# Patient Record
Sex: MALE | Race: WHITE | NOT HISPANIC OR LATINO | Employment: OTHER | URBAN - METROPOLITAN AREA
[De-identification: names, ages, dates, MRNs, and addresses within clinical notes are randomized per-mention and may not be internally consistent; named-entity substitution may affect disease eponyms.]

---

## 2019-12-16 LAB
ANION GAP SERPL CALCULATED.3IONS-SCNC: 12 MMOL/L (ref 10–20)
BICARBONATE: 32 MMOL/L (ref 21–32)
CALCIUM SERPL-MCNC: 9.6 MG/DL (ref 8.6–10.3)
CHLORIDE BLD-SCNC: 93 MMOL/L (ref 98–107)
CREAT SERPL-MCNC: 0.93 MG/DL (ref 0.5–1.3)
ERYTHROCYTE [DISTWIDTH] IN BLOOD BY AUTOMATED COUNT: 14 % (ref 11.5–14)
GFR AFRICAN AMERICAN: >60 ML/MIN/1.73M2
GFR NON-AFRICAN AMERICAN: >60 ML/MIN/1.73M2
GLUCOSE: 109 MG/DL (ref 74–99)
HCT VFR BLD CALC: 48.9 % (ref 41–52)
HEMOGLOBIN: 16.3 G/DL (ref 13.5–17)
MCHC RBC AUTO-ENTMCNC: 33.3 G/DL (ref 32–36)
MCV RBC AUTO: 109 FL (ref 80–100)
NUCLEATED RBCS: 0 /100 WBC (ref 0–0)
PLATELET # BLD: 304 X10E9/L (ref 150–450)
POTASSIUM SERPL-SCNC: 3.6 MMOL/L (ref 3.5–5.3)
RBC # BLD: 4.5 X10E12/L (ref 4.5–5.9)
SODIUM BLD-SCNC: 133 MMOL/L (ref 136–145)
UREA NITROGEN: 11 MG/DL (ref 6–23)
WBC: 9.2 X10E9/L (ref 4.4–11.3)

## 2019-12-17 LAB
ABO: NORMAL
ANTIBODY SCREEN: NORMAL
RH TYPE: NORMAL

## 2019-12-28 LAB
BASOPHILS # BLD: 0.01 X10E9/L (ref 0–0.1)
BASOPHILS RELATIVE PERCENT: 0.1 % (ref 0–2)
EOSINOPHIL # BLD: 0.06 X10E9/L (ref 0–0.7)
EOSINOPHILS RELATIVE PERCENT: 0.5 % (ref 0–6)
ERYTHROCYTE [DISTWIDTH] IN BLOOD BY AUTOMATED COUNT: 12.6 % (ref 11.5–14)
HCT VFR BLD CALC: 41.5 % (ref 41–52)
HEMOGLOBIN: 13.4 G/DL (ref 13.5–17)
IMMATURE GRANULOCYTES %: 0.7 % (ref 0–0.9)
LYMPHOCYTES # BLD: 12.7 % (ref 13–44)
LYMPHOCYTES RELATIVE PERCENT: 1.56 X10E9/L (ref 1.2–4.8)
MCHC RBC AUTO-ENTMCNC: 32.3 G/DL (ref 32–36)
MCV RBC AUTO: 109 FL (ref 80–100)
MONOCYTES # BLD: 1.18 X10E9/L (ref 0.1–1)
MONOCYTES RELATIVE PERCENT: 9.6 % (ref 2–10)
NEUTROPHILS RELATIVE PERCENT: 76.4 % (ref 40–80)
NEUTROPHILS: 9.41 X10E9/L (ref 1.2–7.7)
NUCLEATED RBCS: 0 /100 WBC (ref 0–0)
PLATELET # BLD: 228 X10E9/L (ref 150–450)
RBC # BLD: 3.79 X10E12/L (ref 4.5–5.9)
WBC: 12.3 X10E9/L (ref 4.4–11.3)

## 2019-12-29 LAB
BASOPHILS # BLD: 0.01 X10E9/L (ref 0–0.1)
BASOPHILS RELATIVE PERCENT: 0.1 % (ref 0–2)
EOSINOPHIL # BLD: 0.23 X10E9/L (ref 0–0.7)
EOSINOPHILS RELATIVE PERCENT: 1.9 % (ref 0–6)
ERYTHROCYTE [DISTWIDTH] IN BLOOD BY AUTOMATED COUNT: 12.8 % (ref 11.5–14)
HCT VFR BLD CALC: 36.1 % (ref 41–52)
HEMOGLOBIN: 11.9 G/DL (ref 13.5–17)
IMMATURE GRANULOCYTES %: 0.6 % (ref 0–0.9)
LYMPHOCYTES # BLD: 14.5 % (ref 13–44)
LYMPHOCYTES RELATIVE PERCENT: 1.73 X10E9/L (ref 1.2–4.8)
MCHC RBC AUTO-ENTMCNC: 33 G/DL (ref 32–36)
MCV RBC AUTO: 106 FL (ref 80–100)
MONOCYTES # BLD: 1.55 X10E9/L (ref 0.1–1)
MONOCYTES RELATIVE PERCENT: 13 % (ref 2–10)
NEUTROPHILS RELATIVE PERCENT: 69.9 % (ref 40–80)
NEUTROPHILS: 8.31 X10E9/L (ref 1.2–7.7)
NUCLEATED RBCS: 0 /100 WBC (ref 0–0)
PLATELET # BLD: 212 X10E9/L (ref 150–450)
RBC # BLD: 3.4 X10E12/L (ref 4.5–5.9)
WBC: 11.9 X10E9/L (ref 4.4–11.3)

## 2019-12-30 LAB
BASOPHILS # BLD: 0.04 X10E9/L (ref 0–0.1)
BASOPHILS RELATIVE PERCENT: 0.4 % (ref 0–2)
EOSINOPHIL # BLD: 0.17 X10E9/L (ref 0–0.7)
EOSINOPHILS RELATIVE PERCENT: 1.6 % (ref 0–6)
ERYTHROCYTE [DISTWIDTH] IN BLOOD BY AUTOMATED COUNT: 12.7 % (ref 11.5–14)
HCT VFR BLD CALC: 34.7 % (ref 41–52)
HEMOGLOBIN: 11.5 G/DL (ref 13.5–17)
IMMATURE GRANULOCYTES %: 1 % (ref 0–0.9)
LYMPHOCYTES # BLD: 14.6 % (ref 13–44)
LYMPHOCYTES RELATIVE PERCENT: 1.58 X10E9/L (ref 1.2–4.8)
MCHC RBC AUTO-ENTMCNC: 33.1 G/DL (ref 32–36)
MCV RBC AUTO: 107 FL (ref 80–100)
MONOCYTES # BLD: 1.32 X10E9/L (ref 0.1–1)
MONOCYTES RELATIVE PERCENT: 12.2 % (ref 2–10)
NEUTROPHILS RELATIVE PERCENT: 70.2 % (ref 40–80)
NEUTROPHILS: 7.58 X10E9/L (ref 1.2–7.7)
NUCLEATED RBCS: 0 /100 WBC (ref 0–0)
PLATELET # BLD: 270 X10E9/L (ref 150–450)
RBC # BLD: 3.24 X10E12/L (ref 4.5–5.9)
WBC: 10.8 X10E9/L (ref 4.4–11.3)

## 2020-01-07 LAB — SURGICAL PATHOLOGY REPORT: NORMAL

## 2023-01-29 PROBLEM — M53.3 SACROILIAC PAIN: Status: ACTIVE | Noted: 2023-01-29

## 2023-01-29 PROBLEM — M25.522 LEFT ELBOW PAIN: Status: ACTIVE | Noted: 2023-01-29

## 2023-01-29 PROBLEM — N40.0 BENIGN ENLARGEMENT OF PROSTATE: Status: ACTIVE | Noted: 2023-01-29

## 2023-01-29 PROBLEM — R10.9 ABDOMINAL PAIN: Status: ACTIVE | Noted: 2023-01-29

## 2023-01-29 PROBLEM — M54.16 LUMBAR RADICULOPATHY: Status: ACTIVE | Noted: 2023-01-29

## 2023-01-29 PROBLEM — E78.5 HYPERLIPIDEMIA: Status: ACTIVE | Noted: 2023-01-29

## 2023-01-29 PROBLEM — R29.6 FALLING: Status: ACTIVE | Noted: 2023-01-29

## 2023-01-29 PROBLEM — Z86.79 HISTORY OF PAROXYSMAL SUPRAVENTRICULAR TACHYCARDIA: Status: ACTIVE | Noted: 2023-01-29

## 2023-01-29 PROBLEM — R74.01 ALT (SGPT) LEVEL RAISED: Status: ACTIVE | Noted: 2023-01-29

## 2023-01-29 PROBLEM — G89.29 CHRONIC HIP PAIN AFTER TOTAL REPLACEMENT OF RIGHT HIP JOINT: Status: ACTIVE | Noted: 2023-01-29

## 2023-01-29 PROBLEM — Z96.642 HISTORY OF LEFT HIP REPLACEMENT: Status: ACTIVE | Noted: 2023-01-29

## 2023-01-29 PROBLEM — R39.89 PNEUMATURIA: Status: ACTIVE | Noted: 2023-01-29

## 2023-01-29 PROBLEM — E87.6 HYPOKALEMIA: Status: ACTIVE | Noted: 2023-01-29

## 2023-01-29 PROBLEM — R26.9 ABNORMALITY OF GAIT AND MOBILITY: Status: ACTIVE | Noted: 2023-01-29

## 2023-01-29 PROBLEM — S22.39XA RIB FRACTURE: Status: ACTIVE | Noted: 2023-01-29

## 2023-01-29 PROBLEM — M79.672 LEFT FOOT PAIN: Status: ACTIVE | Noted: 2023-01-29

## 2023-01-29 PROBLEM — N52.9 MALE ERECTILE DISORDER OF ORGANIC ORIGIN: Status: ACTIVE | Noted: 2023-01-29

## 2023-01-29 PROBLEM — M25.572 PAIN IN JOINTS OF BOTH FEET: Status: ACTIVE | Noted: 2023-01-29

## 2023-01-29 PROBLEM — Z96.641 CHRONIC HIP PAIN AFTER TOTAL REPLACEMENT OF RIGHT HIP JOINT: Status: ACTIVE | Noted: 2023-01-29

## 2023-01-29 PROBLEM — D53.9 MACROCYTIC ANEMIA: Status: ACTIVE | Noted: 2023-01-29

## 2023-01-29 PROBLEM — F32.A DEPRESSION, CONTROLLED: Status: ACTIVE | Noted: 2023-01-29

## 2023-01-29 PROBLEM — N12 PYELONEPHRITIS: Status: ACTIVE | Noted: 2023-01-29

## 2023-01-29 PROBLEM — F43.21 SITUATIONAL DEPRESSION: Status: ACTIVE | Noted: 2023-01-29

## 2023-01-29 PROBLEM — S29.9XXA RIB INJURY: Status: ACTIVE | Noted: 2023-01-29

## 2023-01-29 PROBLEM — G56.22 CUBITAL TUNNEL SYNDROME ON LEFT: Status: ACTIVE | Noted: 2023-01-29

## 2023-01-29 PROBLEM — D12.6 ADENOMATOUS POLYP OF COLON: Status: ACTIVE | Noted: 2023-01-29

## 2023-01-29 PROBLEM — R39.15 URINARY URGENCY: Status: ACTIVE | Noted: 2023-01-29

## 2023-01-29 PROBLEM — I48.0 PAROXYSMAL ATRIAL FIBRILLATION (MULTI): Status: ACTIVE | Noted: 2023-01-29

## 2023-01-29 PROBLEM — G89.29 CHRONIC PAIN OF RIGHT KNEE: Status: ACTIVE | Noted: 2023-01-29

## 2023-01-29 PROBLEM — R91.8 NONSPECIFIC ABNORMAL FINDING OF LUNG FIELD: Status: ACTIVE | Noted: 2023-01-29

## 2023-01-29 PROBLEM — E53.8 VITAMIN B12 DEFICIENCY: Status: ACTIVE | Noted: 2023-01-29

## 2023-01-29 PROBLEM — K21.9 GASTROESOPHAGEAL REFLUX DISEASE: Status: ACTIVE | Noted: 2023-01-29

## 2023-01-29 PROBLEM — G62.9 PERIPHERAL NEUROPATHY: Status: ACTIVE | Noted: 2023-01-29

## 2023-01-29 PROBLEM — M25.561 CHRONIC PAIN OF RIGHT KNEE: Status: ACTIVE | Noted: 2023-01-29

## 2023-01-29 PROBLEM — G57.93 NEUROPATHIC PAIN OF BOTH FEET: Status: ACTIVE | Noted: 2023-01-29

## 2023-01-29 PROBLEM — M25.571 PAIN IN JOINTS OF BOTH FEET: Status: ACTIVE | Noted: 2023-01-29

## 2023-01-29 PROBLEM — M25.519 SHOULDER PAIN: Status: ACTIVE | Noted: 2023-01-29

## 2023-01-29 PROBLEM — Q66.50 CONGENITAL PES PLANUS: Status: ACTIVE | Noted: 2023-01-29

## 2023-01-29 PROBLEM — M79.641 PAIN IN BOTH HANDS: Status: ACTIVE | Noted: 2023-01-29

## 2023-01-29 PROBLEM — G56.00 CARPAL TUNNEL SYNDROME: Status: ACTIVE | Noted: 2023-01-29

## 2023-01-29 PROBLEM — S00.03XA SCALP HEMATOMA: Status: ACTIVE | Noted: 2023-01-29

## 2023-01-29 PROBLEM — N39.0 ACUTE UTI: Status: ACTIVE | Noted: 2023-01-29

## 2023-01-29 PROBLEM — N40.1 BPH WITH OBSTRUCTION/LOWER URINARY TRACT SYMPTOMS: Status: ACTIVE | Noted: 2023-01-29

## 2023-01-29 PROBLEM — Z97.3 WEARS GLASSES: Status: ACTIVE | Noted: 2023-01-29

## 2023-01-29 PROBLEM — N13.8 BPH WITH OBSTRUCTION/LOWER URINARY TRACT SYMPTOMS: Status: ACTIVE | Noted: 2023-01-29

## 2023-01-29 PROBLEM — K29.20 ALCOHOLIC GASTRITIS: Status: ACTIVE | Noted: 2023-01-29

## 2023-01-29 PROBLEM — I10 BENIGN ESSENTIAL HYPERTENSION: Status: ACTIVE | Noted: 2023-01-29

## 2023-01-29 PROBLEM — C44.90 SKIN CANCER: Status: ACTIVE | Noted: 2023-01-29

## 2023-01-29 PROBLEM — M79.642 PAIN IN BOTH HANDS: Status: ACTIVE | Noted: 2023-01-29

## 2023-01-29 PROBLEM — E55.9 VITAMIN D DEFICIENCY: Status: ACTIVE | Noted: 2023-01-29

## 2023-01-29 PROBLEM — L03.90 CELLULITIS: Status: ACTIVE | Noted: 2023-01-29

## 2023-01-29 PROBLEM — D12.6 TUBULAR ADENOMA OF COLON: Status: ACTIVE | Noted: 2023-01-29

## 2023-01-29 PROBLEM — H26.9 EARLY CATARACTS, BILATERAL: Status: ACTIVE | Noted: 2023-01-29

## 2023-01-29 PROBLEM — S98.131A AMPUTATION OF TOE OF RIGHT FOOT (CMS-HCC): Status: ACTIVE | Noted: 2023-01-29

## 2023-01-29 PROBLEM — L30.9 ECZEMA: Status: ACTIVE | Noted: 2023-01-29

## 2023-01-29 PROBLEM — F41.9 ANXIETY: Status: ACTIVE | Noted: 2023-01-29

## 2023-01-29 PROBLEM — M19.90 OSTEOARTHRITIS: Status: ACTIVE | Noted: 2023-01-29

## 2023-01-29 PROBLEM — K63.5 COLON POLYPS: Status: ACTIVE | Noted: 2023-01-29

## 2023-01-29 PROBLEM — J32.9 SINUSITIS: Status: ACTIVE | Noted: 2023-01-29

## 2023-01-29 PROBLEM — D64.9 ANEMIA: Status: ACTIVE | Noted: 2023-01-29

## 2023-01-29 PROBLEM — R33.9 BLADDER RETENTION: Status: ACTIVE | Noted: 2023-01-29

## 2023-01-29 PROBLEM — M85.80 OSTEOPENIA: Status: ACTIVE | Noted: 2023-01-29

## 2023-01-29 PROBLEM — J45.909 REACTIVE AIRWAY DISEASE (HHS-HCC): Status: ACTIVE | Noted: 2023-01-29

## 2023-01-29 PROBLEM — M65.342 TRIGGER RING FINGER OF LEFT HAND: Status: ACTIVE | Noted: 2023-01-29

## 2023-01-29 PROBLEM — E03.9 HYPOTHYROIDISM: Status: ACTIVE | Noted: 2023-01-29

## 2023-01-29 PROBLEM — M54.9 BACK PAIN: Status: ACTIVE | Noted: 2023-01-29

## 2023-01-29 PROBLEM — M25.551 CHRONIC HIP PAIN AFTER TOTAL REPLACEMENT OF RIGHT HIP JOINT: Status: ACTIVE | Noted: 2023-01-29

## 2023-01-29 PROBLEM — R00.2 HEART PALPITATIONS: Status: ACTIVE | Noted: 2023-01-29

## 2023-01-29 PROBLEM — Z99.89 USE OF CANE AS AMBULATORY AID: Status: ACTIVE | Noted: 2023-01-29

## 2023-01-29 PROBLEM — M79.632 PAIN OF LEFT FOREARM: Status: ACTIVE | Noted: 2023-01-29

## 2023-01-29 PROBLEM — J30.9 ALLERGIC RHINITIS: Status: ACTIVE | Noted: 2023-01-29

## 2023-01-29 PROBLEM — E53.8 FOLIC ACID DEFICIENCY: Status: ACTIVE | Noted: 2023-01-29

## 2023-01-29 PROBLEM — R09.02 HYPOXEMIA: Status: ACTIVE | Noted: 2023-01-29

## 2023-01-29 PROBLEM — E61.1 IRON DEFICIENCY: Status: ACTIVE | Noted: 2023-01-29

## 2023-01-29 PROBLEM — R42 POSTURAL DIZZINESS: Status: ACTIVE | Noted: 2023-01-29

## 2023-01-29 PROBLEM — F32.4 MAJOR DEPRESSIVE DISORDER IN PARTIAL REMISSION (CMS-HCC): Status: ACTIVE | Noted: 2023-01-29

## 2023-01-29 PROBLEM — F10.20 CONTINUOUS CHRONIC ALCOHOLISM (MULTI): Status: ACTIVE | Noted: 2023-01-29

## 2023-01-29 PROBLEM — Z78.9 LIVING WILL IN PLACE: Status: RESOLVED | Noted: 2023-01-29 | Resolved: 2023-01-29

## 2023-01-29 PROBLEM — Z91.199 NONCOMPLIANCE WITH THERAPEUTIC PLAN: Status: ACTIVE | Noted: 2023-01-29

## 2023-01-29 PROBLEM — I95.1 ORTHOSTATIC HYPOTENSION: Status: ACTIVE | Noted: 2023-01-29

## 2023-01-29 RX ORDER — LEVOTHYROXINE SODIUM 50 UG/1
50 TABLET ORAL DAILY
COMMUNITY
Start: 2015-06-26 | End: 2023-03-13 | Stop reason: SDUPTHER

## 2023-01-29 RX ORDER — MINERAL OIL
180 ENEMA (ML) RECTAL DAILY PRN
COMMUNITY
Start: 2022-06-08

## 2023-01-29 RX ORDER — TAMSULOSIN HYDROCHLORIDE 0.4 MG/1
0.4 CAPSULE ORAL NIGHTLY
COMMUNITY
Start: 2021-11-04 | End: 2024-05-10 | Stop reason: SDUPTHER

## 2023-01-29 RX ORDER — TADALAFIL 5 MG/1
5 TABLET ORAL DAILY
COMMUNITY
Start: 2022-03-14 | End: 2024-02-16

## 2023-01-29 RX ORDER — ERGOCALCIFEROL 1.25 MG/1
1 CAPSULE ORAL
COMMUNITY
Start: 2016-06-02 | End: 2024-01-15 | Stop reason: SDUPTHER

## 2023-01-29 RX ORDER — FOLIC ACID 1 MG/1
1 TABLET ORAL DAILY
COMMUNITY
Start: 2016-12-15 | End: 2023-06-22

## 2023-01-29 RX ORDER — GABAPENTIN 800 MG/1
800 TABLET ORAL 3 TIMES DAILY
COMMUNITY
Start: 2022-09-30 | End: 2024-01-10 | Stop reason: SDUPTHER

## 2023-01-29 RX ORDER — POTASSIUM CHLORIDE 1500 MG/1
20 TABLET, EXTENDED RELEASE ORAL 2 TIMES DAILY
COMMUNITY
Start: 2015-06-09 | End: 2023-06-22

## 2023-01-29 RX ORDER — LANOLIN ALCOHOL/MO/W.PET/CERES
100 CREAM (GRAM) TOPICAL DAILY
COMMUNITY
Start: 2014-12-24 | End: 2023-03-13 | Stop reason: SDUPTHER

## 2023-01-29 RX ORDER — PANTOPRAZOLE SODIUM 40 MG/1
40 TABLET, DELAYED RELEASE ORAL 3 TIMES DAILY
COMMUNITY
Start: 2022-07-04 | End: 2024-01-21 | Stop reason: SDUPTHER

## 2023-01-29 RX ORDER — FLUTICASONE PROPIONATE 50 MCG
1-2 SPRAY, SUSPENSION (ML) NASAL DAILY
COMMUNITY
Start: 2021-04-12

## 2023-01-29 RX ORDER — BUSPIRONE HYDROCHLORIDE 5 MG/1
5 TABLET ORAL 3 TIMES DAILY PRN
COMMUNITY
Start: 2013-09-20

## 2023-01-29 RX ORDER — MIRTAZAPINE 7.5 MG/1
7.5 TABLET, FILM COATED ORAL NIGHTLY
COMMUNITY
End: 2024-01-21 | Stop reason: WASHOUT

## 2023-01-29 RX ORDER — FLUTICASONE FUROATE AND VILANTEROL 100; 25 UG/1; UG/1
1 POWDER RESPIRATORY (INHALATION) DAILY
COMMUNITY
Start: 2021-07-06

## 2023-01-29 RX ORDER — DULOXETIN HYDROCHLORIDE 60 MG/1
60 CAPSULE, DELAYED RELEASE ORAL 2 TIMES DAILY
COMMUNITY
Start: 2019-05-06 | End: 2024-01-10 | Stop reason: SDUPTHER

## 2023-01-29 RX ORDER — MONTELUKAST SODIUM 10 MG/1
TABLET ORAL NIGHTLY
COMMUNITY
Start: 2018-06-27 | End: 2023-05-02

## 2023-01-29 RX ORDER — ALENDRONATE SODIUM 70 MG/1
70 TABLET ORAL
COMMUNITY
Start: 2016-06-02 | End: 2024-03-04 | Stop reason: SDUPTHER

## 2023-01-29 RX ORDER — ESCITALOPRAM OXALATE 10 MG/1
10 TABLET ORAL DAILY
COMMUNITY
End: 2024-05-07 | Stop reason: WASHOUT

## 2023-01-29 RX ORDER — FERROUS SULFATE 325(65) MG
65 TABLET ORAL 2 TIMES DAILY PRN
COMMUNITY
Start: 2021-03-02 | End: 2024-01-21 | Stop reason: ALTCHOICE

## 2023-01-29 RX ORDER — ESCITALOPRAM OXALATE 20 MG/1
20 TABLET ORAL DAILY PRN
COMMUNITY
Start: 2022-09-12 | End: 2023-05-24 | Stop reason: SDUPTHER

## 2023-01-29 RX ORDER — SILDENAFIL 50 MG/1
50 TABLET, FILM COATED ORAL AS NEEDED
COMMUNITY
Start: 2019-04-23 | End: 2024-02-16

## 2023-01-29 RX ORDER — AMLODIPINE BESYLATE 2.5 MG/1
2.5 TABLET ORAL DAILY
COMMUNITY
Start: 2021-08-23 | End: 2023-11-06 | Stop reason: SDUPTHER

## 2023-01-29 RX ORDER — ONDANSETRON 4 MG/1
4 TABLET, FILM COATED ORAL DAILY
COMMUNITY

## 2023-01-29 RX ORDER — PROPRANOLOL HYDROCHLORIDE 10 MG/1
10 TABLET ORAL 3 TIMES DAILY
COMMUNITY
Start: 2015-12-28 | End: 2023-06-22

## 2023-02-24 ENCOUNTER — DOCUMENTATION (OUTPATIENT)
Dept: PRIMARY CARE | Facility: CLINIC | Age: 68
End: 2023-02-24
Payer: MEDICARE

## 2023-03-13 ENCOUNTER — OFFICE VISIT (OUTPATIENT)
Dept: PRIMARY CARE | Facility: CLINIC | Age: 68
End: 2023-03-13
Payer: MEDICARE

## 2023-03-13 ENCOUNTER — PATIENT OUTREACH (OUTPATIENT)
Dept: PRIMARY CARE | Facility: CLINIC | Age: 68
End: 2023-03-13

## 2023-03-13 VITALS
SYSTOLIC BLOOD PRESSURE: 131 MMHG | OXYGEN SATURATION: 92 % | WEIGHT: 228.4 LBS | HEIGHT: 69 IN | BODY MASS INDEX: 33.83 KG/M2 | TEMPERATURE: 98.3 F | RESPIRATION RATE: 16 BRPM | HEART RATE: 74 BPM | DIASTOLIC BLOOD PRESSURE: 76 MMHG

## 2023-03-13 DIAGNOSIS — G57.93 NEUROPATHIC PAIN OF BOTH FEET: Chronic | ICD-10-CM

## 2023-03-13 DIAGNOSIS — M19.91 PRIMARY OSTEOARTHRITIS, UNSPECIFIED SITE: Chronic | ICD-10-CM

## 2023-03-13 DIAGNOSIS — F10.20: Chronic | ICD-10-CM

## 2023-03-13 DIAGNOSIS — R26.9 ABNORMALITY OF GAIT AND MOBILITY: Chronic | ICD-10-CM

## 2023-03-13 DIAGNOSIS — I95.1 ORTHOSTATIC HYPOTENSION: Chronic | ICD-10-CM

## 2023-03-13 DIAGNOSIS — D12.6 TUBULAR ADENOMA OF COLON: Chronic | ICD-10-CM

## 2023-03-13 DIAGNOSIS — E55.9 VITAMIN D DEFICIENCY: Chronic | ICD-10-CM

## 2023-03-13 DIAGNOSIS — I10 HYPERTENSION, UNSPECIFIED TYPE: ICD-10-CM

## 2023-03-13 DIAGNOSIS — N40.1 BPH WITH OBSTRUCTION/LOWER URINARY TRACT SYMPTOMS: Chronic | ICD-10-CM

## 2023-03-13 DIAGNOSIS — Z00.00 ROUTINE GENERAL MEDICAL EXAMINATION AT HEALTH CARE FACILITY: ICD-10-CM

## 2023-03-13 DIAGNOSIS — S98.131A AMPUTATION OF TOE OF RIGHT FOOT (CMS-HCC): Chronic | ICD-10-CM

## 2023-03-13 DIAGNOSIS — E03.9 HYPOTHYROIDISM, UNSPECIFIED TYPE: ICD-10-CM

## 2023-03-13 DIAGNOSIS — M54.16 LUMBAR RADICULOPATHY: Chronic | ICD-10-CM

## 2023-03-13 DIAGNOSIS — N13.8 BPH WITH OBSTRUCTION/LOWER URINARY TRACT SYMPTOMS: Chronic | ICD-10-CM

## 2023-03-13 DIAGNOSIS — E53.8 FOLIC ACID DEFICIENCY: Chronic | ICD-10-CM

## 2023-03-13 DIAGNOSIS — M25.551 CHRONIC HIP PAIN AFTER TOTAL REPLACEMENT OF RIGHT HIP JOINT: Chronic | ICD-10-CM

## 2023-03-13 DIAGNOSIS — E66.09 CLASS 1 OBESITY DUE TO EXCESS CALORIES WITH SERIOUS COMORBIDITY AND BODY MASS INDEX (BMI) OF 33.0 TO 33.9 IN ADULT: ICD-10-CM

## 2023-03-13 DIAGNOSIS — Z96.641 CHRONIC HIP PAIN AFTER TOTAL REPLACEMENT OF RIGHT HIP JOINT: Chronic | ICD-10-CM

## 2023-03-13 DIAGNOSIS — G56.03 BILATERAL CARPAL TUNNEL SYNDROME: Chronic | ICD-10-CM

## 2023-03-13 DIAGNOSIS — E03.9 HYPOTHYROIDISM, UNSPECIFIED TYPE: Chronic | ICD-10-CM

## 2023-03-13 DIAGNOSIS — D64.9 ANEMIA, UNSPECIFIED TYPE: Chronic | ICD-10-CM

## 2023-03-13 DIAGNOSIS — C44.90 SKIN CANCER: Chronic | ICD-10-CM

## 2023-03-13 DIAGNOSIS — I10 BENIGN ESSENTIAL HYPERTENSION: Primary | Chronic | ICD-10-CM

## 2023-03-13 DIAGNOSIS — E87.6 HYPOKALEMIA: Chronic | ICD-10-CM

## 2023-03-13 DIAGNOSIS — I48.20 ATRIAL FIBRILLATION, CHRONIC (MULTI): ICD-10-CM

## 2023-03-13 DIAGNOSIS — E53.8 VITAMIN B12 DEFICIENCY: Chronic | ICD-10-CM

## 2023-03-13 DIAGNOSIS — I48.0 PAROXYSMAL ATRIAL FIBRILLATION (MULTI): Chronic | ICD-10-CM

## 2023-03-13 DIAGNOSIS — E53.8 VITAMIN B 12 DEFICIENCY: Primary | ICD-10-CM

## 2023-03-13 DIAGNOSIS — G62.1 ALCOHOL-INDUCED POLYNEUROPATHY (MULTI): Chronic | ICD-10-CM

## 2023-03-13 DIAGNOSIS — G89.29 CHRONIC HIP PAIN AFTER TOTAL REPLACEMENT OF RIGHT HIP JOINT: Chronic | ICD-10-CM

## 2023-03-13 DIAGNOSIS — F33.41 RECURRENT MAJOR DEPRESSIVE DISORDER, IN PARTIAL REMISSION (CMS-HCC): Chronic | ICD-10-CM

## 2023-03-13 DIAGNOSIS — Z86.79 HISTORY OF PAROXYSMAL SUPRAVENTRICULAR TACHYCARDIA: Chronic | ICD-10-CM

## 2023-03-13 DIAGNOSIS — K29.20 ALCOHOLIC GASTRITIS WITHOUT BLEEDING, UNSPECIFIED CHRONICITY: Chronic | ICD-10-CM

## 2023-03-13 LAB
ALANINE AMINOTRANSFERASE (SGPT) (U/L) IN SER/PLAS: 24 U/L (ref 10–52)
ALBUMIN (G/DL) IN SER/PLAS: 4.3 G/DL (ref 3.4–5)
ALKALINE PHOSPHATASE (U/L) IN SER/PLAS: 119 U/L (ref 33–136)
ASPARTATE AMINOTRANSFERASE (SGOT) (U/L) IN SER/PLAS: 38 U/L (ref 9–39)
BILIRUBIN DIRECT (MG/DL) IN SER/PLAS: 0.5 MG/DL (ref 0–0.3)
BILIRUBIN TOTAL (MG/DL) IN SER/PLAS: 1.7 MG/DL (ref 0–1.2)
ERYTHROCYTE DISTRIBUTION WIDTH (RATIO) BY AUTOMATED COUNT: 14.5 % (ref 11.5–14.5)
ERYTHROCYTE MEAN CORPUSCULAR HEMOGLOBIN CONCENTRATION (G/DL) BY AUTOMATED: 31.2 G/DL (ref 32–36)
ERYTHROCYTE MEAN CORPUSCULAR VOLUME (FL) BY AUTOMATED COUNT: 107 FL (ref 80–100)
ERYTHROCYTES (10*6/UL) IN BLOOD BY AUTOMATED COUNT: 5.09 X10E12/L (ref 4.5–5.9)
FERRITIN (UG/LL) IN SER/PLAS: 671 UG/L (ref 20–300)
HEMATOCRIT (%) IN BLOOD BY AUTOMATED COUNT: 54.5 % (ref 41–52)
HEMOGLOBIN (G/DL) IN BLOOD: 17 G/DL (ref 13.5–17.5)
IRON (UG/DL) IN SER/PLAS: 93 UG/DL (ref 35–150)
IRON BINDING CAPACITY (UG/DL) IN SER/PLAS: 308 UG/DL (ref 240–445)
IRON SATURATION (%) IN SER/PLAS: 30 % (ref 25–45)
LEUKOCYTES (10*3/UL) IN BLOOD BY AUTOMATED COUNT: 7.9 X10E9/L (ref 4.4–11.3)
PLATELETS (10*3/UL) IN BLOOD AUTOMATED COUNT: 275 X10E9/L (ref 150–450)
PROTEIN TOTAL: 7.9 G/DL (ref 6.4–8.2)

## 2023-03-13 PROCEDURE — 1170F FXNL STATUS ASSESSED: CPT | Performed by: INTERNAL MEDICINE

## 2023-03-13 PROCEDURE — 99397 PER PM REEVAL EST PAT 65+ YR: CPT | Performed by: INTERNAL MEDICINE

## 2023-03-13 PROCEDURE — 3008F BODY MASS INDEX DOCD: CPT | Performed by: INTERNAL MEDICINE

## 2023-03-13 PROCEDURE — 1036F TOBACCO NON-USER: CPT | Performed by: INTERNAL MEDICINE

## 2023-03-13 PROCEDURE — 3078F DIAST BP <80 MM HG: CPT | Performed by: INTERNAL MEDICINE

## 2023-03-13 PROCEDURE — 3075F SYST BP GE 130 - 139MM HG: CPT | Performed by: INTERNAL MEDICINE

## 2023-03-13 PROCEDURE — 1160F RVW MEDS BY RX/DR IN RCRD: CPT | Performed by: INTERNAL MEDICINE

## 2023-03-13 PROCEDURE — G0444 DEPRESSION SCREEN ANNUAL: HCPCS | Performed by: INTERNAL MEDICINE

## 2023-03-13 PROCEDURE — 1123F ACP DISCUSS/DSCN MKR DOCD: CPT | Performed by: INTERNAL MEDICINE

## 2023-03-13 PROCEDURE — 1159F MED LIST DOCD IN RCRD: CPT | Performed by: INTERNAL MEDICINE

## 2023-03-13 PROCEDURE — G0439 PPPS, SUBSEQ VISIT: HCPCS | Performed by: INTERNAL MEDICINE

## 2023-03-13 RX ORDER — LEVOTHYROXINE SODIUM 50 UG/1
50 TABLET ORAL
Qty: 30 TABLET | Refills: 2 | Status: SHIPPED | OUTPATIENT
Start: 2023-03-13 | End: 2023-12-13 | Stop reason: SDUPTHER

## 2023-03-13 RX ORDER — LANOLIN ALCOHOL/MO/W.PET/CERES
100 CREAM (GRAM) TOPICAL DAILY
Status: CANCELLED | OUTPATIENT
Start: 2023-03-13

## 2023-03-13 RX ORDER — LANOLIN ALCOHOL/MO/W.PET/CERES
1000 CREAM (GRAM) TOPICAL DAILY
Qty: 90 TABLET | Refills: 3 | Status: SHIPPED | OUTPATIENT
Start: 2023-03-13 | End: 2024-01-15 | Stop reason: SDUPTHER

## 2023-03-13 ASSESSMENT — ACTIVITIES OF DAILY LIVING (ADL)
HEARING - RIGHT EAR: FUNCTIONAL
HEARING - LEFT EAR: FUNCTIONAL
FEEDING YOURSELF: INDEPENDENT
USING TELEPHONE: INDEPENDENT
ASSISTIVE_DEVICE: WALKER;CANE
WALKS IN HOME: NEEDS ASSISTANCE
NEEDS ASSISTANCE WITH FOOD: INDEPENDENT
EATING: INDEPENDENT
TAKING MEDICATION: INDEPENDENT
GROOMING: INDEPENDENT
PATIENT'S MEMORY ADEQUATE TO SAFELY COMPLETE DAILY ACTIVITIES?: YES
ADEQUATE_TO_COMPLETE_ADL: YES
JUDGMENT_ADEQUATE_SAFELY_COMPLETE_DAILY_ACTIVITIES: YES
PREPARING MEALS: INDEPENDENT
PILL BOX USED: NO
GROCERY SHOPPING: INDEPENDENT
BATHING: INDEPENDENT
JUDGMENT_ADEQUATE_SAFELY_COMPLETE_DAILY_ACTIVITIES: YES
JUDGMENT_ADEQUATE_SAFELY_COMPLETE_DAILY_ACTIVITIES: YES
ADEQUATE_TO_COMPLETE_ADL: YES
BATHING: INDEPENDENT
DRESSING YOURSELF: INDEPENDENT
HEARING - LEFT EAR: FUNCTIONAL
USING TRANSPORTATION: TOTAL CARE
WALKS IN HOME: INDEPENDENT
MANAGING FINANCES: INDEPENDENT
PATIENT'S MEMORY ADEQUATE TO SAFELY COMPLETE DAILY ACTIVITIES?: YES
GROOMING: INDEPENDENT
TOILETING: INDEPENDENT
DOING HOUSEWORK: NEEDS ASSISTANCE
DRESSING YOURSELF: INDEPENDENT
STIL DRIVING: NO
TOILETING: INDEPENDENT
HEARING - RIGHT EAR: FUNCTIONAL
ASSISTIVE_DEVICE: WALKER
ADEQUATE_TO_COMPLETE_ADL: YES
FEEDING YOURSELF: INDEPENDENT

## 2023-03-13 ASSESSMENT — ENCOUNTER SYMPTOMS
DEPRESSION: 0
LOSS OF SENSATION IN FEET: 1
OCCASIONAL FEELINGS OF UNSTEADINESS: 1

## 2023-03-13 ASSESSMENT — PATIENT HEALTH QUESTIONNAIRE - PHQ9
1. LITTLE INTEREST OR PLEASURE IN DOING THINGS: NOT AT ALL
2. FEELING DOWN, DEPRESSED OR HOPELESS: NOT AT ALL
SUM OF ALL RESPONSES TO PHQ9 QUESTIONS 1 AND 2: 0

## 2023-03-13 NOTE — PROGRESS NOTES
"Subjective   Reason for Visit: Angus Redman is an 67 y.o. male here for a Medicare Wellness visit.     Past Medical, Surgical, and Family History reviewed and updated in chart.    Reviewed all medications by prescribing practitioner or clinical pharmacist (such as prescriptions, OTCs, herbal therapies and supplements) and documented in the medical record.    Here for f/up and wellness visit  Using cane and walker at home  Now w/ life alert pendant - used it this past Saturday, when he fell on the front porch trying to bring in a package  He orderes a lot of packages                            Patient Care Team:  Artie Haskins MD as PCP - General  Artie Haskins MD as PCP - [employer] Advantage PCP  Evelyn Valentin RN as Care Manager (Case Management)     Review of Systems    Objective   Vitals:  /76 (BP Location: Left arm, Patient Position: Sitting)   Pulse 74   Temp 36.8 °C (98.3 °F)   Resp 16   Ht 1.753 m (5' 9\")   Wt 104 kg (228 lb 6.4 oz)   SpO2 92%   BMI 33.73 kg/m²       Physical Exam  Vitals reviewed.   Constitutional:       Appearance: Normal appearance.   HENT:      Head: Normocephalic and atraumatic.   Eyes:      General: No scleral icterus.        Right eye: No discharge.         Left eye: No discharge.      Extraocular Movements: Extraocular movements intact.      Conjunctiva/sclera: Conjunctivae normal.      Pupils: Pupils are equal, round, and reactive to light.   Cardiovascular:      Rate and Rhythm: Normal rate and regular rhythm.      Pulses: Normal pulses.      Heart sounds: Normal heart sounds. No murmur heard.  Pulmonary:      Effort: Pulmonary effort is normal.      Breath sounds: Normal breath sounds. No wheezing or rhonchi.   Musculoskeletal:         General: No deformity or signs of injury. Normal range of motion.      Cervical back: Normal range of motion and neck supple. No rigidity or tenderness.   Lymphadenopathy:      Cervical: No cervical adenopathy.   Skin:     General: " Skin is warm and dry.      Findings: No rash.   Neurological:      General: No focal deficit present.      Mental Status: He is alert and oriented to person, place, and time. Mental status is at baseline.      Cranial Nerves: No cranial nerve deficit.      Sensory: No sensory deficit.      Gait: Gait normal.   Psychiatric:         Mood and Affect: Mood normal.         Behavior: Behavior normal.         Thought Content: Thought content normal.         Judgment: Judgment normal.         Assessment/Plan   Problem List Items Addressed This Visit          Nervous    Peripheral neuropathy    Lumbar radiculopathy    Carpal tunnel syndrome       Circulatory    Paroxysmal atrial fibrillation (CMS/HCC)    Orthostatic hypotension    Benign essential hypertension - Primary    Relevant Orders    Follow Up In [facility] - PCP       Digestive    Alcoholic gastritis    Tubular adenoma of colon       Genitourinary    BPH with obstruction/lower urinary tract symptoms       Musculoskeletal    Osteoarthritis    Neuropathic pain of both feet    Chronic hip pain after total replacement of right hip joint    Amputation of toe of right foot (CMS/HCC)       Endocrine/Metabolic    Vitamin D deficiency    Folic acid deficiency    Vitamin B12 deficiency    Hypothyroidism       Hematologic    Anemia       Other    Abnormality of gait and mobility    Skin cancer    Major depressive disorder in partial remission (CMS/HCC)    Hypokalemia    History of paroxysmal supraventricular tachycardia    Continuous chronic alcoholism (CMS/HCC)     Other Visit Diagnoses       Routine general medical examination at health care facility        Class 1 obesity due to excess calories with serious comorbidity and body mass index (BMI) of 33.0 to 33.9 in adult                     We spoke at length regarding his complex care plan    Regarding tenuous home situation-he lives alone but now no longer drives.  He is able to hire drivers to get him where he  needs to go.  He is able to order groceries and have them delivered.  At this point he declines any further changes in his home situation    Alcoholism with a history of alcoholic gastritis-unfortunately he is still drinking alcohol-apparently the groceries will deliver wine which she has converted to from gin, unfortunately.    History hypertension-he will continue medications    Alcoholic gastritis-he will continue his PPI and limit alcohol use    Depression-stable with his present plan he will continue    Recurrent falls-he will use a cane or walker as he is able to.  Unfortunately he recently fell when he went out to the [facility] to  delivered items from the store.  He had to call the squad    He will follow-up in 3 to 4 months, sooner as needed

## 2023-03-14 PROBLEM — R10.9 ABDOMINAL PAIN: Status: RESOLVED | Noted: 2023-01-29 | Resolved: 2023-03-14

## 2023-03-14 PROBLEM — R00.2 HEART PALPITATIONS: Status: RESOLVED | Noted: 2023-01-29 | Resolved: 2023-03-14

## 2023-03-14 PROBLEM — S29.9XXA RIB INJURY: Status: RESOLVED | Noted: 2023-01-29 | Resolved: 2023-03-14

## 2023-03-14 PROBLEM — R91.8 NONSPECIFIC ABNORMAL FINDING OF LUNG FIELD: Status: RESOLVED | Noted: 2023-01-29 | Resolved: 2023-03-14

## 2023-03-14 PROBLEM — M79.641 PAIN IN BOTH HANDS: Status: RESOLVED | Noted: 2023-01-29 | Resolved: 2023-03-14

## 2023-03-14 PROBLEM — N39.0 ACUTE UTI: Status: RESOLVED | Noted: 2023-01-29 | Resolved: 2023-03-14

## 2023-03-14 PROBLEM — S22.39XA RIB FRACTURE: Status: RESOLVED | Noted: 2023-01-29 | Resolved: 2023-03-14

## 2023-03-14 PROBLEM — N40.0 BENIGN ENLARGEMENT OF PROSTATE: Status: RESOLVED | Noted: 2023-01-29 | Resolved: 2023-03-14

## 2023-03-14 PROBLEM — M79.642 PAIN IN BOTH HANDS: Status: RESOLVED | Noted: 2023-01-29 | Resolved: 2023-03-14

## 2023-03-14 PROBLEM — N12 PYELONEPHRITIS: Status: RESOLVED | Noted: 2023-01-29 | Resolved: 2023-03-14

## 2023-03-14 PROBLEM — R33.9 BLADDER RETENTION: Status: RESOLVED | Noted: 2023-01-29 | Resolved: 2023-03-14

## 2023-03-14 PROBLEM — M79.632 PAIN OF LEFT FOREARM: Status: RESOLVED | Noted: 2023-01-29 | Resolved: 2023-03-14

## 2023-03-14 PROBLEM — R39.89 PNEUMATURIA: Status: RESOLVED | Noted: 2023-01-29 | Resolved: 2023-03-14

## 2023-03-14 ASSESSMENT — ACTIVITIES OF DAILY LIVING (ADL)
DRESSING: INDEPENDENT
GROCERY_SHOPPING: NEEDS ASSISTANCE
BATHING: INDEPENDENT
DOING_HOUSEWORK: NEEDS ASSISTANCE
MANAGING_FINANCES: INDEPENDENT
TAKING_MEDICATION: INDEPENDENT

## 2023-03-14 ASSESSMENT — PATIENT HEALTH QUESTIONNAIRE - PHQ9
SUM OF ALL RESPONSES TO PHQ9 QUESTIONS 1 AND 2: 1
2. FEELING DOWN, DEPRESSED OR HOPELESS: SEVERAL DAYS
10. IF YOU CHECKED OFF ANY PROBLEMS, HOW DIFFICULT HAVE THESE PROBLEMS MADE IT FOR YOU TO DO YOUR WORK, TAKE CARE OF THINGS AT HOME, OR GET ALONG WITH OTHER PEOPLE: NOT DIFFICULT AT ALL
1. LITTLE INTEREST OR PLEASURE IN DOING THINGS: NOT AT ALL

## 2023-03-16 LAB — BONE SPECIFIC ALKALINE PHOSPHATASE: 13.2 UG/L (ref 6.5–20.1)

## 2023-03-17 LAB
ALKALINE PHOSPHATASE (REF): 148 U/L (ref 40–120)
ALKALINE PHOSPHATASE OTHER: 0 U/L
ALKALINE PHOSPHATASE.BONE (U/L) IN SERUM OR PLASMA: 27 U/L (ref 0–55)
ALKALINE PHOSPHATASE.LIVER (U/L) IN SERUM OR PLASMA: 121 U/L (ref 0–94)

## 2023-04-03 ENCOUNTER — DOCUMENTATION (OUTPATIENT)
Dept: PRIMARY CARE | Facility: CLINIC | Age: 68
End: 2023-04-03
Payer: MEDICARE

## 2023-04-04 ENCOUNTER — PATIENT OUTREACH (OUTPATIENT)
Dept: PRIMARY CARE | Facility: CLINIC | Age: 68
End: 2023-04-04
Payer: MEDICARE

## 2023-04-04 DIAGNOSIS — I10 BENIGN ESSENTIAL HYPERTENSION: ICD-10-CM

## 2023-04-04 DIAGNOSIS — I48.20 ATRIAL FIBRILLATION, CHRONIC (MULTI): ICD-10-CM

## 2023-04-04 PROCEDURE — 99490 CHRNC CARE MGMT STAFF 1ST 20: CPT | Performed by: INTERNAL MEDICINE

## 2023-04-04 NOTE — PROGRESS NOTES
I called and spoke to the patient post discharge from [facility].  Per the patient he fell when it was daylight and woke up in the dark.  Patient does not know how long he was down for.  Patient pressed his emergency button when he work up.  Patient was to be admitted for a longer time but he had to go home to his cat who missed 2 doses of insulin.  Patient stated that he has fallen 2x since being home.  The neighbor helped him up.  I discussed the patient being safe in the house and being alone.  I suggested possible a assisted living or senior living and the patient refused. He does not to sell his house.  Patients sister suggests he move also.  Patient is SOB with ambulation. He is using his roll ator and resting.  Patient refused home o2 at the hospital.  Patient will  his medrol dose pack 4/5/2023.  I stressed the importance of his medrol dose pack and using his IS.  Patient was agreeable.  Patient is seeing pain management and podiatry 4/5/2023.   PCP appointment is scheduled for 4/10/2023.

## 2023-04-10 ENCOUNTER — OFFICE VISIT (OUTPATIENT)
Dept: PRIMARY CARE | Facility: CLINIC | Age: 68
End: 2023-04-10
Payer: MEDICARE

## 2023-04-10 VITALS
TEMPERATURE: 98.5 F | HEART RATE: 75 BPM | DIASTOLIC BLOOD PRESSURE: 71 MMHG | OXYGEN SATURATION: 94 % | WEIGHT: 231.2 LBS | RESPIRATION RATE: 16 BRPM | SYSTOLIC BLOOD PRESSURE: 137 MMHG | BODY MASS INDEX: 34.14 KG/M2

## 2023-04-10 DIAGNOSIS — M19.09 OSTEOARTHRITIS OF OTHER SITE, UNSPECIFIED OSTEOARTHRITIS TYPE: ICD-10-CM

## 2023-04-10 DIAGNOSIS — R54 FRAIL ELDERLY: ICD-10-CM

## 2023-04-10 DIAGNOSIS — I10 BENIGN ESSENTIAL HYPERTENSION: ICD-10-CM

## 2023-04-10 DIAGNOSIS — R09.02 HYPOXEMIA: Primary | ICD-10-CM

## 2023-04-10 DIAGNOSIS — G62.1 ALCOHOL-INDUCED POLYNEUROPATHY (MULTI): ICD-10-CM

## 2023-04-10 DIAGNOSIS — M53.3 SACROILIAC PAIN: ICD-10-CM

## 2023-04-10 DIAGNOSIS — K29.20 ALCOHOLIC GASTRITIS WITHOUT BLEEDING, UNSPECIFIED CHRONICITY: ICD-10-CM

## 2023-04-10 DIAGNOSIS — Z74.2 NEED FOR HOME HEALTH CARE: ICD-10-CM

## 2023-04-10 DIAGNOSIS — E03.9 HYPOTHYROIDISM, UNSPECIFIED TYPE: ICD-10-CM

## 2023-04-10 PROBLEM — R29.6 FREQUENT FALLS: Status: ACTIVE | Noted: 2017-12-14

## 2023-04-10 PROBLEM — R17 ELEVATED BILIRUBIN: Status: ACTIVE | Noted: 2023-04-10

## 2023-04-10 PROBLEM — F10.129 ALCOHOL ABUSE WITH INTOXICATION (CMS-HCC): Status: ACTIVE | Noted: 2017-12-14

## 2023-04-10 PROBLEM — Z91.148 HISTORY OF MEDICATION NONCOMPLIANCE: Status: ACTIVE | Noted: 2023-04-10

## 2023-04-10 PROCEDURE — 99495 TRANSJ CARE MGMT MOD F2F 14D: CPT | Performed by: INTERNAL MEDICINE

## 2023-04-10 PROCEDURE — 3008F BODY MASS INDEX DOCD: CPT | Performed by: INTERNAL MEDICINE

## 2023-04-10 PROCEDURE — 1160F RVW MEDS BY RX/DR IN RCRD: CPT | Performed by: INTERNAL MEDICINE

## 2023-04-10 PROCEDURE — 3075F SYST BP GE 130 - 139MM HG: CPT | Performed by: INTERNAL MEDICINE

## 2023-04-10 PROCEDURE — 1159F MED LIST DOCD IN RCRD: CPT | Performed by: INTERNAL MEDICINE

## 2023-04-10 PROCEDURE — 1036F TOBACCO NON-USER: CPT | Performed by: INTERNAL MEDICINE

## 2023-04-10 PROCEDURE — 3078F DIAST BP <80 MM HG: CPT | Performed by: INTERNAL MEDICINE

## 2023-04-10 RX ORDER — ASPIRIN 81 MG/1
81 TABLET ORAL DAILY
COMMUNITY
End: 2024-02-06 | Stop reason: ALTCHOICE

## 2023-04-10 RX ORDER — ACETAMINOPHEN 325 MG/1
1000 TABLET ORAL EVERY 6 HOURS PRN
COMMUNITY
End: 2024-05-07 | Stop reason: WASHOUT

## 2023-04-10 RX ORDER — ALBUTEROL SULFATE 90 UG/1
2 AEROSOL, METERED RESPIRATORY (INHALATION) EVERY 6 HOURS PRN
COMMUNITY

## 2023-04-10 ASSESSMENT — PATIENT HEALTH QUESTIONNAIRE - PHQ9
2. FEELING DOWN, DEPRESSED OR HOPELESS: NOT AT ALL
SUM OF ALL RESPONSES TO PHQ9 QUESTIONS 1 AND 2: 0
1. LITTLE INTEREST OR PLEASURE IN DOING THINGS: NOT AT ALL

## 2023-04-10 ASSESSMENT — ENCOUNTER SYMPTOMS
OCCASIONAL FEELINGS OF UNSTEADINESS: 1
LOSS OF SENSATION IN FEET: 1
DEPRESSION: 0

## 2023-04-10 NOTE — PROGRESS NOTES
Subjective   Patient ID: Angus Redman is a 67 y.o. male who presents for Hospital Follow-up.    For follow-up after recent hospitalization  He is considering selling his house and moving to a condo  He has had issues with falling where he tends to fall backwards when he looks up  Home physical therapy has not yet been set up         Review of Systems    Objective   /71 (BP Location: Left arm, Patient Position: Sitting)   Pulse 75   Temp 36.9 °C (98.5 °F)   Resp 16   Wt 105 kg (231 lb 3.2 oz)   SpO2 94%   BMI 34.14 kg/m²     Physical Exam  Vitals reviewed.   Constitutional:       Appearance: Normal appearance. He is obese.      Comments: Disheveled white male, appearing older than his stated age   HENT:      Head: Normocephalic and atraumatic.   Eyes:      General: No scleral icterus.        Right eye: No discharge.         Left eye: No discharge.      Extraocular Movements: Extraocular movements intact.      Conjunctiva/sclera: Conjunctivae normal.      Pupils: Pupils are equal, round, and reactive to light.   Cardiovascular:      Rate and Rhythm: Normal rate and regular rhythm.      Pulses: Normal pulses.      Heart sounds: Normal heart sounds. No murmur heard.  Pulmonary:      Effort: Pulmonary effort is normal.      Breath sounds: Normal breath sounds. No wheezing or rhonchi.   Musculoskeletal:         General: No deformity or signs of injury. Normal range of motion.      Cervical back: Normal range of motion and neck supple. No rigidity or tenderness.      Comments: Significant ecchymoses lateral and inferior to his left scapula extending towards his left lateral lumbar area without crepitance on palpation of the ribs   Lymphadenopathy:      Cervical: No cervical adenopathy.   Skin:     General: Skin is warm and dry.      Findings: No rash.   Neurological:      General: No focal deficit present.      Mental Status: He is alert and oriented to person, place, and time. Mental status is at baseline.     "  Cranial Nerves: No cranial nerve deficit.      Sensory: No sensory deficit.      Gait: Gait normal.   Psychiatric:         Mood and Affect: Mood normal.         Behavior: Behavior normal.         Thought Content: Thought content normal.         Judgment: Judgment normal.         Assessment/Plan   Problem List Items Addressed This Visit          Nervous    Peripheral neuropathy       Musculoskeletal    Sacroiliac pain    Relevant Orders    Referral to Home Care    Osteoarthritis       Other    Hypoxemia - Primary    Relevant Orders    Pulse ox single determination (39447)            Patient: Angus Redman  : 1955  PCP: Artie Haskins MD  MRN: [id number]  Program: No linked episodes     Angus Redman is a 67 y.o. male presenting today for follow-up after being discharged from the hospital 8 days ago. The main problem requiring admission was fall and injury to chest wall.  The discharge summary and/or Transitional Care Management documentation was reviewed. Medication reconciliation was performed as indicated via the \"Juan as Reviewed\" timestamp.     Angus Redman was contacted by Transitional Care Management services two days after his discharge. This encounter and supporting documentation was reviewed.    The complexity of medical decision making for this patient's transitional care is moderate    We spoke at length regarding his complex care plan    Patient was recently discharged from an inpatient facility. Discharge medication list reviewed and reconciled with her electronic medical record medication list. Discharge orders reviewed, and we'll continue to follow active medical problems as outlined in this note.    Recent fall with left lateral chest wall contusion with ecchymosis-fortunately no rib fractures he will use a heating pad    Gait unsteadiness-Home physical therapy ordered    Hypoxemia-he was reportedly hypoxic overnight-we will ordered overnight oximeter    Regarding tenuous home situation-he " lives alone but now no longer drives.  He is able to hire drivers to get him where he needs to go.  He is able to order groceries and have them delivered.  At this point he declines any further changes in his home situation    Alcoholism with a history of alcoholic gastritis-unfortunately he is still drinking alcohol-apparently the groceries will deliver wine which she has converted to from gin, unfortunately.    History hypertension-he will continue medications    Alcoholic gastritis-he will continue his PPI and limit alcohol use    Depression-stable with his present plan he will continue    Recurrent falls-he will use a cane or walker as he is able to.  Unfortunately he recently fell when he went out to the [employer] to  delivered items from the store.  He had to call the squad    He will follow-up in 3 to 4 months, sooner as needed

## 2023-04-20 ENCOUNTER — TELEPHONE (OUTPATIENT)
Dept: PRIMARY CARE | Facility: CLINIC | Age: 68
End: 2023-04-20
Payer: MEDICARE

## 2023-04-20 NOTE — TELEPHONE ENCOUNTER
Blanca from [facility] OT Dept calling / states she assessed patient today for his OT.     States she is concerned because pt's pulse ox keeps fluctuating- today it was 71% with activity and 86-90% without activity. States his Pulse ox was low yesterday and was advised to go to ED or UC but patient refused and is still refusing today to go.     States patient has a friend staying with him and she will try to encourage him to seek treatment at ED or UC.     Also, requesting a verbal order for home care nursing and for a .    Please advise

## 2023-04-21 ENCOUNTER — TELEPHONE (OUTPATIENT)
Dept: PRIMARY CARE | Facility: CLINIC | Age: 68
End: 2023-04-21
Payer: MEDICARE

## 2023-04-21 NOTE — TELEPHONE ENCOUNTER
Pt called wanting to to talk to MA in reference to getting out of [facility], will be released this afternoon.

## 2023-04-24 ENCOUNTER — PATIENT OUTREACH (OUTPATIENT)
Dept: PRIMARY CARE | Facility: CLINIC | Age: 68
End: 2023-04-24
Payer: MEDICARE

## 2023-04-25 ENCOUNTER — DOCUMENTATION (OUTPATIENT)
Dept: PRIMARY CARE | Facility: CLINIC | Age: 68
End: 2023-04-25
Payer: MEDICARE

## 2023-04-25 ENCOUNTER — PATIENT OUTREACH (OUTPATIENT)
Dept: PRIMARY CARE | Facility: CLINIC | Age: 68
End: 2023-04-25
Payer: MEDICARE

## 2023-04-25 NOTE — PROGRESS NOTES
I called and spoke with the patient who stated that he is feeling better since his discharge from the hospital.  Patient has a home nurse,PT & OT.  Patient will purchase a pulse ox.  Patient is taking his Prednisone as directed.  No refills needed.  Patient will call with any questions or concerns.

## 2023-04-28 ENCOUNTER — PATIENT OUTREACH (OUTPATIENT)
Dept: PRIMARY CARE | Facility: CLINIC | Age: 68
End: 2023-04-28
Payer: MEDICARE

## 2023-04-28 DIAGNOSIS — J45.909 REACTIVE AIRWAY DISEASE WITHOUT COMPLICATION, UNSPECIFIED ASTHMA SEVERITY, UNSPECIFIED WHETHER PERSISTENT (HHS-HCC): Primary | ICD-10-CM

## 2023-05-02 DIAGNOSIS — J45.20 MILD INTERMITTENT REACTIVE AIRWAY DISEASE WITHOUT COMPLICATION (HHS-HCC): Primary | ICD-10-CM

## 2023-05-02 RX ORDER — MONTELUKAST SODIUM 10 MG/1
TABLET ORAL
Qty: 90 TABLET | Refills: 3 | Status: SHIPPED | OUTPATIENT
Start: 2023-05-02

## 2023-05-03 ENCOUNTER — TELEPHONE (OUTPATIENT)
Dept: PRIMARY CARE | Facility: CLINIC | Age: 68
End: 2023-05-03
Payer: MEDICARE

## 2023-05-03 NOTE — TELEPHONE ENCOUNTER
Pt went to [facility] he was advised Dr. Haskins was ordering a pulmonary function test and there is no order. Please call advise

## 2023-05-08 DIAGNOSIS — R09.02 HYPOXEMIA: ICD-10-CM

## 2023-05-08 DIAGNOSIS — J45.20 MILD INTERMITTENT REACTIVE AIRWAY DISEASE WITHOUT COMPLICATION (HHS-HCC): Primary | ICD-10-CM

## 2023-05-18 ENCOUNTER — PATIENT OUTREACH (OUTPATIENT)
Dept: PRIMARY CARE | Facility: CLINIC | Age: 68
End: 2023-05-18
Payer: MEDICARE

## 2023-05-24 DIAGNOSIS — F33.41 RECURRENT MAJOR DEPRESSIVE DISORDER, IN PARTIAL REMISSION (CMS-HCC): Primary | ICD-10-CM

## 2023-05-24 NOTE — TELEPHONE ENCOUNTER
----- Message from Evelyn Valentin RN sent at 5/24/2023  3:42 PM EDT -----  Regarding: Angus Murray is asking for a refill on his Lexapro.  He also wants to know if Dr Haskins saw the results from his PFT's.    Evelyn

## 2023-05-25 ENCOUNTER — PATIENT OUTREACH (OUTPATIENT)
Dept: PRIMARY CARE | Facility: CLINIC | Age: 68
End: 2023-05-25
Payer: MEDICARE

## 2023-05-25 DIAGNOSIS — R09.02 HYPOXEMIA: ICD-10-CM

## 2023-05-25 DIAGNOSIS — I10 HYPERTENSION, UNSPECIFIED TYPE: ICD-10-CM

## 2023-05-25 PROCEDURE — 99490 CHRNC CARE MGMT STAFF 1ST 20: CPT | Performed by: INTERNAL MEDICINE

## 2023-05-25 RX ORDER — ESCITALOPRAM OXALATE 20 MG/1
20 TABLET ORAL DAILY PRN
Qty: 90 TABLET | Refills: 2 | Status: SHIPPED | OUTPATIENT
Start: 2023-05-25 | End: 2024-05-10 | Stop reason: SDUPTHER

## 2023-05-25 NOTE — PROGRESS NOTES
Patient called and asked for a refill on his Lexapro. Patient also asking if Dr Haskins saw his PFT's.  I will get a copy of the results and send to Dr Haskins.

## 2023-05-30 DIAGNOSIS — R09.02 HYPOXEMIA: Primary | ICD-10-CM

## 2023-06-21 DIAGNOSIS — R26.9 ABNORMALITY OF GAIT AND MOBILITY: ICD-10-CM

## 2023-06-21 DIAGNOSIS — F41.9 ANXIETY: ICD-10-CM

## 2023-06-21 DIAGNOSIS — E53.8 FOLIC ACID DEFICIENCY: Primary | ICD-10-CM

## 2023-06-21 DIAGNOSIS — E87.6 HYPOKALEMIA: ICD-10-CM

## 2023-06-22 RX ORDER — FOLIC ACID 1 MG/1
TABLET ORAL
Qty: 90 TABLET | Refills: 3 | Status: SHIPPED | OUTPATIENT
Start: 2023-06-22

## 2023-06-22 RX ORDER — POTASSIUM CHLORIDE 1500 MG/1
TABLET, EXTENDED RELEASE ORAL
Qty: 180 TABLET | Refills: 3 | Status: SHIPPED | OUTPATIENT
Start: 2023-06-22

## 2023-06-22 RX ORDER — MIRTAZAPINE 15 MG/1
TABLET, FILM COATED ORAL
Qty: 90 TABLET | Refills: 1 | Status: SHIPPED | OUTPATIENT
Start: 2023-06-22 | End: 2024-05-10 | Stop reason: SDUPTHER

## 2023-06-22 RX ORDER — PROPRANOLOL HYDROCHLORIDE 10 MG/1
TABLET ORAL
Qty: 270 TABLET | Refills: 3 | Status: SHIPPED | OUTPATIENT
Start: 2023-06-22 | End: 2024-01-21 | Stop reason: SDUPTHER

## 2023-06-28 DIAGNOSIS — G47.34 NOCTURNAL HYPOXEMIA: ICD-10-CM

## 2023-06-28 DIAGNOSIS — J98.4 RESTRICTIVE LUNG DISEASE: Primary | ICD-10-CM

## 2023-07-13 ENCOUNTER — OFFICE VISIT (OUTPATIENT)
Dept: PRIMARY CARE | Facility: CLINIC | Age: 68
End: 2023-07-13
Payer: MEDICARE

## 2023-07-13 VITALS
RESPIRATION RATE: 16 BRPM | HEART RATE: 77 BPM | OXYGEN SATURATION: 94 % | BODY MASS INDEX: 30.9 KG/M2 | DIASTOLIC BLOOD PRESSURE: 79 MMHG | WEIGHT: 227.8 LBS | SYSTOLIC BLOOD PRESSURE: 141 MMHG | TEMPERATURE: 97.5 F

## 2023-07-13 DIAGNOSIS — J98.4 RESTRICTIVE LUNG DISEASE: ICD-10-CM

## 2023-07-13 DIAGNOSIS — R26.9 ABNORMALITY OF GAIT AND MOBILITY: ICD-10-CM

## 2023-07-13 DIAGNOSIS — I10 BENIGN ESSENTIAL HYPERTENSION: Chronic | ICD-10-CM

## 2023-07-13 DIAGNOSIS — F43.21 SITUATIONAL DEPRESSION: ICD-10-CM

## 2023-07-13 DIAGNOSIS — F10.20: ICD-10-CM

## 2023-07-13 DIAGNOSIS — I73.9 PERIPHERAL VASCULAR DISEASE, UNSPECIFIED (CMS-HCC): Primary | ICD-10-CM

## 2023-07-13 DIAGNOSIS — R09.02 HYPOXEMIA: ICD-10-CM

## 2023-07-13 DIAGNOSIS — G47.34 NOCTURNAL HYPOXEMIA: ICD-10-CM

## 2023-07-13 PROCEDURE — 99214 OFFICE O/P EST MOD 30 MIN: CPT | Performed by: INTERNAL MEDICINE

## 2023-07-13 PROCEDURE — 1159F MED LIST DOCD IN RCRD: CPT | Performed by: INTERNAL MEDICINE

## 2023-07-13 PROCEDURE — 3078F DIAST BP <80 MM HG: CPT | Performed by: INTERNAL MEDICINE

## 2023-07-13 PROCEDURE — 1125F AMNT PAIN NOTED PAIN PRSNT: CPT | Performed by: INTERNAL MEDICINE

## 2023-07-13 PROCEDURE — 3077F SYST BP >= 140 MM HG: CPT | Performed by: INTERNAL MEDICINE

## 2023-07-13 PROCEDURE — 1036F TOBACCO NON-USER: CPT | Performed by: INTERNAL MEDICINE

## 2023-07-13 PROCEDURE — 3008F BODY MASS INDEX DOCD: CPT | Performed by: INTERNAL MEDICINE

## 2023-07-13 PROCEDURE — 1160F RVW MEDS BY RX/DR IN RCRD: CPT | Performed by: INTERNAL MEDICINE

## 2023-07-13 RX ORDER — IBUPROFEN 800 MG/1
800 TABLET ORAL EVERY 8 HOURS PRN
COMMUNITY

## 2023-07-13 ASSESSMENT — PATIENT HEALTH QUESTIONNAIRE - PHQ9
2. FEELING DOWN, DEPRESSED OR HOPELESS: NOT AT ALL
1. LITTLE INTEREST OR PLEASURE IN DOING THINGS: NOT AT ALL
SUM OF ALL RESPONSES TO PHQ9 QUESTIONS 1 AND 2: 0

## 2023-07-13 ASSESSMENT — ENCOUNTER SYMPTOMS
OCCASIONAL FEELINGS OF UNSTEADINESS: 1
DEPRESSION: 0
LOSS OF SENSATION IN FEET: 0

## 2023-07-13 NOTE — PROGRESS NOTES
Subjective   Patient ID: Angus Redman is a 67 y.o. male who presents for Follow-up.    Here for follow-up  For the most part doing well.  He has not had any recent falls.  Painful neuropathy remains a problem-he is using his gabapentin twice daily  He is not on tramadol now  He no longer drinks gin he states now drinking wine which can be delivered he states 2 bottles of white wine daily         Review of Systems    Objective   /79 (BP Location: Left arm, Patient Position: Sitting, BP Cuff Size: Adult)   Pulse 77   Temp 36.4 °C (97.5 °F)   Resp 16   Wt 103 kg (227 lb 12.8 oz)   SpO2 94%   BMI 30.90 kg/m²     Physical Exam  Vitals reviewed.   Constitutional:       Appearance: Normal appearance. He is obese.      Comments: Pleasant white male-appears older than his stated age  With portable oxygen   HENT:      Head: Normocephalic and atraumatic.   Eyes:      General: No scleral icterus.        Right eye: No discharge.         Left eye: No discharge.      Extraocular Movements: Extraocular movements intact.      Conjunctiva/sclera: Conjunctivae normal.      Pupils: Pupils are equal, round, and reactive to light.   Cardiovascular:      Rate and Rhythm: Normal rate and regular rhythm.      Pulses: Normal pulses.      Heart sounds: Normal heart sounds. No murmur heard.  Pulmonary:      Effort: Pulmonary effort is normal.      Breath sounds: Normal breath sounds. No wheezing or rhonchi.   Musculoskeletal:         General: No deformity or signs of injury. Normal range of motion.      Cervical back: Normal range of motion and neck supple. No rigidity or tenderness.   Lymphadenopathy:      Cervical: No cervical adenopathy.   Skin:     General: Skin is warm and dry.      Findings: No rash.   Neurological:      General: No focal deficit present.      Mental Status: He is alert and oriented to person, place, and time. Mental status is at baseline.      Cranial Nerves: No cranial nerve deficit.      Sensory: No  sensory deficit.      Gait: Gait normal.   Psychiatric:         Mood and Affect: Mood normal.         Behavior: Behavior normal.         Thought Content: Thought content normal.         Judgment: Judgment normal.         Assessment/Plan   Problem List Items Addressed This Visit       Benign essential hypertension          Living situation-he lives alone in a house.  He no longer drives.  His   in .  His only sibling sister lives in [place]    Recent fall with left lateral chest wall contusion with ecchymosis-fortunately no rib fractures he will use a heating pad    Gait unsteadiness-Home physical therapy ordered    Hypoxemia-he was reportedly hypoxic overnight-we will ordered overnight oximeter            He is now on portable oxygen presumably from his pulmonologist.  He will continue.  He states that he feels better and has more stamina    Restrictive lung disease-he will follow-up with his pulmonologist    Regarding tenuous home situation-he lives alone but now no longer drives.  He is able to hire drivers to get him where he needs to go.  He is able to order groceries and have them delivered.  At this point he declines any further changes in his home situation    Alcoholism with a history of alcoholic gastritis-unfortunately he is still drinking alcohol-apparently the groceries will deliver wine which she has converted to from gin, unfortunately.                -presently drinking 2 bottles of white wine daily.  He refuses to cut back    History hypertension-he will continue medications    Alcoholic gastritis-he will continue his PPI and limit alcohol use    Asthma-improved with Breo.  He does not always use it    Allergic rhinitis-he is allergic to cats and he has several cats.  He will continue his Allegra and Flonase    Painful peripheral neuropathy-both feet are numb, but he feels the pain at times right lateral ankle area and hands.  He is on the gabapentin twice daily now that  helps.  He will continue to work with the pain clinic    Recurrent falls-he will use a cane or walker as he is able to.  Unfortunately he recently fell when he went out to the [facility] to  delivered items from the store.  He had to call the squad             Presently using a walker-encouraged him to continue to optimize his safety    Polypharmacy-he is on extensive medicine list-he does his best to maintain all of this    Depression/anxiety-he will continue the BuSpar Cymbalta and Lexapro    Flu shot-encouraged each September-    He will follow-up in 3 to 4 months, sooner as needed

## 2023-07-27 ENCOUNTER — PATIENT OUTREACH (OUTPATIENT)
Dept: PRIMARY CARE | Facility: CLINIC | Age: 68
End: 2023-07-27
Payer: MEDICARE

## 2023-07-27 DIAGNOSIS — I10 BENIGN ESSENTIAL HYPERTENSION: ICD-10-CM

## 2023-07-27 DIAGNOSIS — I73.9 PERIPHERAL VASCULAR DISEASE, UNSPECIFIED (CMS-HCC): ICD-10-CM

## 2023-07-31 PROBLEM — R06.89 ASTHMATIC BREATHING: Status: ACTIVE | Noted: 2023-07-31

## 2023-07-31 PROBLEM — J45.20 MILD INTERMITTENT ASTHMA (HHS-HCC): Status: ACTIVE | Noted: 2023-07-31

## 2023-08-24 ENCOUNTER — PATIENT OUTREACH (OUTPATIENT)
Dept: PRIMARY CARE | Facility: CLINIC | Age: 68
End: 2023-08-24
Payer: MEDICARE

## 2023-08-24 DIAGNOSIS — J98.4 RESTRICTIVE LUNG DISEASE: ICD-10-CM

## 2023-08-24 DIAGNOSIS — R09.02 HYPOXEMIA: ICD-10-CM

## 2023-08-24 NOTE — PROGRESS NOTES
I received a call from the patient stating that he is currently on 3 L per nc.  Patient saw Pulmonary on 8/9/2023 and had a cxr.  His spo2 in the office was 85%.  Pulmonary would like it to be at least 90%.  The patient will have a echo, 6 minute walking test, and sleep apnea study.  The echo is scheduled.  Patient will call to schedule the other tests.  Patient denies any recent falls.  Food delivered from [employer].  Patient will have his RSV, flu and new covid vaccine as soon as available.  No refills needed at this time.  Patient will call with any questions or concerns.

## 2023-08-24 NOTE — PROGRESS NOTES
Noted-agree with pulmonary plan-we will defer further pulmonary evaluation and management for his hypoxemia to pulmonary  clinic

## 2023-09-26 ENCOUNTER — PATIENT OUTREACH (OUTPATIENT)
Dept: PRIMARY CARE | Facility: CLINIC | Age: 68
End: 2023-09-26
Payer: MEDICARE

## 2023-09-26 DIAGNOSIS — J98.4 RESTRICTIVE LUNG DISEASE: ICD-10-CM

## 2023-09-26 DIAGNOSIS — I48.20 ATRIAL FIBRILLATION, CHRONIC (MULTI): ICD-10-CM

## 2023-10-20 ENCOUNTER — PATIENT OUTREACH (OUTPATIENT)
Dept: PRIMARY CARE | Facility: CLINIC | Age: 68
End: 2023-10-20
Payer: MEDICARE

## 2023-10-20 DIAGNOSIS — I48.20 ATRIAL FIBRILLATION, CHRONIC (MULTI): ICD-10-CM

## 2023-10-20 DIAGNOSIS — I73.9 PERIPHERAL VASCULAR DISEASE, UNSPECIFIED (CMS-HCC): ICD-10-CM

## 2023-10-23 ENCOUNTER — APPOINTMENT (OUTPATIENT)
Dept: PRIMARY CARE | Facility: CLINIC | Age: 68
End: 2023-10-23
Payer: MEDICARE

## 2023-10-30 ENCOUNTER — PATIENT OUTREACH (OUTPATIENT)
Dept: PRIMARY CARE | Facility: CLINIC | Age: 68
End: 2023-10-30
Payer: MEDICARE

## 2023-10-30 DIAGNOSIS — I73.9 PERIPHERAL VASCULAR DISEASE, UNSPECIFIED (CMS-HCC): ICD-10-CM

## 2023-10-30 DIAGNOSIS — I48.20 ATRIAL FIBRILLATION, CHRONIC (MULTI): ICD-10-CM

## 2023-10-30 NOTE — PROGRESS NOTES
Angus was transferred to a non short stay at [facility] 10/18/2023.  I will take him off the CCM list.

## 2023-11-03 ENCOUNTER — APPOINTMENT (OUTPATIENT)
Dept: PRIMARY CARE | Facility: CLINIC | Age: 68
End: 2023-11-03
Payer: MEDICARE

## 2023-11-05 DIAGNOSIS — I10 ESSENTIAL HYPERTENSION: Primary | ICD-10-CM

## 2023-11-06 RX ORDER — AMLODIPINE BESYLATE 2.5 MG/1
2.5 TABLET ORAL DAILY
Qty: 90 TABLET | Refills: 0 | Status: SHIPPED | OUTPATIENT
Start: 2023-11-06

## 2023-11-20 ENCOUNTER — DOCUMENTATION (OUTPATIENT)
Dept: PRIMARY CARE | Facility: CLINIC | Age: 68
End: 2023-11-20
Payer: MEDICARE

## 2023-11-20 DIAGNOSIS — I73.9 PERIPHERAL VASCULAR DISEASE, UNSPECIFIED (CMS-HCC): ICD-10-CM

## 2023-11-20 DIAGNOSIS — J98.4 RESTRICTIVE LUNG DISEASE: ICD-10-CM

## 2023-11-20 NOTE — PROGRESS NOTES
I spoke with [facility] [phone] and they started care today at the patience's residence [address] [place] [address].  Message left for the patient to return my call.     Patient returned my call and stated that he was very tired from coming home.  Patient stated that [facility] visited today and did not review the medications with him.  I asked the patient if he wanted to review his medication at this time.  Patient was tired from coming home.  Patient stated that he will call me tomorrow to discuss his medications and discharge instructions.  Patient is on 3L per NC.  Per the patient he has wounds on his bilateral feet that was addressed with [facility].  Appointment with Dr Haskins made for 11/27/2023.

## 2023-11-21 ENCOUNTER — DOCUMENTATION (OUTPATIENT)
Dept: PRIMARY CARE | Facility: CLINIC | Age: 68
End: 2023-11-21
Payer: MEDICARE

## 2023-11-21 DIAGNOSIS — I73.9 PERIPHERAL VASCULAR DISEASE, UNSPECIFIED (CMS-HCC): ICD-10-CM

## 2023-11-21 DIAGNOSIS — I48.20 ATRIAL FIBRILLATION, CHRONIC (MULTI): ICD-10-CM

## 2023-11-21 PROCEDURE — 99439 CHRNC CARE MGMT STAF EA ADDL: CPT | Performed by: INTERNAL MEDICINE

## 2023-11-21 PROCEDURE — 99490 CHRNC CARE MGMT STAFF 1ST 20: CPT | Performed by: INTERNAL MEDICINE

## 2023-11-21 RX ORDER — LEVOTHYROXINE SODIUM 50 UG/1
50 TABLET ORAL
COMMUNITY
End: 2023-12-13 | Stop reason: SDUPTHER

## 2023-11-21 RX ORDER — LANOLIN ALCOHOL/MO/W.PET/CERES
100 CREAM (GRAM) TOPICAL DAILY
COMMUNITY
End: 2024-05-07 | Stop reason: WASHOUT

## 2023-11-21 RX ORDER — NITROFURANTOIN 25; 75 MG/1; MG/1
100 CAPSULE ORAL 2 TIMES DAILY
COMMUNITY
Start: 2023-11-18 | End: 2023-11-24

## 2023-11-21 NOTE — PROGRESS NOTES
I called and spoke with the patient who stated that he was not feeling well.  Per the patient he was up last night throwing up.  The night before he slept from 4P M to 8 AM.  He has not been able to prepare any meals for himself due to weakness.  Patient stated that he left the [facility] too soon.  I called and spoke with Valerie at the [facility] and updated her on the patients condition.  Per Valerie, they helped the patient apply for Medicaid and they would be able to take him back without going to the ED.  Valerie will have the  [facility] get involved and assist the patient with returning if he chooses.      I spoke with Ottoniel from [facility].  Patient is scheduled to have the dressings on his feet changed 3x a week.  The patient has a stage 2-3 wound 1.5 x 1 cm on his left ankle.  There is also a wound on his right great toe.  The start date is 11/22/2023. Ottoniel has ordered all the supplies.

## 2023-11-22 ENCOUNTER — TELEPHONE (OUTPATIENT)
Dept: PRIMARY CARE | Facility: CLINIC | Age: 68
End: 2023-11-22
Payer: MEDICARE

## 2023-11-22 NOTE — TELEPHONE ENCOUNTER
Patient has opted to go back to the [facility]   Complex Repair And Flap Additional Text (Will Appearing After The Standard Complex Repair Text): The complex repair was not sufficient to completely close the primary defect. The remaining additional defect was repaired with the flap mentioned below.

## 2023-11-27 ENCOUNTER — APPOINTMENT (OUTPATIENT)
Dept: PRIMARY CARE | Facility: CLINIC | Age: 68
End: 2023-11-27
Payer: MEDICARE

## 2023-12-07 ENCOUNTER — TELEPHONE (OUTPATIENT)
Dept: PRIMARY CARE | Facility: CLINIC | Age: 68
End: 2023-12-07
Payer: MEDICARE

## 2023-12-07 NOTE — TELEPHONE ENCOUNTER
Pt of Dr Haskins's. He was just discharged from the [facility] for rehabilitation and needs to set up a follow up appt with him. Please advise if he may be added on the schedule. Thank you!

## 2023-12-12 ENCOUNTER — PATIENT OUTREACH (OUTPATIENT)
Dept: PRIMARY CARE | Facility: CLINIC | Age: 68
End: 2023-12-12
Payer: MEDICARE

## 2023-12-13 ENCOUNTER — PATIENT OUTREACH (OUTPATIENT)
Dept: PRIMARY CARE | Facility: CLINIC | Age: 68
End: 2023-12-13
Payer: MEDICARE

## 2023-12-13 DIAGNOSIS — I48.20 ATRIAL FIBRILLATION, CHRONIC (MULTI): ICD-10-CM

## 2023-12-13 DIAGNOSIS — E03.9 HYPOTHYROIDISM, UNSPECIFIED TYPE: ICD-10-CM

## 2023-12-13 DIAGNOSIS — J98.4 RESTRICTIVE LUNG DISEASE: ICD-10-CM

## 2023-12-13 PROCEDURE — 99490 CHRNC CARE MGMT STAFF 1ST 20: CPT | Performed by: INTERNAL MEDICINE

## 2023-12-13 RX ORDER — LEVOTHYROXINE SODIUM 50 UG/1
50 TABLET ORAL
Qty: 30 TABLET | Refills: 1 | Status: SHIPPED | OUTPATIENT
Start: 2023-12-13 | End: 2024-02-12 | Stop reason: SDUPTHER

## 2023-12-13 NOTE — PROGRESS NOTES
I called and spoke with the patient who was discharged from [facility] 12/7/2023.  Patient has [facility] coming to the home for wound care/pt/ot.  Per  the patient his wound on his left ankle almost healed.  Wound care will come out next week to evaluate.  Patient will have PT 4 times a week and OT 1 time a week.  Patient is applying for Medicaid to pay off the nursing home bill.  Patient plans on staying in his home.  Patient has a life alert which he used the last time he fell and went to the nursing home.  Refill for synthroid sent to Dr Haskins.  Per the patient he has food and heat in his house and in managing.  Patient has a follow up with Dr Haskins 12/19/2023.  Patient has transportation set up.  Instructed to call with any questions or concerns.

## 2023-12-19 ENCOUNTER — LAB (OUTPATIENT)
Dept: LAB | Facility: LAB | Age: 68
End: 2023-12-19
Payer: MEDICARE

## 2023-12-19 ENCOUNTER — OFFICE VISIT (OUTPATIENT)
Dept: PRIMARY CARE | Facility: CLINIC | Age: 68
End: 2023-12-19
Payer: MEDICARE

## 2023-12-19 DIAGNOSIS — E53.8 VITAMIN B12 DEFICIENCY: Chronic | ICD-10-CM

## 2023-12-19 DIAGNOSIS — D64.9 ANEMIA, UNSPECIFIED TYPE: ICD-10-CM

## 2023-12-19 DIAGNOSIS — E55.9 VITAMIN D DEFICIENCY: ICD-10-CM

## 2023-12-19 DIAGNOSIS — R73.09 ELEVATED GLUCOSE: ICD-10-CM

## 2023-12-19 DIAGNOSIS — E53.8 VITAMIN B12 DEFICIENCY: ICD-10-CM

## 2023-12-19 DIAGNOSIS — J30.1 SEASONAL ALLERGIC RHINITIS DUE TO POLLEN: Chronic | ICD-10-CM

## 2023-12-19 DIAGNOSIS — E03.9 ACQUIRED HYPOTHYROIDISM: Chronic | ICD-10-CM

## 2023-12-19 DIAGNOSIS — K21.9 GASTROESOPHAGEAL REFLUX DISEASE WITHOUT ESOPHAGITIS: Chronic | ICD-10-CM

## 2023-12-19 DIAGNOSIS — R29.6 FREQUENT FALLS: Chronic | ICD-10-CM

## 2023-12-19 DIAGNOSIS — R17 ELEVATED BILIRUBIN: Chronic | ICD-10-CM

## 2023-12-19 DIAGNOSIS — E03.9 HYPOTHYROIDISM, UNSPECIFIED TYPE: Chronic | ICD-10-CM

## 2023-12-19 DIAGNOSIS — E55.9 VITAMIN D DEFICIENCY: Chronic | ICD-10-CM

## 2023-12-19 DIAGNOSIS — E03.9 ACQUIRED HYPOTHYROIDISM: ICD-10-CM

## 2023-12-19 DIAGNOSIS — D64.9 ANEMIA, UNSPECIFIED TYPE: Chronic | ICD-10-CM

## 2023-12-19 DIAGNOSIS — I10 BENIGN ESSENTIAL HYPERTENSION: Primary | Chronic | ICD-10-CM

## 2023-12-19 DIAGNOSIS — M19.91 PRIMARY OSTEOARTHRITIS, UNSPECIFIED SITE: Chronic | ICD-10-CM

## 2023-12-19 DIAGNOSIS — R74.01 ALT (SGPT) LEVEL RAISED: ICD-10-CM

## 2023-12-19 DIAGNOSIS — R74.01 ALT (SGPT) LEVEL RAISED: Chronic | ICD-10-CM

## 2023-12-19 DIAGNOSIS — Z66 DNR (DO NOT RESUSCITATE): Chronic | ICD-10-CM

## 2023-12-19 DIAGNOSIS — R73.09 ELEVATED GLUCOSE: Chronic | ICD-10-CM

## 2023-12-19 PROBLEM — I21.4 NSTEMI (NON-ST ELEVATED MYOCARDIAL INFARCTION) (MULTI): Status: ACTIVE | Noted: 2023-10-13
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
## 2023-12-19 PROBLEM — J32.9 SINUSITIS: Status: RESOLVED | Noted: 2023-01-29 | Resolved: 2023-12-19

## 2023-12-19 PROBLEM — T79.6XXA TRAUMATIC RHABDOMYOLYSIS (CMS-HCC): Status: ACTIVE | Noted: 2023-10-13

## 2023-12-19 LAB
25(OH)D3 SERPL-MCNC: 81 NG/ML (ref 30–100)
ALBUMIN SERPL BCP-MCNC: 3.4 G/DL (ref 3.4–5)
ALP SERPL-CCNC: 122 U/L (ref 33–136)
ALT SERPL W P-5'-P-CCNC: 7 U/L (ref 10–52)
ANION GAP SERPL CALC-SCNC: 16 MMOL/L (ref 10–20)
AST SERPL W P-5'-P-CCNC: 14 U/L (ref 9–39)
BILIRUB SERPL-MCNC: 0.7 MG/DL (ref 0–1.2)
BUN SERPL-MCNC: 14 MG/DL (ref 6–23)
CALCIUM SERPL-MCNC: 8.5 MG/DL (ref 8.6–10.3)
CHLORIDE SERPL-SCNC: 104 MMOL/L (ref 98–107)
CO2 SERPL-SCNC: 27 MMOL/L (ref 21–32)
CREAT SERPL-MCNC: 1.03 MG/DL (ref 0.5–1.3)
ERYTHROCYTE [DISTWIDTH] IN BLOOD BY AUTOMATED COUNT: 15.3 % (ref 11.5–14.5)
EST. AVERAGE GLUCOSE BLD GHB EST-MCNC: 94 MG/DL
FOLATE SERPL-MCNC: 11.4 NG/ML
GFR SERPL CREATININE-BSD FRML MDRD: 78 ML/MIN/1.73M*2
GLUCOSE SERPL-MCNC: 82 MG/DL (ref 74–99)
HBA1C MFR BLD: 4.9 %
HCT VFR BLD AUTO: 36.2 % (ref 41–52)
HGB BLD-MCNC: 11.3 G/DL (ref 13.5–17.5)
IRON SATN MFR SERPL: 36 % (ref 25–45)
IRON SERPL-MCNC: 69 UG/DL (ref 35–150)
MCH RBC QN AUTO: 30.3 PG (ref 26–34)
MCHC RBC AUTO-ENTMCNC: 31.2 G/DL (ref 32–36)
MCV RBC AUTO: 97 FL (ref 80–100)
NRBC BLD-RTO: 0 /100 WBCS (ref 0–0)
PLATELET # BLD AUTO: 395 X10*3/UL (ref 150–450)
POTASSIUM SERPL-SCNC: 4.7 MMOL/L (ref 3.5–5.3)
PROT SERPL-MCNC: 7.2 G/DL (ref 6.4–8.2)
RBC # BLD AUTO: 3.73 X10*6/UL (ref 4.5–5.9)
SODIUM SERPL-SCNC: 142 MMOL/L (ref 136–145)
TIBC SERPL-MCNC: 191 UG/DL (ref 240–445)
TSH SERPL-ACNC: 2.4 MIU/L (ref 0.44–3.98)
UIBC SERPL-MCNC: 122 UG/DL (ref 110–370)
VIT B12 SERPL-MCNC: 936 PG/ML (ref 211–911)
WBC # BLD AUTO: 10.7 X10*3/UL (ref 4.4–11.3)

## 2023-12-19 PROCEDURE — 82746 ASSAY OF FOLIC ACID SERUM: CPT

## 2023-12-19 PROCEDURE — 1125F AMNT PAIN NOTED PAIN PRSNT: CPT | Performed by: INTERNAL MEDICINE

## 2023-12-19 PROCEDURE — 84443 ASSAY THYROID STIM HORMONE: CPT

## 2023-12-19 PROCEDURE — 80053 COMPREHEN METABOLIC PANEL: CPT

## 2023-12-19 PROCEDURE — 99215 OFFICE O/P EST HI 40 MIN: CPT | Performed by: INTERNAL MEDICINE

## 2023-12-19 PROCEDURE — 36415 COLL VENOUS BLD VENIPUNCTURE: CPT

## 2023-12-19 PROCEDURE — 1036F TOBACCO NON-USER: CPT | Performed by: INTERNAL MEDICINE

## 2023-12-19 PROCEDURE — 85027 COMPLETE CBC AUTOMATED: CPT

## 2023-12-19 PROCEDURE — 83036 HEMOGLOBIN GLYCOSYLATED A1C: CPT

## 2023-12-19 PROCEDURE — 83550 IRON BINDING TEST: CPT

## 2023-12-19 PROCEDURE — 83540 ASSAY OF IRON: CPT

## 2023-12-19 PROCEDURE — 3074F SYST BP LT 130 MM HG: CPT | Performed by: INTERNAL MEDICINE

## 2023-12-19 PROCEDURE — 99497 ADVNCD CARE PLAN 30 MIN: CPT | Performed by: INTERNAL MEDICINE

## 2023-12-19 PROCEDURE — 3078F DIAST BP <80 MM HG: CPT | Performed by: INTERNAL MEDICINE

## 2023-12-19 PROCEDURE — 1160F RVW MEDS BY RX/DR IN RCRD: CPT | Performed by: INTERNAL MEDICINE

## 2023-12-19 PROCEDURE — 82306 VITAMIN D 25 HYDROXY: CPT

## 2023-12-19 PROCEDURE — 3008F BODY MASS INDEX DOCD: CPT | Performed by: INTERNAL MEDICINE

## 2023-12-19 PROCEDURE — 1159F MED LIST DOCD IN RCRD: CPT | Performed by: INTERNAL MEDICINE

## 2023-12-19 PROCEDURE — 82607 VITAMIN B-12: CPT

## 2023-12-19 RX ORDER — PHENAZOPYRIDINE HYDROCHLORIDE 100 MG/1
TABLET, FILM COATED ORAL
COMMUNITY
Start: 2023-11-13 | End: 2024-05-07 | Stop reason: WASHOUT

## 2023-12-19 RX ORDER — OXYCODONE HYDROCHLORIDE 5 MG/1
CAPSULE ORAL
COMMUNITY
End: 2024-02-06 | Stop reason: ALTCHOICE

## 2023-12-19 RX ORDER — IPRATROPIUM BROMIDE AND ALBUTEROL SULFATE 2.5; .5 MG/3ML; MG/3ML
SOLUTION RESPIRATORY (INHALATION)
COMMUNITY
Start: 2023-10-18 | End: 2024-05-07 | Stop reason: WASHOUT

## 2023-12-19 RX ORDER — LIDOCAINE 560 MG/1
PATCH PERCUTANEOUS; TOPICAL; TRANSDERMAL
COMMUNITY
Start: 2023-10-19 | End: 2024-02-06 | Stop reason: ALTCHOICE

## 2023-12-19 RX ORDER — CARBOXYMETHYLCELLULOSE SODIUM 10 MG/ML
GEL OPHTHALMIC
COMMUNITY
Start: 2023-10-18

## 2023-12-19 RX ORDER — HONEY 100 %
PASTE (ML) TOPICAL
COMMUNITY
Start: 2023-11-22 | End: 2024-02-06 | Stop reason: ALTCHOICE

## 2023-12-19 RX ORDER — CAPSAICIN 0.03 G/100G
CREAM TOPICAL 3 TIMES DAILY
COMMUNITY
Start: 2023-10-18

## 2023-12-19 ASSESSMENT — PATIENT HEALTH QUESTIONNAIRE - PHQ9
SUM OF ALL RESPONSES TO PHQ9 QUESTIONS 1 AND 2: 0
1. LITTLE INTEREST OR PLEASURE IN DOING THINGS: NOT AT ALL
2. FEELING DOWN, DEPRESSED OR HOPELESS: NOT AT ALL

## 2023-12-19 ASSESSMENT — ENCOUNTER SYMPTOMS
LOSS OF SENSATION IN FEET: 0
OCCASIONAL FEELINGS OF UNSTEADINESS: 1
DEPRESSION: 0

## 2023-12-19 NOTE — PROGRESS NOTES
Subjective   Patient ID: Angus Redman is a 68 y.o. male who presents for Hospital Follow-up.    Here for follow-up after discharge from rehab in the nursing home December 7  He has fallen twice since being home.  He is now wearing a life alert and so both times he is able to push that to get help  He is not driving.  He gets groceries through [employer] delivered  He states he has worked with the  and has signed advanced directives for DNR comfort care    Remarkably he has not been drinking alcohol.  He states he promised his sister that he would do that and so he has had no alcohol in over 8 weeks.  He states he has not really missed it.         Review of Systems    Objective   /70   Pulse 77   Temp 36 °C (96.8 °F)   Resp 16   Ht 1.829 m (6')   Wt 92.5 kg (204 lb)   SpO2 98%   BMI 27.67 kg/m²     Physical Exam  Vitals reviewed.   Constitutional:       Appearance: Normal appearance.   HENT:      Head: Normocephalic and atraumatic.   Eyes:      General: No scleral icterus.        Right eye: No discharge.         Left eye: No discharge.      Extraocular Movements: Extraocular movements intact.      Conjunctiva/sclera: Conjunctivae normal.      Pupils: Pupils are equal, round, and reactive to light.   Cardiovascular:      Rate and Rhythm: Normal rate and regular rhythm.      Pulses: Normal pulses.      Heart sounds: Normal heart sounds. No murmur heard.  Pulmonary:      Effort: Pulmonary effort is normal.      Breath sounds: Normal breath sounds. No wheezing or rhonchi.   Musculoskeletal:         General: No deformity or signs of injury. Normal range of motion.      Cervical back: Normal range of motion and neck supple. No rigidity or tenderness.      Right lower leg: Edema present.      Left lower leg: Edema present.   Lymphadenopathy:      Cervical: No cervical adenopathy.   Skin:     General: Skin is warm and dry.      Findings: No rash.   Neurological:      General: No focal deficit  present.      Mental Status: He is alert and oriented to person, place, and time. Mental status is at baseline.      Cranial Nerves: No cranial nerve deficit.      Sensory: No sensory deficit.      Gait: Gait abnormal.      Comments: Cautious gait   Psychiatric:         Mood and Affect: Mood normal.         Behavior: Behavior normal.         Thought Content: Thought content normal.         Judgment: Judgment normal.         Assessment/Plan   Problem List Items Addressed This Visit             ICD-10-CM    Vitamin D deficiency E55.9    Relevant Orders    Vitamin D 25-Hydroxy,Total (for eval of Vitamin D levels) (Completed)    Vitamin B12 deficiency E53.8    Relevant Orders    Vitamin D 25-Hydroxy,Total (for eval of Vitamin D levels) (Completed)    Osteoarthritis M19.90    Hypothyroidism E03.9    Relevant Orders    TSH with reflex to Free T4 if abnormal (Completed)    Gastroesophageal reflux disease K21.9    Benign essential hypertension - Primary I10    Anemia D64.9    Relevant Orders    CBC (Completed)    Folate (Completed)    Vitamin B12 (Completed)    Iron and TIBC (Completed)    ALT (SGPT) level raised R74.01    Relevant Orders    Comprehensive Metabolic Panel (Completed)    Allergic rhinitis J30.9    Elevated bilirubin R17    Frequent falls R29.6    DNR (do not resuscitate) Z66    Elevated glucose (Chronic) R73.09    Relevant Orders    Hemoglobin A1C (Completed)       We spoke for over 40 minutes, over half the time counseling    Home health documentation-forms completed and faxed back as requested    CODE STATUS-he has requested DNR comfort care and states he has worked with the  to get forms completed accordingly.  He has these at home.  He is will send those in and will update his electronic medical record to reflect this accordingly     Living situation-he lives alone in a house.  He no longer drives.  His   in .  His only sibling sister - Antonina, lives in [place]  [place].      Gait unsteadiness-Home physical therapy ordered last time           He needs help getting out of shower          12/23-he now has a life alert    Home health-he is working with home health with physical therapy and home nursing, twice a week with physical therapy, once a week at this point with OT.    Home help         12/23  - Sharon from [employer], Tues for 4 hrs, and Valentina 3 hrs Friday - housekeeping    Recurrent falls-he will use a cane or walker as he is able to.  Unfortunately he recently fell when he went out to the [employer] to  delivered items from the store.  He had to call the squad             Presently using a walker-encouraged him to continue to optimize his safety              Now has a wheelchair to use in his kitchen             12/23-he continues to use the walker as much as he can around the house and kitchen.  He now has a life alert    Feet infection - from past fall. Home wound RN done w/ regular visits. Plans to follow up to check feet prn      Hypoxemia-he was reportedly hypoxic overnight-we will ordered overnight oximeter            He is now on portable oxygen presumably from his pulmonologist.  He will continue.  He states that he feels better and has more stamina              He forgot his portable oxygen today    Restrictive lung disease-he will follow-up with his pulmonologist    Regarding tenuous home situation-he lives alone but now no longer drives.  He is able to hire drivers to get him where he needs to go.  He is able to order groceries and have them delivered.  At this point he declines any further changes in his home situation    Nutritional concerns - He gets groceries by [employer] w/ home deliveries 1-2 x wk    Alcoholism with a history of alcoholic gastritis-unfortunately he is still drinking alcohol-apparently the groceries will deliver wine which she has converted to from gin, unfortunately.                7/23-presently drinking 2 bottles of white wine  daily.  He refuses to cut back              12/23-he states he quit using alcohol in 10/23 when he promised his sister he would not drink.  He has not really missed that he states strongly encouraged long-term abstinence    History hypertension-he will continue medications           Blood pressure much improved on recheck    Alcoholic gastritis-he will continue his PPI and limit alcohol use            12/23-he states he quit using alcohol in 10/23 when he promised his sister he would not drink.  He has not really missed that he states strongly encouraged long-term abstinence    Asthma-improved with Breo.  He does not always use it    Allergic rhinitis-he is allergic to cats and he has several cats.  He will continue his Allegra and Flonase    Painful peripheral neuropathy-both feet are numb, but he feels the pain at times right lateral ankle area and hands.  He is on the gabapentin twice daily now that helps.  He will continue to work with the pain clinic    Polypharmacy-he is on extensive medicine list-he does his best to maintain all of this    Depression/anxiety-he will continue the BuSpar Cymbalta and Lexapro    Flu shot-encouraged each September-updated 9/23    Covid booster - updated 9/23    RSV vacc - updated 9/23    He will follow-up in 3 to 4 months, sooner as needed

## 2023-12-21 VITALS
TEMPERATURE: 96.8 F | HEART RATE: 77 BPM | SYSTOLIC BLOOD PRESSURE: 116 MMHG | BODY MASS INDEX: 27.63 KG/M2 | RESPIRATION RATE: 16 BRPM | HEIGHT: 72 IN | WEIGHT: 204 LBS | OXYGEN SATURATION: 98 % | DIASTOLIC BLOOD PRESSURE: 70 MMHG

## 2023-12-21 PROBLEM — R73.09 ELEVATED GLUCOSE: Chronic | Status: ACTIVE | Noted: 2023-12-21

## 2023-12-21 PROBLEM — Z66 DNR (DO NOT RESUSCITATE): Status: ACTIVE | Noted: 2023-12-21

## 2023-12-21 NOTE — RESULT ENCOUNTER NOTE
Denzel -thanks for coming in for your appointment, and for doing the blood work.  I am thrilled that the liver labs have normalized now just over 8 weeks of the alcohol.  This is great news.  Please continue to remain off the alcohol.  The vitamin and electrolyte levels look fine as to the kidney and thyroid labs.      The complete blood count shows very slight anemia but there is no evidence of any iron deficiency B12 or folate deficiency.  Will continue to follow this down the road.  Interestingly your complete blood count showed that your MCV also normalized off the alcohol which is noteworthy.  At this point no additional blood work is needed.    Wishing you the very best.  Please notify me if I can do anything to help you.    Sincerely,  Artie Haskins MD

## 2024-01-03 ENCOUNTER — OFFICE VISIT (OUTPATIENT)
Dept: WOUND CARE | Facility: CLINIC | Age: 69
End: 2024-01-03
Payer: MEDICARE

## 2024-01-03 PROCEDURE — 11045 DBRDMT SUBQ TISS EACH ADDL: CPT

## 2024-01-03 PROCEDURE — 11721 DEBRIDE NAIL 6 OR MORE: CPT | Mod: 59

## 2024-01-03 PROCEDURE — 99212 OFFICE O/P EST SF 10 MIN: CPT | Mod: 25

## 2024-01-03 PROCEDURE — 11042 DBRDMT SUBQ TIS 1ST 20SQCM/<: CPT

## 2024-01-09 ENCOUNTER — PATIENT OUTREACH (OUTPATIENT)
Dept: PRIMARY CARE | Facility: CLINIC | Age: 69
End: 2024-01-09
Payer: MEDICARE

## 2024-01-09 DIAGNOSIS — R29.6 FREQUENT FALLS: ICD-10-CM

## 2024-01-09 DIAGNOSIS — I10 BENIGN ESSENTIAL HYPERTENSION: ICD-10-CM

## 2024-01-10 ENCOUNTER — LAB REQUISITION (OUTPATIENT)
Dept: LAB | Facility: HOSPITAL | Age: 69
End: 2024-01-10
Payer: MEDICARE

## 2024-01-10 ENCOUNTER — OFFICE VISIT (OUTPATIENT)
Dept: WOUND CARE | Facility: CLINIC | Age: 69
End: 2024-01-10
Payer: MEDICARE

## 2024-01-10 ENCOUNTER — OFFICE VISIT (OUTPATIENT)
Dept: PAIN MEDICINE | Facility: CLINIC | Age: 69
End: 2024-01-10
Payer: MEDICARE

## 2024-01-10 VITALS
RESPIRATION RATE: 18 BRPM | DIASTOLIC BLOOD PRESSURE: 84 MMHG | TEMPERATURE: 96.2 F | SYSTOLIC BLOOD PRESSURE: 149 MMHG | HEART RATE: 82 BPM

## 2024-01-10 DIAGNOSIS — L97.522 NON-PRESSURE CHRONIC ULCER OF OTHER PART OF LEFT FOOT WITH FAT LAYER EXPOSED (MULTI): ICD-10-CM

## 2024-01-10 DIAGNOSIS — M54.50 CHRONIC LOW BACK PAIN, UNSPECIFIED BACK PAIN LATERALITY, UNSPECIFIED WHETHER SCIATICA PRESENT: ICD-10-CM

## 2024-01-10 DIAGNOSIS — G57.93 NEUROPATHIC PAIN OF BOTH FEET: Primary | ICD-10-CM

## 2024-01-10 DIAGNOSIS — G89.29 CHRONIC LOW BACK PAIN, UNSPECIFIED BACK PAIN LATERALITY, UNSPECIFIED WHETHER SCIATICA PRESENT: ICD-10-CM

## 2024-01-10 PROCEDURE — 3077F SYST BP >= 140 MM HG: CPT

## 2024-01-10 PROCEDURE — 3008F BODY MASS INDEX DOCD: CPT

## 2024-01-10 PROCEDURE — 87070 CULTURE OTHR SPECIMN AEROBIC: CPT

## 2024-01-10 PROCEDURE — 1036F TOBACCO NON-USER: CPT

## 2024-01-10 PROCEDURE — 1159F MED LIST DOCD IN RCRD: CPT

## 2024-01-10 PROCEDURE — 99213 OFFICE O/P EST LOW 20 MIN: CPT | Mod: ZK,25

## 2024-01-10 PROCEDURE — 11042 DBRDMT SUBQ TIS 1ST 20SQCM/<: CPT

## 2024-01-10 PROCEDURE — 87075 CULTR BACTERIA EXCEPT BLOOD: CPT

## 2024-01-10 PROCEDURE — 1125F AMNT PAIN NOTED PAIN PRSNT: CPT

## 2024-01-10 PROCEDURE — 87205 SMEAR GRAM STAIN: CPT

## 2024-01-10 PROCEDURE — 87186 SC STD MICRODIL/AGAR DIL: CPT

## 2024-01-10 PROCEDURE — 3079F DIAST BP 80-89 MM HG: CPT

## 2024-01-10 PROCEDURE — 99213 OFFICE O/P EST LOW 20 MIN: CPT

## 2024-01-10 RX ORDER — DULOXETIN HYDROCHLORIDE 60 MG/1
60 CAPSULE, DELAYED RELEASE ORAL 2 TIMES DAILY
Qty: 60 CAPSULE | Refills: 2 | Status: SHIPPED | OUTPATIENT
Start: 2024-01-10 | End: 2024-04-10 | Stop reason: SDUPTHER

## 2024-01-10 RX ORDER — GABAPENTIN 800 MG/1
800 TABLET ORAL 2 TIMES DAILY
Qty: 60 TABLET | Refills: 2 | Status: SHIPPED | OUTPATIENT
Start: 2024-01-10 | End: 2024-04-10 | Stop reason: SDUPTHER

## 2024-01-10 ASSESSMENT — PAIN - FUNCTIONAL ASSESSMENT: PAIN_FUNCTIONAL_ASSESSMENT: 0-10

## 2024-01-10 ASSESSMENT — COLUMBIA-SUICIDE SEVERITY RATING SCALE - C-SSRS
6. HAVE YOU EVER DONE ANYTHING, STARTED TO DO ANYTHING, OR PREPARED TO DO ANYTHING TO END YOUR LIFE?: NO
1. IN THE PAST MONTH, HAVE YOU WISHED YOU WERE DEAD OR WISHED YOU COULD GO TO SLEEP AND NOT WAKE UP?: NO
2. HAVE YOU ACTUALLY HAD ANY THOUGHTS OF KILLING YOURSELF?: NO

## 2024-01-10 ASSESSMENT — PAIN SCALES - GENERAL
PAINLEVEL_OUTOF10: 6
PAINLEVEL: 7

## 2024-01-10 NOTE — PROGRESS NOTES
Subjective   Patient ID: Angus Redman is a 68 y.o. male who presents for Pain.  HPI    68 year old male presents today for medication refills. He is known to this clinic for neuropathic pain and is maintained on duloxetine 60mg BID and gabapentin 800mg twice daily. He denies any side effects from the medication. He is taking ibuprofen for additional pain relief. Today his pain is rated 7/10 in the low back and bilateral hands and feet. He was in a nursing home for 2 months for rehab, he was discharged home on 12/07. This was following a fall at home, after he tripped over his oxygen tubing. He has a home health aid once a week and a cleaning lady that comes on Fridays. He wears a life alert. He uses a rollator in the house. Denies any bowel or bladder incontinence.       Review of Systems    All 13 systems were reviewed and are within normal levels except as noted below or per HPI. Positive and pertinent negative responses are noted below or in the HPI   Denied any fever or chills. No weight loss and no night sweats. No cough or sputum production. No diarrhea   The constipation has been responding to fibers and over the counter medications.     No bladder and bowel incontinence and no other changes in bladder and bowel. No skin changes. Reports tiredness and fatigability only if the pain is not controlled.   Denied opioids diversion and abuse and denies alcoholism. Denies overuse of the pain medications.     Objective   Physical Exam    General   Alert and oriented x4, pleasant and cooperative.      HEENT  Pupils are equal and normal in size. Ears, nose, mouth, and throat appear to be WNL.  Head atraumatic, symmetric.      No signs of sedation or signs of withdrawal apparent.     Psychiatric   No signs of depression apparent. Appropriate mood and affect.     Neuro   No focal neurological deficit apparent. Ambulation at baseline using rollator.      Respiratory  No respiratory distress, respirations equal and  unlabored. On 3L O2 NC.     Abdomen  Soft and nontender, no distention noted.     Skin  Warm, dry and intact. No skin markings supportive of recent IV drug usage .            Assessment/Plan       67 yo male with history and physical exam supportive of chronic bilateral lower extremity pain associated with peripheral neuropathy. Low back pain associated with lumbar spondylosis and lumbar radiculopathy.   I have personally reviewed the OARRS report for this patient. I have considered the risks of abuse, dependence, addiction and diversion. I believe that it is clinically appropriate for this patient to be prescribed this medication based on documented diagnosis.  I believe the benefits of the continuation of the opiate outweighs the risk. Patient has described positive response to the present medication therapy. Patient denies any side effects associated with the usage of the medication. Patient did not elicit any signs supportive of misuse or abuse. The review of the [place] Automated Reporting prescription is not suggestive of any worrisome pattern. Patient believes the usage of this medication has improved the quality of life and allow him to participate in day-to-day activity. Therefore  I recommend the continuation of this medication and I will be refilling the medication prescription.  Continue to take gabapentin and duloxetine as prescribed  Follow up in 3 months or as needed.        Rosie Camejo, SHIRA-CNP 01/10/24 1:18 PM

## 2024-01-15 ENCOUNTER — PATIENT OUTREACH (OUTPATIENT)
Dept: PRIMARY CARE | Facility: CLINIC | Age: 69
End: 2024-01-15
Payer: MEDICARE

## 2024-01-15 DIAGNOSIS — E53.8 VITAMIN B 12 DEFICIENCY: ICD-10-CM

## 2024-01-15 DIAGNOSIS — I10 BENIGN ESSENTIAL HYPERTENSION: ICD-10-CM

## 2024-01-15 DIAGNOSIS — E55.9 VITAMIN D DEFICIENCY: Primary | ICD-10-CM

## 2024-01-15 DIAGNOSIS — I73.9 PERIPHERAL VASCULAR DISEASE, UNSPECIFIED (CMS-HCC): ICD-10-CM

## 2024-01-15 LAB
BACTERIA SPEC CULT: ABNORMAL
BACTERIA SPEC CULT: ABNORMAL
GRAM STN SPEC: ABNORMAL
GRAM STN SPEC: ABNORMAL

## 2024-01-15 PROCEDURE — 99490 CHRNC CARE MGMT STAFF 1ST 20: CPT | Performed by: INTERNAL MEDICINE

## 2024-01-15 RX ORDER — ERGOCALCIFEROL 1.25 MG/1
1.25 CAPSULE ORAL
Qty: 12 CAPSULE | Refills: 3 | Status: SHIPPED | OUTPATIENT
Start: 2024-01-15

## 2024-01-15 RX ORDER — LANOLIN ALCOHOL/MO/W.PET/CERES
1000 CREAM (GRAM) TOPICAL DAILY
Qty: 90 TABLET | Refills: 3 | Status: SHIPPED | OUTPATIENT
Start: 2024-01-15

## 2024-01-15 NOTE — PROGRESS NOTES
I received a call from the patient who stated that he was doing well.  Per the patient he has an infection in his leg that is being treated by wound care with 2 different antibiotics.  Patient has wound care coming to the house on Monday and Friday.  Patient goes to the wound center on Wednesdays.  Patient also has an aide that comes for 4 hours a week.  Patient has OT 1 time a week and PT 2 times a week.  Patient is doing his exercises on the days they do not come to the house.  Patient feels he is getting stronger every day.

## 2024-01-17 ENCOUNTER — OFFICE VISIT (OUTPATIENT)
Dept: WOUND CARE | Facility: CLINIC | Age: 69
End: 2024-01-17
Payer: MEDICARE

## 2024-01-17 PROCEDURE — 11042 DBRDMT SUBQ TIS 1ST 20SQCM/<: CPT

## 2024-01-21 DIAGNOSIS — R26.9 ABNORMALITY OF GAIT AND MOBILITY: ICD-10-CM

## 2024-01-21 RX ORDER — PROPRANOLOL HYDROCHLORIDE 10 MG/1
10 TABLET ORAL 2 TIMES DAILY
Qty: 270 TABLET | Refills: 3 | COMMUNITY
Start: 2024-01-21

## 2024-01-21 RX ORDER — FERROUS SULFATE 325(65) MG
65 TABLET ORAL
COMMUNITY
Start: 2024-01-21 | End: 2024-05-28 | Stop reason: SDUPTHER

## 2024-01-21 RX ORDER — PANTOPRAZOLE SODIUM 40 MG/1
40 TABLET, DELAYED RELEASE ORAL 2 TIMES DAILY
COMMUNITY
Start: 2024-01-21

## 2024-01-24 ENCOUNTER — OFFICE VISIT (OUTPATIENT)
Dept: WOUND CARE | Facility: CLINIC | Age: 69
End: 2024-01-24
Payer: MEDICARE

## 2024-01-24 PROCEDURE — 11042 DBRDMT SUBQ TIS 1ST 20SQCM/<: CPT

## 2024-01-31 ENCOUNTER — OFFICE VISIT (OUTPATIENT)
Dept: WOUND CARE | Facility: CLINIC | Age: 69
End: 2024-01-31
Payer: MEDICARE

## 2024-01-31 PROCEDURE — 11042 DBRDMT SUBQ TIS 1ST 20SQCM/<: CPT

## 2024-02-06 ENCOUNTER — OFFICE VISIT (OUTPATIENT)
Dept: CARDIOLOGY | Facility: CLINIC | Age: 69
End: 2024-02-06
Payer: MEDICARE

## 2024-02-06 VITALS
HEART RATE: 80 BPM | DIASTOLIC BLOOD PRESSURE: 75 MMHG | HEIGHT: 72 IN | WEIGHT: 204 LBS | BODY MASS INDEX: 27.63 KG/M2 | SYSTOLIC BLOOD PRESSURE: 116 MMHG

## 2024-02-06 DIAGNOSIS — I48.0 PAROXYSMAL ATRIAL FIBRILLATION (MULTI): ICD-10-CM

## 2024-02-06 DIAGNOSIS — Z86.79 HISTORY OF PAROXYSMAL SUPRAVENTRICULAR TACHYCARDIA: ICD-10-CM

## 2024-02-06 DIAGNOSIS — I25.10 ATHEROSCLEROSIS OF NATIVE CORONARY ARTERY OF NATIVE HEART WITHOUT ANGINA PECTORIS: ICD-10-CM

## 2024-02-06 DIAGNOSIS — I10 ESSENTIAL HYPERTENSION: ICD-10-CM

## 2024-02-06 DIAGNOSIS — I21.4 NSTEMI (NON-ST ELEVATED MYOCARDIAL INFARCTION) (MULTI): Primary | ICD-10-CM

## 2024-02-06 PROCEDURE — 1159F MED LIST DOCD IN RCRD: CPT | Performed by: INTERNAL MEDICINE

## 2024-02-06 PROCEDURE — 1125F AMNT PAIN NOTED PAIN PRSNT: CPT | Performed by: INTERNAL MEDICINE

## 2024-02-06 PROCEDURE — 1160F RVW MEDS BY RX/DR IN RCRD: CPT | Performed by: INTERNAL MEDICINE

## 2024-02-06 PROCEDURE — 3078F DIAST BP <80 MM HG: CPT | Performed by: INTERNAL MEDICINE

## 2024-02-06 PROCEDURE — 1157F ADVNC CARE PLAN IN RCRD: CPT | Performed by: INTERNAL MEDICINE

## 2024-02-06 PROCEDURE — 3008F BODY MASS INDEX DOCD: CPT | Performed by: INTERNAL MEDICINE

## 2024-02-06 PROCEDURE — 99205 OFFICE O/P NEW HI 60 MIN: CPT | Performed by: INTERNAL MEDICINE

## 2024-02-06 PROCEDURE — 3074F SYST BP LT 130 MM HG: CPT | Performed by: INTERNAL MEDICINE

## 2024-02-06 PROCEDURE — 93000 ELECTROCARDIOGRAM COMPLETE: CPT | Performed by: INTERNAL MEDICINE

## 2024-02-06 PROCEDURE — 1036F TOBACCO NON-USER: CPT | Performed by: INTERNAL MEDICINE

## 2024-02-06 PROCEDURE — 1123F ACP DISCUSS/DSCN MKR DOCD: CPT | Performed by: INTERNAL MEDICINE

## 2024-02-06 RX ORDER — METOPROLOL TARTRATE 100 MG/1
100 TABLET ORAL DAILY
Qty: 1 TABLET | Refills: 0 | Status: SHIPPED | OUTPATIENT
Start: 2024-02-06 | End: 2024-05-07 | Stop reason: WASHOUT

## 2024-02-06 NOTE — PATIENT INSTRUCTIONS
It was a pleasure seeing you today. Please contact myself or my team with any questions.     To reach Dr. Harry' office please call [phone] (Dasha).   Fax: [phone]   To schedule an appointment call [phone]     If you have any questions or need cardiac medication refills, please call the Heart Failure office at [phone], option [phone]. You may also contact the  Heart Failure Nursing team via email at [email or web address] (Please include your name and date of birth).         1) Continue your current medications  2) We will do a cardiac CT angiogram to look at your heart arteries; you will take metoprolol tartrate 100 mg (2 hours before the scan); to schedule, call [phone].  3) Labs (RFP/BNP)  4) We will do a 14 day monitor to look for atrial fibrillation  5) Follow up in 3 months at /[facility]      Cass Morgan

## 2024-02-06 NOTE — PROGRESS NOTES
[facility] Advanced Heart Failure Clinic  Primary Care Physician: Artie Haskins MD  Referring Provider/Cardiologist: n/a     Date of Visit: 02/06/2024  3:00 PM EST  Location of visit: [address]     HPI:   Mr. Redman is a 68M with a PMHx sig for pSVT, HTN, and oxygen dependent chronic hypoxic respiratory failure who presents to the  Advanced Heart Failure clinic to establish care.     Recently hospitalized at [facility] after being down for several days. Diagnosed with an NSTEMI at that time. He was also told he had AF but is not on anticoagulation.     Currently denies chest pain, palpitations, shortness of breath, orthopnea, PND. No edema noted in BLE. Patient denies headaches or dizziness.     Patient presents today using a walker and on O2 3L via NC (states is supposed to wear all the time but he doesn't). Patient states LARSEN, frequent falls.     Hospitalizations: Admitted October 2023 to [facility] for NSTEMI      PMHx:  pSVT, HTN, and oxygen dependent chronic hypoxic respiratory failure    SocHx:  Lives alone in [place]  Former smoker (quit 1999). Denies ETOH, illicits    FamHx:  Father had heart disease       Current Outpatient Medications   Medication Sig Dispense Refill    acetaminophen (Tylenol) 325 mg tablet Take 1,000 mg by mouth every 6 hours if needed for mild pain (1 - 3).      albuterol (ProAir HFA) 90 mcg/actuation inhaler Inhale 2 puffs every 6 hours if needed for wheezing.      amLODIPine (Norvasc) 2.5 mg tablet TAKE ONE TABLET BY MOUTH DAILY 90 tablet 0    busPIRone (Buspar) 5 mg tablet Take 1 tablet (5 mg) by mouth 3 times a day as needed (anxiety).      capsaicin (Zostrix) 0.025 % cream Apply topically 3 times a day.      carboxymethylcellulose (Refresh Celluvisc) 1 % ophthalmic solution dropperette Administer into affected eye(s).      cyanocobalamin (Vitamin B-12) 1,000 mcg tablet Take 1 tablet (1,000 mcg) by mouth once daily. As directed 90 tablet 3    DULoxetine  (Cymbalta) 60 mg DR capsule Take 1 capsule (60 mg) by mouth 2 times a day. 60 capsule 2    ergocalciferol (Vitamin D-2) 1.25 MG (63287 UT) capsule Take 1 capsule (1,250 mcg) by mouth 1 (one) time per week. 12 capsule 3    escitalopram (Lexapro) 10 mg tablet Take 1 tablet (10 mg) by mouth once daily.      escitalopram (Lexapro) 20 mg tablet Take 1 tablet (20 mg) by mouth once daily as needed (for mood and derpression concerns). 90 tablet 2    ferrous sulfate, 325 mg ferrous sulfate, tablet Take 1 tablet by mouth once daily with breakfast.      fexofenadine (Allegra) 180 mg tablet Take 1 tablet (180 mg) by mouth once daily as needed (as needed for allergies and congestion).      fluticasone (Flonase) 50 mcg/actuation nasal spray Administer 1-2 sprays into affected nostril(s) once daily.      fluticasone furoate-vilanteroL (Breo Elipta) 100-25 mcg/dose inhaler Inhale 1 puff once daily.      folic acid (Folvite) 1 mg tablet TAKE ONE TABLET BY MOUTH EVERY DAY FOR FOLATE DEFICIENCY 90 tablet 3    gabapentin (Neurontin) 800 mg tablet Take 1 tablet (800 mg) by mouth 2 times a day. 60 tablet 2    ibuprofen 800 mg tablet Take 1 tablet (800 mg) by mouth every 8 hours if needed for mild pain (1 - 3).      ipratropium-albuteroL (Duo-Neb) 0.5-2.5 mg/3 mL nebulizer solution Inhale.      levothyroxine (Synthroid, Levoxyl) 50 mcg tablet Take 1 tablet (50 mcg) by mouth once daily in the morning. Take before meals. 30 tablet 1    mirtazapine (Remeron) 15 mg tablet TAKE ONE TABLET BY MOUTH AT BEDTIME 90 tablet 1    montelukast (Singulair) 10 mg tablet TAKE ONE TABLET BY MOUTH AT NIGHT AS NEEDED TO HELP CONTROL ASTHMA AND ALLERGIES 90 tablet 3    ondansetron (Zofran) 4 mg tablet Take 1 tablet (4 mg) by mouth once daily.      pantoprazole (ProtoNix) 40 mg EC tablet Take 1 tablet (40 mg) by mouth 2 times a day.      phenazopyridine (Pyridium) 100 mg tablet       potassium chloride CR (K-Tab) 20 mEq ER tablet TAKE ONE TABLET BY MOUTH TWO  TIMES A  tablet 3    propranolol (Inderal) 10 mg tablet Take 1 tablet (10 mg) by mouth 2 times a day. 270 tablet 3    tadalafil (Cialis) 5 mg tablet Take 1 tablet (5 mg) by mouth once daily.      tamsulosin (Flomax) 0.4 mg 24 hr capsule Take 1 capsule (0.4 mg) by mouth once daily at bedtime.      alendronate (Fosamax) 70 mg tablet Take 1 tablet (70 mg) by mouth every 7 days. Take in the morning with a full glass of water at least 30 min before first food, or drink or medications of the day.      sildenafil (Viagra) 50 mg tablet Take 1 tablet (50 mg) by mouth if needed (sexual activity). 1 hour prior to sexual activity      thiamine 100 mg tablet Take 1 tablet (100 mg) by mouth once daily.       No current facility-administered medications for this visit.       Allergies   Allergen Reactions    Codeine Other    House Dust Mite Unknown    Latex Unknown     Latex gloves Misc     Latex tube/ connecter kit    Lisinopril Hives    Mold Unknown    Tree And Shrub Pollen Unknown         Visit Vitals  /75 (BP Location: Right arm, Patient Position: Sitting)   Pulse 80   Ht 1.829 m (6')   Wt 92.5 kg (204 lb)   BMI 27.67 kg/m²   Smoking Status Former   BSA 2.17 m²        Physical Exam:  On exam Mr. Redman appears his stated age, is alert and oriented x3, and in no acute distress. His sclera are anicteric and his oropharynx has moist mucous membranes. His neck is supple and without thyromegaly. The JVP is ~5 cm of water above the right atrium. His cardiac exam has regular rhythm, normal S1, S2. No S3/4. There are no murmurs. His lungs are clear to auscultation bilaterally and there is no dullness to percussion. His abdomen is soft, nontender with normoactive bowel sounds. There is no HJR. The extremities are warm and without edema. The skin is dry. There is no rash present. The distal pulses are 2-3+ in all four extremities. His mood and affect are appropriate for todays encounter.       Cardiac  Labs/Diagnostics:    Lab Results   Component Value Date    CREATININE 1.03 12/19/2023    BUN 14 12/19/2023     12/19/2023    K 4.7 12/19/2023     12/19/2023    CO2 27 12/19/2023        Recent Labs     04/06/21  1320   BNP 43     ECG (2/6/24):  Sinus rhythm (HR 98)    Echo (9/12/23):  1. Left ventricular systolic function is normal with a 60% estimated ejection fraction.  2. Sinus rhythm (HR 70s).      Impression/Plan:  Mr. Redman is a 68M with a PMHx sig for pSVT, HTN, and oxygen dependent chronic hypoxic respiratory failure who presents to the  Advanced Heart Failure clinic to establish care.     1) NSTEMI  Diagnosed with NSTEMI at [facility] ([facility]). Was recommended for outpt follow up.   -refer for cardiac CTA to risk stratify; will prescribe metoprolol tartrate 100 mg to be taken the morning of  -check lipids  -pending results of the above, will adjust meds    2) ? AF  States he has a history, but not on anticoagulation or any diagnostic tests showing this.   -refer for 2 week holter; pending results, will need to discuss anticoagulation    3) pSVT  Currently on propranolol  -holter as above    4) HTN  BP controlled.  -c/w amlodipine 2.5 mg daily      F/U: 3 months at /[facility]      ____________________________________________________________  Angel Harry,   Section of Advanced Heart Failure and Cardiac Transplantation  Division of Cardiovascular Medicine  [facility]  [facility]

## 2024-02-07 ENCOUNTER — APPOINTMENT (OUTPATIENT)
Dept: PULMONOLOGY | Facility: HOSPITAL | Age: 69
End: 2024-02-07
Payer: MEDICARE

## 2024-02-07 ENCOUNTER — OFFICE VISIT (OUTPATIENT)
Dept: WOUND CARE | Facility: CLINIC | Age: 69
End: 2024-02-07
Payer: MEDICARE

## 2024-02-07 PROCEDURE — 11042 DBRDMT SUBQ TIS 1ST 20SQCM/<: CPT

## 2024-02-08 ENCOUNTER — PATIENT OUTREACH (OUTPATIENT)
Dept: PRIMARY CARE | Facility: CLINIC | Age: 69
End: 2024-02-08
Payer: MEDICARE

## 2024-02-08 DIAGNOSIS — R29.6 FREQUENT FALLS: ICD-10-CM

## 2024-02-08 DIAGNOSIS — I73.9 PERIPHERAL VASCULAR DISEASE, UNSPECIFIED (CMS-HCC): ICD-10-CM

## 2024-02-12 DIAGNOSIS — E03.9 HYPOTHYROIDISM, UNSPECIFIED TYPE: ICD-10-CM

## 2024-02-12 RX ORDER — LEVOTHYROXINE SODIUM 50 UG/1
50 TABLET ORAL
Qty: 90 TABLET | Refills: 1 | Status: SHIPPED | OUTPATIENT
Start: 2024-02-12

## 2024-02-13 ENCOUNTER — LAB (OUTPATIENT)
Dept: LAB | Facility: LAB | Age: 69
End: 2024-02-13
Payer: MEDICARE

## 2024-02-13 DIAGNOSIS — I48.0 PAROXYSMAL ATRIAL FIBRILLATION (MULTI): ICD-10-CM

## 2024-02-13 DIAGNOSIS — I21.4 NSTEMI (NON-ST ELEVATED MYOCARDIAL INFARCTION) (MULTI): ICD-10-CM

## 2024-02-13 LAB
ALBUMIN SERPL BCP-MCNC: 4.2 G/DL (ref 3.4–5)
ANION GAP SERPL CALC-SCNC: 14 MMOL/L (ref 10–20)
BNP SERPL-MCNC: 119 PG/ML (ref 0–99)
BUN SERPL-MCNC: 16 MG/DL (ref 6–23)
CALCIUM SERPL-MCNC: 9.3 MG/DL (ref 8.6–10.3)
CHLORIDE SERPL-SCNC: 101 MMOL/L (ref 98–107)
CHOLEST SERPL-MCNC: 203 MG/DL (ref 0–199)
CHOLESTEROL/HDL RATIO: 5
CO2 SERPL-SCNC: 30 MMOL/L (ref 21–32)
CREAT SERPL-MCNC: 1.27 MG/DL (ref 0.5–1.3)
EGFRCR SERPLBLD CKD-EPI 2021: 61 ML/MIN/1.73M*2
GLUCOSE SERPL-MCNC: 86 MG/DL (ref 74–99)
HDLC SERPL-MCNC: 40.6 MG/DL
LDLC SERPL CALC-MCNC: 136 MG/DL
NON HDL CHOLESTEROL: 162 MG/DL (ref 0–149)
PHOSPHATE SERPL-MCNC: 4.2 MG/DL (ref 2.5–4.9)
POTASSIUM SERPL-SCNC: 5.5 MMOL/L (ref 3.5–5.3)
SODIUM SERPL-SCNC: 139 MMOL/L (ref 136–145)
TRIGL SERPL-MCNC: 132 MG/DL (ref 0–149)
VLDL: 26 MG/DL (ref 0–40)

## 2024-02-13 PROCEDURE — 80061 LIPID PANEL: CPT

## 2024-02-13 PROCEDURE — 36415 COLL VENOUS BLD VENIPUNCTURE: CPT

## 2024-02-13 PROCEDURE — 80069 RENAL FUNCTION PANEL: CPT

## 2024-02-13 PROCEDURE — 83880 ASSAY OF NATRIURETIC PEPTIDE: CPT

## 2024-02-14 ENCOUNTER — OFFICE VISIT (OUTPATIENT)
Dept: WOUND CARE | Facility: CLINIC | Age: 69
End: 2024-02-14
Payer: MEDICARE

## 2024-02-14 ENCOUNTER — LAB REQUISITION (OUTPATIENT)
Dept: LAB | Facility: HOSPITAL | Age: 69
End: 2024-02-14
Payer: MEDICARE

## 2024-02-14 DIAGNOSIS — L97.512 NON-PRESSURE CHRONIC ULCER OF OTHER PART OF RIGHT FOOT WITH FAT LAYER EXPOSED (MULTI): ICD-10-CM

## 2024-02-14 PROCEDURE — 97597 DBRDMT OPN WND 1ST 20 CM/<: CPT | Mod: 59

## 2024-02-14 PROCEDURE — 87070 CULTURE OTHR SPECIMN AEROBIC: CPT | Mod: OUT,STJLAB | Performed by: STUDENT IN AN ORGANIZED HEALTH CARE EDUCATION/TRAINING PROGRAM

## 2024-02-14 PROCEDURE — 11042 DBRDMT SUBQ TIS 1ST 20SQCM/<: CPT

## 2024-02-17 LAB
BACTERIA SPEC CULT: ABNORMAL
GRAM STN SPEC: ABNORMAL
GRAM STN SPEC: ABNORMAL

## 2024-02-19 ENCOUNTER — TELEPHONE (OUTPATIENT)
Dept: PRIMARY CARE | Facility: CLINIC | Age: 69
End: 2024-02-19
Payer: MEDICARE

## 2024-02-19 NOTE — TELEPHONE ENCOUNTER
Ramu, called from [facility] stating there are 2 order that need signed.    Please call Ramu [phone] ext [phone]

## 2024-02-21 ENCOUNTER — OFFICE VISIT (OUTPATIENT)
Dept: WOUND CARE | Facility: CLINIC | Age: 69
End: 2024-02-21
Payer: MEDICARE

## 2024-02-21 ENCOUNTER — LAB REQUISITION (OUTPATIENT)
Dept: LAB | Facility: HOSPITAL | Age: 69
End: 2024-02-21
Payer: MEDICARE

## 2024-02-21 ENCOUNTER — HOSPITAL ENCOUNTER (OUTPATIENT)
Dept: CARDIOLOGY | Facility: CLINIC | Age: 69
Discharge: HOME | End: 2024-02-21
Payer: MEDICARE

## 2024-02-21 ENCOUNTER — APPOINTMENT (OUTPATIENT)
Dept: CARDIOLOGY | Facility: CLINIC | Age: 69
End: 2024-02-21
Payer: MEDICARE

## 2024-02-21 DIAGNOSIS — I48.0 PAROXYSMAL ATRIAL FIBRILLATION (MULTI): ICD-10-CM

## 2024-02-21 DIAGNOSIS — L97.522 NON-PRESSURE CHRONIC ULCER OF OTHER PART OF LEFT FOOT WITH FAT LAYER EXPOSED (MULTI): ICD-10-CM

## 2024-02-21 PROCEDURE — 11042 DBRDMT SUBQ TIS 1ST 20SQCM/<: CPT

## 2024-02-21 PROCEDURE — 93246 EXT ECG>7D<15D RECORDING: CPT

## 2024-02-21 PROCEDURE — 87070 CULTURE OTHR SPECIMN AEROBIC: CPT

## 2024-02-21 PROCEDURE — 87181 SC STD AGAR DILUTION PER AGT: CPT

## 2024-02-21 PROCEDURE — 87075 CULTR BACTERIA EXCEPT BLOOD: CPT

## 2024-02-21 PROCEDURE — 87205 SMEAR GRAM STAIN: CPT

## 2024-02-21 PROCEDURE — 87186 SC STD MICRODIL/AGAR DIL: CPT

## 2024-02-21 PROCEDURE — 93248 EXT ECG>7D<15D REV&INTERPJ: CPT | Performed by: INTERNAL MEDICINE

## 2024-02-22 ENCOUNTER — HOSPITAL ENCOUNTER (OUTPATIENT)
Dept: RADIOLOGY | Facility: HOSPITAL | Age: 69
Discharge: HOME | End: 2024-02-22
Payer: MEDICARE

## 2024-02-22 VITALS
OXYGEN SATURATION: 95 % | HEART RATE: 72 BPM | DIASTOLIC BLOOD PRESSURE: 58 MMHG | RESPIRATION RATE: 14 BRPM | SYSTOLIC BLOOD PRESSURE: 95 MMHG

## 2024-02-22 DIAGNOSIS — I25.10 ATHEROSCLEROSIS OF NATIVE CORONARY ARTERY OF NATIVE HEART WITHOUT ANGINA PECTORIS: ICD-10-CM

## 2024-02-22 DIAGNOSIS — R93.1 ABNORMAL FINDINGS ON DIAGNOSTIC IMAGING OF HEART AND CORONARY CIRCULATION: ICD-10-CM

## 2024-02-22 DIAGNOSIS — I21.4 NSTEMI (NON-ST ELEVATED MYOCARDIAL INFARCTION) (MULTI): ICD-10-CM

## 2024-02-22 PROCEDURE — 2500000005 HC RX 250 GENERAL PHARMACY W/O HCPCS: Performed by: STUDENT IN AN ORGANIZED HEALTH CARE EDUCATION/TRAINING PROGRAM

## 2024-02-22 PROCEDURE — 75580 N-INVAS EST C FFR SW ALY CTA: CPT | Performed by: STUDENT IN AN ORGANIZED HEALTH CARE EDUCATION/TRAINING PROGRAM

## 2024-02-22 PROCEDURE — 2550000001 HC RX 255 CONTRASTS: Performed by: INTERNAL MEDICINE

## 2024-02-22 PROCEDURE — 75580 N-INVAS EST C FFR SW ALY CTA: CPT

## 2024-02-22 PROCEDURE — 75574 CT ANGIO HRT W/3D IMAGE: CPT | Performed by: STUDENT IN AN ORGANIZED HEALTH CARE EDUCATION/TRAINING PROGRAM

## 2024-02-22 PROCEDURE — 75574 CT ANGIO HRT W/3D IMAGE: CPT

## 2024-02-22 RX ORDER — METOPROLOL TARTRATE 1 MG/ML
5 INJECTION, SOLUTION INTRAVENOUS EVERY 5 MIN PRN
Status: DISCONTINUED | OUTPATIENT
Start: 2024-02-22 | End: 2024-02-23 | Stop reason: HOSPADM

## 2024-02-22 RX ORDER — NITROGLYCERIN 0.4 MG/1
0.8 TABLET SUBLINGUAL EVERY 5 MIN PRN
Status: DISCONTINUED | OUTPATIENT
Start: 2024-02-22 | End: 2024-02-23 | Stop reason: HOSPADM

## 2024-02-22 RX ADMIN — Medication: at 14:17

## 2024-02-22 RX ADMIN — METOPROLOL TARTRATE 5 MG: 5 INJECTION INTRAVENOUS at 14:06

## 2024-02-22 RX ADMIN — METOPROLOL TARTRATE 5 MG: 5 INJECTION INTRAVENOUS at 14:25

## 2024-02-22 RX ADMIN — METOPROLOL TARTRATE 5 MG: 5 INJECTION INTRAVENOUS at 14:48

## 2024-02-22 RX ADMIN — METOPROLOL TARTRATE 5 MG: 5 INJECTION INTRAVENOUS at 15:08

## 2024-02-22 RX ADMIN — IOHEXOL 90 ML: 350 INJECTION, SOLUTION INTRAVENOUS at 15:25

## 2024-02-22 RX ADMIN — METOPROLOL TARTRATE 5 MG: 5 INJECTION INTRAVENOUS at 14:18

## 2024-02-22 RX ADMIN — METOPROLOL TARTRATE 5 MG: 5 INJECTION INTRAVENOUS at 14:39

## 2024-02-22 RX ADMIN — METOPROLOL TARTRATE 5 MG: 5 INJECTION INTRAVENOUS at 14:32

## 2024-02-22 ASSESSMENT — PAIN - FUNCTIONAL ASSESSMENT
PAIN_FUNCTIONAL_ASSESSMENT: 0-10

## 2024-02-22 ASSESSMENT — PAIN SCALES - GENERAL
PAINLEVEL_OUTOF10: 0 - NO PAIN

## 2024-02-28 ENCOUNTER — APPOINTMENT (OUTPATIENT)
Dept: PULMONOLOGY | Facility: HOSPITAL | Age: 69
End: 2024-02-28
Payer: MEDICARE

## 2024-02-28 ENCOUNTER — OFFICE VISIT (OUTPATIENT)
Dept: WOUND CARE | Facility: CLINIC | Age: 69
End: 2024-02-28
Payer: MEDICARE

## 2024-02-28 PROCEDURE — 11042 DBRDMT SUBQ TIS 1ST 20SQCM/<: CPT

## 2024-03-04 DIAGNOSIS — M85.80 OSTEOPENIA, UNSPECIFIED LOCATION: Primary | ICD-10-CM

## 2024-03-04 RX ORDER — ALENDRONATE SODIUM 70 MG/1
70 TABLET ORAL
Qty: 12 TABLET | Refills: 3 | Status: SHIPPED | OUTPATIENT
Start: 2024-03-04 | End: 2025-03-04

## 2024-03-06 ENCOUNTER — HOSPITAL ENCOUNTER (EMERGENCY)
Facility: HOSPITAL | Age: 69
Discharge: LEFT BEFORE DISCHARGE | End: 2024-03-06
Attending: EMERGENCY MEDICINE
Payer: MEDICARE

## 2024-03-06 ENCOUNTER — OFFICE VISIT (OUTPATIENT)
Dept: WOUND CARE | Facility: CLINIC | Age: 69
End: 2024-03-06
Payer: MEDICARE

## 2024-03-06 VITALS
HEART RATE: 67 BPM | BODY MASS INDEX: 31.15 KG/M2 | HEIGHT: 72 IN | OXYGEN SATURATION: 93 % | DIASTOLIC BLOOD PRESSURE: 74 MMHG | WEIGHT: 230 LBS | TEMPERATURE: 97.5 F | SYSTOLIC BLOOD PRESSURE: 159 MMHG | RESPIRATION RATE: 18 BRPM

## 2024-03-06 DIAGNOSIS — M10.00 ACUTE IDIOPATHIC GOUT, UNSPECIFIED SITE: Primary | ICD-10-CM

## 2024-03-06 DIAGNOSIS — L03.119 CELLULITIS OF KNEE: ICD-10-CM

## 2024-03-06 LAB
ALBUMIN SERPL BCP-MCNC: 4.3 G/DL (ref 3.4–5)
ALP SERPL-CCNC: 83 U/L (ref 33–136)
ALT SERPL W P-5'-P-CCNC: 16 U/L (ref 10–52)
ANION GAP SERPL CALC-SCNC: 10 MMOL/L (ref 10–20)
AST SERPL W P-5'-P-CCNC: 25 U/L (ref 9–39)
BASOPHILS # BLD AUTO: 0.04 X10*3/UL (ref 0–0.1)
BASOPHILS NFR BLD AUTO: 0.4 %
BILIRUB SERPL-MCNC: 0.6 MG/DL (ref 0–1.2)
BUN SERPL-MCNC: 26 MG/DL (ref 6–23)
CALCIUM SERPL-MCNC: 10.4 MG/DL (ref 8.6–10.3)
CHLORIDE SERPL-SCNC: 98 MMOL/L (ref 98–107)
CO2 SERPL-SCNC: 34 MMOL/L (ref 21–32)
CREAT SERPL-MCNC: 2.22 MG/DL (ref 0.5–1.3)
CRP SERPL-MCNC: 1.35 MG/DL
EGFRCR SERPLBLD CKD-EPI 2021: 31 ML/MIN/1.73M*2
EOSINOPHIL # BLD AUTO: 0.31 X10*3/UL (ref 0–0.7)
EOSINOPHIL NFR BLD AUTO: 2.9 %
ERYTHROCYTE [DISTWIDTH] IN BLOOD BY AUTOMATED COUNT: 14.2 % (ref 11.5–14.5)
GLUCOSE SERPL-MCNC: 95 MG/DL (ref 74–99)
HCT VFR BLD AUTO: 44.1 % (ref 41–52)
HGB BLD-MCNC: 13.5 G/DL (ref 13.5–17.5)
IMM GRANULOCYTES # BLD AUTO: 0.09 X10*3/UL (ref 0–0.7)
IMM GRANULOCYTES NFR BLD AUTO: 0.9 % (ref 0–0.9)
LACTATE SERPL-SCNC: 1.4 MMOL/L (ref 0.4–2)
LYMPHOCYTES # BLD AUTO: 2.06 X10*3/UL (ref 1.2–4.8)
LYMPHOCYTES NFR BLD AUTO: 19.6 %
MCH RBC QN AUTO: 30.4 PG (ref 26–34)
MCHC RBC AUTO-ENTMCNC: 30.6 G/DL (ref 32–36)
MCV RBC AUTO: 99 FL (ref 80–100)
MONOCYTES # BLD AUTO: 0.89 X10*3/UL (ref 0.1–1)
MONOCYTES NFR BLD AUTO: 8.5 %
NEUTROPHILS # BLD AUTO: 7.13 X10*3/UL (ref 1.2–7.7)
NEUTROPHILS NFR BLD AUTO: 67.7 %
NRBC BLD-RTO: 0 /100 WBCS (ref 0–0)
PLATELET # BLD AUTO: 226 X10*3/UL (ref 150–450)
POTASSIUM SERPL-SCNC: 5 MMOL/L (ref 3.5–5.3)
PROT SERPL-MCNC: 7.7 G/DL (ref 6.4–8.2)
RBC # BLD AUTO: 4.44 X10*6/UL (ref 4.5–5.9)
SODIUM SERPL-SCNC: 137 MMOL/L (ref 136–145)
WBC # BLD AUTO: 10.5 X10*3/UL (ref 4.4–11.3)

## 2024-03-06 PROCEDURE — 36415 COLL VENOUS BLD VENIPUNCTURE: CPT | Performed by: EMERGENCY MEDICINE

## 2024-03-06 PROCEDURE — 83605 ASSAY OF LACTIC ACID: CPT | Performed by: EMERGENCY MEDICINE

## 2024-03-06 PROCEDURE — 2500000004 HC RX 250 GENERAL PHARMACY W/ HCPCS (ALT 636 FOR OP/ED): Performed by: EMERGENCY MEDICINE

## 2024-03-06 PROCEDURE — 86140 C-REACTIVE PROTEIN: CPT | Performed by: EMERGENCY MEDICINE

## 2024-03-06 PROCEDURE — 99283 EMERGENCY DEPT VISIT LOW MDM: CPT | Mod: 25

## 2024-03-06 PROCEDURE — 84075 ASSAY ALKALINE PHOSPHATASE: CPT | Performed by: EMERGENCY MEDICINE

## 2024-03-06 PROCEDURE — 87040 BLOOD CULTURE FOR BACTERIA: CPT | Mod: STJLAB | Performed by: EMERGENCY MEDICINE

## 2024-03-06 PROCEDURE — 11042 DBRDMT SUBQ TIS 1ST 20SQCM/<: CPT

## 2024-03-06 PROCEDURE — 96360 HYDRATION IV INFUSION INIT: CPT

## 2024-03-06 PROCEDURE — 85025 COMPLETE CBC W/AUTO DIFF WBC: CPT | Performed by: EMERGENCY MEDICINE

## 2024-03-06 PROCEDURE — 99285 EMERGENCY DEPT VISIT HI MDM: CPT | Performed by: EMERGENCY MEDICINE

## 2024-03-06 RX ORDER — COLCHICINE 0.6 MG/1
0.6 TABLET ORAL DAILY
Qty: 3 TABLET | Refills: 0 | Status: SHIPPED | OUTPATIENT
Start: 2024-03-06 | End: 2024-05-07 | Stop reason: WASHOUT

## 2024-03-06 RX ORDER — DOXYCYCLINE HYCLATE 100 MG
100 TABLET ORAL EVERY 12 HOURS
Qty: 20 TABLET | Refills: 0 | Status: SHIPPED | OUTPATIENT
Start: 2024-03-06 | End: 2024-05-07 | Stop reason: WASHOUT

## 2024-03-06 RX ORDER — AMOXICILLIN AND CLAVULANATE POTASSIUM 875; 125 MG/1; MG/1
1 TABLET, FILM COATED ORAL EVERY 12 HOURS
Qty: 20 TABLET | Refills: 0 | Status: SHIPPED | OUTPATIENT
Start: 2024-03-06 | End: 2024-05-07 | Stop reason: WASHOUT

## 2024-03-06 RX ADMIN — SODIUM CHLORIDE 1000 ML: 9 INJECTION, SOLUTION INTRAVENOUS at 16:52

## 2024-03-06 ASSESSMENT — COLUMBIA-SUICIDE SEVERITY RATING SCALE - C-SSRS
2. HAVE YOU ACTUALLY HAD ANY THOUGHTS OF KILLING YOURSELF?: NO
1. IN THE PAST MONTH, HAVE YOU WISHED YOU WERE DEAD OR WISHED YOU COULD GO TO SLEEP AND NOT WAKE UP?: NO
6. HAVE YOU EVER DONE ANYTHING, STARTED TO DO ANYTHING, OR PREPARED TO DO ANYTHING TO END YOUR LIFE?: NO

## 2024-03-06 ASSESSMENT — PAIN - FUNCTIONAL ASSESSMENT: PAIN_FUNCTIONAL_ASSESSMENT: 0-10

## 2024-03-06 ASSESSMENT — PAIN SCALES - GENERAL: PAINLEVEL_OUTOF10: 0 - NO PAIN

## 2024-03-06 ASSESSMENT — LIFESTYLE VARIABLES
HAVE YOU EVER FELT YOU SHOULD CUT DOWN ON YOUR DRINKING: NO
EVER HAD A DRINK FIRST THING IN THE MORNING TO STEADY YOUR NERVES TO GET RID OF A HANGOVER: NO
HAVE PEOPLE ANNOYED YOU BY CRITICIZING YOUR DRINKING: NO
EVER FELT BAD OR GUILTY ABOUT YOUR DRINKING: NO

## 2024-03-06 NOTE — ED PROVIDER NOTES
HPI   Chief Complaint   Patient presents with    Knee Pain     R/o septic knee       HPI                    Margarito Coma Scale Score: 15                     Patient History   Past Medical History:   Diagnosis Date    Alcohol dependence, in remission (CMS/HCC) 06/08/2022    Alcohol dependence in early, early partial, sustained full, or sustained partial remission    Alcohol dependence, in remission (CMS/HCC) 06/08/2022    Alcohol dependence in early, early partial, sustained full, or sustained partial remission    Alcohol dependence, uncomplicated (CMS/HCC) 09/15/2022    Alcohol dependence, daily use    Dependence on other enabling machines and devices 09/24/2019    Walker as ambulation aid    Encounter for screening for other disorder 03/01/2021    Special screening for other conditions    Fracture of one rib, unspecified side, initial encounter for closed fracture 01/03/2014    Closed rib fracture    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    Aseptic necrosis of femoral head    Laceration without foreign body of scalp, initial encounter 09/10/2015    Scalp laceration    Nausea 04/15/2014    Nausea    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) 07/27/2017    Skin ulcer of right ankle, limited to breakdown of skin    Osteomyelitis, unspecified (CMS/HCC) 02/26/2022    Osteomyelitis of toe    Other chest pain 09/21/2013    Atypical chest pain    Other conditions influencing health status     Skin Cancer    Other conditions influencing health status 10/08/2017    Closed displaced fracture of olecranon process of left ulna with intra-articular extension, initial encounter    Other specified health status 07/23/2014    Foreign travel    Patient's noncompliance with other medical treatment and regimen due to unspecified reason 10/28/2016    Noncompliance with therapeutic plan    Patient's other noncompliance with medication regimen 06/04/2016    History of  medication noncompliance    Personal history of (healed) stress fracture 12/30/2013    History of stress fracture    Personal history of diseases of the skin and subcutaneous tissue 02/26/2022    History of chronic skin ulcer    Personal history of other diseases of the nervous system and sense organs 03/25/2016    History of peripheral neuropathy    Personal history of other diseases of the nervous system and sense organs 02/02/2016    History of peripheral neuropathy    Personal history of other endocrine, nutritional and metabolic disease 07/13/2015    History of hypothyroidism    Personal history of other specified conditions 07/28/2019    History of syncope    Personal history of other specified conditions 05/07/2018    History of syncope    Personal history of other specified conditions 06/25/2015    History of nausea    Unspecified fracture of left foot, initial encounter for closed fracture 06/04/2016    Foot fracture, left    Unspecified injury of head, initial encounter 04/20/2016    Closed head injury, initial encounter    Unspecified injury of unspecified elbow, initial encounter 04/07/2017    Elbow injury    Unsteadiness on feet 04/15/2014    Unsteady gait     Past Surgical History:   Procedure Laterality Date    COLONOSCOPY  10/09/2013    Complete Colonoscopy    OTHER SURGICAL HISTORY  07/28/2019    Insertion of cardiac monitor    OTHER SURGICAL HISTORY  04/15/2014    Reported Hx Of Hip Replacement - Left Side    OTHER SURGICAL HISTORY  01/28/2020    Hip replacement    OTHER SURGICAL HISTORY  11/24/2015    Interrogation Of Implantable Loop Recorder By Physician In Person    OTHER SURGICAL HISTORY  04/19/2017    Arthroscopy Elbow Left    OTHER SURGICAL HISTORY  10/09/2013    Shoulder Surgery Left    SKIN CANCER EXCISION  10/09/2013    Mohs Micrographic Surgery Face     Family History   Problem Relation Name Age of Onset    Other (cardiac pacemaker) Mother      Coronary artery disease Mother      Other  (history of heart artery stent) Mother      Cancer Mother      Other (kurtis cell cancer) Mother      Arthritis Mother      Mental illness Brother          living in handicapped assisted living facility permanently [Other]    Other (angina pectoris) Paternal Grandmother       Social History     Tobacco Use    Smoking status: Former     Types: Cigarettes     Quit date:      Years since quittin.1    Smokeless tobacco: Never   Vaping Use    Vaping Use: Never used   Substance Use Topics    Alcohol use: Yes     Alcohol/week: 3.0 standard drinks of alcohol     Types: 3 Glasses of wine per week    Drug use: Never       Physical Exam   ED Triage Vitals [24 1316]   Temperature Heart Rate Respirations BP   36.4 °C (97.5 °F) 68 18 150/68      Pulse Ox Temp Source Heart Rate Source Patient Position   97 % Temporal Monitor Sitting      BP Location FiO2 (%)     Right arm --       Physical Exam    ED Course & MDM   Diagnoses as of 24 1729   Acute idiopathic gout, unspecified site   Cellulitis of knee       Medical Decision Making  =================Attending note===============    The patient was seen by the resident/fellow.  I have personally performed a substantive portion of the encounter.  I have seen and examined the patient; agree with the workup, evaluation, MDM,   management and diagnosis.  The care plan has been discussed with the resident; I have reviewed the resident's note and agree with the documented findings.      This is a 68 y.o. male who presents to ER with right knee warmth and redness.  Patient was seen by Dr Bull today because of chronic foot wounds.  He states that they did undress his wounds and rewrapped them today.  He is currently on antibiotics because of these wounds.  He was sent here today from the wound care office because they noticed that he had warmth and redness of the anterior portion of his right knee.  Patient had no pain and did not notice this until the doctor  pointed out.  He has been ambulating without issue.  There is no systemic symptoms.  No fevers or chills.  There is been no nausea or vomiting or diarrhea.  Denies any injuries.  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  He no longer drinks alcohol.  No diabetes.  He does have a history of MRSA infection.  He does have a history of gout and is elbow.  He has no history of renal failure.    Heart regular.  Lungs clear.  Abdomen soft and nontender.  I did not undress the foot wound since they were just addressed at the wound care center just prior to him coming here and I did not want to disrupt the dressings.  There is some anterior warmth and redness of the right knee.  There is mild swelling.  There is no fusiform swelling of the joint.  He has good range of motion of the joint.  There is no pain with flexion and extension at the joint.  This is not consistent with septic joint.  Redness and warmth are limited to the anterior knee.  This could be bursitis versus potentially gout.  This could also just be cellulitis.  Patient had been on ciprofloxacin and clindamycin.    CRP is minimally elevated.  Chemistry does show acute renal insufficiency.  Today the BUN is 26 and creatinine is 2.22.  No leukocytosis.  No anemia.  BUN was 16 and creatinine was 1.27 less than a month ago on February 13.    Due to the patient's elevated acute renal failure we did want to admit the patient.  Patient does not want to be admitted.  He is awake and alert and oriented.  He has full decisional capacity.  Patient is signed out AGAINST MEDICAL ADVICE.    Patient is discharged home with colchicine, doxycycline, Augmentin.  He is covered for cellulitis and potential bursitis along with gout.  He is to follow-up with his doctors.  He is to return to the nearest ER for any new or worsening symptoms.                  ==========================================          Procedure  Procedures

## 2024-03-06 NOTE — ED PROVIDER NOTES
EMERGENCY DEPARTMENT ENCOUNTER      Pt Name: Angus Redman  MRN: [id number]  Birthdate 1955  Date of evaluation: 3/6/2024  Provider: Broderick Nuno MD    CHIEF COMPLAINT       Chief Complaint   Patient presents with    Knee Pain     R/o septic knee         HISTORY OF PRESENT ILLNESS    The patient comes to the emergency department after being referred here by his podiatrist Dr. Boone Jang.  The patient was seeing Dr. Jang this morning for bilateral nondiabetic foot wounds.  The patient sees Dr. Jang every Wednesday for bilateral nondiabetic foot wounds.  Document counts greater than noticed that the patient has a swollen erythematous right knee and send the patient to the emergency department due to concern for gout.  Dr. Jang says that the patient was originally being treated for bilateral foot wounds with Bactrim, but has since been switched to ciprofloxacin and clindamycin.  The patient states that Dr. Jang thinks that the patient should be admitted to the hospital, but does not know why Dr. Jang thinks this way.  The patient is adamant that he be discharged and not admitted to the hospital.  The patient denies any fevers, sore throat, nasal drainage, chest pain, shortness of breath, painful respirations, abdominal pain, nausea, vomiting, neck pain, back pain, or headache.  The patient denies the use of tobacco or alcohol.  The patient said that he has been diagnosed with elbow In the past before, but he is not taking any current gout medications.  The patient states that he is having mild medial right knee pain where he has swelling and erythema.      History provided by:  Patient   used: No        Nursing Notes were reviewed.    PAST MEDICAL HISTORY     Past Medical History:   Diagnosis Date    Alcohol dependence, in remission (CMS/[facility]) 06/08/2022    Alcohol dependence in early, early partial, sustained full, or sustained partial remission    Alcohol  dependence, in remission (CMS/HCC) 06/08/2022    Alcohol dependence in early, early partial, sustained full, or sustained partial remission    Alcohol dependence, uncomplicated (CMS/HCC) 09/15/2022    Alcohol dependence, daily use    Dependence on other enabling machines and devices 09/24/2019    Walker as ambulation aid    Encounter for screening for other disorder 03/01/2021    Special screening for other conditions    Fracture of one rib, unspecified side, initial encounter for closed fracture 01/03/2014    Closed rib fracture    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    Aseptic necrosis of femoral head    Laceration without foreign body of scalp, initial encounter 09/10/2015    Scalp laceration    Nausea 04/15/2014    Nausea    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) 07/27/2017    Skin ulcer of right ankle, limited to breakdown of skin    Osteomyelitis, unspecified (CMS/HCC) 02/26/2022    Osteomyelitis of toe    Other chest pain 09/21/2013    Atypical chest pain    Other conditions influencing health status     Skin Cancer    Other conditions influencing health status 10/08/2017    Closed displaced fracture of olecranon process of left ulna with intra-articular extension, initial encounter    Other specified health status 07/23/2014    Foreign travel    Patient's noncompliance with other medical treatment and regimen due to unspecified reason 10/28/2016    Noncompliance with therapeutic plan    Patient's other noncompliance with medication regimen 06/04/2016    History of medication noncompliance    Personal history of (healed) stress fracture 12/30/2013    History of stress fracture    Personal history of diseases of the skin and subcutaneous tissue 02/26/2022    History of chronic skin ulcer    Personal history of other diseases of the nervous system and sense organs 03/25/2016    History of peripheral neuropathy    Personal history of  other diseases of the nervous system and sense organs 02/02/2016    History of peripheral neuropathy    Personal history of other endocrine, nutritional and metabolic disease 07/13/2015    History of hypothyroidism    Personal history of other specified conditions 07/28/2019    History of syncope    Personal history of other specified conditions 05/07/2018    History of syncope    Personal history of other specified conditions 06/25/2015    History of nausea    Unspecified fracture of left foot, initial encounter for closed fracture 06/04/2016    Foot fracture, left    Unspecified injury of head, initial encounter 04/20/2016    Closed head injury, initial encounter    Unspecified injury of unspecified elbow, initial encounter 04/07/2017    Elbow injury    Unsteadiness on feet 04/15/2014    Unsteady gait         SURGICAL HISTORY       Past Surgical History:   Procedure Laterality Date    COLONOSCOPY  10/09/2013    Complete Colonoscopy    OTHER SURGICAL HISTORY  07/28/2019    Insertion of cardiac monitor    OTHER SURGICAL HISTORY  04/15/2014    Reported Hx Of Hip Replacement - Left Side    OTHER SURGICAL HISTORY  01/28/2020    Hip replacement    OTHER SURGICAL HISTORY  11/24/2015    Interrogation Of Implantable Loop Recorder By Physician In Person    OTHER SURGICAL HISTORY  04/19/2017    Arthroscopy Elbow Left    OTHER SURGICAL HISTORY  10/09/2013    Shoulder Surgery Left    SKIN CANCER EXCISION  10/09/2013    Mohs Micrographic Surgery Face         CURRENT MEDICATIONS       Previous Medications    ACETAMINOPHEN (TYLENOL) 325 MG TABLET    Take 1,000 mg by mouth every 6 hours if needed for mild pain (1 - 3).    ALBUTEROL (PROAIR HFA) 90 MCG/ACTUATION INHALER    Inhale 2 puffs every 6 hours if needed for wheezing.    ALENDRONATE (FOSAMAX) 70 MG TABLET    Take 1 tablet (70 mg) by mouth every 7 days. Take in the morning with a full glass of water at least 30 min before first food, or drink or medications of the day.     AMLODIPINE (NORVASC) 2.5 MG TABLET    TAKE ONE TABLET BY MOUTH DAILY    BUSPIRONE (BUSPAR) 5 MG TABLET    Take 1 tablet (5 mg) by mouth 3 times a day as needed (anxiety).    CAPSAICIN (ZOSTRIX) 0.025 % CREAM    Apply topically 3 times a day.    CARBOXYMETHYLCELLULOSE (REFRESH CELLUVISC) 1 % OPHTHALMIC SOLUTION DROPPERETTE    Administer into affected eye(s).    CYANOCOBALAMIN (VITAMIN B-12) 1,000 MCG TABLET    Take 1 tablet (1,000 mcg) by mouth once daily. As directed    DULOXETINE (CYMBALTA) 60 MG DR CAPSULE    Take 1 capsule (60 mg) by mouth 2 times a day.    ERGOCALCIFEROL (VITAMIN D-2) 1.25 MG (92907 UT) CAPSULE    Take 1 capsule (1,250 mcg) by mouth 1 (one) time per week.    ESCITALOPRAM (LEXAPRO) 10 MG TABLET    Take 1 tablet (10 mg) by mouth once daily.    ESCITALOPRAM (LEXAPRO) 20 MG TABLET    Take 1 tablet (20 mg) by mouth once daily as needed (for mood and derpression concerns).    FERROUS SULFATE, 325 MG FERROUS SULFATE, TABLET    Take 1 tablet by mouth once daily with breakfast.    FEXOFENADINE (ALLEGRA) 180 MG TABLET    Take 1 tablet (180 mg) by mouth once daily as needed (as needed for allergies and congestion).    FLUTICASONE (FLONASE) 50 MCG/ACTUATION NASAL SPRAY    Administer 1-2 sprays into affected nostril(s) once daily.    FLUTICASONE FUROATE-VILANTEROL (BREO ELIPTA) 100-25 MCG/DOSE INHALER    Inhale 1 puff once daily.    FOLIC ACID (FOLVITE) 1 MG TABLET    TAKE ONE TABLET BY MOUTH EVERY DAY FOR FOLATE DEFICIENCY    GABAPENTIN (NEURONTIN) 800 MG TABLET    Take 1 tablet (800 mg) by mouth 2 times a day.    IBUPROFEN 800 MG TABLET    Take 1 tablet (800 mg) by mouth every 8 hours if needed for mild pain (1 - 3).    IPRATROPIUM-ALBUTEROL (DUO-NEB) 0.5-2.5 MG/3 ML NEBULIZER SOLUTION    Inhale.    LEVOTHYROXINE (SYNTHROID, LEVOXYL) 50 MCG TABLET    Take 1 tablet (50 mcg) by mouth once daily in the morning. Take before meals.    METOPROLOL TARTRATE (LOPRESSOR) 100 MG TABLET    Take 1 tablet (100 mg) by  mouth once daily. Please take 1 tablet 2 hrs prior to CT    MIRTAZAPINE (REMERON) 15 MG TABLET    TAKE ONE TABLET BY MOUTH AT BEDTIME    MONTELUKAST (SINGULAIR) 10 MG TABLET    TAKE ONE TABLET BY MOUTH AT NIGHT AS NEEDED TO HELP CONTROL ASTHMA AND ALLERGIES    ONDANSETRON (ZOFRAN) 4 MG TABLET    Take 1 tablet (4 mg) by mouth once daily.    PANTOPRAZOLE (PROTONIX) 40 MG EC TABLET    Take 1 tablet (40 mg) by mouth 2 times a day.    PHENAZOPYRIDINE (PYRIDIUM) 100 MG TABLET        POTASSIUM CHLORIDE CR (K-TAB) 20 MEQ ER TABLET    TAKE ONE TABLET BY MOUTH TWO TIMES A DAY    PROPRANOLOL (INDERAL) 10 MG TABLET    Take 1 tablet (10 mg) by mouth 2 times a day.    TAMSULOSIN (FLOMAX) 0.4 MG 24 HR CAPSULE    Take 1 capsule (0.4 mg) by mouth once daily at bedtime.    THIAMINE 100 MG TABLET    Take 1 tablet (100 mg) by mouth once daily.       ALLERGIES     Codeine, House dust mite, Latex, Lisinopril, Mold, and Tree and shrub pollen    FAMILY HISTORY       Family History   Problem Relation Name Age of Onset    Other (cardiac pacemaker) Mother      Coronary artery disease Mother      Other (history of heart artery stent) Mother      Cancer Mother      Other (kurtis cell cancer) Mother      Arthritis Mother      Mental illness Brother          living in handicapped assisted living facility permanently [Other]    Other (angina pectoris) Paternal Grandmother            SOCIAL HISTORY       Social History     Socioeconomic History    Marital status:      Spouse name: None    Number of children: None    Years of education: None    Highest education level: None   Occupational History    None   Tobacco Use    Smoking status: Former     Types: Cigarettes     Quit date:      Years since quittin.1    Smokeless tobacco: Never   Vaping Use    Vaping Use: Never used   Substance and Sexual Activity    Alcohol use: Yes     Alcohol/week: 3.0 standard drinks of alcohol     Types: 3 Glasses of wine per week    Drug use: Never     Sexual activity: None   Other Topics Concern    None   Social History Narrative    None     Social Determinants of Health     Financial Resource Strain: Not on file   Food Insecurity: Not on file   Transportation Needs: Not on file   Physical Activity: Not on file   Stress: Not on file   Social Connections: Not on file   Intimate Partner Violence: Not on file   Housing Stability: Not on file       SCREENINGS                        PHYSICAL EXAM    (up to 7 for level 4, 8 or more for level 5)     ED Triage Vitals [03/06/24 1316]   Temperature Heart Rate Respirations BP   36.4 °C (97.5 °F) 68 18 150/68      Pulse Ox Temp Source Heart Rate Source Patient Position   97 % Temporal Monitor Sitting      BP Location FiO2 (%)     Right arm --       Physical Exam  Constitutional:       General: He is not in acute distress.     Appearance: Normal appearance. He is normal weight. He is not ill-appearing, toxic-appearing or diaphoretic.   HENT:      Head: Normocephalic and atraumatic.      Nose: Nose normal. No congestion or rhinorrhea.   Eyes:      General: No scleral icterus.        Right eye: No discharge.         Left eye: No discharge.      Extraocular Movements: Extraocular movements intact.      Conjunctiva/sclera: Conjunctivae normal.   Cardiovascular:      Rate and Rhythm: Normal rate and regular rhythm.      Pulses: Normal pulses.      Heart sounds: Normal heart sounds. No murmur heard.     No friction rub. No gallop.   Pulmonary:      Effort: Pulmonary effort is normal. No respiratory distress.      Breath sounds: Normal breath sounds. No stridor. No wheezing, rhonchi or rales.   Chest:      Chest wall: No tenderness.   Abdominal:      Tenderness: There is no abdominal tenderness. There is no guarding or rebound.   Musculoskeletal:      Cervical back: Normal range of motion and neck supple. No rigidity or tenderness.   Lymphadenopathy:      Cervical: No cervical adenopathy.   Skin:     General: Skin is warm.       Coloration: Skin is not jaundiced or pale.      Findings: Erythema (The patient has erythema of his right knee.) present. No bruising, lesion or rash.   Neurological:      General: No focal deficit present.      Mental Status: He is alert.   Psychiatric:         Mood and Affect: Mood normal.         Behavior: Behavior normal.         Thought Content: Thought content normal.         Judgment: Judgment normal.          DIAGNOSTIC RESULTS     LABS:  Labs Reviewed   CBC WITH AUTO DIFFERENTIAL - Abnormal       Result Value    WBC 10.5      nRBC 0.0      RBC 4.44 (*)     Hemoglobin 13.5      Hematocrit 44.1      MCV 99      MCH 30.4      MCHC 30.6 (*)     RDW 14.2      Platelets 226      Neutrophils % 67.7      Immature Granulocytes %, Automated 0.9      Lymphocytes % 19.6      Monocytes % 8.5      Eosinophils % 2.9      Basophils % 0.4      Neutrophils Absolute 7.13      Immature Granulocytes Absolute, Automated 0.09      Lymphocytes Absolute 2.06      Monocytes Absolute 0.89      Eosinophils Absolute 0.31      Basophils Absolute 0.04     COMPREHENSIVE METABOLIC PANEL - Abnormal    Glucose 95      Sodium 137      Potassium 5.0      Chloride 98      Bicarbonate 34 (*)     Anion Gap 10      Urea Nitrogen 26 (*)     Creatinine 2.22 (*)     eGFR 31 (*)     Calcium 10.4 (*)     Albumin 4.3      Alkaline Phosphatase 83      Total Protein 7.7      AST 25      Bilirubin, Total 0.6      ALT 16     C-REACTIVE PROTEIN - Abnormal    C-Reactive Protein 1.35 (*)    LACTATE - Normal    Lactate 1.4      Narrative:     Venipuncture immediately after or during the administration of Metamizole may lead to falsely low results. Testing should be performed immediately  prior to Metamizole dosing.   BLOOD CULTURE   BLOOD CULTURE       All other labs were within normal range or not returned as of this dictation.    Imaging  No orders to display        Procedures  Procedures     EMERGENCY DEPARTMENT COURSE/MDM:     Diagnoses as of 03/06/24 3446    Acute idiopathic gout, unspecified site   Cellulitis of knee        Medical Decision Making  =================Attending note===============     The patient was seen by the resident/fellow.  I have personally performed a substantive portion of the encounter.  I have seen and examined the patient; agree with the workup, evaluation, MDM,   management and diagnosis.  The care plan has been discussed with the resident; I have reviewed the resident's note and agree with the documented findings.       This is a 68 y.o. male who presents to ER with right knee warmth and redness.  Patient was seen by Dr Bull today because of chronic foot wounds.  He states that they did undress his wounds and rewrapped them today.  He is currently on antibiotics because of these wounds.  He was sent here today from the wound care office because they noticed that he had warmth and redness of the anterior portion of his right knee.  Patient had no pain and did not notice this until the doctor pointed out.  He has been ambulating without issue.  There is no systemic symptoms.  No fevers or chills.  There is been no nausea or vomiting or diarrhea.  Denies any injuries.  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  He no longer drinks alcohol.  No diabetes.  He does have a history of MRSA infection.  He does have a history of gout and is elbow.  He has no history of renal failure.     Heart regular.  Lungs clear.  Abdomen soft and nontender.  I did not undress the foot wound since they were just addressed at the wound care center just prior to him coming here and I did not want to disrupt the dressings.  There is some anterior warmth and redness of the right knee.  There is mild swelling.  There is no fusiform swelling of the joint.  He has good range of motion of the joint.  There is no pain with flexion and extension at the joint.  This is not consistent with septic joint.  Redness and warmth are limited to the anterior knee.   This could be bursitis versus potentially gout.  This could also just be cellulitis.  Patient had been on ciprofloxacin and clindamycin.     CRP is minimally elevated.  Chemistry does show acute renal insufficiency.  Today the BUN is 26 and creatinine is 2.22.  No leukocytosis.  No anemia.  BUN was 16 and creatinine was 1.27 less than a month ago on February 13.     Due to the patient's elevated acute renal failure we did want to admit the patient.  Patient does not want to be admitted.  He is awake and alert and oriented.  He has full decisional capacity.  Patient is signed out AGAINST MEDICAL ADVICE.     Patient is discharged home with colchicine, doxycycline, Augmentin.  He is covered for cellulitis and potential bursitis along with gout.  He is to follow-up with his doctors.  He is to return to the nearest ER for any new or worsening symptoms.     ==========================================      The patient received a C-reactive protein to look for signs of inflammation as gout can cause inflammation. The patient's C-reactive protein pain came back elevated at 1.35 indicating that there is an inflammatory process. The patient received a lactate study to look for signs of anaerobic metabolism. The patient's lactate was not elevated indicating adequate tissue perfusion due to no elevated signs of anaerobic metabolism. The patient received a CBC to look for signs of elevated white blood cell counts that might indicate infection. The patient's CBC did not return any significant abnormalities.  A comprehensive metabolic panel study was performed to look for electrolyte abnormalities. The patient was found to have elevated BUN, elevated creatinine and decreased EGFR indicating kidney injury.  Since his kidney function was within normal range 3 weeks ago, and his BUN and creatinine nearly doubled in the past 3 weeks, and his EGFR halved in the past 3 weeks, the patient has acute kidney injury.  Although acute kidney  injury is a reason for admission, the patient has been adamant that he does not want to be admitted to the hospital for any reason.  Thus, the patient was given 1 L of normal saline to improve his kidney function and subsequently discharged from the emergency department AMA with a prescription of colchicine for gout, Augmentin for the possibility of cellulitis, and doxycycline for MRSA coverage in case the patient's cellulitis is due to MRSA.    Patient and or family in agreement and understanding of treatment plan.  All questions answered.      I reviewed the case with the attending ED physician. The attending ED physician agrees with the plan. Patient and/or patient´s representative was counseled regarding labs, imaging, likely diagnosis, and plan. All questions were answered.    ED Medications administered this visit:    Medications   sodium chloride 0.9 % bolus 1,000 mL (1,000 mL intravenous New Bag 3/6/24 1938)       New Prescriptions from this visit:    New Prescriptions    AMOXICILLIN-POT CLAVULANATE (AUGMENTIN) 875-125 MG TABLET    Take 1 tablet by mouth every 12 hours.    COLCHICINE 0.6 MG TABLET    Take 1 tablet (0.6 mg) by mouth once daily. Take 2 tablets now and one tablet an hour later    DOXYCYCLINE (VIBRA-TABS) 100 MG TABLET    Take 1 tablet (100 mg) by mouth every 12 hours. Take with a full glass of water and do not lie down for at least 30 minutes after.       Follow-up:  Artie Haskins MD  [address]  [facility], [address]  [place] [id number]  [phone]    In 1 day      Boone Bull DPM  [address]  [place] [address]  [phone]    In 1 day          Final Impression:   1. Acute idiopathic gout, unspecified site    2. Cellulitis of knee          (Please note that portions of this note were completed with a voice recognition program.  Efforts were made to edit the dictations but occasionally words are mis-transcribed.)     Broderick Nuno,  MD  Resident  03/06/24 1830       Broderick Nuno MD  Resident  03/06/24 1950       Foreign Verdugo DO  03/08/24 1431

## 2024-03-06 NOTE — DISCHARGE INSTRUCTIONS
Seek immediate medical attention if you develop: increasing knee redness, increasing knee swelling, fever, chills, nausea, vomiting, weakness, or any new or worsening symptoms.    You need to follow up your acute kidney injury with your doctor.

## 2024-03-07 ENCOUNTER — PATIENT OUTREACH (OUTPATIENT)
Dept: PRIMARY CARE | Facility: CLINIC | Age: 69
End: 2024-03-07
Payer: MEDICARE

## 2024-03-07 DIAGNOSIS — I73.9 PERIPHERAL VASCULAR DISEASE, UNSPECIFIED (CMS-HCC): ICD-10-CM

## 2024-03-07 DIAGNOSIS — I48.20 ATRIAL FIBRILLATION, CHRONIC (MULTI): ICD-10-CM

## 2024-03-07 PROCEDURE — 99490 CHRNC CARE MGMT STAFF 1ST 20: CPT | Performed by: INTERNAL MEDICINE

## 2024-03-07 NOTE — PROGRESS NOTES
I called and spoke with the patient who was sent to the ED from the wound clinic for a red, swollen knee.  Patient was given a RX for amoxicillin, colchicine and doxycline which he will  today.  Patient will, have to take an uber to pick the meds up.  Per the patient, today his knee in not red or swollen.  Patient refused admission for acute renal failure and will follow up on 3/12/2024 with Dr Haskins.  I sent Dr Haskins a message asking if her wants repeat labs.

## 2024-03-10 LAB
BACTERIA BLD CULT: NORMAL
BACTERIA BLD CULT: NORMAL

## 2024-03-12 ENCOUNTER — OFFICE VISIT (OUTPATIENT)
Dept: PRIMARY CARE | Facility: CLINIC | Age: 69
End: 2024-03-12
Payer: MEDICARE

## 2024-03-12 ENCOUNTER — LAB (OUTPATIENT)
Dept: LAB | Facility: LAB | Age: 69
End: 2024-03-12
Payer: MEDICARE

## 2024-03-12 VITALS
RESPIRATION RATE: 16 BRPM | TEMPERATURE: 97.7 F | WEIGHT: 232.4 LBS | HEIGHT: 72 IN | DIASTOLIC BLOOD PRESSURE: 84 MMHG | OXYGEN SATURATION: 96 % | SYSTOLIC BLOOD PRESSURE: 122 MMHG | HEART RATE: 79 BPM | BODY MASS INDEX: 31.48 KG/M2

## 2024-03-12 DIAGNOSIS — F10.20: ICD-10-CM

## 2024-03-12 DIAGNOSIS — Z00.00 ROUTINE GENERAL MEDICAL EXAMINATION AT HEALTH CARE FACILITY: Primary | ICD-10-CM

## 2024-03-12 DIAGNOSIS — I73.9 PERIPHERAL VASCULAR DISEASE, UNSPECIFIED (CMS-HCC): ICD-10-CM

## 2024-03-12 DIAGNOSIS — G62.1 ALCOHOL-INDUCED POLYNEUROPATHY (MULTI): ICD-10-CM

## 2024-03-12 DIAGNOSIS — F33.41 RECURRENT MAJOR DEPRESSIVE DISORDER, IN PARTIAL REMISSION (CMS-HCC): ICD-10-CM

## 2024-03-12 DIAGNOSIS — R79.89 ELEVATED SERUM CREATININE: ICD-10-CM

## 2024-03-12 DIAGNOSIS — Z66 DNR (DO NOT RESUSCITATE): ICD-10-CM

## 2024-03-12 DIAGNOSIS — Z71.89 ADVANCED DIRECTIVES, COUNSELING/DISCUSSION: ICD-10-CM

## 2024-03-12 DIAGNOSIS — E66.9 CLASS 1 OBESITY WITH SERIOUS COMORBIDITY AND BODY MASS INDEX (BMI) OF 31.0 TO 31.9 IN ADULT, UNSPECIFIED OBESITY TYPE: ICD-10-CM

## 2024-03-12 DIAGNOSIS — N18.31 STAGE 3A CHRONIC KIDNEY DISEASE (MULTI): ICD-10-CM

## 2024-03-12 DIAGNOSIS — E11.42 TYPE 2 DIABETES MELLITUS WITH DIABETIC POLYNEUROPATHY, WITHOUT LONG-TERM CURRENT USE OF INSULIN (MULTI): ICD-10-CM

## 2024-03-12 LAB
ANION GAP SERPL CALC-SCNC: 13 MMOL/L (ref 10–20)
BUN SERPL-MCNC: 12 MG/DL (ref 6–23)
CALCIUM SERPL-MCNC: 9.8 MG/DL (ref 8.6–10.6)
CHLORIDE SERPL-SCNC: 105 MMOL/L (ref 98–107)
CO2 SERPL-SCNC: 29 MMOL/L (ref 21–32)
CREAT SERPL-MCNC: 1.25 MG/DL (ref 0.5–1.3)
EGFRCR SERPLBLD CKD-EPI 2021: 62 ML/MIN/1.73M*2
GLUCOSE SERPL-MCNC: 75 MG/DL (ref 74–99)
POTASSIUM SERPL-SCNC: 4.3 MMOL/L (ref 3.5–5.3)
SODIUM SERPL-SCNC: 143 MMOL/L (ref 136–145)

## 2024-03-12 PROCEDURE — 99397 PER PM REEVAL EST PAT 65+ YR: CPT | Performed by: INTERNAL MEDICINE

## 2024-03-12 PROCEDURE — 3074F SYST BP LT 130 MM HG: CPT | Performed by: INTERNAL MEDICINE

## 2024-03-12 PROCEDURE — 99214 OFFICE O/P EST MOD 30 MIN: CPT | Performed by: INTERNAL MEDICINE

## 2024-03-12 PROCEDURE — 1123F ACP DISCUSS/DSCN MKR DOCD: CPT | Performed by: INTERNAL MEDICINE

## 2024-03-12 PROCEDURE — 1036F TOBACCO NON-USER: CPT | Performed by: INTERNAL MEDICINE

## 2024-03-12 PROCEDURE — 1170F FXNL STATUS ASSESSED: CPT | Performed by: INTERNAL MEDICINE

## 2024-03-12 PROCEDURE — 1157F ADVNC CARE PLAN IN RCRD: CPT | Performed by: INTERNAL MEDICINE

## 2024-03-12 PROCEDURE — 80048 BASIC METABOLIC PNL TOTAL CA: CPT

## 2024-03-12 PROCEDURE — 36415 COLL VENOUS BLD VENIPUNCTURE: CPT

## 2024-03-12 PROCEDURE — 3079F DIAST BP 80-89 MM HG: CPT | Performed by: INTERNAL MEDICINE

## 2024-03-12 PROCEDURE — G0444 DEPRESSION SCREEN ANNUAL: HCPCS | Performed by: INTERNAL MEDICINE

## 2024-03-12 PROCEDURE — 1159F MED LIST DOCD IN RCRD: CPT | Performed by: INTERNAL MEDICINE

## 2024-03-12 PROCEDURE — 1160F RVW MEDS BY RX/DR IN RCRD: CPT | Performed by: INTERNAL MEDICINE

## 2024-03-12 PROCEDURE — G0439 PPPS, SUBSEQ VISIT: HCPCS | Performed by: INTERNAL MEDICINE

## 2024-03-12 PROCEDURE — 1125F AMNT PAIN NOTED PAIN PRSNT: CPT | Performed by: INTERNAL MEDICINE

## 2024-03-12 PROCEDURE — 3008F BODY MASS INDEX DOCD: CPT | Performed by: INTERNAL MEDICINE

## 2024-03-12 PROCEDURE — 3050F LDL-C >= 130 MG/DL: CPT | Performed by: INTERNAL MEDICINE

## 2024-03-12 ASSESSMENT — PATIENT HEALTH QUESTIONNAIRE - PHQ9
1. LITTLE INTEREST OR PLEASURE IN DOING THINGS: NOT AT ALL
2. FEELING DOWN, DEPRESSED OR HOPELESS: NOT AT ALL
SUM OF ALL RESPONSES TO PHQ9 QUESTIONS 1 AND 2: 0
1. LITTLE INTEREST OR PLEASURE IN DOING THINGS: NOT AT ALL
2. FEELING DOWN, DEPRESSED OR HOPELESS: NOT AT ALL
SUM OF ALL RESPONSES TO PHQ9 QUESTIONS 1 AND 2: 0

## 2024-03-12 ASSESSMENT — ACTIVITIES OF DAILY LIVING (ADL)
DOING_HOUSEWORK: INDEPENDENT
JUDGMENT_ADEQUATE_SAFELY_COMPLETE_DAILY_ACTIVITIES: YES
ADEQUATE_TO_COMPLETE_ADL: YES
TAKING_MEDICATION: INDEPENDENT
BATHING: INDEPENDENT
ASSISTIVE_DEVICE: WALKER
GROCERY_SHOPPING: INDEPENDENT
DRESSING: INDEPENDENT
PATIENT'S MEMORY ADEQUATE TO SAFELY COMPLETE DAILY ACTIVITIES?: YES
TOILETING: INDEPENDENT
WALKS IN HOME: INDEPENDENT
GROOMING: INDEPENDENT
FEEDING YOURSELF: INDEPENDENT
MANAGING_FINANCES: INDEPENDENT

## 2024-03-12 ASSESSMENT — ENCOUNTER SYMPTOMS
OCCASIONAL FEELINGS OF UNSTEADINESS: 1
DEPRESSION: 0
LOSS OF SENSATION IN FEET: 0

## 2024-03-12 NOTE — PROGRESS NOTES
Subjective   Reason for Visit: Angus Redman is an 68 y.o. male here for a Medicare Wellness visit.     Past Medical, Surgical, and Family History reviewed and updated in chart.         Here for follow up and wellness visit.  Overall doing well for the most part.    He has had hypoxemia and remains on portable oxygen under the direction of his pulmonologist    He remains on antibiotics under the care of his podiatrist and the wound clinic.  He is scheduled to see his podiatrist Dr. Jang tomorrow.    He is also working with Dr. Harry in the cardiology clinic.  He completed his 2-week Holter    He will see his pulmonologist soon as scheduled    He did see orthopedics recently for left elbow    He was contacted by a agent about a home scooter.  However he has multiple stairs and admits that he really cannot use a scooter in the house          Patient Care Team:  Artie Haskins MD as PCP - General  Artie Haskins MD as PCP - [employer]age PCP  Evelyn Valentin RN as Care Manager (Case Management)     Review of Systems    Objective   Vitals:  /84 (BP Location: Left arm, Patient Position: Sitting, BP Cuff Size: Adult)   Pulse 79   Temp 36.5 °C (97.7 °F)   Resp 16   Ht 1.829 m (6')   Wt 105 kg (232 lb 6.4 oz)   SpO2 96%   BMI 31.52 kg/m²       Physical Exam  Constitutional:       Comments: Well-developed elderly male, in a wheelchair slightly disheveled appearing older than his stated age, with portable oxygen per nasal cannula  Eye exam revealed pupils equally round and reactive, with extraocular muscles intact. Normal sclera and eyelids.  No scleral icterus  Neck exam revealed no masses, adenopathy or thyromegaly. No neck pain on range of motion was noted.  Pulmonary exam revealed clear breath sounds bilaterally in all lung fields. No wheezes bronchitis or rales noted.  Cardiac exam revealed I/VI systolic ejection murmur, without gallops or rubs.  No back flank or abdominal  discomfort  Abdominal exam without tenderness on palpation.  Extremities with compression hose/sleeves both calves.  No real pitting edema.  Pedal pulses diminished no palpable  Foot exam deferred to his podiatrist who we will see tomorrow  Mental status-his affect was rather flat but he answered questions appropriately without abnormal thoughts or suicidal ideations.  His recall was appropriate and at baseline without evidence of cognitive impairment         Assessment/Plan   Problem List Items Addressed This Visit       Major depressive disorder in partial remission (CMS/[facility])    Continuous chronic alcoholism (CMS/[facility])    Peripheral vascular disease, unspecified (CMS/[facility])    DNR (do not resuscitate)    Relevant Orders    DNR (Completed)    Alcohol-induced polyneuropathy (CMS/[facility])    Type 2 diabetes mellitus with diabetic polyneuropathy, without long-term current use of insulin (CMS/[facility])    Stage 3a chronic kidney disease (CMS/[facility])     Other Visit Diagnoses       Routine general medical examination at health care facility    -  Primary    Class 1 obesity with serious comorbidity and body mass index (BMI) of 31.0 to 31.9 in adult, unspecified obesity type        Elevated serum creatinine        Relevant Orders    Basic Metabolic Panel (Completed)    Advanced directives, counseling/discussion        Relevant Orders    DNR (Completed)             We spoke for over 30 minutes, over half the time counseling    Home health documentation-forms completed and faxed back as requested    CODE STATUS-he has requested DNR comfort care and states he has worked with the  to get forms completed accordingly.  He has these at home.  He is will send those in and will update his electronic medical record to reflect this accordingly    Healthcare power of -reviewed at his 12/March/2024 visit.  He has designated his sister, Antonina Redman as his legal agent of authority.  His formal written and notarized medical POA  has been filed     Living situation-he lives alone in a house.  He no longer drives.  His   in .  His only sibling sister - Antonina, lives in [facility].      Gait unsteadiness-Home physical therapy ordered last time           He needs help getting out of shower          -he now has a life alert    Home health-he was working with home health with physical therapy and home nursing, twice a week with physical therapy, once a week at this point with OT.    Home help           - Sharon from [employer], Tues for 4 hrs, and Valentina 3 hrs Friday - housekeeping    Home adaptive equipment-though he uses a wheelchair out of the house-he has stairs in his house and cannot use the wheelchair or scooter inside of his house.  He uses a walker instead    Recurrent falls-he will use a cane or walker as he is able to.  Unfortunately he recently fell when he went out to the [employer] to  delivered items from the store.  He had to call the squad             Presently using a walker-encouraged him to continue to optimize his safety              Now has a wheelchair to use in his kitchen             -he continues to use the walker as much as he can around the house and kitchen.  He now has a life alert    MRSA feet infection - from past fall. Home wound RN done w/ regular visits. Plans to follow up to check feet prn             He was referred to the emergency room at [facility] 3/6/2024 for worsening foot infections-he was changed to oral antibiotics as below and discharged             He has ongoing foot infections mainly on the left under his first MTP from difficult calluses-currently on Augmentin and doxycycline.  He is working with the wound clinic as well as his podiatrist who he will see tomorrow, Dr. Jang 3/13/2024    Elevated creatinine-2.2 in ER 3/6/2024.  He will recheck that this afternoon      Hypoxemia-with oxygen dependent chronic hypoxic respiratory failure-he was reportedly  hypoxic overnight-we will ordered overnight oximeter            He is now on portable oxygen presumably from his pulmonologist.  He will continue.  He states that he feels better and has more stamina             He remains on portable oxygen and will meet with his pulmonologist as scheduled soon    Cardiac evaluation-he was referred to the advanced heart failure clinic with Dr. Harry February 2024-he has completed a 2-week Holter monitor and will see Dr. Harry soon.  He was reported to have a NSTEMI.  LVEF normal on last echo 9/23.      Restrictive lung disease-he will follow-up with his pulmonologist    Regarding tenuous home situation-he lives alone but now no longer drives.  He is able to hire drivers to get him where he needs to go.  He is able to order groceries and have them delivered.  At this point he declines any further changes in his home situation    Nutritional concerns - He gets groceries by [employer] w/ home deliveries 1-2 x wk    Alcoholism with a history of alcoholic gastritis-unfortunately he is still drinking alcohol-apparently the groceries will deliver wine which she has converted to from gin, unfortunately.                7/23-presently drinking 2 bottles of white wine daily.  He refuses to cut back              12/23-he states he quit using alcohol in 10/23 when he promised his sister he would not drink.  He has not really missed that he states strongly encouraged long-term abstinence                3/24-he confirms that he remains off alcohol.  Encouragement/support provided    History hypertension-he will continue medications           Blood pressure much improved on recheck    Alcoholic gastritis-he will continue his PPI and limit alcohol use            12/23-he states he quit using alcohol in 10/23 when he promised his sister he would not drink.  He has not really missed that he states strongly encouraged long-term abstinence    Asthma-improved with Breo.  He does not always use  it    Allergic rhinitis-he is allergic to cats and he has several cats.  He will continue his Allegra and Flonase    Painful peripheral neuropathy-both feet are numb, but he feels the pain at times right lateral ankle area and hands.  He is on the gabapentin twice daily now that helps.  He will continue to work with the pain clinic           He continues to meet with the pain clinic-with that appointment 1/24 to continue his duloxetine and gabapentin.    Vision care-he had an eye exam with Dr. Lutz in Feb '24    Polypharmacy-he is on extensive medicine list-he does his best to maintain all of this    Depression/anxiety-he will continue the BuSpar Cymbalta and Lexapro    Flu shot-encouraged each September-updated 9/23    Covid booster - updated 9/23    RSV vacc - updated 9/23    He will follow-up in 3 months, sooner as needed

## 2024-03-12 NOTE — RESULT ENCOUNTER NOTE
Denzel    Thanks for doing the blood work.  I am very glad that the kidney lab has returned back to normal.  Thank you for doing this extra test.    Sincerely,  Artie Haskins MD

## 2024-03-13 ENCOUNTER — OFFICE VISIT (OUTPATIENT)
Dept: PULMONOLOGY | Facility: HOSPITAL | Age: 69
End: 2024-03-13
Payer: MEDICARE

## 2024-03-13 ENCOUNTER — OFFICE VISIT (OUTPATIENT)
Dept: WOUND CARE | Facility: CLINIC | Age: 69
End: 2024-03-13
Payer: MEDICARE

## 2024-03-13 VITALS
SYSTOLIC BLOOD PRESSURE: 128 MMHG | BODY MASS INDEX: 32.28 KG/M2 | TEMPERATURE: 98 F | OXYGEN SATURATION: 96 % | HEART RATE: 77 BPM | WEIGHT: 238 LBS | DIASTOLIC BLOOD PRESSURE: 80 MMHG

## 2024-03-13 DIAGNOSIS — R68.89 OTHER GENERAL SYMPTOMS AND SIGNS: ICD-10-CM

## 2024-03-13 DIAGNOSIS — Z99.81 CHRONIC HYPOXIC RESPIRATORY FAILURE, ON HOME OXYGEN THERAPY (MULTI): Primary | ICD-10-CM

## 2024-03-13 DIAGNOSIS — J96.11 CHRONIC HYPOXIC RESPIRATORY FAILURE, ON HOME OXYGEN THERAPY (MULTI): Primary | ICD-10-CM

## 2024-03-13 PROCEDURE — 1123F ACP DISCUSS/DSCN MKR DOCD: CPT | Performed by: STUDENT IN AN ORGANIZED HEALTH CARE EDUCATION/TRAINING PROGRAM

## 2024-03-13 PROCEDURE — 3008F BODY MASS INDEX DOCD: CPT | Performed by: STUDENT IN AN ORGANIZED HEALTH CARE EDUCATION/TRAINING PROGRAM

## 2024-03-13 PROCEDURE — 11042 DBRDMT SUBQ TIS 1ST 20SQCM/<: CPT

## 2024-03-13 PROCEDURE — 1125F AMNT PAIN NOTED PAIN PRSNT: CPT | Performed by: STUDENT IN AN ORGANIZED HEALTH CARE EDUCATION/TRAINING PROGRAM

## 2024-03-13 PROCEDURE — 1159F MED LIST DOCD IN RCRD: CPT | Performed by: STUDENT IN AN ORGANIZED HEALTH CARE EDUCATION/TRAINING PROGRAM

## 2024-03-13 PROCEDURE — 99213 OFFICE O/P EST LOW 20 MIN: CPT | Performed by: STUDENT IN AN ORGANIZED HEALTH CARE EDUCATION/TRAINING PROGRAM

## 2024-03-13 PROCEDURE — 1157F ADVNC CARE PLAN IN RCRD: CPT | Performed by: STUDENT IN AN ORGANIZED HEALTH CARE EDUCATION/TRAINING PROGRAM

## 2024-03-13 PROCEDURE — 1036F TOBACCO NON-USER: CPT | Performed by: STUDENT IN AN ORGANIZED HEALTH CARE EDUCATION/TRAINING PROGRAM

## 2024-03-13 PROCEDURE — 3074F SYST BP LT 130 MM HG: CPT | Performed by: STUDENT IN AN ORGANIZED HEALTH CARE EDUCATION/TRAINING PROGRAM

## 2024-03-13 PROCEDURE — 3079F DIAST BP 80-89 MM HG: CPT | Performed by: STUDENT IN AN ORGANIZED HEALTH CARE EDUCATION/TRAINING PROGRAM

## 2024-03-13 PROCEDURE — 1160F RVW MEDS BY RX/DR IN RCRD: CPT | Performed by: STUDENT IN AN ORGANIZED HEALTH CARE EDUCATION/TRAINING PROGRAM

## 2024-03-13 PROCEDURE — 3050F LDL-C >= 130 MG/DL: CPT | Performed by: STUDENT IN AN ORGANIZED HEALTH CARE EDUCATION/TRAINING PROGRAM

## 2024-03-13 SDOH — ECONOMIC STABILITY: FOOD INSECURITY: WITHIN THE PAST 12 MONTHS, THE FOOD YOU BOUGHT JUST DIDN'T LAST AND YOU DIDN'T HAVE MONEY TO GET MORE.: NEVER TRUE

## 2024-03-13 SDOH — ECONOMIC STABILITY: FOOD INSECURITY: WITHIN THE PAST 12 MONTHS, YOU WORRIED THAT YOUR FOOD WOULD RUN OUT BEFORE YOU GOT MONEY TO BUY MORE.: NEVER TRUE

## 2024-03-13 ASSESSMENT — PATIENT HEALTH QUESTIONNAIRE - PHQ9
1. LITTLE INTEREST OR PLEASURE IN DOING THINGS: NOT AT ALL
SUM OF ALL RESPONSES TO PHQ9 QUESTIONS 1 AND 2: 0
2. FEELING DOWN, DEPRESSED OR HOPELESS: NOT AT ALL

## 2024-03-13 ASSESSMENT — PAIN SCALES - GENERAL: PAINLEVEL: 6

## 2024-03-13 ASSESSMENT — LIFESTYLE VARIABLES
SKIP TO QUESTIONS 9-10: 1
AUDIT-C TOTAL SCORE: 0
HOW MANY STANDARD DRINKS CONTAINING ALCOHOL DO YOU HAVE ON A TYPICAL DAY: PATIENT DOES NOT DRINK
HOW OFTEN DO YOU HAVE A DRINK CONTAINING ALCOHOL: NEVER
HOW OFTEN DO YOU HAVE SIX OR MORE DRINKS ON ONE OCCASION: NEVER

## 2024-03-13 NOTE — PATIENT INSTRUCTIONS
Thank you for coming into the clinic today. It was a pleasure to see you!     Today we discussed the following:   - We will get PFTs, 6 minute walk test, oxygen titration study  - We will also get CT chest and ECHO to evaluate the hypoxia    Please follow up with me after the above tests.    If you have any further questions feel free to call my office at [phone] (choose #2 to reach a pulmonary nurse) and please allow 1-2 business days for a response.     If you have any scheduling needs feel free to call [phone] (for breathing or walking test), [phone] (for EKG's, echocardiograms or cardiopulmonary stress test), and [phone] (for radiology tests such as CT scan, MRIs and nuclear medicine tests).    Helen Puckett  Pulmonary and Critical Care Fellow     [address] [place] [address]

## 2024-03-16 NOTE — PROGRESS NOTES
Subjective  HPI  68-year-old male with chronic hypoxic respiratory failure on 3 L nasal cannula, former smoker, restrictive lung disease being evaluated for hypoxia presents to the pulmonary clinic for follow-up visit.    Patient was last seen on 8/9/2023, however none of the investigations that were ordered were completed.  Reemphasized the importance of getting these tests and then following up in the pulmonary clinic for further recommendations.     Patient states that on April/May 2023 he passed out at home for couple of hours. When he woke up he activated his health alarm, which prompted EMS to break into his house. As per patient his oxygen saturation was in the 70s. He was admitted to [facility], discharged after 3 days. He was not discharged on any home oxygen. Plan was to do physical therapy and occupational therapy at home. The reason for the syncopal episode was deemed to be secondary to orthostatic hypotension. 3 days later, when PT came home he could not participate since his oxygen saturation was low. He was advised to go to the ED, however patient refused. The following day when OT came home, low SpO2 was observed again, and was advised to go to the ED for hypoxia. He went to [facility], stayed there for 1 day, and was discharged home with 2 L nasal cannula. Patient does not know why he is hypoxic.     Today in the clinic, reports shortness of breath with exertion. Asked to rest for few minutes in between before continuing his day-to-day activities. States that he has to hold on to the side rail intermittently and rest for few minutes while climbing stairs in his house. Denies cough, chest pain, palpitations, fevers, weight loss, night sweats or reflux symptoms.    PMH/PSH: Paroxysmal atrial fibrillation, recurrent syncope secondary to orthostatic hypotension, hypothyroidism, skin cancer, hypertension, hyperlipidemia, BPH, erectile dysfunction, right hip replacement  SH: Former smoker,  30 pack years, quit in 1999. Drinks alcohol regularly. 750 mL bottle of gin every other day. Worked as a , retired now.  FH: No family history of lung disease or lung cancer.      Review of systems  Constitutional: Denies fever, weight loss or night sweats.  Eyes: Denies blurring of vision or double vision.  ENT: Denies nasal discharge, ear pain or throat pain.  Respiratory: As noted in HPI.  Cardiovascular: Denies chest pain or palpitations.  Gastrointestinal: Denies abdominal pain, nausea, vomiting, diarrhea or constipation.  Neurological: Denies headache, confusion or lightheadedness.    All other systems have been reviewed and are negative.    Objective  Vitals:    03/13/24 1432   BP: 128/80   Pulse: 77   Temp: 36.7 °C (98 °F)   SpO2: 96%       Physical exam  General: Awake and alert. In no acute distress.   Eyes: PERRL. Clear sclera.  ENT: Neck supple. Mucus membranes moist.  Neck: Trachea midline. No JVD.   Respiratory: Equal air entry bilaterally. No added sounds.  Cardiovascular: Regular rate and rhythm. S1S2 heard.  Gastrointestinal: Soft, non-distended. Bowel sounds present.  Neurological: Awake and alert. No focal motor deficits.  Skin: Warm and dry. No rash.  Extremities: No pedal edema.    Data  PFTs 5/12/2023  FEV1/FVC 87, FEV1 2.02 (65%), FVC 2.33 (56%), TLC 5.41 (79%), RV by TLC 31 (ULN 36), DLCO 14.37 (55%)    Assessment and plan  68-year-old male with chronic hypoxic respiratory failure on 3 L nasal cannula, former smoker, restrictive lung disease being evaluated for hypoxia presents to the pulmonary clinic for follow-up visit.     #Chronic hypoxic respiratory failure, on 2 to 3 L nasal cannula  #Restrictive lung disease, likely secondary to pleural thickening  #Former smoker, 30 pack years, quit in 1999    - Unclear etiology of hypoxia. Noted to have restrictive pattern on PFT with reduced DLCO,  likely secondary to pleural disease (pleural thickening and calcification noted). No asbestos  history known to patient.   - Ordered CT chest and ECHO with bubble  - Ordered oxygen titration study, PFTs and 6-minute walk test  - Does not qualify for lung cancer screening since patient quit smoking more than 15 years ago     Assessment and plan was discussed with Dr. Vela.     This note was created using speech recognition transcription software. Despite proofreading, several typographical errors might be present that might affect the meaning of the content.     Helen Puckett  Pulmonary and Critical Care  PGY-6

## 2024-03-20 ENCOUNTER — OFFICE VISIT (OUTPATIENT)
Dept: WOUND CARE | Facility: CLINIC | Age: 69
End: 2024-03-20
Payer: MEDICARE

## 2024-03-20 PROCEDURE — 11042 DBRDMT SUBQ TIS 1ST 20SQCM/<: CPT

## 2024-03-27 ENCOUNTER — OFFICE VISIT (OUTPATIENT)
Dept: WOUND CARE | Facility: CLINIC | Age: 69
End: 2024-03-27
Payer: MEDICARE

## 2024-03-27 ENCOUNTER — HOSPITAL ENCOUNTER (OUTPATIENT)
Dept: RADIOLOGY | Facility: CLINIC | Age: 69
Discharge: HOME | End: 2024-03-27
Payer: MEDICARE

## 2024-03-27 DIAGNOSIS — Z99.81 CHRONIC HYPOXIC RESPIRATORY FAILURE, ON HOME OXYGEN THERAPY (MULTI): ICD-10-CM

## 2024-03-27 DIAGNOSIS — J96.11 CHRONIC HYPOXIC RESPIRATORY FAILURE, ON HOME OXYGEN THERAPY (MULTI): ICD-10-CM

## 2024-03-27 PROCEDURE — 71250 CT THORAX DX C-: CPT | Performed by: RADIOLOGY

## 2024-03-27 PROCEDURE — 71250 CT THORAX DX C-: CPT

## 2024-03-27 PROCEDURE — 11042 DBRDMT SUBQ TIS 1ST 20SQCM/<: CPT

## 2024-04-03 ENCOUNTER — OFFICE VISIT (OUTPATIENT)
Dept: WOUND CARE | Facility: CLINIC | Age: 69
End: 2024-04-03
Payer: MEDICARE

## 2024-04-03 PROCEDURE — 11042 DBRDMT SUBQ TIS 1ST 20SQCM/<: CPT

## 2024-04-05 ENCOUNTER — PATIENT OUTREACH (OUTPATIENT)
Dept: PRIMARY CARE | Facility: CLINIC | Age: 69
End: 2024-04-05
Payer: MEDICARE

## 2024-04-05 DIAGNOSIS — E11.42 TYPE 2 DIABETES MELLITUS WITH DIABETIC POLYNEUROPATHY, WITHOUT LONG-TERM CURRENT USE OF INSULIN (MULTI): ICD-10-CM

## 2024-04-05 DIAGNOSIS — N18.31 STAGE 3A CHRONIC KIDNEY DISEASE (MULTI): ICD-10-CM

## 2024-04-05 PROCEDURE — 99490 CHRNC CARE MGMT STAFF 1ST 20: CPT | Performed by: INTERNAL MEDICINE

## 2024-04-05 NOTE — PROGRESS NOTES
I called and spoke with the patient who stated that he was doing well.  Patient is going to the wound clinic every Wednesday and home care is changing the dressing on Mondays and Fridays.  Patient is scheduled for an echo on 4/11/2024 and will follow up with Dr Muller.  Patient had the flu last weekend and feels like he is over it now.  Patient has his food delivered from [employer].  Instructed to call with any questions or concerns.

## 2024-04-08 ENCOUNTER — APPOINTMENT (OUTPATIENT)
Dept: PAIN MEDICINE | Facility: CLINIC | Age: 69
End: 2024-04-08
Payer: MEDICARE

## 2024-04-10 ENCOUNTER — OFFICE VISIT (OUTPATIENT)
Dept: WOUND CARE | Facility: CLINIC | Age: 69
End: 2024-04-10
Payer: MEDICARE

## 2024-04-10 ENCOUNTER — OFFICE VISIT (OUTPATIENT)
Dept: PAIN MEDICINE | Facility: CLINIC | Age: 69
End: 2024-04-10
Payer: MEDICARE

## 2024-04-10 VITALS
OXYGEN SATURATION: 99 % | DIASTOLIC BLOOD PRESSURE: 103 MMHG | HEART RATE: 69 BPM | RESPIRATION RATE: 20 BRPM | SYSTOLIC BLOOD PRESSURE: 163 MMHG | TEMPERATURE: 97.7 F

## 2024-04-10 DIAGNOSIS — G62.1 ALCOHOL-INDUCED POLYNEUROPATHY (MULTI): ICD-10-CM

## 2024-04-10 DIAGNOSIS — G89.29 CHRONIC LOW BACK PAIN, UNSPECIFIED BACK PAIN LATERALITY, UNSPECIFIED WHETHER SCIATICA PRESENT: ICD-10-CM

## 2024-04-10 DIAGNOSIS — M54.50 CHRONIC LOW BACK PAIN, UNSPECIFIED BACK PAIN LATERALITY, UNSPECIFIED WHETHER SCIATICA PRESENT: ICD-10-CM

## 2024-04-10 DIAGNOSIS — M15.9 OSTEOARTHRITIS OF MULTIPLE JOINTS, UNSPECIFIED OSTEOARTHRITIS TYPE: ICD-10-CM

## 2024-04-10 DIAGNOSIS — G57.93 NEUROPATHIC PAIN OF BOTH FEET: Primary | ICD-10-CM

## 2024-04-10 PROCEDURE — 99213 OFFICE O/P EST LOW 20 MIN: CPT

## 2024-04-10 PROCEDURE — 1160F RVW MEDS BY RX/DR IN RCRD: CPT

## 2024-04-10 PROCEDURE — 3008F BODY MASS INDEX DOCD: CPT

## 2024-04-10 PROCEDURE — 1123F ACP DISCUSS/DSCN MKR DOCD: CPT

## 2024-04-10 PROCEDURE — 1159F MED LIST DOCD IN RCRD: CPT

## 2024-04-10 PROCEDURE — 11042 DBRDMT SUBQ TIS 1ST 20SQCM/<: CPT

## 2024-04-10 PROCEDURE — 3080F DIAST BP >= 90 MM HG: CPT

## 2024-04-10 PROCEDURE — 3050F LDL-C >= 130 MG/DL: CPT

## 2024-04-10 PROCEDURE — 99213 OFFICE O/P EST LOW 20 MIN: CPT | Mod: ZK,25

## 2024-04-10 PROCEDURE — 3077F SYST BP >= 140 MM HG: CPT

## 2024-04-10 PROCEDURE — 1036F TOBACCO NON-USER: CPT

## 2024-04-10 PROCEDURE — 1125F AMNT PAIN NOTED PAIN PRSNT: CPT

## 2024-04-10 PROCEDURE — 1157F ADVNC CARE PLAN IN RCRD: CPT

## 2024-04-10 RX ORDER — GABAPENTIN 800 MG/1
800 TABLET ORAL 2 TIMES DAILY
Qty: 60 TABLET | Refills: 2 | Status: SHIPPED | OUTPATIENT
Start: 2024-04-10 | End: 2024-07-09

## 2024-04-10 RX ORDER — DULOXETIN HYDROCHLORIDE 60 MG/1
60 CAPSULE, DELAYED RELEASE ORAL 2 TIMES DAILY
Qty: 60 CAPSULE | Refills: 2 | Status: SHIPPED | OUTPATIENT
Start: 2024-04-10 | End: 2024-07-09

## 2024-04-10 RX ORDER — DICLOFENAC SODIUM 10 MG/G
2 GEL TOPICAL 3 TIMES DAILY PRN
Qty: 180 G | Refills: 0 | Status: SHIPPED | OUTPATIENT
Start: 2024-04-10 | End: 2024-05-07 | Stop reason: WASHOUT

## 2024-04-10 ASSESSMENT — PAIN - FUNCTIONAL ASSESSMENT: PAIN_FUNCTIONAL_ASSESSMENT: 0-10

## 2024-04-10 ASSESSMENT — PAIN SCALES - GENERAL: PAINLEVEL_OUTOF10: 8

## 2024-04-10 NOTE — PATIENT INSTRUCTIONS
Advised to discuss colchicine prescription with primary care.   May use diclofenac gel on hands for additional pain relief.

## 2024-04-10 NOTE — PROGRESS NOTES
Subjective   Patient ID: Angus Redman is a 68 y.o. male who presents for Med Refill.  HPI    67 yo male presents today for medication refills. He is known to this clinic for chronic neuropathic pain. He is maintained on duloxetine 60mg twice daily and gabapentin 800mg twice daily. He confirms that the medications are still effective and he denies any side effects. He is reporting shooting pain in the bilateral hands. This has gotten progressively worse over the past week. He feels as if his hands are swollen. Denies any injury to the hands. He reports a history of gout. States that he recently stopped taking his colchicine. Today he rates his pain as 8/10 in the hands.      Review of Systems  All 13 systems were reviewed and are within normal levels except as noted below or per HPI. Positive and pertinent negative responses are noted below or in the HPI   Denied any fever or chills. No weight loss and no night sweats. No cough or sputum production. No diarrhea   The constipation has been responding to fibers and over the counter medications.     No bladder and bowel incontinence and no other changes in bladder and bowel. No skin changes. Reports tiredness and fatigability only if the pain is not controlled.   Denied opioids diversion and abuse and denies alcoholism. Denies overuse of the pain medications.      Objective   Physical Exam  General   Alert and oriented x4, pleasant and cooperative.      HEENT  Pupils are equal and normal in size. Ears, nose, mouth, and throat appear to be WNL.  Head atraumatic, symmetric.      No signs of sedation or signs of withdrawal apparent.     Psychiatric   No signs of depression apparent. Appropriate mood and affect.     Neuro   No focal neurological deficit apparent. Ambulation at baseline wheeled walker.      Respiratory  No respiratory distress, respirations equal and unlabored.     Abdomen  Soft and nontender, no distention noted.     Skin  Warm, dry and intact. No skin  markings supportive of recent IV drug usage .     Hands  Bilateral hands with no apparent abnormality. ROM intact in distal phalanges. No erythema observed.          Assessment/Plan        67 yo male with history and physical exam supportive of chronic extremity pain associated with peripheral neuropathy. Joint pain associated with osteoarthritis.   I have personally reviewed the [place]ARRS report for this patient. I have considered the risks of abuse, dependence, addiction and diversion. I believe that it is clinically appropriate for this patient to be prescribed this medication based on documented diagnosis.  I believe the benefits of the continuation of the opiate outweighs the risk. Patient has described positive response to the present medication therapy. Patient denies any side effects associated with the usage of the medication. Patient did not elicit any signs supportive of misuse or abuse. The review of the [place] Automated Reporting prescription is not suggestive of any worrisome pattern. Patient believes the usage of this medication has improved the quality of life and allow him to participate in day-to-day activity. Therefore  I recommend the continuation of this medication and I will be refilling the medication prescription.  Continue to take duloxetine and gabapentin as prescribed.   Patient advised to follow up with primary care regarding his colchicine prescription. Diclofenac gel prescribed for joint pain.   Follow up in 3 months or as needed.   Explained plan to this patient, and patient verbalized understanding and agreement with the plan.     Please do not hesitate to contact the pain clinic after your visit with any questions or concerns at  M-F 8-4 pm     Patient was reminded not to share medications, not to take prescription medications that were not prescribed to the patient, and not to increase or change dose without consulting the pain clinic. I advised the patient to always take the  least amount of medication needed to keep symptoms under control.       Rosie Camejo, SHIRA-CNP 04/10/24 12:57 PM

## 2024-04-11 ENCOUNTER — HOSPITAL ENCOUNTER (OUTPATIENT)
Dept: CARDIOLOGY | Facility: CLINIC | Age: 69
Discharge: HOME | End: 2024-04-11
Payer: MEDICARE

## 2024-04-11 DIAGNOSIS — R68.89 OTHER GENERAL SYMPTOMS AND SIGNS: ICD-10-CM

## 2024-04-11 DIAGNOSIS — J96.01 ACUTE RESPIRATORY FAILURE WITH HYPOXIA (MULTI): ICD-10-CM

## 2024-04-11 DIAGNOSIS — J96.11 CHRONIC HYPOXIC RESPIRATORY FAILURE, ON HOME OXYGEN THERAPY (MULTI): ICD-10-CM

## 2024-04-11 DIAGNOSIS — Z99.81 CHRONIC HYPOXIC RESPIRATORY FAILURE, ON HOME OXYGEN THERAPY (MULTI): ICD-10-CM

## 2024-04-11 PROCEDURE — 93321 DOPPLER ECHO F-UP/LMTD STD: CPT | Performed by: INTERNAL MEDICINE

## 2024-04-11 PROCEDURE — 93325 DOPPLER ECHO COLOR FLOW MAPG: CPT | Performed by: INTERNAL MEDICINE

## 2024-04-11 PROCEDURE — 93308 TTE F-UP OR LMTD: CPT | Performed by: INTERNAL MEDICINE

## 2024-04-11 PROCEDURE — 93321 DOPPLER ECHO F-UP/LMTD STD: CPT

## 2024-04-17 ENCOUNTER — OFFICE VISIT (OUTPATIENT)
Dept: WOUND CARE | Facility: CLINIC | Age: 69
End: 2024-04-17
Payer: MEDICARE

## 2024-04-17 PROCEDURE — 97597 DBRDMT OPN WND 1ST 20 CM/<: CPT

## 2024-04-22 ENCOUNTER — HOSPITAL ENCOUNTER (OUTPATIENT)
Dept: RESPIRATORY THERAPY | Facility: HOSPITAL | Age: 69
Discharge: HOME | End: 2024-04-22
Payer: MEDICARE

## 2024-04-22 DIAGNOSIS — J96.11 CHRONIC HYPOXIC RESPIRATORY FAILURE, ON HOME OXYGEN THERAPY (MULTI): ICD-10-CM

## 2024-04-22 DIAGNOSIS — Z99.81 CHRONIC HYPOXIC RESPIRATORY FAILURE, ON HOME OXYGEN THERAPY (MULTI): ICD-10-CM

## 2024-04-22 PROCEDURE — 94726 PLETHYSMOGRAPHY LUNG VOLUMES: CPT | Performed by: INTERNAL MEDICINE

## 2024-04-22 PROCEDURE — 94726 PLETHYSMOGRAPHY LUNG VOLUMES: CPT

## 2024-04-22 PROCEDURE — 94060 EVALUATION OF WHEEZING: CPT | Performed by: INTERNAL MEDICINE

## 2024-04-22 PROCEDURE — 94618 PULMONARY STRESS TESTING: CPT | Performed by: INTERNAL MEDICINE

## 2024-04-22 PROCEDURE — 94729 DIFFUSING CAPACITY: CPT | Performed by: INTERNAL MEDICINE

## 2024-04-24 ENCOUNTER — LAB REQUISITION (OUTPATIENT)
Dept: LAB | Facility: HOSPITAL | Age: 69
End: 2024-04-24
Payer: MEDICARE

## 2024-04-24 ENCOUNTER — OFFICE VISIT (OUTPATIENT)
Dept: WOUND CARE | Facility: CLINIC | Age: 69
End: 2024-04-24
Payer: MEDICARE

## 2024-04-24 DIAGNOSIS — L97.512 NON-PRESSURE CHRONIC ULCER OF OTHER PART OF RIGHT FOOT WITH FAT LAYER EXPOSED (MULTI): ICD-10-CM

## 2024-04-24 LAB
MGC ASCENT PFT - FEV1 - POST: 1.99
MGC ASCENT PFT - FEV1 - PRE: 1.94
MGC ASCENT PFT - FEV1 - PREDICTED: 3.26
MGC ASCENT PFT - FVC - POST: 2.71
MGC ASCENT PFT - FVC - PRE: 2.79
MGC ASCENT PFT - FVC - PREDICTED: 4.36

## 2024-04-24 PROCEDURE — 87070 CULTURE OTHR SPECIMN AEROBIC: CPT

## 2024-04-24 PROCEDURE — 87205 SMEAR GRAM STAIN: CPT

## 2024-04-24 PROCEDURE — 87186 SC STD MICRODIL/AGAR DIL: CPT

## 2024-04-24 PROCEDURE — 87075 CULTR BACTERIA EXCEPT BLOOD: CPT

## 2024-04-24 PROCEDURE — 11042 DBRDMT SUBQ TIS 1ST 20SQCM/<: CPT

## 2024-05-01 ENCOUNTER — OFFICE VISIT (OUTPATIENT)
Dept: WOUND CARE | Facility: CLINIC | Age: 69
End: 2024-05-01
Payer: MEDICARE

## 2024-05-01 PROCEDURE — 11042 DBRDMT SUBQ TIS 1ST 20SQCM/<: CPT

## 2024-05-06 ENCOUNTER — OFFICE VISIT (OUTPATIENT)
Dept: CARDIOLOGY | Facility: CLINIC | Age: 69
End: 2024-05-06
Payer: MEDICARE

## 2024-05-06 VITALS
HEIGHT: 72 IN | DIASTOLIC BLOOD PRESSURE: 86 MMHG | OXYGEN SATURATION: 92 % | BODY MASS INDEX: 33.05 KG/M2 | SYSTOLIC BLOOD PRESSURE: 144 MMHG | HEART RATE: 69 BPM | WEIGHT: 244 LBS

## 2024-05-06 DIAGNOSIS — I10 ESSENTIAL HYPERTENSION: ICD-10-CM

## 2024-05-06 DIAGNOSIS — I25.10 ASCVD (ARTERIOSCLEROTIC CARDIOVASCULAR DISEASE): Primary | ICD-10-CM

## 2024-05-06 DIAGNOSIS — Z86.79 HISTORY OF PAROXYSMAL SUPRAVENTRICULAR TACHYCARDIA: ICD-10-CM

## 2024-05-06 PROCEDURE — 3050F LDL-C >= 130 MG/DL: CPT | Performed by: INTERNAL MEDICINE

## 2024-05-06 PROCEDURE — 1157F ADVNC CARE PLAN IN RCRD: CPT | Performed by: INTERNAL MEDICINE

## 2024-05-06 PROCEDURE — 3079F DIAST BP 80-89 MM HG: CPT | Performed by: INTERNAL MEDICINE

## 2024-05-06 PROCEDURE — 1159F MED LIST DOCD IN RCRD: CPT | Performed by: INTERNAL MEDICINE

## 2024-05-06 PROCEDURE — 1160F RVW MEDS BY RX/DR IN RCRD: CPT | Performed by: INTERNAL MEDICINE

## 2024-05-06 PROCEDURE — 3008F BODY MASS INDEX DOCD: CPT | Performed by: INTERNAL MEDICINE

## 2024-05-06 PROCEDURE — 1036F TOBACCO NON-USER: CPT | Performed by: INTERNAL MEDICINE

## 2024-05-06 PROCEDURE — 3077F SYST BP >= 140 MM HG: CPT | Performed by: INTERNAL MEDICINE

## 2024-05-06 PROCEDURE — 1123F ACP DISCUSS/DSCN MKR DOCD: CPT | Performed by: INTERNAL MEDICINE

## 2024-05-06 PROCEDURE — 99214 OFFICE O/P EST MOD 30 MIN: CPT | Performed by: INTERNAL MEDICINE

## 2024-05-06 RX ORDER — ATORVASTATIN CALCIUM 40 MG/1
40 TABLET, FILM COATED ORAL DAILY
Qty: 30 TABLET | Refills: 11 | Status: SHIPPED | OUTPATIENT
Start: 2024-05-06 | End: 2025-05-06

## 2024-05-06 RX ORDER — NAPROXEN SODIUM 220 MG/1
81 TABLET, FILM COATED ORAL DAILY
Qty: 30 TABLET | Refills: 11 | Status: SHIPPED | OUTPATIENT
Start: 2024-05-06 | End: 2025-05-06

## 2024-05-06 NOTE — PROGRESS NOTES
[facility]  Primary Care Physician: Artie Haskins MD  Referring Provider/Cardiologist: n/a     Date of Visit: 05/06/2024  3:00 PM EDT  Location of visit: [address]     HPI:   Mr. Redman is a 68M with a PMHx sig for pSVT, HTN, and oxygen dependent chronic hypoxic respiratory failure (3L via n/c) who returns to the  Advanced Heart Failure clinic for ongoing evaluation and management.     Interval hx:   He underwent the additional cardiac diagnostics to help with further management, including CCTA, holter, and echocardiogram.     Currently denies chest pain, palpitations, or LE edema. He has exertional dyspnea., but denies orthopnea, PND. He wears 3L O2 continuously. He denies headaches, dizziness or recent falls.     Hospitalizations: Denies       PMHx:  pSVT, nonobstructive CAD, HTN, and oxygen dependent chronic hypoxic respiratory failure    SocHx:  Lives alone in [place]  Former smoker (quit 1999). Denies ETOH, illicits    FamHx:  Father had heart disease       Current Outpatient Medications   Medication Sig Dispense Refill    acetaminophen (Tylenol) 325 mg tablet Take 1,000 mg by mouth every 6 hours if needed for mild pain (1 - 3).      albuterol (ProAir HFA) 90 mcg/actuation inhaler Inhale 2 puffs every 6 hours if needed for wheezing.      alendronate (Fosamax) 70 mg tablet Take 1 tablet (70 mg) by mouth every 7 days. Take in the morning with a full glass of water at least 30 min before first food, or drink or medications of the day. 12 tablet 3    amLODIPine (Norvasc) 2.5 mg tablet TAKE ONE TABLET BY MOUTH DAILY 90 tablet 0    amoxicillin-pot clavulanate (Augmentin) 875-125 mg tablet Take 1 tablet by mouth every 12 hours. 20 tablet 0    busPIRone (Buspar) 5 mg tablet Take 1 tablet (5 mg) by mouth 3 times a day as needed (anxiety).      capsaicin (Zostrix) 0.025 % cream Apply topically 3 times a day.      carboxymethylcellulose (Refresh Celluvisc) 1 %  ophthalmic solution dropperette Administer into affected eye(s).      colchicine 0.6 mg tablet Take 1 tablet (0.6 mg) by mouth once daily. Take 2 tablets now and one tablet an hour later 3 tablet 0    cyanocobalamin (Vitamin B-12) 1,000 mcg tablet Take 1 tablet (1,000 mcg) by mouth once daily. As directed 90 tablet 3    diclofenac sodium (Voltaren) 1 % gel Apply 2.25 inches (2 g) topically 3 times a day as needed (Joint pain). 180 g 0    doxycycline (Vibra-Tabs) 100 mg tablet Take 1 tablet (100 mg) by mouth every 12 hours. Take with a full glass of water and do not lie down for at least 30 minutes after. 20 tablet 0    DULoxetine (Cymbalta) 60 mg DR capsule Take 1 capsule (60 mg) by mouth 2 times a day. 60 capsule 2    ergocalciferol (Vitamin D-2) 1.25 MG (67492 UT) capsule Take 1 capsule (1,250 mcg) by mouth 1 (one) time per week. 12 capsule 3    escitalopram (Lexapro) 10 mg tablet Take 1 tablet (10 mg) by mouth once daily.      escitalopram (Lexapro) 20 mg tablet Take 1 tablet (20 mg) by mouth once daily as needed (for mood and derpression concerns). 90 tablet 2    ferrous sulfate, 325 mg ferrous sulfate, tablet Take 1 tablet by mouth once daily with breakfast.      fexofenadine (Allegra) 180 mg tablet Take 1 tablet (180 mg) by mouth once daily as needed (as needed for allergies and congestion).      fluticasone (Flonase) 50 mcg/actuation nasal spray Administer 1-2 sprays into affected nostril(s) once daily.      fluticasone furoate-vilanteroL (Breo Elipta) 100-25 mcg/dose inhaler Inhale 1 puff once daily.      folic acid (Folvite) 1 mg tablet TAKE ONE TABLET BY MOUTH EVERY DAY FOR FOLATE DEFICIENCY 90 tablet 3    gabapentin (Neurontin) 800 mg tablet Take 1 tablet (800 mg) by mouth 2 times a day. 60 tablet 2    ibuprofen 800 mg tablet Take 1 tablet (800 mg) by mouth every 8 hours if needed for mild pain (1 - 3).      ipratropium-albuteroL (Duo-Neb) 0.5-2.5 mg/3 mL nebulizer solution Inhale.      levothyroxine  (Synthroid, Levoxyl) 50 mcg tablet Take 1 tablet (50 mcg) by mouth once daily in the morning. Take before meals. 90 tablet 1    metoprolol tartrate (Lopressor) 100 mg tablet Take 1 tablet (100 mg) by mouth once daily. Please take 1 tablet 2 hrs prior to CT 1 tablet 0    mirtazapine (Remeron) 15 mg tablet TAKE ONE TABLET BY MOUTH AT BEDTIME 90 tablet 1    montelukast (Singulair) 10 mg tablet TAKE ONE TABLET BY MOUTH AT NIGHT AS NEEDED TO HELP CONTROL ASTHMA AND ALLERGIES 90 tablet 3    ondansetron (Zofran) 4 mg tablet Take 1 tablet (4 mg) by mouth once daily.      pantoprazole (ProtoNix) 40 mg EC tablet Take 1 tablet (40 mg) by mouth 2 times a day.      phenazopyridine (Pyridium) 100 mg tablet       potassium chloride CR (K-Tab) 20 mEq ER tablet TAKE ONE TABLET BY MOUTH TWO TIMES A  tablet 3    propranolol (Inderal) 10 mg tablet Take 1 tablet (10 mg) by mouth 2 times a day. 270 tablet 3    tamsulosin (Flomax) 0.4 mg 24 hr capsule Take 1 capsule (0.4 mg) by mouth once daily at bedtime.      thiamine 100 mg tablet Take 1 tablet (100 mg) by mouth once daily.       No current facility-administered medications for this visit.       Allergies   Allergen Reactions    Codeine Other    House Dust Mite Unknown    Hydrocodone-Acetaminophen Nausea/vomiting    Latex Unknown     Latex gloves Misc     Latex tube/ connecter kit    Lisinopril Hives    Mold Unknown    Tree And Shrub Pollen Unknown         Visit Vitals  /86 (BP Location: Right arm, Patient Position: Sitting)   Pulse 69   Ht 1.829 m (6')   Wt 111 kg (244 lb)   SpO2 92%   BMI 33.09 kg/m²   Smoking Status Former   BSA 2.37 m²        Physical Exam:  On exam Mr. Redman appears his stated age, is alert and oriented x3, and in no acute distress. His sclera are anicteric and his oropharynx has moist mucous membranes. His neck is supple and without thyromegaly. The JVP is ~7 cm of water above the right atrium. His cardiac exam has regular rhythm, normal S1, S2. No  S3/4. There are no murmurs. His lungs are clear to auscultation bilaterally and there is no dullness to percussion. His abdomen is soft, nontender with normoactive bowel sounds. There is no HJR. The extremities are warm and without sig edema. The skin is dry. There is no rash present. The distal pulses are 2+ in all four extremities. His mood and affect are appropriate for todays encounter.       Cardiac Labs/Diagnostics:    Lab Results   Component Value Date    CREATININE 1.25 03/12/2024    BUN 12 03/12/2024     03/12/2024    K 4.3 03/12/2024     03/12/2024    CO2 29 03/12/2024        Recent Labs     02/13/24  1507 04/06/21  1320   * 43     Echo (4/11/24):  1. Left ventricular systolic function is normal with a 60-65% estimated ejection fraction.  2. Poorly visualized anatomical structures due to suboptimal image quality.    Holter (2/21-3/6/24):  Sinus rhythm, no AF, occ SVT (longest lasting 20 seconds)    CCTA (2/27/24):  1. Right dominant system  2. 50% stenosis appreciated at the level the prox/mid LAD-CT FFR of 0.82 in the mid LAD; 25-50% stenosis appreciated in the proximal/mid RCA - CT FFR results are not available..  3. Nonobstructive CAD revolving the remaining vessels    ECG (2/6/24):  Sinus rhythm (HR 98)    Echo (9/12/23):  1. Left ventricular systolic function is normal with a 60% estimated ejection fraction.  2. Sinus rhythm (HR 70s).      Impression/Plan:  Mr. Redman is a 68M with a PMHx sig for pSVT, CAD, HTN, and oxygen dependent chronic hypoxic respiratory failure (3L via n/c) who returns to the  Advanced Heart Failure clinic for ongoing evaluation and management.     1) CAD  Recent NSTEMI at [facility] (hsTrop peak 128).   Lipids (2/13/24): tChol 203, HDL 40, , Trigs 132  CCTA (2/2024) with nonobstructive CAD (CT FFR negative LAD). Echo with preserved biventricular function and no hemodynamically significant valvular disease.   -start ASA 81 mg daily, atorvastatin 40  mg daily  -will repeat lipids after starting statin    2) ? AF  States he has a history, but not on anticoagulation or any diagnostic tests showing this. 2 week holter with no evidence of AF.     3) pSVT  Currently on propranolol. Transient episodes noted on recent holter.   -c/w propranolol    4) HTN  BP controlled.  -c/w amlodipine 2.5 mg daily      F/U: 6 months at /[facility]

## 2024-05-06 NOTE — PATIENT INSTRUCTIONS
It was a pleasure seeing you today. Please contact myself or my team with any questions.     To reach Dr. Harry' office please call [phone] ([place]asha).   Fax: [phone]   To schedule an appointment call [phone]     If you have any questions or need cardiac medication refills, please call the Heart Failure office at [phone], option [phone]. You may also contact the  Heart Failure Nursing team via email at [email or web address] (Please include your name and date of birth).         1) Start aspirin 81 mg once a day  2) Start atorvastatin 40 mg once a day  3) Follow up up in 6 months at /[facility]     Pharmacy  [facility]

## 2024-05-08 ENCOUNTER — OFFICE VISIT (OUTPATIENT)
Dept: WOUND CARE | Facility: CLINIC | Age: 69
End: 2024-05-08
Payer: MEDICARE

## 2024-05-08 PROCEDURE — 11721 DEBRIDE NAIL 6 OR MORE: CPT

## 2024-05-08 PROCEDURE — 11042 DBRDMT SUBQ TIS 1ST 20SQCM/<: CPT

## 2024-05-10 ENCOUNTER — PATIENT OUTREACH (OUTPATIENT)
Dept: PRIMARY CARE | Facility: CLINIC | Age: 69
End: 2024-05-10
Payer: MEDICARE

## 2024-05-10 DIAGNOSIS — R39.15 URINARY URGENCY: Primary | ICD-10-CM

## 2024-05-10 DIAGNOSIS — E11.42 TYPE 2 DIABETES MELLITUS WITH DIABETIC POLYNEUROPATHY, WITHOUT LONG-TERM CURRENT USE OF INSULIN (MULTI): ICD-10-CM

## 2024-05-10 DIAGNOSIS — F41.9 ANXIETY: ICD-10-CM

## 2024-05-10 DIAGNOSIS — F33.41 RECURRENT MAJOR DEPRESSIVE DISORDER, IN PARTIAL REMISSION (CMS-HCC): ICD-10-CM

## 2024-05-10 DIAGNOSIS — N18.31 STAGE 3A CHRONIC KIDNEY DISEASE (MULTI): ICD-10-CM

## 2024-05-10 PROCEDURE — 99490 CHRNC CARE MGMT STAFF 1ST 20: CPT | Performed by: INTERNAL MEDICINE

## 2024-05-10 NOTE — PROGRESS NOTES
I called and spoke with the patient who stated that he MRSA in his wound.  Patient is taking Doxycycline as directed.  Patient goes to the wound center on Wednesdays. On Monday and Tuesdays he has a wound care nurse come to the home.   Patient saw cardiology on 5/1/2024 and was started on ASA and lipitor.  Pulmonary appointment on 6/24/2024.  Patient is using his O2 at 3L per nc.  Patient is to wear his O2 24/7 but does not wear it at night or when in the basement.  I discussed the importance of using the o2 as directed.  Refills sent to Dr Haskins.  Patient to call with any questions or concerns.

## 2024-05-11 RX ORDER — MIRTAZAPINE 15 MG/1
15 TABLET, FILM COATED ORAL NIGHTLY
Qty: 90 TABLET | Refills: 2 | Status: SHIPPED | OUTPATIENT
Start: 2024-05-11

## 2024-05-11 RX ORDER — TAMSULOSIN HYDROCHLORIDE 0.4 MG/1
0.4 CAPSULE ORAL NIGHTLY
Qty: 90 CAPSULE | Refills: 3 | Status: SHIPPED | OUTPATIENT
Start: 2024-05-11

## 2024-05-11 RX ORDER — ESCITALOPRAM OXALATE 20 MG/1
20 TABLET ORAL DAILY PRN
Qty: 90 TABLET | Refills: 2 | Status: SHIPPED | OUTPATIENT
Start: 2024-05-11

## 2024-05-15 ENCOUNTER — APPOINTMENT (OUTPATIENT)
Dept: WOUND CARE | Facility: CLINIC | Age: 69
End: 2024-05-15
Payer: MEDICARE

## 2024-05-22 ENCOUNTER — OFFICE VISIT (OUTPATIENT)
Dept: WOUND CARE | Facility: CLINIC | Age: 69
End: 2024-05-22
Payer: MEDICARE

## 2024-05-22 PROCEDURE — 11042 DBRDMT SUBQ TIS 1ST 20SQCM/<: CPT

## 2024-05-28 ENCOUNTER — PATIENT OUTREACH (OUTPATIENT)
Dept: PRIMARY CARE | Facility: CLINIC | Age: 69
End: 2024-05-28
Payer: MEDICARE

## 2024-05-28 DIAGNOSIS — D64.9 ANEMIA, UNSPECIFIED TYPE: Primary | ICD-10-CM

## 2024-05-28 RX ORDER — FERROUS SULFATE 325(65) MG
65 TABLET ORAL
Qty: 90 TABLET | Refills: 1 | Status: SHIPPED | OUTPATIENT
Start: 2024-05-28 | End: 2025-05-28

## 2024-05-29 ENCOUNTER — OFFICE VISIT (OUTPATIENT)
Dept: WOUND CARE | Facility: CLINIC | Age: 69
End: 2024-05-29
Payer: MEDICARE

## 2024-05-29 PROCEDURE — 11042 DBRDMT SUBQ TIS 1ST 20SQCM/<: CPT

## 2024-06-05 ENCOUNTER — OFFICE VISIT (OUTPATIENT)
Dept: WOUND CARE | Facility: CLINIC | Age: 69
End: 2024-06-05
Payer: MEDICARE

## 2024-06-05 PROCEDURE — 11042 DBRDMT SUBQ TIS 1ST 20SQCM/<: CPT

## 2024-06-10 ENCOUNTER — PATIENT OUTREACH (OUTPATIENT)
Dept: PRIMARY CARE | Facility: CLINIC | Age: 69
End: 2024-06-10
Payer: MEDICARE

## 2024-06-10 DIAGNOSIS — N18.31 STAGE 3A CHRONIC KIDNEY DISEASE (MULTI): ICD-10-CM

## 2024-06-10 DIAGNOSIS — E11.42 TYPE 2 DIABETES MELLITUS WITH DIABETIC POLYNEUROPATHY, WITHOUT LONG-TERM CURRENT USE OF INSULIN (MULTI): ICD-10-CM

## 2024-06-10 NOTE — PROGRESS NOTES
I called and spoke with the patient who continues to have would care change his dressing 2x a week and goes to would care 1x a week.  Per the patient the wound is not healing.  Patient continues to grow MRSA in his wound.  Patient does not want to admitted to the hospital for IV antibiotics at this time.  Without o2 the patients spo2 drops into the 80's.  Patient is wearing his o2 continually and his spo2 is in the low 90's.  Patient has an pulmonary appointment later this month.  Medications reviewed.  Patient has instacart for his groceries.  Instructed to call with any questions or concerns.

## 2024-06-12 ENCOUNTER — LAB REQUISITION (OUTPATIENT)
Dept: LAB | Facility: HOSPITAL | Age: 69
End: 2024-06-12
Payer: MEDICARE

## 2024-06-12 ENCOUNTER — OFFICE VISIT (OUTPATIENT)
Dept: WOUND CARE | Facility: CLINIC | Age: 69
End: 2024-06-12
Payer: MEDICARE

## 2024-06-12 DIAGNOSIS — L97.512 NON-PRESSURE CHRONIC ULCER OF OTHER PART OF RIGHT FOOT WITH FAT LAYER EXPOSED (MULTI): ICD-10-CM

## 2024-06-12 PROCEDURE — 11042 DBRDMT SUBQ TIS 1ST 20SQCM/<: CPT

## 2024-06-12 PROCEDURE — 87075 CULTR BACTERIA EXCEPT BLOOD: CPT

## 2024-06-12 PROCEDURE — 87070 CULTURE OTHR SPECIMN AEROBIC: CPT

## 2024-06-12 PROCEDURE — 87186 SC STD MICRODIL/AGAR DIL: CPT

## 2024-06-12 PROCEDURE — 87205 SMEAR GRAM STAIN: CPT

## 2024-06-13 ENCOUNTER — APPOINTMENT (OUTPATIENT)
Dept: PRIMARY CARE | Facility: CLINIC | Age: 69
End: 2024-06-13
Payer: MEDICARE

## 2024-06-24 ENCOUNTER — OFFICE VISIT (OUTPATIENT)
Dept: PULMONOLOGY | Facility: HOSPITAL | Age: 69
End: 2024-06-24
Payer: MEDICARE

## 2024-06-24 VITALS
WEIGHT: 251 LBS | DIASTOLIC BLOOD PRESSURE: 79 MMHG | OXYGEN SATURATION: 95 % | SYSTOLIC BLOOD PRESSURE: 128 MMHG | TEMPERATURE: 97.2 F | BODY MASS INDEX: 34.04 KG/M2 | HEART RATE: 79 BPM

## 2024-06-24 DIAGNOSIS — Z01.811 PREOPERATIVE RESPIRATORY EXAMINATION: ICD-10-CM

## 2024-06-24 DIAGNOSIS — R06.02 SOB (SHORTNESS OF BREATH): Primary | ICD-10-CM

## 2024-06-24 PROCEDURE — 1126F AMNT PAIN NOTED NONE PRSNT: CPT | Performed by: STUDENT IN AN ORGANIZED HEALTH CARE EDUCATION/TRAINING PROGRAM

## 2024-06-24 PROCEDURE — 3074F SYST BP LT 130 MM HG: CPT | Performed by: STUDENT IN AN ORGANIZED HEALTH CARE EDUCATION/TRAINING PROGRAM

## 2024-06-24 PROCEDURE — 1123F ACP DISCUSS/DSCN MKR DOCD: CPT | Performed by: STUDENT IN AN ORGANIZED HEALTH CARE EDUCATION/TRAINING PROGRAM

## 2024-06-24 PROCEDURE — 99214 OFFICE O/P EST MOD 30 MIN: CPT | Mod: GC | Performed by: STUDENT IN AN ORGANIZED HEALTH CARE EDUCATION/TRAINING PROGRAM

## 2024-06-24 PROCEDURE — 3078F DIAST BP <80 MM HG: CPT | Performed by: STUDENT IN AN ORGANIZED HEALTH CARE EDUCATION/TRAINING PROGRAM

## 2024-06-24 PROCEDURE — 1157F ADVNC CARE PLAN IN RCRD: CPT | Performed by: STUDENT IN AN ORGANIZED HEALTH CARE EDUCATION/TRAINING PROGRAM

## 2024-06-24 PROCEDURE — 1159F MED LIST DOCD IN RCRD: CPT | Performed by: STUDENT IN AN ORGANIZED HEALTH CARE EDUCATION/TRAINING PROGRAM

## 2024-06-24 PROCEDURE — 99214 OFFICE O/P EST MOD 30 MIN: CPT | Performed by: STUDENT IN AN ORGANIZED HEALTH CARE EDUCATION/TRAINING PROGRAM

## 2024-06-24 RX ORDER — CIPROFLOXACIN 500 MG/1
1 TABLET ORAL
COMMUNITY
Start: 2024-06-12

## 2024-06-24 RX ORDER — ALBUTEROL SULFATE 90 UG/1
2 AEROSOL, METERED RESPIRATORY (INHALATION) EVERY 6 HOURS PRN
Qty: 18 G | Refills: 2 | Status: SHIPPED | OUTPATIENT
Start: 2024-06-24

## 2024-06-24 ASSESSMENT — PAIN SCALES - GENERAL: PAINLEVEL: 0-NO PAIN

## 2024-06-24 NOTE — PATIENT INSTRUCTIONS
Thank you for coming today.    Please continue to use 3 L of oxygen specially when you walk around.  I refilled your albuterol to be used as needed for shortness of breath  We will request home agency to do pulmonary rehabilitation at home.   You have 13% risk of pulmonary complications (Respiratory failure, respiratory infection, pleural effusion, atelectasis, pneumothorax, bronchospasm, aspiration pneumonitis) with procedures, and 3% risk of needing mechanical ventilation and endotracheal intubation after procedures.  Please discuss with your gastroenterologist those numbers.  No need for further testing from pulmonary standpoint.  I will see you in 6 months.

## 2024-06-24 NOTE — PROGRESS NOTES
Subjective   Patient ID: Angus Redman is a 68 y.o. male who presents for Pt. here today for FUV.  HPI  This is a 68-year-old male patient with past medical history of chronic hypoxic respiratory failure on 2 to 3 L via nasal cannula, restrictive lung disease on PFTs, and ex smoking (90 pack years, quit in 1999).  Patient presented today to the clinic for follow-up.    Patient was seen in the clinic and 3/2024, CT scan of the chest, echocardiogram, PFTs, 6-minute walk test and oxygen titration study were ordered.    Today, patient mentioned that he still has exertional dyspnea but he feels well overall, he denies cough or chest pain.    Patient mentioned that he had an multiple ribs fracture bilaterally in the past, after that he had multiple falls that also caused multiple rib fracture.  All of that happened before 2016.    Patient mentioned that he did not have any asbestos exposure himself, but his father got exposed to asbestos from his job so he is not sure if he got an direct exposure from his father's clothes.      Review of Systems  As above.      Objective   Physical Exam  Constitutional:       General: He is awake.      Appearance: Normal appearance.   HENT:      Head: Normocephalic and atraumatic.      Nose: Nose normal.      Mouth/Throat:      Mouth: Mucous membranes are moist.   Eyes:      Pupils: Pupils are equal, round, and reactive to light.   Pulmonary:      Effort: Pulmonary effort is normal. No respiratory distress.      Breath sounds: No wheezing.      Comments: Decreased air entry to the bases of the lungs.  Neurological:      Mental Status: He is alert and oriented to person, place, and time. Mental status is at baseline.   Psychiatric:         Attention and Perception: Attention and perception normal.         Mood and Affect: Mood normal.         Behavior: Behavior normal.           CT scan of the chest 3/2024  IMPRESSION:  1.  Pleural thickening and calcification of the right posterior  lung  with associated right lower lobe round atelectasis, unchanged in  appearance as compared with exams dating back to 06/26/2016.  2. Unchanged tiny loculated right pleural effusion as compared with  exams dating back to 06/26/2016.  3. No new acute cardiopulmonary process.  4. Small hiatal hernia.  5. Severe coronary artery calcifications      TTE 4/2024:  PHYSICIAN INTERPRETATION:  Left Ventricle: The left ventricular systolic function is normal, with an estimated ejection fraction of 60-65%. There are no regional wall motion abnormalities. The left ventricular cavity size is normal. Left ventricular diastolic filling was not assessed.  Left Atrium: The left atrium is upper limits of normal in size. A bubble study using agitated saline was performed. Bubble study is negative.  Right Ventricle: The right ventricle is normal in size. There is normal right ventricular global systolic function.  Right Atrium: The right atrium is normal in size.  Aortic Valve: The aortic valve was not well visualized. There is no evidence of aortic valve regurgitation.  Mitral Valve: The mitral valve is normal in structure. There is no evidence of mitral valve regurgitation.  Tricuspid Valve: The tricuspid valve was not well visualized. There is trace tricuspid regurgitation. The right ventricular systolic pressure is unable to be estimated.  Pulmonic Valve: The pulmonic valve was not assessed. Pulmonic valve regurgitation was not assessed.  Pericardium: There is a trivial pericardial effusion.  Aorta: The aortic root is normal.  Systemic Veins: The inferior vena cava appears to be of normal size. There is IVC inspiratory collapse greater than 50%.     CONCLUSIONS:   1. Left ventricular systolic function is normal with a 60-65% estimated ejection fraction.   2. Poorly visualized anatomical structures due to suboptimal image quality.      PFTs 4/2024:  Interpretation: No obstruction, negative bronchodilator response, moderate  restriction, air trapping, decreased DLCO.  FEV1: 1.94 L (59%)  FVC: 2.79 L (63%)  FEV1/FVC: 70%  T.04 L (67%)  DLCO: 14.45 (52%)      6MWT:  Patient was able to walk for a total of 79 m, he desaturated down to 89% on 3 L of oxygen, patient was dyspneic at the end of the test.      Assessment/Plan   Diagnoses and all orders for this visit:  SOB (shortness of breath)  The patient dyspnea can be explained by the moderate restriction seen on PFTs which could be explained by the pleural thickening on the right side.  Unfortunately medical therapy for this condition will probably not be very helpful.  We discussed discussing this condition with thoracic surgery, we talked about the possibility that the surgery might cause a lot of complications without significant improvement of the symptoms.  The shared decision with the patient was not to do the referral.  We discussed a trial of albuterol.  Will also refer the patient for pulmonary rehab, patient prefers to do the pulmonary rehab at home, will try to arrange for that through his home care agency.  -     albuterol (ProAir HFA) 90 mcg/actuation inhaler; Inhale 2 puffs every 6 hours if needed for wheezing.  -     Referral to Pulmonary Rehabilitation; Future  -     Follow Up In Pulmonology; Future        Preoperative respiratory examination  Patient is planned for colonoscopy in the near future.  He has 3% risk of developing perioperative respiratory failure or needing mechanical ventilation based on Sales score, he also has 13% risk of pulmonary complication (Respiratory failure, respiratory infection, pleural effusion, atelectasis, pneumothorax, bronchospasm, aspiration pneumonitis) based on ARISCAT score.  The patient is currently medically optimized and no need for further testing or intervention from pulmonary standpoint.  The decision to proceed with the procedure is between the patient and the procedure team.    RTC in 6 months.    Kenton Montano MD 24  3:26 PM

## 2024-06-26 ENCOUNTER — APPOINTMENT (OUTPATIENT)
Dept: WOUND CARE | Facility: CLINIC | Age: 69
End: 2024-06-26
Payer: MEDICARE

## 2024-07-03 ENCOUNTER — APPOINTMENT (OUTPATIENT)
Dept: PAIN MEDICINE | Facility: CLINIC | Age: 69
End: 2024-07-03
Payer: MEDICARE

## 2024-07-03 ENCOUNTER — OFFICE VISIT (OUTPATIENT)
Dept: WOUND CARE | Facility: CLINIC | Age: 69
End: 2024-07-03
Payer: MEDICARE

## 2024-07-03 DIAGNOSIS — L97.522 FOOT ULCERATION, LEFT, WITH FAT LAYER EXPOSED (MULTI): Primary | ICD-10-CM

## 2024-07-03 DIAGNOSIS — L97.512 FOOT ULCERATION, RIGHT, WITH FAT LAYER EXPOSED (MULTI): ICD-10-CM

## 2024-07-03 PROCEDURE — 11042 DBRDMT SUBQ TIS 1ST 20SQCM/<: CPT

## 2024-07-05 ENCOUNTER — HOSPITAL ENCOUNTER (OUTPATIENT)
Dept: RADIOLOGY | Facility: HOSPITAL | Age: 69
Discharge: HOME | End: 2024-07-05
Payer: MEDICARE

## 2024-07-05 ENCOUNTER — OFFICE VISIT (OUTPATIENT)
Dept: PAIN MEDICINE | Facility: CLINIC | Age: 69
End: 2024-07-05
Payer: MEDICARE

## 2024-07-05 VITALS
DIASTOLIC BLOOD PRESSURE: 72 MMHG | RESPIRATION RATE: 20 BRPM | HEART RATE: 79 BPM | OXYGEN SATURATION: 92 % | SYSTOLIC BLOOD PRESSURE: 143 MMHG | TEMPERATURE: 96.9 F

## 2024-07-05 DIAGNOSIS — M25.531 RIGHT WRIST PAIN: ICD-10-CM

## 2024-07-05 DIAGNOSIS — M54.50 CHRONIC LOW BACK PAIN, UNSPECIFIED BACK PAIN LATERALITY, UNSPECIFIED WHETHER SCIATICA PRESENT: ICD-10-CM

## 2024-07-05 DIAGNOSIS — L97.512 FOOT ULCERATION, RIGHT, WITH FAT LAYER EXPOSED (MULTI): ICD-10-CM

## 2024-07-05 DIAGNOSIS — L97.522 FOOT ULCERATION, LEFT, WITH FAT LAYER EXPOSED (MULTI): ICD-10-CM

## 2024-07-05 DIAGNOSIS — M25.531 RIGHT WRIST PAIN: Primary | ICD-10-CM

## 2024-07-05 DIAGNOSIS — G57.93 NEUROPATHIC PAIN OF BOTH FEET: ICD-10-CM

## 2024-07-05 DIAGNOSIS — G89.29 CHRONIC LOW BACK PAIN, UNSPECIFIED BACK PAIN LATERALITY, UNSPECIFIED WHETHER SCIATICA PRESENT: ICD-10-CM

## 2024-07-05 PROCEDURE — 73630 X-RAY EXAM OF FOOT: CPT | Mod: 50

## 2024-07-05 PROCEDURE — 1125F AMNT PAIN NOTED PAIN PRSNT: CPT

## 2024-07-05 PROCEDURE — 3078F DIAST BP <80 MM HG: CPT

## 2024-07-05 PROCEDURE — 3008F BODY MASS INDEX DOCD: CPT

## 2024-07-05 PROCEDURE — 99213 OFFICE O/P EST LOW 20 MIN: CPT

## 2024-07-05 PROCEDURE — 1159F MED LIST DOCD IN RCRD: CPT

## 2024-07-05 PROCEDURE — 3077F SYST BP >= 140 MM HG: CPT

## 2024-07-05 PROCEDURE — 1036F TOBACCO NON-USER: CPT

## 2024-07-05 PROCEDURE — 3050F LDL-C >= 130 MG/DL: CPT

## 2024-07-05 PROCEDURE — 1157F ADVNC CARE PLAN IN RCRD: CPT

## 2024-07-05 PROCEDURE — 73110 X-RAY EXAM OF WRIST: CPT | Mod: RT

## 2024-07-05 PROCEDURE — 1123F ACP DISCUSS/DSCN MKR DOCD: CPT

## 2024-07-05 RX ORDER — DULOXETIN HYDROCHLORIDE 60 MG/1
60 CAPSULE, DELAYED RELEASE ORAL 2 TIMES DAILY
Qty: 60 CAPSULE | Refills: 2 | Status: SHIPPED | OUTPATIENT
Start: 2024-07-05 | End: 2024-10-03

## 2024-07-05 RX ORDER — GABAPENTIN 800 MG/1
800 TABLET ORAL 3 TIMES DAILY
Qty: 90 TABLET | Refills: 2 | Status: SHIPPED | OUTPATIENT
Start: 2024-07-05 | End: 2024-10-03

## 2024-07-05 ASSESSMENT — PAIN SCALES - GENERAL
PAINLEVEL_OUTOF10: 9
PAINLEVEL: 9

## 2024-07-05 ASSESSMENT — PAIN - FUNCTIONAL ASSESSMENT: PAIN_FUNCTIONAL_ASSESSMENT: 0-10

## 2024-07-05 NOTE — PROGRESS NOTES
"Subjective   Patient ID: Angus Redman is a 68 y.o. male who presents for Med Refill.  HPI    He feels that the gabapentin is no longer helpful. Denies any side effects. He is also taking duloxetine twice daily and ibuprofen.     He is reporting right wrist pain that started on Monday after lifting the litter pan, felt something \"snap\" and experienced swelling. States that he has not been using the right hand since. States that swelling has gone done.     He has MRSA in the right foot, is going to wound care. He has a life alert button     Review of Systems    Objective   Physical Exam  General   Alert and oriented x4, pleasant and cooperative.      HEENT  Pupils are equal and normal in size. Ears, nose, mouth, and throat appear to be WNL.  Head atraumatic, symmetric.      No signs of sedation or signs of withdrawal apparent.     Psychiatric   No signs of depression apparent. Appropriate mood and affect.     Neuro   No focal neurological deficit apparent. Ambulation at baseline using wheeled walker.      Respiratory  No respiratory distress, respirations equal and unlabored.     Abdomen  Soft and nontender, no distention noted.     Skin  Warm, dry and intact. No skin markings supportive of recent IV drug usage .      Right wrist.   Normal visual inspection of the right wrist, slight swelling noted. Pain with palpation along medial aspect of the right wrist. 3/5  on the right, 5/5 biceps and triceps. Normal sensation. Cap refill <2 seconds. 2+ right radial pulse.       Assessment/Plan     67 yo male with history and physical exam supportive of chronic low back pain associated with lumbar spondylosis, lumbar radiculopathy. Bilateral lower extremity pain associated with peripheral neuropathy. Right wrist pain.      I have personally reviewed the OARRS report for this patient. I have considered the risks of abuse, dependence, addiction and diversion. I believe that it is clinically appropriate for this patient to be " prescribed this medication based on documented diagnosis.  I believe the benefits of the continuation of the opiate outweighs the risk. Patient has described positive response to the present medication therapy. Patient denies any side effects associated with the usage of the medication. Patient did not elicit any signs supportive of misuse or abuse. The review of the [place] Automated Reporting prescription is not suggestive of any worrisome pattern. Patient believes the usage of this medication has improved the quality of life and allow him to participate in day-to-day activity. Therefore  I recommend the continuation of this medication and I will be refilling the medication prescription.  We will increase gabapentin to 800mg three times daily.   Continue to take duloxetine as prescribed.   Obtain right wrist x-ray to rule out fracture.   Prefers to be called with results.   Follow up in 3 months or as needed.   Explained plan to this patient, and patient verbalized understanding and agreement with the plan.     Please do not hesitate to contact the pain clinic after your visit with any questions or concerns at  M-F 8-4 pm     Patient was reminded not to share medications, not to take prescription medications that were not prescribed to the patient, and not to increase or change dose without consulting the pain clinic. I advised the patient to always take the least amount of medication needed to keep symptoms under control.       Rosie Camejo, SHIRA-CNP 07/05/24 10:21 AM

## 2024-07-05 NOTE — PROGRESS NOTES
"Patient here for medication refills. States his gabapentin is not helping with the pain. Also states his right wrist has been bothering him. Recently \"heard a snap\" on Monday and has had pain since.  "

## 2024-07-08 ENCOUNTER — PATIENT OUTREACH (OUTPATIENT)
Dept: PRIMARY CARE | Facility: CLINIC | Age: 69
End: 2024-07-08
Payer: MEDICARE

## 2024-07-08 DIAGNOSIS — E11.42 TYPE 2 DIABETES MELLITUS WITH DIABETIC POLYNEUROPATHY, WITHOUT LONG-TERM CURRENT USE OF INSULIN (MULTI): ICD-10-CM

## 2024-07-08 DIAGNOSIS — N18.31 STAGE 3A CHRONIC KIDNEY DISEASE (MULTI): ICD-10-CM

## 2024-07-08 DIAGNOSIS — E53.8 FOLIC ACID DEFICIENCY: ICD-10-CM

## 2024-07-08 NOTE — PROGRESS NOTES
I called and spoke with the patient who stated that he just saw pain management and is still having terrific pain in his right wrist.  Patient has the wrist x-rayed and did not show a fx.  Patient is taking gabapentin and ibuprofen for the pain without much relief.  I suggested he let pain management know that he is still having intense pain and suggested possibly using a wrist splint.  I also suggested over the counter lido patches and possible a referral to [facility] for acupuncture.  Patient will ask pain management regarding the above.

## 2024-07-09 ENCOUNTER — TELEPHONE (OUTPATIENT)
Dept: PRIMARY CARE | Facility: CLINIC | Age: 69
End: 2024-07-09
Payer: MEDICARE

## 2024-07-09 DIAGNOSIS — M25.531 RIGHT WRIST PAIN: Primary | ICD-10-CM

## 2024-07-09 RX ORDER — FOLIC ACID 1 MG/1
TABLET ORAL
Qty: 90 TABLET | Refills: 3 | Status: SHIPPED | OUTPATIENT
Start: 2024-07-09

## 2024-07-10 ENCOUNTER — OFFICE VISIT (OUTPATIENT)
Dept: WOUND CARE | Facility: CLINIC | Age: 69
End: 2024-07-10
Payer: MEDICARE

## 2024-07-10 ENCOUNTER — APPOINTMENT (OUTPATIENT)
Dept: WOUND CARE | Facility: CLINIC | Age: 69
End: 2024-07-10
Payer: MEDICARE

## 2024-07-10 PROCEDURE — 11042 DBRDMT SUBQ TIS 1ST 20SQCM/<: CPT

## 2024-07-11 ENCOUNTER — TELEPHONE (OUTPATIENT)
Dept: PAIN MEDICINE | Facility: CLINIC | Age: 69
End: 2024-07-11
Payer: MEDICARE

## 2024-07-11 NOTE — TELEPHONE ENCOUNTER
I CALLED AND LEFT MESSAGE ABOUT THE RECENT REULTS  OUR NUMBER WAS PROVIDED FOR ANY QUESTIONS    ----- Message from Rosie Camejo sent at 7/10/2024  1:08 PM EDT -----  Regarding: Results  Can you let Angus know that the X-Ray of his wrist does not show any fracture. 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 If the pain/swelling continues we can refer him to Ortho-Hand.  ----- Message -----  From: Interface, Radiology Results In  Sent: 7/6/2024   8:59 AM EDT  To: Rosie Camejo, APRN-CNP

## 2024-07-17 ENCOUNTER — OFFICE VISIT (OUTPATIENT)
Dept: WOUND CARE | Facility: CLINIC | Age: 69
End: 2024-07-17
Payer: MEDICARE

## 2024-07-17 ENCOUNTER — APPOINTMENT (OUTPATIENT)
Dept: WOUND CARE | Facility: CLINIC | Age: 69
End: 2024-07-17
Payer: MEDICARE

## 2024-07-17 PROCEDURE — 11042 DBRDMT SUBQ TIS 1ST 20SQCM/<: CPT

## 2024-07-18 ENCOUNTER — TELEPHONE (OUTPATIENT)
Dept: PAIN MEDICINE | Facility: CLINIC | Age: 69
End: 2024-07-18
Payer: MEDICARE

## 2024-07-18 NOTE — TELEPHONE ENCOUNTER
"He cals in and states the gabapentin is not giving him releif  states he \"needs something else for the pain\" did call him back and reached voicemail  "

## 2024-07-24 ENCOUNTER — OFFICE VISIT (OUTPATIENT)
Dept: WOUND CARE | Facility: CLINIC | Age: 69
End: 2024-07-24
Payer: MEDICARE

## 2024-07-24 ENCOUNTER — APPOINTMENT (OUTPATIENT)
Dept: PRIMARY CARE | Facility: CLINIC | Age: 69
End: 2024-07-24
Payer: MEDICARE

## 2024-07-24 ENCOUNTER — APPOINTMENT (OUTPATIENT)
Dept: WOUND CARE | Facility: CLINIC | Age: 69
End: 2024-07-24
Payer: MEDICARE

## 2024-07-24 VITALS
TEMPERATURE: 97.7 F | SYSTOLIC BLOOD PRESSURE: 128 MMHG | WEIGHT: 247 LBS | HEART RATE: 74 BPM | HEIGHT: 72 IN | OXYGEN SATURATION: 93 % | BODY MASS INDEX: 33.46 KG/M2 | DIASTOLIC BLOOD PRESSURE: 82 MMHG

## 2024-07-24 DIAGNOSIS — D64.9 ANEMIA, UNSPECIFIED TYPE: ICD-10-CM

## 2024-07-24 DIAGNOSIS — E03.9 HYPOTHYROIDISM, UNSPECIFIED TYPE: ICD-10-CM

## 2024-07-24 DIAGNOSIS — I25.10 ASCVD (ARTERIOSCLEROTIC CARDIOVASCULAR DISEASE): ICD-10-CM

## 2024-07-24 DIAGNOSIS — F41.9 ANXIETY: ICD-10-CM

## 2024-07-24 DIAGNOSIS — F33.41 RECURRENT MAJOR DEPRESSIVE DISORDER, IN PARTIAL REMISSION (CMS-HCC): ICD-10-CM

## 2024-07-24 DIAGNOSIS — E87.6 HYPOKALEMIA: ICD-10-CM

## 2024-07-24 PROCEDURE — G2211 COMPLEX E/M VISIT ADD ON: HCPCS | Performed by: INTERNAL MEDICINE

## 2024-07-24 PROCEDURE — 11042 DBRDMT SUBQ TIS 1ST 20SQCM/<: CPT

## 2024-07-24 PROCEDURE — 3074F SYST BP LT 130 MM HG: CPT | Performed by: INTERNAL MEDICINE

## 2024-07-24 PROCEDURE — 1157F ADVNC CARE PLAN IN RCRD: CPT | Performed by: INTERNAL MEDICINE

## 2024-07-24 PROCEDURE — 3050F LDL-C >= 130 MG/DL: CPT | Performed by: INTERNAL MEDICINE

## 2024-07-24 PROCEDURE — 1123F ACP DISCUSS/DSCN MKR DOCD: CPT | Performed by: INTERNAL MEDICINE

## 2024-07-24 PROCEDURE — 3079F DIAST BP 80-89 MM HG: CPT | Performed by: INTERNAL MEDICINE

## 2024-07-24 PROCEDURE — 1159F MED LIST DOCD IN RCRD: CPT | Performed by: INTERNAL MEDICINE

## 2024-07-24 PROCEDURE — 99214 OFFICE O/P EST MOD 30 MIN: CPT | Performed by: INTERNAL MEDICINE

## 2024-07-24 PROCEDURE — 1036F TOBACCO NON-USER: CPT | Performed by: INTERNAL MEDICINE

## 2024-07-24 PROCEDURE — 3008F BODY MASS INDEX DOCD: CPT | Performed by: INTERNAL MEDICINE

## 2024-07-24 PROCEDURE — 1160F RVW MEDS BY RX/DR IN RCRD: CPT | Performed by: INTERNAL MEDICINE

## 2024-07-24 RX ORDER — NAPROXEN SODIUM 220 MG/1
81 TABLET, FILM COATED ORAL DAILY
Qty: 90 TABLET | Refills: 3 | Status: SHIPPED | OUTPATIENT
Start: 2024-07-24 | End: 2025-07-24

## 2024-07-24 RX ORDER — ESCITALOPRAM OXALATE 20 MG/1
20 TABLET ORAL DAILY PRN
Qty: 90 TABLET | Refills: 2 | Status: SHIPPED | OUTPATIENT
Start: 2024-07-24

## 2024-07-24 RX ORDER — MIRTAZAPINE 15 MG/1
15 TABLET, FILM COATED ORAL NIGHTLY
Qty: 90 TABLET | Refills: 2 | Status: SHIPPED | OUTPATIENT
Start: 2024-07-24

## 2024-07-24 RX ORDER — ATORVASTATIN CALCIUM 40 MG/1
40 TABLET, FILM COATED ORAL DAILY
Qty: 90 TABLET | Refills: 3 | Status: SHIPPED | OUTPATIENT
Start: 2024-07-24 | End: 2025-07-24

## 2024-07-24 RX ORDER — FERROUS SULFATE 325(65) MG
65 TABLET ORAL
Qty: 90 TABLET | Refills: 1 | Status: SHIPPED | OUTPATIENT
Start: 2024-07-24 | End: 2025-07-24

## 2024-07-24 RX ORDER — LEVOTHYROXINE SODIUM 50 UG/1
50 TABLET ORAL
Qty: 90 TABLET | Refills: 1 | Status: SHIPPED | OUTPATIENT
Start: 2024-07-24

## 2024-07-24 RX ORDER — POTASSIUM CHLORIDE 20 MEQ/1
20 TABLET, EXTENDED RELEASE ORAL 2 TIMES DAILY
Qty: 180 TABLET | Refills: 3 | Status: SHIPPED | OUTPATIENT
Start: 2024-07-24

## 2024-07-24 ASSESSMENT — ENCOUNTER SYMPTOMS
LOSS OF SENSATION IN FEET: 0
DEPRESSION: 0
OCCASIONAL FEELINGS OF UNSTEADINESS: 1

## 2024-07-24 NOTE — PROGRESS NOTES
Subjective   Patient ID: Angus Redman is a 68 y.o. male who presents for Follow-up.    Here for follow-up  No recent illnesses or injuries  He has a lot of pain from his right wrist as well as his left arm-he has been work with the pain clinic-using gabapentin twice daily  He works with the wound clinic each Wednesday with his MRSA feet infections  Wound nurse comes out to his house Monday and Fridays    Unfortunately he is drinking wine again-1 bottle a day he states  Denies any abdominal pain no recent falls         Review of Systems    Objective   /82   Pulse 74   Temp 36.5 °C (97.7 °F)   Ht 1.829 m (6')   Wt 112 kg (247 lb)   SpO2 93%   BMI 33.50 kg/m²     Physical Exam  Constitutional:       Comments: Disheveled overweight white male, appears older than his stated age  Eye exam revealed pupils equally round and reactive, with extraocular muscles intact. Normal sclera and eyelids.  No scleral icterus  Neck exam revealed no masses, adenopathy or thyromegaly. No neck pain on range of motion was noted.  Pulmonary exam revealed clear breath sounds bilaterally in all lung fields. No wheezes bronchitis or rales noted.  Cardiac exam revealed regular rate and rhythm without murmurs gallops or rubs.  No back flank or abdominal discomfort  Extremities-with compression hose from the wound clinic/Unna boots bilaterally  Foot exam deferred to podiatry  Mental status rather pleasant, no forgetfulness no anxiousness affect a bit flat, his baseline at times mild         Assessment/Plan   Problem List Items Addressed This Visit             ICD-10-CM    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) F32.4    Relevant Medications    escitalopram (Lexapro) 20 mg tablet    Hypothyroidism E03.9    Relevant Medications    levothyroxine (Synthroid, Levoxyl) 50 mcg tablet    Hypokalemia E87.6    Relevant Medications    potassium chloride CR 20 mEq ER tablet    Anxiety F41.9    Relevant Medications    mirtazapine  (Remeron) 15 mg tablet    Anemia D64.9    Relevant Medications    ferrous sulfate, 325 mg ferrous sulfate, tablet     Other Visit Diagnoses         Codes    ASCVD (arteriosclerotic cardiovascular disease)     I25.10    Relevant Medications    atorvastatin (Lipitor) 40 mg tablet    aspirin 81 mg chewable tablet             Portions of this encounter note have been copied from my previous note dated 3/12/24  , which have been updated where appropriate and all reflect my current medical decision making from today.        We spoke for over 30 minutes, over half the time counseling    Home health documentation-forms completed and faxed back as requested    CODE STATUS-he has requested DNR comfort care and states he has worked with the  to get forms completed accordingly.  He has these at home.  He is will send those in and will update his electronic medical record to reflect this accordingly    Healthcare power of -reviewed at his  visit.  He has designated his sister, Antonina Redman as his legal agent of authority.  His formal written and notarized medical POA has been filed     Living situation-he lives alone in a house.  He no longer drives.  His   in .  His only sibling sister - Antonina, lives in [facility].    Nutritional concerns-unfortunately he does not do a lot of cooking.  He gets food from [employer] as well as his wife and he states      Gait unsteadiness-Home physical therapy ordered last time           He needs help getting out of shower          -he now has a life alert             He uses his rollator consistently    Home health-he was working with home health with physical therapy and home nursing, twice a week with physical therapy, once a week at this point with OT.           - Ottoniel, wound care RN comes to his house Mon and Fri    Home help           - Sharon from [employer], Tues for 4 hrs, and Valentina 3 hrs Friday -  housekeeping    Home adaptive equipment-though he uses a wheelchair out of the house-he has stairs in his house and cannot use the wheelchair or scooter inside of his house.  He uses a walker instead    Recurrent falls-he will use a cane or walker as he is able to.  Unfortunately he recently fell when he went out to the [employer] to  delivered items from the store.  He had to call the squad             Presently using a walker-encouraged him to continue to optimize his safety              Now has a wheelchair to use in his kitchen             12/23-he continues to use the walker as much as he can around the house and kitchen.  He now has a life alert              7/24-fortunately no recent falls    MRSA feet infection - from past fall. Home wound RN done w/ regular visits. Plans to follow up to check feet prn             He was referred to the emergency room at [facility] 3/6/2024 for worsening foot infections-he was changed to oral antibiotics as below and discharged             He has ongoing foot infections mainly on the left under his first MTP from difficult calluses-currently on Augmentin and doxycycline.  He is working with the wound clinic as well as his podiatrist who he will see tomorrow, Dr. Jnag 3/13/2024            7/24- wound clinic visits every Wed continues    Elevated creatinine-2.2 in ER 3/6/2024.  He will recheck that this afternoon                 Creatinine normalized on recheck fortunately      Hypoxemia-with oxygen dependent chronic hypoxic respiratory failure-he was reportedly hypoxic overnight-we will ordered overnight oximeter            He is now on portable oxygen presumably from his pulmonologist.  He will continue.  He states that he feels better and has more stamina             He remains on portable oxygen and will meet with his pulmonologist as scheduled.              7/24 - recent visit w/ pulmonologist - suggested pulm rehab. He hopes to do this at home.    Cardiac  evaluation-he was referred to the advanced heart failure clinic with Dr. Harry February 2024-he has completed a 2-week Holter monitor and will see Dr. Harry soon.  He was reported to have a NSTEMI.  LVEF normal on last echo 9/23.      Restrictive lung disease-he will follow-up with his pulmonologist    Regarding tenuous home situation-he lives alone but now no longer drives.  He is able to hire drivers to get him where he needs to go.  He is able to order groceries and have them delivered.  At this point he declines any further changes in his home situation            7/24- his friend Vicky moved away. His sister comes to town when she can. Now retired. He's managing so far on his own.    Nutritional concerns - He gets groceries by [employer] w/ home deliveries 1-2 x wk    Alcoholism with a history of alcoholic gastritis-unfortunately he is still drinking alcohol-apparently the groceries will deliver wine which she has converted to from gin, unfortunately.                7/23-presently drinking 2 bottles of white wine daily.  He refuses to cut back              12/23-he states he quit using alcohol in 10/23 when he promised his sister he would not drink.  He has not really missed that he states strongly encouraged long-term abstinence                3/24-he confirms that he remains off alcohol.  Encouragement/support provided               7/24-unfortunately he is drinking a bottle of wine again daily.  Encouraged him to stop using alcohol    History hypertension-he will continue medications           Blood pressure much improved on recheck    Alcoholic gastritis-he will continue his PPI and limit alcohol use            12/23-he states he quit using alcohol in 10/23 when he promised his sister he would not drink.  He has not really missed that he states strongly encouraged long-term abstinence    Asthma-improved with Breo.  He does not always use it    Allergic rhinitis-he is allergic to cats and he has  several cats.  He will continue his Allegra and Flonase      Painful peripheral neuropathy-both feet are numb, but he feels the pain at times right lateral ankle area and hands.  He is on the gabapentin twice daily now that helps.  He will continue to work with the pain clinic           He continues to meet with the pain clinic-with that appointment 1/24 to continue his duloxetine and gabapentin.              7/24-right wrist arthritis and left arm pain main problems presently.  He is on twice daily gabapentin.  He plans to establish with the pain clinic near [facility]-he had an eye exam with Dr. Lutz in Feb '24    Polypharmacy-he is on extensive medicine list-he does his best to maintain all of this    Depression/anxiety-he will continue the BuSpar Cymbalta and Lexapro    Flu shot-encouraged each September-updated 9/23    Covid booster -encouraged each September updated 9/23    RSV vacc - updated 9/23    He will follow-up in 3-4 months, sooner as needed    Charting was completed using voice recognition technology and may include unintended errors.

## 2024-07-31 ENCOUNTER — APPOINTMENT (OUTPATIENT)
Dept: WOUND CARE | Facility: CLINIC | Age: 69
End: 2024-07-31
Payer: MEDICARE

## 2024-08-07 ENCOUNTER — APPOINTMENT (OUTPATIENT)
Dept: WOUND CARE | Facility: CLINIC | Age: 69
End: 2024-08-07
Payer: MEDICARE

## 2024-08-12 ENCOUNTER — APPOINTMENT (OUTPATIENT)
Dept: PAIN MEDICINE | Facility: CLINIC | Age: 69
End: 2024-08-12
Payer: MEDICARE

## 2024-08-14 ENCOUNTER — OFFICE VISIT (OUTPATIENT)
Dept: PAIN MEDICINE | Facility: CLINIC | Age: 69
End: 2024-08-14
Payer: MEDICARE

## 2024-08-14 ENCOUNTER — LAB REQUISITION (OUTPATIENT)
Dept: LAB | Facility: HOSPITAL | Age: 69
End: 2024-08-14
Payer: MEDICARE

## 2024-08-14 ENCOUNTER — OFFICE VISIT (OUTPATIENT)
Dept: WOUND CARE | Facility: CLINIC | Age: 69
End: 2024-08-14
Payer: MEDICARE

## 2024-08-14 DIAGNOSIS — G57.93 NEUROPATHIC PAIN OF BOTH FEET: ICD-10-CM

## 2024-08-14 DIAGNOSIS — M54.50 CHRONIC LOW BACK PAIN, UNSPECIFIED BACK PAIN LATERALITY, UNSPECIFIED WHETHER SCIATICA PRESENT: ICD-10-CM

## 2024-08-14 DIAGNOSIS — G89.29 CHRONIC LOW BACK PAIN, UNSPECIFIED BACK PAIN LATERALITY, UNSPECIFIED WHETHER SCIATICA PRESENT: ICD-10-CM

## 2024-08-14 DIAGNOSIS — L97.512 NON-PRESSURE CHRONIC ULCER OF OTHER PART OF RIGHT FOOT WITH FAT LAYER EXPOSED (MULTI): ICD-10-CM

## 2024-08-14 PROCEDURE — 11721 DEBRIDE NAIL 6 OR MORE: CPT

## 2024-08-14 PROCEDURE — 3050F LDL-C >= 130 MG/DL: CPT | Performed by: PAIN MEDICINE

## 2024-08-14 PROCEDURE — 87075 CULTR BACTERIA EXCEPT BLOOD: CPT

## 2024-08-14 PROCEDURE — 87186 SC STD MICRODIL/AGAR DIL: CPT

## 2024-08-14 PROCEDURE — 99214 OFFICE O/P EST MOD 30 MIN: CPT | Performed by: PAIN MEDICINE

## 2024-08-14 PROCEDURE — 1159F MED LIST DOCD IN RCRD: CPT | Performed by: PAIN MEDICINE

## 2024-08-14 PROCEDURE — 87070 CULTURE OTHR SPECIMN AEROBIC: CPT

## 2024-08-14 PROCEDURE — 1157F ADVNC CARE PLAN IN RCRD: CPT | Performed by: PAIN MEDICINE

## 2024-08-14 PROCEDURE — 87205 SMEAR GRAM STAIN: CPT

## 2024-08-14 PROCEDURE — 1125F AMNT PAIN NOTED PAIN PRSNT: CPT | Performed by: PAIN MEDICINE

## 2024-08-14 PROCEDURE — 87077 CULTURE AEROBIC IDENTIFY: CPT

## 2024-08-14 PROCEDURE — 1123F ACP DISCUSS/DSCN MKR DOCD: CPT | Performed by: PAIN MEDICINE

## 2024-08-14 PROCEDURE — G2211 COMPLEX E/M VISIT ADD ON: HCPCS | Performed by: PAIN MEDICINE

## 2024-08-14 PROCEDURE — 11042 DBRDMT SUBQ TIS 1ST 20SQCM/<: CPT

## 2024-08-14 RX ORDER — GABAPENTIN 800 MG/1
800 TABLET ORAL 4 TIMES DAILY
Qty: 120 TABLET | Refills: 0 | Status: SHIPPED | OUTPATIENT
Start: 2024-08-14 | End: 2024-11-12

## 2024-08-14 ASSESSMENT — PAIN SCALES - GENERAL: PAINLEVEL: 5

## 2024-08-14 NOTE — H&P
History Of Present Illness  Angus Redman is a 68 y.o. male presenting with neuropathy in the hands and the feet he continues to complain of pain at night that is 10 out of 10 during the day it however around 5 out of 10 described feels like a shooting sensation goes through his fingers he is currently on the gabapentin 800 mg 3 times per day but he does not believe the gabapentin is sufficient he is also on the Cymbalta 60 mg twice per day with both medication on board he continues to describe his pain as not well under control     Past Medical History  Past Medical History:   Diagnosis Date    Alcohol dependence, in remission (Multi) 06/08/2022    Alcohol dependence in early, early partial, sustained full, or sustained partial remission    Alcohol dependence, in remission (Multi) 06/08/2022    Alcohol dependence in early, early partial, sustained full, or sustained partial remission    Alcohol dependence, uncomplicated (Multi) 09/15/2022    Alcohol dependence, daily use    Dependence on other enabling machines and devices 09/24/2019    Walker as ambulation aid    Encounter for screening for other disorder 03/01/2021    Special screening for other conditions    Fracture of one rib, unspecified side, initial encounter for closed fracture 01/03/2014    Closed rib fracture    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    Laceration without foreign body of scalp, initial encounter 09/10/2015    Scalp laceration    Nausea 04/15/2014    Nausea    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) 07/27/2017    Skin ulcer of right ankle, limited to breakdown of skin    Osteomyelitis, unspecified (Multi) 02/26/2022    Osteomyelitis of toe    Other chest pain 09/21/2013    Atypical chest pain    Other conditions influencing health status     Skin Cancer    Other conditions influencing health status 10/08/2017    Closed displaced  fracture of olecranon process of left ulna with intra-articular extension, initial encounter    Other specified health status 07/23/2014    Foreign travel    Patient's noncompliance with other medical treatment and regimen due to unspecified reason 10/28/2016    Noncompliance with therapeutic plan    Patient's other noncompliance with medication regimen 06/04/2016    History of medication noncompliance    Personal history of (healed) stress fracture 12/30/2013    History of stress fracture    Personal history of diseases of the skin and subcutaneous tissue 02/26/2022    History of chronic skin ulcer    Personal history of other diseases of the nervous system and sense organs 03/25/2016    History of peripheral neuropathy    Personal history of other diseases of the nervous system and sense organs 02/02/2016    History of peripheral neuropathy    Personal history of other endocrine, nutritional and metabolic disease 07/13/2015    History of hypothyroidism    Personal history of other specified conditions 07/28/2019    History of syncope    Personal history of other specified conditions 05/07/2018    History of syncope    Personal history of other specified conditions 06/25/2015    History of nausea    Unspecified fracture of left foot, initial encounter for closed fracture 06/04/2016    Foot fracture, left    Unspecified injury of head, initial encounter 04/20/2016    Closed head injury, initial encounter    Unspecified injury of unspecified elbow, initial encounter 04/07/2017    Elbow injury    Unsteadiness on feet 04/15/2014    Unsteady gait     Surgical History  Past Surgical History:   Procedure Laterality Date    COLONOSCOPY  10/09/2013    Complete Colonoscopy    OTHER SURGICAL HISTORY  07/28/2019    Insertion of cardiac monitor    OTHER SURGICAL HISTORY  04/15/2014    Reported Hx Of Hip Replacement - Left Side    OTHER SURGICAL HISTORY  01/28/2020    Hip replacement    OTHER SURGICAL HISTORY  11/24/2015     Interrogation Of Implantable Loop Recorder By Physician In Person    OTHER SURGICAL HISTORY  04/19/2017    Arthroscopy Elbow Left    OTHER SURGICAL HISTORY  10/09/2013    Shoulder Surgery Left    SKIN CANCER EXCISION  10/09/2013    Mohs Micrographic Surgery Face     Social History  He reports that he quit smoking about 25 years ago. His smoking use included cigarettes. He has never used smokeless tobacco. He reports that he does not currently use alcohol. He reports that he does not use drugs.    Family History  Family History   Problem Relation Name Age of Onset    Other (cardiac pacemaker) Mother      Coronary artery disease Mother      Other (history of heart artery stent) Mother      Cancer Mother      Other (kurtis cell cancer) Mother      Arthritis Mother      Mental illness Brother          living in handicapped assisted living facility permanently [Other]    Other (angina pectoris) Paternal Grandmother          Allergies  Allergies   Allergen Reactions    Codeine Other    House Dust Mite Unknown    Hydrocodone-Acetaminophen Nausea/vomiting    Latex Unknown     Latex gloves Misc     Latex tube/ connecter kit    Lisinopril Hives    Mold Unknown    Tree And Shrub Pollen Unknown     Review of Systems   All 13 systems were reviewed and are within normal levels except as noted below or per HPI. Positive and pertinent negative responses are noted below or in the HPI   Denied any fever or chills. No weight loss and no night sweats. No cough or sputum production. No diarrhea   No constipation  No bladder and bowel incontinence and no other changes in bladder and bowel. No skin changes.   Denied opioids diversion and abuse and denies alcoholism. Denies overuse of  pain medications.   Physical Exam       Past medical history no interval changes has been noted    On physical examination    General   Alert, oriented x3 pleasant and cooperative. Does not look in any major distress.    HEENT  Pupils normal in size. Ears,  nose, mouth, and throat appear to be in normal condition.  Head atraumatic      No signs of sedation or signs of withdrawal apparent.    Psychiatric   No signs of depression apparent.    Neuro   No focal neurological deficit apparent. Ambulation at baseline.      Respiratory  No respiratory distress on nc      Abdomen  no distention     Skin  No skin markings supportive of recent IV drug usage .    Cardiovascular  Regular rate and rhythm    Last Recorded Vitals  There were no vitals taken for this visit.    Assessment/Plan   68 years old with history and physical examination supportive of peripheral neuropathy in the hands and the feet currently maintained on gabapentin    Plan  Advised the patient that I will be increasing the dose of the gabapentin to 4 times per day and continuing him on the Cymbalta to 60 mg twice per day for a trial of 1 month if the patient continues to have significant amount of pain despite the titration of the medication then I will stop the gabapentin and switch the patient to Lyrica starting with 100 mg 3 times per day titrated up to a maximum of 600 mg/day patient was advised to follow-up with the pain clinic within 1 month to continue the titration of the medication patient verbalized understanding and agreement to the plan      The above clinical summary has been dictated with voice recognition software. It has not been proofread for grammatical errors, typographical mistakes, or other semantic inconsistencies.    Thank you for visiting our office today. It was our pleasure to take part in your healthcare.     Please do not hesitate to contact the pain clinic after your visit with any questions or concerns at  M-F 8-4 pm       Raheem Graham M.D.  Medical Director , Division of Pain Medicine [facility]   of Anesthesiology and Pain Medicine  [facility] [employer] [facility]      [address] [id number] [facility]  [address]  [place] [address]     Office: [phone]  Fax: [phone]      Raheem Graham MD

## 2024-08-16 DIAGNOSIS — G57.93 NEUROPATHIC PAIN OF BOTH FEET: ICD-10-CM

## 2024-08-16 DIAGNOSIS — G89.29 CHRONIC LOW BACK PAIN, UNSPECIFIED BACK PAIN LATERALITY, UNSPECIFIED WHETHER SCIATICA PRESENT: ICD-10-CM

## 2024-08-16 DIAGNOSIS — K29.20 CHRONIC ALCOHOLIC GASTRITIS, PRESENCE OF BLEEDING UNSPECIFIED: Primary | ICD-10-CM

## 2024-08-16 DIAGNOSIS — M54.50 CHRONIC LOW BACK PAIN, UNSPECIFIED BACK PAIN LATERALITY, UNSPECIFIED WHETHER SCIATICA PRESENT: ICD-10-CM

## 2024-08-16 RX ORDER — PANTOPRAZOLE SODIUM 40 MG/1
40 TABLET, DELAYED RELEASE ORAL 2 TIMES DAILY
Qty: 180 TABLET | Refills: 3 | Status: SHIPPED | OUTPATIENT
Start: 2024-08-16

## 2024-08-16 RX ORDER — DULOXETIN HYDROCHLORIDE 60 MG/1
60 CAPSULE, DELAYED RELEASE ORAL 2 TIMES DAILY
Qty: 60 CAPSULE | Refills: 0 | Status: SHIPPED | OUTPATIENT
Start: 2024-08-16 | End: 2024-08-22

## 2024-08-17 LAB
BACTERIA SPEC CULT: ABNORMAL
BACTERIA SPEC CULT: ABNORMAL
GRAM STN SPEC: ABNORMAL

## 2024-08-21 ENCOUNTER — OFFICE VISIT (OUTPATIENT)
Dept: WOUND CARE | Facility: CLINIC | Age: 69
End: 2024-08-21
Payer: MEDICARE

## 2024-08-21 PROCEDURE — 11042 DBRDMT SUBQ TIS 1ST 20SQCM/<: CPT

## 2024-08-27 ENCOUNTER — APPOINTMENT (OUTPATIENT)
Dept: PAIN MEDICINE | Facility: CLINIC | Age: 69
End: 2024-08-27
Payer: MEDICARE

## 2024-08-27 DIAGNOSIS — J45.20 MILD INTERMITTENT REACTIVE AIRWAY DISEASE WITHOUT COMPLICATION (HHS-HCC): ICD-10-CM

## 2024-08-27 DIAGNOSIS — R26.9 ABNORMALITY OF GAIT AND MOBILITY: ICD-10-CM

## 2024-08-27 DIAGNOSIS — I10 ESSENTIAL HYPERTENSION: ICD-10-CM

## 2024-08-27 DIAGNOSIS — E03.9 HYPOTHYROIDISM, UNSPECIFIED TYPE: ICD-10-CM

## 2024-08-27 DIAGNOSIS — F41.9 ANXIETY: Primary | ICD-10-CM

## 2024-08-28 ENCOUNTER — OFFICE VISIT (OUTPATIENT)
Dept: WOUND CARE | Facility: CLINIC | Age: 69
End: 2024-08-28
Payer: MEDICARE

## 2024-08-28 PROCEDURE — 11042 DBRDMT SUBQ TIS 1ST 20SQCM/<: CPT

## 2024-08-28 RX ORDER — SULFAMETHOXAZOLE AND TRIMETHOPRIM 800; 160 MG/1; MG/1
1 TABLET ORAL EVERY 12 HOURS
Qty: 20 TABLET | OUTPATIENT
Start: 2024-08-28 | End: 2024-09-07

## 2024-08-30 ENCOUNTER — PATIENT OUTREACH (OUTPATIENT)
Dept: PRIMARY CARE | Facility: CLINIC | Age: 69
End: 2024-08-30
Payer: MEDICARE

## 2024-08-30 DIAGNOSIS — F33.41 RECURRENT MAJOR DEPRESSIVE DISORDER, IN PARTIAL REMISSION (CMS-HCC): ICD-10-CM

## 2024-08-30 DIAGNOSIS — E11.42 TYPE 2 DIABETES MELLITUS WITH DIABETIC POLYNEUROPATHY, WITHOUT LONG-TERM CURRENT USE OF INSULIN (MULTI): ICD-10-CM

## 2024-08-30 PROCEDURE — 99490 CHRNC CARE MGMT STAFF 1ST 20: CPT | Performed by: INTERNAL MEDICINE

## 2024-08-30 RX ORDER — LEVOTHYROXINE SODIUM 50 UG/1
50 TABLET ORAL
Qty: 90 TABLET | Refills: 1 | Status: SHIPPED | OUTPATIENT
Start: 2024-08-30

## 2024-08-30 RX ORDER — MONTELUKAST SODIUM 10 MG/1
10 TABLET ORAL NIGHTLY
Qty: 60 TABLET | Refills: 0 | Status: SHIPPED | OUTPATIENT
Start: 2024-08-30 | End: 2024-10-29

## 2024-08-30 RX ORDER — BUSPIRONE HYDROCHLORIDE 5 MG/1
5 TABLET ORAL 3 TIMES DAILY PRN
Qty: 180 TABLET | Refills: 0 | Status: SHIPPED | OUTPATIENT
Start: 2024-08-30 | End: 2024-10-29

## 2024-08-30 RX ORDER — AMLODIPINE BESYLATE 2.5 MG/1
2.5 TABLET ORAL DAILY
Qty: 90 TABLET | Refills: 0 | Status: SHIPPED | OUTPATIENT
Start: 2024-08-30

## 2024-08-30 RX ORDER — PROPRANOLOL HYDROCHLORIDE 10 MG/1
10 TABLET ORAL 2 TIMES DAILY
Qty: 270 TABLET | Refills: 3 | Status: SHIPPED | OUTPATIENT
Start: 2024-08-30

## 2024-08-30 NOTE — PROGRESS NOTES
I called and spoke with the patient who stated that he was doing well.  Per the patient the wound care nurse just left the house.  The wound is healing slowly.  Patient remains on antibiotic for MRSA.  Patient goes to wound care every Wednesday and have home care on Monday and Friday.  Medications reviewed and renewal sent to Dr Haskins.  Patient fell 2 days ago changing the water in his air cooler. Patient used his home alert to call for help.  No injury per the patient.  Instructed to call with any questions or concerns.

## 2024-09-04 ENCOUNTER — OFFICE VISIT (OUTPATIENT)
Dept: WOUND CARE | Facility: CLINIC | Age: 69
End: 2024-09-04
Payer: MEDICARE

## 2024-09-04 PROCEDURE — 11042 DBRDMT SUBQ TIS 1ST 20SQCM/<: CPT

## 2024-09-11 ENCOUNTER — OFFICE VISIT (OUTPATIENT)
Dept: WOUND CARE | Facility: CLINIC | Age: 69
End: 2024-09-11
Payer: MEDICARE

## 2024-09-11 ENCOUNTER — APPOINTMENT (OUTPATIENT)
Dept: PAIN MEDICINE | Facility: CLINIC | Age: 69
End: 2024-09-11
Payer: MEDICARE

## 2024-09-11 DIAGNOSIS — G62.9 PERIPHERAL NEUROPATHY: Primary | ICD-10-CM

## 2024-09-11 PROCEDURE — 1159F MED LIST DOCD IN RCRD: CPT | Performed by: PAIN MEDICINE

## 2024-09-11 PROCEDURE — 11042 DBRDMT SUBQ TIS 1ST 20SQCM/<: CPT

## 2024-09-11 PROCEDURE — 99213 OFFICE O/P EST LOW 20 MIN: CPT | Mod: 25 | Performed by: PAIN MEDICINE

## 2024-09-11 PROCEDURE — 1125F AMNT PAIN NOTED PAIN PRSNT: CPT | Performed by: PAIN MEDICINE

## 2024-09-11 PROCEDURE — 3050F LDL-C >= 130 MG/DL: CPT | Performed by: PAIN MEDICINE

## 2024-09-11 PROCEDURE — 1157F ADVNC CARE PLAN IN RCRD: CPT | Performed by: PAIN MEDICINE

## 2024-09-11 PROCEDURE — 99213 OFFICE O/P EST LOW 20 MIN: CPT | Performed by: PAIN MEDICINE

## 2024-09-11 PROCEDURE — G2211 COMPLEX E/M VISIT ADD ON: HCPCS | Performed by: PAIN MEDICINE

## 2024-09-11 PROCEDURE — 1123F ACP DISCUSS/DSCN MKR DOCD: CPT | Performed by: PAIN MEDICINE

## 2024-09-11 RX ORDER — PREGABALIN 150 MG/1
150 CAPSULE ORAL EVERY 6 HOURS
Qty: 120 CAPSULE | Refills: 2 | Status: SHIPPED | OUTPATIENT
Start: 2024-09-11

## 2024-09-11 ASSESSMENT — PAIN SCALES - GENERAL: PAINLEVEL: 8

## 2024-09-11 NOTE — H&P
History Of Present Illness  Angus Redman is a 69 y.o. male presenting with pain mainly in the right hand and in both feet he titrated up the dose of the Cymbalta to 60 mg twice per day and he is currently on the gabapentin 800 mg 4 pills a day but despite usage of all the medication he continues to have a significant amount of pain rating the pain at 8 out of 10 which will increase to a 10 out of 10 in the evening described mainly as a sharp     Past Medical History  Past Medical History:   Diagnosis Date    Alcohol dependence, in remission (Multi) 06/08/2022    Alcohol dependence in early, early partial, sustained full, or sustained partial remission    Alcohol dependence, in remission (Multi) 06/08/2022    Alcohol dependence in early, early partial, sustained full, or sustained partial remission    Alcohol dependence, uncomplicated (Multi) 09/15/2022    Alcohol dependence, daily use    Dependence on other enabling machines and devices 09/24/2019    Walker as ambulation aid    Encounter for screening for other disorder 03/01/2021    Special screening for other conditions    Fracture of one rib, unspecified side, initial encounter for closed fracture 01/03/2014    Closed rib fracture    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    Laceration without foreign body of scalp, initial encounter 09/10/2015    Scalp laceration    Nausea 04/15/2014    Nausea    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) 07/27/2017    Skin ulcer of right ankle, limited to breakdown of skin    Osteomyelitis, unspecified (Multi) 02/26/2022    Osteomyelitis of toe    Other chest pain 09/21/2013    Atypical chest pain    Other conditions influencing health status     Skin Cancer    Other conditions influencing health status 10/08/2017    Closed displaced fracture of olecranon process of left ulna with intra-articular extension, initial encounter     Other specified health status 07/23/2014    Foreign travel    Patient's noncompliance with other medical treatment and regimen due to unspecified reason 10/28/2016    Noncompliance with therapeutic plan    Patient's other noncompliance with medication regimen 06/04/2016    History of medication noncompliance    Personal history of (healed) stress fracture 12/30/2013    History of stress fracture    Personal history of diseases of the skin and subcutaneous tissue 02/26/2022    History of chronic skin ulcer    Personal history of other diseases of the nervous system and sense organs 03/25/2016    History of peripheral neuropathy    Personal history of other diseases of the nervous system and sense organs 02/02/2016    History of peripheral neuropathy    Personal history of other endocrine, nutritional and metabolic disease 07/13/2015    History of hypothyroidism    Personal history of other specified conditions 07/28/2019    History of syncope    Personal history of other specified conditions 05/07/2018    History of syncope    Personal history of other specified conditions 06/25/2015    History of nausea    Unspecified fracture of left foot, initial encounter for closed fracture 06/04/2016    Foot fracture, left    Unspecified injury of head, initial encounter 04/20/2016    Closed head injury, initial encounter    Unspecified injury of unspecified elbow, initial encounter 04/07/2017    Elbow injury    Unsteadiness on feet 04/15/2014    Unsteady gait     Surgical History  Past Surgical History:   Procedure Laterality Date    COLONOSCOPY  10/09/2013    Complete Colonoscopy    OTHER SURGICAL HISTORY  07/28/2019    Insertion of cardiac monitor    OTHER SURGICAL HISTORY  04/15/2014    Reported Hx Of Hip Replacement - Left Side    OTHER SURGICAL HISTORY  01/28/2020    Hip replacement    OTHER SURGICAL HISTORY  11/24/2015    Interrogation Of Implantable Loop Recorder By Physician In Person    OTHER SURGICAL HISTORY   04/19/2017    Arthroscopy Elbow Left    OTHER SURGICAL HISTORY  10/09/2013    Shoulder Surgery Left    SKIN CANCER EXCISION  10/09/2013    Mohs Micrographic Surgery Face     Social History  He reports that he quit smoking about 25 years ago. His smoking use included cigarettes. He has never used smokeless tobacco. He reports that he does not currently use alcohol. He reports that he does not use drugs.    Family History  Family History   Problem Relation Name Age of Onset    Other (cardiac pacemaker) Mother      Coronary artery disease Mother      Other (history of heart artery stent) Mother      Cancer Mother      Other (kurtis cell cancer) Mother      Arthritis Mother      Mental illness Brother          living in handicapped assisted living facility permanently [Other]    Other (angina pectoris) Paternal Grandmother          Allergies  Allergies   Allergen Reactions    Codeine Other    House Dust Mite Unknown    Hydrocodone-Acetaminophen Nausea/vomiting    Latex Unknown     Latex gloves Misc     Latex tube/ connecter kit    Lisinopril Hives    Mold Unknown    Tree And Shrub Pollen Unknown     Review of Systems   All 13 systems were reviewed and are within normal levels except as noted below or per HPI. Positive and pertinent negative responses are noted below or in the HPI   Denied any fever or chills. No weight loss and no night sweats. No cough or sputum production. No diarrhea   No constipation  No bladder and bowel incontinence and no other changes in bladder and bowel. No skin changes.   Denied opioids diversion and abuse and denies alcoholism. Denies overuse of  pain medications.   Physical Exam       Past medical history no interval changes has been noted    On physical examination    General   Alert, oriented x3 pleasant and cooperative. Does not look in any major distress.    HEENT  Pupils normal in size. Ears, nose, mouth, and throat appear to be in normal condition.  Head atraumatic      No signs of  sedation or signs of withdrawal apparent.    Psychiatric   No signs of depression apparent.    Neuro  Neurologically unchanged ambulate with the assistance of      Respiratory  No respiratory distress     Abdomen  no distention     Skin  No skin markings supportive of recent IV drug usage .    Cardiovascular  Regular rate and rhythm    Last Recorded Vitals  There were no vitals taken for this visit.    Assessment/Plan   69 years old with history and physical examination supportive of peripheral neuropathy failed gabapentin full dose    Plan  Advised the patient that I will be stopping the gabapentin and switching him to a Lyrica trial at 150 mg 4 times per day patient will be continued on the full dose of the Cymbalta 60 mg twice per day  Explained to the patient that if he does not have any significant improvement with the Lyrica then I would recommend for him a trial of spinal cord stimulator transcutaneously patient verbalized understanding and agreement with the plan benefits and risk were discussed and he elected proceed      The above clinical summary has been dictated with voice recognition software. It has not been proofread for grammatical errors, typographical mistakes, or other semantic inconsistencies.    Thank you for visiting our office today. It was our pleasure to take part in your healthcare.     Please do not hesitate to contact the pain clinic after your visit with any questions or concerns at  M-F 8-4 pm       Raheem Graham M.D.  Medical Director , Division of Pain Medicine [facility]   of Anesthesiology and Pain Medicine  [facility]     [address] [place] [id number]     Office: [phone]  Fax: [phone]      Raheem Graham MD

## 2024-09-11 NOTE — PROGRESS NOTES
Right hand and both feet pain   Complex Repair Preamble Text (Leave Blank If You Do Not Want): Extensive wide undermining was performed.

## 2024-09-12 DIAGNOSIS — I25.10 ASCVD (ARTERIOSCLEROTIC CARDIOVASCULAR DISEASE): ICD-10-CM

## 2024-09-12 DIAGNOSIS — M85.80 OSTEOPENIA, UNSPECIFIED LOCATION: ICD-10-CM

## 2024-09-12 DIAGNOSIS — I10 ESSENTIAL HYPERTENSION: ICD-10-CM

## 2024-09-12 DIAGNOSIS — F41.9 ANXIETY: ICD-10-CM

## 2024-09-12 DIAGNOSIS — D64.9 ANEMIA, UNSPECIFIED TYPE: ICD-10-CM

## 2024-09-12 DIAGNOSIS — J45.20 MILD INTERMITTENT REACTIVE AIRWAY DISEASE WITHOUT COMPLICATION (HHS-HCC): ICD-10-CM

## 2024-09-12 RX ORDER — BUSPIRONE HYDROCHLORIDE 5 MG/1
5 TABLET ORAL 3 TIMES DAILY PRN
Qty: 180 TABLET | Refills: 3 | Status: SHIPPED | OUTPATIENT
Start: 2024-09-12

## 2024-09-12 RX ORDER — NAPROXEN SODIUM 220 MG/1
81 TABLET, FILM COATED ORAL DAILY
Qty: 90 TABLET | Refills: 3 | Status: SHIPPED | OUTPATIENT
Start: 2024-09-12

## 2024-09-12 RX ORDER — AMLODIPINE BESYLATE 2.5 MG/1
2.5 TABLET ORAL DAILY
Qty: 90 TABLET | Refills: 3 | Status: SHIPPED | OUTPATIENT
Start: 2024-09-12

## 2024-09-12 RX ORDER — ALENDRONATE SODIUM 70 MG/1
TABLET ORAL
Qty: 12 TABLET | Refills: 3 | Status: SHIPPED | OUTPATIENT
Start: 2024-09-12

## 2024-09-12 RX ORDER — FERROUS SULFATE TAB 325 MG (65 MG ELEMENTAL FE) 325 (65 FE) MG
1 TAB ORAL
Qty: 90 TABLET | Refills: 3 | Status: SHIPPED | OUTPATIENT
Start: 2024-09-12

## 2024-09-12 RX ORDER — MONTELUKAST SODIUM 10 MG/1
10 TABLET ORAL NIGHTLY
Qty: 90 TABLET | Refills: 3 | Status: SHIPPED | OUTPATIENT
Start: 2024-09-12

## 2024-09-18 ENCOUNTER — LAB REQUISITION (OUTPATIENT)
Dept: LAB | Facility: HOSPITAL | Age: 69
End: 2024-09-18
Payer: MEDICARE

## 2024-09-18 ENCOUNTER — OFFICE VISIT (OUTPATIENT)
Dept: WOUND CARE | Facility: CLINIC | Age: 69
End: 2024-09-18
Payer: MEDICARE

## 2024-09-18 DIAGNOSIS — L97.512 NON-PRESSURE CHRONIC ULCER OF OTHER PART OF RIGHT FOOT WITH FAT LAYER EXPOSED: ICD-10-CM

## 2024-09-18 PROCEDURE — 87186 SC STD MICRODIL/AGAR DIL: CPT

## 2024-09-18 PROCEDURE — 87205 SMEAR GRAM STAIN: CPT

## 2024-09-18 PROCEDURE — 87075 CULTR BACTERIA EXCEPT BLOOD: CPT

## 2024-09-18 PROCEDURE — 87077 CULTURE AEROBIC IDENTIFY: CPT

## 2024-09-18 PROCEDURE — 87070 CULTURE OTHR SPECIMN AEROBIC: CPT

## 2024-09-18 PROCEDURE — 11042 DBRDMT SUBQ TIS 1ST 20SQCM/<: CPT

## 2024-09-23 ENCOUNTER — HOSPITAL ENCOUNTER (INPATIENT)
Facility: HOSPITAL | Age: 69
LOS: 2 days | Discharge: HOME | DRG: 186 | End: 2024-09-25
Attending: STUDENT IN AN ORGANIZED HEALTH CARE EDUCATION/TRAINING PROGRAM | Admitting: STUDENT IN AN ORGANIZED HEALTH CARE EDUCATION/TRAINING PROGRAM
Payer: MEDICARE

## 2024-09-23 ENCOUNTER — APPOINTMENT (OUTPATIENT)
Dept: CARDIOLOGY | Facility: HOSPITAL | Age: 69
DRG: 186 | End: 2024-09-23
Payer: MEDICARE

## 2024-09-23 ENCOUNTER — APPOINTMENT (OUTPATIENT)
Dept: RADIOLOGY | Facility: HOSPITAL | Age: 69
DRG: 186 | End: 2024-09-23
Payer: MEDICARE

## 2024-09-23 DIAGNOSIS — I50.31 ACUTE DIASTOLIC HEART FAILURE: ICD-10-CM

## 2024-09-23 DIAGNOSIS — F33.41 RECURRENT MAJOR DEPRESSIVE DISORDER, IN PARTIAL REMISSION (CMS-HCC): ICD-10-CM

## 2024-09-23 DIAGNOSIS — M79.605 PAIN IN LEFT LEG: ICD-10-CM

## 2024-09-23 DIAGNOSIS — L97.512 ULCER OF TOE OF RIGHT FOOT, WITH FAT LAYER EXPOSED (MULTI): ICD-10-CM

## 2024-09-23 DIAGNOSIS — J98.4 RESTRICTIVE LUNG DISEASE: ICD-10-CM

## 2024-09-23 DIAGNOSIS — J96.01 ACUTE HYPOXIC RESPIRATORY FAILURE (MULTI): Primary | ICD-10-CM

## 2024-09-23 DIAGNOSIS — M79.604 PAIN IN RIGHT LEG: ICD-10-CM

## 2024-09-23 LAB
ALBUMIN SERPL BCP-MCNC: 3.9 G/DL (ref 3.4–5)
ALP SERPL-CCNC: 103 U/L (ref 33–136)
ALT SERPL W P-5'-P-CCNC: 6 U/L (ref 10–52)
ANION GAP SERPL CALC-SCNC: 13 MMOL/L (ref 10–20)
APPEARANCE UR: CLEAR
AST SERPL W P-5'-P-CCNC: 10 U/L (ref 9–39)
BASOPHILS # BLD AUTO: 0.03 X10*3/UL (ref 0–0.1)
BASOPHILS NFR BLD AUTO: 0.3 %
BILIRUB SERPL-MCNC: 0.8 MG/DL (ref 0–1.2)
BILIRUB UR STRIP.AUTO-MCNC: NEGATIVE MG/DL
BUN SERPL-MCNC: 17 MG/DL (ref 6–23)
CALCIUM SERPL-MCNC: 9.1 MG/DL (ref 8.6–10.3)
CARDIAC TROPONIN I PNL SERPL HS: 3 NG/L (ref 0–20)
CARDIAC TROPONIN I PNL SERPL HS: 4 NG/L (ref 0–20)
CHLORIDE SERPL-SCNC: 101 MMOL/L (ref 98–107)
CO2 SERPL-SCNC: 28 MMOL/L (ref 21–32)
COLOR UR: ABNORMAL
CREAT SERPL-MCNC: 1.61 MG/DL (ref 0.5–1.3)
D DIMER PPP FEU-MCNC: 1484 NG/ML FEU
EGFRCR SERPLBLD CKD-EPI 2021: 45 ML/MIN/1.73M*2
EOSINOPHIL # BLD AUTO: 0.3 X10*3/UL (ref 0–0.4)
EOSINOPHIL NFR BLD AUTO: 2.8 %
ERYTHROCYTE [DISTWIDTH] IN BLOOD BY AUTOMATED COUNT: 17.2 % (ref 11.5–14.5)
FLUAV RNA RESP QL NAA+PROBE: NOT DETECTED
FLUBV RNA RESP QL NAA+PROBE: NOT DETECTED
GLUCOSE SERPL-MCNC: 99 MG/DL (ref 74–99)
GLUCOSE UR STRIP.AUTO-MCNC: NORMAL MG/DL
HCT VFR BLD AUTO: 33.3 % (ref 41–52)
HGB BLD-MCNC: 9.7 G/DL (ref 13.5–17.5)
IMM GRANULOCYTES # BLD AUTO: 0.19 X10*3/UL (ref 0–0.5)
IMM GRANULOCYTES NFR BLD AUTO: 1.8 % (ref 0–0.9)
KETONES UR STRIP.AUTO-MCNC: NEGATIVE MG/DL
LACTATE SERPL-SCNC: 0.9 MMOL/L (ref 0.4–2)
LDH SERPL L TO P-CCNC: 125 U/L (ref 84–246)
LEUKOCYTE ESTERASE UR QL STRIP.AUTO: ABNORMAL
LYMPHOCYTES # BLD AUTO: 1.75 X10*3/UL (ref 0.8–3)
LYMPHOCYTES NFR BLD AUTO: 16.2 %
MCH RBC QN AUTO: 29.1 PG (ref 26–34)
MCHC RBC AUTO-ENTMCNC: 29.1 G/DL (ref 32–36)
MCV RBC AUTO: 100 FL (ref 80–100)
MONOCYTES # BLD AUTO: 0.7 X10*3/UL (ref 0.05–0.8)
MONOCYTES NFR BLD AUTO: 6.5 %
NEUTROPHILS # BLD AUTO: 7.84 X10*3/UL (ref 1.6–5.5)
NEUTROPHILS NFR BLD AUTO: 72.4 %
NITRITE UR QL STRIP.AUTO: NEGATIVE
NRBC BLD-RTO: 0.3 /100 WBCS (ref 0–0)
PH UR STRIP.AUTO: 5.5 [PH]
PLATELET # BLD AUTO: 365 X10*3/UL (ref 150–450)
POTASSIUM SERPL-SCNC: 4.7 MMOL/L (ref 3.5–5.3)
PROT SERPL-MCNC: 7.3 G/DL (ref 6.4–8.2)
PROT SERPL-MCNC: 7.7 G/DL (ref 6.4–8.2)
PROT UR STRIP.AUTO-MCNC: NEGATIVE MG/DL
RBC # BLD AUTO: 3.33 X10*6/UL (ref 4.5–5.9)
RBC # UR STRIP.AUTO: NEGATIVE /UL
RBC #/AREA URNS AUTO: ABNORMAL /HPF
SARS-COV-2 RNA RESP QL NAA+PROBE: NOT DETECTED
SODIUM SERPL-SCNC: 137 MMOL/L (ref 136–145)
SP GR UR STRIP.AUTO: 1.01
TRIGL SERPL-MCNC: 104 MG/DL (ref 0–149)
UROBILINOGEN UR STRIP.AUTO-MCNC: NORMAL MG/DL
WBC # BLD AUTO: 10.8 X10*3/UL (ref 4.4–11.3)
WBC #/AREA URNS AUTO: ABNORMAL /HPF

## 2024-09-23 PROCEDURE — 71275 CT ANGIOGRAPHY CHEST: CPT

## 2024-09-23 PROCEDURE — 87086 URINE CULTURE/COLONY COUNT: CPT | Mod: STJLAB

## 2024-09-23 PROCEDURE — 83615 LACTATE (LD) (LDH) ENZYME: CPT

## 2024-09-23 PROCEDURE — 71045 X-RAY EXAM CHEST 1 VIEW: CPT

## 2024-09-23 PROCEDURE — 93010 ELECTROCARDIOGRAM REPORT: CPT | Performed by: STUDENT IN AN ORGANIZED HEALTH CARE EDUCATION/TRAINING PROGRAM

## 2024-09-23 PROCEDURE — 99285 EMERGENCY DEPT VISIT HI MDM: CPT

## 2024-09-23 PROCEDURE — 36415 COLL VENOUS BLD VENIPUNCTURE: CPT

## 2024-09-23 PROCEDURE — 85379 FIBRIN DEGRADATION QUANT: CPT

## 2024-09-23 PROCEDURE — 2550000001 HC RX 255 CONTRASTS: Performed by: STUDENT IN AN ORGANIZED HEALTH CARE EDUCATION/TRAINING PROGRAM

## 2024-09-23 PROCEDURE — 84484 ASSAY OF TROPONIN QUANT: CPT

## 2024-09-23 PROCEDURE — 99285 EMERGENCY DEPT VISIT HI MDM: CPT | Performed by: STUDENT IN AN ORGANIZED HEALTH CARE EDUCATION/TRAINING PROGRAM

## 2024-09-23 PROCEDURE — 87040 BLOOD CULTURE FOR BACTERIA: CPT | Mod: STJLAB

## 2024-09-23 PROCEDURE — 80053 COMPREHEN METABOLIC PANEL: CPT

## 2024-09-23 PROCEDURE — 2060000001 HC INTERMEDIATE ICU ROOM DAILY

## 2024-09-23 PROCEDURE — 93010 ELECTROCARDIOGRAM REPORT: CPT | Performed by: INTERNAL MEDICINE

## 2024-09-23 PROCEDURE — 2500000001 HC RX 250 WO HCPCS SELF ADMINISTERED DRUGS (ALT 637 FOR MEDICARE OP)

## 2024-09-23 PROCEDURE — 2500000004 HC RX 250 GENERAL PHARMACY W/ HCPCS (ALT 636 FOR OP/ED)

## 2024-09-23 PROCEDURE — 84155 ASSAY OF PROTEIN SERUM: CPT

## 2024-09-23 PROCEDURE — 2500000005 HC RX 250 GENERAL PHARMACY W/O HCPCS

## 2024-09-23 PROCEDURE — 87899 AGENT NOS ASSAY W/OPTIC: CPT | Mod: STJLAB

## 2024-09-23 PROCEDURE — 85025 COMPLETE CBC W/AUTO DIFF WBC: CPT

## 2024-09-23 PROCEDURE — 81003 URINALYSIS AUTO W/O SCOPE: CPT

## 2024-09-23 PROCEDURE — 71045 X-RAY EXAM CHEST 1 VIEW: CPT | Performed by: RADIOLOGY

## 2024-09-23 PROCEDURE — 84478 ASSAY OF TRIGLYCERIDES: CPT

## 2024-09-23 PROCEDURE — 87635 SARS-COV-2 COVID-19 AMP PRB: CPT

## 2024-09-23 PROCEDURE — 93005 ELECTROCARDIOGRAM TRACING: CPT

## 2024-09-23 PROCEDURE — 71275 CT ANGIOGRAPHY CHEST: CPT | Performed by: STUDENT IN AN ORGANIZED HEALTH CARE EDUCATION/TRAINING PROGRAM

## 2024-09-23 PROCEDURE — 2500000002 HC RX 250 W HCPCS SELF ADMINISTERED DRUGS (ALT 637 FOR MEDICARE OP, ALT 636 FOR OP/ED)

## 2024-09-23 PROCEDURE — 83605 ASSAY OF LACTIC ACID: CPT

## 2024-09-23 RX ORDER — HEPARIN SODIUM 5000 [USP'U]/ML
5000 INJECTION, SOLUTION INTRAVENOUS; SUBCUTANEOUS EVERY 8 HOURS
Status: DISCONTINUED | OUTPATIENT
Start: 2024-09-23 | End: 2024-09-25 | Stop reason: HOSPADM

## 2024-09-23 RX ORDER — CETIRIZINE HYDROCHLORIDE 10 MG/1
10 TABLET ORAL DAILY
Status: DISCONTINUED | OUTPATIENT
Start: 2024-09-24 | End: 2024-09-25 | Stop reason: HOSPADM

## 2024-09-23 RX ORDER — GUAIFENESIN 600 MG/1
600 TABLET, EXTENDED RELEASE ORAL EVERY 12 HOURS PRN
Status: DISCONTINUED | OUTPATIENT
Start: 2024-09-23 | End: 2024-09-25 | Stop reason: HOSPADM

## 2024-09-23 RX ORDER — ONDANSETRON 4 MG/1
4 TABLET, FILM COATED ORAL EVERY 8 HOURS PRN
Status: DISCONTINUED | OUTPATIENT
Start: 2024-09-23 | End: 2024-09-25 | Stop reason: HOSPADM

## 2024-09-23 RX ORDER — NAPROXEN SODIUM 220 MG/1
81 TABLET, FILM COATED ORAL DAILY
Status: DISCONTINUED | OUTPATIENT
Start: 2024-09-24 | End: 2024-09-25 | Stop reason: HOSPADM

## 2024-09-23 RX ORDER — ACETAMINOPHEN 325 MG/1
650 TABLET ORAL EVERY 6 HOURS PRN
Status: DISCONTINUED | OUTPATIENT
Start: 2024-09-23 | End: 2024-09-25 | Stop reason: HOSPADM

## 2024-09-23 RX ORDER — LANOLIN ALCOHOL/MO/W.PET/CERES
100 CREAM (GRAM) TOPICAL DAILY
Status: DISCONTINUED | OUTPATIENT
Start: 2024-09-26 | End: 2024-09-24

## 2024-09-23 RX ORDER — FOLIC ACID 1 MG/1
1 TABLET ORAL DAILY
Status: DISCONTINUED | OUTPATIENT
Start: 2024-09-24 | End: 2024-09-25

## 2024-09-23 RX ORDER — PROPRANOLOL HYDROCHLORIDE 10 MG/1
10 TABLET ORAL 2 TIMES DAILY
Status: DISCONTINUED | OUTPATIENT
Start: 2024-09-23 | End: 2024-09-25 | Stop reason: HOSPADM

## 2024-09-23 RX ORDER — ATORVASTATIN CALCIUM 40 MG/1
40 TABLET, FILM COATED ORAL DAILY
Status: DISCONTINUED | OUTPATIENT
Start: 2024-09-24 | End: 2024-09-25 | Stop reason: HOSPADM

## 2024-09-23 RX ORDER — MIRTAZAPINE 15 MG/1
15 TABLET, FILM COATED ORAL NIGHTLY
Status: DISCONTINUED | OUTPATIENT
Start: 2024-09-23 | End: 2024-09-25 | Stop reason: HOSPADM

## 2024-09-23 RX ORDER — LORAZEPAM 2 MG/ML
1 INJECTION INTRAMUSCULAR EVERY 2 HOUR PRN
Status: DISCONTINUED | OUTPATIENT
Start: 2024-09-23 | End: 2024-09-23

## 2024-09-23 RX ORDER — MONTELUKAST SODIUM 10 MG/1
10 TABLET ORAL NIGHTLY
Status: DISCONTINUED | OUTPATIENT
Start: 2024-09-23 | End: 2024-09-25 | Stop reason: HOSPADM

## 2024-09-23 RX ORDER — LORAZEPAM 2 MG/ML
2 INJECTION INTRAMUSCULAR EVERY 2 HOUR PRN
Status: DISCONTINUED | OUTPATIENT
Start: 2024-09-23 | End: 2024-09-23

## 2024-09-23 RX ORDER — LEVOTHYROXINE SODIUM 50 UG/1
50 TABLET ORAL DAILY
Status: DISCONTINUED | OUTPATIENT
Start: 2024-09-24 | End: 2024-09-25 | Stop reason: HOSPADM

## 2024-09-23 RX ORDER — OXYCODONE HYDROCHLORIDE 5 MG/1
5 TABLET ORAL EVERY 6 HOURS PRN
Status: DISCONTINUED | OUTPATIENT
Start: 2024-09-23 | End: 2024-09-25 | Stop reason: HOSPADM

## 2024-09-23 RX ORDER — DIAZEPAM 5 MG/ML
10 INJECTION, SOLUTION INTRAMUSCULAR; INTRAVENOUS EVERY 2 HOUR PRN
Status: DISCONTINUED | OUTPATIENT
Start: 2024-09-23 | End: 2024-09-24

## 2024-09-23 RX ORDER — PREGABALIN 150 MG/1
150 CAPSULE ORAL 2 TIMES DAILY
Status: DISCONTINUED | OUTPATIENT
Start: 2024-09-23 | End: 2024-09-24

## 2024-09-23 RX ORDER — THIAMINE HYDROCHLORIDE 100 MG/ML
100 INJECTION, SOLUTION INTRAMUSCULAR; INTRAVENOUS DAILY
Status: DISCONTINUED | OUTPATIENT
Start: 2024-09-24 | End: 2024-09-25

## 2024-09-23 RX ORDER — MULTIVIT-MIN/IRON FUM/FOLIC AC 7.5 MG-4
1 TABLET ORAL DAILY
Status: DISCONTINUED | OUTPATIENT
Start: 2024-09-24 | End: 2024-09-25

## 2024-09-23 RX ORDER — SULFAMETHOXAZOLE AND TRIMETHOPRIM 800; 160 MG/1; MG/1
1 TABLET ORAL 2 TIMES DAILY
COMMUNITY
Start: 2024-09-20 | End: 2024-09-29

## 2024-09-23 RX ORDER — ONDANSETRON HYDROCHLORIDE 2 MG/ML
4 INJECTION, SOLUTION INTRAVENOUS EVERY 8 HOURS PRN
Status: DISCONTINUED | OUTPATIENT
Start: 2024-09-23 | End: 2024-09-25 | Stop reason: HOSPADM

## 2024-09-23 RX ORDER — CEFTRIAXONE 1 G/50ML
1 INJECTION, SOLUTION INTRAVENOUS EVERY 24 HOURS
Status: DISCONTINUED | OUTPATIENT
Start: 2024-09-23 | End: 2024-09-24

## 2024-09-23 RX ORDER — ALENDRONATE SODIUM 5 MG/1
10 TABLET ORAL
Status: DISCONTINUED | OUTPATIENT
Start: 2024-09-24 | End: 2024-09-24

## 2024-09-23 RX ORDER — DULOXETIN HYDROCHLORIDE 60 MG/1
60 CAPSULE, DELAYED RELEASE ORAL 2 TIMES DAILY
Status: DISCONTINUED | OUTPATIENT
Start: 2024-09-23 | End: 2024-09-25 | Stop reason: HOSPADM

## 2024-09-23 RX ORDER — AMLODIPINE BESYLATE 2.5 MG/1
2.5 TABLET ORAL DAILY
Status: DISCONTINUED | OUTPATIENT
Start: 2024-09-24 | End: 2024-09-25 | Stop reason: HOSPADM

## 2024-09-23 RX ORDER — PANTOPRAZOLE SODIUM 40 MG/1
40 TABLET, DELAYED RELEASE ORAL
Status: DISCONTINUED | OUTPATIENT
Start: 2024-09-24 | End: 2024-09-25 | Stop reason: HOSPADM

## 2024-09-23 RX ORDER — BUSPIRONE HYDROCHLORIDE 5 MG/1
5 TABLET ORAL 3 TIMES DAILY PRN
Status: DISCONTINUED | OUTPATIENT
Start: 2024-09-23 | End: 2024-09-25 | Stop reason: HOSPADM

## 2024-09-23 RX ORDER — ESCITALOPRAM OXALATE 20 MG/1
20 TABLET ORAL DAILY PRN
Status: DISCONTINUED | OUTPATIENT
Start: 2024-09-23 | End: 2024-09-24

## 2024-09-23 RX ORDER — TAMSULOSIN HYDROCHLORIDE 0.4 MG/1
0.4 CAPSULE ORAL NIGHTLY
Status: DISCONTINUED | OUTPATIENT
Start: 2024-09-23 | End: 2024-09-25 | Stop reason: HOSPADM

## 2024-09-23 RX ORDER — GUAIFENESIN/DEXTROMETHORPHAN 100-10MG/5
5 SYRUP ORAL EVERY 4 HOURS PRN
Status: DISCONTINUED | OUTPATIENT
Start: 2024-09-23 | End: 2024-09-25 | Stop reason: HOSPADM

## 2024-09-23 RX ORDER — LORAZEPAM 2 MG/ML
0.5 INJECTION INTRAMUSCULAR EVERY 2 HOUR PRN
Status: DISCONTINUED | OUTPATIENT
Start: 2024-09-23 | End: 2024-09-23

## 2024-09-23 RX ORDER — IPRATROPIUM BROMIDE AND ALBUTEROL SULFATE 2.5; .5 MG/3ML; MG/3ML
3 SOLUTION RESPIRATORY (INHALATION)
Status: DISCONTINUED | OUTPATIENT
Start: 2024-09-24 | End: 2024-09-24

## 2024-09-23 SDOH — SOCIAL STABILITY: SOCIAL INSECURITY: HAVE YOU HAD THOUGHTS OF HARMING ANYONE ELSE?: NO

## 2024-09-23 SDOH — SOCIAL STABILITY: SOCIAL INSECURITY: ABUSE: ADULT

## 2024-09-23 SDOH — SOCIAL STABILITY: SOCIAL INSECURITY: WERE YOU ABLE TO COMPLETE ALL THE BEHAVIORAL HEALTH SCREENINGS?: YES

## 2024-09-23 SDOH — SOCIAL STABILITY: SOCIAL INSECURITY: DO YOU FEEL ANYONE HAS EXPLOITED OR TAKEN ADVANTAGE OF YOU FINANCIALLY OR OF YOUR PERSONAL PROPERTY?: NO

## 2024-09-23 SDOH — SOCIAL STABILITY: SOCIAL INSECURITY: DOES ANYONE TRY TO KEEP YOU FROM HAVING/CONTACTING OTHER FRIENDS OR DOING THINGS OUTSIDE YOUR HOME?: NO

## 2024-09-23 SDOH — SOCIAL STABILITY: SOCIAL INSECURITY: DO YOU FEEL UNSAFE GOING BACK TO THE PLACE WHERE YOU ARE LIVING?: NO

## 2024-09-23 SDOH — SOCIAL STABILITY: SOCIAL INSECURITY: HAS ANYONE EVER THREATENED TO HURT YOUR FAMILY OR YOUR PETS?: NO

## 2024-09-23 SDOH — SOCIAL STABILITY: SOCIAL INSECURITY: ARE YOU OR HAVE YOU BEEN THREATENED OR ABUSED PHYSICALLY, EMOTIONALLY, OR SEXUALLY BY ANYONE?: NO

## 2024-09-23 SDOH — SOCIAL STABILITY: SOCIAL INSECURITY: HAVE YOU HAD ANY THOUGHTS OF HARMING ANYONE ELSE?: NO

## 2024-09-23 SDOH — SOCIAL STABILITY: SOCIAL INSECURITY: ARE THERE ANY APPARENT SIGNS OF INJURIES/BEHAVIORS THAT COULD BE RELATED TO ABUSE/NEGLECT?: NO

## 2024-09-23 ASSESSMENT — LIFESTYLE VARIABLES
TOTAL SCORE: 0
ANXIETY: MILDLY ANXIOUS
ORIENTATION AND CLOUDING OF SENSORIUM: ORIENTED AND CAN DO SERIAL ADDITIONS
HOW OFTEN DO YOU HAVE A DRINK CONTAINING ALCOHOL: 4 OR MORE TIMES A WEEK
HEADACHE, FULLNESS IN HEAD: NOT PRESENT
HOW OFTEN DO YOU HAVE 6 OR MORE DRINKS ON ONE OCCASION: NEVER
VISUAL DISTURBANCES: NOT PRESENT
SKIP TO QUESTIONS 9-10: 0
AUDIT-C TOTAL SCORE: 5
AUDIT-C TOTAL SCORE: 5
HAVE PEOPLE ANNOYED YOU BY CRITICIZING YOUR DRINKING: NO
HAVE YOU EVER FELT YOU SHOULD CUT DOWN ON YOUR DRINKING: NO
TREMOR: NO TREMOR
PULSE: 64
NAUSEA AND VOMITING: NO NAUSEA AND NO VOMITING
TOTAL SCORE: 1
EVER FELT BAD OR GUILTY ABOUT YOUR DRINKING: NO
AGITATION: NORMAL ACTIVITY
EVER HAD A DRINK FIRST THING IN THE MORNING TO STEADY YOUR NERVES TO GET RID OF A HANGOVER: NO
HOW MANY STANDARD DRINKS CONTAINING ALCOHOL DO YOU HAVE ON A TYPICAL DAY: 3 OR 4
PAROXYSMAL SWEATS: NO SWEAT VISIBLE
AUDITORY DISTURBANCES: NOT PRESENT

## 2024-09-23 ASSESSMENT — PAIN - FUNCTIONAL ASSESSMENT
PAIN_FUNCTIONAL_ASSESSMENT: 0-10

## 2024-09-23 ASSESSMENT — PAIN DESCRIPTION - LOCATION
LOCATION: FOOT
LOCATION: FOOT

## 2024-09-23 ASSESSMENT — COGNITIVE AND FUNCTIONAL STATUS - GENERAL
CLIMB 3 TO 5 STEPS WITH RAILING: A LOT
HELP NEEDED FOR BATHING: A LITTLE
TURNING FROM BACK TO SIDE WHILE IN FLAT BAD: A LITTLE
PERSONAL GROOMING: A LITTLE
MOBILITY SCORE: 15
DRESSING REGULAR UPPER BODY CLOTHING: A LITTLE
DAILY ACTIVITIY SCORE: 19
STANDING UP FROM CHAIR USING ARMS: A LOT
MOVING FROM LYING ON BACK TO SITTING ON SIDE OF FLAT BED WITH BEDRAILS: A LITTLE
MOVING TO AND FROM BED TO CHAIR: A LITTLE
DRESSING REGULAR LOWER BODY CLOTHING: A LITTLE
TOILETING: A LITTLE
PATIENT BASELINE BEDBOUND: NO
WALKING IN HOSPITAL ROOM: A LOT

## 2024-09-23 ASSESSMENT — ACTIVITIES OF DAILY LIVING (ADL)
WALKS IN HOME: INDEPENDENT
ASSISTIVE_DEVICE: OXYGEN;WALKER
PATIENT'S MEMORY ADEQUATE TO SAFELY COMPLETE DAILY ACTIVITIES?: YES
JUDGMENT_ADEQUATE_SAFELY_COMPLETE_DAILY_ACTIVITIES: YES
FEEDING YOURSELF: INDEPENDENT
HEARING - RIGHT EAR: FUNCTIONAL
HEARING - LEFT EAR: FUNCTIONAL
GROOMING: INDEPENDENT
TOILETING: INDEPENDENT
LACK_OF_TRANSPORTATION: NO
DRESSING YOURSELF: INDEPENDENT
ADEQUATE_TO_COMPLETE_ADL: YES
BATHING: INDEPENDENT

## 2024-09-23 ASSESSMENT — PAIN DESCRIPTION - FREQUENCY: FREQUENCY: CONSTANT/CONTINUOUS

## 2024-09-23 ASSESSMENT — PAIN DESCRIPTION - ONSET: ONSET: OTHER (COMMENT)

## 2024-09-23 ASSESSMENT — PAIN SCALES - GENERAL
PAINLEVEL_OUTOF10: 8
PAINLEVEL_OUTOF10: 8
PAINLEVEL_OUTOF10: 7
PAINLEVEL_OUTOF10: 8
PAINLEVEL_OUTOF10: 3

## 2024-09-23 ASSESSMENT — PATIENT HEALTH QUESTIONNAIRE - PHQ9
SUM OF ALL RESPONSES TO PHQ9 QUESTIONS 1 & 2: 0
2. FEELING DOWN, DEPRESSED OR HOPELESS: NOT AT ALL
1. LITTLE INTEREST OR PLEASURE IN DOING THINGS: NOT AT ALL

## 2024-09-23 ASSESSMENT — PAIN DESCRIPTION - ORIENTATION
ORIENTATION: OTHER (COMMENT)
ORIENTATION: RIGHT

## 2024-09-23 ASSESSMENT — PAIN DESCRIPTION - DESCRIPTORS: DESCRIPTORS: DISCOMFORT;NAGGING

## 2024-09-23 NOTE — Clinical Note
265 mL serous fluid drained from left pleural space. Specimen taken to lab as ordered. Pt tolerated procedure well. Site dressed with gauze, tegaderm, no signs of bleeding or hematoma. Pt to return to room 2105 for ongoing hospitalization.

## 2024-09-23 NOTE — ED PROVIDER NOTES
"HPI   Chief Complaint   Patient presents with    Shortness of Breath     Pt states he has been sating at home in the \"40-50s\" sp02 x1 week; pt arrived to ER wearing 3L 02 NC sating low 60s-70s in triage, brought back directly to room. Pt endorsing headaches w/the SOB but denies any other s/sx. Currently following w/ wound clinic for leg wounds, states wound doc wants him to get iv antibiotics       Patient is a 69-year-old male with history of HLD, CKD 3, DM P A-fib on blood thinners presenting [facility] ED for shortness of breath, where he was measuring low oxygen saturations to the 40s to 50s for the past week.  Patient reports that it is worsened with exertion.  Patient reports that he normally wears 3 to 4 L of oxygen at home.  Patient reports that he does follow with pulmonologist.  Patient denies any recent fever, chills, chest pain, nausea, vomiting, diarrhea, abdominal pain, dizziness, headache, or weakness.              Patient History   Past Medical History:   Diagnosis Date    Alcohol dependence, in remission (Multi) 06/08/2022    Alcohol dependence in early, early partial, sustained full, or sustained partial remission    Alcohol dependence, in remission (Multi) 06/08/2022    Alcohol dependence in early, early partial, sustained full, or sustained partial remission    Alcohol dependence, uncomplicated (Multi) 09/15/2022    Alcohol dependence, daily use    Dependence on other enabling machines and devices 09/24/2019    Walker as ambulation aid    Encounter for screening for other disorder 03/01/2021    Special screening for other conditions    Fracture of one rib, unspecified side, initial encounter for closed fracture 01/03/2014    Closed rib fracture    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    Laceration without foreign body of scalp, initial encounter 09/10/2015    Scalp laceration    Nausea 04/15/2014    Nausea    Non-pressure chronic " ulcer of right ankle limited to breakdown of skin (Multi) 07/27/2017    Skin ulcer of right ankle, limited to breakdown of skin    Osteomyelitis, unspecified (Multi) 02/26/2022    Osteomyelitis of toe    Other chest pain 09/21/2013    Atypical chest pain    Other conditions influencing health status     Skin Cancer    Other conditions influencing health status 10/08/2017    Closed displaced fracture of olecranon process of left ulna with intra-articular extension, initial encounter    Other specified health status 07/23/2014    Foreign travel    Patient's noncompliance with other medical treatment and regimen due to unspecified reason 10/28/2016    Noncompliance with therapeutic plan    Patient's other noncompliance with medication regimen 06/04/2016    History of medication noncompliance    Personal history of (healed) stress fracture 12/30/2013    History of stress fracture    Personal history of diseases of the skin and subcutaneous tissue 02/26/2022    History of chronic skin ulcer    Personal history of other diseases of the nervous system and sense organs 03/25/2016    History of peripheral neuropathy    Personal history of other diseases of the nervous system and sense organs 02/02/2016    History of peripheral neuropathy    Personal history of other endocrine, nutritional and metabolic disease 07/13/2015    History of hypothyroidism    Personal history of other specified conditions 07/28/2019    History of syncope    Personal history of other specified conditions 05/07/2018    History of syncope    Personal history of other specified conditions 06/25/2015    History of nausea    Unspecified fracture of left foot, initial encounter for closed fracture 06/04/2016    Foot fracture, left    Unspecified injury of head, initial encounter 04/20/2016    Closed head injury, initial encounter    Unspecified injury of unspecified elbow, initial encounter 04/07/2017    Elbow injury    Unsteadiness on feet 04/15/2014     Unsteady gait     Past Surgical History:   Procedure Laterality Date    COLONOSCOPY  10/09/2013    Complete Colonoscopy    OTHER SURGICAL HISTORY  2019    Insertion of cardiac monitor    OTHER SURGICAL HISTORY  04/15/2014    Reported Hx Of Hip Replacement - Left Side    OTHER SURGICAL HISTORY  2020    Hip replacement    OTHER SURGICAL HISTORY  2015    Interrogation Of Implantable Loop Recorder By Physician In Person    OTHER SURGICAL HISTORY  2017    Arthroscopy Elbow Left    OTHER SURGICAL HISTORY  10/09/2013    Shoulder Surgery Left    SKIN CANCER EXCISION  10/09/2013    Mohs Micrographic Surgery Face     Family History   Problem Relation Name Age of Onset    Other (cardiac pacemaker) Mother      Coronary artery disease Mother      Other (history of heart artery stent) Mother      Cancer Mother      Other (kurtis cell cancer) Mother      Arthritis Mother      Mental illness Brother          living in handicapped assisted living facility permanently [Other]    Other (angina pectoris) Paternal Grandmother       Social History     Tobacco Use    Smoking status: Former     Current packs/day: 0.00     Types: Cigarettes     Quit date:      Years since quittin.7    Smokeless tobacco: Never   Vaping Use    Vaping status: Never Used   Substance Use Topics    Alcohol use: Not Currently    Drug use: Never       Physical Exam   ED Triage Vitals   Temperature Heart Rate Respirations BP   24 1529 24 1529 24 1529 24 1529   36.7 °C (98.1 °F) 69 (!) 24 130/64      Pulse Ox Temp Source Heart Rate Source Patient Position   24 1519 24 1529 -- --   (!) 77 % Tympanic        BP Location FiO2 (%)     -- --             Physical Exam  Constitutional:       Appearance: Normal appearance. He is normal weight.   HENT:      Head: Normocephalic and atraumatic.      Nose: Nose normal.      Mouth/Throat:      Mouth: Mucous membranes are moist.      Pharynx: Oropharynx is clear.  "  Eyes:      Extraocular Movements: Extraocular movements intact.      Conjunctiva/sclera: Conjunctivae normal.      Pupils: Pupils are equal, round, and reactive to light.   Cardiovascular:      Rate and Rhythm: Normal rate and regular rhythm.      Pulses: Normal pulses.      Heart sounds: Normal heart sounds.   Pulmonary:      Effort: Pulmonary effort is normal.      Breath sounds: Normal breath sounds. No wheezing, rhonchi or rales.   Abdominal:      General: Abdomen is flat. Bowel sounds are normal.      Palpations: Abdomen is soft.   Musculoskeletal:         General: Normal range of motion.      Cervical back: Normal range of motion and neck supple.   Skin:     General: Skin is warm and dry.      Capillary Refill: Capillary refill takes less than 2 seconds.   Neurological:      General: No focal deficit present.      Mental Status: He is alert and oriented to person, place, and time. Mental status is at baseline.   Psychiatric:         Mood and Affect: Mood normal.         Behavior: Behavior normal.           ED Course & MDM   Diagnoses as of 09/23/24 6586   Acute hypoxic respiratory failure (Multi)                 No data recorded                                 Medical Decision Making  Patient is a 69 y.o. male who presents to [facility] ED for Shortness of Breath (Pt states he has been sating at home in the \"40-50s\" sp02 x1 week; pt arrived to ER wearing 3L 02 NC sating low 60s-70s in triage, brought back directly to room. Pt endorsing headaches w/the SOB but denies any other s/sx. Currently following w/ wound clinic for leg wounds, states wound doc wants him to get iv antibiotics). On initial ED evaluation, patient found to be in no acute distress. Per HPI, concern to evaluate and treat for possible PE versus pneumonia.  Obtaining cardiac labs and diagnostics including chest x-ray and D-dimer.  Patient EKG showed no acute ischemic change.  Patient had negative delta troponin series.  Patient denies any chest " pain.  Patient CMP showing no significant electrolyte abnormality or MARIA L.  Patient CBC showed no concerning leukocytosis.  Patient had elevated D-dimer of 1484.  Obtaining CT PE study.  CT PE study showed no pulmonary embolism, however did show moderate to large new left-sided pleural effusion with small amount of loculated fluid also present in the right lung.  This presents likely cause for patient's new oxygenation need.  Patient to be admitted to general medicine service for further evaluation management of pleural effusion.  Patient vitally stable at this time.  Diagnostic findings and treatment plan discussed with patient.  Patient amenable to plan.              Procedure  Procedures     Dang Paul MD  Resident  09/23/24 [id number]

## 2024-09-24 ENCOUNTER — APPOINTMENT (OUTPATIENT)
Dept: RADIOLOGY | Facility: HOSPITAL | Age: 69
DRG: 186 | End: 2024-09-24
Payer: MEDICARE

## 2024-09-24 ENCOUNTER — APPOINTMENT (OUTPATIENT)
Dept: CARDIOLOGY | Facility: HOSPITAL | Age: 69
DRG: 186 | End: 2024-09-24
Payer: MEDICARE

## 2024-09-24 LAB
ALBUMIN SERPL BCP-MCNC: 3.4 G/DL (ref 3.4–5)
ANION GAP SERPL CALC-SCNC: 11 MMOL/L (ref 10–20)
ATRIAL RATE: 59 BPM
ATRIAL RATE: 66 BPM
BASOPHILS # BLD AUTO: 0.02 X10*3/UL (ref 0–0.1)
BASOPHILS NFR BLD AUTO: 0.2 %
BASOPHILS NFR FLD MANUAL: 0 %
BLASTS NFR FLD MANUAL: 0 %
BNP SERPL-MCNC: 395 PG/ML (ref 0–99)
BUN SERPL-MCNC: 15 MG/DL (ref 6–23)
CALCIUM SERPL-MCNC: 8.4 MG/DL (ref 8.6–10.3)
CHLORIDE SERPL-SCNC: 103 MMOL/L (ref 98–107)
CLARITY FLD: ABNORMAL
CO2 SERPL-SCNC: 28 MMOL/L (ref 21–32)
COLOR FLD: ABNORMAL
CREAT SERPL-MCNC: 1.39 MG/DL (ref 0.5–1.3)
EGFRCR SERPLBLD CKD-EPI 2021: 54 ML/MIN/1.73M*2
EOSINOPHIL # BLD AUTO: 0.3 X10*3/UL (ref 0–0.4)
EOSINOPHIL NFR BLD AUTO: 3 %
EOSINOPHIL NFR FLD MANUAL: 0 %
ERYTHROCYTE [DISTWIDTH] IN BLOOD BY AUTOMATED COUNT: 17.2 % (ref 11.5–14.5)
GLUCOSE BLD MANUAL STRIP-MCNC: 116 MG/DL (ref 74–99)
GLUCOSE FLD-MCNC: 95 MG/DL
GLUCOSE SERPL-MCNC: 103 MG/DL (ref 74–99)
HCT VFR BLD AUTO: 30.5 % (ref 41–52)
HGB BLD-MCNC: 8.9 G/DL (ref 13.5–17.5)
HOLD SPECIMEN: NORMAL
IMM GRANULOCYTES # BLD AUTO: 0.09 X10*3/UL (ref 0–0.5)
IMM GRANULOCYTES NFR BLD AUTO: 0.9 % (ref 0–0.9)
IMMATURE GRANULOCYTES IN FLUID: 0 %
LDH FLD L TO P-CCNC: 83 U/L
LEGIONELLA AG UR QL: NEGATIVE
LYMPHOCYTES # BLD AUTO: 1.18 X10*3/UL (ref 0.8–3)
LYMPHOCYTES NFR BLD AUTO: 11.8 %
LYMPHOCYTES NFR FLD MANUAL: 25 %
MAGNESIUM SERPL-MCNC: 2.06 MG/DL (ref 1.6–2.4)
MCH RBC QN AUTO: 29.3 PG (ref 26–34)
MCHC RBC AUTO-ENTMCNC: 29.2 G/DL (ref 32–36)
MCV RBC AUTO: 100 FL (ref 80–100)
MONOCYTES # BLD AUTO: 0.78 X10*3/UL (ref 0.05–0.8)
MONOCYTES NFR BLD AUTO: 7.8 %
MONOS+MACROS NFR FLD MANUAL: 12 %
MRSA DNA SPEC QL NAA+PROBE: DETECTED
NEUTROPHILS # BLD AUTO: 7.63 X10*3/UL (ref 1.6–5.5)
NEUTROPHILS NFR BLD AUTO: 76.3 %
NEUTROPHILS NFR FLD MANUAL: 63 %
NRBC BLD-RTO: 0.2 /100 WBCS (ref 0–0)
OTHER CELLS NFR FLD MANUAL: 0 %
P AXIS: 180 DEGREES
P AXIS: 33 DEGREES
P OFFSET: 165 MS
P OFFSET: 181 MS
P ONSET: 114 MS
P ONSET: 115 MS
PH FLD: 7.26 [PH]
PHOSPHATE SERPL-MCNC: 5.1 MG/DL (ref 2.5–4.9)
PLASMA CELLS NFR FLD MANUAL: 0 %
PLATELET # BLD AUTO: 296 X10*3/UL (ref 150–450)
POTASSIUM SERPL-SCNC: 4.1 MMOL/L (ref 3.5–5.3)
PR INTERVAL: 212 MS
PR INTERVAL: 216 MS
PROCALCITONIN SERPL-MCNC: 0.05 NG/ML
PROT FLD-MCNC: 3 G/DL
Q ONSET: 221 MS
Q ONSET: 222 MS
QRS COUNT: 11 BEATS
QRS COUNT: 9 BEATS
QRS DURATION: 76 MS
QRS DURATION: 80 MS
QT INTERVAL: 410 MS
QT INTERVAL: 450 MS
QTC CALCULATION(BAZETT): 429 MS
QTC CALCULATION(BAZETT): 445 MS
QTC FREDERICIA: 423 MS
QTC FREDERICIA: 447 MS
R AXIS: 2 DEGREES
R AXIS: 25 DEGREES
RBC # BLD AUTO: 3.04 X10*6/UL (ref 4.5–5.9)
RBC # FLD AUTO: 4000 /UL
S PNEUM AG UR QL: NEGATIVE
SODIUM SERPL-SCNC: 138 MMOL/L (ref 136–145)
T AXIS: 37 DEGREES
T AXIS: 64 DEGREES
T OFFSET: 426 MS
T OFFSET: 447 MS
TOTAL CELLS COUNTED FLD: 100
VENTRICULAR RATE: 59 BPM
VENTRICULAR RATE: 66 BPM
WBC # BLD AUTO: 10 X10*3/UL (ref 4.4–11.3)
WBC # FLD AUTO: 1127 /UL

## 2024-09-24 PROCEDURE — 83735 ASSAY OF MAGNESIUM: CPT

## 2024-09-24 PROCEDURE — 93010 ELECTROCARDIOGRAM REPORT: CPT | Performed by: INTERNAL MEDICINE

## 2024-09-24 PROCEDURE — 32555 ASPIRATE PLEURA W/ IMAGING: CPT | Mod: RIGHT SIDE | Performed by: RADIOLOGY

## 2024-09-24 PROCEDURE — 87116 MYCOBACTERIA CULTURE: CPT | Mod: STJLAB

## 2024-09-24 PROCEDURE — 84145 PROCALCITONIN (PCT): CPT | Mod: STJLAB

## 2024-09-24 PROCEDURE — 84157 ASSAY OF PROTEIN OTHER: CPT | Mod: STJLAB

## 2024-09-24 PROCEDURE — 2500000002 HC RX 250 W HCPCS SELF ADMINISTERED DRUGS (ALT 637 FOR MEDICARE OP, ALT 636 FOR OP/ED)

## 2024-09-24 PROCEDURE — 2720000007 HC OR 272 NO HCPCS

## 2024-09-24 PROCEDURE — 2500000005 HC RX 250 GENERAL PHARMACY W/O HCPCS

## 2024-09-24 PROCEDURE — 85025 COMPLETE CBC W/AUTO DIFF WBC: CPT

## 2024-09-24 PROCEDURE — 32555 ASPIRATE PLEURA W/ IMAGING: CPT | Mod: LT | Performed by: NURSE PRACTITIONER

## 2024-09-24 PROCEDURE — 36415 COLL VENOUS BLD VENIPUNCTURE: CPT

## 2024-09-24 PROCEDURE — 99223 1ST HOSP IP/OBS HIGH 75: CPT | Performed by: STUDENT IN AN ORGANIZED HEALTH CARE EDUCATION/TRAINING PROGRAM

## 2024-09-24 PROCEDURE — 82947 ASSAY GLUCOSE BLOOD QUANT: CPT

## 2024-09-24 PROCEDURE — 2500000004 HC RX 250 GENERAL PHARMACY W/ HCPCS (ALT 636 FOR OP/ED)

## 2024-09-24 PROCEDURE — 2500000001 HC RX 250 WO HCPCS SELF ADMINISTERED DRUGS (ALT 637 FOR MEDICARE OP)

## 2024-09-24 PROCEDURE — 87641 MR-STAPH DNA AMP PROBE: CPT

## 2024-09-24 PROCEDURE — 83615 LACTATE (LD) (LDH) ENZYME: CPT | Mod: STJLAB

## 2024-09-24 PROCEDURE — 2500000002 HC RX 250 W HCPCS SELF ADMINISTERED DRUGS (ALT 637 FOR MEDICARE OP, ALT 636 FOR OP/ED): Performed by: STUDENT IN AN ORGANIZED HEALTH CARE EDUCATION/TRAINING PROGRAM

## 2024-09-24 PROCEDURE — 83986 ASSAY PH BODY FLUID NOS: CPT | Mod: STJLAB

## 2024-09-24 PROCEDURE — 87102 FUNGUS ISOLATION CULTURE: CPT | Mod: STJLAB

## 2024-09-24 PROCEDURE — 83880 ASSAY OF NATRIURETIC PEPTIDE: CPT

## 2024-09-24 PROCEDURE — 80069 RENAL FUNCTION PANEL: CPT

## 2024-09-24 PROCEDURE — 87070 CULTURE OTHR SPECIMN AEROBIC: CPT | Mod: STJLAB

## 2024-09-24 PROCEDURE — 88112 CYTOPATH CELL ENHANCE TECH: CPT | Mod: TC

## 2024-09-24 PROCEDURE — 99223 1ST HOSP IP/OBS HIGH 75: CPT | Performed by: INTERNAL MEDICINE

## 2024-09-24 PROCEDURE — 82945 GLUCOSE OTHER FLUID: CPT | Mod: STJLAB

## 2024-09-24 PROCEDURE — 94640 AIRWAY INHALATION TREATMENT: CPT

## 2024-09-24 PROCEDURE — 93005 ELECTROCARDIOGRAM TRACING: CPT

## 2024-09-24 PROCEDURE — 2060000001 HC INTERMEDIATE ICU ROOM DAILY

## 2024-09-24 PROCEDURE — 89051 BODY FLUID CELL COUNT: CPT

## 2024-09-24 PROCEDURE — 87449 NOS EACH ORGANISM AG IA: CPT | Mod: STJLAB

## 2024-09-24 PROCEDURE — 2500000001 HC RX 250 WO HCPCS SELF ADMINISTERED DRUGS (ALT 637 FOR MEDICARE OP): Performed by: INTERNAL MEDICINE

## 2024-09-24 PROCEDURE — 0W9B3ZZ DRAINAGE OF LEFT PLEURAL CAVITY, PERCUTANEOUS APPROACH: ICD-10-PCS | Performed by: NURSE PRACTITIONER

## 2024-09-24 PROCEDURE — 2500000005 HC RX 250 GENERAL PHARMACY W/O HCPCS: Performed by: RADIOLOGY

## 2024-09-24 RX ORDER — LORAZEPAM 0.5 MG/1
0.5 TABLET ORAL EVERY 2 HOUR PRN
Status: DISCONTINUED | OUTPATIENT
Start: 2024-09-24 | End: 2024-09-25

## 2024-09-24 RX ORDER — IPRATROPIUM BROMIDE AND ALBUTEROL SULFATE 2.5; .5 MG/3ML; MG/3ML
3 SOLUTION RESPIRATORY (INHALATION)
Status: DISCONTINUED | OUTPATIENT
Start: 2024-09-24 | End: 2024-09-25 | Stop reason: HOSPADM

## 2024-09-24 RX ORDER — SULFAMETHOXAZOLE AND TRIMETHOPRIM 800; 160 MG/1; MG/1
1 TABLET ORAL EVERY 12 HOURS SCHEDULED
Status: DISCONTINUED | OUTPATIENT
Start: 2024-09-24 | End: 2024-09-25 | Stop reason: HOSPADM

## 2024-09-24 RX ORDER — FUROSEMIDE 10 MG/ML
20 INJECTION INTRAMUSCULAR; INTRAVENOUS ONCE
Status: COMPLETED | OUTPATIENT
Start: 2024-09-24 | End: 2024-09-24

## 2024-09-24 RX ORDER — LORAZEPAM 1 MG/1
1 TABLET ORAL EVERY 2 HOUR PRN
Status: DISCONTINUED | OUTPATIENT
Start: 2024-09-24 | End: 2024-09-25

## 2024-09-24 RX ORDER — VANCOMYCIN HYDROCHLORIDE 1 G/200ML
1000 INJECTION, SOLUTION INTRAVENOUS EVERY 12 HOURS
Status: DISCONTINUED | OUTPATIENT
Start: 2024-09-24 | End: 2024-09-24

## 2024-09-24 RX ORDER — IPRATROPIUM BROMIDE AND ALBUTEROL SULFATE 2.5; .5 MG/3ML; MG/3ML
3 SOLUTION RESPIRATORY (INHALATION) EVERY 2 HOUR PRN
Status: DISCONTINUED | OUTPATIENT
Start: 2024-09-24 | End: 2024-09-25 | Stop reason: HOSPADM

## 2024-09-24 RX ORDER — LANOLIN ALCOHOL/MO/W.PET/CERES
100 CREAM (GRAM) TOPICAL DAILY
Status: DISCONTINUED | OUTPATIENT
Start: 2024-09-27 | End: 2024-09-25

## 2024-09-24 RX ORDER — LIDOCAINE HYDROCHLORIDE 10 MG/ML
INJECTION, SOLUTION EPIDURAL; INFILTRATION; INTRACAUDAL; PERINEURAL
Status: COMPLETED | OUTPATIENT
Start: 2024-09-24 | End: 2024-09-24

## 2024-09-24 RX ORDER — PREGABALIN 150 MG/1
150 CAPSULE ORAL 4 TIMES DAILY
Status: DISCONTINUED | OUTPATIENT
Start: 2024-09-24 | End: 2024-09-25 | Stop reason: HOSPADM

## 2024-09-24 RX ORDER — ESCITALOPRAM OXALATE 20 MG/1
20 TABLET ORAL DAILY
Status: DISCONTINUED | OUTPATIENT
Start: 2024-09-24 | End: 2024-09-25 | Stop reason: HOSPADM

## 2024-09-24 RX ORDER — VANCOMYCIN HYDROCHLORIDE 1 G/20ML
INJECTION, POWDER, LYOPHILIZED, FOR SOLUTION INTRAVENOUS DAILY PRN
Status: DISCONTINUED | OUTPATIENT
Start: 2024-09-24 | End: 2024-09-24 | Stop reason: ALTCHOICE

## 2024-09-24 RX ORDER — LORAZEPAM 1 MG/1
2 TABLET ORAL EVERY 2 HOUR PRN
Status: DISCONTINUED | OUTPATIENT
Start: 2024-09-24 | End: 2024-09-25

## 2024-09-24 RX ORDER — OXYCODONE HYDROCHLORIDE 5 MG/1
5 TABLET ORAL ONCE
Status: DISCONTINUED | OUTPATIENT
Start: 2024-09-24 | End: 2024-09-25 | Stop reason: HOSPADM

## 2024-09-24 SDOH — ECONOMIC STABILITY: FOOD INSECURITY: WITHIN THE PAST 12 MONTHS, THE FOOD YOU BOUGHT JUST DIDN'T LAST AND YOU DIDN'T HAVE MONEY TO GET MORE.: NEVER TRUE

## 2024-09-24 SDOH — ECONOMIC STABILITY: FOOD INSECURITY: WITHIN THE PAST 12 MONTHS, YOU WORRIED THAT YOUR FOOD WOULD RUN OUT BEFORE YOU GOT MONEY TO BUY MORE.: NEVER TRUE

## 2024-09-24 SDOH — ECONOMIC STABILITY: HOUSING INSECURITY: IN THE PAST 12 MONTHS, HOW MANY TIMES HAVE YOU MOVED WHERE YOU WERE LIVING?: 0

## 2024-09-24 SDOH — ECONOMIC STABILITY: INCOME INSECURITY: IN THE LAST 12 MONTHS, WAS THERE A TIME WHEN YOU WERE NOT ABLE TO PAY THE MORTGAGE OR RENT ON TIME?: NO

## 2024-09-24 SDOH — ECONOMIC STABILITY: INCOME INSECURITY: HOW HARD IS IT FOR YOU TO PAY FOR THE VERY BASICS LIKE FOOD, HOUSING, MEDICAL CARE, AND HEATING?: HARD

## 2024-09-24 SDOH — ECONOMIC STABILITY: HOUSING INSECURITY: AT ANY TIME IN THE PAST 12 MONTHS, WERE YOU HOMELESS OR LIVING IN A SHELTER (INCLUDING NOW)?: NO

## 2024-09-24 SDOH — ECONOMIC STABILITY: TRANSPORTATION INSECURITY
IN THE PAST 12 MONTHS, HAS LACK OF TRANSPORTATION KEPT YOU FROM MEETINGS, WORK, OR FROM GETTING THINGS NEEDED FOR DAILY LIVING?: NO

## 2024-09-24 SDOH — ECONOMIC STABILITY: TRANSPORTATION INSECURITY
IN THE PAST 12 MONTHS, HAS THE LACK OF TRANSPORTATION KEPT YOU FROM MEDICAL APPOINTMENTS OR FROM GETTING MEDICATIONS?: NO

## 2024-09-24 ASSESSMENT — COGNITIVE AND FUNCTIONAL STATUS - GENERAL
WALKING IN HOSPITAL ROOM: A LOT
STANDING UP FROM CHAIR USING ARMS: A LOT
MOBILITY SCORE: 15
TOILETING: A LITTLE
HELP NEEDED FOR BATHING: A LITTLE
DRESSING REGULAR UPPER BODY CLOTHING: A LITTLE
MOBILITY SCORE: 15
MOVING FROM LYING ON BACK TO SITTING ON SIDE OF FLAT BED WITH BEDRAILS: A LITTLE
DRESSING REGULAR UPPER BODY CLOTHING: A LITTLE
CLIMB 3 TO 5 STEPS WITH RAILING: A LOT
DRESSING REGULAR LOWER BODY CLOTHING: A LITTLE
DAILY ACTIVITIY SCORE: 19
TOILETING: A LITTLE
STANDING UP FROM CHAIR USING ARMS: A LOT
DRESSING REGULAR LOWER BODY CLOTHING: A LITTLE
MOBILITY SCORE: 15
STANDING UP FROM CHAIR USING ARMS: A LOT
MOBILITY SCORE: 16
TURNING FROM BACK TO SIDE WHILE IN FLAT BAD: A LITTLE
WALKING IN HOSPITAL ROOM: A LOT
MOVING TO AND FROM BED TO CHAIR: A LITTLE
MOVING FROM LYING ON BACK TO SITTING ON SIDE OF FLAT BED WITH BEDRAILS: A LITTLE
TURNING FROM BACK TO SIDE WHILE IN FLAT BAD: A LITTLE
MOVING FROM LYING ON BACK TO SITTING ON SIDE OF FLAT BED WITH BEDRAILS: A LITTLE
PERSONAL GROOMING: A LITTLE
CLIMB 3 TO 5 STEPS WITH RAILING: A LOT
DRESSING REGULAR UPPER BODY CLOTHING: A LITTLE
TOILETING: A LITTLE
TOILETING: A LITTLE
HELP NEEDED FOR BATHING: A LITTLE
DAILY ACTIVITIY SCORE: 20
DAILY ACTIVITIY SCORE: 19
HELP NEEDED FOR BATHING: A LITTLE
WALKING IN HOSPITAL ROOM: A LOT
STANDING UP FROM CHAIR USING ARMS: A LOT
MOVING TO AND FROM BED TO CHAIR: A LITTLE
MOVING TO AND FROM BED TO CHAIR: A LITTLE
DAILY ACTIVITIY SCORE: 19
TURNING FROM BACK TO SIDE WHILE IN FLAT BAD: A LITTLE
DRESSING REGULAR LOWER BODY CLOTHING: A LITTLE
CLIMB 3 TO 5 STEPS WITH RAILING: A LOT
PERSONAL GROOMING: A LITTLE
PERSONAL GROOMING: A LITTLE
TURNING FROM BACK TO SIDE WHILE IN FLAT BAD: A LITTLE
WALKING IN HOSPITAL ROOM: A LOT
DRESSING REGULAR LOWER BODY CLOTHING: A LITTLE
MOVING TO AND FROM BED TO CHAIR: A LITTLE
CLIMB 3 TO 5 STEPS WITH RAILING: A LOT
PERSONAL GROOMING: A LITTLE
HELP NEEDED FOR BATHING: A LITTLE

## 2024-09-24 ASSESSMENT — LIFESTYLE VARIABLES
ORIENTATION AND CLOUDING OF SENSORIUM: ORIENTED AND CAN DO SERIAL ADDITIONS
NAUSEA AND VOMITING: NO NAUSEA AND NO VOMITING
PAROXYSMAL SWEATS: NO SWEAT VISIBLE
TOTAL SCORE: 0
ANXIETY: NO ANXIETY, AT EASE
NAUSEA AND VOMITING: NO NAUSEA AND NO VOMITING
HEADACHE, FULLNESS IN HEAD: NOT PRESENT
AGITATION: NORMAL ACTIVITY
VISUAL DISTURBANCES: NOT PRESENT
NAUSEA AND VOMITING: NO NAUSEA AND NO VOMITING
TREMOR: NO TREMOR
AUDITORY DISTURBANCES: NOT PRESENT
TOTAL SCORE: 0
TOTAL SCORE: 1
AUDITORY DISTURBANCES: NOT PRESENT
TOTAL SCORE: 0
TREMOR: NO TREMOR
VISUAL DISTURBANCES: NOT PRESENT
TOTAL SCORE: 0
VISUAL DISTURBANCES: NOT PRESENT
HEADACHE, FULLNESS IN HEAD: NOT PRESENT
ANXIETY: NO ANXIETY, AT EASE
HEADACHE, FULLNESS IN HEAD: NOT PRESENT
AGITATION: NORMAL ACTIVITY
AUDITORY DISTURBANCES: NOT PRESENT
AUDITORY DISTURBANCES: NOT PRESENT
NAUSEA AND VOMITING: NO NAUSEA AND NO VOMITING
AGITATION: NORMAL ACTIVITY
VISUAL DISTURBANCES: NOT PRESENT
AGITATION: NORMAL ACTIVITY
NAUSEA AND VOMITING: NO NAUSEA AND NO VOMITING
ORIENTATION AND CLOUDING OF SENSORIUM: ORIENTED AND CAN DO SERIAL ADDITIONS
NAUSEA AND VOMITING: NO NAUSEA AND NO VOMITING
ANXIETY: NO ANXIETY, AT EASE
TOTAL SCORE: 0
ORIENTATION AND CLOUDING OF SENSORIUM: ORIENTED AND CAN DO SERIAL ADDITIONS
VISUAL DISTURBANCES: NOT PRESENT
HEADACHE, FULLNESS IN HEAD: NOT PRESENT
ORIENTATION AND CLOUDING OF SENSORIUM: ORIENTED AND CAN DO SERIAL ADDITIONS
ORIENTATION AND CLOUDING OF SENSORIUM: ORIENTED AND CAN DO SERIAL ADDITIONS
AUDITORY DISTURBANCES: NOT PRESENT
ORIENTATION AND CLOUDING OF SENSORIUM: ORIENTED AND CAN DO SERIAL ADDITIONS
PAROXYSMAL SWEATS: NO SWEAT VISIBLE
PAROXYSMAL SWEATS: NO SWEAT VISIBLE
HEADACHE, FULLNESS IN HEAD: NOT PRESENT
VISUAL DISTURBANCES: NOT PRESENT
PAROXYSMAL SWEATS: NO SWEAT VISIBLE
NAUSEA AND VOMITING: NO NAUSEA AND NO VOMITING
PULSE: 64
HEADACHE, FULLNESS IN HEAD: NOT PRESENT
TREMOR: NO TREMOR
AUDITORY DISTURBANCES: NOT PRESENT
PAROXYSMAL SWEATS: NO SWEAT VISIBLE
TREMOR: NO TREMOR
ANXIETY: NO ANXIETY, AT EASE
HEADACHE, FULLNESS IN HEAD: NOT PRESENT
PAROXYSMAL SWEATS: NO SWEAT VISIBLE
PAROXYSMAL SWEATS: BARELY PERCEPTIBLE SWEATING, PALMS MOIST
VISUAL DISTURBANCES: NOT PRESENT
AUDITORY DISTURBANCES: NOT PRESENT
HEADACHE, FULLNESS IN HEAD: NOT PRESENT
HEADACHE, FULLNESS IN HEAD: NOT PRESENT
AGITATION: NORMAL ACTIVITY
HEADACHE, FULLNESS IN HEAD: NOT PRESENT
ANXIETY: NO ANXIETY, AT EASE
PAROXYSMAL SWEATS: NO SWEAT VISIBLE
AGITATION: NORMAL ACTIVITY
ORIENTATION AND CLOUDING OF SENSORIUM: ORIENTED AND CAN DO SERIAL ADDITIONS
ORIENTATION AND CLOUDING OF SENSORIUM: ORIENTED AND CAN DO SERIAL ADDITIONS
TOTAL SCORE: 0
ANXIETY: NO ANXIETY, AT EASE
AUDITORY DISTURBANCES: NOT PRESENT
VISUAL DISTURBANCES: NOT PRESENT
VISUAL DISTURBANCES: NOT PRESENT
ORIENTATION AND CLOUDING OF SENSORIUM: ORIENTED AND CAN DO SERIAL ADDITIONS
ANXIETY: NO ANXIETY, AT EASE
ANXIETY: NO ANXIETY, AT EASE
AUDITORY DISTURBANCES: NOT PRESENT
PAROXYSMAL SWEATS: NO SWEAT VISIBLE
NAUSEA AND VOMITING: NO NAUSEA AND NO VOMITING
PAROXYSMAL SWEATS: NO SWEAT VISIBLE
ANXIETY: NO ANXIETY, AT EASE
PAROXYSMAL SWEATS: NO SWEAT VISIBLE
PAROXYSMAL SWEATS: NO SWEAT VISIBLE
ORIENTATION AND CLOUDING OF SENSORIUM: ORIENTED AND CAN DO SERIAL ADDITIONS
TREMOR: NO TREMOR
HEADACHE, FULLNESS IN HEAD: NOT PRESENT
TREMOR: NOT VISIBLE, BUT CAN BE FELT FINGERTIP TO FINGERTIP
AUDITORY DISTURBANCES: NOT PRESENT
VISUAL DISTURBANCES: NOT PRESENT
AGITATION: NORMAL ACTIVITY
TREMOR: NO TREMOR
TREMOR: NO TREMOR
VISUAL DISTURBANCES: NOT PRESENT
TOTAL SCORE: 1
ANXIETY: NO ANXIETY, AT EASE
TOTAL SCORE: 0
BLOOD PRESSURE: 127/73
AGITATION: NORMAL ACTIVITY
NAUSEA AND VOMITING: NO NAUSEA AND NO VOMITING
BLOOD PRESSURE: 114/76
TOTAL SCORE: 0
TREMOR: NO TREMOR
NAUSEA AND VOMITING: NO NAUSEA AND NO VOMITING
AGITATION: NORMAL ACTIVITY
TREMOR: NO TREMOR
ANXIETY: NO ANXIETY, AT EASE
ANXIETY: NO ANXIETY, AT EASE
NAUSEA AND VOMITING: NO NAUSEA AND NO VOMITING
AGITATION: NORMAL ACTIVITY
NAUSEA AND VOMITING: NO NAUSEA AND NO VOMITING
HEADACHE, FULLNESS IN HEAD: NOT PRESENT
TOTAL SCORE: 0
TREMOR: NO TREMOR
AGITATION: NORMAL ACTIVITY
AGITATION: NORMAL ACTIVITY
TREMOR: NO TREMOR
VISUAL DISTURBANCES: NOT PRESENT
TOTAL SCORE: 0

## 2024-09-24 ASSESSMENT — PAIN - FUNCTIONAL ASSESSMENT
PAIN_FUNCTIONAL_ASSESSMENT: 0-10

## 2024-09-24 ASSESSMENT — PAIN SCALES - GENERAL
PAINLEVEL_OUTOF10: 2
PAINLEVEL_OUTOF10: 8
PAINLEVEL_OUTOF10: 0 - NO PAIN
PAINLEVEL_OUTOF10: 8
PAINLEVEL_OUTOF10: 7
PAINLEVEL_OUTOF10: 0 - NO PAIN
PAINLEVEL_OUTOF10: 8
PAINLEVEL_OUTOF10: 0 - NO PAIN
PAINLEVEL_OUTOF10: 5 - MODERATE PAIN

## 2024-09-24 ASSESSMENT — ENCOUNTER SYMPTOMS
CARDIOVASCULAR NEGATIVE: 1
ENDOCRINE NEGATIVE: 1
ALLERGIC/IMMUNOLOGIC NEGATIVE: 1
PSYCHIATRIC NEGATIVE: 1
HEMATOLOGIC/LYMPHATIC NEGATIVE: 1
NEUROLOGICAL NEGATIVE: 1
GASTROINTESTINAL NEGATIVE: 1
COUGH: 1
SHORTNESS OF BREATH: 1
CONSTITUTIONAL NEGATIVE: 1
MUSCULOSKELETAL NEGATIVE: 1
EYES NEGATIVE: 1

## 2024-09-24 ASSESSMENT — PAIN DESCRIPTION - DESCRIPTORS
DESCRIPTORS: DISCOMFORT
DESCRIPTORS: DISCOMFORT

## 2024-09-24 ASSESSMENT — PAIN DESCRIPTION - LOCATION: LOCATION: FOOT

## 2024-09-24 NOTE — NURSING NOTE
THE PT WAS ADMITTED FROM THE ER BY CART INTO  2105. THE PT IS AWAKE AND ALERT X3. WAS ORIENTED TO THE ,  EQUIPMENT AND PLAN OF CARE. THE PT CAME UP ON 4 L NC. AMBULATED FROM THE CART TO BED. O2 SATS DROPPED TO 88%. O2 was increased to 6 l nc and the pts sats improved to 92-94%, the pt visibly appeared sob and was using his accessory muscles to breathe. Effort lessened with rest. Breath sounds are diminished t/o and with expiratory wheezing.  THE PT STATES A COPD AND ASTHMA HX. STATED HE HAD BEEN SOB X1WK+. STATES HE HAS SOME FINANCIAL CONCERNS .  HE WAS IRRITATED THAT HE CAN'T GO HOME. STATED HE HAS CATS X3 AT HOME WHO NEED HIM TO CARE FOR THEM. BUT THEN STATED HIS SISTER TOOK CARE OF THEM WHEN HE WAS IN THE [facility] HOME LAST OCT/NOV/DEC. 2023.  STATED SHE IS CARING FOR THEM NOW.  ALSO STATED HE'S GOING HOME AND WILL REFUSE ANY REHAB IF ANYONE SUGGESTS IT. THAT HE NEEDS TO BE HOME. THE PT ALSO STATED  HE WENT TO HIS PODIATRIST WHO SUGGESTED  HE NEEDED TO GET IV ANTIBIOTICS FOR HIS FEET.  THEN HE  SAID HE IS UNABLE TO DRIVE AND CAN'T REALLY WALK AT HOME MUCH DUE TO THE PAIN IN HIS BLE. HIS RT LEG APPEARS SWOLLEN, RED AND WARM TO TOUCH. PULSES ARE +1. HAS BOTH FEET BANDAGED UP WITH KERLEX DSGS. 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 HAD HIS 3RD TOE AMPUTATED. HIS FEET AND TOES ARE CRUSTY AND FLAKY. THE PT STATES SEVERE NEUROPATHY IN ALL 4 EXTREMITIES.   THE LT FOOT IS +1 EDEMA. THE PTS DDIMER WAS ELLEVATED AT 1400 ON ADMISSION.  STATES HIS PAIN IS AN 8/10. REQUESTING IBUPROPHEN  AND LYRICA FOR PAIN. THE PT DENIES ANY CHEST PAIN. EKG-NSR WITH [facility] PACS AND PVCS. VSS. TEMP IS AFEBRILE. THE PTS ABD IS OBESE, SOFT AND DISTENDED ROUND. BS+. THE PT IS VOICING HUNGER, ALSO STATED HE DRINKS A BOTTLE OF WINE A DAY.  HAS DONE GIN AND VODKA IN THE PAST. THE PT WAS IRRITATED BECAUSE HE IS A HIGH FALL RISK  AND WAS BANDED, BUT THEN STATED HE FREQ. FALLS WEEKLY. LAST FALL WAS LAST WEEK. HE STATES HE HAS A HARD TIME GETTING UP OFF THE  FLOOR AND FREKIKI HAS CALLED POLICE/FIRE TO GET HIM UP .IT WAS ALSO NOTED THAT HE HAS A LG AREA OF PUFFY SWOLLEN AREA ON HIS POST NECK AND LT SCAPULA. NON TENDER TO TOUCH. .  THE PT STATED  THAT HE CALLED HIS SISTER AND SHE  IS AWARE THAT  HE WAS BEING ADMITTED. HE VOIDED A MOD AMT OF CLEAR YELLOW URINE VIA THE URINAL.   CONTINENT. CIWA +0 AT THIS TIME.  2245- THE PTS FOOT DSGS WERE CHANGED, EXAMINED AND DOCUMENTED .  FOAM DSGS WITH ACE WRAPS WERE APPLIED. THE OLD DSGS WERE STUCK TO THE WOUNDS. NS IRRIGATION GENTLY REMOVED THE DSGS. THE PT TOLERATED THE DSG CHANGES W/O DIFFICULTY. NO PAIN WAS VOICED.   2315 DR MCPHERSON WAS AT THE BEDSIDE AND SPOKE WITH THE PT REGARDING THE  ANTICIPATED POC. WOUND CARE WAS BEING CONSULTED, SOCIAL WORK AND IR - TO DRAIN AND CULTURE HIS LT BASILAR PLEURAL EFFUSION FLUID. THAT HE WAS GOING TO BE STARTED ON IV ANTIBIOTICS AND HAVE RESP THERAPY /PHYSICAL THEARPY CONSULTS AS WELL.   2345 OXYCODONE WAS GIVEN FOR PAIN X1 ALONG WITH HIS PM MEDS.  THE PT ATE SNACKY FOODS PROVIDED BY THE NSG SUPERVISOR.  URINE SAMPLE WAS SENT FOR LEGONELLA AND A MRSA NARES SAMPLE WAS SENT TO LAB.  0100- ANTIBIOTIS ROCEPHIN AND AZITHROMYCIN WERE STARTED ONCE PROVIDED BY PHARMACY.  0400- VANCOMYCIN ALSO ADMINISTERED. THE PT HAS BEEN SLEEPING. THE PTS IS NOTED TO USE HIS ACCESORY MUSCLES TO BREATHE WHEN SLEEPING. STILL STATES HIS LE PAINS AN 8/10.  I SPOKE WITH DR CORCORAN SEVERAL TIMES FOR  ADDITIONAL PAIN MED REQUEST.   0530- THE PT IS ASLEEP; THE PT CONTINUES TO USE HIS ACCESSORY ABD MUSCLES WHEN ASLEEP OR WITH EXERTION. FEELS SOB. . O2 SATS 91-93%. THE PT AROUSES EASILY.  HIS CIWA REMAINS A 1. HE APPEARS ASYMPTOMATIC AT THIS TIME. VSS. TEMP AFEBRILE. THE PT STATES HIS LEG AND FEET PAIN IS RESOLVED IN RESPONSE TO THE EXTRA OXYCODONE DOSE GIVEN. DR WOODS WAS UPDATED ON THE PTS PRESENT STATUS. NO DRAINAGE  IS NOTED ON EITHER FEET DSGS. THE PT CONTINUES TO STATE HE IS GOING HOME TODAY. AM LABS OBTAINED.  0800 IR CALLED  ; STATED THEY WOULD DO HIS THORCOCENTHESIS AT 3 PM TODAY. THEN TEXTED BACK AND ARE GOING TO TAKE THE PT NOW.

## 2024-09-24 NOTE — PROGRESS NOTES
Vancomycin Dosing by Pharmacy- Cessation of Therapy    Consult to pharmacy for vancomycin dosing has been discontinued by the prescriber, pharmacy will sign off at this time.    Please call pharmacy if there are further questions or re-enter a consult if vancomycin is resumed.     Gabriela Hoover, AriannaD

## 2024-09-24 NOTE — PROGRESS NOTES
09/24/24 1333   Discharge Planning   Living Arrangements Alone   Support Systems Family members   Assistance Needed Uses rollator to ambulate   Type of Residence Private residence   Number of Stairs to Enter Residence 3   Number of Stairs Within Residence 17  (patient has tri-level home  railings on stairs)   Do you have animals or pets at home? Yes   Type of Animals or Pets 3 cats   Who is requesting discharge planning? Provider   Home or Post Acute Services In home services   Type of Home Care Services Home nursing visits   Expected Discharge Disposition Home   Does the patient need discharge transport arranged? Yes   RoundTrip coordination needed? Yes   Has discharge transport been arranged? No   Patient Choice   Patient / Family choosing to utilize agency / facility established prior to hospitalization Yes     Met with patient at bedside. Introduced self and role in hospital. Verified address and PCP is Dr. Artie Haskins. Uses [facility] for medications mainly because they will deliver to home. Also uses [facility] for specific medications that [employer] does not have ans patient manages his own medications. Uses a rollator to ambulate and states that he has 3 of them to uses in different place of the home. Does not drive but has transportation available through the [employer] insurance which is called [employer]. Orders the groceries through [employer]. Patient is active with a HC agency, [facility] for wound care and also goes to  wound center for treatment of bilateral foot wounds. Depending on therapy recommendations, patient would be agreeable only to PT/OT with HC. Patient refused to go to  a rehab facility. Does wear O2 at home @ 4 liters and [employer] is the supply company. CT team to follow patient.

## 2024-09-24 NOTE — CONSULTS
Consults   Reason For Consult  Bilateral foot wounds    History Of Present Illness  Angus Redman is a 69 y.o. male who presented to [facility] ED on 9/23/2024, and was admitted for acute hypoxic respiratory failure.  Patient was noted to have bilateral pleural effusions.  Patient is well-known to the service, seen weekly at [facility] for bilateral foot wounds.  Patient has had recalcitrant cellulitis of bilateral lower extremities, and has been urged many occasions to report to [facility] for IV antibiotics.  Patient has refused each time, and has been treated outpatient with p.o. antibiotics.  Cellulitis typically responds somewhat to antibiotics, however recurs frequently.  Patient has been noncompliant with medications, and often states antibiotics for later once the cellulitis starts to improve.  Patient has a [facility] nurse that comes to the hospital twice weekly for dressing changes.  Patient states that he is feeling much better, and his chest SOB has resolved, however, appears winded upon conversation.  Patient denies any constitutional symptoms, and has no other complaints at this time.     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) (07/27/2017), Osteomyelitis, unspecified (Multi) (02/26/2022), Other chest pain (09/21/2013), Other conditions influencing health status, Other conditions influencing  health status (10/08/2017), Other specified health status (07/23/2014), Patient's noncompliance with other medical treatment and regimen due to unspecified reason (10/28/2016), Patient's other noncompliance with medication regimen (06/04/2016), Personal history of (healed) stress fracture (12/30/2013), Personal history of diseases of the skin and subcutaneous tissue (02/26/2022), Personal history of other diseases of the nervous system and sense organs (03/25/2016), Personal history of other diseases of the nervous system and sense organs (02/02/2016), Personal history of other endocrine, nutritional and metabolic disease (07/13/2015), Personal history of other specified conditions (07/28/2019), Personal history of other specified conditions (05/07/2018), Personal history of other specified conditions (06/25/2015), Unspecified fracture of left foot, initial encounter for closed fracture (06/04/2016), Unspecified injury of head, initial encounter (04/20/2016), Unspecified injury of unspecified elbow, initial encounter (04/07/2017), and Unsteadiness on feet (04/15/2014).    Surgical History  He has a past surgical history that includes Colonoscopy (10/09/2013); Other surgical history (07/28/2019); Other surgical history (04/15/2014); Other surgical history (01/28/2020); Other surgical history (11/24/2015); Other surgical history (04/19/2017); Other surgical history (10/09/2013); and Skin cancer excision (10/09/2013).     Social History  He reports that he quit smoking about 25 years ago. His smoking use included cigarettes. He has never used smokeless tobacco. He reports that he does not currently use alcohol. He reports that he does not use drugs.    Family History  Family History   Problem Relation Name Age of Onset   • Other (cardiac pacemaker) Mother     • Coronary artery disease Mother     • Other (history of heart artery stent) Mother     • Cancer Mother     • Other (kurtis cell cancer) Mother     • Arthritis  Mother     • Mental illness Brother          living in handicapped assisted living facility permanently [Other]   • Other (angina pectoris) Paternal Grandmother          Allergies  Latex, Codeine, House dust mite, Hydrocodone-acetaminophen, Lactose, Lisinopril, Mold, and Tree and shrub pollen    Review of Systems  Review of Systems      Physical Exam  Vascular: DP and PT pulses palpable 1/4 bilaterally.  CFT under 5 seconds when tested at distal digits.  Skin temp warm to warm from proximal to distal.  +2 pitting edema noted to BLE.    Neuro: Protective sensation absent, light tough sensation intact.  No Tinel's or Valleix's signs elicited.      Derm: Full-thickness ulceration noted to plantar aspect of first metatarsal head, left foot.  Wound extends to subcutaneous tissue, no probe to bone or exposed fascia noted.  No undermining, tracking, or tunneling moderate periwound hyperkeratotic tissue noted.  Serosanguineous drainage, mild maceration.   Full-thickness ulceration noted to plantar aspect of right foot to subcutaneous tissue.  No tracking, tunneling, or undermining.  No exposed bone or fascia.  Minimal drainage noted.   Full-thickness ulceration noted distal aspect of third digit, right foot.  Wound extends to subcutaneous tissue, no no exposed bone or fascia.  No subcutaneous nodules.  Interdigital spaces are CDI.  Marked periwound erythema noted to RLE, improved from admission.    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  No other gross deformities noted.  No POP noted to any other area of the foot/ankle.     Medications  Scheduled medications  amLODIPine, 2.5 mg, oral, Daily  aspirin, 81 mg, oral, Daily  atorvastatin, 40 mg, oral, Daily  cetirizine, 10 mg, oral, Daily  DULoxetine, 60 mg, oral, BID  escitalopram, 20 mg, oral, Daily  folic acid, 1 mg, oral, Daily  heparin (porcine), 5,000 Units, subcutaneous, q8h  ipratropium-albuteroL, 3 mL, nebulization, TID  levothyroxine, 50 mcg, oral,  Daily  mirtazapine, 15 mg, oral, Nightly  montelukast, 10 mg, oral, Nightly  multivitamin with minerals, 1 tablet, oral, Daily  oxyCODONE, 5 mg, oral, Once  oxygen, , inhalation, Continuous - Inhalation  pantoprazole, 40 mg, oral, BID AC  pregabalin, 150 mg, oral, 4x daily  propranolol, 10 mg, oral, BID  sulfamethoxazole-trimethoprim, 1 tablet, oral, q12h SHANTEL  tamsulosin, 0.4 mg, oral, Nightly  [START ON 9/27/2024] thiamine, 100 mg, oral, Daily  thiamine, 100 mg, intravenous, Daily      Continuous medications     PRN medications  PRN medications: acetaminophen, benzocaine-menthol, busPIRone, dextromethorphan-guaifenesin, guaiFENesin, ipratropium-albuteroL, LORazepam **OR** LORazepam **OR** LORazepam, ondansetron **OR** ondansetron, oxyCODONE     Last Recorded Vitals  Blood pressure 119/60, pulse 68, temperature 36.7 °C (98.1 °F), temperature source Temporal, resp. rate 18, height 1.829 m (6'), weight 114 kg (251 lb 8.7 oz), SpO2 92%.    Relevant Results  Results for orders placed or performed during the hospital encounter of 09/23/24 (from the past 24 hour(s))   CBC and Auto Differential   Result Value Ref Range    WBC 10.8 4.4 - 11.3 x10*3/uL    nRBC 0.3 (H) 0.0 - 0.0 /100 WBCs    RBC 3.33 (L) 4.50 - 5.90 x10*6/uL    Hemoglobin 9.7 (L) 13.5 - 17.5 g/dL    Hematocrit 33.3 (L) 41.0 - 52.0 %     80 - 100 fL    MCH 29.1 26.0 - 34.0 pg    MCHC 29.1 (L) 32.0 - 36.0 g/dL    RDW 17.2 (H) 11.5 - 14.5 %    Platelets 365 150 - 450 x10*3/uL    Neutrophils % 72.4 40.0 - 80.0 %    Immature Granulocytes %, Automated 1.8 (H) 0.0 - 0.9 %    Lymphocytes % 16.2 13.0 - 44.0 %    Monocytes % 6.5 2.0 - 10.0 %    Eosinophils % 2.8 0.0 - 6.0 %    Basophils % 0.3 0.0 - 2.0 %    Neutrophils Absolute 7.84 (H) 1.60 - 5.50 x10*3/uL    Immature Granulocytes Absolute, Automated 0.19 0.00 - 0.50 x10*3/uL    Lymphocytes Absolute 1.75 0.80 - 3.00 x10*3/uL    Monocytes Absolute 0.70 0.05 - 0.80 x10*3/uL    Eosinophils Absolute 0.30 0.00 -  0.40 x10*3/uL    Basophils Absolute 0.03 0.00 - 0.10 x10*3/uL   Comprehensive Metabolic Panel   Result Value Ref Range    Glucose 99 74 - 99 mg/dL    Sodium 137 136 - 145 mmol/L    Potassium 4.7 3.5 - 5.3 mmol/L    Chloride 101 98 - 107 mmol/L    Bicarbonate 28 21 - 32 mmol/L    Anion Gap 13 10 - 20 mmol/L    Urea Nitrogen 17 6 - 23 mg/dL    Creatinine 1.61 (H) 0.50 - 1.30 mg/dL    eGFR 45 (L) >60 mL/min/1.73m*2    Calcium 9.1 8.6 - 10.3 mg/dL    Albumin 3.9 3.4 - 5.0 g/dL    Alkaline Phosphatase 103 33 - 136 U/L    Total Protein 7.7 6.4 - 8.2 g/dL    AST 10 9 - 39 U/L    Bilirubin, Total 0.8 0.0 - 1.2 mg/dL    ALT 6 (L) 10 - 52 U/L   Lactate   Result Value Ref Range    Lactate 0.9 0.4 - 2.0 mmol/L   Troponin I, High Sensitivity, Initial   Result Value Ref Range    Troponin I, High Sensitivity 4 0 - 20 ng/L   ECG 12 Lead   Result Value Ref Range    Ventricular Rate 66 BPM    Atrial Rate 66 BPM    IN Interval 212 ms    QRS Duration 80 ms    QT Interval 410 ms    QTC Calculation(Bazett) 429 ms    P Axis 33 degrees    R Axis 25 degrees    T Axis 64 degrees    QRS Count 11 beats    Q Onset 221 ms    P Onset 115 ms    P Offset 181 ms    T Offset 426 ms    QTC Fredericia 423 ms   Blood Culture    Specimen: Peripheral Venipuncture; Blood culture   Result Value Ref Range    Blood Culture Loaded on Instrument - Culture in progress    Blood Culture    Specimen: Peripheral Venipuncture; Blood culture   Result Value Ref Range    Blood Culture Loaded on Instrument - Culture in progress    Troponin, High Sensitivity, 1 Hour   Result Value Ref Range    Troponin I, High Sensitivity 3 0 - 20 ng/L   D-dimer, VTE Exclusion   Result Value Ref Range    D-Dimer, Quantitative VTE Exclusion 1,484 (H) <=500 ng/mL FEU   Protein, Total   Result Value Ref Range    Total Protein 7.3 6.4 - 8.2 g/dL   Lactate Dehydrogenase   Result Value Ref Range     84 - 246 U/L   Triglycerides   Result Value Ref Range    Triglycerides 104 0 - 149 mg/dL    Urinalysis with Reflex Culture and Microscopic   Result Value Ref Range    Color, Urine Light-Yellow Light-Yellow, Yellow, Dark-Yellow    Appearance, Urine Clear Clear    Specific Gravity, Urine 1.010 1.005 - 1.035    pH, Urine 5.5 5.0, 5.5, 6.0, 6.5, 7.0, 7.5, 8.0    Protein, Urine NEGATIVE NEGATIVE, 10 (TRACE), 20 (TRACE) mg/dL    Glucose, Urine Normal Normal mg/dL    Blood, Urine NEGATIVE NEGATIVE    Ketones, Urine NEGATIVE NEGATIVE mg/dL    Bilirubin, Urine NEGATIVE NEGATIVE    Urobilinogen, Urine Normal Normal mg/dL    Nitrite, Urine NEGATIVE NEGATIVE    Leukocyte Esterase, Urine 250 Mike/µL (A) NEGATIVE   Extra Urine Gray Tube   Result Value Ref Range    Extra Tube Hold for add-ons.    Microscopic Only, Urine   Result Value Ref Range    WBC, Urine 21-50 (A) 1-5, NONE /HPF    RBC, Urine 1-2 NONE, 1-2, 3-5 /HPF   Sars-CoV-2 PCR   Result Value Ref Range    Coronavirus 2019, PCR Not Detected Not Detected   Influenza A, and B PCR   Result Value Ref Range    Flu A Result Not Detected Not Detected    Flu B Result Not Detected Not Detected   MRSA Surveillance for Vancomycin De-escalation, PCR    Specimen: Anterior Nares; Swab   Result Value Ref Range    MRSA PCR Detected (A) Not Detected   Renal Function Panel   Result Value Ref Range    Glucose 103 (H) 74 - 99 mg/dL    Sodium 138 136 - 145 mmol/L    Potassium 4.1 3.5 - 5.3 mmol/L    Chloride 103 98 - 107 mmol/L    Bicarbonate 28 21 - 32 mmol/L    Anion Gap 11 10 - 20 mmol/L    Urea Nitrogen 15 6 - 23 mg/dL    Creatinine 1.39 (H) 0.50 - 1.30 mg/dL    eGFR 54 (L) >60 mL/min/1.73m*2    Calcium 8.4 (L) 8.6 - 10.3 mg/dL    Phosphorus 5.1 (H) 2.5 - 4.9 mg/dL    Albumin 3.4 3.4 - 5.0 g/dL   CBC and Auto Differential   Result Value Ref Range    WBC 10.0 4.4 - 11.3 x10*3/uL    nRBC 0.2 (H) 0.0 - 0.0 /100 WBCs    RBC 3.04 (L) 4.50 - 5.90 x10*6/uL    Hemoglobin 8.9 (L) 13.5 - 17.5 g/dL    Hematocrit 30.5 (L) 41.0 - 52.0 %     80 - 100 fL    MCH 29.3 26.0 - 34.0 pg     MCHC 29.2 (L) 32.0 - 36.0 g/dL    RDW 17.2 (H) 11.5 - 14.5 %    Platelets 296 150 - 450 x10*3/uL    Neutrophils % 76.3 40.0 - 80.0 %    Immature Granulocytes %, Automated 0.9 0.0 - 0.9 %    Lymphocytes % 11.8 13.0 - 44.0 %    Monocytes % 7.8 2.0 - 10.0 %    Eosinophils % 3.0 0.0 - 6.0 %    Basophils % 0.2 0.0 - 2.0 %    Neutrophils Absolute 7.63 (H) 1.60 - 5.50 x10*3/uL    Immature Granulocytes Absolute, Automated 0.09 0.00 - 0.50 x10*3/uL    Lymphocytes Absolute 1.18 0.80 - 3.00 x10*3/uL    Monocytes Absolute 0.78 0.05 - 0.80 x10*3/uL    Eosinophils Absolute 0.30 0.00 - 0.40 x10*3/uL    Basophils Absolute 0.02 0.00 - 0.10 x10*3/uL   Magnesium   Result Value Ref Range    Magnesium 2.06 1.60 - 2.40 mg/dL   PST Top   Result Value Ref Range    Extra Tube Hold for add-ons.    B-type natriuretic peptide   Result Value Ref Range     (H) 0 - 99 pg/mL   Electrocardiogram, 12-lead PRN ACS symptoms   Result Value Ref Range    Ventricular Rate 59 BPM    Atrial Rate 59 BPM    IA Interval 216 ms    QRS Duration 76 ms    QT Interval 450 ms    QTC Calculation(Bazett) 445 ms    P Axis 180 degrees    R Axis 2 degrees    T Axis 37 degrees    QRS Count 9 beats    Q Onset 222 ms    P Onset 114 ms    P Offset 165 ms    T Offset 447 ms    QTC Fredericia 447 ms   pH, Body Fluid   Result Value Ref Range    pH, Fluid 7.26 See Below   Body Fluid Cell Count   Result Value Ref Range    Color, Fluid Winsome (A) Colorless, Straw, Yellow    Clarity, Fluid Hazy (A) Clear    WBC, Fluid 1,127 See Comment /uL    RBC, Fluid 4,000 0  /uL /uL   Body Fluid Differential   Result Value Ref Range    Neutrophils %, Manual, Fluid 63 <25 % %    Lymphocytes %, Manual, Fluid 25 <75 % %    Mono/Macrophages %, Manual, Fluid 12 <70 % %    Eosinophils %, Manual, Fluid 0 0 % %    Basophils %, Manual, Fluid 0 0 % %    Immature Granulocytes %, Manual, Fluid 0 0 % %    Blasts %, Manual, Fluid 0 0 % %    Unclassified Cells %, Manual, Fluid 0 0 % %    Plasma Cells %,  Manual, Fluid 0 0 % %    Total Cells Counted, Fluid 100       Electrocardiogram, 12-lead PRN ACS symptoms    Result Date: 9/24/2024  Unusual P axis, possible ectopic atrial bradycardia Low voltage QRS Abnormal ECG When compared with ECG of 23-SEP-2024 15:45, (unconfirmed) Ectopic atrial rhythm has replaced Sinus rhythm    ECG 12 Lead    Result Date: 9/24/2024  Sinus rhythm with 1st degree AV block Low voltage QRS Borderline ECG When compared with ECG of 12-JUL-2021 19:51, QRS axis Shifted right    CT angio chest for pulmonary embolism    Result Date: 9/23/2024  Interpreted By:  Shanna Flores, STUDY: CT ANGIO CHEST FOR PULMONARY EMBOLISM;  9/23/2024 8:17 pm   INDICATION: Signs/Symptoms:D-dimer 1400.     COMPARISON: CT of the chest dated 03/27/2024.   ACCESSION NUMBER(S): [id number]   ORDERING CLINICIAN: SHAYLA ARNOLD   TECHNIQUE: Helical data acquisition of the chest was obtained after intravenous administration of 75 mL Omnipaque 350, as per PE protocol. Images were reformatted in coronal and sagittal planes. Axial and coronal maximum intensity projection (MIP) images were created and reviewed.   FINDINGS: POTENTIAL LIMITATIONS OF THE STUDY: Exam is somewhat degraded by beam hardening artifact from the contrast bolus in the left subclavian vein in the SVC.   HEART AND VESSELS: There are no discrete filling defects within main pulmonary artery and its branches to suggest acute pulmonary embolism. Main pulmonary arteries mildly enlarged, measuring up to 3.6 cm in diameter.   The thoracic aorta normal in course and caliber.Mild vascular calcifications are present in the thoracic aorta.Although, the study is not tailored for evaluation of aorta, there is no definite evidence of acute aortic pathology. Moderate coronary artery calcifications are seen. Please note,the study is not optimized for evaluation of coronary arteries.   The cardiac chambers are not enlarged.There are no findings to suggest right  heart strain. There is no pericardial effusion seen.   MEDIASTINUM AND KAITLYN, LOWER NECK AND AXILLA: The visualized thyroid gland is within normal limits. Mildly enlarged mediastinal lymph nodes, measuring up to 1.2 cm in size are nonspecific and may be reactive in etiology. Esophagus appears within normal limits as seen.   LUNGS AND AIRWAYS: The trachea and central airways are patent. No endobronchial lesion is seen.   In the interim since prior exam in March of 2024, there is a new moderate to large left-sided pleural effusion. Small amount of loculated fluid is also present in the right lower lobe, new/increased in the interim since prior exam.   Pleural calcifications in the posterior aspect of the right lung with round atelectasis in the right lower lobe are similar in appearance to prior exam.   There is somewhat increased volume loss/atelectasis in the lower lobes bilaterally compared to prior study with prominence of the interstitial markings in the upper lobes and subtle mosaic attenuation, suggestive of combination of fluid overload and small vessel/small airway disease.   UPPER ABDOMEN: The visualized subdiaphragmatic structures demonstrate no remarkable findings.   CHEST WALL AND OSSEOUS STRUCTURES: Cutaneous tissues of the chest wall do not demonstrate any acute abnormality. There is mild bilateral gynecomastia. Numerous remote bilateral rib fractures are similar to prior study. No acute osseous abnormality is present.There are no suspicious osseous lesions.Multilevel degenerative changes of the thoracic spine are similar to prior exam without evidence of compression fracture or high-grade stenosis.       1. No definite evidence of pulmonary embolism. 2. Moderate to large new left-sided pleural effusion with small amount of loculated fluid also present in the right lung with mild interstitial thickening/prominence in the lungs bilaterally. Correlate with fluid volume status. 3. Pleural calcifications  along the posterior aspect of the right lung and round atelectasis in the right lower lobe are similar in appearance to prior study in March of 2024, although there is somewhat increased volume loss in the lung bases bilaterally compared to prior exam. 4. Subtle mosaic attenuation in the lungs bilaterally likely representing component of small vessel/small airway disease. 5. Mildly enlarged mediastinal lymph nodes, measuring up to 1.2 cm in short axis dimension, are nonspecific and may be reactive.   MACRO: None   Signed by: Shanna Flores 9/23/2024 8:36 PM Dictation workstation:   [id number]    XR chest 1 view    Result Date: 9/23/2024  Interpreted By:  Schoenberger, Joseph, STUDY: XR CHEST 1 VIEW;  9/23/2024 4:29 pm   INDICATION: Signs/Symptoms:SOB.     COMPARISON: 08/09/2023   ACCESSION NUMBER(S): [id number]   ORDERING CLINICIAN: SHAYLA ARNOLD   FINDINGS: Pleural and parenchymal scarring in the right hemithorax is likely similar to the prior exam accounting for projectional differences.       CARDIOMEDIASTINAL SILHOUETTE: Cardiac silhouette remains enlarged.   LUNGS: There is some new blunting in the costophrenic angle on the right which may represent some minimal amount of new pleural fluid versus pleural thickening more apparent on the AP projection than the prior PA projection.   ABDOMEN: No remarkable upper abdominal findings.   BONES: No acute osseous changes.       1.  Pleural and parenchymal scarring in the right hemithorax similar to prior. Difficult to exclude of developing very small right pleural effusion. Otherwise findings unchanged.       MACRO: None   Signed by: Joseph Schoenberger 9/23/2024 4:37 PM Dictation workstation:   [id number]       Assessment/Plan     Assessment:  - Non pressure ulcers, left and right foot  - Peripheral neuropathy  - Acute on Chronic respiratory failure  - Bilateral pleural effusion      Plan:  - Pt examined and evaluated.  All findings discussed to  patient to their satisfaction and understanding.  - Reviewed labs and chart.  HUMBLE/PVRs ordered, pending.  - Radiographs obtained in July showed no evidence of OM.  No probe to bone noted at this time.  - SOI have improved significantly from last week.  - Systemic conditions managed by primary team, antibiotics managed by ID.  - Performed dressing change consisting of Betadine paint and DSD.  Dressings to be changed every 1-2 days by podiatry.  - Will continue to follow inpatient.  Please contact podiatry with any acute questions/concerns.    - Thank you for the consult.     Boone Bull DPM  Podiatric Surgery  Doc Halo/Hiram BAUTISTA spent >35 minutes in the professional and overall care of this patient.      Boone Bull DPM

## 2024-09-24 NOTE — CARE PLAN
The patient's goals for the shift include      The clinical goals for the shift include THE PT WILL BE HDS, MAINTAIN A PULSE OX > 92; HAVE NO CP OR SOB; LEG/FOOT PAIN WILL BE ACHIEVED TO TOLERABLE LEVELS 5/10; THE PTS RESPS WILL BE EVEN AND UNLABORED. HE WILL HAVE GOOD URINE OUTPUT APROX  30ML/HR; HE WILL TOLERATE HIS X3 ANTIBIOTICS WITHOUT ANY STOMACH UPSET. THE PT MASSIEL;L NOT FALL AND WILL REMAIN IN A SAFE ENVIRONMENT T/O THIS SHIFT  TILL 0700.

## 2024-09-24 NOTE — CONSULTS
Pulmonary Disease Consult    PATIENT NAME: Angus Redman    MRN: [id number]  SERVICE DATE:  9/24/2024   SERVICE TIME:  4:11 PM        PRIMARY CARE PHYSICIAN: Artie Haskins MD  REASON FOR CONSULT: Pleural effusion  REQUESTING PHYSICIAN: Naveen        ASSESSMENT :     Acute on chronic hypoxic respiratory failure  Moderate to large pleural effusion  Bilateral pleural thickening  Restrictive lung disease  Possible underlying COPD  Pleural calcification  No history to suggest asbestos exposure      Status post thoracentesis today  Pleural fluid sent for analysis  Patient feels less hypoxic and less short of breath  Further workup depends on the fluid results transudate versus exudate and need for further intervention    PLAN:    IMPRESSION:  1. No definite evidence of pulmonary embolism.  2. Moderate to large new left-sided pleural effusion with small  amount of loculated fluid also present in the right lung with mild  interstitial thickening/prominence in the lungs bilaterally.  Correlate with fluid volume status.  3. Pleural calcifications along the posterior aspect of the right  lung and round atelectasis in the right lower lobe are similar in  appearance to prior study in March of 2024, although there is  somewhat increased volume loss in the lung bases bilaterally compared  to prior exam.  4. Subtle mosaic attenuation in the lungs bilaterally likely  representing component of small vessel/small airway disease.  5. Mildly enlarged mediastinal lymph nodes, measuring up to 1.2 cm in       CONCLUSIONS:   1. Left ventricular systolic function is normal with a 60-65% estimated ejection fraction.   2. Poorly visualized anatomical structures due to suboptimal image qualit  HPI    Mr. Angus Redman is a 69 y.o. male ex heavy smoker with past medical history of asthma, HTN, hypothyroidism, depression, anxiety, chronic alcohol abuse, BPH, ALMA DELIA, PAF not on AC, GERD, COPD, restrictive lung disease, bilateral pleural  thickening  -Card: recurrent syncope secondary to orthostatic hypotension/autonomic dysfunction/autonomic neuropathy, pSVT on propranolol, Type 2 MI 2023/CAD w/ CCTA 2/2024 - 50% stenosis of LAD/25%-50% at RCA,  primary HTN  -Pulm: Restrictive lung disease on PFTs follows with pulmonology possibly due to right pleural thickening seen on CT imaging (not a CT surgery candidate) with 30-pack-year smoking history on 2 to 3 L of O2 at baseline   on April 2023 CT spine was reviewed with again noted chronic pleural thickening and bibasilar atelectasis and right lower lobe opacity with suspicion for scarring versus atelectasis as well   -Neuro: alcohol use disorder daily bottle of wine, severe alcoholic polyneuropathy with autonomic dysfunction  -ID: Chronic nonhealing MRSA positive b/l foot infections on Bactrim (Dr. Jang), osteomyelitis s/p left great toe and right 2nd toe amputation  -GI: alcohol use disorder c/b alcoholic gastritis  -MSK: aseptic necrosis of the femoral head, gout  -Endo: hypothyroidism  -: BPH  Presents with 1 week history of shortness of breath on exertion with reduced SpO2 despite increase in O2 supplementation from baseline 3 L to 4 L in the absence of any lightheadedness, dizziness, or chest pain      PAST MEDICAL HISTORY:   Past Medical History:   Diagnosis Date    Alcohol dependence, in remission (Multi) 06/08/2022    Alcohol dependence in early, early partial, sustained full, or sustained partial remission    Alcohol dependence, in remission (Multi) 06/08/2022    Alcohol dependence in early, early partial, sustained full, or sustained partial remission    Alcohol dependence, uncomplicated (Multi) 09/15/2022    Alcohol dependence, daily use    Dependence on other enabling machines and devices 09/24/2019    Walker as ambulation aid    Encounter for screening for other disorder 03/01/2021    Special screening for other conditions    Fracture of one rib, unspecified side, initial encounter  for closed fracture 01/03/2014    Closed rib fracture    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    Laceration without foreign body of scalp, initial encounter 09/10/2015    Scalp laceration    Nausea 04/15/2014    Nausea    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) 07/27/2017    Skin ulcer of right ankle, limited to breakdown of skin    Osteomyelitis, unspecified (Multi) 02/26/2022    Osteomyelitis of toe    Other chest pain 09/21/2013    Atypical chest pain    Other conditions influencing health status     Skin Cancer    Other conditions influencing health status 10/08/2017    Closed displaced fracture of olecranon process of left ulna with intra-articular extension, initial encounter    Other specified health status 07/23/2014    Foreign travel    Patient's noncompliance with other medical treatment and regimen due to unspecified reason 10/28/2016    Noncompliance with therapeutic plan    Patient's other noncompliance with medication regimen 06/04/2016    History of medication noncompliance    Personal history of (healed) stress fracture 12/30/2013    History of stress fracture    Personal history of diseases of the skin and subcutaneous tissue 02/26/2022    History of chronic skin ulcer    Personal history of other diseases of the nervous system and sense organs 03/25/2016    History of peripheral neuropathy    Personal history of other diseases of the nervous system and sense organs 02/02/2016    History of peripheral neuropathy    Personal history of other endocrine, nutritional and metabolic disease 07/13/2015    History of hypothyroidism    Personal history of other specified conditions 07/28/2019    History of syncope    Personal history of other specified conditions 05/07/2018    History of syncope    Personal history of other specified conditions 06/25/2015    History of nausea    Unspecified fracture of  left foot, initial encounter for closed fracture 2016    Foot fracture, left    Unspecified injury of head, initial encounter 2016    Closed head injury, initial encounter    Unspecified injury of unspecified elbow, initial encounter 2017    Elbow injury    Unsteadiness on feet 04/15/2014    Unsteady gait     PAST SURGICAL HISTORY:   Past Surgical History:   Procedure Laterality Date    COLONOSCOPY  10/09/2013    Complete Colonoscopy    OTHER SURGICAL HISTORY  2019    Insertion of cardiac monitor    OTHER SURGICAL HISTORY  04/15/2014    Reported Hx Of Hip Replacement - Left Side    OTHER SURGICAL HISTORY  2020    Hip replacement    OTHER SURGICAL HISTORY  2015    Interrogation Of Implantable Loop Recorder By Physician In Person    OTHER SURGICAL HISTORY  2017    Arthroscopy Elbow Left    OTHER SURGICAL HISTORY  10/09/2013    Shoulder Surgery Left    SKIN CANCER EXCISION  10/09/2013    Mohs Micrographic Surgery Face     FAMILY HISTORY:   Family History   Problem Relation Name Age of Onset    Other (cardiac pacemaker) Mother      Coronary artery disease Mother      Other (history of heart artery stent) Mother      Cancer Mother      Other (kurtis cell cancer) Mother      Arthritis Mother      Mental illness Brother          living in handicapped assisted living facility permanently [Other]    Other (angina pectoris) Paternal Grandmother       SOCIAL HISTORY:   Social History     Tobacco Use    Smoking status: Former     Current packs/day: 0.00     Types: Cigarettes     Quit date:      Years since quittin.7    Smokeless tobacco: Never   Vaping Use    Vaping status: Never Used   Substance Use Topics    Alcohol use: Not Currently    Drug use: Never     CURRENT ALLERGIES:   Allergies as of 2024 - Reviewed 2024   Allergen Reaction Noted    Latex Unknown 2023    Codeine GI Upset 2023    House dust mite Runny nose 2023     Hydrocodone-acetaminophen Nausea/vomiting 03/12/2024    Lisinopril Hives 01/29/2023    Mold Runny nose 01/29/2023    Tree and shrub pollen Runny nose 01/29/2023       MEDICATIONS:  Scheduled medications  amLODIPine, 2.5 mg, oral, Daily  aspirin, 81 mg, oral, Daily  atorvastatin, 40 mg, oral, Daily  cetirizine, 10 mg, oral, Daily  DULoxetine, 60 mg, oral, BID  empagliflozin, 10 mg, oral, Daily  escitalopram, 20 mg, oral, Daily  folic acid, 1 mg, oral, Daily  heparin (porcine), 5,000 Units, subcutaneous, q8h  ipratropium-albuteroL, 3 mL, nebulization, TID  levothyroxine, 50 mcg, oral, Daily  mirtazapine, 15 mg, oral, Nightly  montelukast, 10 mg, oral, Nightly  multivitamin with minerals, 1 tablet, oral, Daily  oxyCODONE, 5 mg, oral, Once  oxygen, , inhalation, Continuous - Inhalation  pantoprazole, 40 mg, oral, BID AC  pregabalin, 150 mg, oral, 4x daily  propranolol, 10 mg, oral, BID  sulfamethoxazole-trimethoprim, 1 tablet, oral, q12h SHANTEL  tamsulosin, 0.4 mg, oral, Nightly  [START ON 9/27/2024] thiamine, 100 mg, oral, Daily  thiamine, 100 mg, intravenous, Daily      Continuous medications     PRN medications  PRN medications: acetaminophen, benzocaine-menthol, busPIRone, dextromethorphan-guaifenesin, guaiFENesin, ipratropium-albuteroL, LORazepam **OR** LORazepam **OR** LORazepam, ondansetron **OR** ondansetron, oxyCODONE      ROS  Review of Systems      Physical Exam  Vitals reviewed.   Constitutional:       Appearance: Normal appearance. he is obese.   HENT:      Head: Normocephalic and atraumatic.   Eyes:      Extraocular Movements: Extraocular movements intact.      Pupils: Pupils are equal, round, and reactive to light.   Cardiovascular:      Rate and Rhythm: Normal rate and regular rhythm.      Heart sounds: No murmur heard.  Pulmonary:      Effort: no Respiratory distress present.      Breath sounds: No stridor. No Wheezing and rhonchi present.   Chest:      Chest wall: No tenderness.   Abdominal:      General:  Bowel sounds are normal. There is no distension.      Palpations: There is no mass.      Tenderness: There is no abdominal tenderness. There is no rebound.   Musculoskeletal:         General: no Swelling present. No tenderness or deformity.      Cervical back: No rigidity.   Lymphadenopathy:      Cervical: No cervical adenopathy.   Skin:     General: Skin is warm.      Coloration: Skin is pale. Skin is not jaundiced.      Findings: No bruising.   Neurological:      General: No focal deficit present.      Mental Status: She is alert and oriented to person, place, and time. Mental status is at baseline.      Cranial Nerves: No cranial nerve deficit.      Motor: Weakness present.   Psychiatric:         Mood and Affect: Mood normal.         Behavior: Behavior normal.     Patient Vitals for the past 24 hrs:   BP Temp Temp src Pulse Resp SpO2 Height Weight   09/24/24 1607 114/76 36.7 °C (98.1 °F) Temporal 72 26 94 % -- --   09/24/24 1210 119/60 36.7 °C (98.1 °F) Temporal 68 18 92 % -- --   09/24/24 0957 137/72 -- -- 69 24 93 % -- --   09/24/24 0951 -- -- -- -- -- 92 % -- --   09/24/24 0940 -- -- -- -- -- 91 % -- --   09/24/24 0940 122/58 -- -- 63 24 (!) 86 % -- --   09/24/24 0739 150/70 36.6 °C (97.9 °F) Temporal 62 21 93 % -- --   09/24/24 0607 140/74 -- -- 54 15 92 % -- --   09/24/24 0424 -- -- -- -- -- 91 % -- --   09/24/24 0400 127/73 36.5 °C (97.7 °F) Temporal 54 17 94 % -- --   09/24/24 0200 117/59 -- -- 62 14 94 % -- --   09/24/24 0100 -- -- -- 64 -- 92 % -- --   09/24/24 0045 -- -- -- -- -- 93 % -- --   09/24/24 0010 107/56 36 °C (96.8 °F) Temporal 70 20 93 % -- --   09/24/24 0000 107/56 -- -- 70 20 94 % -- --   09/23/24 2348 -- -- -- -- -- 96 % -- --   09/23/24 2300 -- -- -- 64 -- 94 % -- --   09/23/24 2150 163/72 36.1 °C (97 °F) Temporal 62 (!) 31 92 % 1.829 m (6') 114 kg (251 lb 8.7 oz)   09/23/24 2130 -- -- -- 66 (!) 24 94 % -- --   09/23/24 2100 136/68 -- -- 64 16 (!) 93 % -- --   09/23/24 2030 -- -- -- 68 --  (!) 93 % -- --   09/23/24 2000 131/82 -- -- 66 (!) 34 94 % -- --   09/23/24 1930 -- -- -- 66 -- (!) 92 % -- --   09/23/24 1900 137/78 -- -- 68 16 (!) 92 % -- --   09/23/24 1800 128/67 -- -- 68 20 (!) 93 % -- --   09/23/24 1710 -- -- -- -- -- -- 1.829 m (6') 112 kg (247 lb)   09/23/24 1700 132/69 -- -- 70 20 94 % -- --   09/23/24 1649 126/67 -- -- 72 20 (!) 93 % -- --     Body mass index is 34.12 kg/m².      Gen: NAD  Neck: symmetric, no mass  Cardiovascular: RRR  Respiratory: No distress   GI: Abd soft, nontender, non-distended  Extremities: no leg edema  Skin: Warm and dry.  Neuro: alert and oriented times 3  : no carrion     Labs:  Lab Results   Component Value Date    WBC 10.0 09/24/2024    HGB 8.9 (L) 09/24/2024    HCT 30.5 (L) 09/24/2024     09/24/2024     09/24/2024     Lab Results   Component Value Date    GLUCOSE 103 (H) 09/24/2024    CALCIUM 8.4 (L) 09/24/2024     09/24/2024    K 4.1 09/24/2024    CO2 28 09/24/2024     09/24/2024    BUN 15 09/24/2024    CREATININE 1.39 (H) 09/24/2024   ESR: --  Lab Results   Component Value Date    SEDRATE 71 (H) 12/17/2021     Lab Results   Component Value Date    CRP 1.35 (H) 03/06/2024     Lab Results   Component Value Date    ALT 6 (L) 09/23/2024    AST 10 09/23/2024    ALKPHOS 103 09/23/2024    BILITOT 0.8 09/23/2024   @ABG@      DATA:   Diagnostic tests reviewed for today's visit:    Labs this admission reviewed  Imagings this admission reviewed  Cultures: Reviewed    IR guided thoracentesis   Final Result   Ultrasound-guided left thoracentesis.        Signed by: Wallace Lockett 9/24/2024 3:44 PM   Dictation workstation:   [id number]      CT angio chest for pulmonary embolism   Final Result   1. No definite evidence of pulmonary embolism.   2. Moderate to large new left-sided pleural effusion with small   amount of loculated fluid also present in the right lung with mild   interstitial thickening/prominence in the lungs bilaterally.    Correlate with fluid volume status.   3. Pleural calcifications along the posterior aspect of the right   lung and round atelectasis in the right lower lobe are similar in   appearance to prior study in March of 2024, although there is   somewhat increased volume loss in the lung bases bilaterally compared   to prior exam.   4. Subtle mosaic attenuation in the lungs bilaterally likely   representing component of small vessel/small airway disease.   5. Mildly enlarged mediastinal lymph nodes, measuring up to 1.2 cm in   short axis dimension, are nonspecific and may be reactive.        MACRO:   None        Signed by: Shanna Flores 9/23/2024 8:36 PM   Dictation workstation:   [id number]      XR chest 1 view   Final Result   1.  Pleural and parenchymal scarring in the right hemithorax similar   to prior. Difficult to exclude of developing very small right pleural   effusion. Otherwise findings unchanged.                  MACRO:   None        Signed by: Joseph Schoenberger 9/23/2024 4:37 PM   Dictation workstation:   [id number]      Vascular US PVR without exercise    (Results Pending)            Will continue to follow     Thank you so much for this consultation     Elieser Patterson MD

## 2024-09-24 NOTE — PROGRESS NOTES
Physical Therapy                 Therapy Communication Note    Patient Name: Angus Redman  MRN: [id number]  Department: [facility]  Room: 2105/2105-A  Today's Date: 9/24/2024     Discipline: Physical Therapy    Missed Visit Reason: Missed Visit Reason: Patient in a medical procedure    Missed Time: Attempt    Comment:  PT orders received, chart reviewed.  Pt currently off floor for thoracentesis.  Will re-attempt PT eval as able.

## 2024-09-24 NOTE — PROGRESS NOTES
Spiritual Care Visit    Clinical Encounter Type  Visited With: Patient  Routine Visit: Introduction  Continue Visiting: No                                            Taxonomy  Intended Effects: Promote sense of peace, Preserve dignity and respect, Meaning-making  Methods: Offer spiritual/Voodoo support  Interventions: Share words of hope and inspiration, Provide a Voodoo item(s), [place]     gave the patient a Rosary at his request and prayed for him.  Patient shared he has no family members.   provided companionship and a supportive presence.

## 2024-09-24 NOTE — CONSULTS
Wound Care Consult     Visit Date: 9/24/2024      Patient Name: Angus Redman         MRN: [id number]           YOB: 1955     Reason for Consult: Bilateral foot wounds         Wound History: Non pressure Injury     Pertinent Labs:   Albumin   Date Value Ref Range Status   09/24/2024 3.4 3.4 - 5.0 g/dL Final   02/23/2018 4.2 3.4 - 5.0 g/dL Final     Albumin (Data Conversion)   Date Value Ref Range Status   02/23/2018 5.8 mg/dL Final       Wound Assessment:  Wound 09/23/24 Foot Right (Active)   Wound Image   09/23/24 2238   Wound Length (cm) 5 cm 09/23/24 2238   Wound Width (cm) 3 cm 09/23/24 2238   Wound Surface Area (cm^2) 15 cm^2 09/23/24 2238   State of Healing Early/partial granulation;Eschar;Healing ridge 09/23/24 2238   Margins Poorly defined 09/23/24 2238   Drainage Description None 09/23/24 2238   Drainage Amount None 09/24/24 0400   Dressing Foam 09/24/24 0000   Dressing Changed Changed 09/23/24 2238   Dressing Status Clean;Dry 09/24/24 1200       Wound 09/23/24 Toe D1, great Left (Active)   Wound Image   09/23/24 2236   Wound Length (cm) 4 cm 09/23/24 2236   Wound Width (cm) 2.5 cm 09/23/24 2236   Wound Surface Area (cm^2) 10 cm^2 09/23/24 2236   Wound Depth (cm) 0.1 cm 09/23/24 2236   Wound Volume (cm^3) 1 cm^3 09/23/24 2236   State of Healing Early/partial granulation 09/23/24 2236   Drainage Description None 09/23/24 2236   Drainage Amount None 09/24/24 0400   Dressing Foam 09/24/24 0000   Dressing Changed Changed 09/23/24 2236   Dressing Status Clean;Dry 09/24/24 1200       Wound 09/23/24 Toe D2, second Right;Dorsal foot (Active)   Wound Image   09/23/24 2245   Wound Length (cm) 1 cm 09/23/24 2245   Wound Width (cm) 1 cm 09/23/24 2245   Wound Surface Area (cm^2) 1 cm^2 09/23/24 2245   State of Healing Early/partial granulation 09/23/24 2245   Drainage Description None 09/23/24 2245   Drainage Amount None 09/24/24 0400   Dressing Foam 09/24/24 0000   Dressing Changed Changed 09/23/24 2245    Dressing Status Clean;Dry 09/24/24 1200       Wound Team Summary Assessment: Received consult to see patient for bilateral foot wounds. Chart and pictures reviewed. Patient is going to wound care center with Dr. Bull managing wounds. Podiatry consulted.      Wound Team Plan: Recommendation for wound care - cleanse with NS, pat dry and paint with Betadine and apply dressing daily. Awaiting podiatry consult.      Salma Flores RN  9/24/2024  3:08 PM

## 2024-09-24 NOTE — POST-PROCEDURE NOTE
Interventional Radiology Brief Postprocedure Note    Procedure: Left thoracentesis    Provider: Wallace Lockett MD    Assistant: None    Diagnosis: Left small pleural effusion    Description of procedure: A time out was performed and Left Hemithorax was examined with US and appropriate entry point was confirmed and marked.  The patient was prepped and draped in a sterile manner, 1% lidocaine was used to anesthesize the skin and subcutaneous tissue. A 5F Centesis needle was then introduced through the skin into the pleural space, the centesis catheter was then threaded without difficulty. 265 ml of  serous  fluid was removed without difficulty. The catheter was then removed. Specimens labeled and sent to lab per primary team. No immediate complications were noted during and immediately following the procedure.          Medications  As of 09/24/24 0959      oxygen (O2) therapy (L/min) Total volume:  Not documented*   *Total volume has not been documented. View each administration to see the amount administered.     Date/Time Rate/Dose/Volume Action       09/23/24  1649 6 L/min Rate Verify Medical Gas      2348 6 L/min Start     09/24/24  0010 6 L/min Rate Verify Medical Gas               iohexol (OMNIPaque) 350 mg iodine/mL solution 75 mL (mL) Total volume:  500 mL Dosing weight:  112      Date/Time Rate/Dose/Volume Action       09/23/24 2006 500 mL Given               heparin (porcine) injection 5,000 Units (Units) Total dose:  10,000 Units Dosing weight:  112      Date/Time Rate/Dose/Volume Action       09/23/24  2347 5,000 Units Given     09/24/24  0637 5,000 Units Given      0709 *Not included in total Held by provider               guaiFENesin (Mucinex) 12 hr tablet 600 mg (mg) Total dose:  600 mg* Dosing weight:  112   *Administration not included in total     Date/Time Rate/Dose/Volume Action       09/23/24  2345 *600 mg Missed     09/24/24  0009 600 mg Given               alendronate (Fosamax) tablet 10 mg  (mg) Total dose:  Cannot be calculated* Dosing weight:  112   *Administration dose not documented     Date/Time Rate/Dose/Volume Action       09/23/24  2222 *Not included in total Held by provider     09/24/24  0700 *Not included in total Automatically Held      0911 *Not included in total Unheld by provider               aspirin chewable tablet 81 mg (mg) Total dose:  Cannot be calculated* Dosing weight:  112   *Administration dose not documented     Date/Time Rate/Dose/Volume Action       09/24/24  0714 *Not included in total Held by provider      0900 *Not included in total Automatically Held               busPIRone (Buspar) tablet 5 mg (mg) Total dose:  5 mg      Date/Time Rate/Dose/Volume Action       09/23/24 2347 5 mg Given               DULoxetine (Cymbalta) DR capsule 60 mg (mg) Total dose:  60 mg      Date/Time Rate/Dose/Volume Action       09/23/24 2345 60 mg Given               escitalopram (Lexapro) tablet 20 mg (mg) Total dose:  20 mg      Date/Time Rate/Dose/Volume Action       09/23/24 2346 20 mg Given               levothyroxine (Synthroid, Levoxyl) tablet 50 mcg (mcg) Total dose:  50 mcg      Date/Time Rate/Dose/Volume Action       09/24/24 0637 50 mcg Given               mirtazapine (Remeron) tablet 15 mg (mg) Total dose:  15 mg      Date/Time Rate/Dose/Volume Action       09/23/24 2347 15 mg Given               montelukast (Singulair) tablet 10 mg (mg) Total dose:  10 mg      Date/Time Rate/Dose/Volume Action       09/23/24 2346 10 mg Given               pantoprazole (ProtoNix) EC tablet 40 mg (mg) Total dose:  40 mg      Date/Time Rate/Dose/Volume Action       09/24/24 0637 40 mg Given               propranolol (Inderal) tablet 10 mg (mg) Total dose:  10 mg      Date/Time Rate/Dose/Volume Action       09/24/24 0054 10 mg Given               tamsulosin (Flomax) 24 hr capsule 0.4 mg (mg) Total dose:  0.4 mg      Date/Time Rate/Dose/Volume Action       09/23/24 2347 0.4 mg Given                pregabalin (Lyrica) capsule 150 mg (mg) Total dose:  150 mg      Date/Time Rate/Dose/Volume Action       09/24/24  0005 150 mg Given               cefTRIAXone (Rocephin) 1 g in dextrose (iso) IV 50 mL (mL/hr) Total dose:  1 g* Dosing weight:  114   *From user-documented volume     Date/Time Rate/Dose/Volume Action       09/24/24  0054 1 g - 100 mL/hr (over 30 min) New Bag      0135 50 mL Stopped               azithromycin 500 mg in dextrose 5% 250 mL IV (mL/hr) Total dose:  500 mg* Dosing weight:  114   *From user-documented volume     Date/Time Rate/Dose/Volume Action       09/24/24  0135 500 mg - 250 mL/hr (over 60 min) New Bag      0346 250 mL Stopped               ipratropium-albuteroL (Duo-Neb) 0.5-2.5 mg/3 mL nebulizer solution 3 mL (mL) Total volume:  6 mL Dosing weight:  114      Date/Time Rate/Dose/Volume Action       09/24/24  0010 3 mL Given      0754 3 mL Given               oxyCODONE (Roxicodone) immediate release tablet 5 mg (mg) Total dose:  10 mg Dosing weight:  114      Date/Time Rate/Dose/Volume Action       09/23/24  2346 5 mg Given     09/24/24  0424 5 mg Given      0641 *5 mg Missed               diazePAM (Valium) injection 10 mg (mg) Total dose:  0 mg* Dosing weight:  114   *Administration not included in total     Date/Time Rate/Dose/Volume Action       09/24/24  0112 *10 mg (over 3 min) Missed               vancomycin (Vancocin) 1,000 mg in dextrose 5%  mL (mL/hr) Total dose:  1,000 mg* Dosing weight:  114   *From user-documented volume     Date/Time Rate/Dose/Volume Action       09/24/24  0424 1,000 mg - 200 mL/hr (over 60 min) - 200 mL New Bag      0642  (over 60 min) Stopped               lidocaine PF (Xylocaine) 10 mg/mL (1 %) injection (mL) Total volume:  3 mL      Date/Time Rate/Dose/Volume Action       09/24/24  0952 3 mL Given                     Complications: None    Estimated Blood Loss: none        See detailed result report with images in PACS.    The patient tolerated  the procedure well without incident or complication and is in stable condition.

## 2024-09-24 NOTE — PROGRESS NOTES
Nutrition Initial Assessment:   Nutrition Assessment    Reason for Assessment: Dietitian discretion (auto: wound)    Patient is a 69 y.o. male presenting from home alone for acute hypoxic respiratory failure. Cardiology on consult. Pt w/left small pleural effusion. Pt seeing outpatient wound care. Pt w/frequent falls at home and uses a Rolator. Pt was SOB prior to admission and now on antibiotics. Per chart notes, pt endorses 1 bottle of wine/day. Pt w/thoracentesis today w/265mL removed.    Past Medical History: HLD, CKD 3, DM, hypertension, pSVT, A-fib not on blood thinners (only reported by the patient, however 2-week Holter monitor showed no evidence of AF, reported by Dr. Harry 5/6/2024), former smoker (quit 25 years ago), NSTEMI.    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) (07/27/2017), Osteomyelitis, unspecified (Multi) (02/26/2022), Other chest pain (09/21/2013), Other conditions influencing health status, Other conditions influencing health status (10/08/2017), Other specified health status (07/23/2014), Patient's noncompliance with other medical treatment and regimen due to unspecified reason (10/28/2016), Patient's other noncompliance with medication regimen (06/04/2016), Personal history of (healed) stress fracture (12/30/2013), Personal history of diseases of the skin and subcutaneous tissue  (02/26/2022), Personal history of other diseases of the nervous system and sense organs (03/25/2016), Personal history of other diseases of the nervous system and sense organs (02/02/2016), Personal history of other endocrine, nutritional and metabolic disease (07/13/2015), Personal history of other specified conditions (07/28/2019), Personal history of other specified conditions (05/07/2018), Personal history of other specified conditions (06/25/2015), Unspecified fracture of left foot, initial encounter for closed fracture (06/04/2016), Unspecified injury of head, initial encounter (04/20/2016), Unspecified injury of unspecified elbow, initial encounter (04/07/2017), and Unsteadiness on feet (04/15/2014).  Surgical History   has a past surgical history that includes Colonoscopy (10/09/2013); Other surgical history (07/28/2019); Other surgical history (04/15/2014); Other surgical history (01/28/2020); Other surgical history (11/24/2015); Other surgical history (04/19/2017); Other surgical history (10/09/2013); and Skin cancer excision (10/09/2013).    Nutrition History:  Energy Intake: Poor < 50 % (x4 days PTA)  Food and Nutrient History: Pt typically eats 1 large meal per evening and no snacks. Pt drinks Gatorade Zero during the day. Pt has been eating 1 meal/day for years. Pt avoids fish, gravy, poultry and steak. Pt does eat eggs, hamburger and pork. Pt endorses being Lactose intolerant and will sometimes take Lactaid pills. Pt has had GoLo supplements for a year (for wt loss), but not yet taken them. Pt has Ensure at home, but not yet started. Pt was too weak/SOB to cook the past 4 days PTA that he only ate a sleeve of saltine crackers per day x 4 days. Pt endorses this happens 1-2x/month.  Pt endorses being a good cook.  Vitamin/Herbal Supplement Use: Vitamin D, B1 and B12  Food Allergies/Intolerances:   lactose intolerant  GI Symptoms: None  Oral Problems: None     Anthropometrics:  Height: 182.9 cm (6')    Weight: 114 kg (251 lb 8.7 oz)   BMI (Calculated): 34.11           Weight History:   Wt Readings from Last 22 Encounters:   09/23/24 114 kg (251 lb 8.7 oz)   07/24/24 112 kg (247 lb)   06/24/24 114 kg (251 lb)   05/06/24 111 kg (244 lb)   03/13/24 108 kg (238 lb)   03/12/24 105 kg (232 lb 6.4 oz)   03/06/24 104 kg (230 lb)   02/06/24 92.5 kg (204 lb)   12/19/23 92.5 kg (204 lb)   07/13/23 103 kg (227 lb 12.8 oz)   06/21/23 106 kg (234 lb)   04/10/23 105 kg (231 lb 3.2 oz)   04/05/23 104 kg (230 lb)   03/13/23 104 kg (228 lb 6.4 oz)   01/19/23 108 kg (238 lb 8 oz)   09/30/22 113 kg (250 lb)   09/14/22 113 kg (248 lb 4 oz)   08/23/22 112 kg (246 lb)   07/08/22 112 kg (246 lb)   06/28/22 118 kg (260 lb)   06/08/22 118 kg (260 lb)   05/02/22 113 kg (250 lb)     Weight Change %:  Weight History / % Weight Change: Pt endorses wt gain, desires wt loss. Per archives 10/18/23 244#.  Significant Weight Loss: No    Nutrition Focused Physical Exam Findings:    Subcutaneous Fat Loss:   Orbital Fat Pads: Well nourished (slightly bulging fat pads)  Buccal Fat Pads: Well nourished (full, rounded cheeks)  Triceps: Well nourished (ample fat tissue)  Muscle Wasting:  Temporalis: Well nourished (well-defined muscle)  Pectoralis (Clavicular Region): Well nourished (clavicle not visible)  Gastrocnemius: Well nourished (well developed bulbous muscle)  Edema:  Edema: +1 trace  Edema Location: BLE  Physical Findings:  Skin: Positive (pale, warm, dry, bruising and rt foot wound, left great toe wound, rt 2nd toe wound)    Nutrition Significant Labs:  CBC Trend:   Results from last 7 days   Lab Units 09/24/24  0535 09/23/24  1536   WBC AUTO x10*3/uL 10.0 10.8   RBC AUTO x10*6/uL 3.04* 3.33*   HEMOGLOBIN g/dL 8.9* 9.7*   HEMATOCRIT % 30.5* 33.3*   MCV fL 100 100   PLATELETS AUTO x10*3/uL 296 365    , BMP Trend:   Results from last 7 days   Lab Units 09/24/24  0535 09/23/24  1536   GLUCOSE mg/dL 103* 99   CALCIUM mg/dL 8.4* 9.1   SODIUM mmol/L  138 137   POTASSIUM mmol/L 4.1 4.7   CO2 mmol/L 28 28   CHLORIDE mmol/L 103 101   BUN mg/dL 15 17   CREATININE mg/dL 1.39* 1.61*    , A1C:  Lab Results   Component Value Date    HGBA1C 4.9 12/19/2023   , BG POCT trend:    , Liver Function Trend:   Results from last 7 days   Lab Units 09/23/24  1536   ALK PHOS U/L 103   AST U/L 10   ALT U/L 6*   BILIRUBIN TOTAL mg/dL 0.8    , Renal Lab Trend:   Results from last 7 days   Lab Units 09/24/24  0535 09/23/24  1536   POTASSIUM mmol/L 4.1 4.7   PHOSPHORUS mg/dL 5.1*  --    SODIUM mmol/L 138 137   MAGNESIUM mg/dL 2.06  --    EGFR mL/min/1.73m*2 54* 45*   BUN mg/dL 15 17   CREATININE mg/dL 1.39* 1.61*    , Vit D:   Lab Results   Component Value Date    VITD25 81 12/19/2023    , Vit B12:   Lab Results   Component Value Date    QIYTKEYW48 936 (H) 12/19/2023    , Iron Panel:   Lab Results   Component Value Date    IRON 69 12/19/2023    TIBC 191 (L) 12/19/2023    FERRITIN 671 (H) 03/13/2023    , Folate:   Lab Results   Component Value Date    FOLATE 11.4 12/19/2023        Nutrition Specific Medications:  Scheduled medications  amLODIPine, 2.5 mg, oral, Daily  aspirin, 81 mg, oral, Daily  atorvastatin, 40 mg, oral, Daily  cetirizine, 10 mg, oral, Daily  DULoxetine, 60 mg, oral, BID  escitalopram, 20 mg, oral, Daily  folic acid, 1 mg, oral, Daily  heparin (porcine), 5,000 Units, subcutaneous, q8h  ipratropium-albuteroL, 3 mL, nebulization, TID  levothyroxine, 50 mcg, oral, Daily  mirtazapine, 15 mg, oral, Nightly  montelukast, 10 mg, oral, Nightly  multivitamin with minerals, 1 tablet, oral, Daily  oxyCODONE, 5 mg, oral, Once  oxygen, , inhalation, Continuous - Inhalation  pantoprazole, 40 mg, oral, BID AC  pregabalin, 150 mg, oral, 4x daily  propranolol, 10 mg, oral, BID  sulfamethoxazole-trimethoprim, 1 tablet, oral, q12h SHANTEL  tamsulosin, 0.4 mg, oral, Nightly  [START ON 9/27/2024] thiamine, 100 mg, oral, Daily  thiamine, 100 mg, intravenous, Daily      Continuous medications      PRN medications  PRN medications: acetaminophen, benzocaine-menthol, busPIRone, dextromethorphan-guaifenesin, guaiFENesin, ipratropium-albuteroL, LORazepam **OR** LORazepam **OR** LORazepam, ondansetron **OR** ondansetron, oxyCODONE  0-10 (Numeric) Pain Score: 5 - Moderate pain     I/O:   Last BM Date: 09/24/24;      Dietary Orders (From admission, onward)       Start     Ordered    09/24/24 1252  Oral nutritional supplements  Until discontinued        Comments: vanilla   Question Answer Comment   Deliver with Breakfast    Select supplement: Ensure High Protein        09/24/24 1252    09/24/24 1252  Oral nutritional supplements  Until discontinued        Comments: 1 oz   Question Answer Comment   Deliver with Lunch    Select supplement: Pro-Stat Sugar Free        09/24/24 1252    09/23/24 2212  Adult diet Regular, Cardiac; 70 gm fat; 2 - 3 grams Sodium  Diet effective now        Question Answer Comment   Diet type Regular    Diet type Cardiac    Fat restriction: 70 gm fat    Sodium restriction: 2 - 3 grams Sodium        09/23/24 2211                   Estimated Needs:   Total Energy Estimated Needs (kCal):  (0050-5950 (15-18kcal/kg))     Total Protein Estimated Needs (g):  ( (1.2-1.5g/kg IBW))     Total Fluid Estimated Needs (mL):  (1mL/kcal)         Nutrition Diagnosis   Malnutrition Diagnosis  Patient has Malnutrition Diagnosis: No    Nutrition Diagnosis  Patient has Nutrition Diagnosis: Yes  Diagnosis Status (1): New  Nutrition Diagnosis 1: Inadequate oral intake  Related to (1): SOB/weakness  As Evidenced by (1): pt only consuming 1 sleeve of saltine crackers per day x 4 days.     Nutrition Interventions/Recommendations         Nutrition Prescription:  Individualized Nutrition Prescription Provided for : Diet: continue w/oral diet as ordered - Adult diet Regular, Cardiac; 70 gm fat; 2 - 3 grams Sodium        Nutrition Interventions:   Interventions: Meals and snacks, Medical food supplement  Meals and  Snacks: Fat-modified diet, Mineral-modified diet  Goal: will consume >75% of meals.  Medical Food Supplement: Commercial beverage  Goal: will provide vanilla Ensure HP at breakfast (160kcal, 16g pro per 8oz) and ProStat 1 oz daily at lunch (100kcal, 15g pro per 1oz)    Collaboration and Referral of Nutrition Care: Collaboration by nutrition professional with other providers  Goal: LINDSEY Carvajal    Nutrition Education:   Discussed increased nutrient needs (protein) for wound healing. Reviewed protein sources. Discussed ONS.  Encouraged pt to attempt small frequent meals rather than 1 large meal only per day. Discussed how only eating crackers daily is not adequate nutrition. Encouraged pt to discuss all over the counter vitamins/supplements w/PCP.      Nutrition Monitoring and Evaluation   Food/Nutrient Related History Monitoring  Monitoring and Evaluation Plan: Energy intake  Energy Intake: Estimated energy intake  Criteria: will meet 75% of estimated energy needs w/meals and ONS.       Biochemical Data, Medical Tests and Procedures  Monitoring and Evaluation Plan: Electrolyte/renal panel, Glucose/endocrine profile  Electrolyte and Renal Panel: Sodium, Potassium, Phosphorus, Creatinine, Magnesium, BUN  Criteria: WNR  Glucose/Endocrine Profile: Glucose, casual  Criteria: BG  mg/dL    Nutrition Focused Physical Findings  Monitoring and Evaluation Plan: Skin  Skin: Impaired wound healing  Criteria: promote healing     Time Spent (min): 60 minutes  Follow up 5-7 days  Last RD note 9/24/24

## 2024-09-24 NOTE — CONSULTS
Vancomycin Dosing by Pharmacy- INITIAL    Angus Redman is a 69 y.o. year old male who Pharmacy has been consulted for vancomycin dosing for pneumonia. Based on the patient's indication and renal status this patient will be dosed based on a goal AUC of 400-600.     Renal function is currently stable. CKD stage 3, but no information on baseline Scr after diagnosis    Visit Vitals  /56 (BP Location: Left arm, Patient Position: Lying)   Pulse 64   Temp 36 °C (96.8 °F) (Temporal)   Resp 20        Lab Results   Component Value Date    CREATININE 1.61 (H) 2024    CREATININE 1.25 2024    CREATININE 2.22 (H) 2024    CREATININE 1.27 2024        Patient weight is as follows:   Vitals:    24 2150   Weight: 114 kg (251 lb 8.7 oz)       Cultures:  No results found for the encounter in last 14 days.        No intake/output data recorded.  I/O during current shift:  No intake/output data recorded.    Temp (24hrs), Av.3 °C (97.3 °F), Min:36 °C (96.8 °F), Max:36.7 °C (98.1 °F)         Assessment/Plan     Patient will not be given a loading dose.  Will initiate vancomycin maintenance,  1000 mg every 12 hours.    This dosing regimen is predicted by [employer] to result in the following pharmacokinetic parameters:  Loading dose: N/A  Regimen: 1000 mg IV every 12 hours.  Start time: 03:30 on 2024  Exposure target: AUC24 (range)400-600 mg/L.hr   [id number]: 407 mg/L.hr  AUC24,ss: 535 mg/L.hr  Probability of AUC24 > 400: 93 %  Ctrough,ss: 17.4 mg/L  Probability of Ctrough,ss > 20: 27 %      Follow-up level will be ordered on  at 1000 unless clinically indicated sooner.  Will continue to monitor renal function daily while on vancomycin and order serum creatinine at least every 48 hours if not already ordered.  Follow for continued vancomycin needs, clinical response, and signs/symptoms of toxicity.       ADRIANA ROSS, PharmD

## 2024-09-24 NOTE — PROGRESS NOTES
Occupational Therapy                 Therapy Communication Note    Patient Name: Angus Redman  MRN: [id number]  Department: [facility]  Room: 2105/2105-A  Today's Date: 9/24/2024     Discipline: Occupational Therapy    Missed Visit Reason:  OT evaluation attempted at 09:37, patient away at thoracentesis.  Reattempt as appropriate.     Missed Time: Attempt    Comment:

## 2024-09-24 NOTE — CONSULTS
Consults  J90.0 Pleural effusion status post IR thoracentesis  J96.01 Acute on chronic hypoxic respiratory failure  I25.1 Atherosclerotic vascular disease sees Artie Haskins  I25.1 Nonobstructive LAD disease by CCTA with FFR at [place] in clinic    Workup at [place] by CC TA with FFR showed nonobstructive LAD lesion, normal echo after non-STEMI peak troponin 258    Holter monitor showed short PSVT on propranolol  Hypertension on amlodipine  No documented A-fib not on anticoagulants  Dyslipidemia being treated  Recurrent COPD exacerbations followed by pulmonary    Will review [facility] chart for greater understanding of the workup there    History Of Present Illness:    Angus Redman is a 69 y.o. male presenting with past medical history of dyslipidemia chronic kidney disease stage III diabetes hypertension PSVT paroxysmal A-fib not on blood thinners.  2-week Holter showed no atrial fibrillation per Dr. Harry he is a former smoker who stopped 25 years ago recent non-STEMI infarction.  He presents with acute hypoxic respiratory failure day 1 with known restrictive lung disease bilateral pleural effusions sees pulmonology Dr. Montano.  Patient declined surgical thoracic intervention previous admission had a BNP of 395 prompting concern for heart failure and a CT angio which showed moderate to large new left pleural effusion with small loculated right lung effusion but no pulmonary embolism.  He has a past history of recurrent falls left great toe osteomyelitis right second toe amputation July 2021 history of MRSA foot infections in the past at this time I see that he is on vancomycin.  He has underwent left thoracentesis found to have a left small pleural effusion with 265 mL serous fluid removed and sent for cytology    In the ED presented with home oxygen saturation 40-50 wearing 3 L oxygen 60 to 70% in triage was evaluated for PE versus pneumonia.  EKG showed no acute ischemic changes.  No white count  elevation was noted initially.  Was started initially on vancomycin in the ER for pneumonia which was later discontinued    Outpatient notes document hypothyroidism on Synthroid 50 hypokalemia on replacement anemia on iron therapy atherosclerotic vascular disease on aspirin atorvastatin depressive disorder on Lexapro.  According to his primary care he is DNR comfort care with a baseline creatinine value of 2.2 follows with Dr. Jang for podiatry and Dr. Harry cardiology and advanced heart failure clinic.  He has a past history of drinking 2 L of wine a day in the past past history of alcoholic gastritis and asthma peripheral neuropathy.  Echo in April 11, 2024 by Dr. Corey Oneal showed normal left ventricular function with no significant valvular heart disease.  Remote carotid duplex showed mild plaque in the right carotid bulb and left carotid bulb nonobstructive in 2016.  A PVR study in July 2021 per Dr. Jaime Marsh showed normal triphasic flow bilaterally with HUMBLE ratios 0.96 on the right 1.08 on the left posterior tibial right dorsalis pedis ratio is 1.13 and 1.02 right and left    He now follows with Angel Harry nonobstructive CAD PSVT former smoker 1999 quit drinking alcohol status post echo and Holter monitor with PSVT but no documented atrial fibrillation followed at  advanced heart failure clinic is updated cardiac medicines include amlodipine 2.5, metoprolol to tartrate 100 daily, Inderal 10 twice daily.  He had a recent non-STEMI at [facility] high-sensitivity troponins 128 total cholesterol 203 HDL 40  triglycerides 132 CT FFR negative for LAD lesion echo with preserved biventricular function he remains on propranolol for intermittent PSVT his hypertension is controlled with amlodipine 2.5 I will review his [facility] epic chart on [facility] later    cho (4/11/24):  1. Left ventricular systolic function is normal with a 60-65% estimated ejection  fraction.  2. Poorly visualized anatomical structures due to suboptimal image quality.     Holter (2/21-3/6/24):  Sinus rhythm, no AF, occ SVT (longest lasting 20 seconds)     CCTA (2/27/24):  1. Right dominant system  2. 50% stenosis appreciated at the level the prox/mid LAD-CT FFR of 0.82 in the mid LAD; 25-50% stenosis appreciated in the proximal/mid RCA - CT FFR results are not available..  3. Nonobstructive CAD revolving the remaining vessels     ECG (2/6/24):  Sinus rhythm (HR 98)     Echo (9/12/23):  1. Left ventricular systolic function is normal with a 60% estimated ejection fraction.  2. Sinus rhythm (HR 70s).    Echocardiogram:    PHYSICIAN INTERPRETATION:  Left Ventricle: The left ventricular systolic function is normal, with an estimated ejection fraction of 60-65%. There are no regional wall motion abnormalities. The left ventricular cavity size is normal. Left ventricular diastolic filling was not assessed.  Left Atrium: The left atrium is upper limits of normal in size. A bubble study using agitated saline was performed. Bubble study is negative.  Right Ventricle: The right ventricle is normal in size. There is normal right ventricular global systolic function.  Right Atrium: The right atrium is normal in size.  Aortic Valve: The aortic valve was not well visualized. There is no evidence of aortic valve regurgitation.  Mitral Valve: The mitral valve is normal in structure. There is no evidence of mitral valve regurgitation.  Tricuspid Valve: The tricuspid valve was not well visualized. There is trace tricuspid regurgitation. The right ventricular systolic pressure is unable to be estimated.  Pulmonic Valve: The pulmonic valve was not assessed. Pulmonic valve regurgitation was not assessed.  Pericardium: There is a trivial pericardial effusion.  Aorta: The aortic root is normal.  Systemic Veins: The inferior vena cava appears to be of normal size. There is IVC inspiratory collapse greater than 50%.         CONCLUSIONS:   1. Left ventricular systolic function is normal with a 60-65% estimated ejection fraction.   2. Poorly visualized anatomical structures due to suboptimal image quality.     QUANTITATIVE DATA SUMMARY:  TRICUSPID VALVE/RVSP:                    Normal Ranges:  IVC Diam: 2.10 cm        [id number] Corey Hsu MD  Electronically signed on 4/11/2024 at 1:54:45 PM        Last Recorded Vitals:  Vitals:    09/24/24 0940 09/24/24 0951 09/24/24 0957 09/24/24 1210   BP:   137/72 119/60   BP Location:    Right arm   Patient Position:    Lying   Pulse:   69 68   Resp:   24 18   Temp:    36.7 °C (98.1 °F)   TempSrc:    Temporal   SpO2: 91% 92% 93% 92%   Weight:       Height:           Last Labs:  CBC - 9/24/2024:  5:35 AM  10.0 8.9 296    30.5      CMP - 9/24/2024:  5:35 AM  8.4 7.3 10 --- 0.8   5.1 3.4 6 103      PTT - No results in last year.  _   _ _     Troponin I, High Sensitivity   Date/Time Value Ref Range Status   09/23/2024 04:39 PM 3 0 - 20 ng/L Final   09/23/2024 03:36 PM 4 0 - 20 ng/L Final     BNP   Date/Time Value Ref Range Status   09/24/2024 05:35  (H) 0 - 99 pg/mL Final   02/13/2024 03:07  (H) 0 - 99 pg/mL Final     Hemoglobin A1C   Date/Time Value Ref Range Status   12/19/2023 02:52 PM 4.9 see below % Final   02/23/2018 01:42 PM 4.7 % Final     Comment:          Diagnosis of Diabetes-Adults   Non-Diabetic: < or = 5.6%   Increased risk for developing diabetes: 5.7-6.4%   Diagnostic of diabetes: > or = 6.5%  .       Monitoring of Diabetes                Age (y)     Therapeutic Goal (%)   Adults:          >18           <7.0   Pediatrics:    13-18           <7.5                   7-12           <8.0                   0- 6            7.5-8.5   American Diabetes Association. Diabetes Care 33(S1), Jan 2010.       LDL Calculated   Date/Time Value Ref Range Status   02/13/2024 03:07  (H) <=99 mg/dL Final     Comment:                                 Near   Borderline      AGE      Desirable  " Optimal    High     High     Very High     0-19 Y     0 - 109     ---    110-129   >/= 130     ----    20-24 Y     0 - 119     ---    120-159   >/= 160     ----      >24 Y     0 -  99   100-129  130-159   160-189     >/=190       VLDL   Date/Time Value Ref Range Status   02/13/2024 03:07 PM 26 0 - 40 mg/dL Final      Last I/O:  I/O last 3 completed shifts:  In: 1280 (11.2 mL/kg) [P.O.:780; IV Piggyback:500]  Out: 1100 (9.6 mL/kg) [Urine:1100 (0.3 mL/kg/hr)]  Weight: 114.1 kg     Past Cardiology Tests (Last 3 Years):  EKG:  Electrocardiogram, 12-lead PRN ACS symptoms 09/24/2024 (Preliminary)      ECG 12 Lead 09/23/2024 (Preliminary)      ECG 12 lead (Clinic Performed) 02/06/2024    Echo:  Transthoracic Echo (TTE) Limited 04/11/2024    Ejection Fractions:  No results found for: \"EF\"  Cath:  No results found for this or any previous visit from the past 1095 days.    Stress Test:  No results found for this or any previous visit from the past 1095 days.    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) (07/27/2017), Osteomyelitis, unspecified (Multi) (02/26/2022), Other chest pain (09/21/2013), Other conditions influencing health status, Other conditions influencing health status (10/08/2017), Other " specified health status (07/23/2014), Patient's noncompliance with other medical treatment and regimen due to unspecified reason (10/28/2016), Patient's other noncompliance with medication regimen (06/04/2016), Personal history of (healed) stress fracture (12/30/2013), Personal history of diseases of the skin and subcutaneous tissue (02/26/2022), Personal history of other diseases of the nervous system and sense organs (03/25/2016), Personal history of other diseases of the nervous system and sense organs (02/02/2016), Personal history of other endocrine, nutritional and metabolic disease (07/13/2015), Personal history of other specified conditions (07/28/2019), Personal history of other specified conditions (05/07/2018), Personal history of other specified conditions (06/25/2015), Unspecified fracture of left foot, initial encounter for closed fracture (06/04/2016), Unspecified injury of head, initial encounter (04/20/2016), Unspecified injury of unspecified elbow, initial encounter (04/07/2017), and Unsteadiness on feet (04/15/2014).    Past Surgical History:  He has a past surgical history that includes Colonoscopy (10/09/2013); Other surgical history (07/28/2019); Other surgical history (04/15/2014); Other surgical history (01/28/2020); Other surgical history (11/24/2015); Other surgical history (04/19/2017); Other surgical history (10/09/2013); and Skin cancer excision (10/09/2013).      Social History:  He reports that he quit smoking about 25 years ago. His smoking use included cigarettes. He has never used smokeless tobacco. He reports that he does not currently use alcohol. He reports that he does not use drugs.    Family History:  Family History   Problem Relation Name Age of Onset    Other (cardiac pacemaker) Mother      Coronary artery disease Mother      Other (history of heart artery stent) Mother      Cancer Mother      Other (kurtis cell cancer) Mother      Arthritis Mother      Mental illness Brother           living in handicapped assisted living facility permanently [Other]    Other (angina pectoris) Paternal Grandmother          Allergies:  Latex, Codeine, House dust mite, Hydrocodone-acetaminophen, Lactose, Lisinopril, Mold, and Tree and shrub pollen    Inpatient Medications:  Scheduled medications   Medication Dose Route Frequency    amLODIPine  2.5 mg oral Daily    aspirin  81 mg oral Daily    atorvastatin  40 mg oral Daily    cetirizine  10 mg oral Daily    DULoxetine  60 mg oral BID    escitalopram  20 mg oral Daily    folic acid  1 mg oral Daily    heparin (porcine)  5,000 Units subcutaneous q8h    ipratropium-albuteroL  3 mL nebulization TID    levothyroxine  50 mcg oral Daily    mirtazapine  15 mg oral Nightly    montelukast  10 mg oral Nightly    multivitamin with minerals  1 tablet oral Daily    oxyCODONE  5 mg oral Once    oxygen   inhalation Continuous - Inhalation    pantoprazole  40 mg oral BID AC    pregabalin  150 mg oral 4x daily    propranolol  10 mg oral BID    sulfamethoxazole-trimethoprim  1 tablet oral q12h SHANTEL    tamsulosin  0.4 mg oral Nightly    [START ON 9/27/2024] thiamine  100 mg oral Daily    thiamine  100 mg intravenous Daily     PRN medications   Medication    acetaminophen    benzocaine-menthol    busPIRone    dextromethorphan-guaifenesin    guaiFENesin    ipratropium-albuteroL    LORazepam    Or    LORazepam    Or    LORazepam    ondansetron    Or    ondansetron    oxyCODONE     Continuous Medications   Medication Dose Last Rate     Outpatient Medications:  Current Outpatient Medications   Medication Instructions    albuterol (ProAir HFA) 90 mcg/actuation inhaler 2 puffs, inhalation, Every 6 hours PRN    alendronate (Fosamax) 70 mg tablet Take 1 tablet by mouth every 7 days. Take in the morning with a full glass of water at least 30 minutes before first food, drink, or medications of the day.    amLODIPine (NORVASC) 2.5 mg, oral, Daily, For blood pressure    aspirin 81 mg,  oral, Daily    atorvastatin (LIPITOR) 40 mg, oral, Daily    busPIRone (BUSPAR) 5 mg, oral, 3 times daily PRN    cyanocobalamin (VITAMIN B-12) 1,000 mcg, oral, Daily, As directed    DULoxetine (CYMBALTA) 60 mg, oral, 2 times daily    ergocalciferol (VITAMIN D-2) 1,250 mcg, oral, Once Weekly    escitalopram (LEXAPRO) 20 mg, oral, Daily PRN    FeroSuL 325 mg, oral, Daily with breakfast    fexofenadine (ALLEGRA) 180 mg, oral, Daily PRN    folic acid (Folvite) 1 mg tablet TAKE ONE TABLET BY MOUTH EVERY DAY for folate deficiency    levothyroxine (SYNTHROID, LEVOXYL) 50 mcg, oral, Daily before breakfast    mirtazapine (REMERON) 15 mg, oral, Nightly    montelukast (SINGULAIR) 10 mg, oral, Nightly    pantoprazole (PROTONIX) 40 mg, oral, 2 times daily    potassium chloride CR 20 mEq ER tablet 20 mEq, oral, 2 times daily, Do not crush or chew.    pregabalin (LYRICA) 150 mg, oral, Every 6 hours    propranolol (INDERAL) 10 mg, oral, 2 times daily    sulfamethoxazole-trimethoprim (Bactrim DS) 800-160 mg tablet 1 tablet, oral, 2 times daily    tamsulosin (FLOMAX) 0.4 mg, oral, Nightly     Results for orders placed or performed during the hospital encounter of 09/23/24 (from the past 96 hour(s))   CBC and Auto Differential   Result Value Ref Range    WBC 10.8 4.4 - 11.3 x10*3/uL    nRBC 0.3 (H) 0.0 - 0.0 /100 WBCs    RBC 3.33 (L) 4.50 - 5.90 x10*6/uL    Hemoglobin 9.7 (L) 13.5 - 17.5 g/dL    Hematocrit 33.3 (L) 41.0 - 52.0 %     80 - 100 fL    MCH 29.1 26.0 - 34.0 pg    MCHC 29.1 (L) 32.0 - 36.0 g/dL    RDW 17.2 (H) 11.5 - 14.5 %    Platelets 365 150 - 450 x10*3/uL    Neutrophils % 72.4 40.0 - 80.0 %    Immature Granulocytes %, Automated 1.8 (H) 0.0 - 0.9 %    Lymphocytes % 16.2 13.0 - 44.0 %    Monocytes % 6.5 2.0 - 10.0 %    Eosinophils % 2.8 0.0 - 6.0 %    Basophils % 0.3 0.0 - 2.0 %    Neutrophils Absolute 7.84 (H) 1.60 - 5.50 x10*3/uL    Immature Granulocytes Absolute, Automated 0.19 0.00 - 0.50 x10*3/uL    Lymphocytes  Absolute 1.75 0.80 - 3.00 x10*3/uL    Monocytes Absolute 0.70 0.05 - 0.80 x10*3/uL    Eosinophils Absolute 0.30 0.00 - 0.40 x10*3/uL    Basophils Absolute 0.03 0.00 - 0.10 x10*3/uL   Comprehensive Metabolic Panel   Result Value Ref Range    Glucose 99 74 - 99 mg/dL    Sodium 137 136 - 145 mmol/L    Potassium 4.7 3.5 - 5.3 mmol/L    Chloride 101 98 - 107 mmol/L    Bicarbonate 28 21 - 32 mmol/L    Anion Gap 13 10 - 20 mmol/L    Urea Nitrogen 17 6 - 23 mg/dL    Creatinine 1.61 (H) 0.50 - 1.30 mg/dL    eGFR 45 (L) >60 mL/min/1.73m*2    Calcium 9.1 8.6 - 10.3 mg/dL    Albumin 3.9 3.4 - 5.0 g/dL    Alkaline Phosphatase 103 33 - 136 U/L    Total Protein 7.7 6.4 - 8.2 g/dL    AST 10 9 - 39 U/L    Bilirubin, Total 0.8 0.0 - 1.2 mg/dL    ALT 6 (L) 10 - 52 U/L   Lactate   Result Value Ref Range    Lactate 0.9 0.4 - 2.0 mmol/L   Troponin I, High Sensitivity, Initial   Result Value Ref Range    Troponin I, High Sensitivity 4 0 - 20 ng/L   ECG 12 Lead   Result Value Ref Range    Ventricular Rate 66 BPM    Atrial Rate 66 BPM    VT Interval 212 ms    QRS Duration 80 ms    QT Interval 410 ms    QTC Calculation(Bazett) 429 ms    P Axis 33 degrees    R Axis 25 degrees    T Axis 64 degrees    QRS Count 11 beats    Q Onset 221 ms    P Onset 115 ms    P Offset 181 ms    T Offset 426 ms    QTC Fredericia 423 ms   Blood Culture    Specimen: Peripheral Venipuncture; Blood culture   Result Value Ref Range    Blood Culture Loaded on Instrument - Culture in progress    Blood Culture    Specimen: Peripheral Venipuncture; Blood culture   Result Value Ref Range    Blood Culture Loaded on Instrument - Culture in progress    Troponin, High Sensitivity, 1 Hour   Result Value Ref Range    Troponin I, High Sensitivity 3 0 - 20 ng/L   D-dimer, VTE Exclusion   Result Value Ref Range    D-Dimer, Quantitative VTE Exclusion 1,484 (H) <=500 ng/mL FEU   Protein, Total   Result Value Ref Range    Total Protein 7.3 6.4 - 8.2 g/dL   Lactate Dehydrogenase    Result Value Ref Range     84 - 246 U/L   Triglycerides   Result Value Ref Range    Triglycerides 104 0 - 149 mg/dL   Urinalysis with Reflex Culture and Microscopic   Result Value Ref Range    Color, Urine Light-Yellow Light-Yellow, Yellow, Dark-Yellow    Appearance, Urine Clear Clear    Specific Gravity, Urine 1.010 1.005 - 1.035    pH, Urine 5.5 5.0, 5.5, 6.0, 6.5, 7.0, 7.5, 8.0    Protein, Urine NEGATIVE NEGATIVE, 10 (TRACE), 20 (TRACE) mg/dL    Glucose, Urine Normal Normal mg/dL    Blood, Urine NEGATIVE NEGATIVE    Ketones, Urine NEGATIVE NEGATIVE mg/dL    Bilirubin, Urine NEGATIVE NEGATIVE    Urobilinogen, Urine Normal Normal mg/dL    Nitrite, Urine NEGATIVE NEGATIVE    Leukocyte Esterase, Urine 250 Mike/µL (A) NEGATIVE   Extra Urine Gray Tube   Result Value Ref Range    Extra Tube Hold for add-ons.    Microscopic Only, Urine   Result Value Ref Range    WBC, Urine 21-50 (A) 1-5, NONE /HPF    RBC, Urine 1-2 NONE, 1-2, 3-5 /HPF   Sars-CoV-2 PCR   Result Value Ref Range    Coronavirus 2019, PCR Not Detected Not Detected   Influenza A, and B PCR   Result Value Ref Range    Flu A Result Not Detected Not Detected    Flu B Result Not Detected Not Detected   MRSA Surveillance for Vancomycin De-escalation, PCR    Specimen: Anterior Nares; Swab   Result Value Ref Range    MRSA PCR Detected (A) Not Detected   Renal Function Panel   Result Value Ref Range    Glucose 103 (H) 74 - 99 mg/dL    Sodium 138 136 - 145 mmol/L    Potassium 4.1 3.5 - 5.3 mmol/L    Chloride 103 98 - 107 mmol/L    Bicarbonate 28 21 - 32 mmol/L    Anion Gap 11 10 - 20 mmol/L    Urea Nitrogen 15 6 - 23 mg/dL    Creatinine 1.39 (H) 0.50 - 1.30 mg/dL    eGFR 54 (L) >60 mL/min/1.73m*2    Calcium 8.4 (L) 8.6 - 10.3 mg/dL    Phosphorus 5.1 (H) 2.5 - 4.9 mg/dL    Albumin 3.4 3.4 - 5.0 g/dL   CBC and Auto Differential   Result Value Ref Range    WBC 10.0 4.4 - 11.3 x10*3/uL    nRBC 0.2 (H) 0.0 - 0.0 /100 WBCs    RBC 3.04 (L) 4.50 - 5.90 x10*6/uL     Hemoglobin 8.9 (L) 13.5 - 17.5 g/dL    Hematocrit 30.5 (L) 41.0 - 52.0 %     80 - 100 fL    MCH 29.3 26.0 - 34.0 pg    MCHC 29.2 (L) 32.0 - 36.0 g/dL    RDW 17.2 (H) 11.5 - 14.5 %    Platelets 296 150 - 450 x10*3/uL    Neutrophils % 76.3 40.0 - 80.0 %    Immature Granulocytes %, Automated 0.9 0.0 - 0.9 %    Lymphocytes % 11.8 13.0 - 44.0 %    Monocytes % 7.8 2.0 - 10.0 %    Eosinophils % 3.0 0.0 - 6.0 %    Basophils % 0.2 0.0 - 2.0 %    Neutrophils Absolute 7.63 (H) 1.60 - 5.50 x10*3/uL    Immature Granulocytes Absolute, Automated 0.09 0.00 - 0.50 x10*3/uL    Lymphocytes Absolute 1.18 0.80 - 3.00 x10*3/uL    Monocytes Absolute 0.78 0.05 - 0.80 x10*3/uL    Eosinophils Absolute 0.30 0.00 - 0.40 x10*3/uL    Basophils Absolute 0.02 0.00 - 0.10 x10*3/uL   Magnesium   Result Value Ref Range    Magnesium 2.06 1.60 - 2.40 mg/dL   PST Top   Result Value Ref Range    Extra Tube Hold for add-ons.    B-type natriuretic peptide   Result Value Ref Range     (H) 0 - 99 pg/mL   Electrocardiogram, 12-lead PRN ACS symptoms   Result Value Ref Range    Ventricular Rate 59 BPM    Atrial Rate 59 BPM    NE Interval 216 ms    QRS Duration 76 ms    QT Interval 450 ms    QTC Calculation(Bazett) 445 ms    P Axis 180 degrees    R Axis 2 degrees    T Axis 37 degrees    QRS Count 9 beats    Q Onset 222 ms    P Onset 114 ms    P Offset 165 ms    T Offset 447 ms    QTC Fredericia 447 ms   Body Fluid Cell Count   Result Value Ref Range    Color, Fluid Winsome (A) Colorless, Straw, Yellow    Clarity, Fluid Hazy (A) Clear    WBC, Fluid 1,127 See Comment /uL    RBC, Fluid 4,000 0  /uL /uL   Body Fluid Differential   Result Value Ref Range    Neutrophils %, Manual, Fluid 63 <25 % %    Lymphocytes %, Manual, Fluid 25 <75 % %    Mono/Macrophages %, Manual, Fluid 12 <70 % %    Eosinophils %, Manual, Fluid 0 0 % %    Basophils %, Manual, Fluid 0 0 % %    Immature Granulocytes %, Manual, Fluid 0 0 % %    Blasts %, Manual, Fluid 0 0 % %     Unclassified Cells %, Manual, Fluid 0 0 % %    Plasma Cells %, Manual, Fluid 0 0 % %    Total Cells Counted, Fluid 100          Physical Exam:  Subjective:   Examination:   General Examination:   General Appearance: Well developed, well nourished, in no acute distress.  Advanced COPD status post IR drainage left pleural effusion which was mild to 50  Head: normocephalic, atraumatic   Eyes: Anicteric sclera. Pupils are equally round and reactive to light.  Extraocular movements are intact.    Ears: normal   Oral: Cavity: mucosa moist.   Throat: clear.   Neck/Thyroid: neck supple, full range of motion, no cervical lymphadenopathy.   Skin: warm and dry, no suspicious lesions.    Heart: regular rate and rhythm, S1, S2 normal, no murmurs.   Lungs: clear to auscultation bilaterally.   Abdomen: soft, non-tender, non-distended, bowl sound present, normal.   Extremities: no clubbing, no cyanosis, no edema.  Toe amputations recurrent MRSA foot infections  Neuro: non-focal, motor strength normal upper lower extremities, sensory exam intact.       Assessment/Plan   J90.0 Pleural effusion status post IR thoracentesis  J96.01 Acute on chronic hypoxic respiratory failure  I25.1 Atherosclerotic vascular disease sees Artie Haskins  I25.1 Nonobstructive LAD disease by CCTA with FFR at [place] in clinic    Workup at [place] by CC TA with FFR showed nonobstructive LAD lesion, normal echo after non-STEMI peak troponin 258    Holter monitor showed short PSVT on propranolol  Hypertension on amlodipine  No documented A-fib not on anticoagulants  Dyslipidemia being treated  Recurrent COPD exacerbations followed by pulmonary    Will review [facility] epic chart for greater understanding of the workup there      Discussed this afternoon with Dr. Angel Harry who suggested adding Jardiance 10 mg daily if cytology negative and HFpEF the leading diagnosis      Code Status:  DNR    I spent 70 minutes in the professional and overall care  of this patient.        Angus Nixon MD

## 2024-09-24 NOTE — PROGRESS NOTES
Angus Redman is a 69 y.o. male on day 1 of admission presenting with Acute hypoxic respiratory failure (Multi).    Subjective   No overnight events. Patient is feeling well this morning. He has no acute complaints. He has no lightheadedness, dizziness, chest pain, nausea, vomiting, diarrhea or constipation. When asked about asbestos exposure, he said not directly but his father had occupational exposure while he was a child. Patient personally denies any occupational exposures - previously exmployed as  at [employer]. He says that he usually sleeps with several pillows under his head for acid reflux management. He denies any shortness of breath when lying flat.  He also states that he lives with 3 cats and his sister has a pet parrot.     Objective     Physical Exam  Constitutional:       Appearance: Normal appearance. He is normal weight.   HENT:      Head: Normocephalic and atraumatic.      Nose: Nose normal. No congestion or rhinorrhea.   Eyes:      Conjunctiva/sclera: Conjunctivae normal.      Pupils: Pupils are equal, round, and reactive to light.   Cardiovascular:      Rate and Rhythm: Normal rate and regular rhythm.      Pulses: Normal pulses.      Heart sounds: Normal heart sounds. No murmur heard.     No friction rub. No gallop.   Pulmonary:      Effort: Pulmonary effort is normal. No respiratory distress.      Comments: Inspiratory crackles along lung bases  Abdominal:      General: Abdomen is flat. Bowel sounds are normal.      Palpations: Abdomen is soft.   Musculoskeletal:         General: Normal range of motion.      Right lower leg: No edema.      Left lower leg: No edema.   Skin:     General: Skin is warm and dry.   Neurological:      General: No focal deficit present.      Mental Status: He is alert and oriented to person, place, and time. Mental status is at baseline.   Psychiatric:         Mood and Affect: Mood normal.         Behavior: Behavior normal.         Thought  Content: Thought content normal.         Last Recorded Vitals  Blood pressure 137/72, pulse 69, temperature 36.6 °C (97.9 °F), temperature source Temporal, resp. rate 24, height 1.829 m (6'), weight 114 kg (251 lb 8.7 oz), SpO2 93%.  Intake/Output last 3 Shifts:  I/O last 3 completed shifts:  In: 1280 (11.2 mL/kg) [P.O.:780; IV Piggyback:500]  Out: 1100 (9.6 mL/kg) [Urine:1100 (0.3 mL/kg/hr)]  Weight: 114.1 kg     Relevant Results  Scheduled medications  amLODIPine, 2.5 mg, oral, Daily  aspirin, 81 mg, oral, Daily  atorvastatin, 40 mg, oral, Daily  cetirizine, 10 mg, oral, Daily  DULoxetine, 60 mg, oral, BID  escitalopram, 20 mg, oral, Daily  folic acid, 1 mg, oral, Daily  heparin (porcine), 5,000 Units, subcutaneous, q8h  ipratropium-albuteroL, 3 mL, nebulization, TID  levothyroxine, 50 mcg, oral, Daily  mirtazapine, 15 mg, oral, Nightly  montelukast, 10 mg, oral, Nightly  multivitamin with minerals, 1 tablet, oral, Daily  oxyCODONE, 5 mg, oral, Once  oxygen, , inhalation, Continuous - Inhalation  pantoprazole, 40 mg, oral, BID AC  pregabalin, 150 mg, oral, 4x daily  propranolol, 10 mg, oral, BID  sulfamethoxazole-trimethoprim, 1 tablet, oral, q12h SHANTEL  tamsulosin, 0.4 mg, oral, Nightly  [START ON 9/27/2024] thiamine, 100 mg, oral, Daily  thiamine, 100 mg, intravenous, Daily      Continuous medications     PRN medications  PRN medications: acetaminophen, benzocaine-menthol, busPIRone, dextromethorphan-guaifenesin, guaiFENesin, ipratropium-albuteroL, LORazepam **OR** LORazepam **OR** LORazepam, ondansetron **OR** ondansetron, oxyCODONE     Results for orders placed or performed during the hospital encounter of 09/23/24 (from the past 24 hour(s))   CBC and Auto Differential   Result Value Ref Range    WBC 10.8 4.4 - 11.3 x10*3/uL    nRBC 0.3 (H) 0.0 - 0.0 /100 WBCs    RBC 3.33 (L) 4.50 - 5.90 x10*6/uL    Hemoglobin 9.7 (L) 13.5 - 17.5 g/dL    Hematocrit 33.3 (L) 41.0 - 52.0 %     80 - 100 fL    MCH 29.1 26.0 -  34.0 pg    MCHC 29.1 (L) 32.0 - 36.0 g/dL    RDW 17.2 (H) 11.5 - 14.5 %    Platelets 365 150 - 450 x10*3/uL    Neutrophils % 72.4 40.0 - 80.0 %    Immature Granulocytes %, Automated 1.8 (H) 0.0 - 0.9 %    Lymphocytes % 16.2 13.0 - 44.0 %    Monocytes % 6.5 2.0 - 10.0 %    Eosinophils % 2.8 0.0 - 6.0 %    Basophils % 0.3 0.0 - 2.0 %    Neutrophils Absolute 7.84 (H) 1.60 - 5.50 x10*3/uL    Immature Granulocytes Absolute, Automated 0.19 0.00 - 0.50 x10*3/uL    Lymphocytes Absolute 1.75 0.80 - 3.00 x10*3/uL    Monocytes Absolute 0.70 0.05 - 0.80 x10*3/uL    Eosinophils Absolute 0.30 0.00 - 0.40 x10*3/uL    Basophils Absolute 0.03 0.00 - 0.10 x10*3/uL   Comprehensive Metabolic Panel   Result Value Ref Range    Glucose 99 74 - 99 mg/dL    Sodium 137 136 - 145 mmol/L    Potassium 4.7 3.5 - 5.3 mmol/L    Chloride 101 98 - 107 mmol/L    Bicarbonate 28 21 - 32 mmol/L    Anion Gap 13 10 - 20 mmol/L    Urea Nitrogen 17 6 - 23 mg/dL    Creatinine 1.61 (H) 0.50 - 1.30 mg/dL    eGFR 45 (L) >60 mL/min/1.73m*2    Calcium 9.1 8.6 - 10.3 mg/dL    Albumin 3.9 3.4 - 5.0 g/dL    Alkaline Phosphatase 103 33 - 136 U/L    Total Protein 7.7 6.4 - 8.2 g/dL    AST 10 9 - 39 U/L    Bilirubin, Total 0.8 0.0 - 1.2 mg/dL    ALT 6 (L) 10 - 52 U/L   Lactate   Result Value Ref Range    Lactate 0.9 0.4 - 2.0 mmol/L   Troponin I, High Sensitivity, Initial   Result Value Ref Range    Troponin I, High Sensitivity 4 0 - 20 ng/L   ECG 12 Lead   Result Value Ref Range    Ventricular Rate 66 BPM    Atrial Rate 66 BPM    MD Interval 212 ms    QRS Duration 80 ms    QT Interval 410 ms    QTC Calculation(Bazett) 429 ms    P Axis 33 degrees    R Axis 25 degrees    T Axis 64 degrees    QRS Count 11 beats    Q Onset 221 ms    P Onset 115 ms    P Offset 181 ms    T Offset 426 ms    QTC Fredericia 423 ms   Blood Culture    Specimen: Peripheral Venipuncture; Blood culture   Result Value Ref Range    Blood Culture Loaded on Instrument - Culture in progress    Blood  Culture    Specimen: Peripheral Venipuncture; Blood culture   Result Value Ref Range    Blood Culture Loaded on Instrument - Culture in progress    Troponin, High Sensitivity, 1 Hour   Result Value Ref Range    Troponin I, High Sensitivity 3 0 - 20 ng/L   D-dimer, VTE Exclusion   Result Value Ref Range    D-Dimer, Quantitative VTE Exclusion 1,484 (H) <=500 ng/mL FEU   Protein, Total   Result Value Ref Range    Total Protein 7.3 6.4 - 8.2 g/dL   Lactate Dehydrogenase   Result Value Ref Range     84 - 246 U/L   Triglycerides   Result Value Ref Range    Triglycerides 104 0 - 149 mg/dL   Urinalysis with Reflex Culture and Microscopic   Result Value Ref Range    Color, Urine Light-Yellow Light-Yellow, Yellow, Dark-Yellow    Appearance, Urine Clear Clear    Specific Gravity, Urine 1.010 1.005 - 1.035    pH, Urine 5.5 5.0, 5.5, 6.0, 6.5, 7.0, 7.5, 8.0    Protein, Urine NEGATIVE NEGATIVE, 10 (TRACE), 20 (TRACE) mg/dL    Glucose, Urine Normal Normal mg/dL    Blood, Urine NEGATIVE NEGATIVE    Ketones, Urine NEGATIVE NEGATIVE mg/dL    Bilirubin, Urine NEGATIVE NEGATIVE    Urobilinogen, Urine Normal Normal mg/dL    Nitrite, Urine NEGATIVE NEGATIVE    Leukocyte Esterase, Urine 250 Mike/µL (A) NEGATIVE   Extra Urine Gray Tube   Result Value Ref Range    Extra Tube Hold for add-ons.    Microscopic Only, Urine   Result Value Ref Range    WBC, Urine 21-50 (A) 1-5, NONE /HPF    RBC, Urine 1-2 NONE, 1-2, 3-5 /HPF   Sars-CoV-2 PCR   Result Value Ref Range    Coronavirus 2019, PCR Not Detected Not Detected   Influenza A, and B PCR   Result Value Ref Range    Flu A Result Not Detected Not Detected    Flu B Result Not Detected Not Detected   MRSA Surveillance for Vancomycin De-escalation, PCR    Specimen: Anterior Nares; Swab   Result Value Ref Range    MRSA PCR Detected (A) Not Detected   Renal Function Panel   Result Value Ref Range    Glucose 103 (H) 74 - 99 mg/dL    Sodium 138 136 - 145 mmol/L    Potassium 4.1 3.5 - 5.3 mmol/L     Chloride 103 98 - 107 mmol/L    Bicarbonate 28 21 - 32 mmol/L    Anion Gap 11 10 - 20 mmol/L    Urea Nitrogen 15 6 - 23 mg/dL    Creatinine 1.39 (H) 0.50 - 1.30 mg/dL    eGFR 54 (L) >60 mL/min/1.73m*2    Calcium 8.4 (L) 8.6 - 10.3 mg/dL    Phosphorus 5.1 (H) 2.5 - 4.9 mg/dL    Albumin 3.4 3.4 - 5.0 g/dL   CBC and Auto Differential   Result Value Ref Range    WBC 10.0 4.4 - 11.3 x10*3/uL    nRBC 0.2 (H) 0.0 - 0.0 /100 WBCs    RBC 3.04 (L) 4.50 - 5.90 x10*6/uL    Hemoglobin 8.9 (L) 13.5 - 17.5 g/dL    Hematocrit 30.5 (L) 41.0 - 52.0 %     80 - 100 fL    MCH 29.3 26.0 - 34.0 pg    MCHC 29.2 (L) 32.0 - 36.0 g/dL    RDW 17.2 (H) 11.5 - 14.5 %    Platelets 296 150 - 450 x10*3/uL    Neutrophils % 76.3 40.0 - 80.0 %    Immature Granulocytes %, Automated 0.9 0.0 - 0.9 %    Lymphocytes % 11.8 13.0 - 44.0 %    Monocytes % 7.8 2.0 - 10.0 %    Eosinophils % 3.0 0.0 - 6.0 %    Basophils % 0.2 0.0 - 2.0 %    Neutrophils Absolute 7.63 (H) 1.60 - 5.50 x10*3/uL    Immature Granulocytes Absolute, Automated 0.09 0.00 - 0.50 x10*3/uL    Lymphocytes Absolute 1.18 0.80 - 3.00 x10*3/uL    Monocytes Absolute 0.78 0.05 - 0.80 x10*3/uL    Eosinophils Absolute 0.30 0.00 - 0.40 x10*3/uL    Basophils Absolute 0.02 0.00 - 0.10 x10*3/uL   Magnesium   Result Value Ref Range    Magnesium 2.06 1.60 - 2.40 mg/dL   PST Top   Result Value Ref Range    Extra Tube Hold for add-ons.    B-type natriuretic peptide   Result Value Ref Range     (H) 0 - 99 pg/mL      ECG 12 Lead    Result Date: 9/24/2024  Sinus rhythm with 1st degree AV block Low voltage QRS Borderline ECG When compared with ECG of 12-JUL-2021 19:51, QRS axis Shifted right    CT angio chest for pulmonary embolism    Result Date: 9/23/2024  Interpreted By:  Shanna Flores, STUDY: CT ANGIO CHEST FOR PULMONARY EMBOLISM;  9/23/2024 8:17 pm   INDICATION: Signs/Symptoms:D-dimer 1400.     COMPARISON: CT of the chest dated 03/27/2024.   ACCESSION NUMBER(S): [id number]    ORDERING CLINICIAN: SHAYLA ARNOLD   TECHNIQUE: Helical data acquisition of the chest was obtained after intravenous administration of 75 mL Omnipaque 350, as per PE protocol. Images were reformatted in coronal and sagittal planes. Axial and coronal maximum intensity projection (MIP) images were created and reviewed.   FINDINGS: POTENTIAL LIMITATIONS OF THE STUDY: Exam is somewhat degraded by beam hardening artifact from the contrast bolus in the left subclavian vein in the SVC.   HEART AND VESSELS: There are no discrete filling defects within main pulmonary artery and its branches to suggest acute pulmonary embolism. Main pulmonary arteries mildly enlarged, measuring up to 3.6 cm in diameter.   The thoracic aorta normal in course and caliber.Mild vascular calcifications are present in the thoracic aorta.Although, the study is not tailored for evaluation of aorta, there is no definite evidence of acute aortic pathology. Moderate coronary artery calcifications are seen. Please note,the study is not optimized for evaluation of coronary arteries.   The cardiac chambers are not enlarged.There are no findings to suggest right heart strain. There is no pericardial effusion seen.   MEDIASTINUM AND KAITLYN, LOWER NECK AND AXILLA: The visualized thyroid gland is within normal limits. Mildly enlarged mediastinal lymph nodes, measuring up to 1.2 cm in size are nonspecific and may be reactive in etiology. Esophagus appears within normal limits as seen.   LUNGS AND AIRWAYS: The trachea and central airways are patent. No endobronchial lesion is seen.   In the interim since prior exam in March of 2024, there is a new moderate to large left-sided pleural effusion. Small amount of loculated fluid is also present in the right lower lobe, new/increased in the interim since prior exam.   Pleural calcifications in the posterior aspect of the right lung with round atelectasis in the right lower lobe are similar in appearance to prior  exam.   There is somewhat increased volume loss/atelectasis in the lower lobes bilaterally compared to prior study with prominence of the interstitial markings in the upper lobes and subtle mosaic attenuation, suggestive of combination of fluid overload and small vessel/small airway disease.   UPPER ABDOMEN: The visualized subdiaphragmatic structures demonstrate no remarkable findings.   CHEST WALL AND OSSEOUS STRUCTURES: Cutaneous tissues of the chest wall do not demonstrate any acute abnormality. There is mild bilateral gynecomastia. Numerous remote bilateral rib fractures are similar to prior study. No acute osseous abnormality is present.There are no suspicious osseous lesions.Multilevel degenerative changes of the thoracic spine are similar to prior exam without evidence of compression fracture or high-grade stenosis.       1. No definite evidence of pulmonary embolism. 2. Moderate to large new left-sided pleural effusion with small amount of loculated fluid also present in the right lung with mild interstitial thickening/prominence in the lungs bilaterally. Correlate with fluid volume status. 3. Pleural calcifications along the posterior aspect of the right lung and round atelectasis in the right lower lobe are similar in appearance to prior study in March of 2024, although there is somewhat increased volume loss in the lung bases bilaterally compared to prior exam. 4. Subtle mosaic attenuation in the lungs bilaterally likely representing component of small vessel/small airway disease. 5. Mildly enlarged mediastinal lymph nodes, measuring up to 1.2 cm in short axis dimension, are nonspecific and may be reactive.   MACRO: None   Signed by: Shanna Flores 9/23/2024 8:36 PM Dictation workstation:   [id number]    XR chest 1 view    Result Date: 9/23/2024  Interpreted By:  Schoenberger, Joseph, STUDY: XR CHEST 1 VIEW;  9/23/2024 4:29 pm   INDICATION: Signs/Symptoms:SOB.     COMPARISON: 08/09/2023    ACCESSION NUMBER(S): [id number]   ORDERING CLINICIAN: SHAYLA ARNOLD   FINDINGS: Pleural and parenchymal scarring in the right hemithorax is likely similar to the prior exam accounting for projectional differences.       CARDIOMEDIASTINAL SILHOUETTE: Cardiac silhouette remains enlarged.   LUNGS: There is some new blunting in the costophrenic angle on the right which may represent some minimal amount of new pleural fluid versus pleural thickening more apparent on the AP projection than the prior PA projection.   ABDOMEN: No remarkable upper abdominal findings.   BONES: No acute osseous changes.       1.  Pleural and parenchymal scarring in the right hemithorax similar to prior. Difficult to exclude of developing very small right pleural effusion. Otherwise findings unchanged.       MACRO: None   Signed by: Joseph Schoenberger 9/23/2024 4:37 PM Dictation workstation:   [id number]      Assessment/Plan    Mr. Angus Redman is a 69 y.o. male with PMH  -Card: recurrent syncope secondary to orthostatic hypotension/autonomic dysfunction/autonomic neuropathy, pSVT on propranolol, Type 2 MI 2023/CAD w/ CCTA 2/2024 - 50% stenosis of LAD/25%-50% at RCA,  primary HTN  -Pulm: Restrictive lung disease on PFTs follows with pulmonology possibly due to right pleural thickening seen on CT imaging (not a CT surgery candidate) with 30-pack-year smoking history on 2 to 3 L of O2 at baseline  -Neuro: alcohol use disorder daily bottle of wine, severe alcoholic polyneuropathy with autonomic dysfunction  -ID: Chronic nonhealing MRSA positive b/l foot infections on Bactrim (Dr. Jang), osteomyelitis s/p left great toe and right 2nd toe amputation  -GI: alcohol use disorder c/b alcoholic gastritis  -MSK: aseptic necrosis of the femoral head, gout  -Endo: hypothyroidism  -: BPH  Presents with 1 week history of shortness of breath on exertion with reduced SpO2 despite increase in O2 supplementation from baseline 3 L to 4 L in the  absence of any lightheadedness, dizziness, or chest pain.     # Acute on Chronic hypoxic respiratory failure 2/2 progressive restrictive lung disease versus new onset heart failure  # Dyspnea on exertion/PND  # Loculated pleural effusion, right-sided  Coronary artery disease disease/history of type II MI 2023  Primary hypertension  -Patient reports progressive shortness of breath with exertion over the last week  -Per chart review, patient does see pulmonology (Dr Montano). Most recent visit June 2024, felt shortness of breath secondary to pleural thickening. Discussed GDMT vs thoracic surgery, patient declined surgical referral. Recommended albuterol trial and pulmonary rehab.   -Oxygen therapy (2L NC) was initiated April 2023  -Patient denies any occupational exposures. Father did work in steel mill with asbestos exposure.   -Patient with documented 90 pack year history, quit 1999.   -CT angio chest for PE:  -No evidence of pulmonary embolism  -Moderate to large new left-sided pleural effusion with a small amount of loculated fluid in the right lung.  -  -Initial concern for possible CAP upon admission (thus started on ceftriaxone, azithromycin) but on evaluation 9/24, suspicion less likely for infectious etiology. Patient has had no leukocytosis or fever since presentation.   -Consideration for possible acute exacerbation congestive heart failure, however no documented diagnosis of heart failure found. Elevated BNP. Most recent echocardiogram performed April 2024, LVEF 60-65%.  -Left sided thoracentesis performed 9/24 - 265 mL serous fluid removed, specimens labeled and sent to lab for further analysis    Plan  -Antibiotics discontinued 9/24  -Sputum culture, Streptococcus pneumoniae, legionella urine antigens are ordered. Procalcitonin level was ordered.  -DuoNeb, spirometry, bronchial hygiene for improvement of respiratory secretion elimination and enhance ventilation  -O2 nasal cannula with target SpO2  92 to 94%  -Follow up on pleural effusion analysis   -May consider lasix 20 mg for pleural effusion.   -Consult Pulmonology, appreciate any recs       # Chronic alcoholism c/b gastritis and polyneuropathy   Patient reported drinking 1 bottle per day  -CIWA protocol is implemented.     # History of chronic bilateral foot infections - previously identified as MRSA positive  # History of osteomyelitis left great toe (December 2021, treated with IV antibiotics)  # History of right second toe amputation July 2021  Patient following up with wound care as an outpatient every Wednesday. Does see Dr. Jang with podiatry.   -Wound care was consulted   -Consult Podiatry, appreciate any recs     # History of recurrent falls  Autonomic neuropathy c/b recurrent syncope and and orthostasis  -PT/OT consulted     CHRONIC PROBLEMS:  #HLD   #CKD 3  #hypertension  #pSVT  #A-fib not on blood thinners (questionable)  #History of NSTEMI  Continue home meds as appropriate  Pregabalin dose was adjusted from 150 q6h to 150 BID due to increased creatinine level.      Global Plan of Care  Fluid: PRN  Electrolytes: PRN  Nutrition:   Regular  DVT Prophylaxis:  Heparin/SCD  GI Prophylaxis: Protonix 40 mg BID  Code Status: DNANGELA Fonseca, MS3    -------------------------  Attending Addendum  -------------------------     Seen and examined with resident physicians.  Labs and imaging personally reviewed.      69-year-old male with complicated past medical history including restrictive lung disease for which she is on 3 to 4 L at baseline of oxygen by nasal cannula secondary to presumed pleural disease as calcifications and thickening have been seen on multiple CTs going back to at least 2016 follows with [facility] pulmonology, and not a surgical candidate given multiple medical comorbidities.  Alcohol use disorder complicated by peripheral polyneuropathy including autonomic neuropathy complicated by orthostasis and recurrent syncope.  Other  medical history as above in resident documentation.     Presenting with dyspnea on exertion and increasing hypoxemia over the last few weeks and now requiring 6 L of oxygen by nasal cannula as before he was only requiring 3-4.  CT scan in the emergency room showing a small right loculated pleural effusion next to the stable pleural plaque that was seen in 2016, no focal infiltrates, small left pleural effusion.  He was afebrile, hemodynamically stable, no leukocytosis, denying productive cough, denying fevers, chills, sweats at home.  He has a chronic nonhealing wound wounds on his feet for which he is currently on Bactrim.     Acute on chronic hypoxemic respiratory failure secondary to restrictive lung disease likely exacerbated by new onset heart failure  Small right loculated pleural effusion  Small left pleural effusion  BNP near 400 which is increased from prior, likely some diastolic dysfunction as this has not been assessed on prior echocardiograms, will have thoracentesis performed today and fluid studies performed to assess for exudative effusion with concern for malignancy given his asbestos exposure and prior smoking history.  Based on these results we will pursue further workup if exudative, if transudative likely representing heart failure.  No signs or symptoms of pneumonia, denies fevers, chills, sweats, hemodynamically stable since admission without fevers and no focal infiltrate seen on the CT scan of the chest.  Will discontinue antibiotics, await procalcitonin for further insight as well as pleural fluid studies.     Alcohol use disorder c/b polyneuropathy/orthostasis and chronic foot wounds  CIWA  Continue home Bactrim  Podiatry and wound care consulted     Case discussed with case management, residents and patient.     Complexity: High     I saw and evaluated the patient. I personally obtained the key and critical portions of the history and physical exam or was physically present for key and  critical portions performed by the resident/fellow. I reviewed the resident/fellow's documentation and discussed the patient with the resident/fellow. I agree with the resident/fellow's medical decision making as documented in the note.     Trae Hodge, DO

## 2024-09-25 ENCOUNTER — APPOINTMENT (OUTPATIENT)
Dept: WOUND CARE | Facility: CLINIC | Age: 69
End: 2024-09-25
Payer: MEDICARE

## 2024-09-25 ENCOUNTER — APPOINTMENT (OUTPATIENT)
Dept: CARDIOLOGY | Facility: HOSPITAL | Age: 69
DRG: 186 | End: 2024-09-25
Payer: MEDICARE

## 2024-09-25 VITALS
HEIGHT: 72 IN | BODY MASS INDEX: 33.56 KG/M2 | TEMPERATURE: 96.8 F | OXYGEN SATURATION: 91 % | WEIGHT: 247.8 LBS | DIASTOLIC BLOOD PRESSURE: 59 MMHG | HEART RATE: 86 BPM | RESPIRATION RATE: 20 BRPM | SYSTOLIC BLOOD PRESSURE: 103 MMHG

## 2024-09-25 PROBLEM — I50.9 HEART FAILURE: Status: ACTIVE | Noted: 2024-09-25

## 2024-09-25 LAB
ACID FAST STN SPEC: NORMAL
ALBUMIN SERPL BCP-MCNC: 3.6 G/DL (ref 3.4–5)
ANION GAP SERPL CALC-SCNC: 13 MMOL/L (ref 10–20)
BASOPHILS # BLD AUTO: 0.02 X10*3/UL (ref 0–0.1)
BASOPHILS NFR BLD AUTO: 0.3 %
BUN SERPL-MCNC: 15 MG/DL (ref 6–23)
CALCIUM SERPL-MCNC: 8.5 MG/DL (ref 8.6–10.3)
CCP IGG SERPL-ACNC: <1 U/ML
CENTROMERE B AB SER-ACNC: <0.2 AI
CHLORIDE SERPL-SCNC: 101 MMOL/L (ref 98–107)
CHROMATIN AB SERPL-ACNC: <0.2 AI
CO2 SERPL-SCNC: 27 MMOL/L (ref 21–32)
CREAT SERPL-MCNC: 1.5 MG/DL (ref 0.5–1.3)
DSDNA AB SER-ACNC: <1 IU/ML
EGFRCR SERPLBLD CKD-EPI 2021: 49 ML/MIN/1.73M*2
ENA JO1 AB SER QL IA: <0.2 AI
ENA RNP AB SER IA-ACNC: <0.2 AI
ENA SCL70 AB SER QL IA: <0.2 AI
ENA SM AB SER IA-ACNC: <0.2 AI
ENA SM+RNP AB SER QL IA: <0.2 AI
ENA SS-A AB SER IA-ACNC: <0.2 AI
ENA SS-B AB SER IA-ACNC: <0.2 AI
EOSINOPHIL # BLD AUTO: 0.29 X10*3/UL (ref 0–0.4)
EOSINOPHIL NFR BLD AUTO: 4.1 %
ERYTHROCYTE [DISTWIDTH] IN BLOOD BY AUTOMATED COUNT: 17.2 % (ref 11.5–14.5)
GLUCOSE SERPL-MCNC: 101 MG/DL (ref 74–99)
HCT VFR BLD AUTO: 33.1 % (ref 41–52)
HGB BLD-MCNC: 9.4 G/DL (ref 13.5–17.5)
IMM GRANULOCYTES # BLD AUTO: 0.05 X10*3/UL (ref 0–0.5)
IMM GRANULOCYTES NFR BLD AUTO: 0.7 % (ref 0–0.9)
LYMPHOCYTES # BLD AUTO: 1.17 X10*3/UL (ref 0.8–3)
LYMPHOCYTES NFR BLD AUTO: 16.4 %
MAGNESIUM SERPL-MCNC: 2.11 MG/DL (ref 1.6–2.4)
MCH RBC QN AUTO: 28.9 PG (ref 26–34)
MCHC RBC AUTO-ENTMCNC: 28.4 G/DL (ref 32–36)
MCV RBC AUTO: 102 FL (ref 80–100)
MONOCYTES # BLD AUTO: 0.57 X10*3/UL (ref 0.05–0.8)
MONOCYTES NFR BLD AUTO: 8 %
MYCOBACTERIUM SPEC CULT: NORMAL
NEUTROPHILS # BLD AUTO: 5.02 X10*3/UL (ref 1.6–5.5)
NEUTROPHILS NFR BLD AUTO: 70.5 %
NRBC BLD-RTO: 0 /100 WBCS (ref 0–0)
PHOSPHATE SERPL-MCNC: 5.1 MG/DL (ref 2.5–4.9)
PLATELET # BLD AUTO: 302 X10*3/UL (ref 150–450)
POTASSIUM SERPL-SCNC: 4.1 MMOL/L (ref 3.5–5.3)
RBC # BLD AUTO: 3.25 X10*6/UL (ref 4.5–5.9)
RHEUMATOID FACT SER NEPH-ACNC: <10 IU/ML (ref 0–15)
RIBOSOMAL P AB SER-ACNC: <0.2 AI
SODIUM SERPL-SCNC: 137 MMOL/L (ref 136–145)
WBC # BLD AUTO: 7.1 X10*3/UL (ref 4.4–11.3)

## 2024-09-25 PROCEDURE — 99239 HOSP IP/OBS DSCHRG MGMT >30: CPT | Performed by: STUDENT IN AN ORGANIZED HEALTH CARE EDUCATION/TRAINING PROGRAM

## 2024-09-25 PROCEDURE — 97165 OT EVAL LOW COMPLEX 30 MIN: CPT | Mod: GO

## 2024-09-25 PROCEDURE — 93922 UPR/L XTREMITY ART 2 LEVELS: CPT | Performed by: INTERNAL MEDICINE

## 2024-09-25 PROCEDURE — 80069 RENAL FUNCTION PANEL: CPT

## 2024-09-25 PROCEDURE — 2500000001 HC RX 250 WO HCPCS SELF ADMINISTERED DRUGS (ALT 637 FOR MEDICARE OP)

## 2024-09-25 PROCEDURE — 2500000005 HC RX 250 GENERAL PHARMACY W/O HCPCS

## 2024-09-25 PROCEDURE — 86431 RHEUMATOID FACTOR QUANT: CPT | Mod: STJLAB

## 2024-09-25 PROCEDURE — 86200 CCP ANTIBODY: CPT | Mod: STJLAB

## 2024-09-25 PROCEDURE — 97116 GAIT TRAINING THERAPY: CPT | Mod: GP

## 2024-09-25 PROCEDURE — 2500000004 HC RX 250 GENERAL PHARMACY W/ HCPCS (ALT 636 FOR OP/ED): Performed by: STUDENT IN AN ORGANIZED HEALTH CARE EDUCATION/TRAINING PROGRAM

## 2024-09-25 PROCEDURE — 2500000002 HC RX 250 W HCPCS SELF ADMINISTERED DRUGS (ALT 637 FOR MEDICARE OP, ALT 636 FOR OP/ED)

## 2024-09-25 PROCEDURE — 99233 SBSQ HOSP IP/OBS HIGH 50: CPT | Performed by: INTERNAL MEDICINE

## 2024-09-25 PROCEDURE — 94640 AIRWAY INHALATION TREATMENT: CPT

## 2024-09-25 PROCEDURE — 97161 PT EVAL LOW COMPLEX 20 MIN: CPT | Mod: GP

## 2024-09-25 PROCEDURE — 86038 ANTINUCLEAR ANTIBODIES: CPT | Mod: STJLAB

## 2024-09-25 PROCEDURE — 97530 THERAPEUTIC ACTIVITIES: CPT | Mod: GP

## 2024-09-25 PROCEDURE — 36415 COLL VENOUS BLD VENIPUNCTURE: CPT

## 2024-09-25 PROCEDURE — 86235 NUCLEAR ANTIGEN ANTIBODY: CPT | Mod: STJLAB

## 2024-09-25 PROCEDURE — 2500000002 HC RX 250 W HCPCS SELF ADMINISTERED DRUGS (ALT 637 FOR MEDICARE OP, ALT 636 FOR OP/ED): Performed by: STUDENT IN AN ORGANIZED HEALTH CARE EDUCATION/TRAINING PROGRAM

## 2024-09-25 PROCEDURE — 2500000004 HC RX 250 GENERAL PHARMACY W/ HCPCS (ALT 636 FOR OP/ED)

## 2024-09-25 PROCEDURE — 97112 NEUROMUSCULAR REEDUCATION: CPT | Mod: GO

## 2024-09-25 PROCEDURE — 83735 ASSAY OF MAGNESIUM: CPT

## 2024-09-25 PROCEDURE — 93922 UPR/L XTREMITY ART 2 LEVELS: CPT

## 2024-09-25 PROCEDURE — 85025 COMPLETE CBC W/AUTO DIFF WBC: CPT

## 2024-09-25 RX ORDER — FUROSEMIDE 20 MG/1
20 TABLET ORAL DAILY PRN
Qty: 30 TABLET | Refills: 0 | Status: SHIPPED | OUTPATIENT
Start: 2024-09-25 | End: 2024-10-25

## 2024-09-25 RX ORDER — FUROSEMIDE 10 MG/ML
10 INJECTION INTRAMUSCULAR; INTRAVENOUS ONCE
Status: COMPLETED | OUTPATIENT
Start: 2024-09-25 | End: 2024-09-25

## 2024-09-25 RX ORDER — ESCITALOPRAM OXALATE 20 MG/1
20 TABLET ORAL DAILY
Qty: 30 TABLET | Refills: 0 | Status: SHIPPED | OUTPATIENT
Start: 2024-09-25

## 2024-09-25 ASSESSMENT — COGNITIVE AND FUNCTIONAL STATUS - GENERAL
DRESSING REGULAR UPPER BODY CLOTHING: A LITTLE
MOBILITY SCORE: 14
DRESSING REGULAR UPPER BODY CLOTHING: A LOT
HELP NEEDED FOR BATHING: A LITTLE
DAILY ACTIVITIY SCORE: 10
PERSONAL GROOMING: A LOT
TURNING FROM BACK TO SIDE WHILE IN FLAT BAD: A LITTLE
CLIMB 3 TO 5 STEPS WITH RAILING: A LOT
MOVING TO AND FROM BED TO CHAIR: A LITTLE
MOVING FROM LYING ON BACK TO SITTING ON SIDE OF FLAT BED WITH BEDRAILS: A LITTLE
EATING MEALS: A LOT
DAILY ACTIVITIY SCORE: 20
HELP NEEDED FOR BATHING: A LOT
TOILETING: A LITTLE
WALKING IN HOSPITAL ROOM: A LOT
DRESSING REGULAR LOWER BODY CLOTHING: A LITTLE
STANDING UP FROM CHAIR USING ARMS: A LOT
DRESSING REGULAR LOWER BODY CLOTHING: TOTAL
WALKING IN HOSPITAL ROOM: A LITTLE
MOVING TO AND FROM BED TO CHAIR: A LOT
CLIMB 3 TO 5 STEPS WITH RAILING: A LOT
TURNING FROM BACK TO SIDE WHILE IN FLAT BAD: A LITTLE
MOBILITY SCORE: 18
STANDING UP FROM CHAIR USING ARMS: A LITTLE
TOILETING: TOTAL

## 2024-09-25 ASSESSMENT — LIFESTYLE VARIABLES
AGITATION: NORMAL ACTIVITY
ANXIETY: NO ANXIETY, AT EASE
TOTAL SCORE: 0
HEADACHE, FULLNESS IN HEAD: NOT PRESENT
TREMOR: NO TREMOR
HEADACHE, FULLNESS IN HEAD: NOT PRESENT
AUDITORY DISTURBANCES: NOT PRESENT
TOTAL SCORE: 0
HEADACHE, FULLNESS IN HEAD: NOT PRESENT
AGITATION: NORMAL ACTIVITY
ORIENTATION AND CLOUDING OF SENSORIUM: ORIENTED AND CAN DO SERIAL ADDITIONS
AGITATION: NORMAL ACTIVITY
NAUSEA AND VOMITING: NO NAUSEA AND NO VOMITING
VISUAL DISTURBANCES: NOT PRESENT
PAROXYSMAL SWEATS: NO SWEAT VISIBLE
AUDITORY DISTURBANCES: NOT PRESENT
VISUAL DISTURBANCES: NOT PRESENT
AUDITORY DISTURBANCES: NOT PRESENT
VISUAL DISTURBANCES: NOT PRESENT
PAROXYSMAL SWEATS: NO SWEAT VISIBLE
NAUSEA AND VOMITING: NO NAUSEA AND NO VOMITING
ORIENTATION AND CLOUDING OF SENSORIUM: ORIENTED AND CAN DO SERIAL ADDITIONS
TREMOR: NO TREMOR
PAROXYSMAL SWEATS: NO SWEAT VISIBLE
ORIENTATION AND CLOUDING OF SENSORIUM: ORIENTED AND CAN DO SERIAL ADDITIONS
ANXIETY: NO ANXIETY, AT EASE
NAUSEA AND VOMITING: NO NAUSEA AND NO VOMITING
ANXIETY: NO ANXIETY, AT EASE
TOTAL SCORE: 0
TREMOR: NO TREMOR

## 2024-09-25 ASSESSMENT — PAIN SCALES - GENERAL
PAINLEVEL_OUTOF10: 0 - NO PAIN
PAINLEVEL_OUTOF10: 9
PAINLEVEL_OUTOF10: 0 - NO PAIN
PAINLEVEL_OUTOF10: 9
PAINLEVEL_OUTOF10: 0 - NO PAIN
PAINLEVEL_OUTOF10: 9

## 2024-09-25 ASSESSMENT — PAIN - FUNCTIONAL ASSESSMENT
PAIN_FUNCTIONAL_ASSESSMENT: 0-10

## 2024-09-25 ASSESSMENT — ACTIVITIES OF DAILY LIVING (ADL): BATHING_ASSISTANCE: MAXIMAL

## 2024-09-25 NOTE — PROGRESS NOTES
INPATIENT PULMONARY  PROGRESS NOTES    PATIENT NAME: Angus Redman    MRN: [id number]  SERVICE DATE:  9/25/2024   SERVICE TIME:  1:44 PM        ASSESSMENT :       Acute on chronic hypoxic respiratory failure  Moderate to large pleural effusion  Bilateral pleural thickening  Possible underlying COPD with signs of acute exacerbation  Restrictive lung disease Pleural calcification  No history to suggest asbestos exposure      PLAN:  Status post thoracentesis YESTERDAY,   Pleural fluid sent for analysis, showed transudate  Patient feels less hypoxic and less short of breath, adjust o2 reqiuirement to keep o2 sats 89--92%  Further workup as  the fluid results is transudate   Eval for chf, liver kidney thyroid and nutrition status  Otherwise, will need further follow up for reaccumulation of fluid , pleural thickening with risk of mesothelioma or worsening respiratory status  Patient is also showing signs of wheezing suggestive of COPD exacerbation, benefiting from DuoNeb treatment and continued on oxygen now.    Okay to discharge patient tomorrow from respiratory status and to follow-up with his own pulmonary  Suggest to add nebulizer device and to add DuoNeb every 4-6 hours as needed for respiratory distress or wheezing          SUBJECTIVE  Afebrile  No events overnight     REVIEW OF SYSTEMS    GENERAL: no pain fever, fatigue , chills night sweats or weight loss  Head and neck: no pain or swelling  Chest : as per HPI  Card ; no h/o CAD, CHF , A FIB. KNOWN WITH HTN. NO chest pain, orthopnea or paroxysmal noct dyspnea  Abdomen no abd pain, swelling or change in bowel habits, regular, no constipation or diarrhea  Neuro , no cahne in awakeness, or confusion, no weakness numbness headache or change in vision  Extremities; no leg swelling, pain or change in color   Vascular, NO H/O pvd  Endocrine: h/o DM, HYPOTHYROID           Scheduled medications  amLODIPine, 2.5 mg, oral, Daily  aspirin, 81 mg, oral, Daily  atorvastatin, 40  mg, oral, Daily  cetirizine, 10 mg, oral, Daily  DULoxetine, 60 mg, oral, BID  escitalopram, 20 mg, oral, Daily  heparin (porcine), 5,000 Units, subcutaneous, q8h  ipratropium-albuteroL, 3 mL, nebulization, TID  levothyroxine, 50 mcg, oral, Daily  mirtazapine, 15 mg, oral, Nightly  montelukast, 10 mg, oral, Nightly  oxyCODONE, 5 mg, oral, Once  oxygen, , inhalation, Continuous - Inhalation  pantoprazole, 40 mg, oral, BID AC  pregabalin, 150 mg, oral, 4x daily  propranolol, 10 mg, oral, BID  sulfamethoxazole-trimethoprim, 1 tablet, oral, q12h SHANTEL  tamsulosin, 0.4 mg, oral, Nightly      Continuous medications     PRN medications  PRN medications: acetaminophen, benzocaine-menthol, busPIRone, dextromethorphan-guaifenesin, guaiFENesin, ipratropium-albuteroL, ondansetron **OR** ondansetron, oxyCODONE         OBJECTIVE    Physical Exam  Vitals reviewed.   Constitutional:       Appearance: Normal appearance.She is obese.  On oxygen at rest  HENT:      Head: Normocephalic and atraumatic.   Eyes:      Extraocular Movements: Extraocular movements intact.      Pupils: Pupils are equal, round, and reactive to light.   Cardiovascular:      Rate and Rhythm: Normal rate and regular rhythm.      Heart sounds: No murmur heard.  Pulmonary:      Effort: No respiratory distress present.      Breath sounds: No stridor.  Mild expiratory wheezing and rhonchi present.   Chest:      Chest wall: No tenderness.   Abdominal:      General: Bowel sounds are normal. There is no distension.      Palpations: There is no mass.      Tenderness: There is no abdominal tenderness. There is no rebound.   Musculoskeletal:         General: Mild swelling present. No tenderness or deformity.      Cervical back: No rigidity.   Lymphadenopathy:      Cervical: No cervical adenopathy.   Skin:     General: Skin is warm.      Coloration: Skin is pale. Skin is not jaundiced.      Findings: No bruising.   Neurological:      General: No focal deficit present.       Mental Status: She is alert and oriented to person, place, and time. Mental status is at baseline.      Cranial Nerves: No cranial nerve deficit.      Motor: Weakness present.   Psychiatric:         Mood and Affect: Mood normal.         Behavior: Behavior normal.     Patient Vitals for the past 24 hrs:   BP Temp Temp src Pulse Resp SpO2 Weight   09/25/24 1226 112/57 36.2 °C (97.2 °F) Temporal 72 18 91 % --   09/25/24 0931 -- -- -- -- -- 92 % --   09/25/24 0852 139/63 -- -- 70 26 -- --   09/25/24 0807 129/62 36.5 °C (97.7 °F) Temporal 90 20 94 % --   09/25/24 0425 107/62 36 °C (96.8 °F) -- 56 12 94 % 112 kg (247 lb 12.8 oz)   09/25/24 0005 99/55 36.3 °C (97.3 °F) Temporal 56 14 93 % --   09/24/24 2220 99/56 -- -- 62 16 -- --   09/24/24 1950 122/67 36.5 °C (97.7 °F) Temporal 68 19 94 % --   09/24/24 1900 114/76 -- -- -- -- -- --   09/24/24 1607 114/76 36.7 °C (98.1 °F) Temporal 72 26 94 % --   09/24/24 1400 127/73 -- -- -- -- -- --             Gen: NAD  Neck: symmetric, no mass  Cardiovascular: RRR  Respiratory: No distress   GI: Abd soft, nontender, non-distended  Extremities: no leg edema  Skin: Warm and dry.  Neuro: alert and oriented times 3    Labs:  Lab Results   Component Value Date    WBC 7.1 09/25/2024    HGB 9.4 (L) 09/25/2024    HCT 33.1 (L) 09/25/2024     (H) 09/25/2024     09/25/2024     Lab Results   Component Value Date    GLUCOSE 101 (H) 09/25/2024    CALCIUM 8.5 (L) 09/25/2024     09/25/2024    K 4.1 09/25/2024    CO2 27 09/25/2024     09/25/2024    BUN 15 09/25/2024    CREATININE 1.50 (H) 09/25/2024   ESR: --  Lab Results   Component Value Date    SEDRATE 71 (H) 12/17/2021     Lab Results   Component Value Date    CRP 1.35 (H) 03/06/2024     Lab Results   Component Value Date    ALT 6 (L) 09/23/2024    AST 10 09/23/2024    ALKPHOS 103 09/23/2024    BILITOT 0.8 09/23/2024           DATA:   Diagnostic tests reviewed for today's visit:    Labs this admission reviewed  Imagings  this admission reviewed  Cultures: Reviewed    IR guided thoracentesis   Final Result   Ultrasound-guided left thoracentesis.        Signed by: Wallace Lockett 9/24/2024 3:44 PM   Dictation workstation:   [id number]      CT angio chest for pulmonary embolism   Final Result   1. No definite evidence of pulmonary embolism.   2. Moderate to large new left-sided pleural effusion with small   amount of loculated fluid also present in the right lung with mild   interstitial thickening/prominence in the lungs bilaterally.   Correlate with fluid volume status.   3. Pleural calcifications along the posterior aspect of the right   lung and round atelectasis in the right lower lobe are similar in   appearance to prior study in March of 2024, although there is   somewhat increased volume loss in the lung bases bilaterally compared   to prior exam.   4. Subtle mosaic attenuation in the lungs bilaterally likely   representing component of small vessel/small airway disease.   5. Mildly enlarged mediastinal lymph nodes, measuring up to 1.2 cm in   short axis dimension, are nonspecific and may be reactive.        MACRO:   None        Signed by: Shanna Flores 9/23/2024 8:36 PM   Dictation workstation:   [id number]      XR chest 1 view   Final Result   1.  Pleural and parenchymal scarring in the right hemithorax similar   to prior. Difficult to exclude of developing very small right pleural   effusion. Otherwise findings unchanged.                  MACRO:   None        Signed by: Joseph Schoenberger 9/23/2024 4:37 PM   Dictation workstation:   [id number]      Vascular US Ankle Brachial Index (HUMBLE) Without Exercise    (Results Pending)        Elieser Patterson MD

## 2024-09-25 NOTE — PROGRESS NOTES
Angus Redman is a 69 y.o. male on day 2 of admission presenting with Acute hypoxic respiratory failure (Multi).      Subjective   No significant events overnight. He was on 5L NC overnight sating 92-94% w/o any fever. When I saw the patient at bedside he had developed a worsening cough and runny nose. He blamed the cough on the nebulizer treatments since he did not have the cough prior. As for the runny nose we discussed how the oxygen could be irritating his nose and that we would humidify it to decrease how harsh it is intranasal. He also denied any new muscle/bone pain, rashes, vomiting, diarrhea. Left thoracentesis was done by IR and 265 ml of serous fluid was removed on 9/24 and analysis showed exudative pleural effusion and cytology result is pending. Podiatry recommended dressing change every 1 to 2 days and HUMBLE.       Objective     Last Recorded Vitals  /63 (BP Location: Right arm, Patient Position: Lying)   Pulse 70   Temp 36.5 °C (97.7 °F) (Temporal)   Resp 26   Wt 112 kg (247 lb 12.8 oz)   SpO2 92%   Intake/Output last 3 Shifts:    Intake/Output Summary (Last 24 hours) at 9/25/2024 1105  Last data filed at 9/25/2024 1037  Gross per 24 hour   Intake 360 ml   Output 2200 ml   Net -1840 ml       Admission Weight  Weight: 112 kg (247 lb) (09/23/24 1710)    Daily Weight  09/25/24 : 112 kg (247 lb 12.8 oz)    Image Results      Physical Exam  Constitutional:       Appearance: He is obese.   HENT:      Mouth/Throat:      Mouth: Mucous membranes are moist.   Cardiovascular:      Rate and Rhythm: Normal rate and regular rhythm.      Pulses: Normal pulses.      Heart sounds: Normal heart sounds.   Pulmonary:      Breath sounds: Wheezing and rhonchi present.   Abdominal:      General: Bowel sounds are normal.   Musculoskeletal:      Cervical back: Normal range of motion.   Skin:     General: Skin is warm and dry.      Capillary Refill: Capillary refill takes 2 to 3 seconds.   Neurological:      General:  No focal deficit present.      Mental Status: He is alert and oriented to person, place, and time. Mental status is at baseline.   Psychiatric:         Mood and Affect: Mood normal.         Behavior: Behavior normal.         Thought Content: Thought content normal.         Judgment: Judgment normal.                Assessment/Plan   This patient currently has cardiac telemetry ordered; if you would like to modify or discontinue the telemetry order, click here to go to the orders activity to modify/discontinue the order.    Mr. Angus Redman is a 69 y.o. male presenting with worsening SOB on exertion. His PMHx is significant for:    -Card: recurrent syncope secondary to orthostatic hypotension/autonomic dysfunction/autonomic neuropathy, pSVT on propranolol, Type 2 MI 2023/CAD w/ CCTA 2/2024 - 50% stenosis of LAD/25%-50% at RCA,  primary HTN  -Pulm: Restrictive lung disease on PFTs possibly due to right pleural thickening, 30-pack-year smoking history on 2 to 3 L of NC O2 at baseline  -Neuro: alcohol use disorder daily bottle of wine, severe alcoholic polyneuropathy with autonomic dysfunction  -ID: Chronic nonhealing MRSA positive b/l foot infections on Bactrim (Dr. Jang), osteomyelitis s/p left great toe and right 2nd toe amputation  -GI: alcohol use disorder c/b alcoholic gastritis  -MSK: aseptic necrosis of the femoral head, gout  -Endo: hypothyroidism  -: BPH    Presents with 1 week history of shortness of breath on exertion with reduced SpO2 despite increase in O2 supplementation from baseline 3 L to 4 L in the absence of any lightheadedness, dizziness, or chest pain.    Assessment & Plan    # Acute on chronic hypoxemic respiratory failure secondary to restrictive lung disease likely exacerbated by new onset heart failure  # Small right loculated pleural effusion  # Small left pleural effusion  Patient reports progressive shortness of breath with exertion over the last week. Per chart review, patient does  see pulmonology (Dr Montano). Most recent visit June 2024, felt shortness of breath secondary to pleural thickening. Discussed GDMT vs thoracic surgery, patient declined surgical referral. He began oxygen therapy (2L NC)in April 2023. SOB most likely secondary to rheumatological/inflammatory plural effusion vs undiagnosed CHF vs malignancy.  -Patient with documented 90 pack year history, quit 1999.   -Initial concern for possible CAP upon admission (thus started on ceftriaxone, azithromycin) but on evaluation 9/24, suspicion less likely d/t no leukocytosis or fever since presentation.   -Consideration for possible undiagnosed acute exacerbation CHF (), however no. Previous Echo April 2024, LVEF 60-65% but imaging quality was not great. Given his already complicated cardiopulmonary history a slight EF disturbance could result in edema.  -Left sided thoracentesis performed 9/24 - 265 mL serous fluid removed  -Exudative will look for underlying autoimmune/rheumatology causes.  -Additionally will order cytology of fluid to look for irregular cells indicating underlying malignancy  -CT angio chest for PE: No evidence of pulmonary embolism. Moderate to large new left-sided pleural effusion with a small amount of loculated fluid in the right lung.  -  -Echo (4/24): LVEF 60-65% limited view  -Thoracentesis fluid analysis:  exudative with elevated (63%) neutrophils, concerning for slight inflammatory exudative effusion  - Procalcitonin -0.5 which is normal.  Plan:  -Antibiotics discontinued 9/24  -Sputum culture,   -Streptococcus pneumoniae, legionella urine antigens are negative  -DuoNeb, spirometry, bronchial hygiene for improvement of respiratory secretion elimination and enhance ventilation  -O2 nasal cannula with target SpO2 92 to 94%   -Thoracentesis cytology: Pending  -ANC: Pending: Pending   -IRAM: Pending: Pending  -Rheumatoid Factor: Pending  -Citrulline Ab: Pending        # Alcohol use disorder C/B  polyneuropathy/orthostasis  # Chronic foot wounds  # History of recurrent falls  - Patient reported drinking 1 bottle per day  - CIWA discontinued  - Podiatry consulted, appreciate recommendations.  - HUMBLE/PVRs          CHRONIC PROBLEMS:  #HLD   #CKD 3  #hypertension  #pSVT  #A-fib not on blood thinners (questionable)  #History of NSTEMI  #Coronary artery disease disease/history of type II MI 2023  #Primary hypertension  Continue home meds as appropriate  Pregabalin dose was adjusted from 150 q6h to 150 BID due to increased creatinine level.      Global Plan of Care  Fluid: PRN  Electrolytes: PRN  Nutrition: Regular  DVT Prophylaxis:  Heparin/SCD  GI Prophylaxis: Protonix 40 mg BID  Code Status: DNR               KARLOS VERONICA  MS3    I saw this patient with the above medical student and performed my own History and Physical Examination. My Subjective and Objective findings are reflected in the above documentation. The Assessment and Plan reflect my input. My Edits are included in the above documentation.     Anjum Hu MD  PGY-I IM resident

## 2024-09-25 NOTE — PROGRESS NOTES
Angus Redman is a 69 y.o. male on day 2 of admission presenting with No Principal Problem: There is no principal problem currently on the Problem List. Please update the Problem List and refresh..    Subjective   Pt examined and evaluated at bedside, found resting comfortably.  Pt states that he developed a cough overnight, but still wishes to go home today.   Pt states that their pain is well controlled, denies any constitutional symptoms, and has no other complaints at this time.        Objective     Vascular: DP and PT pulses palpable 1/4 bilaterally.  CFT under 5 seconds when tested at distal digits.  Skin temp warm to warm from proximal to distal.  +2 pitting edema noted to BLE.     Neuro: Protective sensation absent, light tough sensation intact.  No Tinel's or Valleix's signs elicited.       Derm: Full-thickness ulceration noted to plantar aspect of first metatarsal head, left foot.  Wound extends to subcutaneous tissue, no probe to bone or exposed fascia noted.  No undermining, tracking, or tunneling moderate periwound hyperkeratotic tissue noted.  Serosanguineous drainage, mild maceration.   Full-thickness ulceration noted to plantar aspect of right foot to subcutaneous tissue.  No tracking, tunneling, or undermining.  No exposed bone or fascia.  Minimal drainage noted.   Full-thickness ulceration noted distal aspect of third digit, right foot.  Wound extends to subcutaneous tissue, no no exposed bone or fascia.  No subcutaneous nodules.  Interdigital spaces are CDI.  Marked periwound erythema noted to RLE, improved from admission.     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  No other gross deformities noted.  No POP noted to any other area of the foot/ankle.       Last Recorded Vitals  Blood pressure 112/57, pulse 72, temperature 36.2 °C (97.2 °F), temperature source Temporal, resp. rate 18, height 1.829 m (6'), weight 112 kg (247 lb 12.8 oz), SpO2 91%.  Intake/Output last 3 Shifts:  I/O last 3  completed shifts:  In: 1640 (14.6 mL/kg) [P.O.:1140; IV Piggyback:500]  Out: 3600 (32 mL/kg) [Urine:3600 (0.9 mL/kg/hr)]  Weight: 112.4 kg     Relevant Results  Results for orders placed or performed during the hospital encounter of 09/23/24 (from the past 24 hour(s))   POCT GLUCOSE   Result Value Ref Range    POCT Glucose 116 (H) 74 - 99 mg/dL   Renal Function Panel   Result Value Ref Range    Glucose 101 (H) 74 - 99 mg/dL    Sodium 137 136 - 145 mmol/L    Potassium 4.1 3.5 - 5.3 mmol/L    Chloride 101 98 - 107 mmol/L    Bicarbonate 27 21 - 32 mmol/L    Anion Gap 13 10 - 20 mmol/L    Urea Nitrogen 15 6 - 23 mg/dL    Creatinine 1.50 (H) 0.50 - 1.30 mg/dL    eGFR 49 (L) >60 mL/min/1.73m*2    Calcium 8.5 (L) 8.6 - 10.3 mg/dL    Phosphorus 5.1 (H) 2.5 - 4.9 mg/dL    Albumin 3.6 3.4 - 5.0 g/dL   CBC and Auto Differential   Result Value Ref Range    WBC 7.1 4.4 - 11.3 x10*3/uL    nRBC 0.0 0.0 - 0.0 /100 WBCs    RBC 3.25 (L) 4.50 - 5.90 x10*6/uL    Hemoglobin 9.4 (L) 13.5 - 17.5 g/dL    Hematocrit 33.1 (L) 41.0 - 52.0 %     (H) 80 - 100 fL    MCH 28.9 26.0 - 34.0 pg    MCHC 28.4 (L) 32.0 - 36.0 g/dL    RDW 17.2 (H) 11.5 - 14.5 %    Platelets 302 150 - 450 x10*3/uL    Neutrophils % 70.5 40.0 - 80.0 %    Immature Granulocytes %, Automated 0.7 0.0 - 0.9 %    Lymphocytes % 16.4 13.0 - 44.0 %    Monocytes % 8.0 2.0 - 10.0 %    Eosinophils % 4.1 0.0 - 6.0 %    Basophils % 0.3 0.0 - 2.0 %    Neutrophils Absolute 5.02 1.60 - 5.50 x10*3/uL    Immature Granulocytes Absolute, Automated 0.05 0.00 - 0.50 x10*3/uL    Lymphocytes Absolute 1.17 0.80 - 3.00 x10*3/uL    Monocytes Absolute 0.57 0.05 - 0.80 x10*3/uL    Eosinophils Absolute 0.29 0.00 - 0.40 x10*3/uL    Basophils Absolute 0.02 0.00 - 0.10 x10*3/uL   Magnesium   Result Value Ref Range    Magnesium 2.11 1.60 - 2.40 mg/dL      Assessment/Plan   Assessment & Plan    Assessment:  - Non pressure ulcers, left and right foot  - Peripheral neuropathy  - Acute on Chronic  respiratory failure  - Bilateral pleural effusion        Plan:  - Pt examined and evaluated.  All findings discussed to patient to their satisfaction and understanding.  - Reviewed labs and chart.  HUMBLE/PVRs ordered, pending.  - Radiographs obtained in July showed no evidence of OM.  No probe to bone noted at this time.  - SOI have improved significantly from last week.  Wounds continue to improve in size and quality daily from admission.  - Systemic conditions managed by primary team, antibiotics managed by ID.  - Performed dressing change consisting of Betadine paint and DSD.  Dressings to be changed every 1-2 days by podiatry.  - Will continue to follow inpatient.  Pt to follow up in my office 1 week after discharge.  Please contact podiatry with any acute questions/concerns.       Boone Bull DPM  Podiatric Surgery  Doc [facility]      I spent >35 minutes in the professional and overall care of this patient.        Boone Bull DPM

## 2024-09-25 NOTE — NURSING NOTE
Pt remained safe throughout shift. BP soft. HR 50-80s NSR. Sp02 >92% on 5L NC. CIWA scores 0 throughout shift.

## 2024-09-25 NOTE — DISCHARGE SUMMARY
Discharge Diagnosis  Acute on chronic hypoxemic respiratory failure 2/2 restrictive lung disease likely exacerbated by new onset heart failure    Issues Requiring Follow-Up  Hypoxemia/Restrictive lung disease - f/u with pulmonology clinic/cardiology - Started on lasix 20 mg on d/c after hypoxemia improved with diuresis and thoracentesis while inpatient, will need  Exudative effusion -Rheum and cytology collected but pending on discharge, f/u with PCP/Pulm for results and further management    Discharge Meds     Medication List      START taking these medications     furosemide 20 mg tablet; Commonly known as: Lasix; Take 1 tablet (20 mg)   by mouth once daily as needed (take it for worsening shortness of breath   or increased body weight).     CONTINUE taking these medications     albuterol 90 mcg/actuation inhaler; Commonly known as: ProAir HFA;   Inhale 2 puffs every 6 hours if needed for wheezing.   alendronate 70 mg tablet; Commonly known as: Fosamax; Take 1 tablet by   mouth every 7 days. Take in the morning with a full glass of water at   least 30 minutes before first food, drink, or medications of the day.   amLODIPine 2.5 mg tablet; Commonly known as: Norvasc; Take 1 tablet (2.5   mg) by mouth once daily. For blood pressure   aspirin 81 mg chewable tablet; Chew and swallow 1 tablet by mouth daily.   atorvastatin 40 mg tablet; Commonly known as: Lipitor; Take 1 tablet (40   mg) by mouth once daily.   busPIRone 5 mg tablet; Commonly known as: Buspar; Take 1 tablet by mouth   three times daily as needed for anxiety.   cyanocobalamin 1,000 mcg tablet; Commonly known as: Vitamin B-12; Take 1   tablet (1,000 mcg) by mouth once daily. As directed   DULoxetine 60 mg DR capsule; Commonly known as: Cymbalta; Take 1 capsule   by mouth twice daily.   ergocalciferol 1.25 MG (57851 UT) capsule; Commonly known as: Vitamin   D-2; Take 1 capsule (1,250 mcg) by mouth 1 (one) time per week.   escitalopram 20 mg tablet;  Commonly known as: Lexapro; Take 1 tablet (20   mg) by mouth once daily.   FeroSuL tablet; Generic drug: ferrous sulfate (325 mg ferrous sulfate);   Take 1 tablet by mouth once daily with breakfast.   fexofenadine 180 mg tablet; Commonly known as: Allegra   folic acid 1 mg tablet; Commonly known as: Folvite; TAKE ONE TABLET BY   MOUTH EVERY DAY for folate deficiency   levothyroxine 50 mcg tablet; Commonly known as: Synthroid, Levoxyl; Take   1 tablet (50 mcg) by mouth once daily in the morning. Take before meals.   mirtazapine 15 mg tablet; Commonly known as: Remeron; Take 1 tablet (15   mg) by mouth once daily at bedtime.   montelukast 10 mg tablet; Commonly known as: Singulair; Take 1 tablet by   mouth once daily at bedtime.   pantoprazole 40 mg EC tablet; Commonly known as: ProtoNix; Take 1 tablet   (40 mg) by mouth 2 times a day.   potassium chloride CR 20 mEq ER tablet; Commonly known as: Klor-Con M20;   Take 1 tablet (20 mEq) by mouth 2 times a day. Do not crush or chew.   pregabalin 150 mg capsule; Commonly known as: Lyrica; Take 1 capsule   (150 mg) by mouth every 6 hours.   propranolol 10 mg tablet; Commonly known as: Inderal; Take 1 tablet (10   mg) by mouth 2 times a day.   sulfamethoxazole-trimethoprim 800-160 mg tablet; Commonly known as:   Bactrim DS   tamsulosin 0.4 mg 24 hr capsule; Commonly known as: Flomax; Take 1   capsule (0.4 mg) by mouth once daily at bedtime.       Test Results Pending At Discharge  Pending Labs       Order Current Status    HOMERO without Reflex IRAM In process    Citrulline Antibody, IgG In process    IRAM Panel In process    Non-gynecologic cytology In process    Rheumatoid Factor In process    AFB Culture/Smear Preliminary result    Blood Culture Preliminary result    Blood Culture Preliminary result    Fungal Culture/Smear Preliminary result    Sterile Fluid Culture/Smear Preliminary result    Urine Culture Preliminary result            Hospital Course  Mr. Redman is a  69-year-old male with complicated past medical history including restrictive lung disease for which she is on 3 to 4 L at baseline of oxygen by nasal cannula secondary to presumed pleural disease as calcifications and thickening have been seen on multiple CTs going back to at least 2016 follows with [facility] [place], and not a surgical candidate given multiple medical comorbidities.  Alcohol use disorder complicated by peripheral polyneuropathy including autonomic neuropathy complicated by orthostasis and recurrent syncope. pSVT, hypertension, CAD, presented to the ED with 1 week worsening of shortness of breath associated with cough.     Found to have a bilateral pleural effusion, status post drainage of the left pleural effusion with round 200 mL of fluid removed with improvement in his subjective shortness of breath and received diuresis with good urine output and was discharged on his 3 L of oxygen home on Lasix as needed for shortness of breath and lower extremity edema.  Pleural effusion was exudative, rheumatologic collected and pending at time of discharge as well as cytology of the fluid given his pleural thickening.  Discharged on nebulizer treatments as well by pulmonology.  He will follow-up in the outpatient setting with his cardiologist Dr. Harry as well as pulmonology team at [facility].      Pertinent Physical Exam At Time of Discharge  Physical Exam  onstitutional:       Appearance: He is obese.   HENT:      Mouth/Throat:      Mouth: Mucous membranes are moist.   Cardiovascular:      Rate and Rhythm: Normal rate and regular rhythm.      Pulses: Normal pulses.      Heart sounds: Normal heart sounds.   Pulmonary:      Breath sounds: Wheezing and rhonchi present.   Abdominal:      General: Bowel sounds are normal.   Musculoskeletal:      Cervical back: Normal range of motion.   Skin:     General: Skin is warm and dry.      Capillary Refill: Capillary refill takes 2 to 3 seconds.   Neurological:       General: No focal deficit present.      Mental Status: He is alert and oriented to person, place, and time. Mental status is at baseline.   Psychiatric:         Mood and Affect: Mood normal.         Behavior: Behavior normal.         Thought Content: Thought content normal.         Judgment: Judgment normal.   Outpatient Follow-Up  Future Appointments   Date Time Provider Department [place]   10/2/2024 10:45 AM Boone Bull DPM [id number] [place]   10/2/2024  1:30 PM Rosie Camejo, APRN-CNP [id number] [place]   10/7/2024  1:30 PM Mendoza Woo MD PAROPCPNM [place]   11/5/2024  3:40 PM Angel Harry DO [id number] [place]   11/20/2024  1:30 PM Artie Haskins MD [id number] [place]   12/9/2024  2:20 PM Quita Younger, APRN-CNP [id number] [facility]   3/26/2025  1:30 PM Artie Haskins MD [id number] [place]       -------------------------  Attending Addendum  -------------------------    Seen and examined with resident physicians. Labs and imaging personally reviewed. Pt medically/clinically stable for discharge home w/ [facility]. Case discussed with case management, residents and patient.    DC Time: > 30 minutes    I saw and evaluated the patient. I personally obtained the key and critical portions of the history and physical exam or was physically present for key and critical portions performed by the resident/fellow. I reviewed the resident/fellow's documentation and discussed the patient with the resident/fellow. I agree with the resident/fellow's medical decision making as documented in the note.    Trae Hodge DO     7

## 2024-09-25 NOTE — PROGRESS NOTES
Occupational Therapy    Occupational Therapy    Evaluation    Patient Name: Angus Redman  MRN: [id number]  Today's Date: 9/25/2024  Time Calculation  Start Time: 1117  Stop Time: 1151  Time Calculation (min): 34 min  2105/2105-A    Assessment  IP OT Assessment  OT Assessment: Pt cooperative with increased motivation. Pt requires increased assist with ADL's and mobility. Recommend SNF to increase safety and independence with ADL's  Prognosis: Fair  End of Session Communication: Bedside nurse  End of Session Patient Position: Bed, 3 rail up, Alarm on    Plan:  Treatment Interventions: ADL retraining, Functional transfer training, UE strengthening/ROM, Endurance training, Cognitive reorientation, Neuromuscular reeducation  OT Frequency: 3 times per week  OT Discharge Recommendations: Moderate intensity level of continued care  Equipment Recommended upon Discharge: Wheeled walker  OT Recommended Transfer Status: Maximum assist, Assist of 2  OT - OK to Discharge: Yes (to next level of care)    Subjective     Current Problem:  1. Acute hypoxic respiratory failure (Multi)  Home Nebulizer    Referral to Home Health      2. Ulcer of toe of right foot, with fat layer exposed (Multi)  Vascular US Ankle Brachial Index (HUMBLE) Without Exercise    Vascular US Ankle Brachial Index (HUMBLE) Without Exercise    CANCELED: Vascular US PVR without exercise    CANCELED: Vascular US PVR without exercise    CANCELED: Vascular US PVR without exercise    CANCELED: Vascular US PVR without exercise      3. Pain in right leg  Vascular US Ankle Brachial Index (HUMBLE) Without Exercise      4. Pain in left leg  Vascular US Ankle Brachial Index (HUMBLE) Without Exercise      5. Recurrent major depressive disorder, in partial remission (CMS-HCC)  escitalopram (Lexapro) 20 mg tablet      6. Acute diastolic heart failure (Multi)  furosemide (Lasix) 20 mg tablet      7. Restrictive lung disease  Referral to Home Health          General:  General  Reason for  Referral: ADL's; Safety Assessment  Referred By: Angel Hodge  Past Medical History Relevant to Rehab: CKD, DM, Afib, COPD, neuropathy, frequent falls, restrictive lung disease, ETOH abuse  Co-Treatment: PT  Co-Treatment Reason: safety  Prior to Session Communication: Bedside nurse  Patient Position Received: Bed, 3 rail up, Alarm on  General Comment: pt with poor safety awareness    General Visit Information:  Per EMR: pt presents with progressive dyspnea on exertion and shortness of breath increasing over the last couple weeks, chronically on 3 L at baseline for restrictive lung disease however noted to be hypoxemic on his home 3 L by visiting wound care nurse and he was increased to 4 L, however on home pulse ox he has continued to have increasing hypoxemia as well as dyspnea on exertion prompting his visit to the emergency room.  He denies soco chest pain or discomfort, endorses some PND and sleeps with his head on multiple pillows due to GERD but denies increasing the amount of pillows that he uses at home.  He had a type II MI in 2023 after having a syncopal episode and fall and follow-up CCTA as above in medical history.  He denies unexplained weight loss, fevers, chills, sweats, productive cough, nausea, vomiting, diarrhea.  Endorses a chronic nonhealing foot wound for which she sees Dr. Jang in the wound care clinic and is currently on Bactrim.     He has been on home oxygen since 2023, echocardiograms since initiation of oxygen have showed normal biventricular function, on review of echocardiograms it appears diastolic function has not able to be assessed.  Last echocardiogram in 4/2024 showing an EF of 65%, however left ventricular diastolic filling was again not assessed, aortic, pulmonic and tricuspid valves were not well-visualized, there was a trivial pericardial effusion and the mitral valve was normal in structure, RVSP was not able to be assessed.  He is seeing advanced heart failure in  "the outpatient setting due to concern for atrial fibrillation however a Holter monitor showed episodes of pSVT episodes of atrial fibrillation.     PFTs April 2024 showing a restrictive pattern of lung disease with no response to bronchodilators no evidence of obstructive lung disease.  Follows with pulmonology at [facility] [place], discussed cardiothoracic surgery as the underlying etiology of his restrictive lung disease was felt to likely be due to pleural thickening seen on CT.  He does have a history of asbestos exposure, he was exposed to asbestos as a child through his father that worked at a steel foundry.  He himself worked at [email or web address] as a , no firsthand exposures to asbestos.     On arrival, pt supine in bed.  Pt in no apparent distress and agreeable to therapy.    Precautions:  Medical Precautions: Fall precautions, Oxygen therapy device and L/min (4LNC)  Precautions Comment: bed/chair alarm    Vital Signs:  SpO2:  (89-90% on 4LNC at rest; 85% once 4LNC replaced after pt insisted on removing NC to ambulate to bathroom despite education on importance and need for supp O2; SpO2 improved to 96% with 1 min of PLB)    Pain:  Pain Assessment  Pain Assessment: 0-10  0-10 (Numeric) Pain Score: 9  Pain Type: Chronic pain  Pain Location: Foot (pt with complaints of pain in B feet \"all of the time\" (L>R))    Objective     Cognition:  Overall Cognitive Status: Within Functional Limits  Orientation Level: Oriented X4  Safety/Judgement: Pt with poor safety and impaired judgement, and not receptive to education. Pt makes choices that can lead to increased risk for infection or falls.   Pt was not aware of incontinence of urine while in bed. Pt's clothing, brief and bed linens were heavily soiled with urine. Pt with shorts on, that once removed,  wanted therapist to hang to dry. This therapist educated pt that he should avoid wearing soiled shorts once discharged. Pt insisted \"it will be fine\". Pt was " "offered hospital pants, but refused.   Pt with poor safety, and insisted on pulling up on walker (with one lock broken) to complete sit-stand from bed. Pt demanded therapists hold walker in place. When asked if pt stood this way at home, pt replied that he has carpet, and the walker doesn't slide.  Pt impulsive with all transfers and mobility.  Pt was on tele and with 4L O2 NC (wears 3L O2 NC at home). Upon standing, pt wanted to use restroom, and disconnected tele and doffed NC to ambulate to restroom. Educated pt on safety of wearing O2 since on 4L, and pt refused. Pt with increased SOB, and SpO2 85% after using restroom (RA).  Pt adamant to have things done a certain way, or items to be placed in a certain spot without appreciating the assist provided.  Pt does not appear aware of poor safety    Home Living/PLOF:  Pt lives alone (with 3 cats) in multi level home.  Pt states there are no MELISSA to enter the house, and then 3 up to kitchen (main level). Pt does not have a bathroom on the main level. From the main level, pt has 10 (3+7) stairs (with rails) to get down to basement. Pt has a walk in shower in the basement that has a grab bar and a \"pipe\" to hold onto. From the main level, pt has 14 stairs (2+6+1+5) (with rails) to get to the 2nd floor. Pt's bedroom and another full bath are on 2nd floor. Pt has a tub/shower with a shower chair, but does not use that shower. Pt uses all levels of the house daily.  Pt states independence with all ADL's. Pt has only been sponge bathing secondary to B LE wounds. Pt was completing dressing and toileting independently. Pt wear briefs secondary to incontinence.   Pt was completing all household tasks. Pt did not clarify when he does for transportation. Pt uses [employer] for groceries and states they bring the groceries inside for him.   Pt ambulates using rollator and states that stair negotiation has become difficulty and he has had to \"pull self up and lay in bed to rest " "before using the bathroom.\"  Pt reports multiple falls but unable to quantify, or state the reason for the falls.  Pt has wound nurse that visits 2x/wk.  He normally wears 3 LNC at baseline but states his wound nurse recently bumped him up to 4LNC.       ADL:  Eating Assistance: Moderate  Grooming Assistance: Moderate  Bathing Assistance: Maximal  UE Dressing Assistance: Moderate  LE Dressing Assistance: Maximal  Toileting Assistance with Device: Maximal    Activity Tolerance:  Endurance: Tolerates 10 - 20 min exercise with multiple rests    Functional Mobility:  Bed mobility  Supine to sit: Min A x2; able to move BLE towards EOB but required assist to complete transfer     Sit to supine: Min A x2; assist at trunk and BLE      Transfers  Sit to stand: Mod A x2 off EOB (x2 trials), pt pulls up on rollator despite therapist cues for safety; Min A x2 off bedside chair     Stand to sit: Min A x2; vCs for hand placement, decreased eccentric control      Toilet transfer: standing toileting with close SUP; pt insisted on removing O2 to ambulate to bathroom despite Vcs for importance     Ambulation/Stairs  Pt ambulated 20 ft with Min A x2 and rollator; pt severely unsafe while ambulating and quite impulsive     Pt ambulated 3 steps from chair to bed with B HHA/Mod A x2 2/2 to pt insisting on not using rollator; pt prematurely sits on EOB prior to assuring safe clearance     Pt unreceptive to directions or safety cues and prefers to do what he wants when he chooses regardless of safety     Sitting Balance:  Static Sitting Balance  Static Sitting-Comment/Number of Minutes: fair  Dynamic Sitting Balance  Dynamic Sitting-Comments: fair-    Standing Balance:  Static Standing Balance  Static Standing-Comment/Number of Minutes: poor  Dynamic Standing Balance  Dynamic Standing-Comments: poor    Sensation:  Sensation Comment: pt reports neuropathy in B feet    Strength:  Strength Comments: B UE's  WFL      Extremities: RUE   RUE : " Within Functional Limits and LUE   LUE: Within Functional Limits    Outcome Measures: [facility] Daily Activity  Putting on and taking off regular lower body clothing: Total  Bathing (including washing, rinsing, drying): A lot  Putting on and taking off regular upper body clothing: A lot  Toileting, which includes using toilet, bedpan or urinal: Total  Taking care of personal grooming such as brushing teeth: A lot  Eating Meals: A lot  Daily Activity - Total Score: 10                       EDUCATION:  Education  Individual(s) Educated: Patient  Education Provided:  (safety)  Education Documentation  Body Mechanics, taught by Maira Nino OT at 9/25/2024  4:08 PM.  Learner: Patient  Readiness: Acceptance  Method: Explanation  Response: Verbalizes Understanding    Precautions, taught by Maira Nino OT at 9/25/2024  4:08 PM.  Learner: Patient  Readiness: Acceptance  Method: Explanation  Response: Verbalizes Understanding    Education Comments  No comments found.        Goals:   Encounter Problems       Encounter Problems (Active)       OT Goals       OT Goal 1 (Progressing)       Start:  09/25/24    Expected End:  10/09/24       Pt will complete all bed mobility with Min A safely            OT Goal 2 (Progressing)       Start:  09/25/24    Expected End:  10/09/24       Pt with complete all transfers safely with Min A of 1-2         OT Goal 3 (Progressing)       Start:  09/25/24    Expected End:  10/09/24       Pt will complete ADL's and mobility with good sit balance and fair stand balance            OT Goal 4 (Progressing)       Start:  09/25/24    Expected End:  10/09/24       Pt will complete UB dressing ADL's with Min A using adaptive device(s) as needed           OT Goal 5 (Progressing)       Start:  09/25/24    Expected End:  10/09/24       Pt will complete grooming ADL's with SBA using DME as needed                 Treatment: Pt required increased time to complete all mobility. Pt required Min A of 2 to complete  "supine-sit and to scoot hips to EOB. Pt with fair sit balance on EOB. Pt completed all transfers with Mod A. Of 2 with decreased safety. Pt insisted on ambulating to the bathroom, and did so using rollator with Min A. Pt stood to urinate in toilet with CGA/close SBA. Pt ambulated out of restroom to stand at sink with fair balance and CGA to wash hands. Pt ambulated to bedside chair with Min A, and sat in chair without warning or reaching back. Pt did not want to stay up in chair and ambulated a few feet chair to bed without rollator (he \"didn't need it\"), but then demanded both therapists hand to steady self, and stated, \"I hold onto things at home\". Pt returned to supine in bed with Min A of 2, and remained in bed with bed alarm on and call light within reach.        "

## 2024-09-25 NOTE — PROGRESS NOTES
Physical Therapy    Physical Therapy Evaluation    Patient Name: Angus Redman  MRN: [id number]  Today's Date: 9/25/2024   Time Calculation  Start Time: 1117  Stop Time: 1150  Time Calculation (min): 33 min  2105/2105-A    Assessment/Plan   PT Assessment: Pt demonstrates decreased strength, decreased safety awareness, increased risk of falls, decreased endurance, decreased activity tolerance, and impaired balance/mobility.  Pt appears below baseline level of function and based on current level of function, pt would benefit from continued skilled therapy while in the hospital to ensure safety, decrease risk of falls, and regain strength/mobility back to baseline.  Once stable enough for discharge, pt would benefit from moderate intensity therapy.  Pt is an extremely high fall risk 2/2 to lack of safety awareness and impulsion.      PT Assessment Results: Decreased strength, Decreased endurance, Impaired balance, Decreased mobility, Impaired judgement, Decreased safety awareness, Impaired sensation, Pain  Rehab Prognosis: Fair (2/2 to stubbornness)  Evaluation/Treatment Tolerance: Patient limited by fatigue  Medical Staff Made Aware: Yes  End of Session Communication: Bedside nurse  End of Session Patient Position: Bed, 3 rail up, Alarm on  IP OR SWING BED PT PLAN  Inpatient or Swing Bed: Inpatient  PT Plan  Treatment/Interventions: Bed mobility, Transfer training, Gait training, Stair training, Balance training, Neuromuscular re-education, Strengthening, Endurance training, Range of motion, Therapeutic exercise, Therapeutic activity, Home exercise program  PT Plan: Ongoing PT  PT Frequency: 3 times per week  PT Discharge Recommendations: Moderate intensity level of continued care  Equipment Recommended upon Discharge: Wheeled walker  PT Recommended Transfer Status: Assist x2 (Min-Mod A)  PT - OK to Discharge: Yes - To next level of care when cleared by medical team    Subjective     Current Problem:  1. Acute hypoxic  respiratory failure (Multi)        2. Ulcer of toe of right foot, with fat layer exposed (Multi)  Vascular US Ankle Brachial Index (HUMBLE) Without Exercise    Vascular US Ankle Brachial Index (HUMBLE) Without Exercise    CANCELED: Vascular US PVR without exercise    CANCELED: Vascular US PVR without exercise    CANCELED: Vascular US PVR without exercise    CANCELED: Vascular US PVR without exercise          Past Medical History:  Patient Active Problem List   Diagnosis    Abnormality of gait and mobility    Adenomatous polyp of colon    Alcoholic gastritis    Wears glasses    Vitamin D deficiency    Folic acid deficiency    Vitamin B12 deficiency    Use of cane as ambulatory aid    Urinary urgency    Tubular adenoma of colon    Skin cancer    Scalp hematoma    Sacroiliac pain    Reactive airway disease (HHS-HCC)    Postural dizziness    Peripheral neuropathy    Paroxysmal atrial fibrillation (Multi)    Osteopenia    Osteoarthritis    Orthostatic hypotension    Noncompliance with therapeutic plan    Male erectile disorder of organic origin    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)    Lumbar radiculopathy    Shoulder pain    Left elbow pain    Iron deficiency    Hypoxemia    Hypothyroidism    Hypokalemia    Hyperlipidemia    History of paroxysmal supraventricular tachycardia    Gastroesophageal reflux disease    Falling    Eczema    Early cataracts, bilateral    Situational depression    Depression, controlled    Cubital tunnel syndrome on left    Continuous chronic alcoholism (Multi)    Congenital pes planus    Colon polyps    Chronic pain of right knee    Pain in joints of both feet    Neuropathic pain of both feet    Left foot pain    Chronic hip pain after total replacement of right hip joint    Cellulitis    Trigger ring finger of left hand    Carpal tunnel syndrome    Benign essential hypertension    BPH with obstruction/lower urinary tract symptoms    Anxiety    Macrocytic anemia    Anemia     Amputation of toe of right foot (CMS-[facility])    ALT (SGPT) level raised    Allergic rhinitis    Back pain    History of left hip replacement    Elevated bilirubin    Elevated prostate specific antigen (PSA)    Frequent falls    History of medication noncompliance    Alcohol abuse with intoxication (CMS-[facility])    Restrictive lung disease    Nocturnal hypoxemia    Peripheral vascular disease, unspecified (CMS-[facility])    Asthmatic breathing    Mild intermittent asthma ([facility])    Fall    NSTEMI (non-ST elevated myocardial infarction) (Multi)    Traumatic rhabdomyolysis (CMS-[facility])    DNR (do not resuscitate)    Elevated glucose    Alcohol-induced polyneuropathy (Multi)    Type 2 diabetes mellitus with diabetic polyneuropathy, without long-term current use of insulin (Multi)    Stage 3a chronic kidney disease (Multi)       General Visit Information:  Per EMR: pt presents with progressive dyspnea on exertion and shortness of breath increasing over the last couple weeks, chronically on 3 L at baseline for restrictive lung disease however noted to be hypoxemic on his home 3 L by visiting wound care nurse and he was increased to 4 L, however on home pulse ox he has continued to have increasing hypoxemia as well as dyspnea on exertion prompting his visit to the emergency room.  He denies soco chest pain or discomfort, endorses some PND and sleeps with his head on multiple pillows due to GERD but denies increasing the amount of pillows that he uses at home.  He had a type II MI in 2023 after having a syncopal episode and fall and follow-up CCTA as above in medical history.  He denies unexplained weight loss, fevers, chills, sweats, productive cough, nausea, vomiting, diarrhea.  Endorses a chronic nonhealing foot wound for which she sees Dr. Jang in the wound care clinic and is currently on Bactrim.     He has been on home oxygen since 2023, echocardiograms since initiation of oxygen have showed normal biventricular function, on  review of echocardiograms it appears diastolic function has not able to be assessed.  Last echocardiogram in 4/2024 showing an EF of 65%, however left ventricular diastolic filling was again not assessed, aortic, pulmonic and tricuspid valves were not well-visualized, there was a trivial pericardial effusion and the mitral valve was normal in structure, RVSP was not able to be assessed.  He is seeing advanced heart failure in the outpatient setting due to concern for atrial fibrillation however a Holter monitor showed episodes of pSVT episodes of atrial fibrillation.     PFTs April 2024 showing a restrictive pattern of lung disease with no response to bronchodilators no evidence of obstructive lung disease.  Follows with pulmonology at [facility], discussed cardiothoracic surgery as the underlying etiology of his restrictive lung disease was felt to likely be due to pleural thickening seen on CT.  He does have a history of asbestos exposure, he was exposed to asbestos as a child through his father that worked at a steel foundry.  He himself worked at [employer] as a , no firsthand exposures to asbestos.    On arrival, pt supine in bed.  Pt in no apparent distress and agreeable to therapy.    General  Reason for Referral: impaired mobility, gait training  Referred By: Angel Hodge  Past Medical History Relevant to Rehab: CKD, DM, Afib, COPD, neuropathy, frequent falls, restrictive lung disease, ETOH abuse  Missed Visit: Yes  Missed Visit Reason: Patient in a medical procedure  Co-Treatment: OT  Prior to Session Communication: Bedside nurse  Patient Position Received: Bed, 3 rail up, Alarm on  General Comment: pt with poor safety awareness    Home Living/PLOF:  Pt lives alone (with cats) in multi level home.  Pt states there are 3 MELISSA to kitchen (main level).  No bathroom on main level.  Pt has full bath on basement level and full bath on upper level.  7 steps down to basement level.  Has WIS  "with Gbs in basement but states he has been only sponge bathing 2/2 to wounds.  Pt's bedroom is on upper level with 2+6+5 steps up with landings in between and B rails.  Pt ambulates using rollator and states that stair negotiation has become difficulty and he has had to \"pull self up and lay in bed to rest before using the bathroom.\"  Pt reports multiple falls but unable to quantify.  Pt has wound nurse that visits 2x/wk.  He normally wears 3 LNC at baseline but states his wound nurse recently bumped him up to 4LNC.  Pt uses [employer] for groceries and states they bring the groceries inside for him.     Precautions:  Precautions  Medical Precautions: Fall precautions, Oxygen therapy device and L/min (4LNC)    Vital Signs:  Vital Signs  SpO2:  (89-90% on 4LNC at rest; 85% once 4LNC replaced after pt insisted on removing NC to ambulate to bathroom despite education on importance and need for supp O2; SpO2 improved to 96% with 1 min of PLB)  Objective     Pain:  Pain Assessment  Pain Assessment: 0-10  0-10 (Numeric) Pain Score: 9  Pain Type: Chronic pain  Pain Location: Foot  Pain Orientation: Right, Left  Pain Interventions: Repositioned    Cognition:  Cognition  Overall Cognitive Status: Within Functional Limits  Orientation Level: Oriented X4  Safety/Judgement:  (severely impaired)  Insight: Moderate  Impulsive: Severely    General Assessments:      Activity Tolerance  Endurance: Tolerates 10 - 20 min exercise with multiple rests  Sensation  Sensation Comment: pt reports neuropathy in B feet    Static Sitting Balance  Static Sitting-Comment/Number of Minutes: fair plus  Dynamic Sitting Balance  Dynamic Sitting-Comments: fair  Static Standing Balance  Static Standing-Comment/Number of Minutes: fair  Dynamic Standing Balance  Dynamic Standing-Comments: fair minus    Extremity/Trunk Assessments:  BLE strength: appears WFL    Functional Mobility:  Bed mobility  Supine to sit: Min A x2; able to move BLE towards EOB but " required assist to complete transfer     Sit to supine: Min A x2; assist at trunk and BLE     Transfers  Sit to stand: Mod A x2 off EOB (x2 trials), pt pulls up on rollator despite therapist cues for safety; Min A x2 off bedside chair    Stand to sit: Min A x2; vCs for hand placement, decreased eccentric control     Toilet transfer: standing toileting with close SUP; pt insisted on removing O2 to ambulate to bathroom despite Vcs for importance    Ambulation/Stairs  Pt ambulated 20 ft with Min A x2 and rollator; pt severely unsafe while ambulating and quite impulsive    Pt ambulated 3 steps from chair to bed with B HHA/Mod A x2 2/2 to pt insisting on not using rollator; pt prematurely sits on EOB prior to assuring safe clearance    Pt unreceptive to directions or safety cues and prefers to do what he wants when he chooses regardless of safety     Outcome Measures:  [facility] Basic Mobility  Turning from your back to your side while in a flat bed without using bedrails: A little  Moving from lying on your back to sitting on the side of a flat bed without using bedrails: A little  Moving to and from bed to chair (including a wheelchair): A lot  Standing up from a chair using your arms (e.g. wheelchair or bedside chair): A lot  To walk in hospital room: A lot  Climbing 3-5 steps with railing: A lot  Basic Mobility - Total Score: 14    Goals:  Encounter Problems       Encounter Problems (Active)       PT Problem       Pt will be able to perform all bed mobility tasks with Mod I.  (Progressing)       Start:  09/25/24    Expected End:  10/09/24            Pt will perform all transfers with Mod I and FWW with proper safety mechanics.   (Progressing)       Start:  09/25/24    Expected End:  10/09/24            Pt will ambulate 75 ft with Mod I using FWW for improved functional independence.  (Progressing)       Start:  09/25/24    Expected End:  10/09/24            Pt will be able to negotiate 12 steps with 1-2 HR with SBA.   (Progressing)       Start:  09/25/24    Expected End:  10/09/24            Pt will be able to ambulate at least 75 ft with < or equal to 1 rest break while maintaining SpO2 > 90%.  (Progressing)       Start:  09/25/24    Expected End:  10/09/24                 Education Documentation  Body Mechanics, taught by Yvette Phelps, PT at 9/25/2024  2:21 PM.  Learner: Patient  Readiness: Acceptance  Method: Explanation  Response: Needs Reinforcement, No Evidence of Learning, Refused Teaching    Home Exercise Program, taught by Yvette Phelps, PT at 9/25/2024  2:21 PM.  Learner: Patient  Readiness: Acceptance  Method: Explanation  Response: Needs Reinforcement, No Evidence of Learning, Refused Teaching    Mobility Training, taught by Yvette Phelps, PT at 9/25/2024  2:21 PM.  Learner: Patient  Readiness: Acceptance  Method: Explanation  Response: Needs Reinforcement, No Evidence of Learning, Refused Teaching    Education Comments  No comments found.

## 2024-09-25 NOTE — CARE PLAN
Problem: Pain - Adult  Goal: Verbalizes/displays adequate comfort level or baseline comfort level  Outcome: Met     Problem: Safety - Adult  Goal: Free from fall injury  Outcome: Met     Problem: Discharge Planning  Goal: Discharge to home or other facility with appropriate resources  Outcome: Met     Problem: Skin  Goal: Prevent/manage excess moisture  Outcome: Met  Flowsheets (Taken 9/25/2024 0303)  Prevent/manage excess moisture: Cleanse incontinence/protect with barrier cream       Pt remained hemodynamically stable throughout shift. Pt required 4LNC, oxygen saturation remained above 91%. Discharge instructions reviewed with patient. Pt discharged home this shift. Transported via wheelchair to car with family. Safety maintained.

## 2024-09-25 NOTE — PROGRESS NOTES
Pt HHC agency is not [facility] as previously charted but is [facility], confirmed with pt. [facility]. Pt states he was having  home visits for wound care on Mondays and Fridays, was going to the wound clinic on Wednesdays.     1000 [facility] confirmed they can resume pt care upon dc and can add pt/ot if needed.    1525 Pt has a dc order. [facility] 14. HC orders for wound care/pt/ot requested from Dr. Hodge      1600 Received message that pt will need a nebulizer upon dc. Advised resident to reach out to respiratory or to provide a script for the pt. Pt's home oxygen is thru [facility] so respiratory may be able to reach out to them and arrange. [facility] updated.

## 2024-09-25 NOTE — DISCHARGE INSTRUCTIONS
Please follow up with your primary care provider,podiatrist and cardiologist within 7 days for hospital follow up. Please call to make this appointment.       New medication added  - Lasix 20 mg orally daily as needed for worsening shortness of breath,increased body weight or leg swelling    Please take your medications as prescribed.     If you have any new or worsening symptoms seek medical attention.    Thank you for allowing us to participate in your care!    -[facility] Inpatient Medicine Teaching Service.

## 2024-09-25 NOTE — HOSPITAL COURSE
Mr. Redman is a 69-year-old with PMH pSVT, hypertension, CAD, restrictive lung disease complicated by chronic hypoxic respiratory failure (on 4 L nasal cannula at home), presented to the ED with 1 week worsening of shortness of breath associated with cough (not sure if dry or wet). He admits to have orthopnea but no PND associated with chronic leg pain (he is following up with Dr. Bull at [facility]).  He denied any recent travel or being around sick patients. He lives by himself in a house with 3 cats, uses rollator for ambulation and he admits to have recurrent falls (up to 3 times in a week).  physical examination on admission, he was mildly tachypneic, in moderate respiratory distress but he speaks full sentences.  On 5 L nasal cannula satting 94-95%.  Lung auscultation did show decreased air entry at lung bases bilaterally, with bronchial breathing up to mid thorax bilaterally and no noticeable crackles or wheezing.  His WBC normal at 10.8, CMP consistent with CKD with BUN/creatinine/GFR 17/1.61/45, normal lactate. D-dimer elevated at 1484 for which CT PE was done in the ED was negative for PE but did show moderate to large new left-sided pleural effusion with loculated pleural effusion in the right lung.  Patient is admitted with a diagnosis of acute on chronic hypoxemic respiratory failure secondary to restrictive lung disease likely exacerbated by new onset heart failure, small right loculated pleural effusion and small left pleural effusion.  On 9/24 left thoracentesis was done by IR about 265 mL of serous fluid was removed and sent for analysis and cytology.  Patient was seen by podiatry and recommended dressing to be changed every 1 to 2 days and non pressure ulcer, left and right foot.  With suspicion for heart failure patient received IV Lasix.  Patient is back to his baseline 3 l of nasal cannula oxygen.

## 2024-09-26 ENCOUNTER — PATIENT OUTREACH (OUTPATIENT)
Dept: PRIMARY CARE | Facility: CLINIC | Age: 69
End: 2024-09-26
Payer: MEDICARE

## 2024-09-26 DIAGNOSIS — M54.50 CHRONIC LOW BACK PAIN, UNSPECIFIED BACK PAIN LATERALITY, UNSPECIFIED WHETHER SCIATICA PRESENT: ICD-10-CM

## 2024-09-26 DIAGNOSIS — G89.29 CHRONIC LOW BACK PAIN, UNSPECIFIED BACK PAIN LATERALITY, UNSPECIFIED WHETHER SCIATICA PRESENT: ICD-10-CM

## 2024-09-26 DIAGNOSIS — G57.93 NEUROPATHIC PAIN OF BOTH FEET: ICD-10-CM

## 2024-09-26 LAB
ANA SER QL HEP2 SUBST: NEGATIVE
BACTERIA UR CULT: ABNORMAL

## 2024-09-26 NOTE — PROGRESS NOTES
Discharge facility: [facility]  Discharge diagnosis: Acute on chronic hypoxemic respiratory failure 2/2 restrictive lung disease likely exacerbated by new onset heart failure   Admission date: 9/23/2024  Discharge date: 9/25/2024    PCP Appointment Date: 10/8/2024  Specialist Appointment Date: Pulmonary 12/9/2024, Cardiology 10/14/2024, Pain management 10/7/2024  Hospital Encounter and Summary: Linked   See Discharge assessment below for further details      Medications  Medications reviewed with patient/caregiver?: Yes (9/26/2024  3:15 PM)  Is the patient having any side effects they believe may be caused by any medication additions or changes?: No (9/26/2024  3:15 PM)  Does the patient have all medications ordered at discharge?: No (waiting for amanon medication delivery) (9/26/2024  3:15 PM)  Care Management Interventions: Provided patient education (9/26/2024  3:15 PM)  Prescription Comments: discussed lasix (9/26/2024  3:15 PM)  Is the patient taking all medications as directed (includes completed medication regime)?: Yes (9/26/2024  3:15 PM)  Care Management Interventions: Provided patient education (9/26/2024  3:15 PM)    Appointments  Does the patient have a primary care provider?: Yes (9/26/2024  3:15 PM)  Care Management Interventions: Verified appointment date/time/provider (9/26/2024  3:15 PM)  Has the patient kept scheduled appointments due by today?: Yes (9/26/2024  3:15 PM)  Care Management Interventions: Educated on importance of keeping appointment (9/26/2024  3:15 PM)    Self Management  What is the home health agency?:  [facility] (9/26/2024  3:15 PM)  Has home health visited the patient within 72 hours of discharge?: Yes (9/26/2024  3:15 PM)  What Durable Medical Equipment (DME) was ordered?: nebulizer (9/26/2024  3:15 PM)  Has all Durable Medical Equipment (DME) been delivered?: No (9/26/2024  3:15 PM)  Follow Up Tasks: Durable Medical Equipment (DME) (9/26/2024  3:15 PM)    Patient Teaching  Does  the patient have access to their discharge instructions?: Yes (9/26/2024  3:15 PM)  Care Management Interventions: Reviewed instructions with patient (9/26/2024  3:15 PM)  What is the patient's perception of their health status since discharge?: Improving (9/26/2024  3:15 PM)  Is the patient/caregiver able to teach back the hierarchy of who to call/visit for symptoms/problems? PCP, Specialist, Home Health nurse, Urgent Care, ED, 911: Yes (9/26/2024  3:15 PM)  Patient/Caregiver Education Comments: I spoke with the patient who stated that he was feeling better.  Patient is using his O2at 4L per NC.  Discussed O2 safety.  Discussed his new medication Lasix and when to use the medication.  We discussed daily weights and worsening S&S of HF.  His hospital weight was 247.  Patient verbalized an apparent understanding.  Patient was ordered an nebulizer at discharge.  Follow up appointments reviewed.  All questions answered.  patient will call with any questions or concerns. (9/26/2024  3:15 PM)    Wrap Up  Is the patient/caregiver familiar with Advance Care Planning?: Yes (9/26/2024  3:15 PM)  Would the patient like more information on Advance Care Planning?: No (9/26/2024  3:15 PM)      TCM outreach completed on : 9/26/2024  Discharge date: 9/25/2024  Pt has follow up on : 10/8/2024    Moderately complex & follow-up within 14 days- bill [id number] for hospital follow up.   Highly complex and follow-up visit within 7 days-can bill [id number] for hospital follow up    *virtual follow up needs modifier added (95 or GT)   *AWV AND TCM CAN BE BILLED TOGETHER WITH 25 MODIFIER

## 2024-09-27 DIAGNOSIS — J45.20 MILD INTERMITTENT ASTHMA, UNSPECIFIED WHETHER COMPLICATED (HHS-HCC): ICD-10-CM

## 2024-09-27 DIAGNOSIS — I50.9 HEART FAILURE, UNSPECIFIED HF CHRONICITY, UNSPECIFIED HEART FAILURE TYPE: Primary | ICD-10-CM

## 2024-09-27 LAB
BACTERIA FLD CULT: NORMAL
FUNGUS SPEC CULT: NORMAL
FUNGUS SPEC FUNGUS STN: NORMAL
GRAM STN SPEC: NORMAL
GRAM STN SPEC: NORMAL
LABORATORY COMMENT REPORT: NORMAL
LABORATORY COMMENT REPORT: NORMAL
PATH REPORT.FINAL DX SPEC: NORMAL
PATH REPORT.GROSS SPEC: NORMAL
PATH REPORT.RELEVANT HX SPEC: NORMAL
PATH REPORT.TOTAL CANCER: NORMAL

## 2024-09-27 RX ORDER — DULOXETIN HYDROCHLORIDE 60 MG/1
60 CAPSULE, DELAYED RELEASE ORAL 2 TIMES DAILY
Qty: 60 CAPSULE | Refills: 6 | Status: SHIPPED | OUTPATIENT
Start: 2024-09-27

## 2024-09-28 LAB
BACTERIA BLD CULT: NORMAL
BACTERIA BLD CULT: NORMAL

## 2024-09-30 ENCOUNTER — TELEPHONE (OUTPATIENT)
Dept: PAIN MEDICINE | Facility: CLINIC | Age: 69
End: 2024-09-30

## 2024-09-30 ENCOUNTER — PATIENT OUTREACH (OUTPATIENT)
Dept: PRIMARY CARE | Facility: CLINIC | Age: 69
End: 2024-09-30
Payer: MEDICARE

## 2024-09-30 DIAGNOSIS — F33.41 RECURRENT MAJOR DEPRESSIVE DISORDER, IN PARTIAL REMISSION (CMS-HCC): ICD-10-CM

## 2024-09-30 DIAGNOSIS — J98.4 RESTRICTIVE LUNG DISEASE: ICD-10-CM

## 2024-09-30 LAB
FUNGUS SPEC CULT: NORMAL
FUNGUS SPEC FUNGUS STN: NORMAL

## 2024-09-30 PROCEDURE — 99490 CHRNC CARE MGMT STAFF 1ST 20: CPT | Performed by: INTERNAL MEDICINE

## 2024-09-30 NOTE — PROGRESS NOTES
I called and spoke to the patient post discharge from the hospital.  Patient stated that he was still tired and sleeping a lot.  Wound care was at the house on Friday 9/27/2024 and will continue to follow the patient 2x a week.  Pt will also start in the house.  Patient did receive his lasix order.  Patient will try and have someone bring up his scale for daily weights.  Instructed again on the proper use of lasix.  Patient verbalized an apparent understanding.  Instructed to call with any questions or concerns.

## 2024-10-02 ENCOUNTER — APPOINTMENT (OUTPATIENT)
Dept: PAIN MEDICINE | Facility: CLINIC | Age: 69
End: 2024-10-02
Payer: MEDICARE

## 2024-10-02 ENCOUNTER — APPOINTMENT (OUTPATIENT)
Dept: WOUND CARE | Facility: CLINIC | Age: 69
End: 2024-10-02
Payer: MEDICARE

## 2024-10-02 ENCOUNTER — TELEPHONE (OUTPATIENT)
Dept: PRIMARY CARE | Facility: CLINIC | Age: 69
End: 2024-10-02
Payer: MEDICARE

## 2024-10-02 LAB
ACID FAST STN SPEC: NORMAL
MYCOBACTERIUM SPEC CULT: NORMAL

## 2024-10-02 NOTE — TELEPHONE ENCOUNTER
Pt was discharged from [facility] and needs a nebulizer which they did not set up. Home care now needs an order for a  consult to get that ordered for patient. Please call with verbal

## 2024-10-04 LAB
ATRIAL RATE: 59 BPM
P AXIS: 180 DEGREES
P OFFSET: 165 MS
P ONSET: 114 MS
PR INTERVAL: 216 MS
Q ONSET: 222 MS
QRS COUNT: 9 BEATS
QRS DURATION: 76 MS
QT INTERVAL: 450 MS
QTC CALCULATION(BAZETT): 445 MS
QTC FREDERICIA: 447 MS
R AXIS: 2 DEGREES
T AXIS: 37 DEGREES
T OFFSET: 447 MS
VENTRICULAR RATE: 59 BPM

## 2024-10-05 LAB
ATRIAL RATE: 66 BPM
P AXIS: 33 DEGREES
P OFFSET: 181 MS
P ONSET: 115 MS
PR INTERVAL: 212 MS
Q ONSET: 221 MS
QRS COUNT: 11 BEATS
QRS DURATION: 80 MS
QT INTERVAL: 410 MS
QTC CALCULATION(BAZETT): 429 MS
QTC FREDERICIA: 423 MS
R AXIS: 25 DEGREES
T AXIS: 64 DEGREES
T OFFSET: 426 MS
VENTRICULAR RATE: 66 BPM

## 2024-10-07 ENCOUNTER — APPOINTMENT (OUTPATIENT)
Dept: PAIN MEDICINE | Facility: CLINIC | Age: 69
End: 2024-10-07
Payer: MEDICARE

## 2024-10-07 LAB
FUNGUS SPEC CULT: NORMAL
FUNGUS SPEC FUNGUS STN: NORMAL

## 2024-10-08 ENCOUNTER — APPOINTMENT (OUTPATIENT)
Dept: PRIMARY CARE | Facility: CLINIC | Age: 69
End: 2024-10-08
Payer: MEDICARE

## 2024-10-08 VITALS
HEIGHT: 72 IN | BODY MASS INDEX: 32.21 KG/M2 | HEART RATE: 111 BPM | OXYGEN SATURATION: 93 % | WEIGHT: 237.8 LBS | SYSTOLIC BLOOD PRESSURE: 118 MMHG | DIASTOLIC BLOOD PRESSURE: 72 MMHG

## 2024-10-08 DIAGNOSIS — L89.520: Primary | ICD-10-CM

## 2024-10-08 DIAGNOSIS — I50.9 ACUTE ON CHRONIC HEART FAILURE, UNSPECIFIED HEART FAILURE TYPE: ICD-10-CM

## 2024-10-08 DIAGNOSIS — L03.119 CELLULITIS OF LOWER EXTREMITY, UNSPECIFIED LATERALITY: ICD-10-CM

## 2024-10-08 DIAGNOSIS — R09.02 HYPOXEMIA: ICD-10-CM

## 2024-10-08 DIAGNOSIS — E55.9 VITAMIN D DEFICIENCY: ICD-10-CM

## 2024-10-08 DIAGNOSIS — J30.1 SEASONAL ALLERGIC RHINITIS DUE TO POLLEN: ICD-10-CM

## 2024-10-08 DIAGNOSIS — G62.1 ALCOHOL-INDUCED POLYNEUROPATHY (MULTI): ICD-10-CM

## 2024-10-08 DIAGNOSIS — E03.9 ACQUIRED HYPOTHYROIDISM: ICD-10-CM

## 2024-10-08 DIAGNOSIS — I10 BENIGN ESSENTIAL HYPERTENSION: ICD-10-CM

## 2024-10-08 DIAGNOSIS — J45.41 MODERATE PERSISTENT REACTIVE AIRWAY DISEASE WITH ACUTE EXACERBATION (HHS-HCC): ICD-10-CM

## 2024-10-08 DIAGNOSIS — E53.8 VITAMIN B12 DEFICIENCY: ICD-10-CM

## 2024-10-08 DIAGNOSIS — F10.20: ICD-10-CM

## 2024-10-08 DIAGNOSIS — E53.8 VITAMIN B 12 DEFICIENCY: ICD-10-CM

## 2024-10-08 DIAGNOSIS — Z86.79 HISTORY OF PAROXYSMAL SUPRAVENTRICULAR TACHYCARDIA: ICD-10-CM

## 2024-10-08 PROCEDURE — 3008F BODY MASS INDEX DOCD: CPT | Performed by: INTERNAL MEDICINE

## 2024-10-08 PROCEDURE — 3078F DIAST BP <80 MM HG: CPT | Performed by: INTERNAL MEDICINE

## 2024-10-08 PROCEDURE — 3074F SYST BP LT 130 MM HG: CPT | Performed by: INTERNAL MEDICINE

## 2024-10-08 PROCEDURE — 1159F MED LIST DOCD IN RCRD: CPT | Performed by: INTERNAL MEDICINE

## 2024-10-08 PROCEDURE — 1160F RVW MEDS BY RX/DR IN RCRD: CPT | Performed by: INTERNAL MEDICINE

## 2024-10-08 PROCEDURE — 1157F ADVNC CARE PLAN IN RCRD: CPT | Performed by: INTERNAL MEDICINE

## 2024-10-08 PROCEDURE — 1111F DSCHRG MED/CURRENT MED MERGE: CPT | Performed by: INTERNAL MEDICINE

## 2024-10-08 PROCEDURE — 99495 TRANSJ CARE MGMT MOD F2F 14D: CPT | Performed by: INTERNAL MEDICINE

## 2024-10-08 PROCEDURE — 1123F ACP DISCUSS/DSCN MKR DOCD: CPT | Performed by: INTERNAL MEDICINE

## 2024-10-08 RX ORDER — ALBUTEROL SULFATE 0.83 MG/ML
2.5 SOLUTION RESPIRATORY (INHALATION) EVERY 6 HOURS PRN
Qty: 120 ML | Refills: 11 | Status: SHIPPED | OUTPATIENT
Start: 2024-10-08 | End: 2025-10-08

## 2024-10-08 RX ORDER — ERGOCALCIFEROL 1.25 MG/1
1.25 CAPSULE ORAL
Qty: 12 CAPSULE | Refills: 3 | Status: SHIPPED | OUTPATIENT
Start: 2024-10-08

## 2024-10-08 RX ORDER — IPRATROPIUM BROMIDE 0.5 MG/2.5ML
0.5 SOLUTION RESPIRATORY (INHALATION) 4 TIMES DAILY
Qty: 120 ML | Refills: 11 | Status: SHIPPED | OUTPATIENT
Start: 2024-10-08 | End: 2025-10-08

## 2024-10-08 NOTE — PROGRESS NOTES
"Patient: Angus Redman  : 1955  PCP: Artie Haskins MD  MRN: [id number]  Program: Chronic Care Management (CMS)  Status: Enrolled  Effective Dates: 2023 - present  Responsible Staff: Evelyn Valentin RN  Social Determinants to be Addressed: No information to display    Transitional Care Management  Status: Enrolled  Effective Dates: 2024 - present  Responsible Staff: Evelyn Valentin RN  Social Determinants to be Addressed: Alcohol Use, Financial Resource Strain, Physical Activity, Social Connections, Stress, Tobacco Use         Angus Redman is a 69 y.o. male presenting today for follow-up after being discharged from the hospital 13 days ago. The main problem requiring admission was dyspnea and pleural effusion. The discharge summary and/or Transitional Care Management documentation was reviewed. Medication reconciliation was performed as indicated via the \"Juan as Reviewed\" timestamp.     Angus Redman was contacted by Transitional Care Management services two days after his discharge. This encounter and supporting documentation was reviewed.      Here for follow-up after admission to the hospital , discharge , with dyspnea on exertion.  Left pleural effusion evaluated with thoracentesis.  Diagnosed as acute on chronic hypoxemia respiratory failure with restrictive lung disease.  Furosemide started along with nebulizer.  Portable oxygen continues      Review of Systems    /72 (BP Location: Right arm, Patient Position: Sitting, BP Cuff Size: Adult)   Pulse (!) 111   Ht 1.829 m (6')   Wt 108 kg (237 lb 12.8 oz)   SpO2 93%   BMI 32.25 kg/m²     Physical Exam  Constitutional:       Comments: Disheveled overweight white male, appears older than his stated age  He sat with rather poor posture and with his obesity appeared rather slumped at the waist  Eye exam revealed pupils equally round and reactive, with extraocular muscles intact. Normal sclera and eyelids.  No scleral " icterus  Neck exam revealed no masses, adenopathy or thyromegaly. No neck pain on range of motion was noted.  Pulmonary exam revealed clear breath sounds bilaterally in all lung fields. No wheezes bronchitis or rales noted.  He did have bibasilar crackles more on the right than left  Cardiac exam revealed regular rate and rhythm without murmurs gallops or rubs.  No back flank or abdominal discomfort  Extremities-with compression hose from the wound clinic  Foot exam deferred to podiatry  Mental status rather pleasant, no forgetfulness no anxiousness affect a bit flat, his baseline at times mild no worrisome thoughts           The complexity of medical decision making for this patient's transitional care is moderate.    Assessment/Plan   Problem List Items Addressed This Visit             ICD-10-CM    Vitamin D deficiency E55.9    Vitamin B12 deficiency E53.8    Reactive airway disease ([facility]) J45.909    Hypoxemia R09.02    Relevant Medications    albuterol 2.5 mg /3 mL (0.083 %) nebulizer solution    ipratropium (Atrovent) 0.02 % nebulizer solution    Hypothyroidism E03.9    History of paroxysmal supraventricular tachycardia Z86.79    Continuous chronic alcoholism (Multi) F10.20    Cellulitis L03.90    Benign essential hypertension I10    Allergic rhinitis J30.9    Alcohol-induced polyneuropathy (Multi) G62.1    Heart failure I50.9    Pressure ulcer of left ankle, unstageable (Multi) - Primary L89.520     Other Visit Diagnoses         Codes    Vitamin B 12 deficiency     E53.8    Relevant Medications    ergocalciferol (Vitamin D-2) 1.25 MG (31471 UT) capsule          Portions of this encounter note have been copied from my previous note dated 7/24/24  , which have been updated where appropriate and all reflect my current medical decision making from today.        We spoke for over 30 minutes, over half the time counseling    Home health documentation-forms completed and faxed back as requested    CODE STATUS-he  has requested DNR comfort care and states he has worked with the  to get forms completed accordingly.  He has these at home.  He is will send those in and will update his electronic medical record to reflect this accordingly    Healthcare power of -reviewed at his  visit.  He has designated his sister, Antonina Redman as his legal agent of authority.  His formal written and notarized medical POA has been filed     Living situation-he lives alone in a house.  He no longer drives.  His   in .  His  sister - Antonina, lives in [facility], now retired.  The brother lives in a group home near [place] near their family home.    Patient was recently discharged from an inpatient facility. Discharge medication list reviewed and reconciled with her electronic medical record medication list. Discharge orders reviewed, and we'll continue to follow active medical problems as outlined in this note.    Patient was recently discharged from an inpatient facility. Discharge medication list reviewed and reconciled with her electronic medical record medication list. Discharge orders reviewed, and we'll continue to follow active medical problems as outlined in this note.    Hospital admission 2024-2024-acute on chronic hypoxic respiratory failure from restrictive lung disease likely exacerbated by new onset heart failure-    Left pleural effusion-thoracentesis revealed exudative fluid-furosemide started.  He will follow-up with pulmonary later this week to review    History of heart failure-cardiac evaluation-he was referred to the advanced heart failure clinic with Dr. Harry 2024-he has completed a 2-week Holter monitor and will see Dr. Harry soon.  He was reported to have a NSTEMI.  LVEF normal on last echo .              echo-normal EF, suboptimal echo              10/24-scheduled to meet with Dr. Angel Harry in the heart failure clinic  10/14/2024.  He will continue his cardiac regimen including furosemide      Restrictive lung disease-he will follow-up with his pulmonologist provider-10/21/2024-since discharge he has been on a nebulizer-albuterol and ipratropium provided    Polypharmacy-he will continue to optimize his compliance.  He has a home nurse and will meet with our pharmacy team 10/28/2024    Hypoxemia-with oxygen dependent chronic hypoxic respiratory failure-he was reportedly hypoxic overnight-we will ordered overnight oximeter            He is now on portable oxygen presumably from his pulmonologist.  He will continue.  He states that he feels better and has more stamina             He remains on portable oxygen and will meet with his pulmonologist as scheduled.        Nutritional concerns-unfortunately he does not do a lot of cooking.  He gets food from [employer] which is delivered      Gait unsteadiness-Home physical therapy ordered last time           He needs help getting out of shower          10/24-he now has a life alert             He uses his rollator consistently             Will continue physical therapy Tuesdays and Thursdays.    Home health-he was working with home health with physical therapy and home nursing, twice a week with physical therapy, once a week at this point with OT.          7/24 - Ottoniel, wound care RN comes to his house Mon and Fri for wound care, physical therapy Tuesday and Thursday and occupational hopefully will be out soon    Home help         12/23  - Sharon from [employer], Tues for 4 hrs, and Valentina 3 hrs Friday - housekeeping    Home adaptive equipment-though he uses a wheelchair out of the house-he has stairs in his house and cannot use the wheelchair or scooter inside of his house.  He uses a walker instead.  Unfortunately he does not have a chairlift for the stairs as he does not think this can fit with the layout of his stairs    Recurrent falls-he will use a cane or walker as he is able to.   Unfortunately he recently fell when he went out to the [place] to  delivered items from the store.  He had to call the squad.           Presently using a walker-encouraged him to continue to optimize his safety              Now has a wheelchair to use in his kitchen             12/23-he continues to use the walker as much as he can around the house and kitchen.  He now has a life alert              10/24-fortunately no recent falls.  He will continue efforts with therapy to regain strength stamina and ambulation conditioning especially with his stairs    MRSA feet infection - from past fall. Home wound RN done w/ regular visits. Plans to follow up to check feet prn             He was referred to the emergency room at [facility] 3/6/2024 for worsening foot infections-he was changed to oral antibiotics as below and discharged             He has ongoing foot infections mainly on the left under his first MTP from difficult calluses-currently on Augmentin and doxycycline.  He is working with the wound clinic as well as his podiatrist who he will see tomorrow, Dr. Jang 3/13/2024            10/24-bilateral feet infections-ongoing issue-wound clinic visits every Wed continues with Dr. Jang along with home visits with wound care nurseOttoniel Monday and Fridays    Elevated creatinine-2.2 in ER 3/6/2024.  He will recheck that this afternoon                 Creatinine normalized on recheck fortunately           Regarding tenuous home situation-he lives alone but now no longer drives.  He is able to hire drivers to get him where he needs to go.  He is able to order groceries and have them delivered.  At this point he declines any further changes in his home situation            10/24- his friend Vicky moved away. His sister comes to town when she can. Now retired. He's managing so far on his own.    Nutritional concerns - He gets groceries by [employer] w/ home deliveries 1-2 x wk    Alcoholism with a history of  alcoholic gastritis-unfortunately he is still drinking alcohol-apparently the groceries will deliver wine which she has converted to from gin, unfortunately.                7/23-presently drinking 2 bottles of white wine daily.  He refuses to cut back              12/23-he states he quit using alcohol in 10/23 when he promised his sister he would not drink.  He has not really missed that he states strongly encouraged long-term abstinence                3/24-he confirms that he remains off alcohol.  Encouragement/support provided               10/24-unfortunately he is drinking a bottle of wine again daily.  Encouraged him to stop using alcohol    History hypertension-he will continue medications           Blood pressure much improved on recheck    Alcoholic gastritis-he will continue his PPI and limit alcohol use            12/23-he states he quit using alcohol in 10/23 when he promised his sister he would not drink.  He has not really missed that he states strongly encouraged long-term abstinence             10/24-fortunately no present dyspepsia issues    Asthma-improved with Breo.  He does not always use it    Allergic rhinitis-he is allergic to cats and he has several cats.  He will continue his Allegra and Flonase      Painful peripheral neuropathy-both feet are numb, but he feels the pain at times right lateral ankle area and hands.  He is on the gabapentin twice daily now that helps.  He will continue to work with the pain clinic           He continues to meet with the pain clinic-with that appointment 1/24 to continue his duloxetine and gabapentin.              7/24-right wrist arthritis and left arm pain main problems presently.  He is on twice daily gabapentin.  He plans to establish with the pain clinic near [place]             10/24-he will meet with the pain clinic as scheduled 10/9/2024    Vitamin D deficiency-he will continue-refilled    Vision care-he had an eye exam with Dr. Lutz in Feb  '24      Depression/anxiety-he will continue the BuSpar Cymbalta and Lexapro              10/24-confirm that he is using this    Flu shot-encouraged each September-updated 9/23           10/24-he will do this soon    Covid booster -encouraged each September updated 9/23        10/24-he will get a booster soon    RSV vacc - updated 9/23    Shingrix-completed in 2021    He will follow-up in 3 months, sooner as needed    Charting was completed using voice recognition technology and may include unintended errors.

## 2024-10-09 ENCOUNTER — OFFICE VISIT (OUTPATIENT)
Dept: PAIN MEDICINE | Facility: CLINIC | Age: 69
End: 2024-10-09
Payer: MEDICARE

## 2024-10-09 ENCOUNTER — PATIENT OUTREACH (OUTPATIENT)
Dept: PRIMARY CARE | Facility: CLINIC | Age: 69
End: 2024-10-09
Payer: MEDICARE

## 2024-10-09 ENCOUNTER — OFFICE VISIT (OUTPATIENT)
Dept: WOUND CARE | Facility: CLINIC | Age: 69
End: 2024-10-09
Payer: MEDICARE

## 2024-10-09 VITALS
RESPIRATION RATE: 16 BRPM | OXYGEN SATURATION: 96 % | TEMPERATURE: 98.6 F | DIASTOLIC BLOOD PRESSURE: 78 MMHG | HEART RATE: 76 BPM | SYSTOLIC BLOOD PRESSURE: 150 MMHG

## 2024-10-09 DIAGNOSIS — G57.93 NEUROPATHIC PAIN OF BOTH FEET: ICD-10-CM

## 2024-10-09 LAB
ACID FAST STN SPEC: NORMAL
MYCOBACTERIUM SPEC CULT: NORMAL

## 2024-10-09 PROCEDURE — 99213 OFFICE O/P EST LOW 20 MIN: CPT | Mod: 25

## 2024-10-09 PROCEDURE — 11042 DBRDMT SUBQ TIS 1ST 20SQCM/<: CPT

## 2024-10-09 PROCEDURE — 11721 DEBRIDE NAIL 6 OR MORE: CPT

## 2024-10-09 ASSESSMENT — PAIN SCALES - GENERAL: PAINLEVEL: 8

## 2024-10-09 NOTE — PROGRESS NOTES
States he feels the lyrica is not helping with his pain he has pain in his feet    Recently hospitalized a couple of weeks ago

## 2024-10-14 ENCOUNTER — APPOINTMENT (OUTPATIENT)
Dept: CARDIOLOGY | Facility: CLINIC | Age: 69
End: 2024-10-14
Payer: MEDICARE

## 2024-10-14 VITALS
HEIGHT: 72 IN | WEIGHT: 237 LBS | HEART RATE: 85 BPM | BODY MASS INDEX: 32.1 KG/M2 | SYSTOLIC BLOOD PRESSURE: 155 MMHG | DIASTOLIC BLOOD PRESSURE: 73 MMHG | OXYGEN SATURATION: 91 %

## 2024-10-14 DIAGNOSIS — I10 ESSENTIAL HYPERTENSION: ICD-10-CM

## 2024-10-14 DIAGNOSIS — I25.10 ASCVD (ARTERIOSCLEROTIC CARDIOVASCULAR DISEASE): ICD-10-CM

## 2024-10-14 DIAGNOSIS — I50.30 STAGE C DIASTOLIC HEART FAILURE: Primary | ICD-10-CM

## 2024-10-14 LAB
FUNGUS SPEC CULT: NORMAL
FUNGUS SPEC FUNGUS STN: NORMAL

## 2024-10-14 PROCEDURE — G2211 COMPLEX E/M VISIT ADD ON: HCPCS | Performed by: INTERNAL MEDICINE

## 2024-10-14 PROCEDURE — 99214 OFFICE O/P EST MOD 30 MIN: CPT | Performed by: INTERNAL MEDICINE

## 2024-10-14 PROCEDURE — 1159F MED LIST DOCD IN RCRD: CPT | Performed by: INTERNAL MEDICINE

## 2024-10-14 PROCEDURE — 1160F RVW MEDS BY RX/DR IN RCRD: CPT | Performed by: INTERNAL MEDICINE

## 2024-10-14 PROCEDURE — 3078F DIAST BP <80 MM HG: CPT | Performed by: INTERNAL MEDICINE

## 2024-10-14 PROCEDURE — 1111F DSCHRG MED/CURRENT MED MERGE: CPT | Performed by: INTERNAL MEDICINE

## 2024-10-14 PROCEDURE — 3077F SYST BP >= 140 MM HG: CPT | Performed by: INTERNAL MEDICINE

## 2024-10-14 PROCEDURE — 1036F TOBACCO NON-USER: CPT | Performed by: INTERNAL MEDICINE

## 2024-10-14 PROCEDURE — 1157F ADVNC CARE PLAN IN RCRD: CPT | Performed by: INTERNAL MEDICINE

## 2024-10-14 PROCEDURE — 3008F BODY MASS INDEX DOCD: CPT | Performed by: INTERNAL MEDICINE

## 2024-10-14 PROCEDURE — 1123F ACP DISCUSS/DSCN MKR DOCD: CPT | Performed by: INTERNAL MEDICINE

## 2024-10-14 RX ORDER — SPIRONOLACTONE 25 MG/1
25 TABLET ORAL DAILY
Qty: 30 TABLET | Refills: 11 | Status: SHIPPED | OUTPATIENT
Start: 2024-10-14 | End: 2025-10-14

## 2024-10-14 RX ORDER — TORSEMIDE 20 MG/1
20 TABLET ORAL DAILY
Qty: 30 TABLET | Refills: 11 | Status: SHIPPED | OUTPATIENT
Start: 2024-10-14 | End: 2025-10-14

## 2024-10-14 NOTE — PROGRESS NOTES
[facility] Advanced Heart Failure Clinic  Primary Care Physician: Artie Haskins MD  Referring Provider/Cardiologist: n/a     Date of Visit: 10/14/2024  2:00 PM EDT  Location of visit: [address]     HPI:   Mr. Redman is a 69M with a PMHx sig for stage C diastolic HF/HFpEF, pSVT, HTN, and oxygen dependent chronic hypoxic respiratory failure (4L via n/c) who returns to the  Advanced Heart Failure clinic for ongoing evaluation and management.     Interval hx:   Recently hospitalized for dyspnea found to have ADHF, pleural effusions, and COPD.     Currently denies chest pain, palpitations. He has complaints of shortness of breath, dyspnea on exertion. He is wearing 4L O2. Patient denies orthopnea, PND. No edema noted in BLE. He denies headaches or recent falls.    Hospitalizations: 9/23-9/25/24 Respiratory failure       PMHx:  stage C diastolic HF/HFpEF, pSVT, nonobstructive CAD, HTN, and oxygen dependent chronic hypoxic respiratory failure    SocHx:  Lives alone in [place]  Former smoker (quit 1999). Denies ETOH, illicits    FamHx:  Father had heart disease       Current Outpatient Medications   Medication Sig Dispense Refill    albuterol (ProAir HFA) 90 mcg/actuation inhaler Inhale 2 puffs every 6 hours if needed for wheezing. 18 g 2    albuterol 2.5 mg /3 mL (0.083 %) nebulizer solution Take 3 mL (2.5 mg) by nebulization every 6 hours if needed for wheezing. 120 mL 11    alendronate (Fosamax) 70 mg tablet Take 1 tablet by mouth every 7 days. Take in the morning with a full glass of water at least 30 minutes before first food, drink, or medications of the day. 12 tablet 3    amLODIPine (Norvasc) 2.5 mg tablet Take 1 tablet (2.5 mg) by mouth once daily. For blood pressure 90 tablet 3    aspirin 81 mg chewable tablet Chew and swallow 1 tablet by mouth daily. 90 tablet 3    atorvastatin (Lipitor) 40 mg tablet Take 1 tablet (40 mg) by mouth once daily. 90 tablet 3    busPIRone (Buspar) 5 mg  tablet Take 1 tablet by mouth three times daily as needed for anxiety. 180 tablet 3    cyanocobalamin (Vitamin B-12) 1,000 mcg tablet Take 1 tablet (1,000 mcg) by mouth once daily. As directed 90 tablet 3    DULoxetine (Cymbalta) 60 mg DR capsule Take 1 capsule by mouth twice daily. 60 capsule 6    ergocalciferol (Vitamin D-2) 1.25 MG (30153 UT) capsule Take 1 capsule (1,250 mcg) by mouth 1 (one) time per week. 12 capsule 3    escitalopram (Lexapro) 20 mg tablet Take 1 tablet (20 mg) by mouth once daily. 30 tablet 0    ferrous sulfate, 325 mg ferrous sulfate, (FeroSuL) tablet Take 1 tablet by mouth once daily with breakfast. 90 tablet 3    fexofenadine (Allegra) 180 mg tablet Take 1 tablet (180 mg) by mouth once daily as needed (as needed for allergies and congestion).      folic acid (Folvite) 1 mg tablet TAKE ONE TABLET BY MOUTH EVERY DAY for folate deficiency 90 tablet 3    furosemide (Lasix) 20 mg tablet Take 1 tablet (20 mg) by mouth once daily as needed (take it for worsening shortness of breath or increased body weight). 30 tablet 0    ipratropium (Atrovent) 0.02 % nebulizer solution Take 2.5 mL (0.5 mg) by nebulization 4 times a day. 120 mL 11    levothyroxine (Synthroid, Levoxyl) 50 mcg tablet Take 1 tablet (50 mcg) by mouth once daily in the morning. Take before meals. 90 tablet 1    mirtazapine (Remeron) 15 mg tablet Take 1 tablet (15 mg) by mouth once daily at bedtime. 90 tablet 2    montelukast (Singulair) 10 mg tablet Take 1 tablet by mouth once daily at bedtime. 90 tablet 3    pantoprazole (ProtoNix) 40 mg EC tablet Take 1 tablet (40 mg) by mouth 2 times a day. 180 tablet 3    potassium chloride CR 20 mEq ER tablet Take 1 tablet (20 mEq) by mouth 2 times a day. Do not crush or chew. 180 tablet 3    pregabalin (Lyrica) 150 mg capsule Take 1 capsule (150 mg) by mouth every 6 hours. 120 capsule 2    propranolol (Inderal) 10 mg tablet Take 1 tablet (10 mg) by mouth 2 times a day. 270 tablet 3     tamsulosin (Flomax) 0.4 mg 24 hr capsule Take 1 capsule (0.4 mg) by mouth once daily at bedtime. 90 capsule 3     No current facility-administered medications for this visit.       Allergies   Allergen Reactions    Latex Unknown     Latex gloves Misc     Latex tube/ connecter kit    Codeine GI Upset    House Dust Mite Runny nose    Hydrocodone-Acetaminophen Nausea/vomiting    Lactose GI Upset    Lisinopril Hives    Mold Runny nose    Tree And Shrub Pollen Runny nose         Visit Vitals  /73 (BP Location: Left arm, Patient Position: Sitting)   Pulse 85   Ht 1.829 m (6')   Wt 108 kg (237 lb)   SpO2 91%   BMI 32.14 kg/m²   Smoking Status Former   BSA 2.34 m²         Physical Exam:  On exam Mr. Redman appears his stated age, is alert and oriented x3, and in no acute distress. His sclera are anicteric and his oropharynx has moist mucous membranes. His neck is supple and without thyromegaly. The JVP is ~7 cm of water above the right atrium. His cardiac exam has regular rhythm, normal S1, S2. No S3/4. There are no murmurs. His lungs are clear to auscultation bilaterally and there is no dullness to percussion. His abdomen is soft, nontender with normoactive bowel sounds. There is no HJR. The extremities are warm and without sig edema. The skin is dry. There is no rash present. The distal pulses are 2+ in all four extremities. His mood and affect are appropriate for todays encounter.       Cardiac Labs/Diagnostics:    Lab Results   Component Value Date    CREATININE 1.50 (H) 09/25/2024    BUN 15 09/25/2024     09/25/2024    K 4.1 09/25/2024     09/25/2024    CO2 27 09/25/2024        Recent Labs     09/24/24  0535 02/13/24  1507 04/06/21  1320   * 119* 43     Echo (4/11/24):  1. Left ventricular systolic function is normal with a 60-65% estimated ejection fraction.  2. Poorly visualized anatomical structures due to suboptimal image quality.    Holter (2/21-3/6/24):  Sinus rhythm, no AF, occ SVT  (longest lasting 20 seconds)    CCTA (2/27/24):  1. Right dominant system  2. 50% stenosis appreciated at the level the prox/mid LAD-CT FFR of 0.82 in the mid LAD; 25-50% stenosis appreciated in the proximal/mid RCA - CT FFR results are not available..  3. Nonobstructive CAD revolving the remaining vessels    ECG (2/6/24):  Sinus rhythm (HR 98)    Echo (9/12/23):  1. Left ventricular systolic function is normal with a 60% estimated ejection fraction.  2. Sinus rhythm (HR 70s).      Impression/Plan:  Mr. Redman is a 69M with a PMHx sig for stage C diastolic HF/HFpEF, pSVT, HTN, and oxygen dependent chronic hypoxic respiratory failure (4L via n/c) who returns to the  Advanced Heart Failure clinic for ongoing evaluation and management. At the current time he has functional class III symptoms and is hypervolemic on exam.     1) Stage C acute on chronic diastolic HF/HFpEF  Will optimize meds and reassess.  -discontinue furosemide and potassium  -start torsemide 20 mg daily  -start spironolactone 25 mg daily  -start jardiance 10 mg daily  -labs (RFP/BNP) in in 7-10 days    2) CAD  Lipids (2/13/24): tChol 203, HDL 40, , Trigs 132  CCTA (2/2024) with nonobstructive CAD (CT FFR negative LAD). Echo with preserved biventricular function and no hemodynamically significant valvular disease.   -c/w ASA 81 mg daily, atorvastatin 40 mg daily  -will repeat lipids after starting statin    3) ? AF  States he has a history, but not on anticoagulation or any diagnostic tests showing this. 2 week holter with no evidence of AF.     4) pSVT  Currently on propranolol. Transient episodes noted on recent holter.   -c/w propranolol    5) HTN  BP elevated  -c/w amlodipine 2.5 mg daily  -reassess after starting MRA      F/U: 6 months at /[facility]      ____________________________________________________________  Angel Harry DO  Section of Advanced Heart Failure and Cardiac Transplantation  Division of Cardiovascular  Medicine  [facility]  [facility]

## 2024-10-14 NOTE — PATIENT INSTRUCTIONS
It was a pleasure seeing you today. Please contact myself or my team with any questions.     To reach Dr. Harry' office please call [phone] ([place]).   Fax: [phone]   To schedule an appointment call [phone]     If you have any questions or need cardiac medication refills, please call the Heart Failure office at [phone], option 6. You may also contact the  Heart Failure Nursing team via email at [email or web address] (Please include your name and date of birth).        1) Stop furosemide (Lasix)  2) Start torsemide 20 mg once a day  3) Start jardiance 10 mg once a day  4) Stop potassium  5) Start spironolactone 25 mg once a day  6) Labs (RFP/BNP) in 7-10 days   7) Follow up in 6 months at /[facility]

## 2024-10-14 NOTE — PROGRESS NOTES
Patient: Angus Redman    [id number]  : 1955 -- AGE 69 y.o.    Provider: MOISES Mcarthur     Location [facility]   Service Date: 10/21/2024         Department of Medicine  Division of Pulmonary, Critical Care, and Sleep Medicine         [facility]  Follow Up Visit Note        HISTORY OF PRESENT ILLNESS     HISTORY OF PRESENT ILLNESS   Angus Redman is a 69 y.o. male who presents to a [facility] for a follow up visit with concerns of Shortness of Breath (Follow up after hospitalization ). I have independently interviewed and examined the patient in the office and reviewed available records.      Current History    Acute on chronic hypoxic respiratory failure  Moderate to large pleural effusion - transudative   Bilateral pleural thickening  Possible underlying COPD with signs of acute exacerbation  Restrictive lung disease Pleural calcification  No history to suggest asbestos exposure    He would get dyspneic with 1 flight of stairs. He uses rollator to ambulate. Walking around his home he would have dyspnea. His wound care RN recommend he be seen in the hospital. He initially was placed on 6L. He is on 4L O2.  Currently  at rest is not short of breath. He has to stop for breath after walking about 100 meters or a few minutes (mMRC 3). He is unaware when things worsened.  He also denies orthopnea, but has tiffani and leg cellulitis . He has gained X 10 pounds in the last X 12 months. C/o cough in the am, with cloudy to brown secretions. He has a nebulizer helped with clearing secretions.  He also  wheezing, and denies  green, blood streaks, but no sputum. No night cough. No hemoptysis. No fever or shivering chills. He has a runny nose, and a tingling sensation in the back of his throat. Has seasonal allergies - dust mites, mold,pollen, and latex . He denies chest pain or heartburn.     Previous pulmonary history:    Multiple episodes of PNA non currently   He has no history of recurrent infections, or lung disease as a child.  He had no previous lung hx, never on oxygen or inhaler therapy.     Inhalers/nebulized medications: albuterol     Hospitalization History:  Has  been hospitalized over the last year for breathing related problem.      Sleep history:  Denies snoring, apnea, feeling tired during the day or taking naps during the day.       REVIEW OF SYSTEMS     REVIEW OF SYSTEMS  Review of Systems   Respiratory:  Positive for cough and shortness of breath.    Skin:  Positive for wound.        Lower leg cellulitlits    All other systems reviewed and are negative.        ALLERGIES AND MEDICATIONS     ALLERGIES  Allergies   Allergen Reactions    Latex Unknown     Latex gloves Misc     Latex tube/ connecter kit    Codeine GI Upset    House Dust Mite Runny nose    Hydrocodone-Acetaminophen Nausea/vomiting    Lactose GI Upset    Lisinopril Hives    Mold Runny nose    Tree And Shrub Pollen Runny nose       MEDICATIONS  Current Outpatient Medications   Medication Sig Dispense Refill    albuterol (ProAir HFA) 90 mcg/actuation inhaler Inhale 2 puffs every 6 hours if needed for wheezing. 18 g 2    albuterol 2.5 mg /3 mL (0.083 %) nebulizer solution Take 3 mL (2.5 mg) by nebulization every 6 hours if needed for wheezing. 120 mL 11    alendronate (Fosamax) 70 mg tablet Take 1 tablet by mouth every 7 days. Take in the morning with a full glass of water at least 30 minutes before first food, drink, or medications of the day. 12 tablet 3    amLODIPine (Norvasc) 2.5 mg tablet Take 1 tablet (2.5 mg) by mouth once daily. For blood pressure 90 tablet 3    aspirin 81 mg chewable tablet Chew and swallow 1 tablet by mouth daily. 90 tablet 3    atorvastatin (Lipitor) 40 mg tablet Take 1 tablet (40 mg) by mouth once daily. 90 tablet 3    busPIRone (Buspar) 5 mg tablet Take 1 tablet by mouth three times daily as needed for anxiety. 180 tablet 3     cyanocobalamin (Vitamin B-12) 1,000 mcg tablet Take 1 tablet (1,000 mcg) by mouth once daily. As directed 90 tablet 3    DULoxetine (Cymbalta) 60 mg DR capsule Take 1 capsule by mouth twice daily. 60 capsule 6    empagliflozin (Jardiance) 10 mg Take 1 tablet (10 mg) by mouth once daily. 30 tablet 11    ergocalciferol (Vitamin D-2) 1.25 MG (36963 UT) capsule Take 1 capsule (1,250 mcg) by mouth 1 (one) time per week. 12 capsule 3    escitalopram (Lexapro) 20 mg tablet Take 1 tablet (20 mg) by mouth once daily. 30 tablet 0    ferrous sulfate, 325 mg ferrous sulfate, (FeroSuL) tablet Take 1 tablet by mouth once daily with breakfast. 90 tablet 3    fexofenadine (Allegra) 180 mg tablet Take 1 tablet (180 mg) by mouth once daily as needed (as needed for allergies and congestion).      folic acid (Folvite) 1 mg tablet TAKE ONE TABLET BY MOUTH EVERY DAY for folate deficiency 90 tablet 3    ipratropium (Atrovent) 0.02 % nebulizer solution Take 2.5 mL (0.5 mg) by nebulization 4 times a day. 120 mL 11    levothyroxine (Synthroid, Levoxyl) 50 mcg tablet Take 1 tablet (50 mcg) by mouth once daily in the morning. Take before meals. 90 tablet 1    mirtazapine (Remeron) 15 mg tablet Take 1 tablet (15 mg) by mouth once daily at bedtime. 90 tablet 2    montelukast (Singulair) 10 mg tablet Take 1 tablet by mouth once daily at bedtime. 90 tablet 3    pantoprazole (ProtoNix) 40 mg EC tablet Take 1 tablet (40 mg) by mouth 2 times a day. 180 tablet 3    pregabalin (Lyrica) 150 mg capsule Take 1 capsule (150 mg) by mouth every 6 hours. 120 capsule 2    propranolol (Inderal) 10 mg tablet Take 1 tablet (10 mg) by mouth 2 times a day. 270 tablet 3    spironolactone (Aldactone) 25 mg tablet Take 1 tablet (25 mg) by mouth once daily. 30 tablet 11    tamsulosin (Flomax) 0.4 mg 24 hr capsule Take 1 capsule (0.4 mg) by mouth once daily at bedtime. 90 capsule 3    torsemide (Demadex) 20 mg tablet Take 1 tablet (20 mg) by mouth once daily. 30 tablet  11     No current facility-administered medications for this visit.         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), Laceration without foreign body of scalp, initial encounter (09/10/2015), Nausea (04/15/2014), 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 (07/27/2017), Osteomyelitis, unspecified (02/26/2022), Other chest pain (09/21/2013), Other conditions influencing health status, Other conditions influencing health status (10/08/2017), Other specified health status (07/23/2014), Patient's noncompliance with other medical treatment and regimen due to unspecified reason (10/28/2016), Patient's other noncompliance with medication regimen (06/04/2016), Personal history of (healed) stress fracture (12/30/2013), Personal history of diseases of the skin and subcutaneous tissue (02/26/2022), Personal history of other diseases of the nervous system and sense organs (03/25/2016), Personal history of other diseases of the nervous system and sense organs (02/02/2016), Personal history of other endocrine, nutritional and metabolic disease (07/13/2015), Personal history of other specified conditions (07/28/2019), Personal history of other specified conditions (05/07/2018), Personal history of other specified conditions (06/25/2015), Unspecified fracture of left foot, initial encounter for closed fracture (06/04/2016), Unspecified injury of head, initial encounter (04/20/2016), Unspecified injury of unspecified elbow, initial encounter  (04/07/2017), and Unsteadiness on feet (04/15/2014).       PAST SURGICAL HISTORY  Past Surgical History:   Procedure Laterality Date    COLONOSCOPY  10/09/2013    Complete Colonoscopy    OTHER SURGICAL HISTORY  07/28/2019    Insertion of cardiac monitor    OTHER SURGICAL HISTORY  04/15/2014    Reported Hx Of Hip Replacement - Left Side    OTHER SURGICAL HISTORY  01/28/2020    Hip replacement    OTHER SURGICAL HISTORY  11/24/2015    Interrogation Of Implantable Loop Recorder By Physician In Person    OTHER SURGICAL HISTORY  04/19/2017    Arthroscopy Elbow Left    OTHER SURGICAL HISTORY  10/09/2013    Shoulder Surgery Left    SKIN CANCER EXCISION  10/09/2013    Mohs Micrographic Surgery Face       IMMUNIZATION HISTORY  Immunization History   Administered Date(s) Administered    Flu vaccine (IIV4), preservative free *Check age/dose* 09/09/2015, 09/01/2016, 10/06/2017    Flu vaccine, quadrivalent, high-dose, preservative free, age 65y+ (FLUZONE) 09/13/2022    Flu vaccine, trivalent, preservative free, HIGH-DOSE, age 65y+ (Fluzone) 09/19/2019    Flu vaccine, trivalent, preservative free, age 6 months and greater (Fluarix/Fluzone/Flulaval) 09/21/2015    Hep A, Unspecified 01/28/2015    Hepatitis A vaccine, age 19 years and greater (HAVRIX) 07/07/2014, 01/28/2015    Influenza Nasal, Unspecified 09/30/2012    Influenza, Seasonal, Quadrivalent, Adjuvanted 10/21/2020, 08/30/2021, 09/29/2023    Influenza, seasonal, injectable 09/28/2012, 09/21/2015, 08/30/2021, 10/18/2023    Influenza, trivalent, adjuvanted 09/18/2018    Moderna COVID-19 vaccine, 12 years and older (50mcg/0.5mL)(Spikevax) 09/29/2023    Moderna COVID-19 vaccine, bivalent, blue cap/gray label *Check age/dose* 09/13/2022    Moderna SARS-CoV-2 Vaccination 02/08/2021, 02/13/2021, 03/13/2021, 11/08/2021, 04/01/2022, 09/13/2022    PPD Test 02/08/2014    Pneumococcal conjugate vaccine, 13-valent (PREVNAR 13) 06/02/2016    Pneumococcal polysaccharide vaccine,  23-valent, age 2 years and older (PNEUMOVAX 23) 12/15/2013, 01/28/2015, 09/24/2020, 05/28/2021    RESPIRATORY SYNCYTIAL VIRUS (RSV), ELIGIBLE PREGNANT PTS, 0.5 ML (ABRYSVO) 09/29/2023    SARS-CoV-2, Unspecified 10/12/2023    Td vaccine, age 7 years and older (TDVAX) 01/01/2005    Tdap vaccine, age 7 year and older (BOOSTRIX, ADACEL) 07/21/2014    Zoster vaccine, recombinant, adult (SHINGRIX) 10/21/2020, 05/11/2021    Zoster, live 10/09/2013       SOCIAL HISTORY  He  reports that he quit smoking about 25 years ago. His smoking use included cigarettes. He has never used smokeless tobacco. He reports that he does not currently use alcohol. He reports that he does not use drugs. 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  DOES/DOES NOT: does have exposure to birds or exotic animals. Had pet birds for 10 years about in [id number] has 3 cats     FAMILY HISTORY  Family History   Problem Relation Name Age of Onset    Other (cardiac pacemaker) Mother      Coronary artery disease Mother      Other (history of heart artery stent) Mother      Cancer Mother      Other (kurtis cell cancer) Mother      Arthritis Mother      Mental illness Brother          living in handicapped assisted living facility permanently [Other]    Other (angina pectoris) Paternal Grandmother       DOES/DOES NOT: does have a family history of pulmonary disease. Mother was heavy smoker   DOES/DOES NOT: does have a family history of cancer.  DOES/DOES NOT: does not have a family history of autoimmune disorders.    PHYSICAL EXAM     VITAL SIGNS: /73   Pulse 84   Temp 36.8 °C (98.2 °F) (Temporal)   Ht 1.829 m (6')   Wt 113 kg (249 lb)   SpO2 (!) 89%   BMI 33.77 kg/m²      PREVIOUS WEIGHTS:  Wt Readings from Last 3 Encounters:   10/21/24  113 kg (249 lb)   10/14/24 108 kg (237 lb)   10/08/24 108 kg (237 lb 12.8 oz)       Physical Exam  Constitutional:       Appearance: Normal appearance. He is ill-appearing.   HENT:      Head: Normocephalic and atraumatic.      Right Ear: External ear normal.      Left Ear: External ear normal.      Nose: Nose normal.      Mouth/Throat:      Mouth: Mucous membranes are moist.      Pharynx: Oropharynx is clear.   Eyes:      Extraocular Movements: Extraocular movements intact.      Conjunctiva/sclera: Conjunctivae normal.      Pupils: Pupils are equal, round, and reactive to light.   Cardiovascular:      Rate and Rhythm: Normal rate and regular rhythm.      Pulses: Normal pulses.      Heart sounds: Normal heart sounds.   Pulmonary:      Effort: Pulmonary effort is normal.      Breath sounds: Normal breath sounds.   Abdominal:      General: Bowel sounds are normal.      Palpations: Abdomen is soft.   Musculoskeletal:         General: Normal range of motion.      Cervical back: Normal range of motion and neck supple.      Right lower leg: Edema present.      Left lower leg: Edema present.   Skin:     General: Skin is warm and dry.   Neurological:      General: No focal deficit present.      Mental Status: He is alert and oriented to person, place, and time. Mental status is at baseline.   Psychiatric:         Mood and Affect: Mood normal.         Behavior: Behavior normal.         Thought Content: Thought content normal.         Judgment: Judgment normal.           RESULTS/DATA     Pulmonary Function Test Results        Complete Pulmonary Function Test Pre/Post Bronchodialator (Spirometry Pre/Post/DLCO/Lung Volumes) 4/22/2024  Status: Final result     Study Result    Narrative & Impression   Spirometry shows no obstruction. There is no significant bronchodilator response. Lung volumes indicate a moderate restrictive defect. Spirometry (normal FEV1/FVC) and lung volumes (increased RV/TLC) suggest a complex restrictive  ventilatory impairment. The DLCO corrected for hemoglobin is moderately reduced. Low DLCO with low/normal KCO is consistent with a loss of alveoli capillary structure with loss of lung volume. 6MWT: distance was 79 meters. Test was done on 3 LPM of supplemental O2. End of exercise SPO2 95%, HR 90.     Scans on Order [id number]      Pulmonary Function Test - Scan on 4/22/2024  2:22 PM                                          Chest Radiograph     XR chest 1 view 09/23/2024      Impression  1.  Pleural and parenchymal scarring in the right hemithorax similar  to prior. Difficult to exclude of developing very small right pleural  effusion. Otherwise findings unchanged.    Chest CT Scan     CT angio chest for pulmonary embolism 09/23/2024      Impression  1. No definite evidence of pulmonary embolism.  2. Moderate to large new left-sided pleural effusion with small  amount of loculated fluid also present in the right lung with mild  interstitial thickening/prominence in the lungs bilaterally.  Correlate with fluid volume status.  3. Pleural calcifications along the posterior aspect of the right  lung and round atelectasis in the right lower lobe are similar in  appearance to prior study in March of 2024, although there is  somewhat increased volume loss in the lung bases bilaterally compared  to prior exam.  4. Subtle mosaic attenuation in the lungs bilaterally likely  representing component of small vessel/small airway disease.  5. Mildly enlarged mediastinal lymph nodes, measuring up to 1.2 cm in  short axis dimension, are nonspecific and may be reactive.    Echocardiogram     TRANSTHORACIC ECHOCARDIOGRAM REPORT 4/11/2024        Patient Name:      PRUDENCIO Alcantar Physician:    [id number] Corey Hsu MD  Study Date:        4/11/2024            Ordering Provider:    [id number] SANDRA       PHYSICIAN INTERPRETATION:  Left Ventricle: The left ventricular  systolic function is normal, with an estimated ejection fraction of 60-65%. There are no regional wall motion abnormalities. The left ventricular cavity size is normal. Left ventricular diastolic filling was not assessed.  Left Atrium: The left atrium is upper limits of normal in size. A bubble study using agitated saline was performed. Bubble study is negative.  Right Ventricle: The right ventricle is normal in size. There is normal right ventricular global systolic function.  Right Atrium: The right atrium is normal in size.  Aortic Valve: The aortic valve was not well visualized. There is no evidence of aortic valve regurgitation.  Mitral Valve: The mitral valve is normal in structure. There is no evidence of mitral valve regurgitation.  Tricuspid Valve: The tricuspid valve was not well visualized. There is trace tricuspid regurgitation. The right ventricular systolic pressure is unable to be estimated.  Pulmonic Valve: The pulmonic valve was not assessed. Pulmonic valve regurgitation was not assessed.  Pericardium: There is a trivial pericardial effusion.  Aorta: The aortic root is normal.  Systemic Veins: The inferior vena cava appears to be of normal size. There is IVC inspiratory collapse greater than 50%.        CONCLUSIONS:   1. Left ventricular systolic function is normal with a 60-65% estimated ejection fraction.   2. Poorly visualized anatomical structures due to suboptimal image quality.     QUANTITATIVE DATA SUMMARY:  TRICUSPID VALVE/RVSP:                    Normal Ranges:  IVC Diam: 2.10 cm          Labwork   Complete Blood Count  Lab Results   Component Value Date    WBC 7.1 09/25/2024    HGB 9.4 (L) 09/25/2024    HCT 33.1 (L) 09/25/2024     (H) 09/25/2024     09/25/2024       Peripheral Eosinophil Count/Percentage:   Eosinophils Absolute (x10*3/uL)   Date Value   09/25/2024 0.29     Eosinophils % (%)   Date Value   09/25/2024 4.1       Serum Immunoglobulin E:    No results found for:  "\"IGE\"       ASSESSMENT/PLAN     Mr. Redman is a 69 y.o. 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 (07/27/2017), Osteomyelitis, unspecified (02/26/2022), Other chest pain (09/21/2013), Other conditions influencing health status, Other conditions influencing health status (10/08/2017), Other specified health status (07/23/2014), Patient's noncompliance with other medical treatment and regimen due to unspecified reason (10/28/2016), Patient's other noncompliance with medication regimen (06/04/2016), Personal history of (healed) stress fracture (12/30/2013), Personal history of diseases of the skin and subcutaneous tissue (02/26/2022), Personal history of other diseases of the nervous system and sense organs (03/25/2016), Personal history of other diseases of the nervous system and sense organs (02/02/2016), Personal history of other endocrine, nutritional and metabolic disease (07/13/2015), Personal history of other specified conditions (07/28/2019), Personal history of other specified conditions (05/07/2018), Personal history of other specified conditions (06/25/2015), Unspecified fracture of left foot, initial encounter for closed fracture (06/04/2016), Unspecified injury of head, initial encounter (04/20/2016), Unspecified injury of unspecified elbow, initial encounter (04/07/2017), and Unsteadiness on feet (04/15/2014). He " presents to the [facility] for follow up of chronic respiratory failure     Problem List and Orders      Assessment and Plan / Recommendations:    30 pack year history, chronic respiratory failure history   - albuterol hfa 2 puffs or albuterol nebs every 4-6 hours as needed  PFT reveals no obstruction but lung volumes show moderate restrictive defect moderately diffusion pattern  6 minute walk test without desaturation on 3 L     Pleural effusions  - check CXR to evaluate pleural effision      Pleural calcifications along the posterior aspect of the right  lung and round atelectasis in the right lower lobe  - has exposure to asbestos   - can consider repeating CT in 3 months after Dec 23    Chronic respiratory failure - will need continuous O2 from [facility] as needs continuous portable       Thank you for visiting the Pulmonary clinic today!       Return to clinic after _4-6_weeks and after PFTs and CXR  complete  or sooner if needed   Lamar Cooper CNP  My office number is [phone] -     Best way to get a hold of me is to call my office --> Please do not send me follow my health messages  Any test results will be discussed at next visit -- please make sure to make a follow up appt after testing.

## 2024-10-15 ENCOUNTER — PATIENT OUTREACH (OUTPATIENT)
Dept: PRIMARY CARE | Facility: CLINIC | Age: 69
End: 2024-10-15
Payer: MEDICARE

## 2024-10-15 DIAGNOSIS — L89.520: ICD-10-CM

## 2024-10-15 DIAGNOSIS — I50.9 ACUTE ON CHRONIC HEART FAILURE, UNSPECIFIED HEART FAILURE TYPE: ICD-10-CM

## 2024-10-15 NOTE — PROGRESS NOTES
I called and spoke with the patient who stated that he was doing better.  Patient saw Cardiology yesterday and had changes made to his medications.  Changes reviewed and all questions answered.  Patient received his Nebulizer machine with the medication today.  Patient will start using the nebulizer today.  Patient states that he is coughing clear phlegm.   Pulmonary appointment on 10/21/2024.  Patient was reminded to have his labs drawn in 7-10 days.  Wound care and PT continue in the home.  Instructed to call with any questions or concerns.

## 2024-10-16 ENCOUNTER — OFFICE VISIT (OUTPATIENT)
Dept: WOUND CARE | Facility: CLINIC | Age: 69
End: 2024-10-16
Payer: MEDICARE

## 2024-10-16 LAB
ACID FAST STN SPEC: NORMAL
MYCOBACTERIUM SPEC CULT: NORMAL

## 2024-10-16 PROCEDURE — 29581 APPL MULTLAYER CMPRN SYS LEG: CPT | Mod: LT,RT

## 2024-10-21 ENCOUNTER — APPOINTMENT (OUTPATIENT)
Dept: PULMONOLOGY | Facility: CLINIC | Age: 69
End: 2024-10-21
Payer: MEDICARE

## 2024-10-21 VITALS
SYSTOLIC BLOOD PRESSURE: 118 MMHG | HEART RATE: 84 BPM | OXYGEN SATURATION: 89 % | DIASTOLIC BLOOD PRESSURE: 73 MMHG | HEIGHT: 72 IN | BODY MASS INDEX: 33.72 KG/M2 | TEMPERATURE: 98.2 F | WEIGHT: 249 LBS

## 2024-10-21 DIAGNOSIS — R06.02 SOB (SHORTNESS OF BREATH): Primary | ICD-10-CM

## 2024-10-21 DIAGNOSIS — J96.11 CHRONIC HYPOXIC RESPIRATORY FAILURE, ON HOME OXYGEN THERAPY (MULTI): ICD-10-CM

## 2024-10-21 DIAGNOSIS — J90 PLEURAL EFFUSION: ICD-10-CM

## 2024-10-21 DIAGNOSIS — Z99.81 CHRONIC HYPOXIC RESPIRATORY FAILURE, ON HOME OXYGEN THERAPY (MULTI): ICD-10-CM

## 2024-10-21 PROCEDURE — 1159F MED LIST DOCD IN RCRD: CPT | Performed by: NURSE PRACTITIONER

## 2024-10-21 PROCEDURE — 1123F ACP DISCUSS/DSCN MKR DOCD: CPT | Performed by: NURSE PRACTITIONER

## 2024-10-21 PROCEDURE — 99215 OFFICE O/P EST HI 40 MIN: CPT | Performed by: NURSE PRACTITIONER

## 2024-10-21 PROCEDURE — 1157F ADVNC CARE PLAN IN RCRD: CPT | Performed by: NURSE PRACTITIONER

## 2024-10-21 PROCEDURE — 1125F AMNT PAIN NOTED PAIN PRSNT: CPT | Performed by: NURSE PRACTITIONER

## 2024-10-21 PROCEDURE — 3008F BODY MASS INDEX DOCD: CPT | Performed by: NURSE PRACTITIONER

## 2024-10-21 PROCEDURE — 3074F SYST BP LT 130 MM HG: CPT | Performed by: NURSE PRACTITIONER

## 2024-10-21 PROCEDURE — 1111F DSCHRG MED/CURRENT MED MERGE: CPT | Performed by: NURSE PRACTITIONER

## 2024-10-21 PROCEDURE — 3078F DIAST BP <80 MM HG: CPT | Performed by: NURSE PRACTITIONER

## 2024-10-21 ASSESSMENT — ENCOUNTER SYMPTOMS
SHORTNESS OF BREATH: 1
WOUND: 1
OCCASIONAL FEELINGS OF UNSTEADINESS: 1
DEPRESSION: 0
COUGH: 1
LOSS OF SENSATION IN FEET: 0

## 2024-10-21 ASSESSMENT — PAIN SCALES - GENERAL: PAINLEVEL_OUTOF10: 5

## 2024-10-21 NOTE — PATIENT INSTRUCTIONS
30 pack year history, chronic respiratory failure history   - albuterol hfa 2 puffs or albuterol nebs every 4-6 hours as needed  Spirometry shows no obstruction. There is no significant bronchodilator response. Lung volumes indicate a moderate restrictive defect. Spirometry (normal FEV1/FVC) and lung volumes (increased RV/TLC) suggest a complex restrictive ventilatory impairment. The DLCO corrected for hemoglobin is moderately reduced. Low DLCO with low/normal KCO is consistent with a loss of alveoli capillary structure with loss of lung volume. 6MWT: distance was 79 meters. Test was done on 3 LPM of supplemental O2. End of exercise SPO2 95%, HR 90.       Pleural effusions  - check CXR to evaluate pleural effision      Pleural calcifications along the posterior aspect of the right  lung and round atelectasis in the right lower lobe  - has exposure to asbestos   - can consider repeating CT in 3 months after Dec 23    Chronic respiratory failure - will need continuous O2 from [facility] as needs continuous portable         Thank you for visiting the Pulmonary clinic today!       Return to clinic after _4-6_weeks and after PFTs and CXR  complete  or sooner if needed   Lamar Cooper CNP  My office number is [phone] -     Best way to get a hold of me is to call my office --> Please do not send me follow my health messages  Any test results will be discussed at next visit -- please make sure to make a follow up appt after testing.

## 2024-10-23 ENCOUNTER — LAB REQUISITION (OUTPATIENT)
Dept: LAB | Facility: HOSPITAL | Age: 69
End: 2024-10-23
Payer: MEDICARE

## 2024-10-23 ENCOUNTER — OFFICE VISIT (OUTPATIENT)
Dept: WOUND CARE | Facility: CLINIC | Age: 69
End: 2024-10-23
Payer: MEDICARE

## 2024-10-23 DIAGNOSIS — L97.512 NON-PRESSURE CHRONIC ULCER OF OTHER PART OF RIGHT FOOT WITH FAT LAYER EXPOSED: ICD-10-CM

## 2024-10-23 LAB
ACID FAST STN SPEC: NORMAL
MYCOBACTERIUM SPEC CULT: NORMAL

## 2024-10-23 PROCEDURE — 87075 CULTR BACTERIA EXCEPT BLOOD: CPT

## 2024-10-23 PROCEDURE — 87070 CULTURE OTHR SPECIMN AEROBIC: CPT

## 2024-10-23 PROCEDURE — 11042 DBRDMT SUBQ TIS 1ST 20SQCM/<: CPT

## 2024-10-23 PROCEDURE — 87186 SC STD MICRODIL/AGAR DIL: CPT

## 2024-10-23 PROCEDURE — 87077 CULTURE AEROBIC IDENTIFY: CPT

## 2024-10-28 ENCOUNTER — APPOINTMENT (OUTPATIENT)
Dept: PHARMACY | Facility: HOSPITAL | Age: 69
End: 2024-10-28
Payer: MEDICARE

## 2024-10-28 DIAGNOSIS — J45.20 MILD INTERMITTENT ASTHMA, UNSPECIFIED WHETHER COMPLICATED (HHS-HCC): ICD-10-CM

## 2024-10-28 DIAGNOSIS — I50.9 HEART FAILURE, UNSPECIFIED HF CHRONICITY, UNSPECIFIED HEART FAILURE TYPE: ICD-10-CM

## 2024-10-30 ENCOUNTER — OFFICE VISIT (OUTPATIENT)
Dept: WOUND CARE | Facility: CLINIC | Age: 69
End: 2024-10-30
Payer: MEDICARE

## 2024-10-30 ENCOUNTER — HOSPITAL ENCOUNTER (OUTPATIENT)
Dept: RADIOLOGY | Facility: CLINIC | Age: 69
Discharge: HOME | End: 2024-10-30
Payer: MEDICARE

## 2024-10-30 ENCOUNTER — LAB (OUTPATIENT)
Dept: LAB | Facility: LAB | Age: 69
End: 2024-10-30
Payer: MEDICARE

## 2024-10-30 DIAGNOSIS — I50.30 STAGE C DIASTOLIC HEART FAILURE: ICD-10-CM

## 2024-10-30 LAB
ACID FAST STN SPEC: NORMAL
ALBUMIN SERPL BCP-MCNC: 4.3 G/DL (ref 3.4–5)
ANION GAP SERPL CALC-SCNC: 16 MMOL/L (ref 10–20)
BUN SERPL-MCNC: 32 MG/DL (ref 6–23)
CALCIUM SERPL-MCNC: 9.2 MG/DL (ref 8.6–10.6)
CHLORIDE SERPL-SCNC: 102 MMOL/L (ref 98–107)
CO2 SERPL-SCNC: 26 MMOL/L (ref 21–32)
CREAT SERPL-MCNC: 2.34 MG/DL (ref 0.5–1.3)
EGFRCR SERPLBLD CKD-EPI 2021: 29 ML/MIN/1.73M*2
GLUCOSE SERPL-MCNC: 82 MG/DL (ref 74–99)
MYCOBACTERIUM SPEC CULT: NORMAL
PHOSPHATE SERPL-MCNC: 5 MG/DL (ref 2.5–4.9)
POTASSIUM SERPL-SCNC: 5 MMOL/L (ref 3.5–5.3)
SODIUM SERPL-SCNC: 139 MMOL/L (ref 136–145)

## 2024-10-30 PROCEDURE — 80069 RENAL FUNCTION PANEL: CPT

## 2024-10-30 PROCEDURE — 11042 DBRDMT SUBQ TIS 1ST 20SQCM/<: CPT

## 2024-10-30 PROCEDURE — 83880 ASSAY OF NATRIURETIC PEPTIDE: CPT

## 2024-10-30 PROCEDURE — 71046 X-RAY EXAM CHEST 2 VIEWS: CPT

## 2024-10-30 PROCEDURE — 36415 COLL VENOUS BLD VENIPUNCTURE: CPT

## 2024-10-31 DIAGNOSIS — I50.9 ACUTE ON CHRONIC HEART FAILURE, UNSPECIFIED HEART FAILURE TYPE: Primary | ICD-10-CM

## 2024-10-31 LAB — BNP SERPL-MCNC: 69 PG/ML (ref 0–99)

## 2024-11-05 ENCOUNTER — APPOINTMENT (OUTPATIENT)
Dept: CARDIOLOGY | Facility: CLINIC | Age: 69
End: 2024-11-05
Payer: MEDICARE

## 2024-11-06 ENCOUNTER — OFFICE VISIT (OUTPATIENT)
Dept: WOUND CARE | Facility: CLINIC | Age: 69
End: 2024-11-06
Payer: MEDICARE

## 2024-11-06 LAB
ACID FAST STN SPEC: NORMAL
MYCOBACTERIUM SPEC CULT: NORMAL

## 2024-11-06 PROCEDURE — 29581 APPL MULTLAYER CMPRN SYS LEG: CPT | Mod: LT

## 2024-11-06 PROCEDURE — 11042 DBRDMT SUBQ TIS 1ST 20SQCM/<: CPT

## 2024-11-07 ENCOUNTER — DOCUMENTATION (OUTPATIENT)
Dept: PULMONOLOGY | Facility: HOSPITAL | Age: 69
End: 2024-11-07
Payer: MEDICARE

## 2024-11-07 NOTE — PROGRESS NOTES
Reviewed CT chest and current CXR with Dr. Parekh - consider at followup thoracic surgery for another opinion if CT is persistently abnormal for VATS biopsy. Right lung lung looks like rounded atelectasis. Calcifications which is odd without any asbestos exposure. If the fluid was transudate - patient may need improved fluid mgmt.

## 2024-11-13 ENCOUNTER — OFFICE VISIT (OUTPATIENT)
Dept: WOUND CARE | Facility: CLINIC | Age: 69
End: 2024-11-13
Payer: MEDICARE

## 2024-11-13 ENCOUNTER — HOSPITAL ENCOUNTER (OUTPATIENT)
Dept: RADIOLOGY | Facility: CLINIC | Age: 69
Discharge: HOME | End: 2024-11-13
Payer: MEDICARE

## 2024-11-13 ENCOUNTER — LAB (OUTPATIENT)
Dept: LAB | Facility: LAB | Age: 69
End: 2024-11-13
Payer: MEDICARE

## 2024-11-13 DIAGNOSIS — I50.9 ACUTE ON CHRONIC HEART FAILURE, UNSPECIFIED HEART FAILURE TYPE: ICD-10-CM

## 2024-11-13 DIAGNOSIS — L97.512 ULCER OF TOE OF RIGHT FOOT, WITH FAT LAYER EXPOSED (MULTI): ICD-10-CM

## 2024-11-13 DIAGNOSIS — L97.522 FOOT ULCERATION, LEFT, WITH FAT LAYER EXPOSED (MULTI): ICD-10-CM

## 2024-11-13 DIAGNOSIS — L97.512 ULCER OF TOE OF RIGHT FOOT, WITH FAT LAYER EXPOSED (MULTI): Primary | ICD-10-CM

## 2024-11-13 LAB
ACID FAST STN SPEC: NORMAL
ALBUMIN SERPL BCP-MCNC: 4 G/DL (ref 3.4–5)
ANION GAP SERPL CALC-SCNC: 16 MMOL/L (ref 10–20)
BUN SERPL-MCNC: 29 MG/DL (ref 6–23)
CALCIUM SERPL-MCNC: 8.9 MG/DL (ref 8.6–10.6)
CHLORIDE SERPL-SCNC: 107 MMOL/L (ref 98–107)
CO2 SERPL-SCNC: 24 MMOL/L (ref 21–32)
CREAT SERPL-MCNC: 1.59 MG/DL (ref 0.5–1.3)
EGFRCR SERPLBLD CKD-EPI 2021: 46 ML/MIN/1.73M*2
GLUCOSE SERPL-MCNC: 103 MG/DL (ref 74–99)
MYCOBACTERIUM SPEC CULT: NORMAL
PHOSPHATE SERPL-MCNC: 4 MG/DL (ref 2.5–4.9)
POTASSIUM SERPL-SCNC: 4.7 MMOL/L (ref 3.5–5.3)
SODIUM SERPL-SCNC: 142 MMOL/L (ref 136–145)

## 2024-11-13 PROCEDURE — 73620 X-RAY EXAM OF FOOT: CPT | Mod: 50

## 2024-11-13 PROCEDURE — 83880 ASSAY OF NATRIURETIC PEPTIDE: CPT

## 2024-11-13 PROCEDURE — 80069 RENAL FUNCTION PANEL: CPT

## 2024-11-13 PROCEDURE — 11042 DBRDMT SUBQ TIS 1ST 20SQCM/<: CPT

## 2024-11-13 PROCEDURE — 36415 COLL VENOUS BLD VENIPUNCTURE: CPT

## 2024-11-14 LAB — BNP SERPL-MCNC: 52 PG/ML (ref 0–99)

## 2024-11-18 ENCOUNTER — HOSPITAL ENCOUNTER (OUTPATIENT)
Facility: HOSPITAL | Age: 69
Setting detail: OBSERVATION
Discharge: HOME HEALTH | DRG: 464 | End: 2024-11-19
Attending: STUDENT IN AN ORGANIZED HEALTH CARE EDUCATION/TRAINING PROGRAM | Admitting: STUDENT IN AN ORGANIZED HEALTH CARE EDUCATION/TRAINING PROGRAM
Payer: MEDICARE

## 2024-11-18 ENCOUNTER — APPOINTMENT (OUTPATIENT)
Dept: CARDIOLOGY | Facility: HOSPITAL | Age: 69
DRG: 464 | End: 2024-11-18
Payer: MEDICARE

## 2024-11-18 ENCOUNTER — APPOINTMENT (OUTPATIENT)
Dept: RADIOLOGY | Facility: HOSPITAL | Age: 69
DRG: 464 | End: 2024-11-18
Payer: MEDICARE

## 2024-11-18 DIAGNOSIS — L89.520: ICD-10-CM

## 2024-11-18 DIAGNOSIS — J18.9 COMMUNITY ACQUIRED PNEUMONIA OF RIGHT LUNG, UNSPECIFIED PART OF LUNG: ICD-10-CM

## 2024-11-18 DIAGNOSIS — N39.0 UTI (URINARY TRACT INFECTION), UNCOMPLICATED: ICD-10-CM
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
## 2024-11-18 DIAGNOSIS — R74.8 ELEVATED CK: ICD-10-CM

## 2024-11-18 LAB
ALBUMIN SERPL BCP-MCNC: 4.4 G/DL (ref 3.4–5)
ALP SERPL-CCNC: 100 U/L (ref 33–136)
ALT SERPL W P-5'-P-CCNC: 20 U/L (ref 10–52)
ANION GAP SERPL CALC-SCNC: 15 MMOL/L (ref 10–20)
APPEARANCE UR: ABNORMAL
AST SERPL W P-5'-P-CCNC: 46 U/L (ref 9–39)
BACTERIA #/AREA URNS AUTO: ABNORMAL /HPF
BILIRUB SERPL-MCNC: 1.1 MG/DL (ref 0–1.2)
BILIRUB UR STRIP.AUTO-MCNC: NEGATIVE MG/DL
BNP SERPL-MCNC: 41 PG/ML (ref 0–99)
BUN SERPL-MCNC: 27 MG/DL (ref 6–23)
CALCIUM SERPL-MCNC: 9.7 MG/DL (ref 8.6–10.3)
CARDIAC TROPONIN I PNL SERPL HS: 7 NG/L (ref 0–20)
CHLORIDE SERPL-SCNC: 100 MMOL/L (ref 98–107)
CK SERPL-CCNC: 982 U/L (ref 0–325)
CO2 SERPL-SCNC: 29 MMOL/L (ref 21–32)
COLOR UR: YELLOW
CREAT SERPL-MCNC: 1.43 MG/DL (ref 0.5–1.3)
EGFRCR SERPLBLD CKD-EPI 2021: 52 ML/MIN/1.73M*2
ERYTHROCYTE [DISTWIDTH] IN BLOOD BY AUTOMATED COUNT: 18.4 % (ref 11.5–14.5)
FLUAV RNA RESP QL NAA+PROBE: NOT DETECTED
FLUBV RNA RESP QL NAA+PROBE: NOT DETECTED
GLUCOSE SERPL-MCNC: 126 MG/DL (ref 74–99)
GLUCOSE UR STRIP.AUTO-MCNC: ABNORMAL MG/DL
HCT VFR BLD AUTO: 46.9 % (ref 41–52)
HGB BLD-MCNC: 14.1 G/DL (ref 13.5–17.5)
KETONES UR STRIP.AUTO-MCNC: ABNORMAL MG/DL
LEUKOCYTE ESTERASE UR QL STRIP.AUTO: ABNORMAL
MAGNESIUM SERPL-MCNC: 2.33 MG/DL (ref 1.6–2.4)
MCH RBC QN AUTO: 28.7 PG (ref 26–34)
MCHC RBC AUTO-ENTMCNC: 30.1 G/DL (ref 32–36)
MCV RBC AUTO: 95 FL (ref 80–100)
MUCOUS THREADS #/AREA URNS AUTO: ABNORMAL /LPF
NITRITE UR QL STRIP.AUTO: NEGATIVE
NRBC BLD-RTO: 0 /100 WBCS (ref 0–0)
PH UR STRIP.AUTO: 5.5 [PH]
PHOSPHATE SERPL-MCNC: 3.3 MG/DL (ref 2.5–4.9)
PLATELET # BLD AUTO: 223 X10*3/UL (ref 150–450)
POTASSIUM SERPL-SCNC: 4.3 MMOL/L (ref 3.5–5.3)
PROT SERPL-MCNC: 8.9 G/DL (ref 6.4–8.2)
PROT UR STRIP.AUTO-MCNC: ABNORMAL MG/DL
RBC # BLD AUTO: 4.92 X10*6/UL (ref 4.5–5.9)
RBC # UR STRIP.AUTO: ABNORMAL /UL
RBC #/AREA URNS AUTO: ABNORMAL /HPF
SARS-COV-2 RNA RESP QL NAA+PROBE: NOT DETECTED
SODIUM SERPL-SCNC: 140 MMOL/L (ref 136–145)
SP GR UR STRIP.AUTO: 1.02
SQUAMOUS #/AREA URNS AUTO: ABNORMAL /HPF
UROBILINOGEN UR STRIP.AUTO-MCNC: NORMAL MG/DL
WBC # BLD AUTO: 10.1 X10*3/UL (ref 4.4–11.3)
WBC #/AREA URNS AUTO: >50 /HPF
WBC CLUMPS #/AREA URNS AUTO: ABNORMAL /HPF

## 2024-11-18 PROCEDURE — 87086 URINE CULTURE/COLONY COUNT: CPT | Mod: STJLAB | Performed by: STUDENT IN AN ORGANIZED HEALTH CARE EDUCATION/TRAINING PROGRAM

## 2024-11-18 PROCEDURE — 72131 CT LUMBAR SPINE W/O DYE: CPT | Mod: RCN

## 2024-11-18 PROCEDURE — 71045 X-RAY EXAM CHEST 1 VIEW: CPT

## 2024-11-18 PROCEDURE — 96365 THER/PROPH/DIAG IV INF INIT: CPT

## 2024-11-18 PROCEDURE — 96375 TX/PRO/DX INJ NEW DRUG ADDON: CPT

## 2024-11-18 PROCEDURE — 2500000001 HC RX 250 WO HCPCS SELF ADMINISTERED DRUGS (ALT 637 FOR MEDICARE OP)

## 2024-11-18 PROCEDURE — 99285 EMERGENCY DEPT VISIT HI MDM: CPT | Performed by: PHYSICIAN ASSISTANT

## 2024-11-18 PROCEDURE — 71045 X-RAY EXAM CHEST 1 VIEW: CPT | Performed by: RADIOLOGY

## 2024-11-18 PROCEDURE — 72125 CT NECK SPINE W/O DYE: CPT

## 2024-11-18 PROCEDURE — 2550000001 HC RX 255 CONTRASTS: Performed by: STUDENT IN AN ORGANIZED HEALTH CARE EDUCATION/TRAINING PROGRAM

## 2024-11-18 PROCEDURE — 90715 TDAP VACCINE 7 YRS/> IM: CPT | Performed by: PHYSICIAN ASSISTANT

## 2024-11-18 PROCEDURE — 96365 THER/PROPH/DIAG IV INF INIT: CPT | Mod: 59

## 2024-11-18 PROCEDURE — 93005 ELECTROCARDIOGRAM TRACING: CPT

## 2024-11-18 PROCEDURE — 94640 AIRWAY INHALATION TREATMENT: CPT

## 2024-11-18 PROCEDURE — G0378 HOSPITAL OBSERVATION PER HR: HCPCS

## 2024-11-18 PROCEDURE — 74177 CT ABD & PELVIS W/CONTRAST: CPT

## 2024-11-18 PROCEDURE — 73590 X-RAY EXAM OF LOWER LEG: CPT | Mod: 50

## 2024-11-18 PROCEDURE — 70486 CT MAXILLOFACIAL W/O DYE: CPT

## 2024-11-18 PROCEDURE — 73130 X-RAY EXAM OF HAND: CPT | Mod: BILATERAL PROCEDURE | Performed by: RADIOLOGY

## 2024-11-18 PROCEDURE — 2500000005 HC RX 250 GENERAL PHARMACY W/O HCPCS: Performed by: PHYSICIAN ASSISTANT

## 2024-11-18 PROCEDURE — 81001 URINALYSIS AUTO W/SCOPE: CPT | Performed by: STUDENT IN AN ORGANIZED HEALTH CARE EDUCATION/TRAINING PROGRAM

## 2024-11-18 PROCEDURE — 2500000004 HC RX 250 GENERAL PHARMACY W/ HCPCS (ALT 636 FOR OP/ED): Performed by: STUDENT IN AN ORGANIZED HEALTH CARE EDUCATION/TRAINING PROGRAM

## 2024-11-18 PROCEDURE — 82550 ASSAY OF CK (CPK): CPT | Performed by: PHYSICIAN ASSISTANT

## 2024-11-18 PROCEDURE — 85027 COMPLETE CBC AUTOMATED: CPT | Performed by: PHYSICIAN ASSISTANT

## 2024-11-18 PROCEDURE — 93010 ELECTROCARDIOGRAM REPORT: CPT | Performed by: PHYSICIAN ASSISTANT

## 2024-11-18 PROCEDURE — 2500000001 HC RX 250 WO HCPCS SELF ADMINISTERED DRUGS (ALT 637 FOR MEDICARE OP): Performed by: PHYSICIAN ASSISTANT

## 2024-11-18 PROCEDURE — 72128 CT CHEST SPINE W/O DYE: CPT | Mod: RCN

## 2024-11-18 PROCEDURE — 73110 X-RAY EXAM OF WRIST: CPT | Mod: BILATERAL PROCEDURE | Performed by: RADIOLOGY

## 2024-11-18 PROCEDURE — 90471 IMMUNIZATION ADMIN: CPT | Performed by: PHYSICIAN ASSISTANT

## 2024-11-18 PROCEDURE — 70450 CT HEAD/BRAIN W/O DYE: CPT

## 2024-11-18 PROCEDURE — 84075 ASSAY ALKALINE PHOSPHATASE: CPT | Performed by: PHYSICIAN ASSISTANT

## 2024-11-18 PROCEDURE — 93010 ELECTROCARDIOGRAM REPORT: CPT | Performed by: INTERNAL MEDICINE

## 2024-11-18 PROCEDURE — 71275 CT ANGIOGRAPHY CHEST: CPT | Performed by: STUDENT IN AN ORGANIZED HEALTH CARE EDUCATION/TRAINING PROGRAM

## 2024-11-18 PROCEDURE — 71275 CT ANGIOGRAPHY CHEST: CPT

## 2024-11-18 PROCEDURE — 73630 X-RAY EXAM OF FOOT: CPT | Mod: 50

## 2024-11-18 PROCEDURE — 73630 X-RAY EXAM OF FOOT: CPT | Mod: BILATERAL PROCEDURE | Performed by: RADIOLOGY

## 2024-11-18 PROCEDURE — 73130 X-RAY EXAM OF HAND: CPT | Mod: 50

## 2024-11-18 PROCEDURE — 84484 ASSAY OF TROPONIN QUANT: CPT | Performed by: PHYSICIAN ASSISTANT

## 2024-11-18 PROCEDURE — 83880 ASSAY OF NATRIURETIC PEPTIDE: CPT | Performed by: STUDENT IN AN ORGANIZED HEALTH CARE EDUCATION/TRAINING PROGRAM

## 2024-11-18 PROCEDURE — 96375 TX/PRO/DX INJ NEW DRUG ADDON: CPT | Mod: 59

## 2024-11-18 PROCEDURE — 83735 ASSAY OF MAGNESIUM: CPT | Performed by: STUDENT IN AN ORGANIZED HEALTH CARE EDUCATION/TRAINING PROGRAM

## 2024-11-18 PROCEDURE — 87636 SARSCOV2 & INF A&B AMP PRB: CPT | Performed by: STUDENT IN AN ORGANIZED HEALTH CARE EDUCATION/TRAINING PROGRAM

## 2024-11-18 PROCEDURE — 1100000001 HC PRIVATE ROOM DAILY

## 2024-11-18 PROCEDURE — 73110 X-RAY EXAM OF WRIST: CPT | Mod: 50

## 2024-11-18 PROCEDURE — 84100 ASSAY OF PHOSPHORUS: CPT | Performed by: STUDENT IN AN ORGANIZED HEALTH CARE EDUCATION/TRAINING PROGRAM

## 2024-11-18 PROCEDURE — 2500000004 HC RX 250 GENERAL PHARMACY W/ HCPCS (ALT 636 FOR OP/ED): Performed by: PHYSICIAN ASSISTANT

## 2024-11-18 PROCEDURE — 99285 EMERGENCY DEPT VISIT HI MDM: CPT | Mod: 25

## 2024-11-18 PROCEDURE — 36415 COLL VENOUS BLD VENIPUNCTURE: CPT | Performed by: PHYSICIAN ASSISTANT

## 2024-11-18 PROCEDURE — 74177 CT ABD & PELVIS W/CONTRAST: CPT | Performed by: STUDENT IN AN ORGANIZED HEALTH CARE EDUCATION/TRAINING PROGRAM

## 2024-11-18 PROCEDURE — 76377 3D RENDER W/INTRP POSTPROCES: CPT | Mod: CCI

## 2024-11-18 PROCEDURE — 2500000002 HC RX 250 W HCPCS SELF ADMINISTERED DRUGS (ALT 637 FOR MEDICARE OP, ALT 636 FOR OP/ED)

## 2024-11-18 PROCEDURE — 2500000004 HC RX 250 GENERAL PHARMACY W/ HCPCS (ALT 636 FOR OP/ED)

## 2024-11-18 RX ORDER — PREGABALIN 150 MG/1
150 CAPSULE ORAL EVERY 6 HOURS
Status: DISCONTINUED | OUTPATIENT
Start: 2024-11-18 | End: 2024-11-19 | Stop reason: HOSPADM

## 2024-11-18 RX ORDER — MIRTAZAPINE 15 MG/1
15 TABLET, FILM COATED ORAL NIGHTLY
Status: DISCONTINUED | OUTPATIENT
Start: 2024-11-18 | End: 2024-11-19 | Stop reason: HOSPADM

## 2024-11-18 RX ORDER — AZITHROMYCIN 250 MG/1
TABLET, FILM COATED ORAL
Qty: 6 TABLET | Refills: 0 | Status: SHIPPED | OUTPATIENT
Start: 2024-11-18 | End: 2024-11-19 | Stop reason: HOSPADM

## 2024-11-18 RX ORDER — LANOLIN ALCOHOL/MO/W.PET/CERES
1000 CREAM (GRAM) TOPICAL DAILY
Status: DISCONTINUED | OUTPATIENT
Start: 2024-11-19 | End: 2024-11-19 | Stop reason: HOSPADM

## 2024-11-18 RX ORDER — HEPARIN SODIUM 5000 [USP'U]/ML
5000 INJECTION, SOLUTION INTRAVENOUS; SUBCUTANEOUS EVERY 8 HOURS
Status: DISCONTINUED | OUTPATIENT
Start: 2024-11-18 | End: 2024-11-19 | Stop reason: HOSPADM

## 2024-11-18 RX ORDER — AMLODIPINE BESYLATE 2.5 MG/1
2.5 TABLET ORAL DAILY
Status: DISCONTINUED | OUTPATIENT
Start: 2024-11-19 | End: 2024-11-19 | Stop reason: HOSPADM

## 2024-11-18 RX ORDER — LEVOTHYROXINE SODIUM 50 UG/1
50 TABLET ORAL DAILY
Status: DISCONTINUED | OUTPATIENT
Start: 2024-11-19 | End: 2024-11-19 | Stop reason: HOSPADM

## 2024-11-18 RX ORDER — TORSEMIDE 20 MG/1
20 TABLET ORAL DAILY
Status: DISCONTINUED | OUTPATIENT
Start: 2024-11-19 | End: 2024-11-19 | Stop reason: HOSPADM

## 2024-11-18 RX ORDER — SPIRONOLACTONE 25 MG/1
25 TABLET ORAL DAILY
Status: DISCONTINUED | OUTPATIENT
Start: 2024-11-19 | End: 2024-11-19 | Stop reason: HOSPADM

## 2024-11-18 RX ORDER — ALBUTEROL SULFATE 0.83 MG/ML
2.5 SOLUTION RESPIRATORY (INHALATION) EVERY 6 HOURS PRN
Status: DISCONTINUED | OUTPATIENT
Start: 2024-11-18 | End: 2024-11-19 | Stop reason: HOSPADM

## 2024-11-18 RX ORDER — ALBUTEROL SULFATE 90 UG/1
2 INHALANT RESPIRATORY (INHALATION) EVERY 6 HOURS PRN
Status: DISCONTINUED | OUTPATIENT
Start: 2024-11-18 | End: 2024-11-18

## 2024-11-18 RX ORDER — DULOXETIN HYDROCHLORIDE 60 MG/1
60 CAPSULE, DELAYED RELEASE ORAL 2 TIMES DAILY
Status: DISCONTINUED | OUTPATIENT
Start: 2024-11-18 | End: 2024-11-19 | Stop reason: HOSPADM

## 2024-11-18 RX ORDER — TAMSULOSIN HYDROCHLORIDE 0.4 MG/1
0.4 CAPSULE ORAL NIGHTLY
Status: DISCONTINUED | OUTPATIENT
Start: 2024-11-18 | End: 2024-11-19 | Stop reason: HOSPADM

## 2024-11-18 RX ORDER — FERROUS SULFATE 325(65) MG
1 TABLET ORAL
Status: DISCONTINUED | OUTPATIENT
Start: 2024-11-19 | End: 2024-11-19

## 2024-11-18 RX ORDER — PROPRANOLOL HYDROCHLORIDE 20 MG/1
10 TABLET ORAL 2 TIMES DAILY
Status: DISCONTINUED | OUTPATIENT
Start: 2024-11-18 | End: 2024-11-19 | Stop reason: HOSPADM

## 2024-11-18 RX ORDER — MORPHINE SULFATE 4 MG/ML
4 INJECTION, SOLUTION INTRAMUSCULAR; INTRAVENOUS ONCE
Status: COMPLETED | OUTPATIENT
Start: 2024-11-18 | End: 2024-11-18

## 2024-11-18 RX ORDER — FOLIC ACID 1 MG/1
1 TABLET ORAL DAILY
Status: DISCONTINUED | OUTPATIENT
Start: 2024-11-19 | End: 2024-11-19 | Stop reason: HOSPADM

## 2024-11-18 RX ORDER — MONTELUKAST SODIUM 10 MG/1
10 TABLET ORAL NIGHTLY
Status: DISCONTINUED | OUTPATIENT
Start: 2024-11-18 | End: 2024-11-19 | Stop reason: HOSPADM

## 2024-11-18 RX ORDER — ATORVASTATIN CALCIUM 40 MG/1
40 TABLET, FILM COATED ORAL DAILY
Status: DISCONTINUED | OUTPATIENT
Start: 2024-11-19 | End: 2024-11-19 | Stop reason: HOSPADM

## 2024-11-18 RX ORDER — NAPROXEN SODIUM 220 MG/1
81 TABLET, FILM COATED ORAL DAILY
Status: DISCONTINUED | OUTPATIENT
Start: 2024-11-19 | End: 2024-11-19 | Stop reason: HOSPADM

## 2024-11-18 RX ORDER — CEFTRIAXONE 1 G/50ML
1 INJECTION, SOLUTION INTRAVENOUS ONCE
Status: COMPLETED | OUTPATIENT
Start: 2024-11-18 | End: 2024-11-18

## 2024-11-18 RX ORDER — IPRATROPIUM BROMIDE 0.5 MG/2.5ML
0.5 SOLUTION RESPIRATORY (INHALATION) 4 TIMES DAILY
Status: DISCONTINUED | OUTPATIENT
Start: 2024-11-18 | End: 2024-11-19 | Stop reason: HOSPADM

## 2024-11-18 RX ORDER — PANTOPRAZOLE SODIUM 40 MG/1
40 TABLET, DELAYED RELEASE ORAL
Status: DISCONTINUED | OUTPATIENT
Start: 2024-11-19 | End: 2024-11-19 | Stop reason: HOSPADM

## 2024-11-18 RX ORDER — AMOXICILLIN AND CLAVULANATE POTASSIUM 875; 125 MG/1; MG/1
1 TABLET, FILM COATED ORAL 2 TIMES DAILY
Qty: 14 TABLET | Refills: 0 | Status: SHIPPED | OUTPATIENT
Start: 2024-11-18 | End: 2024-11-19 | Stop reason: HOSPADM

## 2024-11-18 RX ORDER — ESCITALOPRAM OXALATE 20 MG/1
20 TABLET ORAL DAILY
Status: DISCONTINUED | OUTPATIENT
Start: 2024-11-19 | End: 2024-11-19 | Stop reason: HOSPADM

## 2024-11-18 RX ORDER — BUSPIRONE HYDROCHLORIDE 5 MG/1
5 TABLET ORAL 3 TIMES DAILY PRN
Status: DISCONTINUED | OUTPATIENT
Start: 2024-11-18 | End: 2024-11-19 | Stop reason: HOSPADM

## 2024-11-18 RX ORDER — AZITHROMYCIN 500 MG/1
500 TABLET, FILM COATED ORAL ONCE
Status: COMPLETED | OUTPATIENT
Start: 2024-11-18 | End: 2024-11-18

## 2024-11-18 SDOH — HEALTH STABILITY: MENTAL HEALTH: HOW OFTEN DO YOU HAVE A DRINK CONTAINING ALCOHOL?: NEVER

## 2024-11-18 SDOH — ECONOMIC STABILITY: FOOD INSECURITY: HOW HARD IS IT FOR YOU TO PAY FOR THE VERY BASICS LIKE FOOD, HOUSING, MEDICAL CARE, AND HEATING?: NOT HARD AT ALL

## 2024-11-18 SDOH — SOCIAL STABILITY: SOCIAL INSECURITY: WITHIN THE LAST YEAR, HAVE YOU BEEN HUMILIATED OR EMOTIONALLY ABUSED IN OTHER WAYS BY YOUR PARTNER OR EX-PARTNER?: NO

## 2024-11-18 SDOH — HEALTH STABILITY: MENTAL HEALTH: HOW OFTEN DO YOU HAVE SIX OR MORE DRINKS ON ONE OCCASION?: NEVER

## 2024-11-18 SDOH — ECONOMIC STABILITY: HOUSING INSECURITY: IN THE PAST 12 MONTHS, HOW MANY TIMES HAVE YOU MOVED WHERE YOU WERE LIVING?: 1

## 2024-11-18 SDOH — HEALTH STABILITY: MENTAL HEALTH: HOW MANY DRINKS CONTAINING ALCOHOL DO YOU HAVE ON A TYPICAL DAY WHEN YOU ARE DRINKING?: PATIENT DOES NOT DRINK

## 2024-11-18 SDOH — ECONOMIC STABILITY: HOUSING INSECURITY: AT ANY TIME IN THE PAST 12 MONTHS, WERE YOU HOMELESS OR LIVING IN A SHELTER (INCLUDING NOW)?: NO

## 2024-11-18 SDOH — SOCIAL STABILITY: SOCIAL INSECURITY: HAS ANYONE EVER THREATENED TO HURT YOUR FAMILY OR YOUR PETS?: NO

## 2024-11-18 SDOH — SOCIAL STABILITY: SOCIAL INSECURITY: ABUSE: ADULT

## 2024-11-18 SDOH — SOCIAL STABILITY: SOCIAL INSECURITY
WITHIN THE LAST YEAR, HAVE YOU BEEN KICKED, HIT, SLAPPED, OR OTHERWISE PHYSICALLY HURT BY YOUR PARTNER OR EX-PARTNER?: NO

## 2024-11-18 SDOH — SOCIAL STABILITY: SOCIAL INSECURITY: WITHIN THE LAST YEAR, HAVE YOU BEEN AFRAID OF YOUR PARTNER OR EX-PARTNER?: NO

## 2024-11-18 SDOH — ECONOMIC STABILITY: FOOD INSECURITY: WITHIN THE PAST 12 MONTHS, YOU WORRIED THAT YOUR FOOD WOULD RUN OUT BEFORE YOU GOT THE MONEY TO BUY MORE.: NEVER TRUE

## 2024-11-18 SDOH — ECONOMIC STABILITY: HOUSING INSECURITY: IN THE LAST 12 MONTHS, WAS THERE A TIME WHEN YOU WERE NOT ABLE TO PAY THE MORTGAGE OR RENT ON TIME?: NO

## 2024-11-18 SDOH — SOCIAL STABILITY: SOCIAL INSECURITY
WITHIN THE LAST YEAR, HAVE YOU BEEN RAPED OR FORCED TO HAVE ANY KIND OF SEXUAL ACTIVITY BY YOUR PARTNER OR EX-PARTNER?: NO

## 2024-11-18 SDOH — SOCIAL STABILITY: SOCIAL INSECURITY: DO YOU FEEL ANYONE HAS EXPLOITED OR TAKEN ADVANTAGE OF YOU FINANCIALLY OR OF YOUR PERSONAL PROPERTY?: NO

## 2024-11-18 SDOH — ECONOMIC STABILITY: FOOD INSECURITY: WITHIN THE PAST 12 MONTHS, THE FOOD YOU BOUGHT JUST DIDN'T LAST AND YOU DIDN'T HAVE MONEY TO GET MORE.: NEVER TRUE

## 2024-11-18 SDOH — SOCIAL STABILITY: SOCIAL INSECURITY: ARE THERE ANY APPARENT SIGNS OF INJURIES/BEHAVIORS THAT COULD BE RELATED TO ABUSE/NEGLECT?: NO

## 2024-11-18 SDOH — SOCIAL STABILITY: SOCIAL INSECURITY: DOES ANYONE TRY TO KEEP YOU FROM HAVING/CONTACTING OTHER FRIENDS OR DOING THINGS OUTSIDE YOUR HOME?: NO

## 2024-11-18 SDOH — ECONOMIC STABILITY: INCOME INSECURITY: IN THE PAST 12 MONTHS HAS THE ELECTRIC, GAS, OIL, OR WATER COMPANY THREATENED TO SHUT OFF SERVICES IN YOUR HOME?: NO

## 2024-11-18 SDOH — ECONOMIC STABILITY: TRANSPORTATION INSECURITY: IN THE PAST 12 MONTHS, HAS LACK OF TRANSPORTATION KEPT YOU FROM MEDICAL APPOINTMENTS OR FROM GETTING MEDICATIONS?: NO

## 2024-11-18 SDOH — SOCIAL STABILITY: SOCIAL INSECURITY: HAVE YOU HAD THOUGHTS OF HARMING ANYONE ELSE?: NO

## 2024-11-18 SDOH — SOCIAL STABILITY: SOCIAL INSECURITY: HAVE YOU HAD ANY THOUGHTS OF HARMING ANYONE ELSE?: NO

## 2024-11-18 SDOH — SOCIAL STABILITY: SOCIAL INSECURITY: WERE YOU ABLE TO COMPLETE ALL THE BEHAVIORAL HEALTH SCREENINGS?: YES

## 2024-11-18 SDOH — SOCIAL STABILITY: SOCIAL INSECURITY: ARE YOU OR HAVE YOU BEEN THREATENED OR ABUSED PHYSICALLY, EMOTIONALLY, OR SEXUALLY BY ANYONE?: NO

## 2024-11-18 SDOH — SOCIAL STABILITY: SOCIAL INSECURITY: DO YOU FEEL UNSAFE GOING BACK TO THE PLACE WHERE YOU ARE LIVING?: NO

## 2024-11-18 ASSESSMENT — COGNITIVE AND FUNCTIONAL STATUS - GENERAL
DAILY ACTIVITIY SCORE: 24
PATIENT BASELINE BEDBOUND: NO
STANDING UP FROM CHAIR USING ARMS: A LITTLE
MOBILITY SCORE: 17
CLIMB 3 TO 5 STEPS WITH RAILING: TOTAL
MOVING TO AND FROM BED TO CHAIR: A LITTLE
WALKING IN HOSPITAL ROOM: A LOT

## 2024-11-18 ASSESSMENT — COLUMBIA-SUICIDE SEVERITY RATING SCALE - C-SSRS
2. HAVE YOU ACTUALLY HAD ANY THOUGHTS OF KILLING YOURSELF?: NO
6. HAVE YOU EVER DONE ANYTHING, STARTED TO DO ANYTHING, OR PREPARED TO DO ANYTHING TO END YOUR LIFE?: NO
1. IN THE PAST MONTH, HAVE YOU WISHED YOU WERE DEAD OR WISHED YOU COULD GO TO SLEEP AND NOT WAKE UP?: NO

## 2024-11-18 ASSESSMENT — ACTIVITIES OF DAILY LIVING (ADL)
HEARING - LEFT EAR: FUNCTIONAL
WALKS IN HOME: NEEDS ASSISTANCE
ASSISTIVE_DEVICE: WALKER
DRESSING YOURSELF: INDEPENDENT
LACK_OF_TRANSPORTATION: NO
PATIENT'S MEMORY ADEQUATE TO SAFELY COMPLETE DAILY ACTIVITIES?: YES
JUDGMENT_ADEQUATE_SAFELY_COMPLETE_DAILY_ACTIVITIES: YES
BATHING: INDEPENDENT
FEEDING YOURSELF: INDEPENDENT
LACK_OF_TRANSPORTATION: NO
ADEQUATE_TO_COMPLETE_ADL: YES
TOILETING: INDEPENDENT
HEARING - RIGHT EAR: FUNCTIONAL
GROOMING: INDEPENDENT

## 2024-11-18 ASSESSMENT — PATIENT HEALTH QUESTIONNAIRE - PHQ9
1. LITTLE INTEREST OR PLEASURE IN DOING THINGS: NOT AT ALL
2. FEELING DOWN, DEPRESSED OR HOPELESS: NOT AT ALL
SUM OF ALL RESPONSES TO PHQ9 QUESTIONS 1 & 2: 0

## 2024-11-18 ASSESSMENT — LIFESTYLE VARIABLES
SKIP TO QUESTIONS 9-10: 1
AUDIT-C TOTAL SCORE: 0
TOTAL SCORE: 0
EVER HAD A DRINK FIRST THING IN THE MORNING TO STEADY YOUR NERVES TO GET RID OF A HANGOVER: NO
SKIP TO QUESTIONS 9-10: 1
AUDIT-C TOTAL SCORE: 0
HOW OFTEN DO YOU HAVE A DRINK CONTAINING ALCOHOL: NEVER
HOW MANY STANDARD DRINKS CONTAINING ALCOHOL DO YOU HAVE ON A TYPICAL DAY: PATIENT DOES NOT DRINK
HAVE PEOPLE ANNOYED YOU BY CRITICIZING YOUR DRINKING: NO
HAVE YOU EVER FELT YOU SHOULD CUT DOWN ON YOUR DRINKING: NO
EVER FELT BAD OR GUILTY ABOUT YOUR DRINKING: NO
HOW OFTEN DO YOU HAVE 6 OR MORE DRINKS ON ONE OCCASION: NEVER
AUDIT-C TOTAL SCORE: 0

## 2024-11-18 ASSESSMENT — PAIN DESCRIPTION - ONSET: ONSET: ONGOING

## 2024-11-18 ASSESSMENT — PAIN DESCRIPTION - LOCATION: LOCATION: BACK

## 2024-11-18 ASSESSMENT — PAIN SCALES - GENERAL
PAINLEVEL_OUTOF10: 0 - NO PAIN
PAINLEVEL_OUTOF10: 5 - MODERATE PAIN
PAINLEVEL_OUTOF10: 7

## 2024-11-18 ASSESSMENT — PAIN DESCRIPTION - PROGRESSION: CLINICAL_PROGRESSION: NOT CHANGED

## 2024-11-18 ASSESSMENT — PAIN - FUNCTIONAL ASSESSMENT
PAIN_FUNCTIONAL_ASSESSMENT: 0-10
PAIN_FUNCTIONAL_ASSESSMENT: 0-10

## 2024-11-18 ASSESSMENT — PAIN DESCRIPTION - DESCRIPTORS: DESCRIPTORS: ACHING

## 2024-11-18 ASSESSMENT — PAIN DESCRIPTION - ORIENTATION: ORIENTATION: RIGHT;LEFT;LOWER

## 2024-11-18 ASSESSMENT — PAIN DESCRIPTION - PAIN TYPE: TYPE: CHRONIC PAIN

## 2024-11-18 ASSESSMENT — PAIN DESCRIPTION - FREQUENCY: FREQUENCY: CONSTANT/CONTINUOUS

## 2024-11-18 NOTE — ED PROVIDER NOTES
This is a 69-year-old male with past medical history of restrictive lung disease/COPD on 4 L baseline, CHF, hypertension, CKD, hypothyroidism who presents to the ED after a fall that occurred around 5 PM yesterday.  Patient states that he was walking up his stairs and believes his oxygen tank was empty and began feeling very short of breath.  He states that because of this he fell at the top of the stairs.  He did hit his head.  He denies any LOC.  He states that he was unable to get himself up off the ground and laid on the floor until his home wound nurse came today and found him on the ground.  Patient also states that his cat knocked multiple objects off of a nightstand that was right beside him and hit him in the head with these objects.  He is currently endorsing a headache, facial pain, neck pain, back pain, and left flank pain as well as bilateral hand and foot pain.  He states that he has severe neuropathy and cannot feel his hands and his feet at baseline.  He states that he typically is able to ambulate at home with a rollator and lives independently.      History provided by:  Patient   used: No             Visit Vitals  BP (!) 142/102   Pulse 85   Temp 36.2 °C (97.2 °F)   Resp 20   Ht 1.829 m (6')   Wt 113 kg (250 lb)   SpO2 96%   BMI 33.91 kg/m²   Smoking Status Former   BSA 2.4 m²          Physical Exam     Physical exam:   General: Vitals noted, no distress. Afebrile.   EENT:  Hearing grossly intact. Normal phonation. MMM. Airway patient. PERRL. EOMI. multiple superficial abrasions noted to the patient's forehead without any active bleeding at this time.  Diffuse facial bone tenderness without any instability of the midface.  No gaping lacerations.  Neck: Midline and bilateral paraspinal C-spine tenderness palpation.  No obvious deformity or step-off.  Cardiac: Regular, rate, rhythm. Normal S1 and S2.  No murmurs, gallops, rubs.  Left lateral/posterior chest wall tenderness to  palpation.  No obvious deformity or step-off.  Pulmonary: Good air exchange. Lungs clear bilaterally. No wheezes, rhonchi, rales. No accessory muscle use.   Abdomen: Soft, nonsurgical. Nontender. No peritoneal signs. Normoactive bowel sounds.   Back: +L  CVA tenderness.  No midline L-spine tenderness palpation.  Midline and left-sided paraspinal thoracic tenderness palpation without any obvious deformity or step-off.  No ecchymosis noted.    Extremities: No peripheral edema.  Full range of motion. Moves all extremities freely. No tenderness throughout extremities.  No obvious deformity throughout upper or lower extremities bilaterally.    Skin: No rash. Warm and Dry.   Neuro: No focal neurologic deficits. CN 2-12 grossly intact. Sensation equal bilaterally. No weakness.         Labs Reviewed   CBC - Abnormal       Result Value    WBC 10.1      nRBC 0.0      RBC 4.92      Hemoglobin 14.1      Hematocrit 46.9      MCV 95      MCH 28.7      MCHC 30.1 (*)     RDW 18.4 (*)     Platelets 223     COMPREHENSIVE METABOLIC PANEL - Abnormal    Glucose 126 (*)     Sodium 140      Potassium 4.3      Chloride 100      Bicarbonate 29      Anion Gap 15      Urea Nitrogen 27 (*)     Creatinine 1.43 (*)     eGFR 52 (*)     Calcium 9.7      Albumin 4.4      Alkaline Phosphatase 100      Total Protein 8.9 (*)     AST 46 (*)     Bilirubin, Total 1.1      ALT 20     CREATINE KINASE - Abnormal    Creatine Kinase 982 (*)    URINALYSIS WITH REFLEX CULTURE AND MICROSCOPIC - Abnormal    Color, Urine Yellow      Appearance, Urine Turbid (*)     Specific Gravity, Urine 1.018      pH, Urine 5.5      Protein, Urine 70 (1+) (*)     Glucose, Urine 100 (1+) (*)     Blood, Urine 0.5 (2+) (*)     Ketones, Urine TRACE (*)     Bilirubin, Urine NEGATIVE      Urobilinogen, Urine Normal      Nitrite, Urine NEGATIVE      Leukocyte Esterase, Urine 500 Mike/µL (*)    MICROSCOPIC ONLY, URINE - Abnormal    WBC, Urine >50 (*)     WBC Clumps, Urine OCCASIONAL       RBC, Urine 6-10 (*)     Squamous Epithelial Cells, Urine 1-9 (SPARSE)      Bacteria, Urine 3+ (*)     Mucus, Urine 1+     TROPONIN I, HIGH SENSITIVITY - Normal    Troponin I, High Sensitivity 7      Narrative:     Less than 99th percentile of normal range cutoff-  Female and children under 18 years old <14 ng/L; Male <21 ng/L: Negative  Repeat testing should be performed if clinically indicated.     Female and children under 18 years old 14-50 ng/L; Male 21-50 ng/L:  Consistent with possible cardiac damage and possible increased clinical   risk. Serial measurements may help to assess extent of myocardial damage.     >50 ng/L: Consistent with cardiac damage, increased clinical risk and  myocardial infarction. Serial measurements may help assess extent of   myocardial damage.      NOTE: Children less than 1 year old may have higher baseline troponin   levels and results should be interpreted in conjunction with the overall   clinical context.     NOTE: Troponin I testing is performed using a different   testing methodology at [facility] than at other   [facility]. Direct result comparisons should only   be made within the same method.   PHOSPHORUS - Normal    Phosphorus 3.3     MAGNESIUM - Normal    Magnesium 2.33     INFLUENZA A AND B PCR - Normal    Flu A Result Not Detected      Flu B Result Not Detected      Narrative:     This assay is an in vitro diagnostic multiplex nucleic acid amplification test for the detection and discrimination of Influenza A & B from nasopharyngeal specimens, and has been validated for use at [facility]. Negative results do not preclude Influenza A/B infections, and should not be used as the sole basis for diagnosis, treatment, or other management decisions. If Influenza A/B and RSV PCR results are negative, testing for Parainfluenza virus, Adenovirus and Metapneumovirus is routinely performed for [facility] pediatric oncology and intensive care  inpatients, and is available on other patients by placing an add-on request.   SARS-COV-2 PCR - Normal    Coronavirus 2019, PCR Not Detected      Narrative:     This assay has received FDA Emergency Use Authorization (EUA) and is only authorized for the duration of time that circumstances exist to justify the authorization of the emergency use of in vitro diagnostic tests for the detection of SARS-CoV-2 virus and/or diagnosis of COVID-19 infection under section 564(b)(1) of the Act, 21 U.S.C. 360bbb-3(b)(1). This assay is an in vitro diagnostic nucleic acid amplification test for the qualitative detection of SARS-CoV-2 from nasopharyngeal specimens and has been validated for use at [facility]. Negative results do not preclude COVID-19 infections and should not be used as the sole basis for diagnosis, treatment, or other management decisions.     B-TYPE NATRIURETIC PEPTIDE - Normal    BNP 41      Narrative:        <100 pg/mL - Heart failure unlikely  100-299 pg/mL - Intermediate probability of acute heart                  failure exacerbation. Correlate with clinical                  context and patient history.    >=300 pg/mL - Heart Failure likely. Correlate with clinical                  context and patient history.    BNP testing is performed using different testing methodology at [facility] than at other [facility]. Direct result comparisons should only be made within the same method.      URINE CULTURE   URINALYSIS WITH REFLEX CULTURE AND MICROSCOPIC    Narrative:     The following orders were created for panel order Urinalysis with Reflex Culture and Microscopic.  Procedure                               Abnormality         Status                     ---------                               -----------         ------                     Urinalysis with Reflex C...[[id number]]  Abnormal            Final result               Extra Urine Gray Tube[[id number]]                             In process                   Please view results for these tests on the individual orders.   EXTRA URINE GRAY TUBE       CT thoracic spine wo IV contrast   Final Result   CT HEAD:   1. No acute intracranial abnormality or calvarial fracture.             CT MAXILLOFACIAL SKELETON:   1. No acute maxillofacial skeleton fracture.             CT CERVICAL SPINE:   1. No acute fracture or traumatic malalignment of the cervical spine.   2. Spondylotic changes of the cervical spine as detailed above.             CT THORACIC/LUMBAR SPINE:   1. No acute fracture or traumatic malalignment.   2. Remote minimally depressed compression fracture the upper T2   endplate.   3. Remote fracture deformities of the bilateral L1, L2, and L4   transverse processes and left L3 transverse process.        MACRO:   None.        Signed by: Leonardo Celaya 11/18/2024 6:57 PM   Dictation workstation:   [id number]      CT lumbar spine wo IV contrast   Final Result   CT HEAD:   1. No acute intracranial abnormality or calvarial fracture.             CT MAXILLOFACIAL SKELETON:   1. No acute maxillofacial skeleton fracture.             CT CERVICAL SPINE:   1. No acute fracture or traumatic malalignment of the cervical spine.   2. Spondylotic changes of the cervical spine as detailed above.             CT THORACIC/LUMBAR SPINE:   1. No acute fracture or traumatic malalignment.   2. Remote minimally depressed compression fracture the upper T2   endplate.   3. Remote fracture deformities of the bilateral L1, L2, and L4   transverse processes and left L3 transverse process.        MACRO:   None.        Signed by: Leonardo Celaya 11/18/2024 6:57 PM   Dictation workstation:   [id number]      CT angio chest for pulmonary embolism   Final Result   CT CHEST:   1. No acute pulmonary embolism.   2. Emphysema.   3. Right-sided calcified pleural plaques and round atelectasis which   can be seen with prior asbestos exposure.        CT  ABDOMEN/PELVIS:   1. No acute abnormality in the abdomen or pelvis.        2. Cirrhotic liver morphology and splenomegaly suggestive of portal   hypertension.        MACRO:   None.        Signed by: Loenardo Celaya 11/18/2024 7:12 PM   Dictation workstation:   [id number]      CT abdomen pelvis w IV contrast   Final Result   CT CHEST:   1. No acute pulmonary embolism.   2. Emphysema.   3. Right-sided calcified pleural plaques and round atelectasis which   can be seen with prior asbestos exposure.        CT ABDOMEN/PELVIS:   1. No acute abnormality in the abdomen or pelvis.        2. Cirrhotic liver morphology and splenomegaly suggestive of portal   hypertension.        MACRO:   None.        Signed by: Leonardo Celaya 11/18/2024 7:12 PM   Dictation workstation:   [id number]      CT head wo IV contrast   Final Result   CT HEAD:   1. No acute intracranial abnormality or calvarial fracture.             CT MAXILLOFACIAL SKELETON:   1. No acute maxillofacial skeleton fracture.             CT CERVICAL SPINE:   1. No acute fracture or traumatic malalignment of the cervical spine.   2. Spondylotic changes of the cervical spine as detailed above.             CT THORACIC/LUMBAR SPINE:   1. No acute fracture or traumatic malalignment.   2. Remote minimally depressed compression fracture the upper T2   endplate.   3. Remote fracture deformities of the bilateral L1, L2, and L4   transverse processes and left L3 transverse process.        MACRO:   None.        Signed by: Leonardo Celaya 11/18/2024 6:57 PM   Dictation workstation:   [id number]      CT maxillofacial bones wo IV contrast   Final Result   CT HEAD:   1. No acute intracranial abnormality or calvarial fracture.             CT MAXILLOFACIAL SKELETON:   1. No acute maxillofacial skeleton fracture.             CT CERVICAL SPINE:   1. No acute fracture or traumatic malalignment of the cervical spine.   2. Spondylotic changes of the cervical spine as detailed above.              CT THORACIC/LUMBAR SPINE:   1. No acute fracture or traumatic malalignment.   2. Remote minimally depressed compression fracture the upper T2   endplate.   3. Remote fracture deformities of the bilateral L1, L2, and L4   transverse processes and left L3 transverse process.        MACRO:   None.        Signed by: Leonardo Celaya 11/18/2024 6:57 PM   Dictation workstation:   [id number]      CT cervical spine wo IV contrast   Final Result   CT HEAD:   1. No acute intracranial abnormality or calvarial fracture.             CT MAXILLOFACIAL SKELETON:   1. No acute maxillofacial skeleton fracture.             CT CERVICAL SPINE:   1. No acute fracture or traumatic malalignment of the cervical spine.   2. Spondylotic changes of the cervical spine as detailed above.             CT THORACIC/LUMBAR SPINE:   1. No acute fracture or traumatic malalignment.   2. Remote minimally depressed compression fracture the upper T2   endplate.   3. Remote fracture deformities of the bilateral L1, L2, and L4   transverse processes and left L3 transverse process.        MACRO:   None.        Signed by: Leonardo Celaya 11/18/2024 6:57 PM   Dictation workstation:   [id number]      CT 3D reconstruction   Final Result   CT HEAD:   1. No acute intracranial abnormality or calvarial fracture.             CT MAXILLOFACIAL SKELETON:   1. No acute maxillofacial skeleton fracture.             CT CERVICAL SPINE:   1. No acute fracture or traumatic malalignment of the cervical spine.   2. Spondylotic changes of the cervical spine as detailed above.             CT THORACIC/LUMBAR SPINE:   1. No acute fracture or traumatic malalignment.   2. Remote minimally depressed compression fracture the upper T2   endplate.   3. Remote fracture deformities of the bilateral L1, L2, and L4   transverse processes and left L3 transverse process.        MACRO:   None.        Signed by: Leonardo Celaya 11/18/2024 6:57 PM   Dictation workstation:    [id number]      XR hand 3+ views bilateral   Final Result   1. No fracture or dislocation.   2. Degenerative changes, as described above.        MACRO:   None.        Signed by: Nathen Lovett 11/18/2024 2:38 PM   Dictation workstation:   [id number]      XR wrist 3+ views bilateral   Final Result   1. No fracture or dislocation.   2. Degenerative changes, as described above.        MACRO:   None.        Signed by: Nathen Lovett 11/18/2024 2:38 PM   Dictation workstation:   [id number]      XR tibia fibula bilateral 2 views   Final Result   1. No fracture or dislocation.        MACRO:   None.        Signed by: Nathen Lovett 11/18/2024 2:30 PM   Dictation workstation:   [id number]      XR foot 3+ views bilateral   Final Result   1. No acute fracture or dislocation identified.   2. Postoperative and degenerative changes, as described above.        MACRO:   None.        Signed by: Nathen Lovett 11/18/2024 2:36 PM   Dictation workstation:   [id number]      XR chest 1 view   Final Result   Right basilar airspace opacity, as above. Clinical correlation and   continued follow-up until clearing is recommended.        MACRO:   None.        Signed by: Nathen Lovett 11/18/2024 2:33 PM   Dictation workstation:   [id number]              ED Course & MDM     Medical Decision Making  This is a 69-year-old male with past medical history of restrictive lung disease/COPD on 4 L baseline and CHF who presents to the ED after a fall that occurred yesterday because he felt short of breath secondary to him believing his oxygen tank was empty.  Vital stable upon arrival to the ED with patient being 98% on his home 4 L.  On examination patient alert and oriented x 3.  He is neurologically intact without deficits.  Acute deficits.  Patient did endorse decree sensation throughout hands and feet bilaterally, however is that this is chronic for him.  Examination of his hands and feet limited secondary to this, however no obvious deformities  noted.  He did have midline C and T-spine tenderness palpation as well as tenderness palpation to the left lateral and posterior chest wall as well as left flank.  CT pan scans ordered including CT PE study based on the patient's description of his symptoms as well as x-rays of his hands, wrist, feet, and tib-fib's bilaterally.  Laboratory studies including CK level ordered.  CK level slightly elevated at 982, normal troponin.  CMP showed patient's creatinine is 1.43, this appears to be consistent with his baseline.  BNP 41.  Normal mag and phosphorus.  UA was concerning for infection and patient was ordered IV Rocephin for this.  Chest x-ray concerning for possible right lower lobe pneumonia so he was also ordered azithromycin.  Concerning patient's elevated CK level due to his history of heart failure as well as significant lung disease IV fluids were not started this point in time.  P.o. fluids were encouraged.  Patient's x-rays of his hands, wrist, tib-fib, and feet bilaterally showed no acute fractures or dislocations.  CT pan scan showed no evidence of PE, no intercranial bleed, no acute cervical spine fracture or subluxation.  There were remote appearing compression fractures of T2 as well as remote fracture deformities of the bilateral L1, L2, L4 transverse processes and left L3 transverse process.  No acute fractures noted to the thoracic or lumbar spine.  CT chest without PE, no rib fractures noted.  CT abdomen pelvis without any acute abnormalities within noted within the abdomen or pelvis.  I discussed all these results with the patient.  Due to his fall with prolonged downtime, difficulty with his oxygen at home, questionable pneumonia as well as UTI we recommended admission for further management.  Initially patient did not want to be admitted and wanted to leave AGAINST MEDICAL ADVICE.  Patient stood up to leave, felt lightheaded and short of breath and then change his mind and was agreeable to  staying for admission.  Patient was accepted to medicine for further management of his symptoms.  He had no further questions at time of admission.    Amount and/or Complexity of Data Reviewed  Labs: ordered.  Radiology: ordered and independent interpretation performed.     Details: CT head without any visualized intercranial bleed  Chest x-ray with hazy opacities to the right lower lobe  ECG/medicine tests: ordered and independent interpretation performed.     Details: EKG sinus rhythm with first-degree AV block at 74 bpm without any acute ST elevation or depression.  No T wave inversions.  Normal axis.  QT/QTc 398/441         ED Course as of 11/18/24 2032 Mon Nov 18, 2024 1955 Patient initially wanted to leave A, however after his IV was removed and patient stood up he said he felt lightheaded and went agreed that he did not want to be discharged home and was agreeable to admission at this time. [AW]      ED Course User Index  [AW] Dhara Campoverde PA-C         Diagnoses as of 11/18/24 2032   Fall, initial encounter   UTI (urinary tract infection), uncomplicated   Community acquired pneumonia of right lung, unspecified part of lung   Elevated CK           Procedures    ESTER Haynes, PA-C     Dhara Campoverde PA-C  11/18/24 2032

## 2024-11-18 NOTE — ED TRIAGE NOTES
Pt to ED after falling yesterday at his home around 5 pm, pt states he couldn't get up. States his cat knocked a carton onto his head, causing small abrasions above bilateral eyebrows. -thinners, -LOC, c/o chronic back pain.

## 2024-11-19 ENCOUNTER — HOME HEALTH ADMISSION (OUTPATIENT)
Dept: HOME HEALTH SERVICES | Facility: HOME HEALTH | Age: 69
End: 2024-11-19
Payer: MEDICARE

## 2024-11-19 ENCOUNTER — PHARMACY VISIT (OUTPATIENT)
Dept: PHARMACY | Facility: CLINIC | Age: 69
End: 2024-11-19
Payer: MEDICARE

## 2024-11-19 ENCOUNTER — TELEPHONE (OUTPATIENT)
Dept: SLEEP MEDICINE | Facility: CLINIC | Age: 69
End: 2024-11-19
Payer: MEDICARE

## 2024-11-19 VITALS
HEIGHT: 72 IN | DIASTOLIC BLOOD PRESSURE: 77 MMHG | WEIGHT: 250 LBS | RESPIRATION RATE: 18 BRPM | TEMPERATURE: 97.2 F | SYSTOLIC BLOOD PRESSURE: 128 MMHG | HEART RATE: 68 BPM | BODY MASS INDEX: 33.86 KG/M2 | OXYGEN SATURATION: 99 %
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
## 2024-11-19 LAB
ALBUMIN SERPL BCP-MCNC: 3.7 G/DL (ref 3.4–5)
ANION GAP SERPL CALC-SCNC: 14 MMOL/L (ref 10–20)
BUN SERPL-MCNC: 27 MG/DL (ref 6–23)
CALCIUM SERPL-MCNC: 8.8 MG/DL (ref 8.6–10.3)
CHLORIDE SERPL-SCNC: 100 MMOL/L (ref 98–107)
CK SERPL-CCNC: 519 U/L (ref 0–325)
CO2 SERPL-SCNC: 26 MMOL/L (ref 21–32)
CREAT SERPL-MCNC: 1.28 MG/DL (ref 0.5–1.3)
EGFRCR SERPLBLD CKD-EPI 2021: 60 ML/MIN/1.73M*2
ERYTHROCYTE [DISTWIDTH] IN BLOOD BY AUTOMATED COUNT: 18.2 % (ref 11.5–14.5)
GLUCOSE SERPL-MCNC: 100 MG/DL (ref 74–99)
HCT VFR BLD AUTO: 42.6 % (ref 41–52)
HGB BLD-MCNC: 12.9 G/DL (ref 13.5–17.5)
HOLD SPECIMEN: NORMAL
MAGNESIUM SERPL-MCNC: 2.3 MG/DL (ref 1.6–2.4)
MCH RBC QN AUTO: 29.3 PG (ref 26–34)
MCHC RBC AUTO-ENTMCNC: 30.3 G/DL (ref 32–36)
MCV RBC AUTO: 97 FL (ref 80–100)
NRBC BLD-RTO: 0 /100 WBCS (ref 0–0)
PHOSPHATE SERPL-MCNC: 4.2 MG/DL (ref 2.5–4.9)
PLATELET # BLD AUTO: 184 X10*3/UL (ref 150–450)
POTASSIUM SERPL-SCNC: 3.7 MMOL/L (ref 3.5–5.3)
RBC # BLD AUTO: 4.4 X10*6/UL (ref 4.5–5.9)
SODIUM SERPL-SCNC: 136 MMOL/L (ref 136–145)
WBC # BLD AUTO: 8.9 X10*3/UL (ref 4.4–11.3)

## 2024-11-19 PROCEDURE — G0378 HOSPITAL OBSERVATION PER HR: HCPCS

## 2024-11-19 PROCEDURE — 2500000005 HC RX 250 GENERAL PHARMACY W/O HCPCS: Performed by: PHYSICIAN ASSISTANT

## 2024-11-19 PROCEDURE — 36415 COLL VENOUS BLD VENIPUNCTURE: CPT

## 2024-11-19 PROCEDURE — 2500000002 HC RX 250 W HCPCS SELF ADMINISTERED DRUGS (ALT 637 FOR MEDICARE OP, ALT 636 FOR OP/ED)

## 2024-11-19 PROCEDURE — 99236 HOSP IP/OBS SAME DATE HI 85: CPT | Performed by: STUDENT IN AN ORGANIZED HEALTH CARE EDUCATION/TRAINING PROGRAM

## 2024-11-19 PROCEDURE — 2500000001 HC RX 250 WO HCPCS SELF ADMINISTERED DRUGS (ALT 637 FOR MEDICARE OP)

## 2024-11-19 PROCEDURE — 97530 THERAPEUTIC ACTIVITIES: CPT | Mod: GP

## 2024-11-19 PROCEDURE — 83735 ASSAY OF MAGNESIUM: CPT

## 2024-11-19 PROCEDURE — 97161 PT EVAL LOW COMPLEX 20 MIN: CPT | Mod: GP

## 2024-11-19 PROCEDURE — 82550 ASSAY OF CK (CPK): CPT

## 2024-11-19 PROCEDURE — 2500000002 HC RX 250 W HCPCS SELF ADMINISTERED DRUGS (ALT 637 FOR MEDICARE OP, ALT 636 FOR OP/ED): Performed by: INTERNAL MEDICINE

## 2024-11-19 PROCEDURE — 80069 RENAL FUNCTION PANEL: CPT

## 2024-11-19 PROCEDURE — 94640 AIRWAY INHALATION TREATMENT: CPT

## 2024-11-19 PROCEDURE — RXMED WILLOW AMBULATORY MEDICATION CHARGE

## 2024-11-19 PROCEDURE — 2500000004 HC RX 250 GENERAL PHARMACY W/ HCPCS (ALT 636 FOR OP/ED)

## 2024-11-19 PROCEDURE — 85027 COMPLETE CBC AUTOMATED: CPT

## 2024-11-19 RX ORDER — WATER
500 LIQUID (ML) MISCELLANEOUS
Status: DISCONTINUED | OUTPATIENT
Start: 2024-11-19 | End: 2024-11-19 | Stop reason: HOSPADM

## 2024-11-19 RX ORDER — CEFDINIR 300 MG/1
300 CAPSULE ORAL 2 TIMES DAILY
Qty: 6 CAPSULE | Refills: 0 | Status: SHIPPED | OUTPATIENT
Start: 2024-11-19

## 2024-11-19 RX ORDER — CEFTRIAXONE 2 G/50ML
2 INJECTION, SOLUTION INTRAVENOUS EVERY 24 HOURS
Status: DISCONTINUED | OUTPATIENT
Start: 2024-11-20 | End: 2024-11-19

## 2024-11-19 RX ORDER — POTASSIUM CHLORIDE 20 MEQ/1
40 TABLET, EXTENDED RELEASE ORAL ONCE
Status: COMPLETED | OUTPATIENT
Start: 2024-11-19 | End: 2024-11-19

## 2024-11-19 RX ORDER — CEFTRIAXONE 1 G/50ML
1 INJECTION, SOLUTION INTRAVENOUS EVERY 24 HOURS
Status: DISCONTINUED | OUTPATIENT
Start: 2024-11-19 | End: 2024-11-19 | Stop reason: HOSPADM

## 2024-11-19 ASSESSMENT — PAIN SCALES - GENERAL
PAINLEVEL_OUTOF10: 0 - NO PAIN

## 2024-11-19 ASSESSMENT — PAIN - FUNCTIONAL ASSESSMENT
PAIN_FUNCTIONAL_ASSESSMENT: 0-10
PAIN_FUNCTIONAL_ASSESSMENT: 0-10

## 2024-11-19 ASSESSMENT — COGNITIVE AND FUNCTIONAL STATUS - GENERAL
DAILY ACTIVITIY SCORE: 24
WALKING IN HOSPITAL ROOM: A LITTLE
MOVING TO AND FROM BED TO CHAIR: A LITTLE
CLIMB 3 TO 5 STEPS WITH RAILING: A LITTLE
STANDING UP FROM CHAIR USING ARMS: A LITTLE
MOBILITY SCORE: 14
MOBILITY SCORE: 20
CLIMB 3 TO 5 STEPS WITH RAILING: A LOT
MOVING TO AND FROM BED TO CHAIR: A LITTLE
STANDING UP FROM CHAIR USING ARMS: A LITTLE
WALKING IN HOSPITAL ROOM: A LOT
TURNING FROM BACK TO SIDE WHILE IN FLAT BAD: A LOT
MOVING FROM LYING ON BACK TO SITTING ON SIDE OF FLAT BED WITH BEDRAILS: A LOT

## 2024-11-19 ASSESSMENT — ACTIVITIES OF DAILY LIVING (ADL): LACK_OF_TRANSPORTATION: NO

## 2024-11-19 NOTE — PROGRESS NOTES
Physical Therapy    Physical Therapy Evaluation    Patient Name: Angus Redman  MRN: [id number]  Today's Date: 11/19/2024   Time Calculation  Start Time: 1018  Stop Time: 1034  Time Calculation (min): 16 min  3117/3117-A    Assessment/Plan   PT Assessment: Pt demonstrates decreased strength, decreased endurance, decreased safety awareness, increased risk of falls, and impaired balance/mobility.  Pt appears below baseline level of function and based on current level of function, pt would benefit from continued skilled therapy while in the hospital to ensure safety, decrease risk of falls, and regain strength/mobility back to baseline.  Once stable enough for discharge, pt would benefit from moderate intensity therapy prior to returning home.     PT Assessment Results: Decreased strength, Decreased endurance, Decreased range of motion, Impaired balance, Decreased mobility, Impaired judgement, Decreased safety awareness  Rehab Prognosis: Good  Evaluation/Treatment Tolerance: Patient tolerated treatment well  Medical Staff Made Aware: Yes  End of Session Communication: Bedside nurse  End of Session Patient Position: Up in chair, Alarm on  IP OR SWING BED PT PLAN  Inpatient or Swing Bed: Inpatient  PT Plan  Treatment/Interventions: Bed mobility, Transfer training, Gait training, Stair training, Balance training, Neuromuscular re-education, Strengthening, Endurance training, Range of motion, Therapeutic exercise, Therapeutic activity, Home exercise program  PT Plan: Ongoing PT  PT Frequency: 3 times per week  PT Discharge Recommendations: Moderate intensity level of continued care  Equipment Recommended upon Discharge: Wheeled walker  PT Recommended Transfer Status: Assist x1  PT - OK to Discharge: Yes - To next level of care when cleared by medical team    Subjective     Current Problem:  1. Fall, initial encounter  Referral to Primary Care    Referral to Primary Care - Family Practice      2. UTI (urinary tract  infection), uncomplicated  cefdinir (Omnicef) 300 mg capsule    DISCONTINUED: amoxicillin-pot clavulanate (Augmentin) 875-125 mg tablet      3. Community acquired pneumonia of right lung, unspecified part of lung  DISCONTINUED: amoxicillin-pot clavulanate (Augmentin) 875-125 mg tablet    DISCONTINUED: azithromycin (Zithromax) 250 mg tablet      4. Elevated CK        5. Pressure ulcer of left ankle, unstageable (Multi)  Referral to Home Health    CANCELED: Referral to Home Health          Past Medical History:  Patient Active Problem List   Diagnosis    Abnormality of gait and mobility    Adenomatous polyp of colon    Alcoholic gastritis    Wears glasses    Vitamin D deficiency    Folic acid deficiency    Vitamin B12 deficiency    Use of cane as ambulatory aid    Urinary urgency    Tubular adenoma of colon    Skin cancer    Scalp hematoma    Sacroiliac pain    Reactive airway disease (HHS-HCC)    Postural dizziness    Peripheral neuropathy    Paroxysmal atrial fibrillation (Multi)    Osteopenia    Osteoarthritis    Orthostatic hypotension    Noncompliance with therapeutic plan    Male erectile disorder of organic origin    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)    Lumbar radiculopathy    Shoulder pain    Left elbow pain    Iron deficiency    Hypoxemia    Hypothyroidism    Hypokalemia    Hyperlipidemia    History of paroxysmal supraventricular tachycardia    Gastroesophageal reflux disease    Falling    Eczema    Early cataracts, bilateral    Situational depression    Depression, controlled    Cubital tunnel syndrome on left    Continuous chronic alcoholism (Multi)    Congenital pes planus    Colon polyps    Chronic pain of right knee    Pain in joints of both feet    Neuropathic pain of both feet    Left foot pain    Chronic hip pain after total replacement of right hip joint    Cellulitis    Trigger ring finger of left hand    Carpal tunnel syndrome    Benign essential hypertension    BPH with  obstruction/lower urinary tract symptoms    Anxiety    Macrocytic anemia    Anemia    Amputation of toe of right foot (CMS-[facility])    ALT (SGPT) level raised    Allergic rhinitis    Back pain    History of left hip replacement    Elevated bilirubin    Elevated prostate specific antigen (PSA)    Frequent falls    History of medication noncompliance    Alcohol abuse with intoxication    Restrictive lung disease    Nocturnal hypoxemia    Peripheral vascular disease, unspecified (CMS-[facility])    Asthmatic breathing    Mild intermittent asthma    Fall    NSTEMI (non-ST elevated myocardial infarction) (Multi)    Traumatic rhabdomyolysis (CMS-[facility])    DNR (do not resuscitate)    Elevated glucose    Alcohol-induced polyneuropathy (Multi)    Stage 3a chronic kidney disease (Multi)    Heart failure    Pressure ulcer of left ankle, unstageable (Multi)       General Visit Information:  Per EMR: pt presents to the ED secondary to mechanical fall.  He normally wears oxygen at baseline and there is the tubing connected to the machine was not functioning properly.  He states that around 5 PM yesterday, he went upstairs to use the bathroom despite knowing his oxygen was not working properly and started to experience dyspnea on exertion.  He began to experience severe dyspnea that resulted in him falling; however, denied any loss of consciousness or head trauma.  He denied any lightheadedness or dizziness, sensations of warmth or other prodromal symptoms prior to the fall.  He also denied any postictal symptoms and is able to recall all the events.  He states he fell on his back and the entire time he was unable to turn over.  He states at 1 point his cat knocked over a carton of adult diapers while he was on the ground and landed on his head which caused lacerations of his forehead.  He denied any loss of consciousness after the carton hit his head. He was then found the following morning when his wound care nurse came to change his LE  dressings. His last alcoholic beverage was yesterday afternoon.  He denies any history of alcohol withdrawal or seizures.  He denies any fevers, chills, chest pain, shortness of breath, nausea, vomiting, diarrhea, constipation, or urinary symptoms including hematuria, dysuria or increased frequency/urgency.     On arrival, pt supine in bed.  Pt in no apparent distress and agreeable to therapy.    General  Reason for Referral: impaired mobility  Referred By: Braden No  Prior to Session Communication: Bedside nurse, Physician  Patient Position Received: Bed, 3 rail up, Alarm on    Home Living/PLOF:  Pt lives alone (with cats) in multi level home. Pt states there are 3 MELISSA to kitchen (main level). No bathroom on main level. Pt has full bath on basement level and full bath on upper level. 7 steps down to basement level. Has WIS with Gbs in basement but states he has been only sponge bathing 2/2 to wounds. Pt's bedroom is on upper level with 2+6+5 steps up with landings in between and B rails. Pt ambulates using rollator and states that stair negotiation has become difficult. Pt has wound nurse that visits 2x/wk. He wears 4 LNC at baseline.  Pt uses [employer] for groceries and states they bring the groceries inside for him.  Pt denies any recent falls when asked.    Precautions:  Precautions  Medical Precautions: Fall precautions, Oxygen therapy device and L/min (4LNC)    Vital Signs:  Vital Signs  SpO2:  (94% on 4LNC at rest; 90% on 4LNC after bed to chair transfer)  Objective     Pain:  Pain Assessment  Pain Assessment: 0-10  0-10 (Numeric) Pain Score: 0 - No pain    Cognition:  Cognition  Overall Cognitive Status: Within Functional Limits  Orientation Level: Oriented X4    General Assessments:      Activity Tolerance  Endurance: Tolerates 10 - 20 min exercise with multiple rests  Sensation  Sensation Comment: pt reports neuropathy in B hands and B feet    Static Sitting Balance  Static Sitting-Comment/Number  of Minutes: fair plus  Dynamic Sitting Balance  Dynamic Sitting-Comments: fair  Static Standing Balance  Static Standing-Comment/Number of Minutes: fair  Dynamic Standing Balance  Dynamic Standing-Comments: fair minus    Extremity/Trunk Assessments:  BLE strength: at least 3/5 grossly     Functional Mobility:  Bed mobility  Supine to sit: Mod A x1    Transfers  Sit to stand: Min A x1; Vcs for hand placement with poor follow through; pt pulls up on FWW; bed slightly elevated   Stand to sit: Min A x1; Vcs for hand placement     Ambulation/Stairs  Pt ambulated 4 ft from bed to chair with Min A x1 and FWW; Vcs for safety and guidance    Outcome Measures:  [facility] Basic Mobility  Turning from your back to your side while in a flat bed without using bedrails: A lot  Moving from lying on your back to sitting on the side of a flat bed without using bedrails: A lot  Moving to and from bed to chair (including a wheelchair): A little  Standing up from a chair using your arms (e.g. wheelchair or bedside chair): A little  To walk in hospital room: A lot  Climbing 3-5 steps with railing: A lot  Basic Mobility - Total Score: 14    Goals:  Encounter Problems       Encounter Problems (Active)       PT Problem       Pt will be able to perform all bed mobility tasks with Mod I.  (Progressing)       Start:  11/19/24    Expected End:  12/03/24            Pt will perform all transfers with Mod I and FWW with proper safety mechanics.   (Progressing)       Start:  11/19/24    Expected End:  12/03/24            Pt will ambulate 50 ft with Mod I using FWW for improved functional independence.  (Progressing)       Start:  11/19/24    Expected End:  12/03/24            Pt will be able to negotiate 12 steps with 1 HR with SBA.  (Progressing)       Start:  11/19/24    Expected End:  12/03/24            Pt will be able to ambulate at least 50 ft with < or equal to 1 rest break while maintaining SpO2 > 90%.  (Progressing)       Start:  11/19/24     Expected End:  12/03/24                 Education Documentation  Precautions, taught by Yvette Phelps, PT at 11/19/2024  1:17 PM.  Learner: Patient  Readiness: Acceptance  Method: Explanation  Response: Verbalizes Understanding, Needs Reinforcement    Body Mechanics, taught by Yvette Phelps, PT at 11/19/2024  1:17 PM.  Learner: Patient  Readiness: Acceptance  Method: Explanation  Response: Verbalizes Understanding, Needs Reinforcement    Home Exercise Program, taught by Yvette Phelps, PT at 11/19/2024  1:17 PM.  Learner: Patient  Readiness: Acceptance  Method: Explanation  Response: Verbalizes Understanding, Needs Reinforcement    Mobility Training, taught by Yvette Phelps, PT at 11/19/2024  1:17 PM.  Learner: Patient  Readiness: Acceptance  Method: Explanation  Response: Verbalizes Understanding, Needs Reinforcement    Education Comments  No comments found.

## 2024-11-19 NOTE — DISCHARGE SUMMARY
Discharge Diagnosis  Fall, initial encounter    Issues Requiring Follow-Up  Follow up PCP    Discharge Meds     Medication List      START taking these medications     cefdinir 300 mg capsule; Commonly known as: Omnicef; Take 1 capsule (300   mg) by mouth 2 times a day.     CONTINUE taking these medications     * albuterol 90 mcg/actuation inhaler; Commonly known as: ProAir HFA;   Inhale 2 puffs every 6 hours if needed for wheezing.   * albuterol 2.5 mg /3 mL (0.083 %) nebulizer solution; Take 3 mL (2.5   mg) by nebulization every 6 hours if needed for wheezing.   alendronate 70 mg tablet; Commonly known as: Fosamax; Take 1 tablet by   mouth every 7 days. Take in the morning with a full glass of water at   least 30 minutes before first food, drink, or medications of the day.   amLODIPine 2.5 mg tablet; Commonly known as: Norvasc; Take 1 tablet (2.5   mg) by mouth once daily. For blood pressure   aspirin 81 mg chewable tablet; Chew and swallow 1 tablet by mouth daily.   atorvastatin 40 mg tablet; Commonly known as: Lipitor; Take 1 tablet (40   mg) by mouth once daily.   busPIRone 5 mg tablet; Commonly known as: Buspar; Take 1 tablet by mouth   three times daily as needed for anxiety.   cyanocobalamin 1,000 mcg tablet; Commonly known as: Vitamin B-12; Take 1   tablet (1,000 mcg) by mouth once daily. As directed   DULoxetine 60 mg DR capsule; Commonly known as: Cymbalta; Take 1 capsule   by mouth twice daily.   empagliflozin 10 mg; Commonly known as: Jardiance; Take 1 tablet (10 mg)   by mouth once daily.   ergocalciferol 1.25 MG (64739 UT) capsule; Commonly known as: Vitamin   D-2; Take 1 capsule (1,250 mcg) by mouth 1 (one) time per week.   escitalopram 20 mg tablet; Commonly known as: Lexapro; Take 1 tablet (20   mg) by mouth once daily.   FeroSuL tablet; Generic drug: ferrous sulfate (325 mg ferrous sulfate);   Take 1 tablet by mouth once daily with breakfast.   fexofenadine 180 mg tablet; Commonly known as:  Allegra   folic acid 1 mg tablet; Commonly known as: Folvite; TAKE ONE TABLET BY   MOUTH EVERY DAY for folate deficiency   ipratropium 0.02 % nebulizer solution; Commonly known as: Atrovent; Take   2.5 mL (0.5 mg) by nebulization 4 times a day.   levothyroxine 50 mcg tablet; Commonly known as: Synthroid, Levoxyl; Take   1 tablet (50 mcg) by mouth once daily in the morning. Take before meals.   mirtazapine 15 mg tablet; Commonly known as: Remeron; Take 1 tablet (15   mg) by mouth once daily at bedtime.   montelukast 10 mg tablet; Commonly known as: Singulair; Take 1 tablet by   mouth once daily at bedtime.   pantoprazole 40 mg EC tablet; Commonly known as: ProtoNix; Take 1 tablet   (40 mg) by mouth 2 times a day.   pregabalin 150 mg capsule; Commonly known as: Lyrica; Take 1 capsule   (150 mg) by mouth every 6 hours.   propranolol 10 mg tablet; Commonly known as: Inderal; Take 1 tablet (10   mg) by mouth 2 times a day.   spironolactone 25 mg tablet; Commonly known as: Aldactone; Take 1 tablet   (25 mg) by mouth once daily.   tamsulosin 0.4 mg 24 hr capsule; Commonly known as: Flomax; Take 1   capsule (0.4 mg) by mouth once daily at bedtime.   torsemide 20 mg tablet; Commonly known as: Demadex; Take 1 tablet (20   mg) by mouth once daily.  * This list has 2 medication(s) that are the same as other medications   prescribed for you. Read the directions carefully, and ask your doctor or   other care provider to review them with you.       Test Results Pending At Discharge  Pending Labs       Order Current Status    Urine Culture In process            Hospital Course     Expand All Collapse All    Internal Medicine    Admission H&P       Patient Angus MILLER Feroz PCP Artie Haskins MD    MRN [id number]  Admission Date 11/18/2024       Chief Complaint/Reason for Admission:  Angus is a 69 y.o. male who presented today with  fall        Subjective  Subjective   History of Presenting Illness  69-year-old male with past medical  history of recurrent syncope secondary to orthostatic hypotension/autonomic dysfunction/autonomic neuropathy, restrictive lung disease on 4 to 5 L nasal cannula at baseline, PSVT on propranolol, CAD w/ CCTA (02/2024) - 50% stenosis of LAD - 25-50% stenosis of RCA, alcohol use disorder c/b severe alcoholic polyneuropathy with autonomic dysfunction who presents to the ED secondary to mechanical fall.  He normally wears oxygen at baseline and there is the tubing connected to the machine was not functioning properly.  He states that around 5 PM yesterday, he went upstairs to use the bathroom despite knowing his oxygen was not working properly and started to experience dyspnea on exertion.  He began to experience severe dyspnea that resulted in him falling; however, denied any loss of consciousness or head trauma.  He denied any lightheadedness or dizziness, sensations of warmth or other prodromal symptoms prior to the fall.  He also denied any postictal symptoms and is able to recall all the events.  He states he fell on his back and the entire time he was unable to turn over.  He states at 1 point his cat knocked over a carton of adult diapers while he was on the ground and landed on his head which caused lacerations of his forehead.  He denied any loss of consciousness after the carton hit his head. He was then found the following morning when his wound care nurse came to change his LE dressings. His last alcoholic beverage was yesterday afternoon.  He denies any history of alcohol withdrawal or seizures.  He denies any fevers, chills, chest pain, shortness of breath, nausea, vomiting, diarrhea, constipation, or urinary symptoms including hematuria, dysuria or increased frequency/urgency.     ED course:  V/S: 97.7 °F, 84 HR, 20 RR, 157/95, 98% RA  Labs: CBC grossly unremarkable.  CMP showed CR 1.43 (baseline 1.2-1.3), AST elevated 46, .  UA significant for +1 proteinuria, negative nitrite, positive leukocyte  esterase, +3 bacteria,  >50 WBC  EKG: Sinus rhythm with vent rate 74 bpm, QTc 441  Imaging: CT head/C-spine/T-spine/L-spine's/facial bones showed no acute findings.  CXR showed extensive chronic pleural plaque formation with no acute findings.  Intervention: Given IV Rocephin for UTI, azithromycin for pneumonia       Hospital course: Patient was admitted to the hospital after suffering a fall.  Patient has history of recurrent falls secondary to orthostatic hypotension.  Patient in this case had issues with his oxygen tubing at home.  This led to shortness of breath dizziness lightheadedness and a fall.  He did not have any injury seen on CT of his head C-spine T-spine or L-spine or facial bones.  Patient had fairly unremarkable lab work other than elevated CK.  This did downtrend with encouragement of oral hydration.  Patient also had urinalysis consistent with UTI.  He worked with physical therapy and Occupational Therapy and skilled nursing facility was recommended however patient was adamant about not going to 1 and returning home.  He states that he has been to 1 before possibly multiple times and did not feel it benefited him much.  Despite having multiple and recurrent falls patient was adamant about returning home.  He was discharged home with home health care that was already ongoing however he did not want addition of physical therapy and Occupational Therapy but just wound care nurse who was taking care of his wounds on his feet.  He was discharged home with home health care resumed in stable condition.  He was discharged on cefdinir to cover his urinary tract infection.    Discharge time greater than 35 minutes. Greater than 50% of time spent on counseling patient, coordination of care, medication reconciliation, transmitting medications to pharmacy and clarifying plan of care with nursing staff and case management.      Pertinent Physical Exam At Time of Discharge  Physical Exam  Constitutional:        General: He is not in acute distress.     Appearance: Normal appearance.   HENT:      Head: Normocephalic.      Comments: Hematoma above bilateral eyebrows     Mouth/Throat:      Mouth: Mucous membranes are moist.   Eyes:      Extraocular Movements: Extraocular movements intact.      Conjunctiva/sclera: Conjunctivae normal.   Cardiovascular:      Rate and Rhythm: Normal rate and regular rhythm.      Heart sounds: Normal heart sounds.   Pulmonary:      Effort: Pulmonary effort is normal.      Breath sounds: Normal breath sounds. No wheezing, rhonchi or rales.   Abdominal:      General: Abdomen is flat. Bowel sounds are normal.      Palpations: Abdomen is soft.   Musculoskeletal:         General: No swelling or tenderness. Normal range of motion.      Cervical back: Normal range of motion and neck supple.   Skin:     General: Skin is warm and dry.      Findings: No rash.   Neurological:      General: No focal deficit present.      Mental Status: He is alert and oriented to person, place, and time.      Cranial Nerves: No cranial nerve deficit.      Sensory: No sensory deficit.      Motor: Weakness (generalized) present.   Psychiatric:         Mood and Affect: Mood normal.         Behavior: Behavior normal.         Outpatient Follow-Up  Future Appointments   Date Time Provider Department Center   11/20/2024 10:45 AM Boone Bull DPM [id number] [place]   12/2/2024 10:00 AM [facility] RESPTHER1 PFT ROOM [address]   12/2/2024 10:30 AM [facility]1 PFT ROOM [address]   12/2/2024 11:30 AM [facility] RESP[facility]1 PFT ROOM [address]   12/3/2024  1:40 PM Angel Harry DO [id number] [place]   12/9/2024  2:20 PM Quita Younger APRN-CNP [id number] [facility]   1/29/2025  1:30 PM Artie Haskins MD [id number] [place]   3/26/2025  1:30 PM MD CHYNA [id number] [place]   4/8/2025  3:40 PM Angel Harry DO [id number] [place]         Braden No MD

## 2024-11-19 NOTE — PROGRESS NOTES
Spiritual Care Visit    Clinical Encounter Type  Visited With: Patient  Routine Visit: Introduction              Sacramental Encounters  Sacrament of Sick-Anointing: Anointed                             Taxonomy  Intended Effects: Build relationship of care and support, Convey a calming presence, Preserve dignity and respect, Establish rapport and connectedness  Methods: Offer spiritual/Jewish support, Assist with spiritual/Jewish practices  Interventions: Active listening, Ask guided questions, Perform a Jewish rite or ritual, Share words of hope and inspiration

## 2024-11-19 NOTE — H&P
Internal Medicine    Admission H&P      Patient Angus Redman PCP Artie Haskins MD    MRN [id number]  Admission Date 11/18/2024      Chief Complaint/Reason for Admission:  Angus is a 69 y.o. male who presented today with  fall    Subjective    Subjective   History of Presenting Illness  69-year-old male with past medical history of recurrent syncope secondary to orthostatic hypotension/autonomic dysfunction/autonomic neuropathy, restrictive lung disease on 4 to 5 L nasal cannula at baseline, PSVT on propranolol, CAD w/ CCTA (02/2024) - 50% stenosis of LAD - 25-50% stenosis of RCA, alcohol use disorder c/b severe alcoholic polyneuropathy with autonomic dysfunction who presents to the ED secondary to mechanical fall.  He normally wears oxygen at baseline and there is the tubing connected to the machine was not functioning properly.  He states that around 5 PM yesterday, he went upstairs to use the bathroom despite knowing his oxygen was not working properly and started to experience dyspnea on exertion.  He began to experience severe dyspnea that resulted in him falling; however, denied any loss of consciousness or head trauma.  He denied any lightheadedness or dizziness, sensations of warmth or other prodromal symptoms prior to the fall.  He also denied any postictal symptoms and is able to recall all the events.  He states he fell on his back and the entire time he was unable to turn over.  He states at 1 point his cat knocked over a carton of adult diapers while he was on the ground and landed on his head which caused lacerations of his forehead.  He denied any loss of consciousness after the carton hit his head. He was then found the following morning when his wound care nurse came to change his LE dressings. His last alcoholic beverage was yesterday afternoon.  He denies any history of alcohol withdrawal or seizures.  He denies any fevers, chills, chest pain, shortness of breath, nausea, vomiting, diarrhea,  constipation, or urinary symptoms including hematuria, dysuria or increased frequency/urgency.    ED course:  V/S: 97.7 °F, 84 HR, 20 RR, 157/95, 98% RA  Labs: CBC grossly unremarkable.  CMP showed CR 1.43 (baseline 1.2-1.3), AST elevated 46, .  UA significant for +1 proteinuria, negative nitrite, positive leukocyte esterase, +3 bacteria,  >50 WBC  EKG: Sinus rhythm with vent rate 74 bpm, QTc 441  Imaging: CT head/C-spine/T-spine/L-spine's/facial bones showed no acute findings.  CXR showed extensive chronic pleural plaque formation with no acute findings.  Intervention: Given IV Rocephin for UTI, azithromycin for pneumonia    Past Medical History  -Card: recurrent syncope secondary to orthostatic hypotension/autonomic dysfunction/autonomic neuropathy, pSVT on propranolol, Type 2 MI 2023/CAD w/ CCTA 2/2024 - 50% stenosis of LAD/25%-50% at RCA,  primary HTN  -Pulm: Restrictive lung disease on PFTs follows with pulmonology possibly due to right pleural thickening seen on CT imaging (not a CT surgery candidate) with 30-pack-year smoking history on 2 to 3 L of O2 at baseline  -Neuro: alcohol use disorder daily bottle of wine, severe alcoholic polyneuropathy with autonomic dysfunction  -ID: Chronic nonhealing MRSA positive b/l foot infections on Bactrim (Dr. Jang), osteomyelitis s/p left great toe and right 2nd toe amputation  -GI: alcohol use disorder c/b alcoholic gastritis  -MSK: aseptic necrosis of the femoral head, gout  -Endo: hypothyroidism  -: BPH    Past Surgical History   S/p insertable loop recorder (2015)    Social History  Current tobacco use (30-pack-year history), current alcohol use (daily bottle of wine), denies illicit drug use    Family History  Reviewed and noncontributory to today's presentation unless otherwise noted.    Allergies:  As documented in EMR were reviewed and discussed with patient.      CODE STATUS: DNR        Home Medication:  Prior to Admission medications    Medication  Sig Start Date End Date Taking? Authorizing Provider   albuterol (ProAir HFA) 90 mcg/actuation inhaler Inhale 2 puffs every 6 hours if needed for wheezing. 6/24/24  Yes Kenton Montano MD   albuterol 2.5 mg /3 mL (0.083 %) nebulizer solution Take 3 mL (2.5 mg) by nebulization every 6 hours if needed for wheezing. 10/8/24 10/8/25 Yes Arite Haskins MD   amLODIPine (Norvasc) 2.5 mg tablet Take 1 tablet (2.5 mg) by mouth once daily. For blood pressure 9/12/24  Yes Artie Haskins MD   aspirin 81 mg chewable tablet Chew and swallow 1 tablet by mouth daily. 9/12/24  Yes Artie Haskins MD   atorvastatin (Lipitor) 40 mg tablet Take 1 tablet (40 mg) by mouth once daily. 7/24/24 7/24/25 Yes Artie Haskins MD   busPIRone (Buspar) 5 mg tablet Take 1 tablet by mouth three times daily as needed for anxiety. 9/12/24  Yes Artie Haskins MD   cyanocobalamin (Vitamin B-12) 1,000 mcg tablet Take 1 tablet (1,000 mcg) by mouth once daily. As directed 1/15/24  Yes Artie Haskins MD   DULoxetine (Cymbalta) 60 mg DR capsule Take 1 capsule by mouth twice daily. 9/27/24  Yes Artie Haskins MD   empagliflozin (Jardiance) 10 mg Take 1 tablet (10 mg) by mouth once daily. 10/14/24 10/14/25 Yes Angel Harry DO   escitalopram (Lexapro) 20 mg tablet Take 1 tablet (20 mg) by mouth once daily. 9/25/24  Yes Anjum Hu MD   ferrous sulfate, 325 mg ferrous sulfate, (FeroSuL) tablet Take 1 tablet by mouth once daily with breakfast. 9/12/24  Yes Artie Haskins MD   fexofenadine (Allegra) 180 mg tablet Take 1 tablet (180 mg) by mouth once daily as needed (as needed for allergies and congestion). 6/8/22  Yes Josee Tavarez MD   folic acid (Folvite) 1 mg tablet TAKE ONE TABLET BY MOUTH EVERY DAY for folate deficiency 7/9/24  Yes Artie Haskins MD   ipratropium (Atrovent) 0.02 % nebulizer solution Take 2.5 mL (0.5 mg) by nebulization 4 times a day. 10/8/24 10/8/25 Yes Artie Haskins MD   levothyroxine (Synthroid, Levoxyl) 50 mcg tablet Take 1 tablet (50 mcg) by  mouth once daily in the morning. Take before meals. 8/30/24  Yes Artie Haskins MD   mirtazapine (Remeron) 15 mg tablet Take 1 tablet (15 mg) by mouth once daily at bedtime. 7/24/24  Yes Artie Haskins MD   montelukast (Singulair) 10 mg tablet Take 1 tablet by mouth once daily at bedtime. 9/12/24  Yes Artie Haskins MD   pantoprazole (ProtoNix) 40 mg EC tablet Take 1 tablet (40 mg) by mouth 2 times a day. 8/16/24  Yes Artie Haskins MD   pregabalin (Lyrica) 150 mg capsule Take 1 capsule (150 mg) by mouth every 6 hours. 9/11/24  Yes Raheem Graham MD   propranolol (Inderal) 10 mg tablet Take 1 tablet (10 mg) by mouth 2 times a day. 8/30/24  Yes Artie Haskins MD   spironolactone (Aldactone) 25 mg tablet Take 1 tablet (25 mg) by mouth once daily. 10/14/24 10/14/25 Yes Angel Harry DO   tamsulosin (Flomax) 0.4 mg 24 hr capsule Take 1 capsule (0.4 mg) by mouth once daily at bedtime. 5/11/24  Yes Artie Haskins MD   torsemide (Demadex) 20 mg tablet Take 1 tablet (20 mg) by mouth once daily. 10/14/24 10/14/25 Yes Angel Harry DO   alendronate (Fosamax) 70 mg tablet Take 1 tablet by mouth every 7 days. Take in the morning with a full glass of water at least 30 minutes before first food, drink, or medications of the day. 9/12/24   Artie Haskins MD   amoxicillin-pot clavulanate (Augmentin) 875-125 mg tablet Take 1 tablet by mouth 2 times a day for 7 days. 11/18/24 11/25/24  Dhara Campoverde PA-C   azithromycin (Zithromax) 250 mg tablet Take 2 tablets (500 mg) by mouth once daily for 1 day, THEN 1 tablet (250 mg) once daily for 4 days. 11/18/24 11/23/24  Dhara Campoverde PA-C   ergocalciferol (Vitamin D-2) 1.25 MG (28697 UT) capsule Take 1 capsule (1,250 mcg) by mouth 1 (one) time per week. 10/8/24   Artie Haskins MD          Review of System:  Review of Systems  See HPI    Objective    Objective   Vital Signs:  Visit Vitals  /71 (BP Location: Right arm, Patient Position: Lying)   Pulse 59   Temp 36.5 °C (97.7  °F) (Temporal)   Resp 14   Ht 1.829 m (6')   Wt 113 kg (250 lb)   SpO2 98%   BMI 33.91 kg/m²   Smoking Status Former   BSA 2.4 m²        Physical Examination:  General: Patient does not appear to be in any acute distress, alert, awake  Head: lacerations above bilateral eyebrows on forehead  Eyes: Normal external exam, EOMI  Cardiovascular: RRR, S1/S2, no murmurs, rubs, or gallops, radial pulses +2  Pulmonary: CTAB, no respiratory distress. On 4-5L NC  Abdomen: soft, non-tender, nondistended, no guarding or rebound, no masses noted  Neuro: A&O x3, no focal deficits, strength and sensation intact bilaterally  Extremities: BLE covered in wrapping  Skin- Warm. Dry. No lesions, contusions, or erythema.  Psychiatric: Judgment intact. Appropriate mood and behavior      Laboratory Data:  Results for orders placed or performed during the hospital encounter of 11/18/24 (from the past 24 hours)   CBC   Result Value Ref Range    WBC 10.1 4.4 - 11.3 x10*3/uL    nRBC 0.0 0.0 - 0.0 /100 WBCs    RBC 4.92 4.50 - 5.90 x10*6/uL    Hemoglobin 14.1 13.5 - 17.5 g/dL    Hematocrit 46.9 41.0 - 52.0 %    MCV 95 80 - 100 fL    MCH 28.7 26.0 - 34.0 pg    MCHC 30.1 (L) 32.0 - 36.0 g/dL    RDW 18.4 (H) 11.5 - 14.5 %    Platelets 223 150 - 450 x10*3/uL   Comprehensive metabolic panel   Result Value Ref Range    Glucose 126 (H) 74 - 99 mg/dL    Sodium 140 136 - 145 mmol/L    Potassium 4.3 3.5 - 5.3 mmol/L    Chloride 100 98 - 107 mmol/L    Bicarbonate 29 21 - 32 mmol/L    Anion Gap 15 10 - 20 mmol/L    Urea Nitrogen 27 (H) 6 - 23 mg/dL    Creatinine 1.43 (H) 0.50 - 1.30 mg/dL    eGFR 52 (L) >60 mL/min/1.73m*2    Calcium 9.7 8.6 - 10.3 mg/dL    Albumin 4.4 3.4 - 5.0 g/dL    Alkaline Phosphatase 100 33 - 136 U/L    Total Protein 8.9 (H) 6.4 - 8.2 g/dL    AST 46 (H) 9 - 39 U/L    Bilirubin, Total 1.1 0.0 - 1.2 mg/dL    ALT 20 10 - 52 U/L   Creatine Kinase   Result Value Ref Range    Creatine Kinase 982 (H) 0 - 325 U/L   Troponin I, High Sensitivity    Result Value Ref Range    Troponin I, High Sensitivity 7 0 - 20 ng/L   Phosphorus   Result Value Ref Range    Phosphorus 3.3 2.5 - 4.9 mg/dL   Magnesium   Result Value Ref Range    Magnesium 2.33 1.60 - 2.40 mg/dL   B-type natriuretic peptide   Result Value Ref Range    BNP 41 0 - 99 pg/mL   Urinalysis with Reflex Culture and Microscopic   Result Value Ref Range    Color, Urine Yellow Light-Yellow, Yellow, Dark-Yellow    Appearance, Urine Turbid (N) Clear    Specific Gravity, Urine 1.018 1.005 - 1.035    pH, Urine 5.5 5.0, 5.5, 6.0, 6.5, 7.0, 7.5, 8.0    Protein, Urine 70 (1+) (A) NEGATIVE, 10 (TRACE), 20 (TRACE) mg/dL    Glucose, Urine 100 (1+) (A) Normal mg/dL    Blood, Urine 0.5 (2+) (A) NEGATIVE    Ketones, Urine TRACE (A) NEGATIVE mg/dL    Bilirubin, Urine NEGATIVE NEGATIVE    Urobilinogen, Urine Normal Normal mg/dL    Nitrite, Urine NEGATIVE NEGATIVE    Leukocyte Esterase, Urine 500 Mike/µL (A) NEGATIVE   Extra Urine Gray Tube   Result Value Ref Range    Extra Tube Hold for add-ons.    Microscopic Only, Urine   Result Value Ref Range    WBC, Urine >50 (A) 1-5, NONE /HPF    WBC Clumps, Urine OCCASIONAL Reference range not established. /HPF    RBC, Urine 6-10 (A) NONE, 1-2, 3-5 /HPF    Squamous Epithelial Cells, Urine 1-9 (SPARSE) Reference range not established. /HPF    Bacteria, Urine 3+ (A) NONE SEEN /HPF    Mucus, Urine 1+ Reference range not established. /LPF   Influenza A, and B PCR   Result Value Ref Range    Flu A Result Not Detected Not Detected    Flu B Result Not Detected Not Detected   Sars-CoV-2 PCR   Result Value Ref Range    Coronavirus 2019, PCR Not Detected Not Detected     *Note: Due to a large number of results and/or encounters for the requested time period, some results have not been displayed. A complete set of results can be found in Results Review.           Medications:  Scheduled medications  amLODIPine, 2.5 mg, oral, Daily  aspirin, 81 mg, oral, Daily  atorvastatin, 40 mg, oral,  Daily  cyanocobalamin, 1,000 mcg, oral, Daily  DULoxetine, 60 mg, oral, BID  empagliflozin, 10 mg, oral, Daily  escitalopram, 20 mg, oral, Daily  ferrous sulfate (325 mg ferrous sulfate), 1 tablet, oral, Daily with breakfast  folic acid, 1 mg, oral, Daily  heparin (porcine), 5,000 Units, subcutaneous, q8h  ipratropium, 0.5 mg, nebulization, 4x daily  levothyroxine, 50 mcg, oral, Daily  mirtazapine, 15 mg, oral, Nightly  montelukast, 10 mg, oral, Nightly  oxygen, , inhalation, Continuous - Inhalation  pantoprazole, 40 mg, oral, BID AC  pregabalin, 150 mg, oral, q6h  propranolol, 10 mg, oral, BID  spironolactone, 25 mg, oral, Daily  tamsulosin, 0.4 mg, oral, Nightly  torsemide, 20 mg, oral, Daily      Continuous medications     PRN medications  PRN medications: albuterol, busPIRone    Assessment    Assessment & Plan     Mr. Redman is a 69-year-old male who presents to the ED with fall and admitted for mechanical fall and concern for possible UTI.    #Mechanical Fall  #Hx of orthostatic hypotension/autonomic dysfunction  #Elevated Cr  Low suspicion for syncope or seizure at this time. Patient has a history of orthostasis with significant polyneuropathy likely contributing to his loss of balance. Patient likely became dyspenic on exertion from malfunctioning O2 tubing. Patient was down on the ground for numerous hours. CK elevated 982 but Cr 1.43 near baseline (1.2-1.3); low concern for rhabdomyolysis at this time  -Trend CK  -Will give oral hydration solution due to dehydration and poor oral intake after being found on the ground  -Hold Torsemide due to concerns for dehydration  -PT/OT    #UTI?  UA showed significant bacteruria and pyruria but denied any dysuria/hematuria/increased frequency or urgency; however, mentioned abnormal odor  -Given Rocephin in ED, continue Rocephin for 3 day course  -Hold Jardiance given concern for UTI    #Chronic B/L wounds  Follows at [facility] wound care clinic  -Wound care nurse  consulted      Diet: Cardiac  DVT prophylaxis: Heparin  Telemetry: Not indicated  Consults: None      Code Status: DNR       Dispo: Anticipate 1-2 midnights of admission pending PT/OT evaluation.    Case seen and discussed with attending  Flavio Casillas DO  PGY-3 Internal Medicine  This note has been transcribed using Dragon voice recognition system and there is a possibility of unintentional typing misprints.  Any information found to be copied from previous providers is done in the best interest of the patient to provide accurate, quality, and continuity of care.

## 2024-11-19 NOTE — PROGRESS NOTES
11/19/24 1130   Discharge Planning   Living Arrangements Alone   Support Systems Family members;Friends/neighbors;Home care staff   Assistance Needed [facility]und Care w [facility]idence [facility]   Type of Residence Private residence   Who is requesting discharge planning? Provider   Home or Post Acute Services In home services   Type of Home Care Services Home nursing visits   Expected Discharge Disposition Home   Does the patient need discharge transport arranged? Yes   RoundTrip coordination needed? Yes   Has discharge transport been arranged? No   Financial Resource Strain   How hard is it for you to pay for the very basics like food, housing, medical care, and heating? Not hard   Housing Stability   In the last 12 months, was there a time when you were not able to pay the mortgage or rent on time? N   In the past 12 months, how many times have you moved where you were living? 1   At any time in the past 12 months, were you homeless or living in a shelter (including now)? N   Transportation Needs   In the past 12 months, has lack of transportation kept you from medical appointments or from getting medications? no   In the past 12 months, has lack of transportation kept you from meetings, work, or from getting things needed for daily living? No     Spoke with pt explained TCC role in Care Transitions and verified address, phone number and emergency contact information. PCP is Dr Haskins last seen in October and preferred pharmacy is [facility], denies issues obtaining or affording medications and takes as ordered. Pt is independent with a rollator, lives at home alone and feels safe. Pt uses 4L oxygen 24/7 and it is provided by [facility]. He also has [facility] for wound care come to his home and he wants to stay with them. [facility] 24/17 pending PT cale. ADOD is 11/21. CT to follow. Yuki Londono BSN/RN-TCC

## 2024-11-19 NOTE — CARE PLAN
The patient's goals for the shift include      The clinical goals for the shift include Wound care

## 2024-11-19 NOTE — TELEPHONE ENCOUNTER
Received fax from [facility] - patient needs to come into office and repeat 6 min walk on room air.    Left detailed msg on patients VM to stop into the office to complete 6 minute walk.    Office number given.

## 2024-11-20 ENCOUNTER — PATIENT OUTREACH (OUTPATIENT)
Dept: PRIMARY CARE | Facility: CLINIC | Age: 69
End: 2024-11-20

## 2024-11-20 ENCOUNTER — APPOINTMENT (OUTPATIENT)
Dept: PRIMARY CARE | Facility: CLINIC | Age: 69
End: 2024-11-20
Payer: MEDICARE

## 2024-11-20 ENCOUNTER — OFFICE VISIT (OUTPATIENT)
Dept: WOUND CARE | Facility: CLINIC | Age: 69
End: 2024-11-20
Payer: MEDICARE

## 2024-11-20 DIAGNOSIS — L89.520: ICD-10-CM

## 2024-11-20 DIAGNOSIS — I50.9 ACUTE ON CHRONIC HEART FAILURE, UNSPECIFIED HEART FAILURE TYPE: ICD-10-CM

## 2024-11-20 LAB
ATRIAL RATE: 74 BPM
ATRIAL RATE: 80 BPM
P AXIS: 75 DEGREES
P AXIS: 87 DEGREES
P OFFSET: 142 MS
P OFFSET: 173 MS
P ONSET: 111 MS
P ONSET: 86 MS
PR INTERVAL: 206 MS
PR INTERVAL: 254 MS
Q ONSET: 213 MS
Q ONSET: 214 MS
QRS COUNT: 12 BEATS
QRS COUNT: 13 BEATS
QRS DURATION: 74 MS
QRS DURATION: 74 MS
QT INTERVAL: 392 MS
QT INTERVAL: 398 MS
QTC CALCULATION(BAZETT): 441 MS
QTC CALCULATION(BAZETT): 452 MS
QTC FREDERICIA: 427 MS
QTC FREDERICIA: 431 MS
R AXIS: -30 DEGREES
R AXIS: -60 DEGREES
T AXIS: 36 DEGREES
T AXIS: 64 DEGREES
T OFFSET: 410 MS
T OFFSET: 412 MS
VENTRICULAR RATE: 74 BPM
VENTRICULAR RATE: 80 BPM

## 2024-11-20 PROCEDURE — 99212 OFFICE O/P EST SF 10 MIN: CPT

## 2024-11-20 NOTE — PROGRESS NOTES
I called and spoke with the patient who stated that he is not doing well.  Patient was recently discharged on 11/19/2024 from [facility] for a UTI.  Symptoms reviewed.  Patient is needing to back to the hospital.  Patient will call squad.

## 2024-11-21 ENCOUNTER — PATIENT OUTREACH (OUTPATIENT)
Dept: PRIMARY CARE | Facility: CLINIC | Age: 69
End: 2024-11-21
Payer: MEDICARE

## 2024-11-21 ENCOUNTER — HOSPITAL ENCOUNTER (INPATIENT)
Facility: HOSPITAL | Age: 69
DRG: 464 | End: 2024-11-21
Attending: STUDENT IN AN ORGANIZED HEALTH CARE EDUCATION/TRAINING PROGRAM | Admitting: STUDENT IN AN ORGANIZED HEALTH CARE EDUCATION/TRAINING PROGRAM
Payer: MEDICARE

## 2024-11-21 ENCOUNTER — APPOINTMENT (OUTPATIENT)
Dept: CARDIOLOGY | Facility: HOSPITAL | Age: 69
DRG: 464 | End: 2024-11-21
Payer: MEDICARE

## 2024-11-21 ENCOUNTER — APPOINTMENT (OUTPATIENT)
Dept: RADIOLOGY | Facility: HOSPITAL | Age: 69
DRG: 464 | End: 2024-11-21
Payer: MEDICARE

## 2024-11-21 DIAGNOSIS — M86.9 OSTEOMYELITIS OF THIRD TOE OF RIGHT FOOT (MULTI): ICD-10-CM

## 2024-11-21 DIAGNOSIS — M79.604 PAIN IN RIGHT LEG: ICD-10-CM

## 2024-11-21 DIAGNOSIS — L08.9 FOOT INFECTION: Primary | ICD-10-CM

## 2024-11-21 LAB
ABO GROUP (TYPE) IN BLOOD: NORMAL
ALBUMIN SERPL BCP-MCNC: 4.2 G/DL (ref 3.4–5)
ALP SERPL-CCNC: 116 U/L (ref 33–136)
ALT SERPL W P-5'-P-CCNC: 31 U/L (ref 10–52)
ANION GAP SERPL CALC-SCNC: 15 MMOL/L (ref 10–20)
ANTIBODY SCREEN: NORMAL
AST SERPL W P-5'-P-CCNC: 30 U/L (ref 9–39)
BACTERIA UR CULT: ABNORMAL
BASOPHILS # BLD AUTO: 0.04 X10*3/UL (ref 0–0.1)
BASOPHILS NFR BLD AUTO: 0.4 %
BILIRUB SERPL-MCNC: 0.6 MG/DL (ref 0–1.2)
BUN SERPL-MCNC: 30 MG/DL (ref 6–23)
CALCIUM SERPL-MCNC: 9.1 MG/DL (ref 8.6–10.3)
CHLORIDE SERPL-SCNC: 100 MMOL/L (ref 98–107)
CO2 SERPL-SCNC: 27 MMOL/L (ref 21–32)
CREAT SERPL-MCNC: 1.62 MG/DL (ref 0.5–1.3)
CRP SERPL-MCNC: 2.49 MG/DL
EGFRCR SERPLBLD CKD-EPI 2021: 45 ML/MIN/1.73M*2
EOSINOPHIL # BLD AUTO: 0.28 X10*3/UL (ref 0–0.4)
EOSINOPHIL NFR BLD AUTO: 2.8 %
ERYTHROCYTE [DISTWIDTH] IN BLOOD BY AUTOMATED COUNT: 17.2 % (ref 11.5–14.5)
ERYTHROCYTE [SEDIMENTATION RATE] IN BLOOD BY WESTERGREN METHOD: 56 MM/H (ref 0–20)
GLUCOSE SERPL-MCNC: 101 MG/DL (ref 74–99)
HCT VFR BLD AUTO: 43.8 % (ref 41–52)
HGB BLD-MCNC: 13.5 G/DL (ref 13.5–17.5)
HOLD SPECIMEN: NORMAL
IMM GRANULOCYTES # BLD AUTO: 0.08 X10*3/UL (ref 0–0.5)
IMM GRANULOCYTES NFR BLD AUTO: 0.8 % (ref 0–0.9)
INR PPP: 1.1 (ref 0.9–1.1)
LACTATE SERPL-SCNC: 0.7 MMOL/L (ref 0.4–2)
LYMPHOCYTES # BLD AUTO: 1.86 X10*3/UL (ref 0.8–3)
LYMPHOCYTES NFR BLD AUTO: 18.8 %
MCH RBC QN AUTO: 29.2 PG (ref 26–34)
MCHC RBC AUTO-ENTMCNC: 30.8 G/DL (ref 32–36)
MCV RBC AUTO: 95 FL (ref 80–100)
MONOCYTES # BLD AUTO: 0.68 X10*3/UL (ref 0.05–0.8)
MONOCYTES NFR BLD AUTO: 6.9 %
NEUTROPHILS # BLD AUTO: 6.94 X10*3/UL (ref 1.6–5.5)
NEUTROPHILS NFR BLD AUTO: 70.3 %
NRBC BLD-RTO: 0 /100 WBCS (ref 0–0)
PLATELET # BLD AUTO: 251 X10*3/UL (ref 150–450)
POTASSIUM SERPL-SCNC: 4.5 MMOL/L (ref 3.5–5.3)
PROT SERPL-MCNC: 7.8 G/DL (ref 6.4–8.2)
PROTHROMBIN TIME: 12.3 SECONDS (ref 9.8–12.8)
RBC # BLD AUTO: 4.62 X10*6/UL (ref 4.5–5.9)
RH FACTOR (ANTIGEN D): NORMAL
SODIUM SERPL-SCNC: 137 MMOL/L (ref 136–145)
WBC # BLD AUTO: 9.9 X10*3/UL (ref 4.4–11.3)

## 2024-11-21 PROCEDURE — 2500000001 HC RX 250 WO HCPCS SELF ADMINISTERED DRUGS (ALT 637 FOR MEDICARE OP)

## 2024-11-21 PROCEDURE — 96374 THER/PROPH/DIAG INJ IV PUSH: CPT | Mod: 59

## 2024-11-21 PROCEDURE — 85025 COMPLETE CBC W/AUTO DIFF WBC: CPT

## 2024-11-21 PROCEDURE — 86140 C-REACTIVE PROTEIN: CPT

## 2024-11-21 PROCEDURE — 87040 BLOOD CULTURE FOR BACTERIA: CPT | Mod: STJLAB

## 2024-11-21 PROCEDURE — 2500000004 HC RX 250 GENERAL PHARMACY W/ HCPCS (ALT 636 FOR OP/ED)

## 2024-11-21 PROCEDURE — 93005 ELECTROCARDIOGRAM TRACING: CPT

## 2024-11-21 PROCEDURE — 1100000001 HC PRIVATE ROOM DAILY

## 2024-11-21 PROCEDURE — 85610 PROTHROMBIN TIME: CPT

## 2024-11-21 PROCEDURE — 36415 COLL VENOUS BLD VENIPUNCTURE: CPT

## 2024-11-21 PROCEDURE — 84075 ASSAY ALKALINE PHOSPHATASE: CPT

## 2024-11-21 PROCEDURE — 96365 THER/PROPH/DIAG IV INF INIT: CPT

## 2024-11-21 PROCEDURE — 99285 EMERGENCY DEPT VISIT HI MDM: CPT | Mod: 25

## 2024-11-21 PROCEDURE — 93010 ELECTROCARDIOGRAM REPORT: CPT | Performed by: STUDENT IN AN ORGANIZED HEALTH CARE EDUCATION/TRAINING PROGRAM

## 2024-11-21 PROCEDURE — 85652 RBC SED RATE AUTOMATED: CPT

## 2024-11-21 PROCEDURE — 86901 BLOOD TYPING SEROLOGIC RH(D): CPT

## 2024-11-21 PROCEDURE — 99223 1ST HOSP IP/OBS HIGH 75: CPT

## 2024-11-21 PROCEDURE — 83605 ASSAY OF LACTIC ACID: CPT

## 2024-11-21 PROCEDURE — 99285 EMERGENCY DEPT VISIT HI MDM: CPT | Performed by: STUDENT IN AN ORGANIZED HEALTH CARE EDUCATION/TRAINING PROGRAM

## 2024-11-21 PROCEDURE — 2500000002 HC RX 250 W HCPCS SELF ADMINISTERED DRUGS (ALT 637 FOR MEDICARE OP, ALT 636 FOR OP/ED)

## 2024-11-21 PROCEDURE — 73630 X-RAY EXAM OF FOOT: CPT | Mod: RT

## 2024-11-21 PROCEDURE — 93971 EXTREMITY STUDY: CPT | Performed by: INTERNAL MEDICINE

## 2024-11-21 PROCEDURE — 93971 EXTREMITY STUDY: CPT

## 2024-11-21 PROCEDURE — 73630 X-RAY EXAM OF FOOT: CPT | Mod: RIGHT SIDE | Performed by: RADIOLOGY

## 2024-11-21 RX ORDER — SPIRONOLACTONE 25 MG/1
25 TABLET ORAL DAILY
Status: DISCONTINUED | OUTPATIENT
Start: 2024-11-22 | End: 2024-11-27 | Stop reason: HOSPADM

## 2024-11-21 RX ORDER — BUSPIRONE HYDROCHLORIDE 5 MG/1
5 TABLET ORAL 3 TIMES DAILY PRN
Status: DISCONTINUED | OUTPATIENT
Start: 2024-11-21 | End: 2024-11-27 | Stop reason: HOSPADM

## 2024-11-21 RX ORDER — NAPROXEN SODIUM 220 MG/1
81 TABLET, FILM COATED ORAL DAILY
Status: DISCONTINUED | OUTPATIENT
Start: 2024-11-22 | End: 2024-11-27 | Stop reason: HOSPADM

## 2024-11-21 RX ORDER — DULOXETIN HYDROCHLORIDE 60 MG/1
60 CAPSULE, DELAYED RELEASE ORAL 2 TIMES DAILY
Status: DISCONTINUED | OUTPATIENT
Start: 2024-11-21 | End: 2024-11-27 | Stop reason: HOSPADM

## 2024-11-21 RX ORDER — TORSEMIDE 20 MG/1
20 TABLET ORAL DAILY
Status: DISCONTINUED | OUTPATIENT
Start: 2024-11-22 | End: 2024-11-27 | Stop reason: HOSPADM

## 2024-11-21 RX ORDER — MONTELUKAST SODIUM 10 MG/1
10 TABLET ORAL NIGHTLY
Status: DISCONTINUED | OUTPATIENT
Start: 2024-11-21 | End: 2024-11-27 | Stop reason: HOSPADM

## 2024-11-21 RX ORDER — CEFTRIAXONE 2 G/50ML
2 INJECTION, SOLUTION INTRAVENOUS ONCE
Status: COMPLETED | OUTPATIENT
Start: 2024-11-21 | End: 2024-11-21

## 2024-11-21 RX ORDER — IPRATROPIUM BROMIDE AND ALBUTEROL SULFATE 2.5; .5 MG/3ML; MG/3ML
3 SOLUTION RESPIRATORY (INHALATION) EVERY 4 HOURS PRN
Status: DISCONTINUED | OUTPATIENT
Start: 2024-11-21 | End: 2024-11-27 | Stop reason: HOSPADM

## 2024-11-21 RX ORDER — PROPRANOLOL HYDROCHLORIDE 20 MG/1
10 TABLET ORAL 2 TIMES DAILY
Status: DISCONTINUED | OUTPATIENT
Start: 2024-11-21 | End: 2024-11-27 | Stop reason: HOSPADM

## 2024-11-21 RX ORDER — FORMOTEROL FUMARATE DIHYDRATE 20 UG/2ML
20 SOLUTION RESPIRATORY (INHALATION)
Status: DISCONTINUED | OUTPATIENT
Start: 2024-11-21 | End: 2024-11-27 | Stop reason: HOSPADM

## 2024-11-21 RX ORDER — BUDESONIDE 0.25 MG/2ML
0.25 INHALANT ORAL
Status: DISCONTINUED | OUTPATIENT
Start: 2024-11-21 | End: 2024-11-27 | Stop reason: HOSPADM

## 2024-11-21 RX ORDER — HEPARIN SODIUM 5000 [USP'U]/ML
5000 INJECTION, SOLUTION INTRAVENOUS; SUBCUTANEOUS EVERY 8 HOURS
Status: DISCONTINUED | OUTPATIENT
Start: 2024-11-21 | End: 2024-11-27 | Stop reason: HOSPADM

## 2024-11-21 RX ORDER — LEVOTHYROXINE SODIUM 50 UG/1
50 TABLET ORAL
Status: DISCONTINUED | OUTPATIENT
Start: 2024-11-22 | End: 2024-11-27 | Stop reason: HOSPADM

## 2024-11-21 RX ORDER — PANTOPRAZOLE SODIUM 40 MG/1
40 TABLET, DELAYED RELEASE ORAL 2 TIMES DAILY
Status: DISCONTINUED | OUTPATIENT
Start: 2024-11-21 | End: 2024-11-27 | Stop reason: HOSPADM

## 2024-11-21 RX ORDER — AMLODIPINE BESYLATE 2.5 MG/1
2.5 TABLET ORAL DAILY
Status: DISCONTINUED | OUTPATIENT
Start: 2024-11-22 | End: 2024-11-27 | Stop reason: HOSPADM

## 2024-11-21 RX ORDER — ATORVASTATIN CALCIUM 40 MG/1
40 TABLET, FILM COATED ORAL NIGHTLY
Status: DISCONTINUED | OUTPATIENT
Start: 2024-11-21 | End: 2024-11-27 | Stop reason: HOSPADM

## 2024-11-21 RX ORDER — TAMSULOSIN HYDROCHLORIDE 0.4 MG/1
0.4 CAPSULE ORAL NIGHTLY
Status: DISCONTINUED | OUTPATIENT
Start: 2024-11-21 | End: 2024-11-27 | Stop reason: HOSPADM

## 2024-11-21 RX ORDER — MIRTAZAPINE 15 MG/1
15 TABLET, FILM COATED ORAL NIGHTLY
Status: DISCONTINUED | OUTPATIENT
Start: 2024-11-21 | End: 2024-11-27 | Stop reason: HOSPADM

## 2024-11-21 RX ORDER — ESCITALOPRAM OXALATE 20 MG/1
20 TABLET ORAL DAILY
Status: DISCONTINUED | OUTPATIENT
Start: 2024-11-22 | End: 2024-11-27 | Stop reason: HOSPADM

## 2024-11-21 RX ORDER — VANCOMYCIN 2 GRAM/500 ML IN 0.9 % SODIUM CHLORIDE INTRAVENOUS
2000 ONCE
Status: COMPLETED | OUTPATIENT
Start: 2024-11-21 | End: 2024-11-21

## 2024-11-21 RX ORDER — PREGABALIN 150 MG/1
150 CAPSULE ORAL EVERY 6 HOURS
Status: DISCONTINUED | OUTPATIENT
Start: 2024-11-21 | End: 2024-11-27 | Stop reason: HOSPADM

## 2024-11-21 RX ORDER — VANCOMYCIN HYDROCHLORIDE 1 G/20ML
INJECTION, POWDER, LYOPHILIZED, FOR SOLUTION INTRAVENOUS DAILY PRN
Status: DISCONTINUED | OUTPATIENT
Start: 2024-11-21 | End: 2024-11-24

## 2024-11-21 SDOH — SOCIAL STABILITY: SOCIAL INSECURITY: HAVE YOU HAD THOUGHTS OF HARMING ANYONE ELSE?: NO

## 2024-11-21 SDOH — ECONOMIC STABILITY: HOUSING INSECURITY: AT ANY TIME IN THE PAST 12 MONTHS, WERE YOU HOMELESS OR LIVING IN A SHELTER (INCLUDING NOW)?: NO

## 2024-11-21 SDOH — SOCIAL STABILITY: SOCIAL INSECURITY: WITHIN THE LAST YEAR, HAVE YOU BEEN HUMILIATED OR EMOTIONALLY ABUSED IN OTHER WAYS BY YOUR PARTNER OR EX-PARTNER?: NO

## 2024-11-21 SDOH — SOCIAL STABILITY: SOCIAL INSECURITY: DOES ANYONE TRY TO KEEP YOU FROM HAVING/CONTACTING OTHER FRIENDS OR DOING THINGS OUTSIDE YOUR HOME?: NO

## 2024-11-21 SDOH — HEALTH STABILITY: MENTAL HEALTH: HOW MANY DRINKS CONTAINING ALCOHOL DO YOU HAVE ON A TYPICAL DAY WHEN YOU ARE DRINKING?: 7 TO 9

## 2024-11-21 SDOH — ECONOMIC STABILITY: FOOD INSECURITY: HOW HARD IS IT FOR YOU TO PAY FOR THE VERY BASICS LIKE FOOD, HOUSING, MEDICAL CARE, AND HEATING?: NOT HARD AT ALL

## 2024-11-21 SDOH — SOCIAL STABILITY: SOCIAL INSECURITY: DO YOU FEEL ANYONE HAS EXPLOITED OR TAKEN ADVANTAGE OF YOU FINANCIALLY OR OF YOUR PERSONAL PROPERTY?: NO

## 2024-11-21 SDOH — ECONOMIC STABILITY: FOOD INSECURITY: WITHIN THE PAST 12 MONTHS, YOU WORRIED THAT YOUR FOOD WOULD RUN OUT BEFORE YOU GOT THE MONEY TO BUY MORE.: NEVER TRUE

## 2024-11-21 SDOH — ECONOMIC STABILITY: FOOD INSECURITY: WITHIN THE PAST 12 MONTHS, THE FOOD YOU BOUGHT JUST DIDN'T LAST AND YOU DIDN'T HAVE MONEY TO GET MORE.: NEVER TRUE

## 2024-11-21 SDOH — ECONOMIC STABILITY: INCOME INSECURITY: IN THE PAST 12 MONTHS HAS THE ELECTRIC, GAS, OIL, OR WATER COMPANY THREATENED TO SHUT OFF SERVICES IN YOUR HOME?: NO

## 2024-11-21 SDOH — ECONOMIC STABILITY: HOUSING INSECURITY: IN THE LAST 12 MONTHS, WAS THERE A TIME WHEN YOU WERE NOT ABLE TO PAY THE MORTGAGE OR RENT ON TIME?: NO

## 2024-11-21 SDOH — HEALTH STABILITY: MENTAL HEALTH: HOW OFTEN DO YOU HAVE A DRINK CONTAINING ALCOHOL?: 4 OR MORE TIMES A WEEK

## 2024-11-21 SDOH — HEALTH STABILITY: MENTAL HEALTH: HOW OFTEN DO YOU HAVE SIX OR MORE DRINKS ON ONE OCCASION?: DAILY OR ALMOST DAILY

## 2024-11-21 SDOH — HEALTH STABILITY: MENTAL HEALTH: EXPERIENCED ANY OF THE FOLLOWING LIFE EVENTS: OTHER (COMMENT)

## 2024-11-21 SDOH — SOCIAL STABILITY: SOCIAL INSECURITY: WITHIN THE LAST YEAR, HAVE YOU BEEN AFRAID OF YOUR PARTNER OR EX-PARTNER?: NO

## 2024-11-21 SDOH — ECONOMIC STABILITY: TRANSPORTATION INSECURITY: IN THE PAST 12 MONTHS, HAS LACK OF TRANSPORTATION KEPT YOU FROM MEDICAL APPOINTMENTS OR FROM GETTING MEDICATIONS?: NO

## 2024-11-21 SDOH — SOCIAL STABILITY: SOCIAL INSECURITY: HAS ANYONE EVER THREATENED TO HURT YOUR FAMILY OR YOUR PETS?: NO

## 2024-11-21 SDOH — HEALTH STABILITY: PHYSICAL HEALTH: ON AVERAGE, HOW MANY MINUTES DO YOU ENGAGE IN EXERCISE AT THIS LEVEL?: 0 MIN

## 2024-11-21 SDOH — ECONOMIC STABILITY: HOUSING INSECURITY: IN THE PAST 12 MONTHS, HOW MANY TIMES HAVE YOU MOVED WHERE YOU WERE LIVING?: 1

## 2024-11-21 SDOH — SOCIAL STABILITY: SOCIAL INSECURITY: POSSIBLE ABUSE REPORTED TO:: OTHER (COMMENT)

## 2024-11-21 SDOH — SOCIAL STABILITY: SOCIAL INSECURITY: ARE YOU OR HAVE YOU BEEN THREATENED OR ABUSED PHYSICALLY, EMOTIONALLY, OR SEXUALLY BY ANYONE?: NO

## 2024-11-21 SDOH — HEALTH STABILITY: PHYSICAL HEALTH
HOW OFTEN DO YOU NEED TO HAVE SOMEONE HELP YOU WHEN YOU READ INSTRUCTIONS, PAMPHLETS, OR OTHER WRITTEN MATERIAL FROM YOUR DOCTOR OR PHARMACY?: NEVER

## 2024-11-21 SDOH — SOCIAL STABILITY: SOCIAL INSECURITY: HAVE YOU HAD ANY THOUGHTS OF HARMING ANYONE ELSE?: NO

## 2024-11-21 SDOH — SOCIAL STABILITY: SOCIAL INSECURITY: DO YOU FEEL UNSAFE GOING BACK TO THE PLACE WHERE YOU ARE LIVING?: NO

## 2024-11-21 SDOH — SOCIAL STABILITY: SOCIAL INSECURITY: ARE THERE ANY APPARENT SIGNS OF INJURIES/BEHAVIORS THAT COULD BE RELATED TO ABUSE/NEGLECT?: NO

## 2024-11-21 SDOH — HEALTH STABILITY: PHYSICAL HEALTH: ON AVERAGE, HOW MANY DAYS PER WEEK DO YOU ENGAGE IN MODERATE TO STRENUOUS EXERCISE (LIKE A BRISK WALK)?: 0 DAYS

## 2024-11-21 SDOH — SOCIAL STABILITY: SOCIAL INSECURITY: WERE YOU ABLE TO COMPLETE ALL THE BEHAVIORAL HEALTH SCREENINGS?: YES

## 2024-11-21 SDOH — SOCIAL STABILITY: SOCIAL INSECURITY: ABUSE: ADULT

## 2024-11-21 ASSESSMENT — LIFESTYLE VARIABLES
HOW OFTEN DURING THE LAST YEAR HAVE YOU FOUND THAT YOU WERE NOT ABLE TO STOP DRINKING ONCE YOU HAD STARTED: NEVER
AUDIT-C TOTAL SCORE: 11
EVER HAD A DRINK FIRST THING IN THE MORNING TO STEADY YOUR NERVES TO GET RID OF A HANGOVER: NO
HOW MANY STANDARD DRINKS CONTAINING ALCOHOL DO YOU HAVE ON A TYPICAL DAY: 5 OR 6
AUDIT-C TOTAL SCORE: 10
HOW OFTEN DO YOU HAVE A DRINK CONTAINING ALCOHOL: 4 OR MORE TIMES A WEEK
TOTAL SCORE: 0
AUDIT-C TOTAL SCORE: 10
SKIP TO QUESTIONS 9-10: 0
HOW OFTEN DO YOU HAVE 6 OR MORE DRINKS ON ONE OCCASION: DAILY OR ALMOST DAILY
HOW OFTEN DURING THE LAST YEAR HAVE YOU NEEDED AN ALCOHOLIC DRINK FIRST THING IN THE MORNING TO GET YOURSELF GOING AFTER A NIGHT OF HEAVY DRINKING: NEVER
AUDIT TOTAL SCORE: 4
EVER FELT BAD OR GUILTY ABOUT YOUR DRINKING: NO
HOW OFTEN DURING THE LAST YEAR HAVE YOU HAD A FEELING OF GUILT OR REMORSE AFTER DRINKING: NEVER
HAS A RELATIVE, FRIEND, DOCTOR, OR ANOTHER HEALTH PROFESSIONAL EXPRESSED CONCERN ABOUT YOUR DRINKING OR SUGGESTED YOU CUT DOWN: YES, DURING THE LAST YEAR
HAVE YOU OR SOMEONE ELSE BEEN INJURED AS A RESULT OF YOUR DRINKING: NO
HOW OFTEN DURING THE LAST YEAR HAVE YOU FAILED TO DO WHAT WAS NORMALLY EXPECTED FROM YOU BECAUSE OF DRINKING: NEVER
HAVE YOU EVER FELT YOU SHOULD CUT DOWN ON YOUR DRINKING: NO
SKIP TO QUESTIONS 9-10: 0
SUBSTANCE_ABUSE_PAST_12_MONTHS: NO
AUDIT TOTAL SCORE: 14
HOW OFTEN DURING THE LAST YEAR HAVE YOU BEEN UNABLE TO REMEMBER WHAT HAPPENED THE NIGHT BEFORE BECAUSE YOU HAD BEEN DRINKING: NEVER
HAVE PEOPLE ANNOYED YOU BY CRITICIZING YOUR DRINKING: NO
PRESCIPTION_ABUSE_PAST_12_MONTHS: NO

## 2024-11-21 ASSESSMENT — ACTIVITIES OF DAILY LIVING (ADL)
ADEQUATE_TO_COMPLETE_ADL: YES
ASSISTIVE_DEVICE: OTHER (COMMENT)
LACK_OF_TRANSPORTATION: NO
WALKS IN HOME: INDEPENDENT
JUDGMENT_ADEQUATE_SAFELY_COMPLETE_DAILY_ACTIVITIES: YES
BATHING: INDEPENDENT
HEARING - LEFT EAR: FUNCTIONAL
LACK_OF_TRANSPORTATION: NO
GROOMING: INDEPENDENT
FEEDING YOURSELF: INDEPENDENT
HEARING - RIGHT EAR: FUNCTIONAL
PATIENT'S MEMORY ADEQUATE TO SAFELY COMPLETE DAILY ACTIVITIES?: YES
DRESSING YOURSELF: INDEPENDENT
TOILETING: INDEPENDENT

## 2024-11-21 ASSESSMENT — PATIENT HEALTH QUESTIONNAIRE - PHQ9
1. LITTLE INTEREST OR PLEASURE IN DOING THINGS: NOT AT ALL
SUM OF ALL RESPONSES TO PHQ9 QUESTIONS 1 & 2: 0
2. FEELING DOWN, DEPRESSED OR HOPELESS: NOT AT ALL

## 2024-11-21 ASSESSMENT — COGNITIVE AND FUNCTIONAL STATUS - GENERAL
PATIENT BASELINE BEDBOUND: NO
DAILY ACTIVITIY SCORE: 24
MOBILITY SCORE: 24
DAILY ACTIVITIY SCORE: 24
PATIENT BASELINE BEDBOUND: NO

## 2024-11-21 ASSESSMENT — PAIN SCALES - GENERAL
PAINLEVEL_OUTOF10: 6
PAINLEVEL_OUTOF10: 6

## 2024-11-21 ASSESSMENT — PAIN - FUNCTIONAL ASSESSMENT: PAIN_FUNCTIONAL_ASSESSMENT: 0-10

## 2024-11-21 ASSESSMENT — PAIN DESCRIPTION - ORIENTATION: ORIENTATION: RIGHT

## 2024-11-21 ASSESSMENT — PAIN DESCRIPTION - PAIN TYPE: TYPE: CHRONIC PAIN

## 2024-11-21 ASSESSMENT — PAIN DESCRIPTION - LOCATION: LOCATION: FOOT

## 2024-11-21 NOTE — PROGRESS NOTES
TCM outreach completed. 11/21/2024  A in-basket message was sent to PRACTICE STAFF to assist with scheduling patient for 14 day follow-up.  Patient canceled scheduled appointment    Hospital Discharge Date: 11/19/2024  In order to bill as a TCM, the patient needs to be seen by: 12/2/2024

## 2024-11-21 NOTE — ED PROVIDER NOTES
EMERGENCY DEPARTMENT ENCOUNTER      Pt Name: Angus Redman  MRN: [id number]  Birthdate 1955  Date of evaluation: 11/21/2024  Provider: Jorge Kang MD    CHIEF COMPLAINT       Chief Complaint   Patient presents with    Toe Pain     Pt seen by MD Toscano who requests the Pt come in for amputation of the toes on the right foot. Pt toes present with drainage and foul odor.          HISTORY OF PRESENT ILLNESS    HPI  Patient is a 69-year-old male with a history of bilateral lower extremity neuropathy, paroxysmal SVT, NSTEMI, CKD, CHF presenting with concerns for worsening infection of his right second toe.  Patient was sent in by his podiatrist Dr. Jang with the intention of having him admitted for IV antibiotics and amputation of the toe.  Patient denies fever, chills, chest pain, shortness of breath, nausea, vomiting.  He is otherwise asymptomatic.    Nursing Notes were reviewed.    PAST MEDICAL HISTORY     Past Medical History:   Diagnosis Date    Alcohol dependence, in remission 06/08/2022    Alcohol dependence in early, early partial, sustained full, or sustained partial remission    Alcohol dependence, in remission 06/08/2022    Alcohol dependence in early, early partial, sustained full, or sustained partial remission    Alcohol dependence, uncomplicated (Multi) 09/15/2022    Alcohol dependence, daily use    Dependence on other enabling machines and devices 09/24/2019    Walker as ambulation aid    Encounter for screening for other disorder 03/01/2021    Special screening for other conditions    Fracture of one rib, unspecified side, initial encounter for closed fracture 01/03/2014    Closed rib fracture    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    Laceration without foreign body of scalp, initial encounter 09/10/2015    Scalp laceration    Nausea 04/15/2014    Nausea    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 07/27/2017    Skin ulcer of right ankle, limited to breakdown of skin    Osteomyelitis, unspecified 02/26/2022    Osteomyelitis of toe    Other chest pain 09/21/2013    Atypical chest pain    Other conditions influencing health status     Skin Cancer    Other conditions influencing health status 10/08/2017    Closed displaced fracture of olecranon process of left ulna with intra-articular extension, initial encounter    Other specified health status 07/23/2014    Foreign travel    Patient's noncompliance with other medical treatment and regimen due to unspecified reason 10/28/2016    Noncompliance with therapeutic plan    Patient's other noncompliance with medication regimen 06/04/2016    History of medication noncompliance    Personal history of (healed) stress fracture 12/30/2013    History of stress fracture    Personal history of diseases of the skin and subcutaneous tissue 02/26/2022    History of chronic skin ulcer    Personal history of other diseases of the nervous system and sense organs 03/25/2016    History of peripheral neuropathy    Personal history of other diseases of the nervous system and sense organs 02/02/2016    History of peripheral neuropathy    Personal history of other endocrine, nutritional and metabolic disease 07/13/2015    History of hypothyroidism    Personal history of other specified conditions 07/28/2019    History of syncope    Personal history of other specified conditions 05/07/2018    History of syncope    Personal history of other specified conditions 06/25/2015    History of nausea    Unspecified fracture of left foot, initial encounter for closed fracture 06/04/2016    Foot fracture, left    Unspecified injury of head, initial encounter 04/20/2016    Closed head injury, initial encounter    Unspecified injury of unspecified elbow, initial encounter 04/07/2017    Elbow injury    Unsteadiness on feet 04/15/2014    Unsteady gait         SURGICAL HISTORY       Past Surgical  History:   Procedure Laterality Date    COLONOSCOPY  10/09/2013    Complete Colonoscopy    OTHER SURGICAL HISTORY  07/28/2019    Insertion of cardiac monitor    OTHER SURGICAL HISTORY  04/15/2014    Reported Hx Of Hip Replacement - Left Side    OTHER SURGICAL HISTORY  01/28/2020    Hip replacement    OTHER SURGICAL HISTORY  11/24/2015    Interrogation Of Implantable Loop Recorder By Physician In Person    OTHER SURGICAL HISTORY  04/19/2017    Arthroscopy Elbow Left    OTHER SURGICAL HISTORY  10/09/2013    Shoulder Surgery Left    SKIN CANCER EXCISION  10/09/2013    Mohs Micrographic Surgery Face         CURRENT MEDICATIONS       Previous Medications    ALBUTEROL (PROAIR HFA) 90 MCG/ACTUATION INHALER    Inhale 2 puffs every 6 hours if needed for wheezing.    ALBUTEROL 2.5 MG /3 ML (0.083 %) NEBULIZER SOLUTION    Take 3 mL (2.5 mg) by nebulization every 6 hours if needed for wheezing.    ALENDRONATE (FOSAMAX) 70 MG TABLET    Take 1 tablet by mouth every 7 days. Take in the morning with a full glass of water at least 30 minutes before first food, drink, or medications of the day.    AMLODIPINE (NORVASC) 2.5 MG TABLET    Take 1 tablet (2.5 mg) by mouth once daily. For blood pressure    ASPIRIN 81 MG CHEWABLE TABLET    Chew and swallow 1 tablet by mouth daily.    ATORVASTATIN (LIPITOR) 40 MG TABLET    Take 1 tablet (40 mg) by mouth once daily.    BUSPIRONE (BUSPAR) 5 MG TABLET    Take 1 tablet by mouth three times daily as needed for anxiety.    CEFDINIR (OMNICEF) 300 MG CAPSULE    Take 1 capsule (300 mg) by mouth 2 times a day.    CYANOCOBALAMIN (VITAMIN B-12) 1,000 MCG TABLET    Take 1 tablet (1,000 mcg) by mouth once daily. As directed    DULOXETINE (CYMBALTA) 60 MG DR CAPSULE    Take 1 capsule by mouth twice daily.    EMPAGLIFLOZIN (JARDIANCE) 10 MG    Take 1 tablet (10 mg) by mouth once daily.    ERGOCALCIFEROL (VITAMIN D-2) 1.25 MG (88036 UT) CAPSULE    Take 1 capsule (1,250 mcg) by mouth 1 (one) time per week.     ESCITALOPRAM (LEXAPRO) 20 MG TABLET    Take 1 tablet (20 mg) by mouth once daily.    FERROUS SULFATE, 325 MG FERROUS SULFATE, (FEROSUL) TABLET    Take 1 tablet by mouth once daily with breakfast.    FEXOFENADINE (ALLEGRA) 180 MG TABLET    Take 1 tablet (180 mg) by mouth once daily as needed (as needed for allergies and congestion).    FOLIC ACID (FOLVITE) 1 MG TABLET    TAKE ONE TABLET BY MOUTH EVERY DAY for folate deficiency    IPRATROPIUM (ATROVENT) 0.02 % NEBULIZER SOLUTION    Take 2.5 mL (0.5 mg) by nebulization 4 times a day.    LEVOTHYROXINE (SYNTHROID, LEVOXYL) 50 MCG TABLET    Take 1 tablet (50 mcg) by mouth once daily in the morning. Take before meals.    MIRTAZAPINE (REMERON) 15 MG TABLET    Take 1 tablet (15 mg) by mouth once daily at bedtime.    MONTELUKAST (SINGULAIR) 10 MG TABLET    Take 1 tablet by mouth once daily at bedtime.    PANTOPRAZOLE (PROTONIX) 40 MG EC TABLET    Take 1 tablet (40 mg) by mouth 2 times a day.    PREGABALIN (LYRICA) 150 MG CAPSULE    Take 1 capsule (150 mg) by mouth every 6 hours.    PROPRANOLOL (INDERAL) 10 MG TABLET    Take 1 tablet (10 mg) by mouth 2 times a day.    SPIRONOLACTONE (ALDACTONE) 25 MG TABLET    Take 1 tablet (25 mg) by mouth once daily.    TAMSULOSIN (FLOMAX) 0.4 MG 24 HR CAPSULE    Take 1 capsule (0.4 mg) by mouth once daily at bedtime.    TORSEMIDE (DEMADEX) 20 MG TABLET    Take 1 tablet (20 mg) by mouth once daily.       ALLERGIES     Lisinopril, Latex, Codeine, House dust mite, Hydrocodone-acetaminophen, Lactose, Mold, and Tree and shrub pollen    FAMILY HISTORY       Family History   Problem Relation Name Age of Onset    Other (cardiac pacemaker) Mother      Coronary artery disease Mother      Other (history of heart artery stent) Mother      Cancer Mother      Other (kurtis cell cancer) Mother      Arthritis Mother      Mental illness Brother          living in handicapped assisted living facility permanently [Other]    Other (angina pectoris) Paternal  Grandmother            SOCIAL HISTORY       Social History     Socioeconomic History    Marital status:    Tobacco Use    Smoking status: Former     Current packs/day: 0.00     Types: Cigarettes     Quit date:      Years since quittin.9    Smokeless tobacco: Never   Vaping Use    Vaping status: Never Used   Substance and Sexual Activity    Alcohol use: Not Currently    Drug use: Never     Social Drivers of Health     Financial Resource Strain: Low Risk  (2024)    Overall Financial Resource Strain (CARDIA)     Difficulty of Paying Living Expenses: Not hard at all   Recent Concern: Financial Resource Strain - High Risk (2024)    Overall Financial Resource Strain (CARDIA)     Difficulty of Paying Living Expenses: Hard   Food Insecurity: No Food Insecurity (2024)    Hunger Vital Sign     Worried About Running Out of Food in the Last Year: Never true     Ran Out of Food in the Last Year: Never true   Transportation Needs: No Transportation Needs (2024)    PRAPARE - Transportation     Lack of Transportation (Medical): No     Lack of Transportation (Non-Medical): No   Intimate Partner Violence: Not At Risk (2024)    Humiliation, Afraid, Rape, and Kick questionnaire     Fear of Current or Ex-Partner: No     Emotionally Abused: No     Physically Abused: No     Sexually Abused: No   Housing Stability: Low Risk  (2024)    Housing Stability Vital Sign     Unable to Pay for Housing in the Last Year: No     Number of Times Moved in the Last Year: 1     Homeless in the Last Year: No       SCREENINGS                        PHYSICAL EXAM    (up to 7 for level 4, 8 or more for level 5)     ED Triage Vitals [24 1601]   Temperature Heart Rate Respirations BP   36.5 °C (97.7 °F) 82 17 138/68      Pulse Ox Temp Source Heart Rate Source Patient Position   96 % Temporal Monitor --      BP Location FiO2 (%)     Right arm --       Physical Exam  Constitutional:       General: He is not  in acute distress.     Appearance: He is not toxic-appearing.   HENT:      Head: Normocephalic and atraumatic.      Nose: Nose normal.      Mouth/Throat:      Mouth: Mucous membranes are moist.      Pharynx: Oropharynx is clear.   Eyes:      Extraocular Movements: Extraocular movements intact.      Conjunctiva/sclera: Conjunctivae normal.   Cardiovascular:      Rate and Rhythm: Normal rate and regular rhythm.      Pulses: Normal pulses.      Heart sounds: Normal heart sounds.   Pulmonary:      Effort: Pulmonary effort is normal. No respiratory distress.      Breath sounds: Normal breath sounds.   Abdominal:      General: There is no distension.      Palpations: Abdomen is soft.      Tenderness: There is no abdominal tenderness.   Musculoskeletal:      Cervical back: Normal range of motion and neck supple.      Comments: Right lower extremity is grossly edematous asymmetric to the left leg.  Furthermore, there is ulceration with purulence primarily at the base of the right second toe.  There is a surrounding zone of erythema.  There is no induration, crepitus, fluctuance.   Skin:     General: Skin is warm and dry.      Capillary Refill: Capillary refill takes less than 2 seconds.   Neurological:      General: No focal deficit present.          DIAGNOSTIC RESULTS     LABS:  Labs Reviewed   CBC WITH AUTO DIFFERENTIAL - Abnormal       Result Value    WBC 9.9      nRBC 0.0      RBC 4.62      Hemoglobin 13.5      Hematocrit 43.8      MCV 95      MCH 29.2      MCHC 30.8 (*)     RDW 17.2 (*)     Platelets 251      Neutrophils % 70.3      Immature Granulocytes %, Automated 0.8      Lymphocytes % 18.8      Monocytes % 6.9      Eosinophils % 2.8      Basophils % 0.4      Neutrophils Absolute 6.94 (*)     Immature Granulocytes Absolute, Automated 0.08      Lymphocytes Absolute 1.86      Monocytes Absolute 0.68      Eosinophils Absolute 0.28      Basophils Absolute 0.04     COMPREHENSIVE METABOLIC PANEL - Abnormal    Glucose 101  (*)     Sodium 137      Potassium 4.5      Chloride 100      Bicarbonate 27      Anion Gap 15      Urea Nitrogen 30 (*)     Creatinine 1.62 (*)     eGFR 45 (*)     Calcium 9.1      Albumin 4.2      Alkaline Phosphatase 116      Total Protein 7.8      AST 30      Bilirubin, Total 0.6      ALT 31     C-REACTIVE PROTEIN - Abnormal    C-Reactive Protein 2.49 (*)    SEDIMENTATION RATE, AUTOMATED - Abnormal    Sedimentation Rate 56 (*)    LACTATE - Normal    Lactate 0.7      Narrative:     Venipuncture immediately after or during the administration of Metamizole may lead to falsely low results. Testing should be performed immediately prior to Metamizole dosing.   PROTIME-INR - Normal    Protime 12.3      INR 1.1     BLOOD CULTURE   BLOOD CULTURE   TYPE AND SCREEN    ABO TYPE A      Rh TYPE POS      ANTIBODY SCREEN NEG     GREEN TOP    Extra Tube Hold for add-ons.         All other labs were within normal range or not returned as of this dictation.    Imaging  Vascular US lower extremity venous duplex right   Final Result      XR foot right 3+ views   Final Result   Soft tissue swelling right 3rd digit, nonspecific. No deep soft   tissue gas. If there is a high degree of concern for underlying deep   soft tissue infection or osseous infection, MRI can be considered for   further evaluation.        Signed by: Tayo Menjivar 11/21/2024 6:32 PM   Dictation workstation:   [id number]           Procedures  Procedures     EMERGENCY DEPARTMENT COURSE/MDM:     Diagnoses as of 11/21/24 2024   Foot infection        Medical Decision Making  History obtained from the patient.  Records including labs, imaging, notes reviewed.  Patient does not appear clinically septic and his vitals are within normal limits.  Having said, in the setting of an impressive lower extremity wound has received multiple courses of antibiotics, with plan for amputation, blood cultures were drawn prophylactically and patient was started on broad-spectrum  antibiotics for soft tissue infection.  In setting of known CHF, patient given a 500 cc bolus only.  Patient was neither tachycardic nor hypotensive, no need for additional fluids at this time.  Lactate within normal at 7.7.  CBC without leukocytosis or other acute findings.  Low suspicion for sepsis at this time.  CMP with stable chronic kidney disease with a creatinine of 1.62, no profound electrolyte abnormalities.  An INR and type and screen were also sent in the setting of preoperative planning.  INR within normal limits.  Inflammatory marker was mildly elevated.  CRP at 2.49, 7 Tatian rate of 56.  X-ray of the foot did not demonstrate any subcutaneous air; low suspicion for NSTI at this time.  Vascular ultrasound was obtained given the patient's asymmetric swelling of the lower extremities which was negative for DVT.  Patient subsequently admitted to the medicine service for further evaluation and treatment with Dr. Jang podiatry on consult.      EKG obtained preoperatively.  This demonstrated sinus rhythm with sinus arrhythmia and first-degree AV block at a rate of 80, OR interval prolonged at 264, no acute injury pattern.    Patient and or family in agreement and understanding of treatment plan.  All questions answered.      I reviewed the case with the attending ED physician. The attending ED physician agrees with the plan. Patient and/or patient´s representative was counseled regarding labs, imaging, likely diagnosis, and plan. All questions were answered.    ED Medications administered this visit:    Medications   vancomycin (Vancocin) 2,000 mg in sodium chloride 0.9%  mL (2,000 mg intravenous New Bag 11/21/24 1940)   sodium chloride 0.9 % bolus 500 mL (0 mL intravenous Stopped 11/21/24 1937)   cefTRIAXone (Rocephin) 2 g in dextrose (iso) IV 50 mL (0 g intravenous Stopped 11/21/24 1937)       New Prescriptions from this visit:    New Prescriptions    No medications on file       Follow-up:  No  follow-up provider specified.      Final Impression:   1. Foot infection    2. Pain in right leg          (Please note that portions of this note were completed with a voice recognition program.  Efforts were made to edit the dictations but occasionally words are mis-transcribed.)     Jorge Kang MD  Resident  11/21/24 2026

## 2024-11-22 ENCOUNTER — ANESTHESIA (OUTPATIENT)
Dept: OPERATING ROOM | Facility: HOSPITAL | Age: 69
End: 2024-11-22
Payer: MEDICARE

## 2024-11-22 ENCOUNTER — ANESTHESIA EVENT (OUTPATIENT)
Dept: OPERATING ROOM | Facility: HOSPITAL | Age: 69
End: 2024-11-22
Payer: MEDICARE

## 2024-11-22 PROBLEM — M86.9: Status: ACTIVE | Noted: 2024-11-21

## 2024-11-22 LAB
ALBUMIN SERPL BCP-MCNC: 4.1 G/DL (ref 3.4–5)
ANION GAP SERPL CALC-SCNC: 13 MMOL/L (ref 10–20)
ATRIAL RATE: 80 BPM
BUN SERPL-MCNC: 26 MG/DL (ref 6–23)
CALCIUM SERPL-MCNC: 9 MG/DL (ref 8.6–10.3)
CHLORIDE SERPL-SCNC: 102 MMOL/L (ref 98–107)
CO2 SERPL-SCNC: 27 MMOL/L (ref 21–32)
CREAT SERPL-MCNC: 1.32 MG/DL (ref 0.5–1.3)
EGFRCR SERPLBLD CKD-EPI 2021: 58 ML/MIN/1.73M*2
ERYTHROCYTE [DISTWIDTH] IN BLOOD BY AUTOMATED COUNT: 17 % (ref 11.5–14.5)
GLUCOSE SERPL-MCNC: 103 MG/DL (ref 74–99)
HCT VFR BLD AUTO: 44.4 % (ref 41–52)
HGB BLD-MCNC: 13.3 G/DL (ref 13.5–17.5)
MAGNESIUM SERPL-MCNC: 2.16 MG/DL (ref 1.6–2.4)
MCH RBC QN AUTO: 28.7 PG (ref 26–34)
MCHC RBC AUTO-ENTMCNC: 30 G/DL (ref 32–36)
MCV RBC AUTO: 96 FL (ref 80–100)
NRBC BLD-RTO: 0 /100 WBCS (ref 0–0)
P AXIS: 55 DEGREES
PHOSPHATE SERPL-MCNC: 4.5 MG/DL (ref 2.5–4.9)
PLATELET # BLD AUTO: 209 X10*3/UL (ref 150–450)
POTASSIUM SERPL-SCNC: 4.2 MMOL/L (ref 3.5–5.3)
PR INTERVAL: 264 MS
Q ONSET: 213 MS
QRS COUNT: 13 BEATS
QRS DURATION: 82 MS
QT INTERVAL: 406 MS
QTC CALCULATION(BAZETT): 468 MS
QTC FREDERICIA: 447 MS
R AXIS: -14 DEGREES
RBC # BLD AUTO: 4.64 X10*6/UL (ref 4.5–5.9)
SODIUM SERPL-SCNC: 138 MMOL/L (ref 136–145)
T AXIS: 47 DEGREES
T OFFSET: 416 MS
VENTRICULAR RATE: 80 BPM
WBC # BLD AUTO: 13 X10*3/UL (ref 4.4–11.3)

## 2024-11-22 PROCEDURE — 2500000004 HC RX 250 GENERAL PHARMACY W/ HCPCS (ALT 636 FOR OP/ED): Performed by: ANESTHESIOLOGIST ASSISTANT

## 2024-11-22 PROCEDURE — 2500000002 HC RX 250 W HCPCS SELF ADMINISTERED DRUGS (ALT 637 FOR MEDICARE OP, ALT 636 FOR OP/ED)

## 2024-11-22 PROCEDURE — 0Y6T0Z3 DETACHMENT AT RIGHT 3RD TOE, LOW, OPEN APPROACH: ICD-10-PCS | Performed by: STUDENT IN AN ORGANIZED HEALTH CARE EDUCATION/TRAINING PROGRAM

## 2024-11-22 PROCEDURE — 0HRMXK3 REPLACEMENT OF RIGHT FOOT SKIN WITH NONAUTOLOGOUS TISSUE SUBSTITUTE, FULL THICKNESS, EXTERNAL APPROACH: ICD-10-PCS | Performed by: STUDENT IN AN ORGANIZED HEALTH CARE EDUCATION/TRAINING PROGRAM

## 2024-11-22 PROCEDURE — 3600000008 HC OR TIME - EACH INCREMENTAL 1 MINUTE - PROCEDURE LEVEL THREE: Performed by: STUDENT IN AN ORGANIZED HEALTH CARE EDUCATION/TRAINING PROGRAM

## 2024-11-22 PROCEDURE — 2500000004 HC RX 250 GENERAL PHARMACY W/ HCPCS (ALT 636 FOR OP/ED): Performed by: STUDENT IN AN ORGANIZED HEALTH CARE EDUCATION/TRAINING PROGRAM

## 2024-11-22 PROCEDURE — 2720000007 HC OR 272 NO HCPCS: Performed by: STUDENT IN AN ORGANIZED HEALTH CARE EDUCATION/TRAINING PROGRAM

## 2024-11-22 PROCEDURE — 2500000004 HC RX 250 GENERAL PHARMACY W/ HCPCS (ALT 636 FOR OP/ED)

## 2024-11-22 PROCEDURE — 0753T DGTZ GLS MCRSCP SLD LEVEL IV: CPT | Mod: TC,STJLAB,WESLAB | Performed by: STUDENT IN AN ORGANIZED HEALTH CARE EDUCATION/TRAINING PROGRAM

## 2024-11-22 PROCEDURE — 1100000001 HC PRIVATE ROOM DAILY

## 2024-11-22 PROCEDURE — 0KBW0ZZ EXCISION OF LEFT FOOT MUSCLE, OPEN APPROACH: ICD-10-PCS | Performed by: STUDENT IN AN ORGANIZED HEALTH CARE EDUCATION/TRAINING PROGRAM

## 2024-11-22 PROCEDURE — 94640 AIRWAY INHALATION TREATMENT: CPT

## 2024-11-22 PROCEDURE — 88311 DECALCIFY TISSUE: CPT | Performed by: PATHOLOGY

## 2024-11-22 PROCEDURE — A28805 PR AMPUTATION FOOT,TRANSMETATARSAL: Performed by: STUDENT IN AN ORGANIZED HEALTH CARE EDUCATION/TRAINING PROGRAM

## 2024-11-22 PROCEDURE — 6360000003 HC OR 636 NO HCPCS: Performed by: STUDENT IN AN ORGANIZED HEALTH CARE EDUCATION/TRAINING PROGRAM

## 2024-11-22 PROCEDURE — 80069 RENAL FUNCTION PANEL: CPT

## 2024-11-22 PROCEDURE — 2500000001 HC RX 250 WO HCPCS SELF ADMINISTERED DRUGS (ALT 637 FOR MEDICARE OP)

## 2024-11-22 PROCEDURE — 2500000005 HC RX 250 GENERAL PHARMACY W/O HCPCS: Performed by: INTERNAL MEDICINE

## 2024-11-22 PROCEDURE — 3600000003 HC OR TIME - INITIAL BASE CHARGE - PROCEDURE LEVEL THREE: Performed by: STUDENT IN AN ORGANIZED HEALTH CARE EDUCATION/TRAINING PROGRAM

## 2024-11-22 PROCEDURE — 85027 COMPLETE CBC AUTOMATED: CPT

## 2024-11-22 PROCEDURE — 3700000002 HC GENERAL ANESTHESIA TIME - EACH INCREMENTAL 1 MINUTE: Performed by: STUDENT IN AN ORGANIZED HEALTH CARE EDUCATION/TRAINING PROGRAM

## 2024-11-22 PROCEDURE — 2780000003 HC OR 278 NO HCPCS: Performed by: STUDENT IN AN ORGANIZED HEALTH CARE EDUCATION/TRAINING PROGRAM

## 2024-11-22 PROCEDURE — 88305 TISSUE EXAM BY PATHOLOGIST: CPT | Performed by: PATHOLOGY

## 2024-11-22 PROCEDURE — 3700000001 HC GENERAL ANESTHESIA TIME - INITIAL BASE CHARGE: Performed by: STUDENT IN AN ORGANIZED HEALTH CARE EDUCATION/TRAINING PROGRAM

## 2024-11-22 PROCEDURE — A28805 PR AMPUTATION FOOT,TRANSMETATARSAL: Performed by: ANESTHESIOLOGIST ASSISTANT

## 2024-11-22 PROCEDURE — 99100 ANES PT EXTEME AGE<1 YR&>70: CPT | Performed by: STUDENT IN AN ORGANIZED HEALTH CARE EDUCATION/TRAINING PROGRAM

## 2024-11-22 PROCEDURE — 0HRNXK3 REPLACEMENT OF LEFT FOOT SKIN WITH NONAUTOLOGOUS TISSUE SUBSTITUTE, FULL THICKNESS, EXTERNAL APPROACH: ICD-10-PCS | Performed by: STUDENT IN AN ORGANIZED HEALTH CARE EDUCATION/TRAINING PROGRAM

## 2024-11-22 PROCEDURE — 0KBV0ZZ EXCISION OF RIGHT FOOT MUSCLE, OPEN APPROACH: ICD-10-PCS | Performed by: STUDENT IN AN ORGANIZED HEALTH CARE EDUCATION/TRAINING PROGRAM

## 2024-11-22 PROCEDURE — 87077 CULTURE AEROBIC IDENTIFY: CPT | Mod: STJLAB | Performed by: STUDENT IN AN ORGANIZED HEALTH CARE EDUCATION/TRAINING PROGRAM

## 2024-11-22 PROCEDURE — 7100000002 HC RECOVERY ROOM TIME - EACH INCREMENTAL 1 MINUTE: Performed by: STUDENT IN AN ORGANIZED HEALTH CARE EDUCATION/TRAINING PROGRAM

## 2024-11-22 PROCEDURE — 7100000001 HC RECOVERY ROOM TIME - INITIAL BASE CHARGE: Performed by: STUDENT IN AN ORGANIZED HEALTH CARE EDUCATION/TRAINING PROGRAM

## 2024-11-22 PROCEDURE — 99232 SBSQ HOSP IP/OBS MODERATE 35: CPT | Performed by: INTERNAL MEDICINE

## 2024-11-22 PROCEDURE — 36415 COLL VENOUS BLD VENIPUNCTURE: CPT

## 2024-11-22 PROCEDURE — 83735 ASSAY OF MAGNESIUM: CPT

## 2024-11-22 DEVICE — INTEGRA® MESHED BILAYER WOUND MATRIX 2 IN*2 IN (5 CM*5 CM)
Type: IMPLANTABLE DEVICE | Site: FOOT | Status: FUNCTIONAL
Brand: INTEGRA®

## 2024-11-22 DEVICE — MICROMATRIX® UBM STANDARD PARTICULATE, 1000MG
Type: IMPLANTABLE DEVICE | Site: FOOT | Status: FUNCTIONAL
Brand: MICROMATRIX®

## 2024-11-22 RX ORDER — HYDRALAZINE HYDROCHLORIDE 20 MG/ML
5 INJECTION INTRAMUSCULAR; INTRAVENOUS EVERY 30 MIN PRN
Status: DISCONTINUED | OUTPATIENT
Start: 2024-11-22 | End: 2024-11-22 | Stop reason: HOSPADM

## 2024-11-22 RX ORDER — LABETALOL HYDROCHLORIDE 5 MG/ML
5 INJECTION, SOLUTION INTRAVENOUS ONCE AS NEEDED
Status: DISCONTINUED | OUTPATIENT
Start: 2024-11-22 | End: 2024-11-22 | Stop reason: HOSPADM

## 2024-11-22 RX ORDER — LIDOCAINE HYDROCHLORIDE 10 MG/ML
0.1 INJECTION, SOLUTION INFILTRATION; PERINEURAL ONCE
Status: DISCONTINUED | OUTPATIENT
Start: 2024-11-22 | End: 2024-11-22 | Stop reason: HOSPADM

## 2024-11-22 RX ORDER — OXYCODONE HYDROCHLORIDE 5 MG/1
5 TABLET ORAL EVERY 4 HOURS PRN
Status: DISCONTINUED | OUTPATIENT
Start: 2024-11-22 | End: 2024-11-22 | Stop reason: HOSPADM

## 2024-11-22 RX ORDER — LIDOCAINE HYDROCHLORIDE 10 MG/ML
INJECTION, SOLUTION INFILTRATION; PERINEURAL AS NEEDED
Status: DISCONTINUED | OUTPATIENT
Start: 2024-11-22 | End: 2024-11-22 | Stop reason: HOSPADM

## 2024-11-22 RX ORDER — BUPIVACAINE HYDROCHLORIDE 5 MG/ML
INJECTION, SOLUTION PERINEURAL AS NEEDED
Status: DISCONTINUED | OUTPATIENT
Start: 2024-11-22 | End: 2024-11-22 | Stop reason: HOSPADM

## 2024-11-22 RX ORDER — FENTANYL CITRATE 50 UG/ML
50 INJECTION, SOLUTION INTRAMUSCULAR; INTRAVENOUS EVERY 5 MIN PRN
Status: DISCONTINUED | OUTPATIENT
Start: 2024-11-22 | End: 2024-11-22 | Stop reason: HOSPADM

## 2024-11-22 RX ORDER — ONDANSETRON HYDROCHLORIDE 2 MG/ML
4 INJECTION, SOLUTION INTRAVENOUS ONCE AS NEEDED
Status: DISCONTINUED | OUTPATIENT
Start: 2024-11-22 | End: 2024-11-22 | Stop reason: HOSPADM

## 2024-11-22 RX ORDER — ALBUTEROL SULFATE 0.83 MG/ML
2.5 SOLUTION RESPIRATORY (INHALATION) ONCE AS NEEDED
Status: DISCONTINUED | OUTPATIENT
Start: 2024-11-22 | End: 2024-11-22 | Stop reason: HOSPADM

## 2024-11-22 RX ORDER — PROPOFOL 10 MG/ML
INJECTION, EMULSION INTRAVENOUS AS NEEDED
Status: DISCONTINUED | OUTPATIENT
Start: 2024-11-22 | End: 2024-11-22

## 2024-11-22 RX ADMIN — PROPOFOL 150 MCG/KG/MIN: 10 INJECTION, EMULSION INTRAVENOUS at 14:59

## 2024-11-22 RX ADMIN — SODIUM CHLORIDE: 9 INJECTION, SOLUTION INTRAVENOUS at 14:59

## 2024-11-22 RX ADMIN — PROPOFOL 40 MG: 10 INJECTION, EMULSION INTRAVENOUS at 14:58

## 2024-11-22 ASSESSMENT — PAIN - FUNCTIONAL ASSESSMENT
PAIN_FUNCTIONAL_ASSESSMENT: 0-10
PAIN_FUNCTIONAL_ASSESSMENT: 0-10
PAIN_FUNCTIONAL_ASSESSMENT: UNABLE TO SELF-REPORT
PAIN_FUNCTIONAL_ASSESSMENT: 0-10
PAIN_FUNCTIONAL_ASSESSMENT: UNABLE TO SELF-REPORT
PAIN_FUNCTIONAL_ASSESSMENT: 0-10

## 2024-11-22 ASSESSMENT — COGNITIVE AND FUNCTIONAL STATUS - GENERAL
WALKING IN HOSPITAL ROOM: A LITTLE
MOBILITY SCORE: 22
CLIMB 3 TO 5 STEPS WITH RAILING: A LITTLE
DAILY ACTIVITIY SCORE: 24

## 2024-11-22 ASSESSMENT — PAIN SCALES - GENERAL
PAINLEVEL_OUTOF10: 7
PAINLEVEL_OUTOF10: 0 - NO PAIN
PAINLEVEL_OUTOF10: 6
PAINLEVEL_OUTOF10: 8

## 2024-11-22 NOTE — CONSULTS
Vancomycin Dosing by Pharmacy- INITIAL    Angus Redman is a 69 y.o. year old male who Pharmacy has been consulted for vancomycin dosing for cellulitis, skin and soft tissue. Based on the patient's indication and renal status this patient will be dosed based on a goal AUC of 400-600.     Renal function is currently stable.    Visit Vitals  /88 (BP Location: Left arm, Patient Position: Lying)   Pulse 63   Temp 35.2 °C (95.4 °F) (Temporal)   Resp 18        Lab Results   Component Value Date    CREATININE 1.62 (H) 2024    CREATININE 1.28 2024    CREATININE 1.43 (H) 2024    CREATININE 1.59 (H) 2024        Patient weight is as follows:   Vitals:    246   Weight: 115 kg (254 lb 3.1 oz)       Cultures:  No results found for the encounter in last 14 days.        No intake/output data recorded.  I/O during current shift:  No intake/output data recorded.    Temp (24hrs), Av.9 °C (96.6 °F), Min:35.2 °C (95.4 °F), Max:36.5 °C (97.7 °F)         Assessment/Plan     Patient has already been given a loading dose of 2000 mg.  Will initiate vancomycin maintenance,  1500 mg every 24 hours.    This dosing regimen is predicted by [employer] to result in the following pharmacokinetic parameters:  Loading dose: N/A  Regimen: 1500 mg IV every 24 hours.  Start time: 19:40 on 2024  Exposure target: AUC24 (range)400-600 mg/L.hr   MEQ19-17: 462 mg/L.hr  AUC24,ss: 517 mg/L.hr  Probability of AUC24 > 400: 77 %  Ctrough,ss: 16.3 mg/L  Probability of Ctrough,ss > 20: 32 %    Follow-up level will be ordered on  at 0500 unless clinically indicated sooner.  Will continue to monitor renal function daily while on vancomycin and order serum creatinine at least every 48 hours if not already ordered.  Follow for continued vancomycin needs, clinical response, and signs/symptoms of toxicity.       JESÚS IVEY

## 2024-11-22 NOTE — CONSULTS
Infectious Disease Consult    PATIENT NAME: Angus Redman    MRN: [id number]  SERVICE DATE:  11/22/2024   SERVICE TIME:  11:59 AM    SIGNATURE: Kathleen Patino MD    PRIMARY CARE PHYSICIAN: Artie Haskins MD  REASON FOR CONSULT: Right third toe infection  REQUESTING PHYSICIAN: Dr. Moser      HPI  69-year-old male who I was asked to evaluate for right third toe wound infection.  The patient has been following with podiatry.  He had a wound culture done in October grew ESBL E. coli and MRSA.  Now admitted with worsening infection.  X-ray showed no gas in the soft tissue.  Started on vancomycin and Zosyn.    PAST MEDICAL HISTORY:   Past Medical History:   Diagnosis Date    Alcohol dependence, in remission 06/08/2022    Alcohol dependence in early, early partial, sustained full, or sustained partial remission    Alcohol dependence, in remission 06/08/2022    Alcohol dependence in early, early partial, sustained full, or sustained partial remission    Alcohol dependence, uncomplicated (Multi) 09/15/2022    Alcohol dependence, daily use    Dependence on other enabling machines and devices 09/24/2019    Walker as ambulation aid    Encounter for screening for other disorder 03/01/2021    Special screening for other conditions    Fracture of one rib, unspecified side, initial encounter for closed fracture 01/03/2014    Closed rib fracture    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    Laceration without foreign body of scalp, initial encounter 09/10/2015    Scalp laceration    Nausea 04/15/2014    Nausea    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 07/27/2017    Skin ulcer of right ankle, limited to breakdown of skin    Osteomyelitis, unspecified 02/26/2022    Osteomyelitis of toe    Other chest pain 09/21/2013    Atypical chest pain    Other conditions influencing health status     Skin Cancer    Other conditions influencing health  status 10/08/2017    Closed displaced fracture of olecranon process of left ulna with intra-articular extension, initial encounter    Other specified health status 07/23/2014    Foreign travel    Patient's noncompliance with other medical treatment and regimen due to unspecified reason 10/28/2016    Noncompliance with therapeutic plan    Patient's other noncompliance with medication regimen 06/04/2016    History of medication noncompliance    Personal history of (healed) stress fracture 12/30/2013    History of stress fracture    Personal history of diseases of the skin and subcutaneous tissue 02/26/2022    History of chronic skin ulcer    Personal history of other diseases of the nervous system and sense organs 03/25/2016    History of peripheral neuropathy    Personal history of other diseases of the nervous system and sense organs 02/02/2016    History of peripheral neuropathy    Personal history of other endocrine, nutritional and metabolic disease 07/13/2015    History of hypothyroidism    Personal history of other specified conditions 07/28/2019    History of syncope    Personal history of other specified conditions 05/07/2018    History of syncope    Personal history of other specified conditions 06/25/2015    History of nausea    Unspecified fracture of left foot, initial encounter for closed fracture 06/04/2016    Foot fracture, left    Unspecified injury of head, initial encounter 04/20/2016    Closed head injury, initial encounter    Unspecified injury of unspecified elbow, initial encounter 04/07/2017    Elbow injury    Unsteadiness on feet 04/15/2014    Unsteady gait     PAST SURGICAL HISTORY:   Past Surgical History:   Procedure Laterality Date    COLONOSCOPY  10/09/2013    Complete Colonoscopy    OTHER SURGICAL HISTORY  07/28/2019    Insertion of cardiac monitor    OTHER SURGICAL HISTORY  04/15/2014    Reported Hx Of Hip Replacement - Left Side    OTHER SURGICAL HISTORY  01/28/2020    Hip replacement     OTHER SURGICAL HISTORY  2015    Interrogation Of Implantable Loop Recorder By Physician In Person    OTHER SURGICAL HISTORY  2017    Arthroscopy Elbow Left    OTHER SURGICAL HISTORY  10/09/2013    Shoulder Surgery Left    SKIN CANCER EXCISION  10/09/2013    Mohs Micrographic Surgery Face     FAMILY HISTORY:   Family History   Problem Relation Name Age of Onset    Other (cardiac pacemaker) Mother      Coronary artery disease Mother      Other (history of heart artery stent) Mother      Cancer Mother      Other (kurtis cell cancer) Mother      Arthritis Mother      Mental illness Brother          living in handicapped assisted living facility permanently [Other]    Other (angina pectoris) Paternal Grandmother       SOCIAL HISTORY:   Social History     Tobacco Use    Smoking status: Former     Current packs/day: 0.00     Types: Cigarettes     Quit date:      Years since quittin.9    Smokeless tobacco: Never   Vaping Use    Vaping status: Never Used   Substance Use Topics    Alcohol use: Not Currently    Drug use: Never     CURRENT ALLERGIES:   Allergies as of 2024 - Reviewed 2024   Allergen Reaction Noted    Lisinopril Hives 2023    Latex Unknown 2023    Codeine GI Upset 2023    House dust mite Runny nose 2023    Hydrocodone-acetaminophen Nausea/vomiting 2024    Lactose GI Upset 2024    Mold Runny nose 2023    Tree and shrub pollen Runny nose 2023     MEDICATIONS:    Current Facility-Administered Medications:     amLODIPine (Norvasc) tablet 2.5 mg, 2.5 mg, oral, Daily, Flavio Casillas, DO, 2.5 mg at 24    aspirin chewable tablet 81 mg, 81 mg, oral, Daily, Flavio Casillas, DO, 81 mg at 24 09    atorvastatin (Lipitor) tablet 40 mg, 40 mg, oral, Nightly, Flavio Casillas, DO, 40 mg at 24 0986    budesonide (Pulmicort) 0.25 mg/2 mL nebulizer solution 0.25 mg, 0.25 mg, nebulization, BID, Flavio Casillas, DO, 0.25 mg at 2441     busPIRone (Buspar) tablet 5 mg, 5 mg, oral, TID PRN, Flavio Casillas, DO    DULoxetine (Cymbalta) DR capsule 60 mg, 60 mg, oral, BID, Flavio Casillas, DO, 60 mg at 11/22/24 0946    [Held by provider] empagliflozin (Jardiance) tablet 10 mg, 10 mg, oral, Daily, Flavio Casillas, DO    escitalopram (Lexapro) tablet 20 mg, 20 mg, oral, Daily, Flavio Casillas, DO, 20 mg at 11/22/24 0945    formoterol (Perforomist) 20 mcg/2 mL nebulizer solution 20 mcg, 20 mcg, nebulization, q12h, Flavio Casillas, DO, 20 mcg at 11/22/24 0928    heparin (porcine) injection 5,000 Units, 5,000 Units, subcutaneous, q8h, Flavio Casillas, DO, 5,000 Units at 11/21/24 2256    ipratropium-albuteroL (Duo-Neb) 0.5-2.5 mg/3 mL nebulizer solution 3 mL, 3 mL, nebulization, q4h PRN, Flavio Casillas, DO    levothyroxine (Synthroid, Levoxyl) tablet 50 mcg, 50 mcg, oral, Daily before breakfast, Flavio Casillas, DO    mirtazapine (Remeron) tablet 15 mg, 15 mg, oral, Nightly, Flavio Casillas, DO, 15 mg at 11/21/24 2256    montelukast (Singulair) tablet 10 mg, 10 mg, oral, Nightly, Flavio Casillas, DO, 10 mg at 11/21/24 2256    pantoprazole (ProtoNix) EC tablet 40 mg, 40 mg, oral, BID, Flavio Casillas, DO, 40 mg at 11/22/24 0946    piperacillin-tazobactam (Zosyn) 3.375 g in dextrose (iso) IV 50 mL, 3.375 g, intravenous, q8h, Flavio Casillas, DO, Stopped at 11/22/24 1007    pregabalin (Lyrica) capsule 150 mg, 150 mg, oral, q6h, Flavio Casillas, DO, 150 mg at 11/21/24 2256    propranolol (Inderal) tablet 10 mg, 10 mg, oral, BID, Flavio Casillas, DO, 10 mg at 11/22/24 0946    spironolactone (Aldactone) tablet 25 mg, 25 mg, oral, Daily, Flavio Casillas, DO, 25 mg at 11/22/24 0946    tamsulosin (Flomax) 24 hr capsule 0.4 mg, 0.4 mg, oral, Nightly, Flavio Casillas, DO, 0.4 mg at 11/21/24 3058    [Held by provider] torsemide (Demadex) tablet 20 mg, 20 mg, oral, Daily, Flavio Casillas, DO    vancomycin (Vancocin) 1,500 mg in sodium chloride 0.9%  mL, 1,500 mg, intravenous, q24h, Flavio Casillas, DO    vancomycin (Vancocin) pharmacy  to dose - pharmacy monitoring, , miscellaneous, Daily PRN, Flavio Casillas DO       .        PHYSICAL EXAM:  Patient Vitals for the past 24 hrs:   BP Temp Temp src Pulse Resp SpO2 Height Weight   11/22/24 0944 114/68 -- -- 79 -- -- -- --   11/22/24 0928 -- -- -- -- -- 96 % -- --   11/22/24 0800 137/72 36.5 °C (97.7 °F) -- 104 18 96 % -- --   11/21/24 2226 165/88 35.2 °C (95.4 °F) Temporal 63 18 94 % 1.829 m (6') 115 kg (254 lb 3.1 oz)   11/21/24 1947 159/82 -- -- 63 17 96 % -- --   11/21/24 1601 138/68 36.5 °C (97.7 °F) Temporal 82 17 96 % 1.829 m (6') 113 kg (250 lb)     Body mass index is 34.47 kg/m².            Labs:  Lab Results   Component Value Date    WBC 13.0 (H) 11/22/2024    HGB 13.3 (L) 11/22/2024    HCT 44.4 11/22/2024    MCV 96 11/22/2024     11/22/2024     Lab Results   Component Value Date    GLUCOSE 103 (H) 11/22/2024    CALCIUM 9.0 11/22/2024     11/22/2024    K 4.2 11/22/2024    CO2 27 11/22/2024     11/22/2024    BUN 26 (H) 11/22/2024    CREATININE 1.32 (H) 11/22/2024   ESR: --  Lab Results   Component Value Date    SEDRATE 56 (H) 11/21/2024     Lab Results   Component Value Date    CRP 2.49 (H) 11/21/2024     Lab Results   Component Value Date    ALT 31 11/21/2024    AST 30 11/21/2024    ALKPHOS 116 11/21/2024    BILITOT 0.6 11/21/2024       DATA:   Diagnostic tests reviewed for today's visit:    Labs this admission reviewed  Imagings this admission reviewed  Cultures: Reviewed        ASSESSMENT :   -Right third toe nonhealing wound infection  -Rule out osteomyelitis  -Renal failure  -Previous wound culture grew MRSA and ESBL E. coli  -Chronic hypoxic respiratory failure, history of CAD, history of orthostatic hypotension,History of PVD    PLAN:  -Consider right foot MRI  -Continue vancomycin and Zosyn  -Podiatry evaluation  -Closely monitor for antibiotics side effects including rash, Diarrhea/CDI, thrombocytopenia, MARIA L, etc.      Will continue to follow     Thank you so much for  "this consultation         Kathleen Patino MD.   Infectious Disease Attending        This note was partially created using voice recognition software and is inherently subject to errors including those of syntax and \"sound-alike\" substitutions which may escape proofreading. In such instances, original meaning may be extrapolated by contextual derivation       "

## 2024-11-22 NOTE — CARE PLAN
Problem: Skin  Goal: Participates in plan/prevention/treatment measures  Outcome: Progressing  Flowsheets (Taken 11/21/2024 2100)  Participates in plan/prevention/treatment measures: Elevate heels

## 2024-11-22 NOTE — PROGRESS NOTES
Attempted to meet with patient- he is off floor for procedure per nursing. Will attempt assessment at another time. Per EMR, patient is active with [facility].     Irene Loera RN

## 2024-11-22 NOTE — H&P
Internal Medicine    Admission H&P      Patient Angus Redman PCP Artie Haskins MD    MRN [id number]  Admission Date 11/21/2024      Chief Complaint/Reason for Admission:  Angus is a 69 y.o. male who presented today with  right toe infection    Subjective    Subjective   History of Presenting Illness  Mr. Angus Redman is a 69-year-old male with past medical history of recurrent syncope secondary to orthostatic hypotension/autonomic dysfunction/autonomic neuropathy, restrictive lung disease on 4 to 5 L nasal cannula at baseline, PSVT on propranolol, CAD w/ CCTA (02/2024) - 50% stenosis of LAD - 25-50% stenosis of RCA, alcohol use disorder c/b severe alcoholic polyneuropathy with autonomic dysfunction who presents to the ED with right toe infection.  He follows at [facility] wound care clinic and sees Dr. Jang, podiatry, as an outpatient.  He went to clinic yesterday, and was instructed by Dr. Jang to come to [facility] to be admitted for IV antibiotics and amputation.  However, he did not come until today due to being to care for his cats at home.  He could not tell if there was any drainage from his toe, but was told by Dr. Jang that was draining fluid on his assessment.  He has severe polyneuropathy, and could not tell if there was any pain. He continues to endorse urinary symptoms including increased urinary frequency/urgency and foul smelling urine    ED course:  V/S: 97.7 °F, 83 HR, 17 RR, 130/68, 96% on 3L  Labs: CBC was grossly unremarkable.  CMP showed elevated CR 1.62 (baseline 1.2-1.6).  CRP 2.49, ESR 56.  Lactate 0.7  EKG: Sinus rhythm with sinus arrhythmia vent rate 80 bpm, QTc 468.  Imaging: XR right foot showed soft tissue swelling in the right third digit with no deep soft tissue gas  Intervention: IV vancomycin and Rocephin were started for right toe infection.  Given 500 cc of IVF.    Past Medical History  -Card: recurrent syncope secondary to orthostatic hypotension/autonomic  dysfunction/autonomic neuropathy, pSVT on propranolol, Type 2 MI 2023/CAD w/ CCTA 2/2024 - 50% stenosis of LAD/25%-50% at RCA,  primary HTN  -Pulm: Restrictive lung disease on PFTs follows with pulmonology possibly due to right pleural thickening seen on CT imaging (not a CT surgery candidate) with 30-pack-year smoking history on 2 to 3 L of O2 at baseline  -Neuro: alcohol use disorder daily bottle of wine, severe alcoholic polyneuropathy with autonomic dysfunction  -ID: Chronic nonhealing MRSA positive b/l foot infections on Bactrim (Dr. Jang), osteomyelitis s/p left great toe and right 2nd toe amputation  -GI: alcohol use disorder c/b alcoholic gastritis  -MSK: aseptic necrosis of the femoral head, gout  -Endo: hypothyroidism  -: BPH    Past Surgical History   S/p insertable loop recorder (2015)     Social History  Current tobacco use (30-pack-year history), current alcohol use (daily bottle of wine), denies illicit drug use     Family History  Reviewed and noncontributory to today's presentation unless otherwise noted.    Allergies:  As documented in EMR were reviewed and discussed with patient.      CODE STATUS: DNR        Home Medication:  Prior to Admission medications    Medication Sig Start Date End Date Taking? Authorizing Provider   albuterol (ProAir HFA) 90 mcg/actuation inhaler Inhale 2 puffs every 6 hours if needed for wheezing. 6/24/24  Yes Kenton Montano MD   albuterol 2.5 mg /3 mL (0.083 %) nebulizer solution Take 3 mL (2.5 mg) by nebulization every 6 hours if needed for wheezing. 10/8/24 10/8/25 Yes Artie Haskins MD   alendronate (Fosamax) 70 mg tablet Take 1 tablet by mouth every 7 days. Take in the morning with a full glass of water at least 30 minutes before first food, drink, or medications of the day. 9/12/24  Yes Artie Haskins MD   amLODIPine (Norvasc) 2.5 mg tablet Take 1 tablet (2.5 mg) by mouth once daily. For blood pressure 9/12/24  Yes Artie Haskins MD   aspirin 81 mg chewable tablet  Chew and swallow 1 tablet by mouth daily. 9/12/24  Yes Artie Haskins MD   atorvastatin (Lipitor) 40 mg tablet Take 1 tablet (40 mg) by mouth once daily.  Patient taking differently: Take 1 tablet (40 mg) by mouth once daily at bedtime. 7/24/24 7/24/25 Yes Artie Haskins MD   busPIRone (Buspar) 5 mg tablet Take 1 tablet by mouth three times daily as needed for anxiety. 9/12/24  Yes Artie Haskins MD   cefdinir (Omnicef) 300 mg capsule Take 1 capsule (300 mg) by mouth 2 times a day. 11/19/24  Yes Braden No MD   cyanocobalamin (Vitamin B-12) 1,000 mcg tablet Take 1 tablet (1,000 mcg) by mouth once daily. As directed 1/15/24  Yes Artie Haskins MD   DULoxetine (Cymbalta) 60 mg DR capsule Take 1 capsule by mouth twice daily. 9/27/24  Yes Artie Haskins MD   empagliflozin (Jardiance) 10 mg Take 1 tablet (10 mg) by mouth once daily. 10/14/24 10/14/25 Yes Angel Harry DO   ergocalciferol (Vitamin D-2) 1.25 MG (92656 UT) capsule Take 1 capsule (1,250 mcg) by mouth 1 (one) time per week. 10/8/24  Yes Artie Haskins MD   escitalopram (Lexapro) 20 mg tablet Take 1 tablet (20 mg) by mouth once daily. 9/25/24  Yes Anjum Hu MD   ferrous sulfate, 325 mg ferrous sulfate, (FeroSuL) tablet Take 1 tablet by mouth once daily with breakfast. 9/12/24  Yes Artie Haskins MD   fexofenadine (Allegra) 180 mg tablet Take 1 tablet (180 mg) by mouth once daily as needed (as needed for allergies and congestion). 6/8/22  Yes Josee Tavarez MD   folic acid (Folvite) 1 mg tablet TAKE ONE TABLET BY MOUTH EVERY DAY for folate deficiency 7/9/24  Yes Artie Haskins MD   ipratropium (Atrovent) 0.02 % nebulizer solution Take 2.5 mL (0.5 mg) by nebulization 4 times a day. 10/8/24 10/8/25 Yes Artie Haskins MD   levothyroxine (Synthroid, Levoxyl) 50 mcg tablet Take 1 tablet (50 mcg) by mouth once daily in the morning. Take before meals. 8/30/24  Yes Artie Haskins MD   mirtazapine (Remeron) 15 mg tablet Take 1 tablet (15 mg) by mouth once  daily at bedtime. 7/24/24  Yes Artie Haskins MD   montelukast (Singulair) 10 mg tablet Take 1 tablet by mouth once daily at bedtime. 9/12/24  Yes Artie Haskins MD   pantoprazole (ProtoNix) 40 mg EC tablet Take 1 tablet (40 mg) by mouth 2 times a day. 8/16/24  Yes Artie Haskins MD   pregabalin (Lyrica) 150 mg capsule Take 1 capsule (150 mg) by mouth every 6 hours. 9/11/24  Yes Raheem Graham MD   propranolol (Inderal) 10 mg tablet Take 1 tablet (10 mg) by mouth 2 times a day. 8/30/24  Yes Artie Haskins MD   spironolactone (Aldactone) 25 mg tablet Take 1 tablet (25 mg) by mouth once daily. 10/14/24 10/14/25 Yes Angel Harry DO   tamsulosin (Flomax) 0.4 mg 24 hr capsule Take 1 capsule (0.4 mg) by mouth once daily at bedtime. 5/11/24  Yes Artie Haskins MD   torsemide (Demadex) 20 mg tablet Take 1 tablet (20 mg) by mouth once daily. 10/14/24 10/14/25 Yes Angel Harry DO   amoxicillin-pot clavulanate (Augmentin) 875-125 mg tablet Take 1 tablet by mouth 2 times a day for 7 days. 11/18/24 11/19/24  Dhara Campoverde PA-C   azithromycin (Zithromax) 250 mg tablet Take 2 tablets (500 mg) by mouth once daily for 1 day, THEN 1 tablet (250 mg) once daily for 4 days. 11/18/24 11/19/24  Dhara Campoverde PA-C          Review of System:  Review of Systems  SEE HPI    Objective    Objective   Vital Signs:  Visit Vitals  /88 (BP Location: Left arm, Patient Position: Lying)   Pulse 63   Temp 35.2 °C (95.4 °F) (Temporal)   Resp 18   Ht 1.829 m (6')   Wt 115 kg (254 lb 3.1 oz)   SpO2 (!) 87%   BMI 34.47 kg/m²   Smoking Status Former   BSA 2.42 m²        Physical Examination:  General: Patient does not appear to be in any acute distress, alert, awake  Head: lacerations above bilateral eyebrows on forehead  Eyes: Normal external exam, EOMI  Cardiovascular: RRR, S1/S2, no murmurs, rubs, or gallops, radial pulses +2  Pulmonary: CTAB, no respiratory distress. On 4L NC  Abdomen: soft, non-tender, nondistended, no guarding  or rebound, no masses noted  Neuro: A&O x3, no focal deficits, strength and sensation intact bilaterally  Extremities: BLE with erythema, L foot covered in dressing; R foot: right third digit with ulceration and surrounding erythema but no appreciable drainage  Skin- Warm. Dry. No lesions, contusions, or erythema.  Psychiatric: Judgment intact. Appropriate mood and behavior      Laboratory Data:  Results for orders placed or performed during the hospital encounter of 11/21/24 (from the past 24 hours)   CBC and Auto Differential   Result Value Ref Range    WBC 9.9 4.4 - 11.3 x10*3/uL    nRBC 0.0 0.0 - 0.0 /100 WBCs    RBC 4.62 4.50 - 5.90 x10*6/uL    Hemoglobin 13.5 13.5 - 17.5 g/dL    Hematocrit 43.8 41.0 - 52.0 %    MCV 95 80 - 100 fL    MCH 29.2 26.0 - 34.0 pg    MCHC 30.8 (L) 32.0 - 36.0 g/dL    RDW 17.2 (H) 11.5 - 14.5 %    Platelets 251 150 - 450 x10*3/uL    Neutrophils % 70.3 40.0 - 80.0 %    Immature Granulocytes %, Automated 0.8 0.0 - 0.9 %    Lymphocytes % 18.8 13.0 - 44.0 %    Monocytes % 6.9 2.0 - 10.0 %    Eosinophils % 2.8 0.0 - 6.0 %    Basophils % 0.4 0.0 - 2.0 %    Neutrophils Absolute 6.94 (H) 1.60 - 5.50 x10*3/uL    Immature Granulocytes Absolute, Automated 0.08 0.00 - 0.50 x10*3/uL    Lymphocytes Absolute 1.86 0.80 - 3.00 x10*3/uL    Monocytes Absolute 0.68 0.05 - 0.80 x10*3/uL    Eosinophils Absolute 0.28 0.00 - 0.40 x10*3/uL    Basophils Absolute 0.04 0.00 - 0.10 x10*3/uL   Comprehensive metabolic panel   Result Value Ref Range    Glucose 101 (H) 74 - 99 mg/dL    Sodium 137 136 - 145 mmol/L    Potassium 4.5 3.5 - 5.3 mmol/L    Chloride 100 98 - 107 mmol/L    Bicarbonate 27 21 - 32 mmol/L    Anion Gap 15 10 - 20 mmol/L    Urea Nitrogen 30 (H) 6 - 23 mg/dL    Creatinine 1.62 (H) 0.50 - 1.30 mg/dL    eGFR 45 (L) >60 mL/min/1.73m*2    Calcium 9.1 8.6 - 10.3 mg/dL    Albumin 4.2 3.4 - 5.0 g/dL    Alkaline Phosphatase 116 33 - 136 U/L    Total Protein 7.8 6.4 - 8.2 g/dL    AST 30 9 - 39 U/L     Bilirubin, Total 0.6 0.0 - 1.2 mg/dL    ALT 31 10 - 52 U/L   Lactate   Result Value Ref Range    Lactate 0.7 0.4 - 2.0 mmol/L   Protime-INR   Result Value Ref Range    Protime 12.3 9.8 - 12.8 seconds    INR 1.1 0.9 - 1.1   Type And Screen   Result Value Ref Range    ABO TYPE A     Rh TYPE POS     ANTIBODY SCREEN NEG    C-Reactive Protein   Result Value Ref Range    C-Reactive Protein 2.49 (H) <1.00 mg/dL   Sedimentation Rate   Result Value Ref Range    Sedimentation Rate 56 (H) 0 - 20 mm/h   Green Top   Result Value Ref Range    Extra Tube Hold for add-ons.      *Note: Due to a large number of results and/or encounters for the requested time period, some results have not been displayed. A complete set of results can be found in Results Review.       Imaging:  Vascular US lower extremity venous duplex right    Result Date: 11/21/2024            [facility] [place] [address] [place] [address]     Tel [phone] Fax [phone]  Vascular Lab Report  [facility]TREM[facility]EN[facility]UP[facility] RIGHT Patient Name:      PRUDENCIO Alcantar Physician:  [id number] Charlotte Carslon MD, RPVI Study Date:        11/21/2024           Ordering Provider:  [id number] NILTON JONES MRN/PID:           [id number]             Fellow: Accession#:        [id number]         Technologist:       Brii So RVROSALIND Date of Birth/Age: 1955 / 69 years Technologist 2: Gender:            M                    Encounter#:         [id number] Admission Status:  Emergency            Location Performed: [facility]  Diagnosis/ICD: Pain in right leg-M79.604 CPT Codes:     81953 Peripheral venous duplex scan for DVT Limited  Pertinent History: Obesity and PVD.  CONCLUSIONS:  Right Lower Venous: No evidence of acute deep vein thrombus visualized in the right lower extremity. Poor visualization of peroneal veins in grayscale. Peroneal veins visualized in segments.  Additional Findings; Lymph nodes in groin largest measuring 1.27cm x 0.95 cm. Soft tissue edema visualized in the calf. Left Lower Venous: The left common femoral vein demonstrates normal spontaneous and respirophasic flow.  Comparison: Compared with study from 5/27/2021, no significant change.  Imaging & Doppler Findings:  Right                 Compressible Thrombus        Flow Distal External Iliac     Yes        None   Spontaneous/Phasic CFV                       Yes        None   Spontaneous/Phasic PFV                       Yes        None FV Proximal               Yes        None   Spontaneous/Phasic FV Mid                    Yes        None FV Distal                 Yes        None Popliteal                 Yes        None   Spontaneous/Phasic Peroneal                  Yes        None PTV                       Yes        None  Left        Flow CFV  Spontaneous/Phasic  [id number] Charlotte Carlson MD, RPZAYRA Electronically signed by [id number] CHRISTINE Kendrick MD on 11/21/2024 at 7:45:39 PM  ** Final **     XR foot right 3+ views    Result Date: 11/21/2024  Interpreted By:  Tayo Menjivar, STUDY: XR FOOT RIGHT 3+ VIEWS   INDICATION: Signs/Symptoms:worsening drainage of 3rd digit, check for subcutaneous air.   COMPARISON: November 18, 2024   ACCESSION NUMBER(S): [id number]   ORDERING CLINICIAN: INLTON JONES   FINDINGS: Postsurgical changes of the right foot. 3rd digit soft tissue swelling noted.. No definite osseous abnormality seen. No of no definite soft tissue gas.   The post amputation changes 2nd digit unchanged.       Soft tissue swelling right 3rd digit, nonspecific. No deep soft tissue gas. If there is a high degree of concern for underlying deep soft tissue infection or osseous infection, MRI can be considered for further evaluation.   Signed by: Tayo Menjivar 11/21/2024 6:32 PM Dictation workstation:   [id number]      Medications:  Scheduled medications    Continuous medications    PRN medications      Assessment     Assessment & Plan   Mr. Angus Redman is a 69 y.o. male who presents with right toe infection    Principal Problem:    Foot infection       #SSTI of 3rd digit of R foot  #UTI with recent culture growing ESBL E. Coli  Imaging showed soft tissue swelling, but no clear evidence of osteomyelitis; inflammatory markers elevated. No evidence of soft tissue gas or necrotizing fascitis. Podiatry consulted as patient states Dr. Bull is planning to do an intervention regarding his infected toe following evaluation in the clinic. Patient was recently discharged from [facility] with a course of Cefdinir for treatment of UTI; however, cultures returned with resistance to cephalosporins.   -IV Vancomycin and Zosyn to cover for SSTI and urinary infection  -Podiatry consulted, appreciate recommendations  -ID consulted for abx management, appreciate recommendations  -Blood Cx x2 pending  -NPO after midnight        Diet:NPO after midnight  DVT prophylaxis: Heparin  Telemetry: Not indicated  Consults: Podiatry,  ID      Code Status: DNR       Dispo: Anticipate 2-3 nights of admission pending Podiatry evaluation.     Case seen and discussed with attending  Flavio Casillas DO  PGY-3 Internal Medicine  This note has been transcribed using Dragon voice recognition system and there is a possibility of unintentional typing misprints.  Any information found to be copied from previous providers is done in the best interest of the patient to provide accurate, quality, and continuity of care.

## 2024-11-22 NOTE — ANESTHESIA POSTPROCEDURE EVALUATION
Patient: Angus Redman    Procedure Summary       Date: 11/22/24 Room / Location: [email or web address] 08 / [facility] OR    Anesthesia Start: 1454 Anesthesia Stop: 1602    Procedures:       Amputation Digit Foot (Right)      Debridement Foot (Bilateral)      APPLICATION,ALLOGRAFT,SKIN (Bilateral) Diagnosis:       Osteomyelitis of third toe of right foot (Multi)      (Osteomyelitis of third toe of right foot (Multi) [M86.9])    Surgeons: Boone Bull DPM Responsible Provider: Ava Manzano MD    Anesthesia Type: MAC ASA Status: 3            Anesthesia Type: MAC    Vitals Value Taken Time   /55 11/22/24 1602   Temp 36.8 11/22/24 1602   Pulse 55 11/22/24 1602   Resp 16 11/22/24 1602   SpO2 97 11/22/24 1602       Anesthesia Post Evaluation    Patient location during evaluation: PACU  Patient participation: complete - patient participated  Level of consciousness: sleepy but conscious  Pain management: adequate  Airway patency: patent  Cardiovascular status: acceptable  Respiratory status: acceptable and face mask  Hydration status: acceptable  Postoperative Nausea and Vomiting: none      No notable events documented.

## 2024-11-22 NOTE — ANESTHESIA PREPROCEDURE EVALUATION
Patient: Angus Redman    Procedure Information       Anesthesia Start Date/Time: 11/22/24 2354    Procedures:       Amputation Digit Foot (Right)      Debridement Foot (Bilateral)      APPLICATION,ALLOGRAFT,SKIN (Bilateral)    Location: [facility] OR 08 / [facility] OR    Surgeons: Boone Bull DPM            Relevant Problems   Anesthesia (within normal limits)      Cardiac   (+) Benign essential hypertension   (+) Hyperlipidemia   (+) NSTEMI (non-ST elevated myocardial infarction) (Multi)   (+) Paroxysmal atrial fibrillation (Multi)   (+) Peripheral vascular disease, unspecified (CMS-HCC)      Pulmonary   (+) Mild intermittent asthma   (+) Restrictive lung disease      Neuro   (+) Alcohol-induced polyneuropathy (Multi)   (+) Anxiety   (+) Carpal tunnel syndrome   (+) Cubital tunnel syndrome on left   (+) Depression, controlled   (+) Lumbar radiculopathy   (+) 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)   (+) Peripheral neuropathy   (+) Situational depression      GI   (+) Gastroesophageal reflux disease      /Renal   (+) BPH with obstruction/lower urinary tract symptoms      Endocrine   (+) Hypothyroidism      Hematology   (+) Anemia   (+) Macrocytic anemia      Musculoskeletal   (+) Carpal tunnel syndrome   (+) Osteoarthritis   (+) Traumatic rhabdomyolysis (CMS-[facility])      ID   (+) Foot infection   (+) Osteomyelitis of third toe of right foot (Multi)      Skin   (+) Eczema   (+) Skin cancer      Respiratory   (+) Reactive airway disease (HHS-HCC)      Circulatory   (+) Heart failure   (+) Orthostatic hypotension       Clinical information reviewed:    Allergies  Meds               NPO Detail:  No data recorded     Physical Exam    Airway  Mallampati: II  TM distance: >3 FB  Neck ROM: full     Cardiovascular   Rhythm: regular  Rate: normal     Dental    Pulmonary   Breath sounds clear to auscultation     Abdominal   Abdomen: soft             Anesthesia Plan    History of general anesthesia?:  yes  History of complications of general anesthesia?: no    ASA 3     MAC     intravenous induction   Anesthetic plan and risks discussed with patient.    Plan discussed with CAA, CRNA and attending.

## 2024-11-22 NOTE — SIGNIFICANT EVENT
S/P right third digit amputation with application of allograft to sub first met heads bilaterally and lateral aspect of right fifth digit.   Restart all medications and diet.  Nursing okay to reinforce if bleeding  Podiatry to change dressings within the next 24-48 hours   Elevate feet when at rest  NWB to right and left foot  Pain management per Primary    Case discussed with Attending, Dr. Jorgito Ratliff PGY-2

## 2024-11-23 LAB
ANION GAP SERPL CALC-SCNC: 11 MMOL/L (ref 10–20)
BUN SERPL-MCNC: 24 MG/DL (ref 6–23)
CALCIUM SERPL-MCNC: 8 MG/DL (ref 8.6–10.3)
CHLORIDE SERPL-SCNC: 102 MMOL/L (ref 98–107)
CO2 SERPL-SCNC: 25 MMOL/L (ref 21–32)
CREAT SERPL-MCNC: 1.38 MG/DL (ref 0.5–1.3)
EGFRCR SERPLBLD CKD-EPI 2021: 55 ML/MIN/1.73M*2
ERYTHROCYTE [DISTWIDTH] IN BLOOD BY AUTOMATED COUNT: 17 % (ref 11.5–14.5)
GLUCOSE SERPL-MCNC: 105 MG/DL (ref 74–99)
HCT VFR BLD AUTO: 34.8 % (ref 41–52)
HGB BLD-MCNC: 10.5 G/DL (ref 13.5–17.5)
MAGNESIUM SERPL-MCNC: 2.27 MG/DL (ref 1.6–2.4)
MCH RBC QN AUTO: 29 PG (ref 26–34)
MCHC RBC AUTO-ENTMCNC: 30.2 G/DL (ref 32–36)
MCV RBC AUTO: 96 FL (ref 80–100)
NRBC BLD-RTO: 0 /100 WBCS (ref 0–0)
PLATELET # BLD AUTO: 150 X10*3/UL (ref 150–450)
POTASSIUM SERPL-SCNC: 3.9 MMOL/L (ref 3.5–5.3)
RBC # BLD AUTO: 3.62 X10*6/UL (ref 4.5–5.9)
SODIUM SERPL-SCNC: 134 MMOL/L (ref 136–145)
VANCOMYCIN SERPL-MCNC: 18.1 UG/ML (ref 5–20)
WBC # BLD AUTO: 6.4 X10*3/UL (ref 4.4–11.3)

## 2024-11-23 PROCEDURE — 99232 SBSQ HOSP IP/OBS MODERATE 35: CPT | Performed by: INTERNAL MEDICINE

## 2024-11-23 PROCEDURE — 2500000004 HC RX 250 GENERAL PHARMACY W/ HCPCS (ALT 636 FOR OP/ED)

## 2024-11-23 PROCEDURE — 83735 ASSAY OF MAGNESIUM: CPT | Performed by: INTERNAL MEDICINE

## 2024-11-23 PROCEDURE — 36415 COLL VENOUS BLD VENIPUNCTURE: CPT | Performed by: INTERNAL MEDICINE

## 2024-11-23 PROCEDURE — 85027 COMPLETE CBC AUTOMATED: CPT | Performed by: INTERNAL MEDICINE

## 2024-11-23 PROCEDURE — 94640 AIRWAY INHALATION TREATMENT: CPT

## 2024-11-23 PROCEDURE — 82374 ASSAY BLOOD CARBON DIOXIDE: CPT | Performed by: INTERNAL MEDICINE

## 2024-11-23 PROCEDURE — 2500000005 HC RX 250 GENERAL PHARMACY W/O HCPCS: Performed by: INTERNAL MEDICINE

## 2024-11-23 PROCEDURE — 80202 ASSAY OF VANCOMYCIN: CPT

## 2024-11-23 PROCEDURE — 2500000002 HC RX 250 W HCPCS SELF ADMINISTERED DRUGS (ALT 637 FOR MEDICARE OP, ALT 636 FOR OP/ED)

## 2024-11-23 PROCEDURE — 2500000001 HC RX 250 WO HCPCS SELF ADMINISTERED DRUGS (ALT 637 FOR MEDICARE OP): Performed by: INTERNAL MEDICINE

## 2024-11-23 PROCEDURE — 1100000001 HC PRIVATE ROOM DAILY

## 2024-11-23 PROCEDURE — 2500000001 HC RX 250 WO HCPCS SELF ADMINISTERED DRUGS (ALT 637 FOR MEDICARE OP)

## 2024-11-23 RX ORDER — OXYCODONE HYDROCHLORIDE 5 MG/1
5 TABLET ORAL EVERY 6 HOURS PRN
Status: DISCONTINUED | OUTPATIENT
Start: 2024-11-23 | End: 2024-11-27 | Stop reason: HOSPADM

## 2024-11-23 ASSESSMENT — COGNITIVE AND FUNCTIONAL STATUS - GENERAL
DAILY ACTIVITIY SCORE: 21
HELP NEEDED FOR BATHING: A LITTLE
STANDING UP FROM CHAIR USING ARMS: A LITTLE
MOVING FROM LYING ON BACK TO SITTING ON SIDE OF FLAT BED WITH BEDRAILS: A LITTLE
WALKING IN HOSPITAL ROOM: A LOT
CLIMB 3 TO 5 STEPS WITH RAILING: TOTAL
MOBILITY SCORE: 22
STANDING UP FROM CHAIR USING ARMS: A LITTLE
MOBILITY SCORE: 12
MOVING TO AND FROM BED TO CHAIR: TOTAL
MOVING TO AND FROM BED TO CHAIR: A LOT
TURNING FROM BACK TO SIDE WHILE IN FLAT BAD: A LITTLE
WALKING IN HOSPITAL ROOM: TOTAL
DAILY ACTIVITIY SCORE: 24
HELP NEEDED FOR BATHING: A LITTLE
DRESSING REGULAR LOWER BODY CLOTHING: A LITTLE
DAILY ACTIVITIY SCORE: 21
CLIMB 3 TO 5 STEPS WITH RAILING: A LITTLE
TURNING FROM BACK TO SIDE WHILE IN FLAT BAD: A LITTLE
WALKING IN HOSPITAL ROOM: A LITTLE
MOBILITY SCORE: 16
TOILETING: A LITTLE
CLIMB 3 TO 5 STEPS WITH RAILING: A LOT
TOILETING: A LITTLE
DRESSING REGULAR LOWER BODY CLOTHING: A LITTLE

## 2024-11-23 ASSESSMENT — PAIN SCALES - GENERAL
PAINLEVEL_OUTOF10: 0 - NO PAIN
PAINLEVEL_OUTOF10: 8
PAINLEVEL_OUTOF10: 8
PAINLEVEL_OUTOF10: 0 - NO PAIN
PAINLEVEL_OUTOF10: 7

## 2024-11-23 ASSESSMENT — PAIN DESCRIPTION - LOCATION: LOCATION: FOOT

## 2024-11-23 ASSESSMENT — PAIN - FUNCTIONAL ASSESSMENT
PAIN_FUNCTIONAL_ASSESSMENT: 0-10
PAIN_FUNCTIONAL_ASSESSMENT: 0-10

## 2024-11-23 ASSESSMENT — PAIN DESCRIPTION - ORIENTATION
ORIENTATION: RIGHT;LEFT
ORIENTATION: RIGHT;LEFT

## 2024-11-23 NOTE — CARE PLAN
The patient's goals for the shift include  for patient to go to surgery.    The clinical goals for the shift include see plan of care    Over the shift, the patient did not make progress toward the following goals. Barriers to progression include NWB. Recommendations to address these barriers include ROM.

## 2024-11-23 NOTE — NURSING NOTE
Patient NPO for possible surgery today on his Right foot, 3rd digit. Dr. Bull called and said he'd be in to do the surgery around 1500. Patient taken down for surgery, Right 3rd digit amputation, charito foot debridement, and charito allograft. Patient has staples, Xeroform, 4x4, ABD, Kerlix, and ACE wrap. Patient NWB to BLE. Patient returned back to the unit around 6:00pm. Patient has no complaints and says he feels great.

## 2024-11-23 NOTE — PROGRESS NOTES
Angus Redman is a 69 y.o. male on day 2 of admission presenting with Foot infection.    Subjective   Pt examined and evaluated at bedside, found resting comfortably.  Pt states that their pain is well controlled, denies any constitutional symptoms, and has no other complaints at this time.        Objective     Dressings clean, dry, and intact.  Dressings not disturbed at this time.    Last Recorded Vitals  Blood pressure 123/71, pulse 61, temperature 35.9 °C (96.6 °F), temperature source Temporal, resp. rate 18, height 1.829 m (6'), weight 115 kg (254 lb 3.1 oz), SpO2 97%.  Intake/Output last 3 Shifts:  I/O last 3 completed shifts:  In: 850 (7.4 mL/kg) [IV Piggyback:850]  Out: 1400 (12.1 mL/kg) [Urine:1400 (0.3 mL/kg/hr)]  Weight: 115.3 kg     Relevant Results  Results for orders placed or performed during the hospital encounter of 11/21/24 (from the past 24 hours)   Vancomycin   Result Value Ref Range    Vancomycin 18.1 5.0 - 20.0 ug/mL   CBC   Result Value Ref Range    WBC 6.4 4.4 - 11.3 x10*3/uL    nRBC 0.0 0.0 - 0.0 /100 WBCs    RBC 3.62 (L) 4.50 - 5.90 x10*6/uL    Hemoglobin 10.5 (L) 13.5 - 17.5 g/dL    Hematocrit 34.8 (L) 41.0 - 52.0 %    MCV 96 80 - 100 fL    MCH 29.0 26.0 - 34.0 pg    MCHC 30.2 (L) 32.0 - 36.0 g/dL    RDW 17.0 (H) 11.5 - 14.5 %    Platelets 150 150 - 450 x10*3/uL   Magnesium   Result Value Ref Range    Magnesium 2.27 1.60 - 2.40 mg/dL   Basic Metabolic Panel   Result Value Ref Range    Glucose 105 (H) 74 - 99 mg/dL    Sodium 134 (L) 136 - 145 mmol/L    Potassium 3.9 3.5 - 5.3 mmol/L    Chloride 102 98 - 107 mmol/L    Bicarbonate 25 21 - 32 mmol/L    Anion Gap 11 10 - 20 mmol/L    Urea Nitrogen 24 (H) 6 - 23 mg/dL    Creatinine 1.38 (H) 0.50 - 1.30 mg/dL    eGFR 55 (L) >60 mL/min/1.73m*2    Calcium 8.0 (L) 8.6 - 10.3 mg/dL     *Note: Due to a large number of results and/or encounters for the requested time period, some results have not been displayed. A complete set of results can be found  in Results Review.           Assessment/Plan   Assessment & Plan  Foot infection    Osteomyelitis of third toe of right foot (Multi)    Assessment:  - s/p 3rd digit amputation secondary to osteomyelitis, right foot (DOS: 11/22/24)  - s/p wound debridement with application of Integra Bilayer, left and right foot (DOS: 11/22/24)  - Cellulitis, LLE  - Alcoholic peripheral neuropathy     Plan:  - Patient examined and evaluated.  All findings discussed with patient to his satisfaction and understanding.  - Reviewed labs and chart.  Intraoperative bone culture obtained, pending.  - Systemic conditions managed by primary team.  Antibiotics managed by ID.  - Dressing CDI, not disturbed at this time.  Dressing changes to be performed every 1 to 2 days by podiatry.  - Patient to remain nonweightbearing due to graft placement, ok to use heels for pivot/transfer.  Patient would likely benefit from SNF placement.  - Will continue to follow inpatient.  Please contact podiatry with any acute questions or concerns.       Boone Bull DPM   Podiatric Surgery       I spent >35 minutes in the professional and overall care of this patient.      Boone Bull DPM

## 2024-11-23 NOTE — PROGRESS NOTES
Angus Redman is a 69 y.o. male on day 2 of admission presenting with Foot infection.    Subjective   Patient was laying in bed, he said that he was having a little bit of pain in the surgery site but denied having any fevers or chills.  He did have an appetite, he was feeling a little sleepy today, but very glad that he talked with Dr. Jang and was told that the surgery went very well.  He had no further questions comments or concerns today.    Objective     Physical Exam  General: Patient does not appear to be in any acute distress, alert, awake  Head: lacerations above bilateral eyebrows on forehead, significantly improved since yesterday  Eyes: Normal external exam  Cardiovascular: RRR, S1/S2, no murmurs, rubs, or gallops, radial pulses +2  Pulmonary: CTAB, no respiratory distress. On 3.5L NC  Abdomen: soft, non-tender, nondistended, no guarding or rebound, no masses noted  Neuro: A&O x3, no focal deficits, strength and sensation intact bilaterally  Extremities: BLE with erythema, L foot covered in dressing; R foot: Covered in Ace wrap dressing  Skin- Warm. Dry. No lesions, contusions, or erythema.  Psychiatric: Judgment intact. Appropriate mood and behavior    Last Recorded Vitals  Blood pressure 123/71, pulse 61, temperature 35.9 °C (96.6 °F), temperature source Temporal, resp. rate 18, height 1.829 m (6'), weight 115 kg (254 lb 3.1 oz), SpO2 97%.  Intake/Output last 3 Shifts:  I/O last 3 completed shifts:  In: 850 (7.4 mL/kg) [IV Piggyback:850]  Out: 1400 (12.1 mL/kg) [Urine:1400 (0.3 mL/kg/hr)]  Weight: 115.3 kg     Relevant Results  Scheduled medications  amLODIPine, 2.5 mg, oral, Daily  aspirin, 81 mg, oral, Daily  atorvastatin, 40 mg, oral, Nightly  budesonide, 0.25 mg, nebulization, BID  DULoxetine, 60 mg, oral, BID  [Held by provider] empagliflozin, 10 mg, oral, Daily  escitalopram, 20 mg, oral, Daily  formoterol, 20 mcg, nebulization, q12h  heparin (porcine), 5,000 Units, subcutaneous,  q8h  levothyroxine, 50 mcg, oral, Daily before breakfast  mirtazapine, 15 mg, oral, Nightly  montelukast, 10 mg, oral, Nightly  oxygen, , inhalation, Continuous - Inhalation  pantoprazole, 40 mg, oral, BID  piperacillin-tazobactam, 3.375 g, intravenous, q8h  pregabalin, 150 mg, oral, q6h  propranolol, 10 mg, oral, BID  spironolactone, 25 mg, oral, Daily  tamsulosin, 0.4 mg, oral, Nightly  [Held by provider] torsemide, 20 mg, oral, Daily  vancomycin, 1,500 mg, intravenous, q24h      Continuous medications     PRN medications  PRN medications: busPIRone, ipratropium-albuteroL, oxyCODONE, vancomycin    Results for orders placed or performed during the hospital encounter of 11/21/24 (from the past 24 hours)   Vancomycin   Result Value Ref Range    Vancomycin 18.1 5.0 - 20.0 ug/mL   CBC   Result Value Ref Range    WBC 6.4 4.4 - 11.3 x10*3/uL    nRBC 0.0 0.0 - 0.0 /100 WBCs    RBC 3.62 (L) 4.50 - 5.90 x10*6/uL    Hemoglobin 10.5 (L) 13.5 - 17.5 g/dL    Hematocrit 34.8 (L) 41.0 - 52.0 %    MCV 96 80 - 100 fL    MCH 29.0 26.0 - 34.0 pg    MCHC 30.2 (L) 32.0 - 36.0 g/dL    RDW 17.0 (H) 11.5 - 14.5 %    Platelets 150 150 - 450 x10*3/uL   Magnesium   Result Value Ref Range    Magnesium 2.27 1.60 - 2.40 mg/dL   Basic Metabolic Panel   Result Value Ref Range    Glucose 105 (H) 74 - 99 mg/dL    Sodium 134 (L) 136 - 145 mmol/L    Potassium 3.9 3.5 - 5.3 mmol/L    Chloride 102 98 - 107 mmol/L    Bicarbonate 25 21 - 32 mmol/L    Anion Gap 11 10 - 20 mmol/L    Urea Nitrogen 24 (H) 6 - 23 mg/dL    Creatinine 1.38 (H) 0.50 - 1.30 mg/dL    eGFR 55 (L) >60 mL/min/1.73m*2    Calcium 8.0 (L) 8.6 - 10.3 mg/dL     *Note: Due to a large number of results and/or encounters for the requested time period, some results have not been displayed. A complete set of results can be found in Results Review.     ECG 12 lead    Result Date: 11/22/2024  Sinus rhythm with marked sinus arrhythmia with 1st degree AV block Otherwise normal ECG When compared  with ECG of 18-NOV-2024 15:37, Premature atrial complexes are no longer Present TX interval has increased QRS axis Shifted right    Vascular US lower extremity venous duplex right    Result Date: 11/21/2024            [facility] [id number] [place] [address]     Tel [phone] Fax [phone]  Vascular Lab Report  VASC US LOWER EXTREMITY VENOUS DUPLEX RIGHT Patient Name:      PRUDENCIO DELGADO        Reading Physician:  [id number] Charlotte Carlson MD, RPVI Study Date:        11/21/2024           Ordering Provider:  [id number] NILTON JONES MRN/PID:           [id number]             Fellow: Accession#:        [id number]         Technologist:       Brii So RVT Date of Birth/Age: 1955 / 69 years Technologist 2: Gender:            M                    Encounter#:         [id number] Admission Status:  Emergency            Location Performed: [facility]  Diagnosis/ICD: Pain in right leg-M79.604 CPT Codes:     41186 Peripheral venous duplex scan for DVT Limited  Pertinent History: Obesity and PVD.  CONCLUSIONS:  Right Lower Venous: No evidence of acute deep vein thrombus visualized in the right lower extremity. Poor visualization of peroneal veins in grayscale. Peroneal veins visualized in segments. Additional Findings; Lymph nodes in groin largest measuring 1.27cm x 0.95 cm. Soft tissue edema visualized in the calf. Left Lower Venous: The left common femoral vein demonstrates normal spontaneous and respirophasic flow.  Comparison: Compared with study from 5/27/2021, no significant change.  Imaging & Doppler Findings:  Right                 Compressible Thrombus        Flow Distal External Iliac     Yes        None   Spontaneous/Phasic CFV                       Yes        None   Spontaneous/Phasic PFV                       Yes        None FV Proximal               Yes        None   Spontaneous/Phasic FV Mid                    Yes         None FV Distal                 Yes        None Popliteal                 Yes        None   Spontaneous/Phasic Peroneal                  Yes        None PTV                       Yes        None  Left        Flow CFV  Spontaneous/Phasic  [id number] CHRISTINE Kendrick MD Electronically signed by [id number] CHRISTINE Kendrick MD on 11/21/2024 at 7:45:39 PM  ** Final **     XR foot right 3+ views    Result Date: 11/21/2024  Interpreted By:  Tayo Menjivar, STUDY: XR FOOT RIGHT 3+ VIEWS   INDICATION: Signs/Symptoms:worsening drainage of 3rd digit, check for subcutaneous air.   COMPARISON: November 18, 2024   ACCESSION NUMBER(S): [id number]   ORDERING CLINICIAN: NILTON JONES   FINDINGS: Postsurgical changes of the right foot. 3rd digit soft tissue swelling noted.. No definite osseous abnormality seen. No of no definite soft tissue gas.   The post amputation changes 2nd digit unchanged.       Soft tissue swelling right 3rd digit, nonspecific. No deep soft tissue gas. If there is a high degree of concern for underlying deep soft tissue infection or osseous infection, MRI can be considered for further evaluation.   Signed by: Tayo Menjivar 11/21/2024 6:32 PM Dictation workstation:   [id number]    ECG 12 lead    Result Date: 11/20/2024  Sinus rhythm with frequent Premature atrial complexes Left anterior fascicular block Poor R-wave progression Abnormal ECG Confirmed by Donnell Riojas ([id number]) on 11/20/2024 2:49:31 PM    ECG 12 lead    Result Date: 11/20/2024  Sinus rhythm with 1st degree AV block with frequent Premature atrial complexes Leftward axis Abnormal ECG When compared with ECG of 24-SEP-2024 09:15, Sinus rhythm has replaced Ectopic atrial rhythm QRS axis Shifted left Confirmed by Donnell Riojas ([id number]) on 11/20/2024 2:48:49 PM    CT angio chest for pulmonary embolism    Result Date: 11/18/2024  Interpreted By:  Leonardo Celaya, STUDY: CT ANGIO CHEST FOR PULMONARY EMBOLISM; CT ABDOMEN PELVIS W IV CONTRAST;   11/18/2024 6:21 pm   INDICATION: Signs/Symptoms:fall. L flank pain. SOB before fall; Signs/Symptoms:fall, generalized pain, down for 24h after fall.     COMPARISON: 06/19/2015 CT abdomen/pelvis; 09/23/2024 CT PE chest   ACCESSION NUMBER(S): [id number]; [id number]   ORDERING CLINICIAN: KEN MENDIETA   TECHNIQUE: Contiguous axial images of the chest, abdomen, and pelvis were obtained after the intravenous administration of iodinated contrast. Coronal and sagittal reformatted images were reconstructed from the axial data. CT chest was obtained using PE angiographic protocol with MIP images created on a separate independent workstation.   FINDINGS: CT CHEST:   MEDIASTINUM AND LYMPH NODES:  The esophageal wall appears within normal limits.  No enlarged intrathoracic or axillary lymph nodes by imaging criteria. No pneumomediastinum.   VESSELS:  Normal caliber thoracic aorta without dissection. Mild aortic atherosclerosis.   HEART: Normal size.  Moderate coronary artery calcifications. No significant pericardial effusion.   LUNG, AIRWAYS, PLEURA: There are calcified pleural plaques involving the right posterior hemithorax with pleural thickening. There is adjacent round atelectasis in the right lower lobe. There is minimal left basilar slight dependent subsegmental atelectasis. No consolidation, pulmonary edema, effusion, or pneumothorax. There is emphysema.   CHEST WALL SOFT TISSUES: No discernible acute abnormality.   OSSEOUS STRUCTURES: There are numerous bilateral chronic rib fractures. For example, there are chronic fractures of the posterior right 8th, 10th, 11th, and 12th ribs; posterior left 2nd-7th ribs, posterior left 9th-12th ribs, and multiple lateral and anterior ribs bilaterally. Healed clavicle fracture involving the left clavicular head.     CT ABDOMEN/PELVIS:   ABDOMINAL WALL: No significant abnormality.   LIVER: Cirrhotic liver morphology. No lesions.   BILE DUCTS: No significant  intrahepatic or extrahepatic dilatation.   GALLBLADDER: No significant abnormality.   PANCREAS: No significant abnormality.   SPLEEN: The spleen is enlarged, measuring up to 14.5 cm in length.   ADRENALS: No significant abnormality.   KIDNEYS, URETERS, BLADDER: Moderately atrophic kidneys. No significant abnormality.   REPRODUCTIVE ORGANS: No significant abnormality.   VESSELS: Mild aortic atherosclerosis without AAA.   RETROPERITONEUM/LYMPH NODES: No acute retroperitoneal abnormality. No enlarged lymph nodes.   BOWEL/MESENTERY/PERITONEUM: There is a moderate amount of stool in the rectum distended up to 7.9 x 7.3 cm. No inflammatory bowel wall thickening or dilatation. Normal appendix.   No ascites, free air, or fluid collection.     MUSCULOSKELETAL: No acute osseous abnormality. No suspicious osseous lesion. Large area of heterotopic ossification arising from the right posteromedial femur resulting in marked narrowing of the ischiofemoral space. Healed fracture of the sacrococcygeal junction.       CT CHEST: 1. No acute pulmonary embolism. 2. Emphysema. 3. Right-sided calcified pleural plaques and round atelectasis which can be seen with prior asbestos exposure.   CT ABDOMEN/PELVIS: 1. No acute abnormality in the abdomen or pelvis.   2. Cirrhotic liver morphology and splenomegaly suggestive of portal hypertension.   MACRO: None.   Signed by: Leonardo Celaya 11/18/2024 7:12 PM Dictation workstation:   [id number]    CT abdomen pelvis w IV contrast    Result Date: 11/18/2024  Interpreted By:  Leonardo Celaya, STUDY: CT ANGIO CHEST FOR PULMONARY EMBOLISM; CT ABDOMEN PELVIS W IV CONTRAST;  11/18/2024 6:21 pm   INDICATION: Signs/Symptoms:fall. L flank pain. SOB before fall; Signs/Symptoms:fall, generalized pain, down for 24h after fall.     COMPARISON: 06/19/2015 CT abdomen/pelvis; 09/23/2024 CT PE chest   ACCESSION NUMBER(S): [id number]; [id number]   ORDERING CLINICIAN: KEN MENDIETA   TECHNIQUE:  Contiguous axial images of the chest, abdomen, and pelvis were obtained after the intravenous administration of iodinated contrast. Coronal and sagittal reformatted images were reconstructed from the axial data. CT chest was obtained using PE angiographic protocol with MIP images created on a separate independent workstation.   FINDINGS: CT CHEST:   MEDIASTINUM AND LYMPH NODES:  The esophageal wall appears within normal limits.  No enlarged intrathoracic or axillary lymph nodes by imaging criteria. No pneumomediastinum.   VESSELS:  Normal caliber thoracic aorta without dissection. Mild aortic atherosclerosis.   HEART: Normal size.  Moderate coronary artery calcifications. No significant pericardial effusion.   LUNG, AIRWAYS, PLEURA: There are calcified pleural plaques involving the right posterior hemithorax with pleural thickening. There is adjacent round atelectasis in the right lower lobe. There is minimal left basilar slight dependent subsegmental atelectasis. No consolidation, pulmonary edema, effusion, or pneumothorax. There is emphysema.   CHEST WALL SOFT TISSUES: No discernible acute abnormality.   OSSEOUS STRUCTURES: There are numerous bilateral chronic rib fractures. For example, there are chronic fractures of the posterior right 8th, 10th, 11th, and 12th ribs; posterior left 2nd-7th ribs, posterior left 9th-12th ribs, and multiple lateral and anterior ribs bilaterally. Healed clavicle fracture involving the left clavicular head.     CT ABDOMEN/PELVIS:   ABDOMINAL WALL: No significant abnormality.   LIVER: Cirrhotic liver morphology. No lesions.   BILE DUCTS: No significant intrahepatic or extrahepatic dilatation.   GALLBLADDER: No significant abnormality.   PANCREAS: No significant abnormality.   SPLEEN: The spleen is enlarged, measuring up to 14.5 cm in length.   ADRENALS: No significant abnormality.   KIDNEYS, URETERS, BLADDER: Moderately atrophic kidneys. No significant abnormality.   REPRODUCTIVE  ORGANS: No significant abnormality.   VESSELS: Mild aortic atherosclerosis without AAA.   RETROPERITONEUM/LYMPH NODES: No acute retroperitoneal abnormality. No enlarged lymph nodes.   BOWEL/MESENTERY/PERITONEUM: There is a moderate amount of stool in the rectum distended up to 7.9 x 7.3 cm. No inflammatory bowel wall thickening or dilatation. Normal appendix.   No ascites, free air, or fluid collection.     MUSCULOSKELETAL: No acute osseous abnormality. No suspicious osseous lesion. Large area of heterotopic ossification arising from the right posteromedial femur resulting in marked narrowing of the ischiofemoral space. Healed fracture of the sacrococcygeal junction.       CT CHEST: 1. No acute pulmonary embolism. 2. Emphysema. 3. Right-sided calcified pleural plaques and round atelectasis which can be seen with prior asbestos exposure.   CT ABDOMEN/PELVIS: 1. No acute abnormality in the abdomen or pelvis.   2. Cirrhotic liver morphology and splenomegaly suggestive of portal hypertension.   MACRO: None.   Signed by: Leonardo Celaya 11/18/2024 7:12 PM Dictation workstation:   [id number]    CT head wo IV contrast    Result Date: 11/18/2024  Interpreted By:  Leonardo Celaya, STUDY: CT HEAD WO IV CONTRAST; CT CERVICAL SPINE WO IV CONTRAST; CT LUMBAR SPINE WO IV CONTRAST; CT THORACIC SPINE WO IV CONTRAST; CT FACIAL BONES WO IV CONTRAST; CT 3D RECONSTRUCTION;  11/18/2024 6:19 pm; 11/18/2024 6:21 pm   INDICATION: Signs/Symptoms:fall,head trauma; Signs/Symptoms:fal,neck pain; Signs/Symptoms:fall  back pain; Signs/Symptoms:fall, back pain; Signs/Symptoms:fall, facial trauma; Signs/Symptoms:fall     COMPARISON: CT PE chest 09/23/2024   ACCESSION NUMBER(S): [id number]; [id number]; [id number]; [id number]; [id number]; [id number]   ORDERING CLINICIAN: KEN GONZALEZ   TECHNIQUE: Axial noncontrast CT images of head with coronal and sagittal reconstructed images. Axial noncontrast CT images of maxillofacial skeleton  with coronal and sagittal reconstructed images. 3D maxillofacial skeleton reconstructions were performed on an independent workstation and provided for review. Axial noncontrast CT images of the cervical spine with coronal and sagittal reconstructed images. Multiplanar reformatted images of the thoracic and lumbar spine were obtained from the CTA chest and CT abdomen/pelvis raw data.   FINDINGS: CT HEAD:   BRAIN PARENCHYMA:  No acute intraparenchymal hemorrhage or parenchymal evidence of acute large territory ischemic infarct. Gray-white matter distinction is preserved. No mass-effect.   VENTRICLES and EXTRA-AXIAL SPACES:  No acute extra-axial or intraventricular hemorrhage. No effacement of cerebral sulci. The ventricles and sulci are age-concordant.   PARANASAL SINUSES/MASTOIDS:  No hemorrhage or air-fluid levels within the visualized paranasal sinuses. The mastoids are well aerated.   CALVARIUM/ORBITS:  No skull fracture. The orbits and globes are intact to the extent visualized.   EXTRACRANIAL SOFT TISSUES: No discernible hematoma.     CT MAXILLOFACIAL SKELETON:   FACIAL BONES: No acute facial bone fracture. The bony orbits are intact.   ORBITS: The globes, extraocular muscles and optic nerve sheath complexes are intact. No retrobulbar or subperiosteal hematoma.   SOFT TISSUES: No discernible acute abnormality.   PARANASAL SINUSES: No hemorrhage or air-fluid levels. Mucous retention cyst in the left maxillary sinus.   OTHER FINDINGS: None.     CT CERVICAL SPINE:   PREVERTEBRAL SOFT TISSUES: Within normal limits.   CRANIOCERVICAL JUNCTION: Intact.   ALIGNMENT: Slight reversal of cervical lordosis, likely positional and/or degenerative. No traumatic malalignment or traumatic facet widening.   VERTEBRAE:  No acute fracture. Vertebral body heights are maintained.   SPINAL CANAL/INTERVERTEBRAL DISCS: No high-grade canal stenosis. There is a spur complexes that slightly indent the ventral thecal sac at C3-C4, C5-C6,  C6-C7 result in no greater than mild canal stenosis.   NEURAL FORAMINA: Multilevel uncovertebral joint and facet arthropathy notably contribute to mild right C2-C3 foraminal stenosis, severe right and mild left C3-C4 foraminal stenosis, severe right and moderate-severe left C4-C5 foraminal stenosis, moderate right C5-C6 foraminal stenosis, mild bilateral C6-7 foraminal stenosis.   OTHER: None.       CT THORACIC SPINE:   ALIGNMENT:  No traumatic spondylolisthesis or traumatic facet widening.   VERTEBRAE:  No acute fracture. Mild concavity to the upper T2 endplate is stable from prior study, consistent with remote fracture.   SPINAL CANAL/INTERVERTEBRAL DISCS: No high-grade spinal canal stenosis. Varying degrees of mild degenerative disc disease, mainly related to mild anterior endplate spurring without disc height loss or disc spur complexes.   PARASPINAL SOFT TISSUES:  No paravertebral soft tissue hematoma.   VISUALIZED CHEST: Please see separate report.     CT LUMBAR SPINE:   ALIGNMENT: Minimal grade 1 degenerative anterolisthesis of L5 on S1. No traumatic spondylolisthesis or traumatic facet widening.   VERTEBRAE: There are remote fractures of the bilateral L1, L2, and L4 transverse processes and left L3 transverse process.   SPINAL CANAL/INTERVERTEBRAL DISCS: No high-grade spinal canal stenosis.   NEUROFORAMINA: There is multilevel facet arthropathy which is mild bilaterally at L2-L3, moderate on the right at L3-L4, moderate bilateral and L4-L5 and moderate severe at L5-S1. There is mild bilateral L3-L4 foraminal stenosis, mild-moderate left L4-5 foraminal stenosis and mild bilateral L5-S1 foraminal stenosis.   PARASPINAL SOFT TISSUES:  No paravertebral soft tissue hematoma.   VISUALIZED ABDOMEN: Please see separate report.       CT HEAD: 1. No acute intracranial abnormality or calvarial fracture.     CT MAXILLOFACIAL SKELETON: 1. No acute maxillofacial skeleton fracture.     CT CERVICAL SPINE: 1. No acute  fracture or traumatic malalignment of the cervical spine. 2. Spondylotic changes of the cervical spine as detailed above.     CT THORACIC/LUMBAR SPINE: 1. No acute fracture or traumatic malalignment. 2. Remote minimally depressed compression fracture the upper T2 endplate. 3. Remote fracture deformities of the bilateral L1, L2, and L4 transverse processes and left L3 transverse process.   MACRO: None.   Signed by: Leonardo Celaya 11/18/2024 6:57 PM Dictation workstation:   [id number]    CT maxillofacial bones wo IV contrast    Result Date: 11/18/2024  Interpreted By:  Leonardo Celaya, STUDY: CT HEAD WO IV CONTRAST; CT CERVICAL SPINE WO IV CONTRAST; CT LUMBAR SPINE WO IV CONTRAST; CT THORACIC SPINE WO IV CONTRAST; CT FACIAL BONES WO IV CONTRAST; CT 3D RECONSTRUCTION;  11/18/2024 6:19 pm; 11/18/2024 6:21 pm   INDICATION: Signs/Symptoms:fall,head trauma; Signs/Symptoms:fal,neck pain; Signs/Symptoms:fall  back pain; Signs/Symptoms:fall, back pain; Signs/Symptoms:fall, facial trauma; Signs/Symptoms:fall     COMPARISON: CT PE chest 09/23/2024   ACCESSION NUMBER(S): [id number]; [id number]; [id number]; [id number]; [id number]; [id number]   ORDERING CLINICIAN: KEN GONZALEZ   TECHNIQUE: Axial noncontrast CT images of head with coronal and sagittal reconstructed images. Axial noncontrast CT images of maxillofacial skeleton with coronal and sagittal reconstructed images. 3D maxillofacial skeleton reconstructions were performed on an independent workstation and provided for review. Axial noncontrast CT images of the cervical spine with coronal and sagittal reconstructed images. Multiplanar reformatted images of the thoracic and lumbar spine were obtained from the CTA chest and CT abdomen/pelvis raw data.   FINDINGS: CT HEAD:   BRAIN PARENCHYMA:  No acute intraparenchymal hemorrhage or parenchymal evidence of acute large territory ischemic infarct. Gray-white matter distinction is preserved. No mass-effect.    VENTRICLES and EXTRA-AXIAL SPACES:  No acute extra-axial or intraventricular hemorrhage. No effacement of cerebral sulci. The ventricles and sulci are age-concordant.   PARANASAL SINUSES/MASTOIDS:  No hemorrhage or air-fluid levels within the visualized paranasal sinuses. The mastoids are well aerated.   CALVARIUM/ORBITS:  No skull fracture. The orbits and globes are intact to the extent visualized.   EXTRACRANIAL SOFT TISSUES: No discernible hematoma.     CT MAXILLOFACIAL SKELETON:   FACIAL BONES: No acute facial bone fracture. The bony orbits are intact.   ORBITS: The globes, extraocular muscles and optic nerve sheath complexes are intact. No retrobulbar or subperiosteal hematoma.   SOFT TISSUES: No discernible acute abnormality.   PARANASAL SINUSES: No hemorrhage or air-fluid levels. Mucous retention cyst in the left maxillary sinus.   OTHER FINDINGS: None.     CT CERVICAL SPINE:   PREVERTEBRAL SOFT TISSUES: Within normal limits.   CRANIOCERVICAL JUNCTION: Intact.   ALIGNMENT: Slight reversal of cervical lordosis, likely positional and/or degenerative. No traumatic malalignment or traumatic facet widening.   VERTEBRAE:  No acute fracture. Vertebral body heights are maintained.   SPINAL CANAL/INTERVERTEBRAL DISCS: No high-grade canal stenosis. There is a spur complexes that slightly indent the ventral thecal sac at C3-C4, C5-C6, C6-C7 result in no greater than mild canal stenosis.   NEURAL FORAMINA: Multilevel uncovertebral joint and facet arthropathy notably contribute to mild right C2-C3 foraminal stenosis, severe right and mild left C3-C4 foraminal stenosis, severe right and moderate-severe left C4-C5 foraminal stenosis, moderate right C5-C6 foraminal stenosis, mild bilateral C6-7 foraminal stenosis.   OTHER: None.       CT THORACIC SPINE:   ALIGNMENT:  No traumatic spondylolisthesis or traumatic facet widening.   VERTEBRAE:  No acute fracture. Mild concavity to the upper T2 endplate is stable from prior study,  consistent with remote fracture.   SPINAL CANAL/INTERVERTEBRAL DISCS: No high-grade spinal canal stenosis. Varying degrees of mild degenerative disc disease, mainly related to mild anterior endplate spurring without disc height loss or disc spur complexes.   PARASPINAL SOFT TISSUES:  No paravertebral soft tissue hematoma.   VISUALIZED CHEST: Please see separate report.     CT LUMBAR SPINE:   ALIGNMENT: Minimal grade 1 degenerative anterolisthesis of L5 on S1. No traumatic spondylolisthesis or traumatic facet widening.   VERTEBRAE: There are remote fractures of the bilateral L1, L2, and L4 transverse processes and left L3 transverse process.   SPINAL CANAL/INTERVERTEBRAL DISCS: No high-grade spinal canal stenosis.   NEUROFORAMINA: There is multilevel facet arthropathy which is mild bilaterally at L2-L3, moderate on the right at L3-L4, moderate bilateral and L4-L5 and moderate severe at L5-S1. There is mild bilateral L3-L4 foraminal stenosis, mild-moderate left L4-5 foraminal stenosis and mild bilateral L5-S1 foraminal stenosis.   PARASPINAL SOFT TISSUES:  No paravertebral soft tissue hematoma.   VISUALIZED ABDOMEN: Please see separate report.       CT HEAD: 1. No acute intracranial abnormality or calvarial fracture.     CT MAXILLOFACIAL SKELETON: 1. No acute maxillofacial skeleton fracture.     CT CERVICAL SPINE: 1. No acute fracture or traumatic malalignment of the cervical spine. 2. Spondylotic changes of the cervical spine as detailed above.     CT THORACIC/LUMBAR SPINE: 1. No acute fracture or traumatic malalignment. 2. Remote minimally depressed compression fracture the upper T2 endplate. 3. Remote fracture deformities of the bilateral L1, L2, and L4 transverse processes and left L3 transverse process.   MACRO: None.   Signed by: Leonardo Celaya 11/18/2024 6:57 PM Dictation workstation:   [id number]    CT cervical spine wo IV contrast    Result Date: 11/18/2024  Interpreted By:  Leonardo Celaya, STUDY: CT  HEAD WO IV CONTRAST; CT CERVICAL SPINE WO IV CONTRAST; CT LUMBAR SPINE WO IV CONTRAST; CT THORACIC SPINE WO IV CONTRAST; CT FACIAL BONES WO IV CONTRAST; CT 3D RECONSTRUCTION;  11/18/2024 6:19 pm; 11/18/2024 6:21 pm   INDICATION: Signs/Symptoms:fall,head trauma; Signs/Symptoms:fal,neck pain; Signs/Symptoms:fall  back pain; Signs/Symptoms:fall, back pain; Signs/Symptoms:fall, facial trauma; Signs/Symptoms:fall     COMPARISON: CT PE chest 09/23/2024   ACCESSION NUMBER(S): [id number]; [id number]; [id number]; [id number]; [id number]; [id number]   ORDERING CLINICIAN: KEN GONZALEZ   TECHNIQUE: Axial noncontrast CT images of head with coronal and sagittal reconstructed images. Axial noncontrast CT images of maxillofacial skeleton with coronal and sagittal reconstructed images. 3D maxillofacial skeleton reconstructions were performed on an independent workstation and provided for review. Axial noncontrast CT images of the cervical spine with coronal and sagittal reconstructed images. Multiplanar reformatted images of the thoracic and lumbar spine were obtained from the CTA chest and CT abdomen/pelvis raw data.   FINDINGS: CT HEAD:   BRAIN PARENCHYMA:  No acute intraparenchymal hemorrhage or parenchymal evidence of acute large territory ischemic infarct. Gray-white matter distinction is preserved. No mass-effect.   VENTRICLES and EXTRA-AXIAL SPACES:  No acute extra-axial or intraventricular hemorrhage. No effacement of cerebral sulci. The ventricles and sulci are age-concordant.   PARANASAL SINUSES/MASTOIDS:  No hemorrhage or air-fluid levels within the visualized paranasal sinuses. The mastoids are well aerated.   CALVARIUM/ORBITS:  No skull fracture. The orbits and globes are intact to the extent visualized.   EXTRACRANIAL SOFT TISSUES: No discernible hematoma.     CT MAXILLOFACIAL SKELETON:   FACIAL BONES: No acute facial bone fracture. The bony orbits are intact.   ORBITS: The globes, extraocular muscles and  optic nerve sheath complexes are intact. No retrobulbar or subperiosteal hematoma.   SOFT TISSUES: No discernible acute abnormality.   PARANASAL SINUSES: No hemorrhage or air-fluid levels. Mucous retention cyst in the left maxillary sinus.   OTHER FINDINGS: None.     CT CERVICAL SPINE:   PREVERTEBRAL SOFT TISSUES: Within normal limits.   CRANIOCERVICAL JUNCTION: Intact.   ALIGNMENT: Slight reversal of cervical lordosis, likely positional and/or degenerative. No traumatic malalignment or traumatic facet widening.   VERTEBRAE:  No acute fracture. Vertebral body heights are maintained.   SPINAL CANAL/INTERVERTEBRAL DISCS: No high-grade canal stenosis. There is a spur complexes that slightly indent the ventral thecal sac at C3-C4, C5-C6, C6-C7 result in no greater than mild canal stenosis.   NEURAL FORAMINA: Multilevel uncovertebral joint and facet arthropathy notably contribute to mild right C2-C3 foraminal stenosis, severe right and mild left C3-C4 foraminal stenosis, severe right and moderate-severe left C4-C5 foraminal stenosis, moderate right C5-C6 foraminal stenosis, mild bilateral C6-7 foraminal stenosis.   OTHER: None.       CT THORACIC SPINE:   ALIGNMENT:  No traumatic spondylolisthesis or traumatic facet widening.   VERTEBRAE:  No acute fracture. Mild concavity to the upper T2 endplate is stable from prior study, consistent with remote fracture.   SPINAL CANAL/INTERVERTEBRAL DISCS: No high-grade spinal canal stenosis. Varying degrees of mild degenerative disc disease, mainly related to mild anterior endplate spurring without disc height loss or disc spur complexes.   PARASPINAL SOFT TISSUES:  No paravertebral soft tissue hematoma.   VISUALIZED CHEST: Please see separate report.     CT LUMBAR SPINE:   ALIGNMENT: Minimal grade 1 degenerative anterolisthesis of L5 on S1. No traumatic spondylolisthesis or traumatic facet widening.   VERTEBRAE: There are remote fractures of the bilateral L1, L2, and L4 transverse  processes and left L3 transverse process.   SPINAL CANAL/INTERVERTEBRAL DISCS: No high-grade spinal canal stenosis.   NEUROFORAMINA: There is multilevel facet arthropathy which is mild bilaterally at L2-L3, moderate on the right at L3-L4, moderate bilateral and L4-L5 and moderate severe at L5-S1. There is mild bilateral L3-L4 foraminal stenosis, mild-moderate left L4-5 foraminal stenosis and mild bilateral L5-S1 foraminal stenosis.   PARASPINAL SOFT TISSUES:  No paravertebral soft tissue hematoma.   VISUALIZED ABDOMEN: Please see separate report.       CT HEAD: 1. No acute intracranial abnormality or calvarial fracture.     CT MAXILLOFACIAL SKELETON: 1. No acute maxillofacial skeleton fracture.     CT CERVICAL SPINE: 1. No acute fracture or traumatic malalignment of the cervical spine. 2. Spondylotic changes of the cervical spine as detailed above.     CT THORACIC/LUMBAR SPINE: 1. No acute fracture or traumatic malalignment. 2. Remote minimally depressed compression fracture the upper T2 endplate. 3. Remote fracture deformities of the bilateral L1, L2, and L4 transverse processes and left L3 transverse process.   MACRO: None.   Signed by: Leonardo Celaya 11/18/2024 6:57 PM Dictation workstation:   [id number]    CT thoracic spine wo IV contrast    Result Date: 11/18/2024  Interpreted By:  Leonardo Celaya, STUDY: CT HEAD WO IV CONTRAST; CT CERVICAL SPINE WO IV CONTRAST; CT LUMBAR SPINE WO IV CONTRAST; CT THORACIC SPINE WO IV CONTRAST; CT FACIAL BONES WO IV CONTRAST; CT 3D RECONSTRUCTION;  11/18/2024 6:19 pm; 11/18/2024 6:21 pm   INDICATION: Signs/Symptoms:fall,head trauma; Signs/Symptoms:fal,neck pain; Signs/Symptoms:fall  back pain; Signs/Symptoms:fall, back pain; Signs/Symptoms:fall, facial trauma; Signs/Symptoms:fall     COMPARISON: CT PE chest 09/23/2024   ACCESSION NUMBER(S): [id number]; [id number]; [id number]; [id number]; [id number]; [id number]   ORDERING CLINICIAN: KEN GONZALEZ   TECHNIQUE:  Axial noncontrast CT images of head with coronal and sagittal reconstructed images. Axial noncontrast CT images of maxillofacial skeleton with coronal and sagittal reconstructed images. 3D maxillofacial skeleton reconstructions were performed on an independent workstation and provided for review. Axial noncontrast CT images of the cervical spine with coronal and sagittal reconstructed images. Multiplanar reformatted images of the thoracic and lumbar spine were obtained from the CTA chest and CT abdomen/pelvis raw data.   FINDINGS: CT HEAD:   BRAIN PARENCHYMA:  No acute intraparenchymal hemorrhage or parenchymal evidence of acute large territory ischemic infarct. Gray-white matter distinction is preserved. No mass-effect.   VENTRICLES and EXTRA-AXIAL SPACES:  No acute extra-axial or intraventricular hemorrhage. No effacement of cerebral sulci. The ventricles and sulci are age-concordant.   PARANASAL SINUSES/MASTOIDS:  No hemorrhage or air-fluid levels within the visualized paranasal sinuses. The mastoids are well aerated.   CALVARIUM/ORBITS:  No skull fracture. The orbits and globes are intact to the extent visualized.   EXTRACRANIAL SOFT TISSUES: No discernible hematoma.     CT MAXILLOFACIAL SKELETON:   FACIAL BONES: No acute facial bone fracture. The bony orbits are intact.   ORBITS: The globes, extraocular muscles and optic nerve sheath complexes are intact. No retrobulbar or subperiosteal hematoma.   SOFT TISSUES: No discernible acute abnormality.   PARANASAL SINUSES: No hemorrhage or air-fluid levels. Mucous retention cyst in the left maxillary sinus.   OTHER FINDINGS: None.     CT CERVICAL SPINE:   PREVERTEBRAL SOFT TISSUES: Within normal limits.   CRANIOCERVICAL JUNCTION: Intact.   ALIGNMENT: Slight reversal of cervical lordosis, likely positional and/or degenerative. No traumatic malalignment or traumatic facet widening.   VERTEBRAE:  No acute fracture. Vertebral body heights are maintained.   SPINAL  CANAL/INTERVERTEBRAL DISCS: No high-grade canal stenosis. There is a spur complexes that slightly indent the ventral thecal sac at C3-C4, C5-C6, C6-C7 result in no greater than mild canal stenosis.   NEURAL FORAMINA: Multilevel uncovertebral joint and facet arthropathy notably contribute to mild right C2-C3 foraminal stenosis, severe right and mild left C3-C4 foraminal stenosis, severe right and moderate-severe left C4-C5 foraminal stenosis, moderate right C5-C6 foraminal stenosis, mild bilateral C6-7 foraminal stenosis.   OTHER: None.       CT THORACIC SPINE:   ALIGNMENT:  No traumatic spondylolisthesis or traumatic facet widening.   VERTEBRAE:  No acute fracture. Mild concavity to the upper T2 endplate is stable from prior study, consistent with remote fracture.   SPINAL CANAL/INTERVERTEBRAL DISCS: No high-grade spinal canal stenosis. Varying degrees of mild degenerative disc disease, mainly related to mild anterior endplate spurring without disc height loss or disc spur complexes.   PARASPINAL SOFT TISSUES:  No paravertebral soft tissue hematoma.   VISUALIZED CHEST: Please see separate report.     CT LUMBAR SPINE:   ALIGNMENT: Minimal grade 1 degenerative anterolisthesis of L5 on S1. No traumatic spondylolisthesis or traumatic facet widening.   VERTEBRAE: There are remote fractures of the bilateral L1, L2, and L4 transverse processes and left L3 transverse process.   SPINAL CANAL/INTERVERTEBRAL DISCS: No high-grade spinal canal stenosis.   NEUROFORAMINA: There is multilevel facet arthropathy which is mild bilaterally at L2-L3, moderate on the right at L3-L4, moderate bilateral and L4-L5 and moderate severe at L5-S1. There is mild bilateral L3-L4 foraminal stenosis, mild-moderate left L4-5 foraminal stenosis and mild bilateral L5-S1 foraminal stenosis.   PARASPINAL SOFT TISSUES:  No paravertebral soft tissue hematoma.   VISUALIZED ABDOMEN: Please see separate report.       CT HEAD: 1. No acute intracranial  abnormality or calvarial fracture.     CT MAXILLOFACIAL SKELETON: 1. No acute maxillofacial skeleton fracture.     CT CERVICAL SPINE: 1. No acute fracture or traumatic malalignment of the cervical spine. 2. Spondylotic changes of the cervical spine as detailed above.     CT THORACIC/LUMBAR SPINE: 1. No acute fracture or traumatic malalignment. 2. Remote minimally depressed compression fracture the upper T2 endplate. 3. Remote fracture deformities of the bilateral L1, L2, and L4 transverse processes and left L3 transverse process.   MACRO: None.   Signed by: Leonardo Celaya 11/18/2024 6:57 PM Dictation workstation:   [id number]    CT lumbar spine wo IV contrast    Result Date: 11/18/2024  Interpreted By:  Leonardo Cealya, STUDY: CT HEAD WO IV CONTRAST; CT CERVICAL SPINE WO IV CONTRAST; CT LUMBAR SPINE WO IV CONTRAST; CT THORACIC SPINE WO IV CONTRAST; CT FACIAL BONES WO IV CONTRAST; CT 3D RECONSTRUCTION;  11/18/2024 6:19 pm; 11/18/2024 6:21 pm   INDICATION: Signs/Symptoms:fall,head trauma; Signs/Symptoms:fal,neck pain; Signs/Symptoms:fall  back pain; Signs/Symptoms:fall, back pain; Signs/Symptoms:fall, facial trauma; Signs/Symptoms:fall     COMPARISON: CT PE chest 09/23/2024   ACCESSION NUMBER(S): [id number]; [id number]; [id number]; [id number]; [id number]; [id number]   ORDERING CLINICIAN: KEN GONZALEZ   TECHNIQUE: Axial noncontrast CT images of head with coronal and sagittal reconstructed images. Axial noncontrast CT images of maxillofacial skeleton with coronal and sagittal reconstructed images. 3D maxillofacial skeleton reconstructions were performed on an independent workstation and provided for review. Axial noncontrast CT images of the cervical spine with coronal and sagittal reconstructed images. Multiplanar reformatted images of the thoracic and lumbar spine were obtained from the CTA chest and CT abdomen/pelvis raw data.   FINDINGS: CT HEAD:   BRAIN PARENCHYMA:  No acute intraparenchymal  hemorrhage or parenchymal evidence of acute large territory ischemic infarct. Gray-white matter distinction is preserved. No mass-effect.   VENTRICLES and EXTRA-AXIAL SPACES:  No acute extra-axial or intraventricular hemorrhage. No effacement of cerebral sulci. The ventricles and sulci are age-concordant.   PARANASAL SINUSES/MASTOIDS:  No hemorrhage or air-fluid levels within the visualized paranasal sinuses. The mastoids are well aerated.   CALVARIUM/ORBITS:  No skull fracture. The orbits and globes are intact to the extent visualized.   EXTRACRANIAL SOFT TISSUES: No discernible hematoma.     CT MAXILLOFACIAL SKELETON:   FACIAL BONES: No acute facial bone fracture. The bony orbits are intact.   ORBITS: The globes, extraocular muscles and optic nerve sheath complexes are intact. No retrobulbar or subperiosteal hematoma.   SOFT TISSUES: No discernible acute abnormality.   PARANASAL SINUSES: No hemorrhage or air-fluid levels. Mucous retention cyst in the left maxillary sinus.   OTHER FINDINGS: None.     CT CERVICAL SPINE:   PREVERTEBRAL SOFT TISSUES: Within normal limits.   CRANIOCERVICAL JUNCTION: Intact.   ALIGNMENT: Slight reversal of cervical lordosis, likely positional and/or degenerative. No traumatic malalignment or traumatic facet widening.   VERTEBRAE:  No acute fracture. Vertebral body heights are maintained.   SPINAL CANAL/INTERVERTEBRAL DISCS: No high-grade canal stenosis. There is a spur complexes that slightly indent the ventral thecal sac at C3-C4, C5-C6, C6-C7 result in no greater than mild canal stenosis.   NEURAL FORAMINA: Multilevel uncovertebral joint and facet arthropathy notably contribute to mild right C2-C3 foraminal stenosis, severe right and mild left C3-C4 foraminal stenosis, severe right and moderate-severe left C4-C5 foraminal stenosis, moderate right C5-C6 foraminal stenosis, mild bilateral C6-7 foraminal stenosis.   OTHER: None.       CT THORACIC SPINE:   ALIGNMENT:  No traumatic  spondylolisthesis or traumatic facet widening.   VERTEBRAE:  No acute fracture. Mild concavity to the upper T2 endplate is stable from prior study, consistent with remote fracture.   SPINAL CANAL/INTERVERTEBRAL DISCS: No high-grade spinal canal stenosis. Varying degrees of mild degenerative disc disease, mainly related to mild anterior endplate spurring without disc height loss or disc spur complexes.   PARASPINAL SOFT TISSUES:  No paravertebral soft tissue hematoma.   VISUALIZED CHEST: Please see separate report.     CT LUMBAR SPINE:   ALIGNMENT: Minimal grade 1 degenerative anterolisthesis of L5 on S1. No traumatic spondylolisthesis or traumatic facet widening.   VERTEBRAE: There are remote fractures of the bilateral L1, L2, and L4 transverse processes and left L3 transverse process.   SPINAL CANAL/INTERVERTEBRAL DISCS: No high-grade spinal canal stenosis.   NEUROFORAMINA: There is multilevel facet arthropathy which is mild bilaterally at L2-L3, moderate on the right at L3-L4, moderate bilateral and L4-L5 and moderate severe at L5-S1. There is mild bilateral L3-L4 foraminal stenosis, mild-moderate left L4-5 foraminal stenosis and mild bilateral L5-S1 foraminal stenosis.   PARASPINAL SOFT TISSUES:  No paravertebral soft tissue hematoma.   VISUALIZED ABDOMEN: Please see separate report.       CT HEAD: 1. No acute intracranial abnormality or calvarial fracture.     CT MAXILLOFACIAL SKELETON: 1. No acute maxillofacial skeleton fracture.     CT CERVICAL SPINE: 1. No acute fracture or traumatic malalignment of the cervical spine. 2. Spondylotic changes of the cervical spine as detailed above.     CT THORACIC/LUMBAR SPINE: 1. No acute fracture or traumatic malalignment. 2. Remote minimally depressed compression fracture the upper T2 endplate. 3. Remote fracture deformities of the bilateral L1, L2, and L4 transverse processes and left L3 transverse process.   MACRO: None.   Signed by: Leonardo Celaya 11/18/2024 6:57 PM  Dictation workstation:   [id number]    CT 3D reconstruction    Result Date: 11/18/2024  Interpreted By:  Leonardo Celaya, STUDY: CT HEAD WO IV CONTRAST; CT CERVICAL SPINE WO IV CONTRAST; CT LUMBAR SPINE WO IV CONTRAST; CT THORACIC SPINE WO IV CONTRAST; CT FACIAL BONES WO IV CONTRAST; CT 3D RECONSTRUCTION;  11/18/2024 6:19 pm; 11/18/2024 6:21 pm   INDICATION: Signs/Symptoms:fall,head trauma; Signs/Symptoms:fal,neck pain; Signs/Symptoms:fall  back pain; Signs/Symptoms:fall, back pain; Signs/Symptoms:fall, facial trauma; Signs/Symptoms:fall     COMPARISON: CT PE chest 09/23/2024   ACCESSION NUMBER(S): [id number]; [id number]; [id number]; [id number]; [id number]; [id number]   ORDERING CLINICIAN: KEN GONZALEZ   TECHNIQUE: Axial noncontrast CT images of head with coronal and sagittal reconstructed images. Axial noncontrast CT images of maxillofacial skeleton with coronal and sagittal reconstructed images. 3D maxillofacial skeleton reconstructions were performed on an independent workstation and provided for review. Axial noncontrast CT images of the cervical spine with coronal and sagittal reconstructed images. Multiplanar reformatted images of the thoracic and lumbar spine were obtained from the CTA chest and CT abdomen/pelvis raw data.   FINDINGS: CT HEAD:   BRAIN PARENCHYMA:  No acute intraparenchymal hemorrhage or parenchymal evidence of acute large territory ischemic infarct. Gray-white matter distinction is preserved. No mass-effect.   VENTRICLES and EXTRA-AXIAL SPACES:  No acute extra-axial or intraventricular hemorrhage. No effacement of cerebral sulci. The ventricles and sulci are age-concordant.   PARANASAL SINUSES/MASTOIDS:  No hemorrhage or air-fluid levels within the visualized paranasal sinuses. The mastoids are well aerated.   CALVARIUM/ORBITS:  No skull fracture. The orbits and globes are intact to the extent visualized.   EXTRACRANIAL SOFT TISSUES: No discernible hematoma.     CT  MAXILLOFACIAL SKELETON:   FACIAL BONES: No acute facial bone fracture. The bony orbits are intact.   ORBITS: The globes, extraocular muscles and optic nerve sheath complexes are intact. No retrobulbar or subperiosteal hematoma.   SOFT TISSUES: No discernible acute abnormality.   PARANASAL SINUSES: No hemorrhage or air-fluid levels. Mucous retention cyst in the left maxillary sinus.   OTHER FINDINGS: None.     CT CERVICAL SPINE:   PREVERTEBRAL SOFT TISSUES: Within normal limits.   CRANIOCERVICAL JUNCTION: Intact.   ALIGNMENT: Slight reversal of cervical lordosis, likely positional and/or degenerative. No traumatic malalignment or traumatic facet widening.   VERTEBRAE:  No acute fracture. Vertebral body heights are maintained.   SPINAL CANAL/INTERVERTEBRAL DISCS: No high-grade canal stenosis. There is a spur complexes that slightly indent the ventral thecal sac at C3-C4, C5-C6, C6-C7 result in no greater than mild canal stenosis.   NEURAL FORAMINA: Multilevel uncovertebral joint and facet arthropathy notably contribute to mild right C2-C3 foraminal stenosis, severe right and mild left C3-C4 foraminal stenosis, severe right and moderate-severe left C4-C5 foraminal stenosis, moderate right C5-C6 foraminal stenosis, mild bilateral C6-7 foraminal stenosis.   OTHER: None.       CT THORACIC SPINE:   ALIGNMENT:  No traumatic spondylolisthesis or traumatic facet widening.   VERTEBRAE:  No acute fracture. Mild concavity to the upper T2 endplate is stable from prior study, consistent with remote fracture.   SPINAL CANAL/INTERVERTEBRAL DISCS: No high-grade spinal canal stenosis. Varying degrees of mild degenerative disc disease, mainly related to mild anterior endplate spurring without disc height loss or disc spur complexes.   PARASPINAL SOFT TISSUES:  No paravertebral soft tissue hematoma.   VISUALIZED CHEST: Please see separate report.     CT LUMBAR SPINE:   ALIGNMENT: Minimal grade 1 degenerative anterolisthesis of L5 on S1.  No traumatic spondylolisthesis or traumatic facet widening.   VERTEBRAE: There are remote fractures of the bilateral L1, L2, and L4 transverse processes and left L3 transverse process.   SPINAL CANAL/INTERVERTEBRAL DISCS: No high-grade spinal canal stenosis.   NEUROFORAMINA: There is multilevel facet arthropathy which is mild bilaterally at L2-L3, moderate on the right at L3-L4, moderate bilateral and L4-L5 and moderate severe at L5-S1. There is mild bilateral L3-L4 foraminal stenosis, mild-moderate left L4-5 foraminal stenosis and mild bilateral L5-S1 foraminal stenosis.   PARASPINAL SOFT TISSUES:  No paravertebral soft tissue hematoma.   VISUALIZED ABDOMEN: Please see separate report.       CT HEAD: 1. No acute intracranial abnormality or calvarial fracture.     CT MAXILLOFACIAL SKELETON: 1. No acute maxillofacial skeleton fracture.     CT CERVICAL SPINE: 1. No acute fracture or traumatic malalignment of the cervical spine. 2. Spondylotic changes of the cervical spine as detailed above.     CT THORACIC/LUMBAR SPINE: 1. No acute fracture or traumatic malalignment. 2. Remote minimally depressed compression fracture the upper T2 endplate. 3. Remote fracture deformities of the bilateral L1, L2, and L4 transverse processes and left L3 transverse process.   MACRO: None.   Signed by: Leonardo Celaya 11/18/2024 6:57 PM Dictation workstation:   [id number]    XR hand 3+ views bilateral    Result Date: 11/18/2024  Interpreted By:  Nathen Lovett, STUDY: XR HAND 3+ VIEWS BILATERAL; XR WRIST 3+ VIEWS BILATERAL  11/18/2024 2:15 pm   INDICATION: Signs/Symptoms:b/l hand pain; Signs/Symptoms:b/l wrist pain   COMPARISON: None.   ACCESSION NUMBER(S): [id number]; [id number]   ORDERING CLINICIAN: KEN GONZALEZ   TECHNIQUE: Three views each of the bilateral hands and wrists including AP, oblique and lateral projections were obtained.   FINDINGS: There is no evidence of acute fracture or dislocation identified. Moderate to severe  hypertrophic degenerative changes are seen in the right trapezium 1st metacarpal articulation. Moderate degenerative changes are seen in the left trapezium 1st metacarpal articulation. Moderate to severe degenerative changes are seen in the right proximal interphalangeal joint. Minimal degenerative changes are seen in the remaining interphalangeal joints.       1. No fracture or dislocation. 2. Degenerative changes, as described above.   MACRO: None.   Signed by: Nathen Lovett 11/18/2024 2:38 PM Dictation workstation:   [id number]    XR wrist 3+ views bilateral    Result Date: 11/18/2024  Interpreted By:  Nathen Lovett, STUDY: XR HAND 3+ VIEWS BILATERAL; XR WRIST 3+ VIEWS BILATERAL  11/18/2024 2:15 pm   INDICATION: Signs/Symptoms:b/l hand pain; Signs/Symptoms:b/l wrist pain   COMPARISON: None.   ACCESSION NUMBER(S): [id number]; [id number]   ORDERING CLINICIAN: KEN GONZALEZ   TECHNIQUE: Three views each of the bilateral hands and wrists including AP, oblique and lateral projections were obtained.   FINDINGS: There is no evidence of acute fracture or dislocation identified. Moderate to severe hypertrophic degenerative changes are seen in the right trapezium 1st metacarpal articulation. Moderate degenerative changes are seen in the left trapezium 1st metacarpal articulation. Moderate to severe degenerative changes are seen in the right proximal interphalangeal joint. Minimal degenerative changes are seen in the remaining interphalangeal joints.       1. No fracture or dislocation. 2. Degenerative changes, as described above.   MACRO: None.   Signed by: Nathen Lovett 11/18/2024 2:38 PM Dictation workstation:   [id number]    XR foot 3+ views bilateral    Result Date: 11/18/2024  Interpreted By:  Nathen Lovett, STUDY: XR FOOT 3+ VIEWS BILATERAL  11/18/2024 2:15 pm   INDICATION: Signs/Symptoms:b/l foot pain   COMPARISON: 12/17/2021   ACCESSION NUMBER(S): [id number]   ORDERING CLINICIAN: KEN GONZALEZ   TECHNIQUE:  Three views each of the bilateral feet including AP , oblique and lateral projections were obtained.   FINDINGS: The patient is status post left 1st interphalangeal joint arthrodesis. Postoperative changes are seen involving the distal aspect of the 1st metatarsal. The patient is status post amputation through the left 2nd proximal interphalangeal joint and right 2nd metatarsophalangeal joint. There is no radiographic evidence of acute fracture or dislocation identified. Mild-to-moderate degenerative changes are seen in the 1st metatarsophalangeal joints bilaterally.       1. No acute fracture or dislocation identified. 2. Postoperative and degenerative changes, as described above.   MACRO: None.   Signed by: Nathen Lovett 11/18/2024 2:36 PM Dictation workstation:   [id number]    XR chest 1 view    Result Date: 11/18/2024  Interpreted By:  Nathen Lovett, STUDY: XR CHEST 1 VIEW  11/18/2024 2:15 pm   INDICATION: Signs/Symptoms:fall,near syncope   COMPARISON: 10/30/2024   ACCESSION NUMBER(S): [id number]   ORDERING CLINICIAN: KEN GONZALEZ   TECHNIQUE: A single AP portable radiograph of the chest was obtained.   FINDINGS: Multiple cardiac monitoring leads are seen over the chest.  Hazy airspace opacity is seen at the right lung base, similar to the prior study, and may represent scarring, atelectasis and/or pneumonia. No pneumothorax is identified. The cardiac silhouette is within normal limits for size.       Right basilar airspace opacity, as above. Clinical correlation and continued follow-up until clearing is recommended.   MACRO: None.   Signed by: Nathen Lovett 11/18/2024 2:33 PM Dictation workstation:   [id number]    XR tibia fibula bilateral 2 views    Result Date: 11/18/2024  Interpreted By:  Nathen Lovett, STUDY: XR TIBIA FIBULA BILATERAL 2 VIEWS 11/18/2024 2:15 pm   INDICATION: Signs/Symptoms:b/l foot pain   COMPARISON: None.   ACCESSION NUMBER(S): [id number]   ORDERING CLINICIAN: KEN GONZALEZ    TECHNIQUE: Four views each of the bilateral tibia and fibula including AP and lateral projections were obtained.   FINDINGS: There is no evidence of acute fracture or dislocation identified. The joint spaces are well preserved throughout without significant degenerative changes.       1. No fracture or dislocation.   MACRO: None.   Signed by: Nathen Lovett 11/18/2024 2:30 PM Dictation workstation:   [id number]    XR foot 1-2 views bilateral    Result Date: 11/17/2024  Interpreted By:  Parvez Ellison, STUDY: XR FOOT 1-2 VIEWS BILATERAL; ;  11/13/2024 1:19 pm   INDICATION: Signs/Symptoms:bilateral foot wounds, concern for osteomyelitis.   ,L97.512 Non-pressure chronic ulcer of other part of right foot with fat layer exposed,L97.522 Non-pressure chronic ulcer of other part of left foot with fat layer exposed   COMPARISON: None.   ACCESSION NUMBER(S): [id number]   ORDERING CLINICIAN: PHIL BETANCOURT   FINDINGS: Bilateral feet, two views of each   On the left are postsurgical changes in the 1st IP joint status post fusion and in the 1st metatarsal head status post osteotomy with fixation. Soft tissue edema about the 1st digit. No osseous destruction or erosions seen. There is flattening of the left 2nd metatarsal head consistent with avascular necrosis (Freiberg's disease). There is mild degenerative change in the midfoot articulations.   On the right there is mutation of the 2nd digit with a remote 2nd distal metatarsal fracture. There is moderate degenerative changes of 1st MTP joint and the 1st IP joint with lateral subluxation. Soft tissue edema present without osseous destruction or erosion seen radiographically.       No radiographic evidence of osteomyelitis in either foot. MRI can be performed for further evaluation Soft tissue edema about the left 1st toe with cellulitis in the differential. Postsurgical change bilaterally.   MACRO: None   Signed by: Parvez Ellison 11/17/2024 6:40 AM Dictation  workstation:   [id number]    XR chest 2 views    Result Date: 11/4/2024  Interpreted By:  Tayo Menjivar, STUDY: XR CHEST 2 VIEWS   INDICATION: Signs/Symptoms:pleural effusion.   COMPARISON: September 23, 2024   ACCESSION NUMBER(S): [id number]   ORDERING CLINICIAN: PRABHU POOLE   FINDINGS: Extensive chronic pleural plaque formation right worse than left similar to prior study with right basilar airspace disease and effusion unchanged.   No additional new findings. Multiple prior rib fractures.       Extensive chronic pleural plaque formation right worse than left similar to prior study with right basilar airspace disease and effusion unchanged.   Signed by: Tayo Menjivar 11/4/2024 5:13 PM Dictation workstation:   [id number]         Assessment/Plan   69-year-old male with recurrent syncope secondary to orthostatic hypotension/autonomic dysfunction/autonomic neuropathy, restrictive lung disease on 4 to 5 L nasal cannula at baseline, PSVT on propranolol, CAD w/ CCTA (02/2024) - 50% stenosis of LAD - 25-50% stenosis of RCA, alcohol use disorder c/b severe alcoholic polyneuropathy with autonomic dysfunction  who presents at the direction of his podiatrist due to third right lower extremity digit infection, now POD #1 status post right third digit amputation with application of allograft to metatarsal heads; case discussed with podiatry, appreciate infectious disease input.  Assessment & Plan  Foot infection    Osteomyelitis of third toe of right foot (Multi)    Plan:  - Patient stable to remain at current level of care  - Continue on vancomycin and Zosyn  - Consider MRI depending on his clinical progress  - Follow the blood cultures that were obtained  - His home medication reconciliation was reviewed  - Okay for resuming diet  - Adding as needed analgesia today  - VTE prophylaxis with subcutaneous heparin  - No indication for telemetry  - Code: DNR/DNI-CCA       I spent 35 minutes in the professional and overall  care of this patient.    Peter Moser MD

## 2024-11-23 NOTE — PROGRESS NOTES
Angus Redman is a 69 y.o. male on day 1 of admission presenting with Foot infection.    Subjective   At the time of interview and examination the patient was still n.p.o. for perspective amputation of his toe, he did not have any questions per se other than was focused on when he would be able to have his surgery and to get something to eat; otherwise he declined having any fevers or chills.    Objective     Physical Exam  General: Patient does not appear to be in any acute distress, alert, awake  Head: lacerations above bilateral eyebrows on forehead  Eyes: Normal external exam, EOMI  Cardiovascular: RRR, S1/S2, no murmurs, rubs, or gallops, radial pulses +2  Pulmonary: CTAB, no respiratory distress. On 4L NC  Abdomen: soft, non-tender, nondistended, no guarding or rebound, no masses noted  Neuro: A&O x3, no focal deficits, strength and sensation intact bilaterally  Extremities: BLE with erythema, L foot covered in dressing; R foot: right third digit with ulceration and surrounding erythema but no appreciable drainage  Skin- Warm. Dry. No lesions, contusions, or erythema.  Psychiatric: Judgment intact. Appropriate mood and behavior    Last Recorded Vitals  Blood pressure 101/60, pulse 60, temperature 36.2 °C (97.2 °F), resp. rate 16, height 1.829 m (6'), weight 115 kg (254 lb 3.1 oz), SpO2 96%.  Intake/Output last 3 Shifts:  I/O last 3 completed shifts:  In: 100 (0.9 mL/kg) [IV Piggyback:100]  Out: 650 (5.6 mL/kg) [Urine:650 (0.2 mL/kg/hr)]  Weight: 115.3 kg     Relevant Results  Scheduled medications  amLODIPine, 2.5 mg, oral, Daily  aspirin, 81 mg, oral, Daily  atorvastatin, 40 mg, oral, Nightly  budesonide, 0.25 mg, nebulization, BID  DULoxetine, 60 mg, oral, BID  [Held by provider] empagliflozin, 10 mg, oral, Daily  escitalopram, 20 mg, oral, Daily  formoterol, 20 mcg, nebulization, q12h  heparin (porcine), 5,000 Units, subcutaneous, q8h  levothyroxine, 50 mcg, oral, Daily before breakfast  mirtazapine, 15 mg,  oral, Nightly  montelukast, 10 mg, oral, Nightly  pantoprazole, 40 mg, oral, BID  piperacillin-tazobactam, 3.375 g, intravenous, q8h  pregabalin, 150 mg, oral, q6h  propranolol, 10 mg, oral, BID  spironolactone, 25 mg, oral, Daily  tamsulosin, 0.4 mg, oral, Nightly  [Held by provider] torsemide, 20 mg, oral, Daily  vancomycin, 1,500 mg, intravenous, q24h      Continuous medications     PRN medications  PRN medications: busPIRone, ipratropium-albuteroL, vancomycin    Results for orders placed or performed during the hospital encounter of 11/21/24 (from the past 24 hours)   Magnesium   Result Value Ref Range    Magnesium 2.16 1.60 - 2.40 mg/dL   Renal Function Panel   Result Value Ref Range    Glucose 103 (H) 74 - 99 mg/dL    Sodium 138 136 - 145 mmol/L    Potassium 4.2 3.5 - 5.3 mmol/L    Chloride 102 98 - 107 mmol/L    Bicarbonate 27 21 - 32 mmol/L    Anion Gap 13 10 - 20 mmol/L    Urea Nitrogen 26 (H) 6 - 23 mg/dL    Creatinine 1.32 (H) 0.50 - 1.30 mg/dL    eGFR 58 (L) >60 mL/min/1.73m*2    Calcium 9.0 8.6 - 10.3 mg/dL    Phosphorus 4.5 2.5 - 4.9 mg/dL    Albumin 4.1 3.4 - 5.0 g/dL   CBC   Result Value Ref Range    WBC 13.0 (H) 4.4 - 11.3 x10*3/uL    nRBC 0.0 0.0 - 0.0 /100 WBCs    RBC 4.64 4.50 - 5.90 x10*6/uL    Hemoglobin 13.3 (L) 13.5 - 17.5 g/dL    Hematocrit 44.4 41.0 - 52.0 %    MCV 96 80 - 100 fL    MCH 28.7 26.0 - 34.0 pg    MCHC 30.0 (L) 32.0 - 36.0 g/dL    RDW 17.0 (H) 11.5 - 14.5 %    Platelets 209 150 - 450 x10*3/uL     *Note: Due to a large number of results and/or encounters for the requested time period, some results have not been displayed. A complete set of results can be found in Results Review.     ECG 12 lead    Result Date: 11/22/2024  Sinus rhythm with marked sinus arrhythmia with 1st degree AV block Otherwise normal ECG When compared with ECG of 18-NOV-2024 15:37, Premature atrial complexes are no longer Present [place] interval has increased QRS axis Shifted right    Vascular US lower extremity  venous duplex right    Result Date: 11/21/2024            [facility] [address] [place] [address]     Tel [phone] Fax [phone]  Vascular Lab Report  [facility] US LOWER EXTREMITY VENOUS DUPLEX RIGHT Patient Name:      PRUDENCIO Alcantar Physician:  [id number] Charlotte Carlson MD, RPVI Study Date:        11/21/2024           Ordering Provider:  [id number] NILTON JONES MRN/PID:           [id number]             Fellow: Accession#:        [id number]         Technologist:       Brii So RVT Date of Birth/Age: 1955 / 69 years Technologist 2: Gender:            M                    Encounter#:         [id number] Admission Status:  Emergency            Location Performed: [facility] [place]  Diagnosis/ICD: Pain in right leg-M79.604 CPT Codes:     01954 Peripheral venous duplex scan for DVT Limited  Pertinent History: Obesity and PVD.  CONCLUSIONS:  Right Lower Venous: No evidence of acute deep vein thrombus visualized in the right lower extremity. Poor visualization of peroneal veins in grayscale. Peroneal veins visualized in segments. Additional Findings; Lymph nodes in groin largest measuring 1.27cm x 0.95 cm. Soft tissue edema visualized in the calf. Left Lower Venous: The left common femoral vein demonstrates normal spontaneous and respirophasic flow.  Comparison: Compared with study from 5/27/2021, no significant change.  Imaging & Doppler Findings:  Right                 Compressible Thrombus        Flow Distal External Iliac     Yes        None   Spontaneous/Phasic CFV                       Yes        None   Spontaneous/Phasic PFV                       Yes        None FV Proximal               Yes        None   Spontaneous/Phasic FV Mid                    Yes        None FV Distal                 Yes        None Popliteal                 Yes        None   Spontaneous/Phasic Peroneal                  Yes        None  PTV                       Yes        None  Left        Flow CFV  Spontaneous/Phasic  [id number] CHRISTINE Kendrick MD Electronically signed by [id number] CHRISTINE Kendrick MD on 11/21/2024 at 7:45:39 PM  ** Final **     XR foot right 3+ views    Result Date: 11/21/2024  Interpreted By:  Tayo Menjivar, STUDY: XR FOOT RIGHT 3+ VIEWS   INDICATION: Signs/Symptoms:worsening drainage of 3rd digit, check for subcutaneous air.   COMPARISON: November 18, 2024   ACCESSION NUMBER(S): [id number]   ORDERING CLINICIAN: NILTON JONES   FINDINGS: Postsurgical changes of the right foot. 3rd digit soft tissue swelling noted.. No definite osseous abnormality seen. No of no definite soft tissue gas.   The post amputation changes 2nd digit unchanged.       Soft tissue swelling right 3rd digit, nonspecific. No deep soft tissue gas. If there is a high degree of concern for underlying deep soft tissue infection or osseous infection, MRI can be considered for further evaluation.   Signed by: Tayo Menjivar 11/21/2024 6:32 PM Dictation workstation:   [id number]    ECG 12 lead    Result Date: 11/20/2024  Sinus rhythm with frequent Premature atrial complexes Left anterior fascicular block Poor R-wave progression Abnormal ECG Confirmed by Donnell Riojas ([id number]) on 11/20/2024 2:49:31 PM    ECG 12 lead    Result Date: 11/20/2024  Sinus rhythm with 1st degree AV block with frequent Premature atrial complexes Leftward axis Abnormal ECG When compared with ECG of 24-SEP-2024 09:15, Sinus rhythm has replaced Ectopic atrial rhythm QRS axis Shifted left Confirmed by Donnell Riojas ([id number]) on 11/20/2024 2:48:49 PM    CT angio chest for pulmonary embolism    Result Date: 11/18/2024  Interpreted By:  Leonardo Celaya, STUDY: CT ANGIO CHEST FOR PULMONARY EMBOLISM; CT ABDOMEN PELVIS W IV CONTRAST;  11/18/2024 6:21 pm   INDICATION: Signs/Symptoms:fall. L flank pain. SOB before fall; Signs/Symptoms:fall, generalized pain, down for 24h after fall.     COMPARISON:  06/19/2015 CT abdomen/pelvis; 09/23/2024 CT PE chest   ACCESSION NUMBER(S): [id number]; [id number]   ORDERING CLINICIAN: KEN MENDIETA   TECHNIQUE: Contiguous axial images of the chest, abdomen, and pelvis were obtained after the intravenous administration of iodinated contrast. Coronal and sagittal reformatted images were reconstructed from the axial data. CT chest was obtained using PE angiographic protocol with MIP images created on a separate independent workstation.   FINDINGS: CT CHEST:   MEDIASTINUM AND LYMPH NODES:  The esophageal wall appears within normal limits.  No enlarged intrathoracic or axillary lymph nodes by imaging criteria. No pneumomediastinum.   VESSELS:  Normal caliber thoracic aorta without dissection. Mild aortic atherosclerosis.   HEART: Normal size.  Moderate coronary artery calcifications. No significant pericardial effusion.   LUNG, AIRWAYS, PLEURA: There are calcified pleural plaques involving the right posterior hemithorax with pleural thickening. There is adjacent round atelectasis in the right lower lobe. There is minimal left basilar slight dependent subsegmental atelectasis. No consolidation, pulmonary edema, effusion, or pneumothorax. There is emphysema.   CHEST WALL SOFT TISSUES: No discernible acute abnormality.   OSSEOUS STRUCTURES: There are numerous bilateral chronic rib fractures. For example, there are chronic fractures of the posterior right 8th, 10th, 11th, and 12th ribs; posterior left 2nd-7th ribs, posterior left 9th-12th ribs, and multiple lateral and anterior ribs bilaterally. Healed clavicle fracture involving the left clavicular head.     CT ABDOMEN/PELVIS:   ABDOMINAL WALL: No significant abnormality.   LIVER: Cirrhotic liver morphology. No lesions.   BILE DUCTS: No significant intrahepatic or extrahepatic dilatation.   GALLBLADDER: No significant abnormality.   PANCREAS: No significant abnormality.   SPLEEN: The spleen is enlarged, measuring up  to 14.5 cm in length.   ADRENALS: No significant abnormality.   KIDNEYS, URETERS, BLADDER: Moderately atrophic kidneys. No significant abnormality.   REPRODUCTIVE ORGANS: No significant abnormality.   VESSELS: Mild aortic atherosclerosis without AAA.   RETROPERITONEUM/LYMPH NODES: No acute retroperitoneal abnormality. No enlarged lymph nodes.   BOWEL/MESENTERY/PERITONEUM: There is a moderate amount of stool in the rectum distended up to 7.9 x 7.3 cm. No inflammatory bowel wall thickening or dilatation. Normal appendix.   No ascites, free air, or fluid collection.     MUSCULOSKELETAL: No acute osseous abnormality. No suspicious osseous lesion. Large area of heterotopic ossification arising from the right posteromedial femur resulting in marked narrowing of the ischiofemoral space. Healed fracture of the sacrococcygeal junction.       CT CHEST: 1. No acute pulmonary embolism. 2. Emphysema. 3. Right-sided calcified pleural plaques and round atelectasis which can be seen with prior asbestos exposure.   CT ABDOMEN/PELVIS: 1. No acute abnormality in the abdomen or pelvis.   2. Cirrhotic liver morphology and splenomegaly suggestive of portal hypertension.   MACRO: None.   Signed by: Leonardo Celaya 11/18/2024 7:12 PM Dictation workstation:   [id number]    CT abdomen pelvis w IV contrast    Result Date: 11/18/2024  Interpreted By:  Leonardo Celaya, STUDY: CT ANGIO CHEST FOR PULMONARY EMBOLISM; CT ABDOMEN PELVIS W IV CONTRAST;  11/18/2024 6:21 pm   INDICATION: Signs/Symptoms:fall. L flank pain. SOB before fall; Signs/Symptoms:fall, generalized pain, down for 24h after fall.     COMPARISON: 06/19/2015 CT abdomen/pelvis; 09/23/2024 CT PE chest   ACCESSION NUMBER(S): [id number]; [id number]   ORDERING CLINICIAN: KEN MENDIETA   TECHNIQUE: Contiguous axial images of the chest, abdomen, and pelvis were obtained after the intravenous administration of iodinated contrast. Coronal and sagittal reformatted  images were reconstructed from the axial data. CT chest was obtained using PE angiographic protocol with MIP images created on a separate independent workstation.   FINDINGS: CT CHEST:   MEDIASTINUM AND LYMPH NODES:  The esophageal wall appears within normal limits.  No enlarged intrathoracic or axillary lymph nodes by imaging criteria. No pneumomediastinum.   VESSELS:  Normal caliber thoracic aorta without dissection. Mild aortic atherosclerosis.   HEART: Normal size.  Moderate coronary artery calcifications. No significant pericardial effusion.   LUNG, AIRWAYS, PLEURA: There are calcified pleural plaques involving the right posterior hemithorax with pleural thickening. There is adjacent round atelectasis in the right lower lobe. There is minimal left basilar slight dependent subsegmental atelectasis. No consolidation, pulmonary edema, effusion, or pneumothorax. There is emphysema.   CHEST WALL SOFT TISSUES: No discernible acute abnormality.   OSSEOUS STRUCTURES: There are numerous bilateral chronic rib fractures. For example, there are chronic fractures of the posterior right 8th, 10th, 11th, and 12th ribs; posterior left 2nd-7th ribs, posterior left 9th-12th ribs, and multiple lateral and anterior ribs bilaterally. Healed clavicle fracture involving the left clavicular head.     CT ABDOMEN/PELVIS:   ABDOMINAL WALL: No significant abnormality.   LIVER: Cirrhotic liver morphology. No lesions.   BILE DUCTS: No significant intrahepatic or extrahepatic dilatation.   GALLBLADDER: No significant abnormality.   PANCREAS: No significant abnormality.   SPLEEN: The spleen is enlarged, measuring up to 14.5 cm in length.   ADRENALS: No significant abnormality.   KIDNEYS, URETERS, BLADDER: Moderately atrophic kidneys. No significant abnormality.   REPRODUCTIVE ORGANS: No significant abnormality.   VESSELS: Mild aortic atherosclerosis without AAA.   RETROPERITONEUM/LYMPH NODES: No acute retroperitoneal abnormality. No enlarged  lymph nodes.   BOWEL/MESENTERY/PERITONEUM: There is a moderate amount of stool in the rectum distended up to 7.9 x 7.3 cm. No inflammatory bowel wall thickening or dilatation. Normal appendix.   No ascites, free air, or fluid collection.     MUSCULOSKELETAL: No acute osseous abnormality. No suspicious osseous lesion. Large area of heterotopic ossification arising from the right posteromedial femur resulting in marked narrowing of the ischiofemoral space. Healed fracture of the sacrococcygeal junction.       CT CHEST: 1. No acute pulmonary embolism. 2. Emphysema. 3. Right-sided calcified pleural plaques and round atelectasis which can be seen with prior asbestos exposure.   CT ABDOMEN/PELVIS: 1. No acute abnormality in the abdomen or pelvis.   2. Cirrhotic liver morphology and splenomegaly suggestive of portal hypertension.   MACRO: None.   Signed by: Leonardo Celaya 11/18/2024 7:12 PM Dictation workstation:   [id number]    CT head wo IV contrast    Result Date: 11/18/2024  Interpreted By:  Leonardo Celaya, STUDY: CT HEAD WO IV CONTRAST; CT CERVICAL SPINE WO IV CONTRAST; CT LUMBAR SPINE WO IV CONTRAST; CT THORACIC SPINE WO IV CONTRAST; CT FACIAL BONES WO IV CONTRAST; CT 3D RECONSTRUCTION;  11/18/2024 6:19 pm; 11/18/2024 6:21 pm   INDICATION: Signs/Symptoms:fall,head trauma; Signs/Symptoms:fal,neck pain; Signs/Symptoms:fall  back pain; Signs/Symptoms:fall, back pain; Signs/Symptoms:fall, facial trauma; Signs/Symptoms:fall     COMPARISON: CT PE chest 09/23/2024   ACCESSION NUMBER(S): [id number]; [id number]; [id number]; [id number]; [id number]; [id number]   ORDERING CLINICIAN: KEN GONZALEZ   TECHNIQUE: Axial noncontrast CT images of head with coronal and sagittal reconstructed images. Axial noncontrast CT images of maxillofacial skeleton with coronal and sagittal reconstructed images. 3D maxillofacial skeleton reconstructions were performed on an independent workstation and provided for review. Axial  noncontrast CT images of the cervical spine with coronal and sagittal reconstructed images. Multiplanar reformatted images of the thoracic and lumbar spine were obtained from the CTA chest and CT abdomen/pelvis raw data.   FINDINGS: CT HEAD:   BRAIN PARENCHYMA:  No acute intraparenchymal hemorrhage or parenchymal evidence of acute large territory ischemic infarct. Gray-white matter distinction is preserved. No mass-effect.   VENTRICLES and EXTRA-AXIAL SPACES:  No acute extra-axial or intraventricular hemorrhage. No effacement of cerebral sulci. The ventricles and sulci are age-concordant.   PARANASAL SINUSES/MASTOIDS:  No hemorrhage or air-fluid levels within the visualized paranasal sinuses. The mastoids are well aerated.   CALVARIUM/ORBITS:  No skull fracture. The orbits and globes are intact to the extent visualized.   EXTRACRANIAL SOFT TISSUES: No discernible hematoma.     CT MAXILLOFACIAL SKELETON:   FACIAL BONES: No acute facial bone fracture. The bony orbits are intact.   ORBITS: The globes, extraocular muscles and optic nerve sheath complexes are intact. No retrobulbar or subperiosteal hematoma.   SOFT TISSUES: No discernible acute abnormality.   PARANASAL SINUSES: No hemorrhage or air-fluid levels. Mucous retention cyst in the left maxillary sinus.   OTHER FINDINGS: None.     CT CERVICAL SPINE:   PREVERTEBRAL SOFT TISSUES: Within normal limits.   CRANIOCERVICAL JUNCTION: Intact.   ALIGNMENT: Slight reversal of cervical lordosis, likely positional and/or degenerative. No traumatic malalignment or traumatic facet widening.   VERTEBRAE:  No acute fracture. Vertebral body heights are maintained.   SPINAL CANAL/INTERVERTEBRAL DISCS: No high-grade canal stenosis. There is a spur complexes that slightly indent the ventral thecal sac at C3-C4, C5-C6, C6-C7 result in no greater than mild canal stenosis.   NEURAL FORAMINA: Multilevel uncovertebral joint and facet arthropathy notably contribute to mild right C2-C3  foraminal stenosis, severe right and mild left C3-C4 foraminal stenosis, severe right and moderate-severe left C4-C5 foraminal stenosis, moderate right C5-C6 foraminal stenosis, mild bilateral C6-7 foraminal stenosis.   OTHER: None.       CT THORACIC SPINE:   ALIGNMENT:  No traumatic spondylolisthesis or traumatic facet widening.   VERTEBRAE:  No acute fracture. Mild concavity to the upper T2 endplate is stable from prior study, consistent with remote fracture.   SPINAL CANAL/INTERVERTEBRAL DISCS: No high-grade spinal canal stenosis. Varying degrees of mild degenerative disc disease, mainly related to mild anterior endplate spurring without disc height loss or disc spur complexes.   PARASPINAL SOFT TISSUES:  No paravertebral soft tissue hematoma.   VISUALIZED CHEST: Please see separate report.     CT LUMBAR SPINE:   ALIGNMENT: Minimal grade 1 degenerative anterolisthesis of L5 on S1. No traumatic spondylolisthesis or traumatic facet widening.   VERTEBRAE: There are remote fractures of the bilateral L1, L2, and L4 transverse processes and left L3 transverse process.   SPINAL CANAL/INTERVERTEBRAL DISCS: No high-grade spinal canal stenosis.   NEUROFORAMINA: There is multilevel facet arthropathy which is mild bilaterally at L2-L3, moderate on the right at L3-L4, moderate bilateral and L4-L5 and moderate severe at L5-S1. There is mild bilateral L3-L4 foraminal stenosis, mild-moderate left L4-5 foraminal stenosis and mild bilateral L5-S1 foraminal stenosis.   PARASPINAL SOFT TISSUES:  No paravertebral soft tissue hematoma.   VISUALIZED ABDOMEN: Please see separate report.       CT HEAD: 1. No acute intracranial abnormality or calvarial fracture.     CT MAXILLOFACIAL SKELETON: 1. No acute maxillofacial skeleton fracture.     CT CERVICAL SPINE: 1. No acute fracture or traumatic malalignment of the cervical spine. 2. Spondylotic changes of the cervical spine as detailed above.     CT THORACIC/LUMBAR SPINE: 1. No acute  fracture or traumatic malalignment. 2. Remote minimally depressed compression fracture the upper T2 endplate. 3. Remote fracture deformities of the bilateral L1, L2, and L4 transverse processes and left L3 transverse process.   MACRO: None.   Signed by: Leonardo Celaya 11/18/2024 6:57 PM Dictation workstation:   [id number]    CT maxillofacial bones wo IV contrast    Result Date: 11/18/2024  Interpreted By:  Leonardo Celaya, STUDY: CT HEAD WO IV CONTRAST; CT CERVICAL SPINE WO IV CONTRAST; CT LUMBAR SPINE WO IV CONTRAST; CT THORACIC SPINE WO IV CONTRAST; CT FACIAL BONES WO IV CONTRAST; CT 3D RECONSTRUCTION;  11/18/2024 6:19 pm; 11/18/2024 6:21 pm   INDICATION: Signs/Symptoms:fall,head trauma; Signs/Symptoms:fal,neck pain; Signs/Symptoms:fall  back pain; Signs/Symptoms:fall, back pain; Signs/Symptoms:fall, facial trauma; Signs/Symptoms:fall     COMPARISON: CT PE chest 09/23/2024   ACCESSION NUMBER(S): [id number]; [id number]; [id number]; [id number]; [id number]; [id number]   ORDERING CLINICIAN: KEN GONZALEZ   TECHNIQUE: Axial noncontrast CT images of head with coronal and sagittal reconstructed images. Axial noncontrast CT images of maxillofacial skeleton with coronal and sagittal reconstructed images. 3D maxillofacial skeleton reconstructions were performed on an independent workstation and provided for review. Axial noncontrast CT images of the cervical spine with coronal and sagittal reconstructed images. Multiplanar reformatted images of the thoracic and lumbar spine were obtained from the CTA chest and CT abdomen/pelvis raw data.   FINDINGS: CT HEAD:   BRAIN PARENCHYMA:  No acute intraparenchymal hemorrhage or parenchymal evidence of acute large territory ischemic infarct. Gray-white matter distinction is preserved. No mass-effect.   VENTRICLES and EXTRA-AXIAL SPACES:  No acute extra-axial or intraventricular hemorrhage. No effacement of cerebral sulci. The ventricles and sulci are age-concordant.    PARANASAL SINUSES/MASTOIDS:  No hemorrhage or air-fluid levels within the visualized paranasal sinuses. The mastoids are well aerated.   CALVARIUM/ORBITS:  No skull fracture. The orbits and globes are intact to the extent visualized.   EXTRACRANIAL SOFT TISSUES: No discernible hematoma.     CT MAXILLOFACIAL SKELETON:   FACIAL BONES: No acute facial bone fracture. The bony orbits are intact.   ORBITS: The globes, extraocular muscles and optic nerve sheath complexes are intact. No retrobulbar or subperiosteal hematoma.   SOFT TISSUES: No discernible acute abnormality.   PARANASAL SINUSES: No hemorrhage or air-fluid levels. Mucous retention cyst in the left maxillary sinus.   OTHER FINDINGS: None.     CT CERVICAL SPINE:   PREVERTEBRAL SOFT TISSUES: Within normal limits.   CRANIOCERVICAL JUNCTION: Intact.   ALIGNMENT: Slight reversal of cervical lordosis, likely positional and/or degenerative. No traumatic malalignment or traumatic facet widening.   VERTEBRAE:  No acute fracture. Vertebral body heights are maintained.   SPINAL CANAL/INTERVERTEBRAL DISCS: No high-grade canal stenosis. There is a spur complexes that slightly indent the ventral thecal sac at C3-C4, C5-C6, C6-C7 result in no greater than mild canal stenosis.   NEURAL FORAMINA: Multilevel uncovertebral joint and facet arthropathy notably contribute to mild right C2-C3 foraminal stenosis, severe right and mild left C3-C4 foraminal stenosis, severe right and moderate-severe left C4-C5 foraminal stenosis, moderate right C5-C6 foraminal stenosis, mild bilateral C6-7 foraminal stenosis.   OTHER: None.       CT THORACIC SPINE:   ALIGNMENT:  No traumatic spondylolisthesis or traumatic facet widening.   VERTEBRAE:  No acute fracture. Mild concavity to the upper T2 endplate is stable from prior study, consistent with remote fracture.   SPINAL CANAL/INTERVERTEBRAL DISCS: No high-grade spinal canal stenosis. Varying degrees of mild degenerative disc disease, mainly  related to mild anterior endplate spurring without disc height loss or disc spur complexes.   PARASPINAL SOFT TISSUES:  No paravertebral soft tissue hematoma.   VISUALIZED CHEST: Please see separate report.     CT LUMBAR SPINE:   ALIGNMENT: Minimal grade 1 degenerative anterolisthesis of L5 on S1. No traumatic spondylolisthesis or traumatic facet widening.   VERTEBRAE: There are remote fractures of the bilateral L1, L2, and L4 transverse processes and left L3 transverse process.   SPINAL CANAL/INTERVERTEBRAL DISCS: No high-grade spinal canal stenosis.   NEUROFORAMINA: There is multilevel facet arthropathy which is mild bilaterally at L2-L3, moderate on the right at L3-L4, moderate bilateral and L4-L5 and moderate severe at L5-S1. There is mild bilateral L3-L4 foraminal stenosis, mild-moderate left L4-5 foraminal stenosis and mild bilateral L5-S1 foraminal stenosis.   PARASPINAL SOFT TISSUES:  No paravertebral soft tissue hematoma.   VISUALIZED ABDOMEN: Please see separate report.       CT HEAD: 1. No acute intracranial abnormality or calvarial fracture.     CT MAXILLOFACIAL SKELETON: 1. No acute maxillofacial skeleton fracture.     CT CERVICAL SPINE: 1. No acute fracture or traumatic malalignment of the cervical spine. 2. Spondylotic changes of the cervical spine as detailed above.     CT THORACIC/LUMBAR SPINE: 1. No acute fracture or traumatic malalignment. 2. Remote minimally depressed compression fracture the upper T2 endplate. 3. Remote fracture deformities of the bilateral L1, L2, and L4 transverse processes and left L3 transverse process.   MACRO: None.   Signed by: Leonardo Celaya 11/18/2024 6:57 PM Dictation workstation:   [id number]    CT cervical spine wo IV contrast    Result Date: 11/18/2024  Interpreted By:  Leonardo Celaya, STUDY: CT HEAD WO IV CONTRAST; CT CERVICAL SPINE WO IV CONTRAST; CT LUMBAR SPINE WO IV CONTRAST; CT THORACIC SPINE WO IV CONTRAST; CT FACIAL BONES WO IV CONTRAST; CT 3D  RECONSTRUCTION;  11/18/2024 6:19 pm; 11/18/2024 6:21 pm   INDICATION: Signs/Symptoms:fall,head trauma; Signs/Symptoms:fal,neck pain; Signs/Symptoms:fall  back pain; Signs/Symptoms:fall, back pain; Signs/Symptoms:fall, facial trauma; Signs/Symptoms:fall     COMPARISON: CT PE chest 09/23/2024   ACCESSION NUMBER(S): [id number]; [id number]; [id number]; [id number]; [id number]; [id number]   ORDERING CLINICIAN: KEN GONZALEZ   TECHNIQUE: Axial noncontrast CT images of head with coronal and sagittal reconstructed images. Axial noncontrast CT images of maxillofacial skeleton with coronal and sagittal reconstructed images. 3D maxillofacial skeleton reconstructions were performed on an independent workstation and provided for review. Axial noncontrast CT images of the cervical spine with coronal and sagittal reconstructed images. Multiplanar reformatted images of the thoracic and lumbar spine were obtained from the CTA chest and CT abdomen/pelvis raw data.   FINDINGS: CT HEAD:   BRAIN PARENCHYMA:  No acute intraparenchymal hemorrhage or parenchymal evidence of acute large territory ischemic infarct. Gray-white matter distinction is preserved. No mass-effect.   VENTRICLES and EXTRA-AXIAL SPACES:  No acute extra-axial or intraventricular hemorrhage. No effacement of cerebral sulci. The ventricles and sulci are age-concordant.   PARANASAL SINUSES/MASTOIDS:  No hemorrhage or air-fluid levels within the visualized paranasal sinuses. The mastoids are well aerated.   CALVARIUM/ORBITS:  No skull fracture. The orbits and globes are intact to the extent visualized.   EXTRACRANIAL SOFT TISSUES: No discernible hematoma.     CT MAXILLOFACIAL SKELETON:   FACIAL BONES: No acute facial bone fracture. The bony orbits are intact.   ORBITS: The globes, extraocular muscles and optic nerve sheath complexes are intact. No retrobulbar or subperiosteal hematoma.   SOFT TISSUES: No discernible acute abnormality.   PARANASAL SINUSES: No  hemorrhage or air-fluid levels. Mucous retention cyst in the left maxillary sinus.   OTHER FINDINGS: None.     CT CERVICAL SPINE:   PREVERTEBRAL SOFT TISSUES: Within normal limits.   CRANIOCERVICAL JUNCTION: Intact.   ALIGNMENT: Slight reversal of cervical lordosis, likely positional and/or degenerative. No traumatic malalignment or traumatic facet widening.   VERTEBRAE:  No acute fracture. Vertebral body heights are maintained.   SPINAL CANAL/INTERVERTEBRAL DISCS: No high-grade canal stenosis. There is a spur complexes that slightly indent the ventral thecal sac at C3-C4, C5-C6, C6-C7 result in no greater than mild canal stenosis.   NEURAL FORAMINA: Multilevel uncovertebral joint and facet arthropathy notably contribute to mild right C2-C3 foraminal stenosis, severe right and mild left C3-C4 foraminal stenosis, severe right and moderate-severe left C4-C5 foraminal stenosis, moderate right C5-C6 foraminal stenosis, mild bilateral C6-7 foraminal stenosis.   OTHER: None.       CT THORACIC SPINE:   ALIGNMENT:  No traumatic spondylolisthesis or traumatic facet widening.   VERTEBRAE:  No acute fracture. Mild concavity to the upper T2 endplate is stable from prior study, consistent with remote fracture.   SPINAL CANAL/INTERVERTEBRAL DISCS: No high-grade spinal canal stenosis. Varying degrees of mild degenerative disc disease, mainly related to mild anterior endplate spurring without disc height loss or disc spur complexes.   PARASPINAL SOFT TISSUES:  No paravertebral soft tissue hematoma.   VISUALIZED CHEST: Please see separate report.     CT LUMBAR SPINE:   ALIGNMENT: Minimal grade 1 degenerative anterolisthesis of L5 on S1. No traumatic spondylolisthesis or traumatic facet widening.   VERTEBRAE: There are remote fractures of the bilateral L1, L2, and L4 transverse processes and left L3 transverse process.   SPINAL CANAL/INTERVERTEBRAL DISCS: No high-grade spinal canal stenosis.   NEUROFORAMINA: There is multilevel facet  arthropathy which is mild bilaterally at L2-L3, moderate on the right at L3-L4, moderate bilateral and L4-L5 and moderate severe at L5-S1. There is mild bilateral L3-L4 foraminal stenosis, mild-moderate left L4-5 foraminal stenosis and mild bilateral L5-S1 foraminal stenosis.   PARASPINAL SOFT TISSUES:  No paravertebral soft tissue hematoma.   VISUALIZED ABDOMEN: Please see separate report.       CT HEAD: 1. No acute intracranial abnormality or calvarial fracture.     CT MAXILLOFACIAL SKELETON: 1. No acute maxillofacial skeleton fracture.     CT CERVICAL SPINE: 1. No acute fracture or traumatic malalignment of the cervical spine. 2. Spondylotic changes of the cervical spine as detailed above.     CT THORACIC/LUMBAR SPINE: 1. No acute fracture or traumatic malalignment. 2. Remote minimally depressed compression fracture the upper T2 endplate. 3. Remote fracture deformities of the bilateral L1, L2, and L4 transverse processes and left L3 transverse process.   MACRO: None.   Signed by: Leonardo Celaya 11/18/2024 6:57 PM Dictation workstation:   [id number]    CT thoracic spine wo IV contrast    Result Date: 11/18/2024  Interpreted By:  Leonardo Celaya, STUDY: CT HEAD WO IV CONTRAST; CT CERVICAL SPINE WO IV CONTRAST; CT LUMBAR SPINE WO IV CONTRAST; CT THORACIC SPINE WO IV CONTRAST; CT FACIAL BONES WO IV CONTRAST; CT 3D RECONSTRUCTION;  11/18/2024 6:19 pm; 11/18/2024 6:21 pm   INDICATION: Signs/Symptoms:fall,head trauma; Signs/Symptoms:fal,neck pain; Signs/Symptoms:fall  back pain; Signs/Symptoms:fall, back pain; Signs/Symptoms:fall, facial trauma; Signs/Symptoms:fall     COMPARISON: CT PE chest 09/23/2024   ACCESSION NUMBER(S): [id number]; [id number]; [id number]; [id number]; [id number]; [id number]   ORDERING CLINICIAN: KEN GONZALEZ   TECHNIQUE: Axial noncontrast CT images of head with coronal and sagittal reconstructed images. Axial noncontrast CT images of maxillofacial skeleton with coronal and  sagittal reconstructed images. 3D maxillofacial skeleton reconstructions were performed on an independent workstation and provided for review. Axial noncontrast CT images of the cervical spine with coronal and sagittal reconstructed images. Multiplanar reformatted images of the thoracic and lumbar spine were obtained from the CTA chest and CT abdomen/pelvis raw data.   FINDINGS: CT HEAD:   BRAIN PARENCHYMA:  No acute intraparenchymal hemorrhage or parenchymal evidence of acute large territory ischemic infarct. Gray-white matter distinction is preserved. No mass-effect.   VENTRICLES and EXTRA-AXIAL SPACES:  No acute extra-axial or intraventricular hemorrhage. No effacement of cerebral sulci. The ventricles and sulci are age-concordant.   PARANASAL SINUSES/MASTOIDS:  No hemorrhage or air-fluid levels within the visualized paranasal sinuses. The mastoids are well aerated.   CALVARIUM/ORBITS:  No skull fracture. The orbits and globes are intact to the extent visualized.   EXTRACRANIAL SOFT TISSUES: No discernible hematoma.     CT MAXILLOFACIAL SKELETON:   FACIAL BONES: No acute facial bone fracture. The bony orbits are intact.   ORBITS: The globes, extraocular muscles and optic nerve sheath complexes are intact. No retrobulbar or subperiosteal hematoma.   SOFT TISSUES: No discernible acute abnormality.   PARANASAL SINUSES: No hemorrhage or air-fluid levels. Mucous retention cyst in the left maxillary sinus.   OTHER FINDINGS: None.     CT CERVICAL SPINE:   PREVERTEBRAL SOFT TISSUES: Within normal limits.   CRANIOCERVICAL JUNCTION: Intact.   ALIGNMENT: Slight reversal of cervical lordosis, likely positional and/or degenerative. No traumatic malalignment or traumatic facet widening.   VERTEBRAE:  No acute fracture. Vertebral body heights are maintained.   SPINAL CANAL/INTERVERTEBRAL DISCS: No high-grade canal stenosis. There is a spur complexes that slightly indent the ventral thecal sac at C3-C4, C5-C6, C6-C7 result in no  greater than mild canal stenosis.   NEURAL FORAMINA: Multilevel uncovertebral joint and facet arthropathy notably contribute to mild right C2-C3 foraminal stenosis, severe right and mild left C3-C4 foraminal stenosis, severe right and moderate-severe left C4-C5 foraminal stenosis, moderate right C5-C6 foraminal stenosis, mild bilateral C6-7 foraminal stenosis.   OTHER: None.       CT THORACIC SPINE:   ALIGNMENT:  No traumatic spondylolisthesis or traumatic facet widening.   VERTEBRAE:  No acute fracture. Mild concavity to the upper T2 endplate is stable from prior study, consistent with remote fracture.   SPINAL CANAL/INTERVERTEBRAL DISCS: No high-grade spinal canal stenosis. Varying degrees of mild degenerative disc disease, mainly related to mild anterior endplate spurring without disc height loss or disc spur complexes.   PARASPINAL SOFT TISSUES:  No paravertebral soft tissue hematoma.   VISUALIZED CHEST: Please see separate report.     CT LUMBAR SPINE:   ALIGNMENT: Minimal grade 1 degenerative anterolisthesis of L5 on S1. No traumatic spondylolisthesis or traumatic facet widening.   VERTEBRAE: There are remote fractures of the bilateral L1, L2, and L4 transverse processes and left L3 transverse process.   SPINAL CANAL/INTERVERTEBRAL DISCS: No high-grade spinal canal stenosis.   NEUROFORAMINA: There is multilevel facet arthropathy which is mild bilaterally at L2-L3, moderate on the right at L3-L4, moderate bilateral and L4-L5 and moderate severe at L5-S1. There is mild bilateral L3-L4 foraminal stenosis, mild-moderate left L4-5 foraminal stenosis and mild bilateral L5-S1 foraminal stenosis.   PARASPINAL SOFT TISSUES:  No paravertebral soft tissue hematoma.   VISUALIZED ABDOMEN: Please see separate report.       CT HEAD: 1. No acute intracranial abnormality or calvarial fracture.     CT MAXILLOFACIAL SKELETON: 1. No acute maxillofacial skeleton fracture.     CT CERVICAL SPINE: 1. No acute fracture or traumatic  malalignment of the cervical spine. 2. Spondylotic changes of the cervical spine as detailed above.     CT THORACIC/LUMBAR SPINE: 1. No acute fracture or traumatic malalignment. 2. Remote minimally depressed compression fracture the upper T2 endplate. 3. Remote fracture deformities of the bilateral L1, L2, and L4 transverse processes and left L3 transverse process.   MACRO: None.   Signed by: Leonardo Celaya 11/18/2024 6:57 PM Dictation workstation:   [id number]    CT lumbar spine wo IV contrast    Result Date: 11/18/2024  Interpreted By:  Leonardo Celaya, STUDY: CT HEAD WO IV CONTRAST; CT CERVICAL SPINE WO IV CONTRAST; CT LUMBAR SPINE WO IV CONTRAST; CT THORACIC SPINE WO IV CONTRAST; CT FACIAL BONES WO IV CONTRAST; CT 3D RECONSTRUCTION;  11/18/2024 6:19 pm; 11/18/2024 6:21 pm   INDICATION: Signs/Symptoms:fall,head trauma; Signs/Symptoms:fal,neck pain; Signs/Symptoms:fall  back pain; Signs/Symptoms:fall, back pain; Signs/Symptoms:fall, facial trauma; Signs/Symptoms:fall     COMPARISON: CT PE chest 09/23/2024   ACCESSION NUMBER(S): [id number]; [id number]; [id number]; [id number]; [id number]; [id number]   ORDERING CLINICIAN: KEN GONZALEZ   TECHNIQUE: Axial noncontrast CT images of head with coronal and sagittal reconstructed images. Axial noncontrast CT images of maxillofacial skeleton with coronal and sagittal reconstructed images. 3D maxillofacial skeleton reconstructions were performed on an independent workstation and provided for review. Axial noncontrast CT images of the cervical spine with coronal and sagittal reconstructed images. Multiplanar reformatted images of the thoracic and lumbar spine were obtained from the CTA chest and CT abdomen/pelvis raw data.   FINDINGS: CT HEAD:   BRAIN PARENCHYMA:  No acute intraparenchymal hemorrhage or parenchymal evidence of acute large territory ischemic infarct. Gray-white matter distinction is preserved. No mass-effect.   VENTRICLES and EXTRA-AXIAL  SPACES:  No acute extra-axial or intraventricular hemorrhage. No effacement of cerebral sulci. The ventricles and sulci are age-concordant.   PARANASAL SINUSES/MASTOIDS:  No hemorrhage or air-fluid levels within the visualized paranasal sinuses. The mastoids are well aerated.   CALVARIUM/ORBITS:  No skull fracture. The orbits and globes are intact to the extent visualized.   EXTRACRANIAL SOFT TISSUES: No discernible hematoma.     CT MAXILLOFACIAL SKELETON:   FACIAL BONES: No acute facial bone fracture. The bony orbits are intact.   ORBITS: The globes, extraocular muscles and optic nerve sheath complexes are intact. No retrobulbar or subperiosteal hematoma.   SOFT TISSUES: No discernible acute abnormality.   PARANASAL SINUSES: No hemorrhage or air-fluid levels. Mucous retention cyst in the left maxillary sinus.   OTHER FINDINGS: None.     CT CERVICAL SPINE:   PREVERTEBRAL SOFT TISSUES: Within normal limits.   CRANIOCERVICAL JUNCTION: Intact.   ALIGNMENT: Slight reversal of cervical lordosis, likely positional and/or degenerative. No traumatic malalignment or traumatic facet widening.   VERTEBRAE:  No acute fracture. Vertebral body heights are maintained.   SPINAL CANAL/INTERVERTEBRAL DISCS: No high-grade canal stenosis. There is a spur complexes that slightly indent the ventral thecal sac at C3-C4, C5-C6, C6-C7 result in no greater than mild canal stenosis.   NEURAL FORAMINA: Multilevel uncovertebral joint and facet arthropathy notably contribute to mild right C2-C3 foraminal stenosis, severe right and mild left C3-C4 foraminal stenosis, severe right and moderate-severe left C4-C5 foraminal stenosis, moderate right C5-C6 foraminal stenosis, mild bilateral C6-7 foraminal stenosis.   OTHER: None.       CT THORACIC SPINE:   ALIGNMENT:  No traumatic spondylolisthesis or traumatic facet widening.   VERTEBRAE:  No acute fracture. Mild concavity to the upper T2 endplate is stable from prior study, consistent with remote  fracture.   SPINAL CANAL/INTERVERTEBRAL DISCS: No high-grade spinal canal stenosis. Varying degrees of mild degenerative disc disease, mainly related to mild anterior endplate spurring without disc height loss or disc spur complexes.   PARASPINAL SOFT TISSUES:  No paravertebral soft tissue hematoma.   VISUALIZED CHEST: Please see separate report.     CT LUMBAR SPINE:   ALIGNMENT: Minimal grade 1 degenerative anterolisthesis of L5 on S1. No traumatic spondylolisthesis or traumatic facet widening.   VERTEBRAE: There are remote fractures of the bilateral L1, L2, and L4 transverse processes and left L3 transverse process.   SPINAL CANAL/INTERVERTEBRAL DISCS: No high-grade spinal canal stenosis.   NEUROFORAMINA: There is multilevel facet arthropathy which is mild bilaterally at L2-L3, moderate on the right at L3-L4, moderate bilateral and L4-L5 and moderate severe at L5-S1. There is mild bilateral L3-L4 foraminal stenosis, mild-moderate left L4-5 foraminal stenosis and mild bilateral L5-S1 foraminal stenosis.   PARASPINAL SOFT TISSUES:  No paravertebral soft tissue hematoma.   VISUALIZED ABDOMEN: Please see separate report.       CT HEAD: 1. No acute intracranial abnormality or calvarial fracture.     CT MAXILLOFACIAL SKELETON: 1. No acute maxillofacial skeleton fracture.     CT CERVICAL SPINE: 1. No acute fracture or traumatic malalignment of the cervical spine. 2. Spondylotic changes of the cervical spine as detailed above.     CT THORACIC/LUMBAR SPINE: 1. No acute fracture or traumatic malalignment. 2. Remote minimally depressed compression fracture the upper T2 endplate. 3. Remote fracture deformities of the bilateral L1, L2, and L4 transverse processes and left L3 transverse process.   MACRO: None.   Signed by: Leonardo Celaya 11/18/2024 6:57 PM Dictation workstation:   [id number]    CT 3D reconstruction    Result Date: 11/18/2024  Interpreted By:  Leonardo Celaya, STUDY: CT HEAD WO IV CONTRAST; CT CERVICAL  SPINE WO IV CONTRAST; CT LUMBAR SPINE WO IV CONTRAST; CT THORACIC SPINE WO IV CONTRAST; CT FACIAL BONES WO IV CONTRAST; CT 3D RECONSTRUCTION;  11/18/2024 6:19 pm; 11/18/2024 6:21 pm   INDICATION: Signs/Symptoms:fall,head trauma; Signs/Symptoms:fal,neck pain; Signs/Symptoms:fall  back pain; Signs/Symptoms:fall, back pain; Signs/Symptoms:fall, facial trauma; Signs/Symptoms:fall     COMPARISON: CT PE chest 09/23/2024   ACCESSION NUMBER(S): [id number]; [id number]; [id number]; [id number]; [id number]; [id number]   ORDERING CLINICIAN: KEN GONZALEZ   TECHNIQUE: Axial noncontrast CT images of head with coronal and sagittal reconstructed images. Axial noncontrast CT images of maxillofacial skeleton with coronal and sagittal reconstructed images. 3D maxillofacial skeleton reconstructions were performed on an independent workstation and provided for review. Axial noncontrast CT images of the cervical spine with coronal and sagittal reconstructed images. Multiplanar reformatted images of the thoracic and lumbar spine were obtained from the CTA chest and CT abdomen/pelvis raw data.   FINDINGS: CT HEAD:   BRAIN PARENCHYMA:  No acute intraparenchymal hemorrhage or parenchymal evidence of acute large territory ischemic infarct. Gray-white matter distinction is preserved. No mass-effect.   VENTRICLES and EXTRA-AXIAL SPACES:  No acute extra-axial or intraventricular hemorrhage. No effacement of cerebral sulci. The ventricles and sulci are age-concordant.   PARANASAL SINUSES/MASTOIDS:  No hemorrhage or air-fluid levels within the visualized paranasal sinuses. The mastoids are well aerated.   CALVARIUM/ORBITS:  No skull fracture. The orbits and globes are intact to the extent visualized.   EXTRACRANIAL SOFT TISSUES: No discernible hematoma.     CT MAXILLOFACIAL SKELETON:   FACIAL BONES: No acute facial bone fracture. The bony orbits are intact.   ORBITS: The globes, extraocular muscles and optic nerve sheath complexes are  intact. No retrobulbar or subperiosteal hematoma.   SOFT TISSUES: No discernible acute abnormality.   PARANASAL SINUSES: No hemorrhage or air-fluid levels. Mucous retention cyst in the left maxillary sinus.   OTHER FINDINGS: None.     CT CERVICAL SPINE:   PREVERTEBRAL SOFT TISSUES: Within normal limits.   CRANIOCERVICAL JUNCTION: Intact.   ALIGNMENT: Slight reversal of cervical lordosis, likely positional and/or degenerative. No traumatic malalignment or traumatic facet widening.   VERTEBRAE:  No acute fracture. Vertebral body heights are maintained.   SPINAL CANAL/INTERVERTEBRAL DISCS: No high-grade canal stenosis. There is a spur complexes that slightly indent the ventral thecal sac at C3-C4, C5-C6, C6-C7 result in no greater than mild canal stenosis.   NEURAL FORAMINA: Multilevel uncovertebral joint and facet arthropathy notably contribute to mild right C2-C3 foraminal stenosis, severe right and mild left C3-C4 foraminal stenosis, severe right and moderate-severe left C4-C5 foraminal stenosis, moderate right C5-C6 foraminal stenosis, mild bilateral C6-7 foraminal stenosis.   OTHER: None.       CT THORACIC SPINE:   ALIGNMENT:  No traumatic spondylolisthesis or traumatic facet widening.   VERTEBRAE:  No acute fracture. Mild concavity to the upper T2 endplate is stable from prior study, consistent with remote fracture.   SPINAL CANAL/INTERVERTEBRAL DISCS: No high-grade spinal canal stenosis. Varying degrees of mild degenerative disc disease, mainly related to mild anterior endplate spurring without disc height loss or disc spur complexes.   PARASPINAL SOFT TISSUES:  No paravertebral soft tissue hematoma.   VISUALIZED CHEST: Please see separate report.     CT LUMBAR SPINE:   ALIGNMENT: Minimal grade 1 degenerative anterolisthesis of L5 on S1. No traumatic spondylolisthesis or traumatic facet widening.   VERTEBRAE: There are remote fractures of the bilateral L1, L2, and L4 transverse processes and left L3 transverse  process.   SPINAL CANAL/INTERVERTEBRAL DISCS: No high-grade spinal canal stenosis.   NEUROFORAMINA: There is multilevel facet arthropathy which is mild bilaterally at L2-L3, moderate on the right at L3-L4, moderate bilateral and L4-L5 and moderate severe at L5-S1. There is mild bilateral L3-L4 foraminal stenosis, mild-moderate left L4-5 foraminal stenosis and mild bilateral L5-S1 foraminal stenosis.   PARASPINAL SOFT TISSUES:  No paravertebral soft tissue hematoma.   VISUALIZED ABDOMEN: Please see separate report.       CT HEAD: 1. No acute intracranial abnormality or calvarial fracture.     CT MAXILLOFACIAL SKELETON: 1. No acute maxillofacial skeleton fracture.     CT CERVICAL SPINE: 1. No acute fracture or traumatic malalignment of the cervical spine. 2. Spondylotic changes of the cervical spine as detailed above.     CT THORACIC/LUMBAR SPINE: 1. No acute fracture or traumatic malalignment. 2. Remote minimally depressed compression fracture the upper T2 endplate. 3. Remote fracture deformities of the bilateral L1, L2, and L4 transverse processes and left L3 transverse process.   MACRO: None.   Signed by: Leonardo Celaya 11/18/2024 6:57 PM Dictation workstation:   [id number]    XR hand 3+ views bilateral    Result Date: 11/18/2024  Interpreted By:  Nathen Lovett, STUDY: XR HAND 3+ VIEWS BILATERAL; XR WRIST 3+ VIEWS BILATERAL  11/18/2024 2:15 pm   INDICATION: Signs/Symptoms:b/l hand pain; Signs/Symptoms:b/l wrist pain   COMPARISON: None.   ACCESSION NUMBER(S): [id number]; [id number]   ORDERING CLINICIAN: KEN GONZALEZ   TECHNIQUE: Three views each of the bilateral hands and wrists including AP, oblique and lateral projections were obtained.   FINDINGS: There is no evidence of acute fracture or dislocation identified. Moderate to severe hypertrophic degenerative changes are seen in the right trapezium 1st metacarpal articulation. Moderate degenerative changes are seen in the left trapezium 1st metacarpal  articulation. Moderate to severe degenerative changes are seen in the right proximal interphalangeal joint. Minimal degenerative changes are seen in the remaining interphalangeal joints.       1. No fracture or dislocation. 2. Degenerative changes, as described above.   MACRO: None.   Signed by: Nathen Lovett 11/18/2024 2:38 PM Dictation workstation:   [id number]    XR wrist 3+ views bilateral    Result Date: 11/18/2024  Interpreted By:  Nathen Lovett, STUDY: XR HAND 3+ VIEWS BILATERAL; XR WRIST 3+ VIEWS BILATERAL  11/18/2024 2:15 pm   INDICATION: Signs/Symptoms:b/l hand pain; Signs/Symptoms:b/l wrist pain   COMPARISON: None.   ACCESSION NUMBER(S): [id number]; [id number]   ORDERING CLINICIAN: KEN GONZALEZ   TECHNIQUE: Three views each of the bilateral hands and wrists including AP, oblique and lateral projections were obtained.   FINDINGS: There is no evidence of acute fracture or dislocation identified. Moderate to severe hypertrophic degenerative changes are seen in the right trapezium 1st metacarpal articulation. Moderate degenerative changes are seen in the left trapezium 1st metacarpal articulation. Moderate to severe degenerative changes are seen in the right proximal interphalangeal joint. Minimal degenerative changes are seen in the remaining interphalangeal joints.       1. No fracture or dislocation. 2. Degenerative changes, as described above.   MACRO: None.   Signed by: Nathen Lovett 11/18/2024 2:38 PM Dictation workstation:   [id number]    XR foot 3+ views bilateral    Result Date: 11/18/2024  Interpreted By:  Nathen Lovett, STUDY: XR FOOT 3+ VIEWS BILATERAL  11/18/2024 2:15 pm   INDICATION: Signs/Symptoms:b/l foot pain   COMPARISON: 12/17/2021   ACCESSION NUMBER(S): [id number]   ORDERING CLINICIAN: KEN GONZALEZ   TECHNIQUE: Three views each of the bilateral feet including AP , oblique and lateral projections were obtained.   FINDINGS: The patient is status post left 1st interphalangeal joint  arthrodesis. Postoperative changes are seen involving the distal aspect of the 1st metatarsal. The patient is status post amputation through the left 2nd proximal interphalangeal joint and right 2nd metatarsophalangeal joint. There is no radiographic evidence of acute fracture or dislocation identified. Mild-to-moderate degenerative changes are seen in the 1st metatarsophalangeal joints bilaterally.       1. No acute fracture or dislocation identified. 2. Postoperative and degenerative changes, as described above.   MACRO: None.   Signed by: Nathen Lovett 11/18/2024 2:36 PM Dictation workstation:   [id number]    XR chest 1 view    Result Date: 11/18/2024  Interpreted By:  Nathen Lovett, STUDY: XR CHEST 1 VIEW  11/18/2024 2:15 pm   INDICATION: Signs/Symptoms:fall,near syncope   COMPARISON: 10/30/2024   ACCESSION NUMBER(S): [id number]   ORDERING CLINICIAN: KEN GONZALEZ   TECHNIQUE: A single AP portable radiograph of the chest was obtained.   FINDINGS: Multiple cardiac monitoring leads are seen over the chest.  Hazy airspace opacity is seen at the right lung base, similar to the prior study, and may represent scarring, atelectasis and/or pneumonia. No pneumothorax is identified. The cardiac silhouette is within normal limits for size.       Right basilar airspace opacity, as above. Clinical correlation and continued follow-up until clearing is recommended.   MACRO: None.   Signed by: Nathen Lovett 11/18/2024 2:33 PM Dictation workstation:   [id number]    XR tibia fibula bilateral 2 views    Result Date: 11/18/2024  Interpreted By:  Nathen Lovett, STUDY: XR TIBIA FIBULA BILATERAL 2 VIEWS 11/18/2024 2:15 pm   INDICATION: Signs/Symptoms:b/l foot pain   COMPARISON: None.   ACCESSION NUMBER(S): [id number]   ORDERING CLINICIAN: KEN GONZALEZ   TECHNIQUE: Four views each of the bilateral tibia and fibula including AP and lateral projections were obtained.   FINDINGS: There is no evidence of acute fracture or  dislocation identified. The joint spaces are well preserved throughout without significant degenerative changes.       1. No fracture or dislocation.   MACRO: None.   Signed by: Nathen Lovett 11/18/2024 2:30 PM Dictation workstation:   [id number]    XR foot 1-2 views bilateral    Result Date: 11/17/2024  Interpreted By:  Parvez Ellison, STUDY: XR FOOT 1-2 VIEWS BILATERAL; ;  11/13/2024 1:19 pm   INDICATION: Signs/Symptoms:bilateral foot wounds, concern for osteomyelitis.   ,L97.512 Non-pressure chronic ulcer of other part of right foot with fat layer exposed,L97.522 Non-pressure chronic ulcer of other part of left foot with fat layer exposed   COMPARISON: None.   ACCESSION NUMBER(S): [id number]   ORDERING CLINICIAN: PHIL BETANCOURT   FINDINGS: Bilateral feet, two views of each   On the left are postsurgical changes in the 1st IP joint status post fusion and in the 1st metatarsal head status post osteotomy with fixation. Soft tissue edema about the 1st digit. No osseous destruction or erosions seen. There is flattening of the left 2nd metatarsal head consistent with avascular necrosis (Freiberg's disease). There is mild degenerative change in the midfoot articulations.   On the right there is mutation of the 2nd digit with a remote 2nd distal metatarsal fracture. There is moderate degenerative changes of 1st MTP joint and the 1st IP joint with lateral subluxation. Soft tissue edema present without osseous destruction or erosion seen radiographically.       No radiographic evidence of osteomyelitis in either foot. MRI can be performed for further evaluation Soft tissue edema about the left 1st toe with cellulitis in the differential. Postsurgical change bilaterally.   MACRO: None   Signed by: Parvez Ellison 11/17/2024 6:40 AM Dictation workstation:   [id number]    XR chest 2 views    Result Date: 11/4/2024  Interpreted By:  Tayo Menjivar, STUDY: XR CHEST 2 VIEWS   INDICATION: Signs/Symptoms:pleural effusion.    COMPARISON: September 23, 2024   ACCESSION NUMBER(S): [id number]   ORDERING CLINICIAN: PRABHU POOLE   FINDINGS: Extensive chronic pleural plaque formation right worse than left similar to prior study with right basilar airspace disease and effusion unchanged.   No additional new findings. Multiple prior rib fractures.       Extensive chronic pleural plaque formation right worse than left similar to prior study with right basilar airspace disease and effusion unchanged.   Signed by: Tayo Menjivar 11/4/2024 5:13 PM Dictation workstation:   [id number]         Assessment/Plan   69-year-old male with recurrent syncope secondary to orthostatic hypotension/autonomic dysfunction/autonomic neuropathy, restrictive lung disease on 4 to 5 L nasal cannula at baseline, PSVT on propranolol, CAD w/ CCTA (02/2024) - 50% stenosis of LAD - 25-50% stenosis of RCA, alcohol use disorder c/b severe alcoholic polyneuropathy with autonomic dysfunction  who presents at the direction of his podiatrist due to third right lower extremity digit infection, now POD #0 status post right third digit amputation with application of allograft to metatarsal heads; case discussed with podiatry, appreciate infectious disease input today.  Assessment & Plan  Foot infection    Osteomyelitis of third toe of right foot (Multi)    Plan:  - Patient stable to remain at current level of care  - Continue on vancomycin and Zosyn  - Consider MRI depending on his clinical progress  - Follow the blood cultures that were obtained  - His home medication reconciliation was reviewed  - Okay for resuming diet  - VTE prophylaxis with subcutaneous heparin  - No indication for telemetry  - Code: DNR/DNI-CCA         I spent 35 minutes in the professional and overall care of this patient.    Peter Moser MD

## 2024-11-24 VITALS
TEMPERATURE: 95.7 F | HEART RATE: 59 BPM | BODY MASS INDEX: 34.43 KG/M2 | SYSTOLIC BLOOD PRESSURE: 126 MMHG | RESPIRATION RATE: 18 BRPM | DIASTOLIC BLOOD PRESSURE: 79 MMHG | HEIGHT: 72 IN | WEIGHT: 254.19 LBS | OXYGEN SATURATION: 96 %

## 2024-11-24 LAB
ANION GAP SERPL CALC-SCNC: 10 MMOL/L (ref 10–20)
BACTERIA BLD CULT: NORMAL
BACTERIA BLD CULT: NORMAL
BACTERIA SPEC CULT: ABNORMAL
BUN SERPL-MCNC: 18 MG/DL (ref 6–23)
CALCIUM SERPL-MCNC: 8.2 MG/DL (ref 8.6–10.3)
CHLORIDE SERPL-SCNC: 103 MMOL/L (ref 98–107)
CO2 SERPL-SCNC: 27 MMOL/L (ref 21–32)
CREAT SERPL-MCNC: 1.27 MG/DL (ref 0.5–1.3)
EGFRCR SERPLBLD CKD-EPI 2021: 60 ML/MIN/1.73M*2
ERYTHROCYTE [DISTWIDTH] IN BLOOD BY AUTOMATED COUNT: 17 % (ref 11.5–14.5)
GLUCOSE SERPL-MCNC: 125 MG/DL (ref 74–99)
GRAM STN SPEC: ABNORMAL
GRAM STN SPEC: ABNORMAL
HCT VFR BLD AUTO: 35 % (ref 41–52)
HGB BLD-MCNC: 10.5 G/DL (ref 13.5–17.5)
MAGNESIUM SERPL-MCNC: 2.35 MG/DL (ref 1.6–2.4)
MCH RBC QN AUTO: 28.8 PG (ref 26–34)
MCHC RBC AUTO-ENTMCNC: 30 G/DL (ref 32–36)
MCV RBC AUTO: 96 FL (ref 80–100)
NRBC BLD-RTO: 0 /100 WBCS (ref 0–0)
PLATELET # BLD AUTO: 208 X10*3/UL (ref 150–450)
POTASSIUM SERPL-SCNC: 3.9 MMOL/L (ref 3.5–5.3)
RBC # BLD AUTO: 3.65 X10*6/UL (ref 4.5–5.9)
SODIUM SERPL-SCNC: 136 MMOL/L (ref 136–145)
WBC # BLD AUTO: 6.5 X10*3/UL (ref 4.4–11.3)

## 2024-11-24 PROCEDURE — 2500000002 HC RX 250 W HCPCS SELF ADMINISTERED DRUGS (ALT 637 FOR MEDICARE OP, ALT 636 FOR OP/ED): Performed by: INTERNAL MEDICINE

## 2024-11-24 PROCEDURE — 99233 SBSQ HOSP IP/OBS HIGH 50: CPT | Performed by: INTERNAL MEDICINE

## 2024-11-24 PROCEDURE — 94640 AIRWAY INHALATION TREATMENT: CPT

## 2024-11-24 PROCEDURE — 36415 COLL VENOUS BLD VENIPUNCTURE: CPT | Performed by: INTERNAL MEDICINE

## 2024-11-24 PROCEDURE — 2500000004 HC RX 250 GENERAL PHARMACY W/ HCPCS (ALT 636 FOR OP/ED)

## 2024-11-24 PROCEDURE — 2500000002 HC RX 250 W HCPCS SELF ADMINISTERED DRUGS (ALT 637 FOR MEDICARE OP, ALT 636 FOR OP/ED)

## 2024-11-24 PROCEDURE — 2500000001 HC RX 250 WO HCPCS SELF ADMINISTERED DRUGS (ALT 637 FOR MEDICARE OP)

## 2024-11-24 PROCEDURE — 83735 ASSAY OF MAGNESIUM: CPT | Performed by: INTERNAL MEDICINE

## 2024-11-24 PROCEDURE — 80048 BASIC METABOLIC PNL TOTAL CA: CPT | Performed by: INTERNAL MEDICINE

## 2024-11-24 PROCEDURE — 1100000001 HC PRIVATE ROOM DAILY

## 2024-11-24 PROCEDURE — 2500000001 HC RX 250 WO HCPCS SELF ADMINISTERED DRUGS (ALT 637 FOR MEDICARE OP): Performed by: INTERNAL MEDICINE

## 2024-11-24 PROCEDURE — 85027 COMPLETE CBC AUTOMATED: CPT | Performed by: INTERNAL MEDICINE

## 2024-11-24 PROCEDURE — 2500000005 HC RX 250 GENERAL PHARMACY W/O HCPCS: Performed by: INTERNAL MEDICINE

## 2024-11-24 RX ORDER — AMOXICILLIN 250 MG
1 CAPSULE ORAL 2 TIMES DAILY
Status: DISCONTINUED | OUTPATIENT
Start: 2024-11-24 | End: 2024-11-27 | Stop reason: HOSPADM

## 2024-11-24 RX ORDER — SULFAMETHOXAZOLE AND TRIMETHOPRIM 800; 160 MG/1; MG/1
1 TABLET ORAL EVERY 12 HOURS SCHEDULED
Status: DISCONTINUED | OUTPATIENT
Start: 2024-11-24 | End: 2024-11-27 | Stop reason: HOSPADM

## 2024-11-24 ASSESSMENT — COGNITIVE AND FUNCTIONAL STATUS - GENERAL
TURNING FROM BACK TO SIDE WHILE IN FLAT BAD: A LITTLE
MOVING TO AND FROM BED TO CHAIR: TOTAL
DRESSING REGULAR LOWER BODY CLOTHING: A LITTLE
STANDING UP FROM CHAIR USING ARMS: A LITTLE
STANDING UP FROM CHAIR USING ARMS: TOTAL
MOBILITY SCORE: 12
WALKING IN HOSPITAL ROOM: TOTAL
DAILY ACTIVITIY SCORE: 21
CLIMB 3 TO 5 STEPS WITH RAILING: TOTAL
HELP NEEDED FOR BATHING: A LITTLE
MOVING TO AND FROM BED TO CHAIR: TOTAL
WALKING IN HOSPITAL ROOM: TOTAL
HELP NEEDED FOR BATHING: A LITTLE
DRESSING REGULAR LOWER BODY CLOTHING: A LITTLE
MOBILITY SCORE: 10
CLIMB 3 TO 5 STEPS WITH RAILING: TOTAL
DAILY ACTIVITIY SCORE: 21
TOILETING: A LITTLE
TURNING FROM BACK TO SIDE WHILE IN FLAT BAD: A LITTLE
MOVING FROM LYING ON BACK TO SITTING ON SIDE OF FLAT BED WITH BEDRAILS: A LITTLE
TOILETING: A LITTLE
MOVING FROM LYING ON BACK TO SITTING ON SIDE OF FLAT BED WITH BEDRAILS: A LITTLE

## 2024-11-24 ASSESSMENT — PAIN SCALES - GENERAL
PAINLEVEL_OUTOF10: 4
PAINLEVEL_OUTOF10: 6
PAINLEVEL_OUTOF10: 0 - NO PAIN

## 2024-11-24 ASSESSMENT — PAIN - FUNCTIONAL ASSESSMENT: PAIN_FUNCTIONAL_ASSESSMENT: 0-10

## 2024-11-24 NOTE — PROGRESS NOTES
INPATIENT PROGRESS NOTES    PATIENT NAME: Angus Redman    MRN: [id number]  SERVICE DATE:  11/24/2024   SERVICE TIME:  3:10 PM    SIGNATURE: Kathleen Patino MD      SUBJECTIVE  Afebrile  No events overnight       OBJECTIVE  PHYSICAL EXAM:   Patient Vitals for the past 24 hrs:   BP Temp Temp src Pulse Resp SpO2   11/24/24 0800 143/84 36.6 °C (97.9 °F) Temporal 65 18 96 %   11/23/24 2131 -- -- -- -- -- 95 %   11/23/24 2000 134/78 35.7 °C (96.3 °F) Temporal 82 18 94 %   11/23/24 1600 138/74 36.1 °C (97 °F) -- 72 18 98 %     R foot is wrapped    Labs:  Lab Results   Component Value Date    WBC 6.5 11/24/2024    HGB 10.5 (L) 11/24/2024    HCT 35.0 (L) 11/24/2024    MCV 96 11/24/2024     11/24/2024     Lab Results   Component Value Date    GLUCOSE 125 (H) 11/24/2024    CALCIUM 8.2 (L) 11/24/2024     11/24/2024    K 3.9 11/24/2024    CO2 27 11/24/2024     11/24/2024    BUN 18 11/24/2024    CREATININE 1.27 11/24/2024   ESR: --  Lab Results   Component Value Date    SEDRATE 56 (H) 11/21/2024     Lab Results   Component Value Date    CRP 2.49 (H) 11/21/2024     Lab Results   Component Value Date    ALT 31 11/21/2024    AST 30 11/21/2024    ALKPHOS 116 11/21/2024    BILITOT 0.6 11/21/2024         DATA:   Diagnostic tests reviewed for today's visit:    Labs this admission reviewed  Imagings this admission reviewed  Cultures: Reviewed        ASSESSMENT :   -Right third toe nonhealing wound infection  -Status post amputation at MTP level with application of allograft  -Pathology is pending  -OR culture growing Staph aureus  -Renal failure resolving  -Previous wound culture grew MRSA susceptible to Bactrim and ESBL E. coli  -Chronic hypoxic respiratory failure, history of CAD, history of orthostatic hypotension,History of PVD    PLAN:  -Discontinue vancomycin and Zosyn  -Start Bactrim DS 1 tablet twice daily for 7 to 10 days  -Follow-up final OR culture and pathology  -Can follow-up with me in 2 weeks    Kathleen  "Sukumar JACKSON.   Infectious Disease Attending            This note was partially created using voice recognition software and is inherently subject to errors including those of syntax and \"sound-alike\" substitutions which may escape proofreading. In such instances, original meaning may be extrapolated by contextual derivation       "

## 2024-11-24 NOTE — NURSING NOTE
1030- Talked with therapy about giving patient exercises he can do in the bed to keep legs strong. Per therapy even though patient is non weight bearing there are still things they can do with him. PT/OT ordered.    EOS- No acute changes throughout the shift. Patient remained bedrest and exercising his legs in bed. Pain medication given once this shift with decrease in pain. Dr Bull in this morning and did bilateral foot dressing changes. IV antibiotics completed and will start oral antibiotics tonight. Patient remains on 3.5L with no increase in shortness of breath. Safety maintained and call light within reach.

## 2024-11-24 NOTE — NURSING NOTE
1100- Dr Moser on the unit. Notified him that patient has no pain medication ordered and is complaining of pain.    EOS- No acute changes throughout the shift. Pain medication given once with a little decrease in pain. Patient states no increase shortness of breath from baseline and remains on 3.5L. IV antibiotics continued. Patient remains non wt bearing still. Safety maintained and call light within reach.

## 2024-11-24 NOTE — NURSING NOTE
Patient is A&O, complained of pain in his feet, patient is medicated with PRN pain meds (see MAR)  that was effective,, patient is stable throughout the night, no acute changes in condition, patient continues on IV Antibiotic, without any adverse effect, patient safety maintained, call light within reach.

## 2024-11-24 NOTE — PROGRESS NOTES
Angus Redman is a 69 y.o. male on day 3 of admission presenting with Foot infection.    Subjective   Patient was laying in bed today, he noted that he did talk to podiatry and was instructed that due to the skin graft he was to stay off of his feet for several weeks; he was okay to go to SNF if that was the recommendation; he had been to the [facility] in the past, his  has since passed, although spent his last several years of life in that facility, the patient is also been to that facility himself as a patient and expressed a level of comfort and being there, he will work with TCC's to make arrangements otherwise he is denied having any fevers or chills and has been tolerating his diet, he did require taking several doses of pain medication including this morning but feels like things are improving.    Objective     Physical Exam  General: Patient does not appear to be in any acute distress, alert, awake  Head: lacerations above bilateral eyebrows on forehead, significantly improved since yesterday  Eyes: Normal external exam  Cardiovascular: RRR, S1/S2, no murmurs, rubs, or gallops, radial pulses +2  Pulmonary: CTAB, no respiratory distress. On 3L NC  Abdomen: soft, non-tender, nondistended, no guarding or rebound, no masses noted  Neuro: A&O x3, no focal deficits, strength and sensation intact bilaterally  Extremities: BLE with erythema, L foot covered in dressing; R foot: Covered in Ace wrap dressing  Skin- Warm. Dry. No lesions, contusions, or erythema.  Psychiatric: Judgment intact. Appropriate mood and behavior    Last Recorded Vitals  Blood pressure 129/82, pulse 74, temperature 35.8 °C (96.4 °F), resp. rate 18, height 1.829 m (6'), weight 115 kg (254 lb 3.1 oz), SpO2 95%.  Intake/Output last 3 Shifts:  I/O last 3 completed shifts:  In: 1880 (16.3 mL/kg) [P.O.:480; IV Piggyback:1400]  Out: 2800 (24.3 mL/kg) [Urine:2800 (0.7 mL/kg/hr)]  Weight: 115.3 kg     Relevant Results  Scheduled  medications  amLODIPine, 2.5 mg, oral, Daily  aspirin, 81 mg, oral, Daily  atorvastatin, 40 mg, oral, Nightly  budesonide, 0.25 mg, nebulization, BID  DULoxetine, 60 mg, oral, BID  [Held by provider] empagliflozin, 10 mg, oral, Daily  escitalopram, 20 mg, oral, Daily  formoterol, 20 mcg, nebulization, q12h  heparin (porcine), 5,000 Units, subcutaneous, q8h  levothyroxine, 50 mcg, oral, Daily before breakfast  mirtazapine, 15 mg, oral, Nightly  montelukast, 10 mg, oral, Nightly  oxygen, , inhalation, Continuous - Inhalation  pantoprazole, 40 mg, oral, BID  pregabalin, 150 mg, oral, q6h  propranolol, 10 mg, oral, BID  sennosides-docusate sodium, 1 tablet, oral, BID  spironolactone, 25 mg, oral, Daily  sulfamethoxazole-trimethoprim, 1 tablet, oral, q12h SHANTEL  tamsulosin, 0.4 mg, oral, Nightly  [Held by provider] torsemide, 20 mg, oral, Daily      Continuous medications     PRN medications  PRN medications: busPIRone, ipratropium-albuteroL, oxyCODONE    Results for orders placed or performed during the hospital encounter of 11/21/24 (from the past 24 hours)   CBC   Result Value Ref Range    WBC 6.5 4.4 - 11.3 x10*3/uL    nRBC 0.0 0.0 - 0.0 /100 WBCs    RBC 3.65 (L) 4.50 - 5.90 x10*6/uL    Hemoglobin 10.5 (L) 13.5 - 17.5 g/dL    Hematocrit 35.0 (L) 41.0 - 52.0 %    MCV 96 80 - 100 fL    MCH 28.8 26.0 - 34.0 pg    MCHC 30.0 (L) 32.0 - 36.0 g/dL    RDW 17.0 (H) 11.5 - 14.5 %    Platelets 208 150 - 450 x10*3/uL   Magnesium   Result Value Ref Range    Magnesium 2.35 1.60 - 2.40 mg/dL   Basic Metabolic Panel   Result Value Ref Range    Glucose 125 (H) 74 - 99 mg/dL    Sodium 136 136 - 145 mmol/L    Potassium 3.9 3.5 - 5.3 mmol/L    Chloride 103 98 - 107 mmol/L    Bicarbonate 27 21 - 32 mmol/L    Anion Gap 10 10 - 20 mmol/L    Urea Nitrogen 18 6 - 23 mg/dL    Creatinine 1.27 0.50 - 1.30 mg/dL    eGFR 60 (L) >60 mL/min/1.73m*2    Calcium 8.2 (L) 8.6 - 10.3 mg/dL     *Note: Due to a large number of results and/or encounters for  the requested time period, some results have not been displayed. A complete set of results can be found in Results Review.     ECG 12 lead    Result Date: 11/22/2024  Sinus rhythm with marked sinus arrhythmia with 1st degree AV block Otherwise normal ECG When compared with ECG of 18-NOV-2024 15:37, Premature atrial complexes are no longer Present ME interval has increased QRS axis Shifted right    Vascular US lower extremity venous duplex right    Result Date: 11/21/2024            [facility] [address] [place] [address]     Tel [phone] Fax [phone]  Vascular Lab Report  [facility] US LOWER EXTREMITY VENOUS DUPLEX RIGHT Patient Name:      PRUDENCIO DELGADO        Reading Physician:  [id number] Charlotte Carlson MD, RPVI Study Date:        11/21/2024           Ordering Provider:  [id number] NILTON JONES MRN/PID:           [id number]             Fellow: Accession#:        [id number]         Technologist:       Brii So RVROSALIND Date of Birth/Age: 1955 / 69 years Technologist 2: Gender:            M                    Encounter#:         [id number] Admission Status:  Emergency            Location Performed: [facility]  Diagnosis/ICD: Pain in right leg-M79.604 CPT Codes:     98825 Peripheral venous duplex scan for DVT Limited  Pertinent History: Obesity and PVD.  CONCLUSIONS:  Right Lower Venous: No evidence of acute deep vein thrombus visualized in the right lower extremity. Poor visualization of peroneal veins in grayscale. Peroneal veins visualized in segments. Additional Findings; Lymph nodes in groin largest measuring 1.27cm x 0.95 cm. Soft tissue edema visualized in the calf. Left Lower Venous: The left common femoral vein demonstrates normal spontaneous and respirophasic flow.  Comparison: Compared with study from 5/27/2021, no significant change.  Imaging & Doppler Findings:  Right                 Compressible Thrombus         Flow Distal External Iliac     Yes        None   Spontaneous/Phasic CFV                       Yes        None   Spontaneous/Phasic PFV                       Yes        None FV Proximal               Yes        None   Spontaneous/Phasic FV Mid                    Yes        None FV Distal                 Yes        None Popliteal                 Yes        None   Spontaneous/Phasic Peroneal                  Yes        None PTV                       Yes        None  Left        Flow CFV  Spontaneous/Phasic  [id number] Charlotte Carlson MD, RPVI Electronically signed by [id number] Charlotte Carlson MD, CHRISTINE on 11/21/2024 at 7:45:39 PM  ** Final **     XR foot right 3+ views    Result Date: 11/21/2024  Interpreted By:  Tayo Menjivar, STUDY: XR FOOT RIGHT 3+ VIEWS   INDICATION: Signs/Symptoms:worsening drainage of 3rd digit, check for subcutaneous air.   COMPARISON: November 18, 2024   ACCESSION NUMBER(S): [id number]   ORDERING CLINICIAN: NILTON JONES   FINDINGS: Postsurgical changes of the right foot. 3rd digit soft tissue swelling noted.. No definite osseous abnormality seen. No of no definite soft tissue gas.   The post amputation changes 2nd digit unchanged.       Soft tissue swelling right 3rd digit, nonspecific. No deep soft tissue gas. If there is a high degree of concern for underlying deep soft tissue infection or osseous infection, MRI can be considered for further evaluation.   Signed by: Tayo Menjivar 11/21/2024 6:32 PM Dictation workstation:   [id number]    ECG 12 lead    Result Date: 11/20/2024  Sinus rhythm with frequent Premature atrial complexes Left anterior fascicular block Poor R-wave progression Abnormal ECG Confirmed by Donnell Riojas ([id number]) on 11/20/2024 2:49:31 PM    ECG 12 lead    Result Date: 11/20/2024  Sinus rhythm with 1st degree AV block with frequent Premature atrial complexes Leftward axis Abnormal ECG When compared with ECG of 24-SEP-2024 09:15, Sinus rhythm has replaced Ectopic atrial rhythm QRS axis  Shifted left Confirmed by Donnell Riojas ([id number]) on 11/20/2024 2:48:49 PM    CT angio chest for pulmonary embolism    Result Date: 11/18/2024  Interpreted By:  Leonardo Celaya, STUDY: CT ANGIO CHEST FOR PULMONARY EMBOLISM; CT ABDOMEN PELVIS W IV CONTRAST;  11/18/2024 6:21 pm   INDICATION: Signs/Symptoms:fall. L flank pain. SOB before fall; Signs/Symptoms:fall, generalized pain, down for 24h after fall.     COMPARISON: 06/19/2015 CT abdomen/pelvis; 09/23/2024 CT PE chest   ACCESSION NUMBER(S): [id number]; [id number]   ORDERING CLINICIAN: KEN MENDIETA   TECHNIQUE: Contiguous axial images of the chest, abdomen, and pelvis were obtained after the intravenous administration of iodinated contrast. Coronal and sagittal reformatted images were reconstructed from the axial data. CT chest was obtained using PE angiographic protocol with MIP images created on a separate independent workstation.   FINDINGS: CT CHEST:   MEDIASTINUM AND LYMPH NODES:  The esophageal wall appears within normal limits.  No enlarged intrathoracic or axillary lymph nodes by imaging criteria. No pneumomediastinum.   VESSELS:  Normal caliber thoracic aorta without dissection. Mild aortic atherosclerosis.   HEART: Normal size.  Moderate coronary artery calcifications. No significant pericardial effusion.   LUNG, AIRWAYS, PLEURA: There are calcified pleural plaques involving the right posterior hemithorax with pleural thickening. There is adjacent round atelectasis in the right lower lobe. There is minimal left basilar slight dependent subsegmental atelectasis. No consolidation, pulmonary edema, effusion, or pneumothorax. There is emphysema.   CHEST WALL SOFT TISSUES: No discernible acute abnormality.   OSSEOUS STRUCTURES: There are numerous bilateral chronic rib fractures. For example, there are chronic fractures of the posterior right 8th, 10th, 11th, and 12th ribs; posterior left 2nd-7th ribs, posterior left 9th-12th ribs, and  multiple lateral and anterior ribs bilaterally. Healed clavicle fracture involving the left clavicular head.     CT ABDOMEN/PELVIS:   ABDOMINAL WALL: No significant abnormality.   LIVER: Cirrhotic liver morphology. No lesions.   BILE DUCTS: No significant intrahepatic or extrahepatic dilatation.   GALLBLADDER: No significant abnormality.   PANCREAS: No significant abnormality.   SPLEEN: The spleen is enlarged, measuring up to 14.5 cm in length.   ADRENALS: No significant abnormality.   KIDNEYS, URETERS, BLADDER: Moderately atrophic kidneys. No significant abnormality.   REPRODUCTIVE ORGANS: No significant abnormality.   VESSELS: Mild aortic atherosclerosis without AAA.   RETROPERITONEUM/LYMPH NODES: No acute retroperitoneal abnormality. No enlarged lymph nodes.   BOWEL/MESENTERY/PERITONEUM: There is a moderate amount of stool in the rectum distended up to 7.9 x 7.3 cm. No inflammatory bowel wall thickening or dilatation. Normal appendix.   No ascites, free air, or fluid collection.     MUSCULOSKELETAL: No acute osseous abnormality. No suspicious osseous lesion. Large area of heterotopic ossification arising from the right posteromedial femur resulting in marked narrowing of the ischiofemoral space. Healed fracture of the sacrococcygeal junction.       CT CHEST: 1. No acute pulmonary embolism. 2. Emphysema. 3. Right-sided calcified pleural plaques and round atelectasis which can be seen with prior asbestos exposure.   CT ABDOMEN/PELVIS: 1. No acute abnormality in the abdomen or pelvis.   2. Cirrhotic liver morphology and splenomegaly suggestive of portal hypertension.   MACRO: None.   Signed by: Leonardo Celaya 11/18/2024 7:12 PM Dictation workstation:   [id number]    CT abdomen pelvis w IV contrast    Result Date: 11/18/2024  Interpreted By:  Leonardo Celaya, STUDY: CT ANGIO CHEST FOR PULMONARY EMBOLISM; CT ABDOMEN PELVIS W IV CONTRAST;  11/18/2024 6:21 pm   INDICATION: Signs/Symptoms:fall. L flank pain. SOB  before fall; Signs/Symptoms:fall, generalized pain, down for 24h after fall.     COMPARISON: 06/19/2015 CT abdomen/pelvis; 09/23/2024 CT PE chest   ACCESSION NUMBER(S): [id number]; [id number]   ORDERING CLINICIAN: KEN MENDIETA   TECHNIQUE: Contiguous axial images of the chest, abdomen, and pelvis were obtained after the intravenous administration of iodinated contrast. Coronal and sagittal reformatted images were reconstructed from the axial data. CT chest was obtained using PE angiographic protocol with MIP images created on a separate independent workstation.   FINDINGS: CT CHEST:   MEDIASTINUM AND LYMPH NODES:  The esophageal wall appears within normal limits.  No enlarged intrathoracic or axillary lymph nodes by imaging criteria. No pneumomediastinum.   VESSELS:  Normal caliber thoracic aorta without dissection. Mild aortic atherosclerosis.   HEART: Normal size.  Moderate coronary artery calcifications. No significant pericardial effusion.   LUNG, AIRWAYS, PLEURA: There are calcified pleural plaques involving the right posterior hemithorax with pleural thickening. There is adjacent round atelectasis in the right lower lobe. There is minimal left basilar slight dependent subsegmental atelectasis. No consolidation, pulmonary edema, effusion, or pneumothorax. There is emphysema.   CHEST WALL SOFT TISSUES: No discernible acute abnormality.   OSSEOUS STRUCTURES: There are numerous bilateral chronic rib fractures. For example, there are chronic fractures of the posterior right 8th, 10th, 11th, and 12th ribs; posterior left 2nd-7th ribs, posterior left 9th-12th ribs, and multiple lateral and anterior ribs bilaterally. Healed clavicle fracture involving the left clavicular head.     CT ABDOMEN/PELVIS:   ABDOMINAL WALL: No significant abnormality.   LIVER: Cirrhotic liver morphology. No lesions.   BILE DUCTS: No significant intrahepatic or extrahepatic dilatation.   GALLBLADDER: No significant  abnormality.   PANCREAS: No significant abnormality.   SPLEEN: The spleen is enlarged, measuring up to 14.5 cm in length.   ADRENALS: No significant abnormality.   KIDNEYS, URETERS, BLADDER: Moderately atrophic kidneys. No significant abnormality.   REPRODUCTIVE ORGANS: No significant abnormality.   VESSELS: Mild aortic atherosclerosis without AAA.   RETROPERITONEUM/LYMPH NODES: No acute retroperitoneal abnormality. No enlarged lymph nodes.   BOWEL/MESENTERY/PERITONEUM: There is a moderate amount of stool in the rectum distended up to 7.9 x 7.3 cm. No inflammatory bowel wall thickening or dilatation. Normal appendix.   No ascites, free air, or fluid collection.     MUSCULOSKELETAL: No acute osseous abnormality. No suspicious osseous lesion. Large area of heterotopic ossification arising from the right posteromedial femur resulting in marked narrowing of the ischiofemoral space. Healed fracture of the sacrococcygeal junction.       CT CHEST: 1. No acute pulmonary embolism. 2. Emphysema. 3. Right-sided calcified pleural plaques and round atelectasis which can be seen with prior asbestos exposure.   CT ABDOMEN/PELVIS: 1. No acute abnormality in the abdomen or pelvis.   2. Cirrhotic liver morphology and splenomegaly suggestive of portal hypertension.   MACRO: None.   Signed by: Leonardo Celaya 11/18/2024 7:12 PM Dictation workstation:   [id number]    CT head wo IV contrast    Result Date: 11/18/2024  Interpreted By:  Leonardo Celaya, STUDY: CT HEAD WO IV CONTRAST; CT CERVICAL SPINE WO IV CONTRAST; CT LUMBAR SPINE WO IV CONTRAST; CT THORACIC SPINE WO IV CONTRAST; CT FACIAL BONES WO IV CONTRAST; CT 3D RECONSTRUCTION;  11/18/2024 6:19 pm; 11/18/2024 6:21 pm   INDICATION: Signs/Symptoms:fall,head trauma; Signs/Symptoms:fal,neck pain; Signs/Symptoms:fall  back pain; Signs/Symptoms:fall, back pain; Signs/Symptoms:fall, facial trauma; Signs/Symptoms:fall     COMPARISON: CT PE chest 09/23/2024   ACCESSION NUMBER(S):  [id number]; [id number]; [id number]; [id number]; [id number]; [id number]   ORDERING CLINICIAN: KEN GONZALEZ   TECHNIQUE: Axial noncontrast CT images of head with coronal and sagittal reconstructed images. Axial noncontrast CT images of maxillofacial skeleton with coronal and sagittal reconstructed images. 3D maxillofacial skeleton reconstructions were performed on an independent workstation and provided for review. Axial noncontrast CT images of the cervical spine with coronal and sagittal reconstructed images. Multiplanar reformatted images of the thoracic and lumbar spine were obtained from the CTA chest and CT abdomen/pelvis raw data.   FINDINGS: CT HEAD:   BRAIN PARENCHYMA:  No acute intraparenchymal hemorrhage or parenchymal evidence of acute large territory ischemic infarct. Gray-white matter distinction is preserved. No mass-effect.   VENTRICLES and EXTRA-AXIAL SPACES:  No acute extra-axial or intraventricular hemorrhage. No effacement of cerebral sulci. The ventricles and sulci are age-concordant.   PARANASAL SINUSES/MASTOIDS:  No hemorrhage or air-fluid levels within the visualized paranasal sinuses. The mastoids are well aerated.   CALVARIUM/ORBITS:  No skull fracture. The orbits and globes are intact to the extent visualized.   EXTRACRANIAL SOFT TISSUES: No discernible hematoma.     CT MAXILLOFACIAL SKELETON:   FACIAL BONES: No acute facial bone fracture. The bony orbits are intact.   ORBITS: The globes, extraocular muscles and optic nerve sheath complexes are intact. No retrobulbar or subperiosteal hematoma.   SOFT TISSUES: No discernible acute abnormality.   PARANASAL SINUSES: No hemorrhage or air-fluid levels. Mucous retention cyst in the left maxillary sinus.   OTHER FINDINGS: None.     CT CERVICAL SPINE:   PREVERTEBRAL SOFT TISSUES: Within normal limits.   CRANIOCERVICAL JUNCTION: Intact.   ALIGNMENT: Slight reversal of cervical lordosis, likely positional and/or degenerative. No traumatic  malalignment or traumatic facet widening.   VERTEBRAE:  No acute fracture. Vertebral body heights are maintained.   SPINAL CANAL/INTERVERTEBRAL DISCS: No high-grade canal stenosis. There is a spur complexes that slightly indent the ventral thecal sac at C3-C4, C5-C6, C6-C7 result in no greater than mild canal stenosis.   NEURAL FORAMINA: Multilevel uncovertebral joint and facet arthropathy notably contribute to mild right C2-C3 foraminal stenosis, severe right and mild left C3-C4 foraminal stenosis, severe right and moderate-severe left C4-C5 foraminal stenosis, moderate right C5-C6 foraminal stenosis, mild bilateral C6-7 foraminal stenosis.   OTHER: None.       CT THORACIC SPINE:   ALIGNMENT:  No traumatic spondylolisthesis or traumatic facet widening.   VERTEBRAE:  No acute fracture. Mild concavity to the upper T2 endplate is stable from prior study, consistent with remote fracture.   SPINAL CANAL/INTERVERTEBRAL DISCS: No high-grade spinal canal stenosis. Varying degrees of mild degenerative disc disease, mainly related to mild anterior endplate spurring without disc height loss or disc spur complexes.   PARASPINAL SOFT TISSUES:  No paravertebral soft tissue hematoma.   VISUALIZED CHEST: Please see separate report.     CT LUMBAR SPINE:   ALIGNMENT: Minimal grade 1 degenerative anterolisthesis of L5 on S1. No traumatic spondylolisthesis or traumatic facet widening.   VERTEBRAE: There are remote fractures of the bilateral L1, L2, and L4 transverse processes and left L3 transverse process.   SPINAL CANAL/INTERVERTEBRAL DISCS: No high-grade spinal canal stenosis.   NEUROFORAMINA: There is multilevel facet arthropathy which is mild bilaterally at L2-L3, moderate on the right at L3-L4, moderate bilateral and L4-L5 and moderate severe at L5-S1. There is mild bilateral L3-L4 foraminal stenosis, mild-moderate left L4-5 foraminal stenosis and mild bilateral L5-S1 foraminal stenosis.   PARASPINAL SOFT TISSUES:  No  paravertebral soft tissue hematoma.   VISUALIZED ABDOMEN: Please see separate report.       CT HEAD: 1. No acute intracranial abnormality or calvarial fracture.     CT MAXILLOFACIAL SKELETON: 1. No acute maxillofacial skeleton fracture.     CT CERVICAL SPINE: 1. No acute fracture or traumatic malalignment of the cervical spine. 2. Spondylotic changes of the cervical spine as detailed above.     CT THORACIC/LUMBAR SPINE: 1. No acute fracture or traumatic malalignment. 2. Remote minimally depressed compression fracture the upper T2 endplate. 3. Remote fracture deformities of the bilateral L1, L2, and L4 transverse processes and left L3 transverse process.   MACRO: None.   Signed by: Leonardo Celaay 11/18/2024 6:57 PM Dictation workstation:   [id number]    CT maxillofacial bones wo IV contrast    Result Date: 11/18/2024  Interpreted By:  Leonardo Celaya, STUDY: CT HEAD WO IV CONTRAST; CT CERVICAL SPINE WO IV CONTRAST; CT LUMBAR SPINE WO IV CONTRAST; CT THORACIC SPINE WO IV CONTRAST; CT FACIAL BONES WO IV CONTRAST; CT 3D RECONSTRUCTION;  11/18/2024 6:19 pm; 11/18/2024 6:21 pm   INDICATION: Signs/Symptoms:fall,head trauma; Signs/Symptoms:fal,neck pain; Signs/Symptoms:fall  back pain; Signs/Symptoms:fall, back pain; Signs/Symptoms:fall, facial trauma; Signs/Symptoms:fall     COMPARISON: CT PE chest 09/23/2024   ACCESSION NUMBER(S): [id number]; [id number]; [id number]; [id number]; [id number]; [id number]   ORDERING CLINICIAN: KEN GONZALEZ   TECHNIQUE: Axial noncontrast CT images of head with coronal and sagittal reconstructed images. Axial noncontrast CT images of maxillofacial skeleton with coronal and sagittal reconstructed images. 3D maxillofacial skeleton reconstructions were performed on an independent workstation and provided for review. Axial noncontrast CT images of the cervical spine with coronal and sagittal reconstructed images. Multiplanar reformatted images of the thoracic and lumbar spine were  obtained from the CTA chest and CT abdomen/pelvis raw data.   FINDINGS: CT HEAD:   BRAIN PARENCHYMA:  No acute intraparenchymal hemorrhage or parenchymal evidence of acute large territory ischemic infarct. Gray-white matter distinction is preserved. No mass-effect.   VENTRICLES and EXTRA-AXIAL SPACES:  No acute extra-axial or intraventricular hemorrhage. No effacement of cerebral sulci. The ventricles and sulci are age-concordant.   PARANASAL SINUSES/MASTOIDS:  No hemorrhage or air-fluid levels within the visualized paranasal sinuses. The mastoids are well aerated.   CALVARIUM/ORBITS:  No skull fracture. The orbits and globes are intact to the extent visualized.   EXTRACRANIAL SOFT TISSUES: No discernible hematoma.     CT MAXILLOFACIAL SKELETON:   FACIAL BONES: No acute facial bone fracture. The bony orbits are intact.   ORBITS: The globes, extraocular muscles and optic nerve sheath complexes are intact. No retrobulbar or subperiosteal hematoma.   SOFT TISSUES: No discernible acute abnormality.   PARANASAL SINUSES: No hemorrhage or air-fluid levels. Mucous retention cyst in the left maxillary sinus.   OTHER FINDINGS: None.     CT CERVICAL SPINE:   PREVERTEBRAL SOFT TISSUES: Within normal limits.   CRANIOCERVICAL JUNCTION: Intact.   ALIGNMENT: Slight reversal of cervical lordosis, likely positional and/or degenerative. No traumatic malalignment or traumatic facet widening.   VERTEBRAE:  No acute fracture. Vertebral body heights are maintained.   SPINAL CANAL/INTERVERTEBRAL DISCS: No high-grade canal stenosis. There is a spur complexes that slightly indent the ventral thecal sac at C3-C4, C5-C6, C6-C7 result in no greater than mild canal stenosis.   NEURAL FORAMINA: Multilevel uncovertebral joint and facet arthropathy notably contribute to mild right C2-C3 foraminal stenosis, severe right and mild left C3-C4 foraminal stenosis, severe right and moderate-severe left C4-C5 foraminal stenosis, moderate right C5-C6  foraminal stenosis, mild bilateral C6-7 foraminal stenosis.   OTHER: None.       CT THORACIC SPINE:   ALIGNMENT:  No traumatic spondylolisthesis or traumatic facet widening.   VERTEBRAE:  No acute fracture. Mild concavity to the upper T2 endplate is stable from prior study, consistent with remote fracture.   SPINAL CANAL/INTERVERTEBRAL DISCS: No high-grade spinal canal stenosis. Varying degrees of mild degenerative disc disease, mainly related to mild anterior endplate spurring without disc height loss or disc spur complexes.   PARASPINAL SOFT TISSUES:  No paravertebral soft tissue hematoma.   VISUALIZED CHEST: Please see separate report.     CT LUMBAR SPINE:   ALIGNMENT: Minimal grade 1 degenerative anterolisthesis of L5 on S1. No traumatic spondylolisthesis or traumatic facet widening.   VERTEBRAE: There are remote fractures of the bilateral L1, L2, and L4 transverse processes and left L3 transverse process.   SPINAL CANAL/INTERVERTEBRAL DISCS: No high-grade spinal canal stenosis.   NEUROFORAMINA: There is multilevel facet arthropathy which is mild bilaterally at L2-L3, moderate on the right at L3-L4, moderate bilateral and L4-L5 and moderate severe at L5-S1. There is mild bilateral L3-L4 foraminal stenosis, mild-moderate left L4-5 foraminal stenosis and mild bilateral L5-S1 foraminal stenosis.   PARASPINAL SOFT TISSUES:  No paravertebral soft tissue hematoma.   VISUALIZED ABDOMEN: Please see separate report.       CT HEAD: 1. No acute intracranial abnormality or calvarial fracture.     CT MAXILLOFACIAL SKELETON: 1. No acute maxillofacial skeleton fracture.     CT CERVICAL SPINE: 1. No acute fracture or traumatic malalignment of the cervical spine. 2. Spondylotic changes of the cervical spine as detailed above.     CT THORACIC/LUMBAR SPINE: 1. No acute fracture or traumatic malalignment. 2. Remote minimally depressed compression fracture the upper T2 endplate. 3. Remote fracture deformities of the bilateral L1,  L2, and L4 transverse processes and left L3 transverse process.   MACRO: None.   Signed by: Leonardo Celaya 11/18/2024 6:57 PM Dictation workstation:   [id number]    CT cervical spine wo IV contrast    Result Date: 11/18/2024  Interpreted By:  Leonardo Celaya, STUDY: CT HEAD WO IV CONTRAST; CT CERVICAL SPINE WO IV CONTRAST; CT LUMBAR SPINE WO IV CONTRAST; CT THORACIC SPINE WO IV CONTRAST; CT FACIAL BONES WO IV CONTRAST; CT 3D RECONSTRUCTION;  11/18/2024 6:19 pm; 11/18/2024 6:21 pm   INDICATION: Signs/Symptoms:fall,head trauma; Signs/Symptoms:fal,neck pain; Signs/Symptoms:fall  back pain; Signs/Symptoms:fall, back pain; Signs/Symptoms:fall, facial trauma; Signs/Symptoms:fall     COMPARISON: CT PE chest 09/23/2024   ACCESSION NUMBER(S): [id number]; [id number]; [id number]; [id number]; [id number]; [id number]   ORDERING CLINICIAN: KEN GONZALEZ   TECHNIQUE: Axial noncontrast CT images of head with coronal and sagittal reconstructed images. Axial noncontrast CT images of maxillofacial skeleton with coronal and sagittal reconstructed images. 3D maxillofacial skeleton reconstructions were performed on an independent workstation and provided for review. Axial noncontrast CT images of the cervical spine with coronal and sagittal reconstructed images. Multiplanar reformatted images of the thoracic and lumbar spine were obtained from the CTA chest and CT abdomen/pelvis raw data.   FINDINGS: CT HEAD:   BRAIN PARENCHYMA:  No acute intraparenchymal hemorrhage or parenchymal evidence of acute large territory ischemic infarct. Gray-white matter distinction is preserved. No mass-effect.   VENTRICLES and EXTRA-AXIAL SPACES:  No acute extra-axial or intraventricular hemorrhage. No effacement of cerebral sulci. The ventricles and sulci are age-concordant.   PARANASAL SINUSES/MASTOIDS:  No hemorrhage or air-fluid levels within the visualized paranasal sinuses. The mastoids are well aerated.   CALVARIUM/ORBITS:  No skull  fracture. The orbits and globes are intact to the extent visualized.   EXTRACRANIAL SOFT TISSUES: No discernible hematoma.     CT MAXILLOFACIAL SKELETON:   FACIAL BONES: No acute facial bone fracture. The bony orbits are intact.   ORBITS: The globes, extraocular muscles and optic nerve sheath complexes are intact. No retrobulbar or subperiosteal hematoma.   SOFT TISSUES: No discernible acute abnormality.   PARANASAL SINUSES: No hemorrhage or air-fluid levels. Mucous retention cyst in the left maxillary sinus.   OTHER FINDINGS: None.     CT CERVICAL SPINE:   PREVERTEBRAL SOFT TISSUES: Within normal limits.   CRANIOCERVICAL JUNCTION: Intact.   ALIGNMENT: Slight reversal of cervical lordosis, likely positional and/or degenerative. No traumatic malalignment or traumatic facet widening.   VERTEBRAE:  No acute fracture. Vertebral body heights are maintained.   SPINAL CANAL/INTERVERTEBRAL DISCS: No high-grade canal stenosis. There is a spur complexes that slightly indent the ventral thecal sac at C3-C4, C5-C6, C6-C7 result in no greater than mild canal stenosis.   NEURAL FORAMINA: Multilevel uncovertebral joint and facet arthropathy notably contribute to mild right C2-C3 foraminal stenosis, severe right and mild left C3-C4 foraminal stenosis, severe right and moderate-severe left C4-C5 foraminal stenosis, moderate right C5-C6 foraminal stenosis, mild bilateral C6-7 foraminal stenosis.   OTHER: None.       CT THORACIC SPINE:   ALIGNMENT:  No traumatic spondylolisthesis or traumatic facet widening.   VERTEBRAE:  No acute fracture. Mild concavity to the upper T2 endplate is stable from prior study, consistent with remote fracture.   SPINAL CANAL/INTERVERTEBRAL DISCS: No high-grade spinal canal stenosis. Varying degrees of mild degenerative disc disease, mainly related to mild anterior endplate spurring without disc height loss or disc spur complexes.   PARASPINAL SOFT TISSUES:  No paravertebral soft tissue hematoma.    VISUALIZED CHEST: Please see separate report.     CT LUMBAR SPINE:   ALIGNMENT: Minimal grade 1 degenerative anterolisthesis of L5 on S1. No traumatic spondylolisthesis or traumatic facet widening.   VERTEBRAE: There are remote fractures of the bilateral L1, L2, and L4 transverse processes and left L3 transverse process.   SPINAL CANAL/INTERVERTEBRAL DISCS: No high-grade spinal canal stenosis.   NEUROFORAMINA: There is multilevel facet arthropathy which is mild bilaterally at L2-L3, moderate on the right at L3-L4, moderate bilateral and L4-L5 and moderate severe at L5-S1. There is mild bilateral L3-L4 foraminal stenosis, mild-moderate left L4-5 foraminal stenosis and mild bilateral L5-S1 foraminal stenosis.   PARASPINAL SOFT TISSUES:  No paravertebral soft tissue hematoma.   VISUALIZED ABDOMEN: Please see separate report.       CT HEAD: 1. No acute intracranial abnormality or calvarial fracture.     CT MAXILLOFACIAL SKELETON: 1. No acute maxillofacial skeleton fracture.     CT CERVICAL SPINE: 1. No acute fracture or traumatic malalignment of the cervical spine. 2. Spondylotic changes of the cervical spine as detailed above.     CT THORACIC/LUMBAR SPINE: 1. No acute fracture or traumatic malalignment. 2. Remote minimally depressed compression fracture the upper T2 endplate. 3. Remote fracture deformities of the bilateral L1, L2, and L4 transverse processes and left L3 transverse process.   MACRO: None.   Signed by: Leonardo Celaya 11/18/2024 6:57 PM Dictation workstation:   [id number]    CT thoracic spine wo IV contrast    Result Date: 11/18/2024  Interpreted By:  Leonardo Celaya, STUDY: CT HEAD WO IV CONTRAST; CT CERVICAL SPINE WO IV CONTRAST; CT LUMBAR SPINE WO IV CONTRAST; CT THORACIC SPINE WO IV CONTRAST; CT FACIAL BONES WO IV CONTRAST; CT 3D RECONSTRUCTION;  11/18/2024 6:19 pm; 11/18/2024 6:21 pm   INDICATION: Signs/Symptoms:fall,head trauma; Signs/Symptoms:fal,neck pain; Signs/Symptoms:fall  back pain;  Signs/Symptoms:fall, back pain; Signs/Symptoms:fall, facial trauma; Signs/Symptoms:fall     COMPARISON: CT PE chest 09/23/2024   ACCESSION NUMBER(S): [id number]; [id number]; [id number]; [id number]; [id number]; [id number]   ORDERING CLINICIAN: KEN GONZALEZ   TECHNIQUE: Axial noncontrast CT images of head with coronal and sagittal reconstructed images. Axial noncontrast CT images of maxillofacial skeleton with coronal and sagittal reconstructed images. 3D maxillofacial skeleton reconstructions were performed on an independent workstation and provided for review. Axial noncontrast CT images of the cervical spine with coronal and sagittal reconstructed images. Multiplanar reformatted images of the thoracic and lumbar spine were obtained from the CTA chest and CT abdomen/pelvis raw data.   FINDINGS: CT HEAD:   BRAIN PARENCHYMA:  No acute intraparenchymal hemorrhage or parenchymal evidence of acute large territory ischemic infarct. Gray-white matter distinction is preserved. No mass-effect.   VENTRICLES and EXTRA-AXIAL SPACES:  No acute extra-axial or intraventricular hemorrhage. No effacement of cerebral sulci. The ventricles and sulci are age-concordant.   PARANASAL SINUSES/MASTOIDS:  No hemorrhage or air-fluid levels within the visualized paranasal sinuses. The mastoids are well aerated.   CALVARIUM/ORBITS:  No skull fracture. The orbits and globes are intact to the extent visualized.   EXTRACRANIAL SOFT TISSUES: No discernible hematoma.     CT MAXILLOFACIAL SKELETON:   FACIAL BONES: No acute facial bone fracture. The bony orbits are intact.   ORBITS: The globes, extraocular muscles and optic nerve sheath complexes are intact. No retrobulbar or subperiosteal hematoma.   SOFT TISSUES: No discernible acute abnormality.   PARANASAL SINUSES: No hemorrhage or air-fluid levels. Mucous retention cyst in the left maxillary sinus.   OTHER FINDINGS: None.     CT CERVICAL SPINE:   PREVERTEBRAL SOFT TISSUES: Within  normal limits.   CRANIOCERVICAL JUNCTION: Intact.   ALIGNMENT: Slight reversal of cervical lordosis, likely positional and/or degenerative. No traumatic malalignment or traumatic facet widening.   VERTEBRAE:  No acute fracture. Vertebral body heights are maintained.   SPINAL CANAL/INTERVERTEBRAL DISCS: No high-grade canal stenosis. There is a spur complexes that slightly indent the ventral thecal sac at C3-C4, C5-C6, C6-C7 result in no greater than mild canal stenosis.   NEURAL FORAMINA: Multilevel uncovertebral joint and facet arthropathy notably contribute to mild right C2-C3 foraminal stenosis, severe right and mild left C3-C4 foraminal stenosis, severe right and moderate-severe left C4-C5 foraminal stenosis, moderate right C5-C6 foraminal stenosis, mild bilateral C6-7 foraminal stenosis.   OTHER: None.       CT THORACIC SPINE:   ALIGNMENT:  No traumatic spondylolisthesis or traumatic facet widening.   VERTEBRAE:  No acute fracture. Mild concavity to the upper T2 endplate is stable from prior study, consistent with remote fracture.   SPINAL CANAL/INTERVERTEBRAL DISCS: No high-grade spinal canal stenosis. Varying degrees of mild degenerative disc disease, mainly related to mild anterior endplate spurring without disc height loss or disc spur complexes.   PARASPINAL SOFT TISSUES:  No paravertebral soft tissue hematoma.   VISUALIZED CHEST: Please see separate report.     CT LUMBAR SPINE:   ALIGNMENT: Minimal grade 1 degenerative anterolisthesis of L5 on S1. No traumatic spondylolisthesis or traumatic facet widening.   VERTEBRAE: There are remote fractures of the bilateral L1, L2, and L4 transverse processes and left L3 transverse process.   SPINAL CANAL/INTERVERTEBRAL DISCS: No high-grade spinal canal stenosis.   NEUROFORAMINA: There is multilevel facet arthropathy which is mild bilaterally at L2-L3, moderate on the right at L3-L4, moderate bilateral and L4-L5 and moderate severe at L5-S1. There is mild bilateral  L3-L4 foraminal stenosis, mild-moderate left L4-5 foraminal stenosis and mild bilateral L5-S1 foraminal stenosis.   PARASPINAL SOFT TISSUES:  No paravertebral soft tissue hematoma.   VISUALIZED ABDOMEN: Please see separate report.       CT HEAD: 1. No acute intracranial abnormality or calvarial fracture.     CT MAXILLOFACIAL SKELETON: 1. No acute maxillofacial skeleton fracture.     CT CERVICAL SPINE: 1. No acute fracture or traumatic malalignment of the cervical spine. 2. Spondylotic changes of the cervical spine as detailed above.     CT THORACIC/LUMBAR SPINE: 1. No acute fracture or traumatic malalignment. 2. Remote minimally depressed compression fracture the upper T2 endplate. 3. Remote fracture deformities of the bilateral L1, L2, and L4 transverse processes and left L3 transverse process.   MACRO: None.   Signed by: Leonardo Celaya 11/18/2024 6:57 PM Dictation workstation:   [id number]    CT lumbar spine wo IV contrast    Result Date: 11/18/2024  Interpreted By:  Leonardo Celaya, STUDY: CT HEAD WO IV CONTRAST; CT CERVICAL SPINE WO IV CONTRAST; CT LUMBAR SPINE WO IV CONTRAST; CT THORACIC SPINE WO IV CONTRAST; CT FACIAL BONES WO IV CONTRAST; CT 3D RECONSTRUCTION;  11/18/2024 6:19 pm; 11/18/2024 6:21 pm   INDICATION: Signs/Symptoms:fall,head trauma; Signs/Symptoms:fal,neck pain; Signs/Symptoms:fall  back pain; Signs/Symptoms:fall, back pain; Signs/Symptoms:fall, facial trauma; Signs/Symptoms:fall     COMPARISON: CT PE chest 09/23/2024   ACCESSION NUMBER(S): [id number]; [id number]; [id number]; [id number]; [id number]; [id number]   ORDERING CLINICIAN: KEN GONZALEZ   TECHNIQUE: Axial noncontrast CT images of head with coronal and sagittal reconstructed images. Axial noncontrast CT images of maxillofacial skeleton with coronal and sagittal reconstructed images. 3D maxillofacial skeleton reconstructions were performed on an independent workstation and provided for review. Axial noncontrast CT images  of the cervical spine with coronal and sagittal reconstructed images. Multiplanar reformatted images of the thoracic and lumbar spine were obtained from the CTA chest and CT abdomen/pelvis raw data.   FINDINGS: CT HEAD:   BRAIN PARENCHYMA:  No acute intraparenchymal hemorrhage or parenchymal evidence of acute large territory ischemic infarct. Gray-white matter distinction is preserved. No mass-effect.   VENTRICLES and EXTRA-AXIAL SPACES:  No acute extra-axial or intraventricular hemorrhage. No effacement of cerebral sulci. The ventricles and sulci are age-concordant.   PARANASAL SINUSES/MASTOIDS:  No hemorrhage or air-fluid levels within the visualized paranasal sinuses. The mastoids are well aerated.   CALVARIUM/ORBITS:  No skull fracture. The orbits and globes are intact to the extent visualized.   EXTRACRANIAL SOFT TISSUES: No discernible hematoma.     CT MAXILLOFACIAL SKELETON:   FACIAL BONES: No acute facial bone fracture. The bony orbits are intact.   ORBITS: The globes, extraocular muscles and optic nerve sheath complexes are intact. No retrobulbar or subperiosteal hematoma.   SOFT TISSUES: No discernible acute abnormality.   PARANASAL SINUSES: No hemorrhage or air-fluid levels. Mucous retention cyst in the left maxillary sinus.   OTHER FINDINGS: None.     CT CERVICAL SPINE:   PREVERTEBRAL SOFT TISSUES: Within normal limits.   CRANIOCERVICAL JUNCTION: Intact.   ALIGNMENT: Slight reversal of cervical lordosis, likely positional and/or degenerative. No traumatic malalignment or traumatic facet widening.   VERTEBRAE:  No acute fracture. Vertebral body heights are maintained.   SPINAL CANAL/INTERVERTEBRAL DISCS: No high-grade canal stenosis. There is a spur complexes that slightly indent the ventral thecal sac at C3-C4, C5-C6, C6-C7 result in no greater than mild canal stenosis.   NEURAL FORAMINA: Multilevel uncovertebral joint and facet arthropathy notably contribute to mild right C2-C3 foraminal stenosis,  severe right and mild left C3-C4 foraminal stenosis, severe right and moderate-severe left C4-C5 foraminal stenosis, moderate right C5-C6 foraminal stenosis, mild bilateral C6-7 foraminal stenosis.   OTHER: None.       CT THORACIC SPINE:   ALIGNMENT:  No traumatic spondylolisthesis or traumatic facet widening.   VERTEBRAE:  No acute fracture. Mild concavity to the upper T2 endplate is stable from prior study, consistent with remote fracture.   SPINAL CANAL/INTERVERTEBRAL DISCS: No high-grade spinal canal stenosis. Varying degrees of mild degenerative disc disease, mainly related to mild anterior endplate spurring without disc height loss or disc spur complexes.   PARASPINAL SOFT TISSUES:  No paravertebral soft tissue hematoma.   VISUALIZED CHEST: Please see separate report.     CT LUMBAR SPINE:   ALIGNMENT: Minimal grade 1 degenerative anterolisthesis of L5 on S1. No traumatic spondylolisthesis or traumatic facet widening.   VERTEBRAE: There are remote fractures of the bilateral L1, L2, and L4 transverse processes and left L3 transverse process.   SPINAL CANAL/INTERVERTEBRAL DISCS: No high-grade spinal canal stenosis.   NEUROFORAMINA: There is multilevel facet arthropathy which is mild bilaterally at L2-L3, moderate on the right at L3-L4, moderate bilateral and L4-L5 and moderate severe at L5-S1. There is mild bilateral L3-L4 foraminal stenosis, mild-moderate left L4-5 foraminal stenosis and mild bilateral L5-S1 foraminal stenosis.   PARASPINAL SOFT TISSUES:  No paravertebral soft tissue hematoma.   VISUALIZED ABDOMEN: Please see separate report.       CT HEAD: 1. No acute intracranial abnormality or calvarial fracture.     CT MAXILLOFACIAL SKELETON: 1. No acute maxillofacial skeleton fracture.     CT CERVICAL SPINE: 1. No acute fracture or traumatic malalignment of the cervical spine. 2. Spondylotic changes of the cervical spine as detailed above.     CT THORACIC/LUMBAR SPINE: 1. No acute fracture or traumatic  malalignment. 2. Remote minimally depressed compression fracture the upper T2 endplate. 3. Remote fracture deformities of the bilateral L1, L2, and L4 transverse processes and left L3 transverse process.   MACRO: None.   Signed by: Leonardo Celaya 11/18/2024 6:57 PM Dictation workstation:   [id number]    CT 3D reconstruction    Result Date: 11/18/2024  Interpreted By:  Leonardo Celaya, STUDY: CT HEAD WO IV CONTRAST; CT CERVICAL SPINE WO IV CONTRAST; CT LUMBAR SPINE WO IV CONTRAST; CT THORACIC SPINE WO IV CONTRAST; CT FACIAL BONES WO IV CONTRAST; CT 3D RECONSTRUCTION;  11/18/2024 6:19 pm; 11/18/2024 6:21 pm   INDICATION: Signs/Symptoms:fall,head trauma; Signs/Symptoms:fal,neck pain; Signs/Symptoms:fall  back pain; Signs/Symptoms:fall, back pain; Signs/Symptoms:fall, facial trauma; Signs/Symptoms:fall     COMPARISON: CT PE chest 09/23/2024   ACCESSION NUMBER(S): [id number]; [id number]; [id number]; [id number]; [id number]; [id number]   ORDERING CLINICIAN: KEN GONZALEZ   TECHNIQUE: Axial noncontrast CT images of head with coronal and sagittal reconstructed images. Axial noncontrast CT images of maxillofacial skeleton with coronal and sagittal reconstructed images. 3D maxillofacial skeleton reconstructions were performed on an independent workstation and provided for review. Axial noncontrast CT images of the cervical spine with coronal and sagittal reconstructed images. Multiplanar reformatted images of the thoracic and lumbar spine were obtained from the CTA chest and CT abdomen/pelvis raw data.   FINDINGS: CT HEAD:   BRAIN PARENCHYMA:  No acute intraparenchymal hemorrhage or parenchymal evidence of acute large territory ischemic infarct. Gray-white matter distinction is preserved. No mass-effect.   VENTRICLES and EXTRA-AXIAL SPACES:  No acute extra-axial or intraventricular hemorrhage. No effacement of cerebral sulci. The ventricles and sulci are age-concordant.   PARANASAL SINUSES/MASTOIDS:  No  hemorrhage or air-fluid levels within the visualized paranasal sinuses. The mastoids are well aerated.   CALVARIUM/ORBITS:  No skull fracture. The orbits and globes are intact to the extent visualized.   EXTRACRANIAL SOFT TISSUES: No discernible hematoma.     CT MAXILLOFACIAL SKELETON:   FACIAL BONES: No acute facial bone fracture. The bony orbits are intact.   ORBITS: The globes, extraocular muscles and optic nerve sheath complexes are intact. No retrobulbar or subperiosteal hematoma.   SOFT TISSUES: No discernible acute abnormality.   PARANASAL SINUSES: No hemorrhage or air-fluid levels. Mucous retention cyst in the left maxillary sinus.   OTHER FINDINGS: None.     CT CERVICAL SPINE:   PREVERTEBRAL SOFT TISSUES: Within normal limits.   CRANIOCERVICAL JUNCTION: Intact.   ALIGNMENT: Slight reversal of cervical lordosis, likely positional and/or degenerative. No traumatic malalignment or traumatic facet widening.   VERTEBRAE:  No acute fracture. Vertebral body heights are maintained.   SPINAL CANAL/INTERVERTEBRAL DISCS: No high-grade canal stenosis. There is a spur complexes that slightly indent the ventral thecal sac at C3-C4, C5-C6, C6-C7 result in no greater than mild canal stenosis.   NEURAL FORAMINA: Multilevel uncovertebral joint and facet arthropathy notably contribute to mild right C2-C3 foraminal stenosis, severe right and mild left C3-C4 foraminal stenosis, severe right and moderate-severe left C4-C5 foraminal stenosis, moderate right C5-C6 foraminal stenosis, mild bilateral C6-7 foraminal stenosis.   OTHER: None.       CT THORACIC SPINE:   ALIGNMENT:  No traumatic spondylolisthesis or traumatic facet widening.   VERTEBRAE:  No acute fracture. Mild concavity to the upper T2 endplate is stable from prior study, consistent with remote fracture.   SPINAL CANAL/INTERVERTEBRAL DISCS: No high-grade spinal canal stenosis. Varying degrees of mild degenerative disc disease, mainly related to mild anterior endplate  spurring without disc height loss or disc spur complexes.   PARASPINAL SOFT TISSUES:  No paravertebral soft tissue hematoma.   VISUALIZED CHEST: Please see separate report.     CT LUMBAR SPINE:   ALIGNMENT: Minimal grade 1 degenerative anterolisthesis of L5 on S1. No traumatic spondylolisthesis or traumatic facet widening.   VERTEBRAE: There are remote fractures of the bilateral L1, L2, and L4 transverse processes and left L3 transverse process.   SPINAL CANAL/INTERVERTEBRAL DISCS: No high-grade spinal canal stenosis.   NEUROFORAMINA: There is multilevel facet arthropathy which is mild bilaterally at L2-L3, moderate on the right at L3-L4, moderate bilateral and L4-L5 and moderate severe at L5-S1. There is mild bilateral L3-L4 foraminal stenosis, mild-moderate left L4-5 foraminal stenosis and mild bilateral L5-S1 foraminal stenosis.   PARASPINAL SOFT TISSUES:  No paravertebral soft tissue hematoma.   VISUALIZED ABDOMEN: Please see separate report.       CT HEAD: 1. No acute intracranial abnormality or calvarial fracture.     CT MAXILLOFACIAL SKELETON: 1. No acute maxillofacial skeleton fracture.     CT CERVICAL SPINE: 1. No acute fracture or traumatic malalignment of the cervical spine. 2. Spondylotic changes of the cervical spine as detailed above.     CT THORACIC/LUMBAR SPINE: 1. No acute fracture or traumatic malalignment. 2. Remote minimally depressed compression fracture the upper T2 endplate. 3. Remote fracture deformities of the bilateral L1, L2, and L4 transverse processes and left L3 transverse process.   MACRO: None.   Signed by: Leonardo Celaya 11/18/2024 6:57 PM Dictation workstation:   [id number]    XR hand 3+ views bilateral    Result Date: 11/18/2024  Interpreted By:  Nathen Lovett, STUDY: XR HAND 3+ VIEWS BILATERAL; XR WRIST 3+ VIEWS BILATERAL  11/18/2024 2:15 pm   INDICATION: Signs/Symptoms:b/l hand pain; Signs/Symptoms:b/l wrist pain   COMPARISON: None.   ACCESSION NUMBER(S): [id number];  [id number]   ORDERING CLINICIAN: KEN GONZALEZ   TECHNIQUE: Three views each of the bilateral hands and wrists including AP, oblique and lateral projections were obtained.   FINDINGS: There is no evidence of acute fracture or dislocation identified. Moderate to severe hypertrophic degenerative changes are seen in the right trapezium 1st metacarpal articulation. Moderate degenerative changes are seen in the left trapezium 1st metacarpal articulation. Moderate to severe degenerative changes are seen in the right proximal interphalangeal joint. Minimal degenerative changes are seen in the remaining interphalangeal joints.       1. No fracture or dislocation. 2. Degenerative changes, as described above.   MACRO: None.   Signed by: Nathen Lovett 11/18/2024 2:38 PM Dictation workstation:   [id number]    XR wrist 3+ views bilateral    Result Date: 11/18/2024  Interpreted By:  Nathen Lovett, STUDY: XR HAND 3+ VIEWS BILATERAL; XR WRIST 3+ VIEWS BILATERAL  11/18/2024 2:15 pm   INDICATION: Signs/Symptoms:b/l hand pain; Signs/Symptoms:b/l wrist pain   COMPARISON: None.   ACCESSION NUMBER(S): [id number]; [id number]   ORDERING CLINICIAN: KEN GONZALEZ   TECHNIQUE: Three views each of the bilateral hands and wrists including AP, oblique and lateral projections were obtained.   FINDINGS: There is no evidence of acute fracture or dislocation identified. Moderate to severe hypertrophic degenerative changes are seen in the right trapezium 1st metacarpal articulation. Moderate degenerative changes are seen in the left trapezium 1st metacarpal articulation. Moderate to severe degenerative changes are seen in the right proximal interphalangeal joint. Minimal degenerative changes are seen in the remaining interphalangeal joints.       1. No fracture or dislocation. 2. Degenerative changes, as described above.   MACRO: None.   Signed by: Nathen Lovett 11/18/2024 2:38 PM Dictation workstation:   [id number]    XR foot 3+ views  bilateral    Result Date: 11/18/2024  Interpreted By:  Nathen Lovett, STUDY: XR FOOT 3+ VIEWS BILATERAL  11/18/2024 2:15 pm   INDICATION: Signs/Symptoms:b/l foot pain   COMPARISON: 12/17/2021   ACCESSION NUMBER(S): [id number]   ORDERING CLINICIAN: KEN GONZALEZ   TECHNIQUE: Three views each of the bilateral feet including AP , oblique and lateral projections were obtained.   FINDINGS: The patient is status post left 1st interphalangeal joint arthrodesis. Postoperative changes are seen involving the distal aspect of the 1st metatarsal. The patient is status post amputation through the left 2nd proximal interphalangeal joint and right 2nd metatarsophalangeal joint. There is no radiographic evidence of acute fracture or dislocation identified. Mild-to-moderate degenerative changes are seen in the 1st metatarsophalangeal joints bilaterally.       1. No acute fracture or dislocation identified. 2. Postoperative and degenerative changes, as described above.   MACRO: None.   Signed by: Nathen Lovett 11/18/2024 2:36 PM Dictation workstation:   [id number]    XR chest 1 view    Result Date: 11/18/2024  Interpreted By:  Nathen Lovett, STUDY: XR CHEST 1 VIEW  11/18/2024 2:15 pm   INDICATION: Signs/Symptoms:fall,near syncope   COMPARISON: 10/30/2024   ACCESSION NUMBER(S): [id number]   ORDERING CLINICIAN: KEN GONZALEZ   TECHNIQUE: A single AP portable radiograph of the chest was obtained.   FINDINGS: Multiple cardiac monitoring leads are seen over the chest.  Hazy airspace opacity is seen at the right lung base, similar to the prior study, and may represent scarring, atelectasis and/or pneumonia. No pneumothorax is identified. The cardiac silhouette is within normal limits for size.       Right basilar airspace opacity, as above. Clinical correlation and continued follow-up until clearing is recommended.   MACRO: None.   Signed by: Nathen Lovett 11/18/2024 2:33 PM Dictation workstation:   [id number]    XR tibia fibula  bilateral 2 views    Result Date: 11/18/2024  Interpreted By:  Nathen Lovett, STUDY: XR TIBIA FIBULA BILATERAL 2 VIEWS 11/18/2024 2:15 pm   INDICATION: Signs/Symptoms:b/l foot pain   COMPARISON: None.   ACCESSION NUMBER(S): [id number]   ORDERING CLINICIAN: KEN GONZALEZ   TECHNIQUE: Four views each of the bilateral tibia and fibula including AP and lateral projections were obtained.   FINDINGS: There is no evidence of acute fracture or dislocation identified. The joint spaces are well preserved throughout without significant degenerative changes.       1. No fracture or dislocation.   MACRO: None.   Signed by: Nathen Lovett 11/18/2024 2:30 PM Dictation workstation:   [id number]    XR foot 1-2 views bilateral    Result Date: 11/17/2024  Interpreted By:  Parvez Ellison, STUDY: XR FOOT 1-2 VIEWS BILATERAL; ;  11/13/2024 1:19 pm   INDICATION: Signs/Symptoms:bilateral foot wounds, concern for osteomyelitis.   ,L97.512 Non-pressure chronic ulcer of other part of right foot with fat layer exposed,L97.522 Non-pressure chronic ulcer of other part of left foot with fat layer exposed   COMPARISON: None.   ACCESSION NUMBER(S): [id number]   ORDERING CLINICIAN: PHIL BETANCOURT   FINDINGS: Bilateral feet, two views of each   On the left are postsurgical changes in the 1st IP joint status post fusion and in the 1st metatarsal head status post osteotomy with fixation. Soft tissue edema about the 1st digit. No osseous destruction or erosions seen. There is flattening of the left 2nd metatarsal head consistent with avascular necrosis (Freiberg's disease). There is mild degenerative change in the midfoot articulations.   On the right there is mutation of the 2nd digit with a remote 2nd distal metatarsal fracture. There is moderate degenerative changes of 1st MTP joint and the 1st IP joint with lateral subluxation. Soft tissue edema present without osseous destruction or erosion seen radiographically.       No radiographic  evidence of osteomyelitis in either foot. MRI can be performed for further evaluation Soft tissue edema about the left 1st toe with cellulitis in the differential. Postsurgical change bilaterally.   MACRO: None   Signed by: Parvez Ellison 11/17/2024 6:40 AM Dictation workstation:   [id number]    XR chest 2 views    Result Date: 11/4/2024  Interpreted By:  Tayo Menjivar, STUDY: XR CHEST 2 VIEWS   INDICATION: Signs/Symptoms:pleural effusion.   COMPARISON: September 23, 2024   ACCESSION NUMBER(S): [id number]   ORDERING CLINICIAN: PRABHU POOLE   FINDINGS: Extensive chronic pleural plaque formation right worse than left similar to prior study with right basilar airspace disease and effusion unchanged.   No additional new findings. Multiple prior rib fractures.       Extensive chronic pleural plaque formation right worse than left similar to prior study with right basilar airspace disease and effusion unchanged.   Signed by: Tayo Menjivar 11/4/2024 5:13 PM Dictation workstation:   [id number]     Susceptibility data from last 90 days.  Collected Specimen Info Organism Amoxicillin/Clavulanate Ampicillin Ampicillin/Sulbactam Aztreonam Cefazolin Cefazolin (uncomplicated UTIs only) Cefepime Cefotaxime Ceftazidime Ceftriaxone Cefuroxime (oral)   11/22/24 Tissue from DIGIT THIRD, RIGHT FOOT Staphylococcus aureus              11/18/24 Urine from Clean Catch/Voided Escherichia coli  R  R  I  R  R  R  R  R  R  R    10/23/24 Tissue/Biopsy from Wound/Tissue Escherichia coli  R  R  I  R  R   R  R  R  R  R     Methicillin Resistant Staphylococcus aureus (MRSA)              09/23/24 Urine from Clean Catch/Voided Escherichia coli  R  R  I  R  R  R  R  R  R  R    09/18/24 Tissue/Biopsy from Wound/Tissue Methicillin Resistant Staphylococcus aureus (MRSA)                Mixed Gram-Positive and Gram-Negative Bacteria                Collected Specimen Info Organism Ciprofloxacin Clindamycin Ertapenem Erythromycin Gentamicin  Levofloxacin Meropenem Nitrofurantoin Oxacillin Piperacillin/Tazobactam Tetracycline Trimethoprim/Sulfamethoxazole Vancomycin   11/22/24 Tissue from DIGIT THIRD, RIGHT FOOT Staphylococcus aureus                11/18/24 Urine from Clean Catch/Voided Escherichia coli  S   S   S   S  S   S   R    10/23/24 Tissue/Biopsy from Wound/Tissue Escherichia coli  S   S   S  S  S    S   R      Methicillin Resistant Staphylococcus aureus (MRSA)   R   R      R   R  S  S   09/23/24 Urine from Clean Catch/Voided Escherichia coli  S   S   S   S  S   S   R    09/18/24 Tissue/Biopsy from Wound/Tissue Methicillin Resistant Staphylococcus aureus (MRSA)   R   R      R   R  S  S     Mixed Gram-Positive and Gram-Negative Bacteria                      Assessment/Plan   69-year-old male with recurrent syncope secondary to orthostatic hypotension/autonomic dysfunction/autonomic neuropathy, restrictive lung disease on 4 to 5 L nasal cannula at baseline, PSVT on propranolol, CAD w/ CCTA (02/2024) - 50% stenosis of LAD - 25-50% stenosis of RCA, alcohol use disorder c/b severe alcoholic polyneuropathy with autonomic dysfunction  who presents at the direction of his podiatrist due to third right lower extremity digit infection, now POD #2 status post right third digit amputation with application of allograft to metatarsal heads; case discussed with podiatry, appreciate infectious disease input.  Assessment & Plan  Foot infection    Osteomyelitis of third toe of right foot (Multi)    Plan:  - Patient stable to remain at current level of care  - Disontinue on vancomycin and Zosyn, starting on Bactrim for a total of 7 days  - Will follow-up on final OR cultures  - Follow the blood cultures  - His home medication reconciliation was reviewed  - continue analgesia as ordered  - VTE prophylaxis with subcutaneous heparin  - No indication for telemetry  - Code: DNR/DNI-CCA       I spent 35 minutes in the professional and overall care of this  patient.    Peter Moser MD

## 2024-11-24 NOTE — PROGRESS NOTES
Angus Redman is a 69 y.o. male on day 3 of admission presenting with Foot infection.    Subjective   Pt examined and evaluated at bedside, found resting comfortably.  Pt states that their pain is well controlled, denies any constitutional symptoms, and has no other complaints at this time.        Objective     Vascular: DP and PT pulses palpable 1/4 bilaterally.  CFT under 5 seconds when tested at distal digits.  Skin temp warm to warm from proximal to distal.  +1 pitting edema noted to BLE.     Neuro: Protective sensation absent, light tough sensation intact.  No Tinel's or Valleix's signs elicited.       Derm: Sub 1st met head bilaterally, right 5th digit, right hallux wounds all have Integra Bilayer in place with staples intact.  Incision site well approximated with sutures intact.  No dehiscence or necrosis noted.     Musc: Second and third digit amputation noted to right foot.  No other gross deformities noted.  No POP noted to any other area of the foot/ankle.      Last Recorded Vitals  Blood pressure 143/84, pulse 65, temperature 36.6 °C (97.9 °F), temperature source Temporal, resp. rate 18, height 1.829 m (6'), weight 115 kg (254 lb 3.1 oz), SpO2 96%.  Intake/Output last 3 Shifts:  I/O last 3 completed shifts:  In: 1880 (16.3 mL/kg) [P.O.:480; IV Piggyback:1400]  Out: 2800 (24.3 mL/kg) [Urine:2800 (0.7 mL/kg/hr)]  Weight: 115.3 kg     Relevant Results  Results for orders placed or performed during the hospital encounter of 11/21/24 (from the past 24 hours)   CBC   Result Value Ref Range    WBC 6.5 4.4 - 11.3 x10*3/uL    nRBC 0.0 0.0 - 0.0 /100 WBCs    RBC 3.65 (L) 4.50 - 5.90 x10*6/uL    Hemoglobin 10.5 (L) 13.5 - 17.5 g/dL    Hematocrit 35.0 (L) 41.0 - 52.0 %    MCV 96 80 - 100 fL    MCH 28.8 26.0 - 34.0 pg    MCHC 30.0 (L) 32.0 - 36.0 g/dL    RDW 17.0 (H) 11.5 - 14.5 %    Platelets 208 150 - 450 x10*3/uL   Magnesium   Result Value Ref Range    Magnesium 2.35 1.60 - 2.40 mg/dL   Basic Metabolic Panel    Result Value Ref Range    Glucose 125 (H) 74 - 99 mg/dL    Sodium 136 136 - 145 mmol/L    Potassium 3.9 3.5 - 5.3 mmol/L    Chloride 103 98 - 107 mmol/L    Bicarbonate 27 21 - 32 mmol/L    Anion Gap 10 10 - 20 mmol/L    Urea Nitrogen 18 6 - 23 mg/dL    Creatinine 1.27 0.50 - 1.30 mg/dL    eGFR 60 (L) >60 mL/min/1.73m*2    Calcium 8.2 (L) 8.6 - 10.3 mg/dL     *Note: Due to a large number of results and/or encounters for the requested time period, some results have not been displayed. A complete set of results can be found in Results Review.           Assessment/Plan   Assessment & Plan  Foot infection    Osteomyelitis of third toe of right foot (Multi)    Assessment:  - s/p 3rd digit amputation secondary to osteomyelitis, right foot (DOS: 11/22/24)  - s/p wound debridement with application of Integra Bilayer, left and right foot (DOS: 11/22/24)  - Cellulitis, LLE  - Alcoholic peripheral neuropathy     Plan:  - Patient examined and evaluated.  All findings discussed with patient to his satisfaction and understanding.  - Reviewed labs and chart.  Intraoperative bone culture obtained, pending.  - Systemic conditions managed by primary team.  Antibiotics managed by ID.  -Integra bilayer in place with staples intact bilaterally.  3rd digit amputation incision well-approximated with sutures intact; no dehiscence or necrosis noted.  Dressing changes to be performed every 1 to 2 days by podiatry.  - Patient to remain nonweightbearing due to graft placement, ok to use heels for pivot/transfer.  Patient would likely benefit from SNF placement to allow minimal weight bearing.  - Will continue to follow inpatient.  Please contact podiatry with any acute questions or concerns.       Boone Bull DPM   Podiatric Surgery       I spent >35 minutes in the professional and overall care of this patient.      Boone Bull DPM

## 2024-11-24 NOTE — PROGRESS NOTES
Vancomycin Dosing by Pharmacy- Cessation of Therapy    Consult to pharmacy for vancomycin dosing has been discontinued by the prescriber, pharmacy will sign off at this time.    Please call pharmacy if there are further questions or re-enter a consult if vancomycin is resumed.     Shanel Mckinnon, PharmD

## 2024-11-25 LAB
ANION GAP SERPL CALC-SCNC: 10 MMOL/L (ref 10–20)
BUN SERPL-MCNC: 17 MG/DL (ref 6–23)
CALCIUM SERPL-MCNC: 8.8 MG/DL (ref 8.6–10.3)
CHLORIDE SERPL-SCNC: 105 MMOL/L (ref 98–107)
CO2 SERPL-SCNC: 28 MMOL/L (ref 21–32)
CREAT SERPL-MCNC: 1.12 MG/DL (ref 0.5–1.3)
EGFRCR SERPLBLD CKD-EPI 2021: 70 ML/MIN/1.73M*2
ERYTHROCYTE [DISTWIDTH] IN BLOOD BY AUTOMATED COUNT: 16.9 % (ref 11.5–14.5)
GLUCOSE SERPL-MCNC: 104 MG/DL (ref 74–99)
HCT VFR BLD AUTO: 38.8 % (ref 41–52)
HGB BLD-MCNC: 11.7 G/DL (ref 13.5–17.5)
MAGNESIUM SERPL-MCNC: 2.21 MG/DL (ref 1.6–2.4)
MCH RBC QN AUTO: 29.3 PG (ref 26–34)
MCHC RBC AUTO-ENTMCNC: 30.2 G/DL (ref 32–36)
MCV RBC AUTO: 97 FL (ref 80–100)
NRBC BLD-RTO: 0 /100 WBCS (ref 0–0)
PLATELET # BLD AUTO: 231 X10*3/UL (ref 150–450)
POTASSIUM SERPL-SCNC: 4.2 MMOL/L (ref 3.5–5.3)
RBC # BLD AUTO: 4 X10*6/UL (ref 4.5–5.9)
SODIUM SERPL-SCNC: 139 MMOL/L (ref 136–145)
WBC # BLD AUTO: 7.8 X10*3/UL (ref 4.4–11.3)

## 2024-11-25 PROCEDURE — 2500000002 HC RX 250 W HCPCS SELF ADMINISTERED DRUGS (ALT 637 FOR MEDICARE OP, ALT 636 FOR OP/ED)

## 2024-11-25 PROCEDURE — 36415 COLL VENOUS BLD VENIPUNCTURE: CPT | Performed by: INTERNAL MEDICINE

## 2024-11-25 PROCEDURE — 97165 OT EVAL LOW COMPLEX 30 MIN: CPT | Mod: GO

## 2024-11-25 PROCEDURE — 2500000001 HC RX 250 WO HCPCS SELF ADMINISTERED DRUGS (ALT 637 FOR MEDICARE OP)

## 2024-11-25 PROCEDURE — 85027 COMPLETE CBC AUTOMATED: CPT | Performed by: INTERNAL MEDICINE

## 2024-11-25 PROCEDURE — 83735 ASSAY OF MAGNESIUM: CPT | Performed by: INTERNAL MEDICINE

## 2024-11-25 PROCEDURE — 97161 PT EVAL LOW COMPLEX 20 MIN: CPT | Mod: GP

## 2024-11-25 PROCEDURE — 2500000002 HC RX 250 W HCPCS SELF ADMINISTERED DRUGS (ALT 637 FOR MEDICARE OP, ALT 636 FOR OP/ED): Performed by: INTERNAL MEDICINE

## 2024-11-25 PROCEDURE — 2500000005 HC RX 250 GENERAL PHARMACY W/O HCPCS: Performed by: INTERNAL MEDICINE

## 2024-11-25 PROCEDURE — 2500000001 HC RX 250 WO HCPCS SELF ADMINISTERED DRUGS (ALT 637 FOR MEDICARE OP): Performed by: INTERNAL MEDICINE

## 2024-11-25 PROCEDURE — 80048 BASIC METABOLIC PNL TOTAL CA: CPT | Performed by: INTERNAL MEDICINE

## 2024-11-25 PROCEDURE — 99232 SBSQ HOSP IP/OBS MODERATE 35: CPT | Performed by: INTERNAL MEDICINE

## 2024-11-25 PROCEDURE — 94640 AIRWAY INHALATION TREATMENT: CPT

## 2024-11-25 PROCEDURE — 1100000001 HC PRIVATE ROOM DAILY

## 2024-11-25 PROCEDURE — 2500000004 HC RX 250 GENERAL PHARMACY W/ HCPCS (ALT 636 FOR OP/ED)

## 2024-11-25 RX ORDER — ALUMINUM HYDROXIDE, MAGNESIUM HYDROXIDE, AND SIMETHICONE 1200; 120; 1200 MG/30ML; MG/30ML; MG/30ML
10 SUSPENSION ORAL 4 TIMES DAILY PRN
Status: DISCONTINUED | OUTPATIENT
Start: 2024-11-25 | End: 2024-11-27 | Stop reason: HOSPADM

## 2024-11-25 ASSESSMENT — COGNITIVE AND FUNCTIONAL STATUS - GENERAL
MOVING TO AND FROM BED TO CHAIR: TOTAL
MOBILITY SCORE: 8
HELP NEEDED FOR BATHING: A LITTLE
TOILETING: A LITTLE
MOVING TO AND FROM BED TO CHAIR: TOTAL
DRESSING REGULAR LOWER BODY CLOTHING: TOTAL
STANDING UP FROM CHAIR USING ARMS: TOTAL
WALKING IN HOSPITAL ROOM: TOTAL
EATING MEALS: A LITTLE
DRESSING REGULAR UPPER BODY CLOTHING: A LOT
WALKING IN HOSPITAL ROOM: TOTAL
STANDING UP FROM CHAIR USING ARMS: TOTAL
PERSONAL GROOMING: A LOT
TURNING FROM BACK TO SIDE WHILE IN FLAT BAD: A LITTLE
MOVING FROM LYING ON BACK TO SITTING ON SIDE OF FLAT BED WITH BEDRAILS: A LITTLE
DRESSING REGULAR LOWER BODY CLOTHING: A LITTLE
DAILY ACTIVITIY SCORE: 21
CLIMB 3 TO 5 STEPS WITH RAILING: TOTAL
TOILETING: TOTAL
DAILY ACTIVITIY SCORE: 11
TURNING FROM BACK TO SIDE WHILE IN FLAT BAD: A LOT
HELP NEEDED FOR BATHING: A LOT
MOVING FROM LYING ON BACK TO SITTING ON SIDE OF FLAT BED WITH BEDRAILS: A LOT
CLIMB 3 TO 5 STEPS WITH RAILING: TOTAL
MOBILITY SCORE: 10

## 2024-11-25 ASSESSMENT — PAIN SCALES - GENERAL
PAINLEVEL_OUTOF10: 0 - NO PAIN
PAINLEVEL_OUTOF10: 0 - NO PAIN
PAINLEVEL_OUTOF10: 7
PAINLEVEL_OUTOF10: 5 - MODERATE PAIN

## 2024-11-25 ASSESSMENT — ACTIVITIES OF DAILY LIVING (ADL)
ADL_ASSISTANCE: INDEPENDENT
ADL_ASSISTANCE: INDEPENDENT

## 2024-11-25 ASSESSMENT — PAIN - FUNCTIONAL ASSESSMENT
PAIN_FUNCTIONAL_ASSESSMENT: 0-10

## 2024-11-25 NOTE — PROGRESS NOTES
Occupational Therapy    Occupational Therapy    Evaluation    Patient Name: Angus Redman  MRN: [id number]  Today's Date: 11/25/2024  Time Calculation  Start Time: 1025  Stop Time: 1033  Time Calculation (min): 8 min  3028/3028-A    Assessment  IP OT Assessment  OT Assessment:  (Pt. presents with decreased balance and endurance impacting pt. ability to complete ADLs and mobility independently)  Prognosis: Fair  Barriers to Discharge: Decreased caregiver support  Evaluation/Treatment Tolerance: Patient limited by fatigue  End of Session Communication: Bedside nurse  End of Session Patient Position: Bed, 3 rail up, Alarm on    Plan:  Treatment Interventions: ADL retraining, Functional transfer training, Endurance training  OT Frequency: 3 times per week  OT Discharge Recommendations: Moderate intensity level of continued care  OT Recommended Transfer Status: Assist of 2  OT - OK to Discharge: Yes (Next level of care when cleared by medical team)    Subjective   Per EMR:   S/P right third digit amputation with application of allograft to sub first met heads bilaterally and lateral aspect of right fifth digit.     Current Problem:  1. Foot infection        2. Pain in right leg  Vascular US lower extremity venous duplex right      3. Osteomyelitis of third toe of right foot (Multi)  Case Request Operating Room: Amputation Digit Foot, Debridement Foot, APPLICATION,ALLOGRAFT,SKIN    Case Request Operating Room: Amputation Digit Foot, Debridement Foot, APPLICATION,ALLOGRAFT,SKIN    Surgical Pathology Exam    Surgical Pathology Exam    Tissue/Wound Culture/Smear    Tissue/Wound Culture/Smear          General:  General  Reason for Referral: ADLs, discharge planning  Referred By: Dr. Moser  Family/Caregiver Present: No  Co-Treatment: PT  Co-Treatment Reason: Safety  Prior to Session Communication: Bedside nurse  Patient Position Received: Bed, 3 rail up, Alarm on    Precautions:  LE Weight Bearing Status: Left Non-Weight  Bearing, Right Non-Weight Bearing (Okay to use heels for transfers)  Medical Precautions: Fall precautions      Pain:  Pain Assessment  Pain Assessment: 0-10  0-10 (Numeric) Pain Score: 0 - No pain    Objective     Cognition:  Overall Cognitive Status: Within Functional Limits  Orientation Level: Oriented X4  Insight: Mild             Home Living:  Home Living Comments:  (Pt. lives alone in split level home with 3 entry steps with bedroom upstairs. Sponge bathes. PLOF MOD I with rollator)     Prior Function:  Level of [place]: Independent with ADLs and functional transfers  ADL Assistance: Independent  Homemaking Assistance: Independent  Ambulatory Assistance: Independent         ADL:  Grooming Assistance: Minimal  LE Dressing Assistance: Total  Toileting Assistance with Device: Total    Activity Tolerance:  Endurance: Decreased tolerance for upright activites    Bed Mobility/Transfers:   Bed Mobility  Bed Mobility:  (supine to sit max assist x 2 with use of draw sheet)           Sitting Balance:  Static Sitting Balance  Static Sitting-Balance Support: Bilateral upper extremity supported  Static Sitting-Level of Assistance: Dependent (unable to maintain sitting balance)      Hand Function:  Hand Function  Gross Grasp: Functional  Coordination: Functional    Extremities: RUE   RUE : Within Functional Limits and LUE   LUE: Within Functional Limits    Outcome Measures: [facility] Daily Activity  Putting on and taking off regular lower body clothing: Total  Bathing (including washing, rinsing, drying): A lot  Putting on and taking off regular upper body clothing: A lot  Toileting, which includes using toilet, bedpan or urinal: Total  Taking care of personal grooming such as brushing teeth: A lot  Eating Meals: A little  Daily Activity - Total Score: 11                       EDUCATION:  Education  Individual(s) Educated: Patient  Education Provided: Fall precautons, Risk and benefits of OT discussed with patient or  other, POC discussed and agreed upon  Patient Response to Education: Patient/Caregiver Verbalized Understanding of Information      Goals:   Encounter Problems       Encounter Problems (Active)       Dressings Lower Extremities       STG - Patient to complete lower body dressing mod assist       Start:  11/25/24    Expected End:  12/09/24               Functional Balance       LTG - Patient will maintain sitting balance to allow for completion of daily activities       Start:  11/25/24    Expected End:  12/09/24               Grooming       STG - Patient completes grooming SUP       Start:  11/25/24    Expected End:  12/09/24               OT Transfers       STG - Patient will perform bed mobility mod assist       Start:  11/25/24    Expected End:  12/09/24            STG - Patient will perform toilet transfer mod assist       Start:  11/25/24    Expected End:  12/09/24

## 2024-11-25 NOTE — NURSING NOTE
EOS:  Slept on and off through the night.  Remains oriented x4, on O2 at 4LPM via NC.  VSS, external catheter remains intact, remains on bedrest.

## 2024-11-25 NOTE — PROGRESS NOTES
INPATIENT PROGRESS NOTES    PATIENT NAME: Angus Redman    MRN: [id number]  SERVICE DATE:  11/25/2024   SERVICE TIME:  12:30 PM    SIGNATURE: Kathleen Patino MD      SUBJECTIVE  Afebrile  No events overnight       OBJECTIVE  PHYSICAL EXAM:   Patient Vitals for the past 24 hrs:   BP Temp Temp src Pulse Resp SpO2   11/25/24 0912 -- -- -- -- -- 95 %   11/25/24 0800 125/78 36.1 °C (97 °F) -- 59 18 95 %   11/24/24 2018 -- -- -- -- -- 96 %   11/24/24 2000 126/79 35.4 °C (95.7 °F) Temporal 59 18 94 %   11/24/24 1600 129/82 35.8 °C (96.4 °F) -- 74 18 95 %     R foot is wrapped    Labs:  Lab Results   Component Value Date    WBC 7.8 11/25/2024    HGB 11.7 (L) 11/25/2024    HCT 38.8 (L) 11/25/2024    MCV 97 11/25/2024     11/25/2024     Lab Results   Component Value Date    GLUCOSE 104 (H) 11/25/2024    CALCIUM 8.8 11/25/2024     11/25/2024    K 4.2 11/25/2024    CO2 28 11/25/2024     11/25/2024    BUN 17 11/25/2024    CREATININE 1.12 11/25/2024   ESR: --  Lab Results   Component Value Date    SEDRATE 56 (H) 11/21/2024     Lab Results   Component Value Date    CRP 2.49 (H) 11/21/2024     Lab Results   Component Value Date    ALT 31 11/21/2024    AST 30 11/21/2024    ALKPHOS 116 11/21/2024    BILITOT 0.6 11/21/2024         DATA:   Diagnostic tests reviewed for today's visit:    Labs this admission reviewed  Imagings this admission reviewed  Cultures: Reviewed        ASSESSMENT :   -Right third toe nonhealing wound infection  -Status post amputation at MTP level with application of allograft  -Pathology is pending  -OR culture growing MRSA and mixed bacteria  -Renal failure resolving  -Previous wound culture grew MRSA susceptible to Bactrim and ESBL E. coli  -Chronic hypoxic respiratory failure, history of CAD, history of orthostatic hypotension,History of PVD    PLAN:  -Continue Bactrim DS 1 tablet twice daily for 7 to 10 days  -Renal function and potassium are stable  -Follow-up final OR pathology  -Can  "follow-up with me in 2 weeks    Kathleen Patino MD.   Infectious Disease Attending            This note was partially created using voice recognition software and is inherently subject to errors including those of syntax and \"sound-alike\" substitutions which may escape proofreading. In such instances, original meaning may be extrapolated by contextual derivation       "

## 2024-11-25 NOTE — PROGRESS NOTES
11/25/24 1601   Discharge Planning   Living Arrangements Spouse/significant other   Support Systems Spouse/significant other   Type of Residence Private residence   Home or Post Acute Services Post acute facilities (Rehab/SNF/etc)   Type of Post Acute Facility Services Skilled nursing   Expected Discharge Disposition SNF   Does the patient need discharge transport arranged? Yes   RoundTrip coordination needed? Yes   Patient Choice   Provider Choice list and CMS website (https://medicare.gov/care-compare#search) for post-acute Quality and Resource Measure Data were provided and reviewed with: Patient   Patient / Family choosing to utilize agency / facility established prior to hospitalization Yes   Intensity of Service   Intensity of Service >30 min     Met with pt to discuss the need for SNF placement at discharge. Pt is in agreement. A facility list was provided. Pt is requesting [facility] since he has been there in the past. Request sent to the [facility] to send out the SNF referral. SW to follow.

## 2024-11-25 NOTE — PROGRESS NOTES
Angus Redman is a 69 y.o. male on day 4 of admission presenting with Foot infection.    Subjective   Patient was laying in bed today, he still had not had a BM, although was tolerating eating and thinks that he will be able to move his bowels later; he has kept off his feet as instructed, he will work with PT / OT today, he denies having fevers or chills; he still had a little bit of pain sensation in the area of the operation that is well controlled with pain medication, his questions and concerns were all addressed; he will plan on going to SNF on clearance for discharge.    Objective     Physical Exam  General: Patient does not appear to be in any acute distress, alert, awake  Head: lacerations above bilateral eyebrows on forehead, significantly improved since yesterday  Eyes: Normal external exam  Cardiovascular: RRR, S1/S2, no murmurs, rubs, or gallops, radial pulses +2  Pulmonary: CTAB, no respiratory distress. On 3L NC  Abdomen: soft, non-tender, nondistended, no guarding or rebound, no masses noted  Neuro: A&O x3, no focal deficits, strength and sensation intact bilaterally  Extremities: BLE with erythema, L foot covered in dressing; R foot: Covered in Ace wrap dressing  Skin- Warm. Dry. No lesions, contusions, or erythema.  Psychiatric: Judgment intact. Appropriate mood and behavior    Last Recorded Vitals  Blood pressure 125/78, pulse 59, temperature 36.1 °C (97 °F), resp. rate 18, height 1.829 m (6'), weight 115 kg (254 lb 3.1 oz), SpO2 95%.  Intake/Output last 3 Shifts:  I/O last 3 completed shifts:  In: 2220 (19.3 mL/kg) [P.O.:1520; IV Piggyback:700]  Out: 4075 (35.3 mL/kg) [Urine:4075 (1 mL/kg/hr)]  Weight: 115.3 kg     Relevant Results  Scheduled medications  amLODIPine, 2.5 mg, oral, Daily  aspirin, 81 mg, oral, Daily  atorvastatin, 40 mg, oral, Nightly  budesonide, 0.25 mg, nebulization, BID  DULoxetine, 60 mg, oral, BID  [Held by provider] empagliflozin, 10 mg, oral, Daily  escitalopram, 20 mg,  oral, Daily  formoterol, 20 mcg, nebulization, q12h  heparin (porcine), 5,000 Units, subcutaneous, q8h  levothyroxine, 50 mcg, oral, Daily before breakfast  mirtazapine, 15 mg, oral, Nightly  montelukast, 10 mg, oral, Nightly  oxygen, , inhalation, Continuous - Inhalation  pantoprazole, 40 mg, oral, BID  pregabalin, 150 mg, oral, q6h  propranolol, 10 mg, oral, BID  sennosides-docusate sodium, 1 tablet, oral, BID  spironolactone, 25 mg, oral, Daily  sulfamethoxazole-trimethoprim, 1 tablet, oral, q12h SHANTEL  tamsulosin, 0.4 mg, oral, Nightly  [Held by provider] torsemide, 20 mg, oral, Daily      Continuous medications     PRN medications  PRN medications: busPIRone, ipratropium-albuteroL, oxyCODONE    Results for orders placed or performed during the hospital encounter of 11/21/24 (from the past 24 hours)   CBC   Result Value Ref Range    WBC 7.8 4.4 - 11.3 x10*3/uL    nRBC 0.0 0.0 - 0.0 /100 WBCs    RBC 4.00 (L) 4.50 - 5.90 x10*6/uL    Hemoglobin 11.7 (L) 13.5 - 17.5 g/dL    Hematocrit 38.8 (L) 41.0 - 52.0 %    MCV 97 80 - 100 fL    MCH 29.3 26.0 - 34.0 pg    MCHC 30.2 (L) 32.0 - 36.0 g/dL    RDW 16.9 (H) 11.5 - 14.5 %    Platelets 231 150 - 450 x10*3/uL   Magnesium   Result Value Ref Range    Magnesium 2.21 1.60 - 2.40 mg/dL   Basic Metabolic Panel   Result Value Ref Range    Glucose 104 (H) 74 - 99 mg/dL    Sodium 139 136 - 145 mmol/L    Potassium 4.2 3.5 - 5.3 mmol/L    Chloride 105 98 - 107 mmol/L    Bicarbonate 28 21 - 32 mmol/L    Anion Gap 10 10 - 20 mmol/L    Urea Nitrogen 17 6 - 23 mg/dL    Creatinine 1.12 0.50 - 1.30 mg/dL    eGFR 70 >60 mL/min/1.73m*2    Calcium 8.8 8.6 - 10.3 mg/dL     *Note: Due to a large number of results and/or encounters for the requested time period, some results have not been displayed. A complete set of results can be found in Results Review.     ECG 12 lead    Result Date: 11/22/2024  Sinus rhythm with marked sinus arrhythmia with 1st degree AV block Otherwise normal ECG When  compared with ECG of 18-NOV-2024 15:37, Premature atrial complexes are no longer Present ID interval has increased QRS axis Shifted right    Vascular US lower extremity venous duplex right    Result Date: 11/21/2024            [facility] [address] [address] [place] [address]     Tel [phone] Fax [phone]  Vascular Lab Report  VASC US LOWER EXTREMITY VENOUS DUPLEX RIGHT Patient Name:      PRUDENCIO DELGADO        Reading Physician:  [id number] Charlotte Carlson MD, RPVI Study Date:        11/21/2024           Ordering Provider:  [id number] NILTON JONES MRN/PID:           [id number]             Fellow: Accession#:        [id number]         Technologist:       Brii So RVT Date of Birth/Age: 1955 / 69 years Technologist 2: Gender:            M                    Encounter#:         [id number] Admission Status:  Emergency            Location Performed: [facility]  Diagnosis/ICD: Pain in right leg-M79.604 CPT Codes:     40928 Peripheral venous duplex scan for DVT Limited  Pertinent History: Obesity and PVD.  CONCLUSIONS:  Right Lower Venous: No evidence of acute deep vein thrombus visualized in the right lower extremity. Poor visualization of peroneal veins in grayscale. Peroneal veins visualized in segments. Additional Findings; Lymph nodes in groin largest measuring 1.27cm x 0.95 cm. Soft tissue edema visualized in the calf. Left Lower Venous: The left common femoral vein demonstrates normal spontaneous and respirophasic flow.  Comparison: Compared with study from 5/27/2021, no significant change.  Imaging & Doppler Findings:  Right                 Compressible Thrombus        Flow Distal External Iliac     Yes        None   Spontaneous/Phasic CFV                       Yes        None   Spontaneous/Phasic PFV                       Yes        None FV Proximal               Yes        None   Spontaneous/Phasic FV Mid                     Yes        None FV Distal                 Yes        None Popliteal                 Yes        None   Spontaneous/Phasic Peroneal                  Yes        None PTV                       Yes        None  Left        Flow CFV  Spontaneous/Phasic  [id number] CHRISTINE Kendrick MD Electronically signed by [id number] CHRISTINE Kendrick MD on 11/21/2024 at 7:45:39 PM  ** Final **     XR foot right 3+ views    Result Date: 11/21/2024  Interpreted By:  Tayo Menjivar, STUDY: XR FOOT RIGHT 3+ VIEWS   INDICATION: Signs/Symptoms:worsening drainage of 3rd digit, check for subcutaneous air.   COMPARISON: November 18, 2024   ACCESSION NUMBER(S): [id number]   ORDERING CLINICIAN: NILTON JONES   FINDINGS: Postsurgical changes of the right foot. 3rd digit soft tissue swelling noted.. No definite osseous abnormality seen. No of no definite soft tissue gas.   The post amputation changes 2nd digit unchanged.       Soft tissue swelling right 3rd digit, nonspecific. No deep soft tissue gas. If there is a high degree of concern for underlying deep soft tissue infection or osseous infection, MRI can be considered for further evaluation.   Signed by: Tayo Menjivar 11/21/2024 6:32 PM Dictation workstation:   [id number]    ECG 12 lead    Result Date: 11/20/2024  Sinus rhythm with frequent Premature atrial complexes Left anterior fascicular block Poor R-wave progression Abnormal ECG Confirmed by Donnell Riojas ([id number]) on 11/20/2024 2:49:31 PM    ECG 12 lead    Result Date: 11/20/2024  Sinus rhythm with 1st degree AV block with frequent Premature atrial complexes Leftward axis Abnormal ECG When compared with ECG of 24-SEP-2024 09:15, Sinus rhythm has replaced Ectopic atrial rhythm QRS axis Shifted left Confirmed by Donnell Riojas ([id number]) on 11/20/2024 2:48:49 PM    CT angio chest for pulmonary embolism    Result Date: 11/18/2024  Interpreted By:  Leonardo Celaya, STUDY: CT ANGIO CHEST FOR PULMONARY EMBOLISM; CT ABDOMEN PELVIS W IV CONTRAST;   11/18/2024 6:21 pm   INDICATION: Signs/Symptoms:fall. L flank pain. SOB before fall; Signs/Symptoms:fall, generalized pain, down for 24h after fall.     COMPARISON: 06/19/2015 CT abdomen/pelvis; 09/23/2024 CT PE chest   ACCESSION NUMBER(S): [id number]; [id number]   ORDERING CLINICIAN: KEN MENDIETA   TECHNIQUE: Contiguous axial images of the chest, abdomen, and pelvis were obtained after the intravenous administration of iodinated contrast. Coronal and sagittal reformatted images were reconstructed from the axial data. CT chest was obtained using PE angiographic protocol with MIP images created on a separate independent workstation.   FINDINGS: CT CHEST:   MEDIASTINUM AND LYMPH NODES:  The esophageal wall appears within normal limits.  No enlarged intrathoracic or axillary lymph nodes by imaging criteria. No pneumomediastinum.   VESSELS:  Normal caliber thoracic aorta without dissection. Mild aortic atherosclerosis.   HEART: Normal size.  Moderate coronary artery calcifications. No significant pericardial effusion.   LUNG, AIRWAYS, PLEURA: There are calcified pleural plaques involving the right posterior hemithorax with pleural thickening. There is adjacent round atelectasis in the right lower lobe. There is minimal left basilar slight dependent subsegmental atelectasis. No consolidation, pulmonary edema, effusion, or pneumothorax. There is emphysema.   CHEST WALL SOFT TISSUES: No discernible acute abnormality.   OSSEOUS STRUCTURES: There are numerous bilateral chronic rib fractures. For example, there are chronic fractures of the posterior right 8th, 10th, 11th, and 12th ribs; posterior left 2nd-7th ribs, posterior left 9th-12th ribs, and multiple lateral and anterior ribs bilaterally. Healed clavicle fracture involving the left clavicular head.     CT ABDOMEN/PELVIS:   ABDOMINAL WALL: No significant abnormality.   LIVER: Cirrhotic liver morphology. No lesions.   BILE DUCTS: No significant  intrahepatic or extrahepatic dilatation.   GALLBLADDER: No significant abnormality.   PANCREAS: No significant abnormality.   SPLEEN: The spleen is enlarged, measuring up to 14.5 cm in length.   ADRENALS: No significant abnormality.   KIDNEYS, URETERS, BLADDER: Moderately atrophic kidneys. No significant abnormality.   REPRODUCTIVE ORGANS: No significant abnormality.   VESSELS: Mild aortic atherosclerosis without AAA.   RETROPERITONEUM/LYMPH NODES: No acute retroperitoneal abnormality. No enlarged lymph nodes.   BOWEL/MESENTERY/PERITONEUM: There is a moderate amount of stool in the rectum distended up to 7.9 x 7.3 cm. No inflammatory bowel wall thickening or dilatation. Normal appendix.   No ascites, free air, or fluid collection.     MUSCULOSKELETAL: No acute osseous abnormality. No suspicious osseous lesion. Large area of heterotopic ossification arising from the right posteromedial femur resulting in marked narrowing of the ischiofemoral space. Healed fracture of the sacrococcygeal junction.       CT CHEST: 1. No acute pulmonary embolism. 2. Emphysema. 3. Right-sided calcified pleural plaques and round atelectasis which can be seen with prior asbestos exposure.   CT ABDOMEN/PELVIS: 1. No acute abnormality in the abdomen or pelvis.   2. Cirrhotic liver morphology and splenomegaly suggestive of portal hypertension.   MACRO: None.   Signed by: Leonardo Celaya 11/18/2024 7:12 PM Dictation workstation:   [id number]    CT abdomen pelvis w IV contrast    Result Date: 11/18/2024  Interpreted By:  Leonardo Celaya, STUDY: CT ANGIO CHEST FOR PULMONARY EMBOLISM; CT ABDOMEN PELVIS W IV CONTRAST;  11/18/2024 6:21 pm   INDICATION: Signs/Symptoms:fall. L flank pain. SOB before fall; Signs/Symptoms:fall, generalized pain, down for 24h after fall.     COMPARISON: 06/19/2015 CT abdomen/pelvis; 09/23/2024 CT PE chest   ACCESSION NUMBER(S): [id number]; [id number]   ORDERING CLINICIAN: KEN MENDIETA   TECHNIQUE:  Contiguous axial images of the chest, abdomen, and pelvis were obtained after the intravenous administration of iodinated contrast. Coronal and sagittal reformatted images were reconstructed from the axial data. CT chest was obtained using PE angiographic protocol with MIP images created on a separate independent workstation.   FINDINGS: CT CHEST:   MEDIASTINUM AND LYMPH NODES:  The esophageal wall appears within normal limits.  No enlarged intrathoracic or axillary lymph nodes by imaging criteria. No pneumomediastinum.   VESSELS:  Normal caliber thoracic aorta without dissection. Mild aortic atherosclerosis.   HEART: Normal size.  Moderate coronary artery calcifications. No significant pericardial effusion.   LUNG, AIRWAYS, PLEURA: There are calcified pleural plaques involving the right posterior hemithorax with pleural thickening. There is adjacent round atelectasis in the right lower lobe. There is minimal left basilar slight dependent subsegmental atelectasis. No consolidation, pulmonary edema, effusion, or pneumothorax. There is emphysema.   CHEST WALL SOFT TISSUES: No discernible acute abnormality.   OSSEOUS STRUCTURES: There are numerous bilateral chronic rib fractures. For example, there are chronic fractures of the posterior right 8th, 10th, 11th, and 12th ribs; posterior left 2nd-7th ribs, posterior left 9th-12th ribs, and multiple lateral and anterior ribs bilaterally. Healed clavicle fracture involving the left clavicular head.     CT ABDOMEN/PELVIS:   ABDOMINAL WALL: No significant abnormality.   LIVER: Cirrhotic liver morphology. No lesions.   BILE DUCTS: No significant intrahepatic or extrahepatic dilatation.   GALLBLADDER: No significant abnormality.   PANCREAS: No significant abnormality.   SPLEEN: The spleen is enlarged, measuring up to 14.5 cm in length.   ADRENALS: No significant abnormality.   KIDNEYS, URETERS, BLADDER: Moderately atrophic kidneys. No significant abnormality.   REPRODUCTIVE  ORGANS: No significant abnormality.   VESSELS: Mild aortic atherosclerosis without AAA.   RETROPERITONEUM/LYMPH NODES: No acute retroperitoneal abnormality. No enlarged lymph nodes.   BOWEL/MESENTERY/PERITONEUM: There is a moderate amount of stool in the rectum distended up to 7.9 x 7.3 cm. No inflammatory bowel wall thickening or dilatation. Normal appendix.   No ascites, free air, or fluid collection.     MUSCULOSKELETAL: No acute osseous abnormality. No suspicious osseous lesion. Large area of heterotopic ossification arising from the right posteromedial femur resulting in marked narrowing of the ischiofemoral space. Healed fracture of the sacrococcygeal junction.       CT CHEST: 1. No acute pulmonary embolism. 2. Emphysema. 3. Right-sided calcified pleural plaques and round atelectasis which can be seen with prior asbestos exposure.   CT ABDOMEN/PELVIS: 1. No acute abnormality in the abdomen or pelvis.   2. Cirrhotic liver morphology and splenomegaly suggestive of portal hypertension.   MACRO: None.   Signed by: Leonardo Celaya 11/18/2024 7:12 PM Dictation workstation:   [id number]    CT head wo IV contrast    Result Date: 11/18/2024  Interpreted By:  Leonardo Celaya, STUDY: CT HEAD WO IV CONTRAST; CT CERVICAL SPINE WO IV CONTRAST; CT LUMBAR SPINE WO IV CONTRAST; CT THORACIC SPINE WO IV CONTRAST; CT FACIAL BONES WO IV CONTRAST; CT 3D RECONSTRUCTION;  11/18/2024 6:19 pm; 11/18/2024 6:21 pm   INDICATION: Signs/Symptoms:fall,head trauma; Signs/Symptoms:fal,neck pain; Signs/Symptoms:fall  back pain; Signs/Symptoms:fall, back pain; Signs/Symptoms:fall, facial trauma; Signs/Symptoms:fall     COMPARISON: CT PE chest 09/23/2024   ACCESSION NUMBER(S): [id number]; [id number]; [id number]; [id number]; [id number]; [id number]   ORDERING CLINICIAN: KEN GONZALEZ   TECHNIQUE: Axial noncontrast CT images of head with coronal and sagittal reconstructed images. Axial noncontrast CT images of maxillofacial skeleton  with coronal and sagittal reconstructed images. 3D maxillofacial skeleton reconstructions were performed on an independent workstation and provided for review. Axial noncontrast CT images of the cervical spine with coronal and sagittal reconstructed images. Multiplanar reformatted images of the thoracic and lumbar spine were obtained from the CTA chest and CT abdomen/pelvis raw data.   FINDINGS: CT HEAD:   BRAIN PARENCHYMA:  No acute intraparenchymal hemorrhage or parenchymal evidence of acute large territory ischemic infarct. Gray-white matter distinction is preserved. No mass-effect.   VENTRICLES and EXTRA-AXIAL SPACES:  No acute extra-axial or intraventricular hemorrhage. No effacement of cerebral sulci. The ventricles and sulci are age-concordant.   PARANASAL SINUSES/MASTOIDS:  No hemorrhage or air-fluid levels within the visualized paranasal sinuses. The mastoids are well aerated.   CALVARIUM/ORBITS:  No skull fracture. The orbits and globes are intact to the extent visualized.   EXTRACRANIAL SOFT TISSUES: No discernible hematoma.     CT MAXILLOFACIAL SKELETON:   FACIAL BONES: No acute facial bone fracture. The bony orbits are intact.   ORBITS: The globes, extraocular muscles and optic nerve sheath complexes are intact. No retrobulbar or subperiosteal hematoma.   SOFT TISSUES: No discernible acute abnormality.   PARANASAL SINUSES: No hemorrhage or air-fluid levels. Mucous retention cyst in the left maxillary sinus.   OTHER FINDINGS: None.     CT CERVICAL SPINE:   PREVERTEBRAL SOFT TISSUES: Within normal limits.   CRANIOCERVICAL JUNCTION: Intact.   ALIGNMENT: Slight reversal of cervical lordosis, likely positional and/or degenerative. No traumatic malalignment or traumatic facet widening.   VERTEBRAE:  No acute fracture. Vertebral body heights are maintained.   SPINAL CANAL/INTERVERTEBRAL DISCS: No high-grade canal stenosis. There is a spur complexes that slightly indent the ventral thecal sac at C3-C4, C5-C6,  C6-C7 result in no greater than mild canal stenosis.   NEURAL FORAMINA: Multilevel uncovertebral joint and facet arthropathy notably contribute to mild right C2-C3 foraminal stenosis, severe right and mild left C3-C4 foraminal stenosis, severe right and moderate-severe left C4-C5 foraminal stenosis, moderate right C5-C6 foraminal stenosis, mild bilateral C6-7 foraminal stenosis.   OTHER: None.       CT THORACIC SPINE:   ALIGNMENT:  No traumatic spondylolisthesis or traumatic facet widening.   VERTEBRAE:  No acute fracture. Mild concavity to the upper T2 endplate is stable from prior study, consistent with remote fracture.   SPINAL CANAL/INTERVERTEBRAL DISCS: No high-grade spinal canal stenosis. Varying degrees of mild degenerative disc disease, mainly related to mild anterior endplate spurring without disc height loss or disc spur complexes.   PARASPINAL SOFT TISSUES:  No paravertebral soft tissue hematoma.   VISUALIZED CHEST: Please see separate report.     CT LUMBAR SPINE:   ALIGNMENT: Minimal grade 1 degenerative anterolisthesis of L5 on S1. No traumatic spondylolisthesis or traumatic facet widening.   VERTEBRAE: There are remote fractures of the bilateral L1, L2, and L4 transverse processes and left L3 transverse process.   SPINAL CANAL/INTERVERTEBRAL DISCS: No high-grade spinal canal stenosis.   NEUROFORAMINA: There is multilevel facet arthropathy which is mild bilaterally at L2-L3, moderate on the right at L3-L4, moderate bilateral and L4-L5 and moderate severe at L5-S1. There is mild bilateral L3-L4 foraminal stenosis, mild-moderate left L4-5 foraminal stenosis and mild bilateral L5-S1 foraminal stenosis.   PARASPINAL SOFT TISSUES:  No paravertebral soft tissue hematoma.   VISUALIZED ABDOMEN: Please see separate report.       CT HEAD: 1. No acute intracranial abnormality or calvarial fracture.     CT MAXILLOFACIAL SKELETON: 1. No acute maxillofacial skeleton fracture.     CT CERVICAL SPINE: 1. No acute  fracture or traumatic malalignment of the cervical spine. 2. Spondylotic changes of the cervical spine as detailed above.     CT THORACIC/LUMBAR SPINE: 1. No acute fracture or traumatic malalignment. 2. Remote minimally depressed compression fracture the upper T2 endplate. 3. Remote fracture deformities of the bilateral L1, L2, and L4 transverse processes and left L3 transverse process.   MACRO: None.   Signed by: Leonardo Celaya 11/18/2024 6:57 PM Dictation workstation:   [id number]    CT maxillofacial bones wo IV contrast    Result Date: 11/18/2024  Interpreted By:  Leonardo Celaya, STUDY: CT HEAD WO IV CONTRAST; CT CERVICAL SPINE WO IV CONTRAST; CT LUMBAR SPINE WO IV CONTRAST; CT THORACIC SPINE WO IV CONTRAST; CT FACIAL BONES WO IV CONTRAST; CT 3D RECONSTRUCTION;  11/18/2024 6:19 pm; 11/18/2024 6:21 pm   INDICATION: Signs/Symptoms:fall,head trauma; Signs/Symptoms:fal,neck pain; Signs/Symptoms:fall  back pain; Signs/Symptoms:fall, back pain; Signs/Symptoms:fall, facial trauma; Signs/Symptoms:fall     COMPARISON: CT PE chest 09/23/2024   ACCESSION NUMBER(S): [id number]; [id number]; [id number]; [id number]; [id number]; [id number]   ORDERING CLINICIAN: KEN GONZALEZ   TECHNIQUE: Axial noncontrast CT images of head with coronal and sagittal reconstructed images. Axial noncontrast CT images of maxillofacial skeleton with coronal and sagittal reconstructed images. 3D maxillofacial skeleton reconstructions were performed on an independent workstation and provided for review. Axial noncontrast CT images of the cervical spine with coronal and sagittal reconstructed images. Multiplanar reformatted images of the thoracic and lumbar spine were obtained from the CTA chest and CT abdomen/pelvis raw data.   FINDINGS: CT HEAD:   BRAIN PARENCHYMA:  No acute intraparenchymal hemorrhage or parenchymal evidence of acute large territory ischemic infarct. Gray-white matter distinction is preserved. No mass-effect.    VENTRICLES and EXTRA-AXIAL SPACES:  No acute extra-axial or intraventricular hemorrhage. No effacement of cerebral sulci. The ventricles and sulci are age-concordant.   PARANASAL SINUSES/MASTOIDS:  No hemorrhage or air-fluid levels within the visualized paranasal sinuses. The mastoids are well aerated.   CALVARIUM/ORBITS:  No skull fracture. The orbits and globes are intact to the extent visualized.   EXTRACRANIAL SOFT TISSUES: No discernible hematoma.     CT MAXILLOFACIAL SKELETON:   FACIAL BONES: No acute facial bone fracture. The bony orbits are intact.   ORBITS: The globes, extraocular muscles and optic nerve sheath complexes are intact. No retrobulbar or subperiosteal hematoma.   SOFT TISSUES: No discernible acute abnormality.   PARANASAL SINUSES: No hemorrhage or air-fluid levels. Mucous retention cyst in the left maxillary sinus.   OTHER FINDINGS: None.     CT CERVICAL SPINE:   PREVERTEBRAL SOFT TISSUES: Within normal limits.   CRANIOCERVICAL JUNCTION: Intact.   ALIGNMENT: Slight reversal of cervical lordosis, likely positional and/or degenerative. No traumatic malalignment or traumatic facet widening.   VERTEBRAE:  No acute fracture. Vertebral body heights are maintained.   SPINAL CANAL/INTERVERTEBRAL DISCS: No high-grade canal stenosis. There is a spur complexes that slightly indent the ventral thecal sac at C3-C4, C5-C6, C6-C7 result in no greater than mild canal stenosis.   NEURAL FORAMINA: Multilevel uncovertebral joint and facet arthropathy notably contribute to mild right C2-C3 foraminal stenosis, severe right and mild left C3-C4 foraminal stenosis, severe right and moderate-severe left C4-C5 foraminal stenosis, moderate right C5-C6 foraminal stenosis, mild bilateral C6-7 foraminal stenosis.   OTHER: None.       CT THORACIC SPINE:   ALIGNMENT:  No traumatic spondylolisthesis or traumatic facet widening.   VERTEBRAE:  No acute fracture. Mild concavity to the upper T2 endplate is stable from prior study,  consistent with remote fracture.   SPINAL CANAL/INTERVERTEBRAL DISCS: No high-grade spinal canal stenosis. Varying degrees of mild degenerative disc disease, mainly related to mild anterior endplate spurring without disc height loss or disc spur complexes.   PARASPINAL SOFT TISSUES:  No paravertebral soft tissue hematoma.   VISUALIZED CHEST: Please see separate report.     CT LUMBAR SPINE:   ALIGNMENT: Minimal grade 1 degenerative anterolisthesis of L5 on S1. No traumatic spondylolisthesis or traumatic facet widening.   VERTEBRAE: There are remote fractures of the bilateral L1, L2, and L4 transverse processes and left L3 transverse process.   SPINAL CANAL/INTERVERTEBRAL DISCS: No high-grade spinal canal stenosis.   NEUROFORAMINA: There is multilevel facet arthropathy which is mild bilaterally at L2-L3, moderate on the right at L3-L4, moderate bilateral and L4-L5 and moderate severe at L5-S1. There is mild bilateral L3-L4 foraminal stenosis, mild-moderate left L4-5 foraminal stenosis and mild bilateral L5-S1 foraminal stenosis.   PARASPINAL SOFT TISSUES:  No paravertebral soft tissue hematoma.   VISUALIZED ABDOMEN: Please see separate report.       CT HEAD: 1. No acute intracranial abnormality or calvarial fracture.     CT MAXILLOFACIAL SKELETON: 1. No acute maxillofacial skeleton fracture.     CT CERVICAL SPINE: 1. No acute fracture or traumatic malalignment of the cervical spine. 2. Spondylotic changes of the cervical spine as detailed above.     CT THORACIC/LUMBAR SPINE: 1. No acute fracture or traumatic malalignment. 2. Remote minimally depressed compression fracture the upper T2 endplate. 3. Remote fracture deformities of the bilateral L1, L2, and L4 transverse processes and left L3 transverse process.   MACRO: None.   Signed by: Leonardo Celaya 11/18/2024 6:57 PM Dictation workstation:   [id number]    CT cervical spine wo IV contrast    Result Date: 11/18/2024  Interpreted By:  Leonardo Celaya, STUDY: CT  HEAD WO IV CONTRAST; CT CERVICAL SPINE WO IV CONTRAST; CT LUMBAR SPINE WO IV CONTRAST; CT THORACIC SPINE WO IV CONTRAST; CT FACIAL BONES WO IV CONTRAST; CT 3D RECONSTRUCTION;  11/18/2024 6:19 pm; 11/18/2024 6:21 pm   INDICATION: Signs/Symptoms:fall,head trauma; Signs/Symptoms:fal,neck pain; Signs/Symptoms:fall  back pain; Signs/Symptoms:fall, back pain; Signs/Symptoms:fall, facial trauma; Signs/Symptoms:fall     COMPARISON: CT PE chest 09/23/2024   ACCESSION NUMBER(S): [id number]; [id number]; [id number]; [id number]; [id number]; [id number]   ORDERING CLINICIAN: KEN GONZALEZ   TECHNIQUE: Axial noncontrast CT images of head with coronal and sagittal reconstructed images. Axial noncontrast CT images of maxillofacial skeleton with coronal and sagittal reconstructed images. 3D maxillofacial skeleton reconstructions were performed on an independent workstation and provided for review. Axial noncontrast CT images of the cervical spine with coronal and sagittal reconstructed images. Multiplanar reformatted images of the thoracic and lumbar spine were obtained from the CTA chest and CT abdomen/pelvis raw data.   FINDINGS: CT HEAD:   BRAIN PARENCHYMA:  No acute intraparenchymal hemorrhage or parenchymal evidence of acute large territory ischemic infarct. Gray-white matter distinction is preserved. No mass-effect.   VENTRICLES and EXTRA-AXIAL SPACES:  No acute extra-axial or intraventricular hemorrhage. No effacement of cerebral sulci. The ventricles and sulci are age-concordant.   PARANASAL SINUSES/MASTOIDS:  No hemorrhage or air-fluid levels within the visualized paranasal sinuses. The mastoids are well aerated.   CALVARIUM/ORBITS:  No skull fracture. The orbits and globes are intact to the extent visualized.   EXTRACRANIAL SOFT TISSUES: No discernible hematoma.     CT MAXILLOFACIAL SKELETON:   FACIAL BONES: No acute facial bone fracture. The bony orbits are intact.   ORBITS: The globes, extraocular muscles and  optic nerve sheath complexes are intact. No retrobulbar or subperiosteal hematoma.   SOFT TISSUES: No discernible acute abnormality.   PARANASAL SINUSES: No hemorrhage or air-fluid levels. Mucous retention cyst in the left maxillary sinus.   OTHER FINDINGS: None.     CT CERVICAL SPINE:   PREVERTEBRAL SOFT TISSUES: Within normal limits.   CRANIOCERVICAL JUNCTION: Intact.   ALIGNMENT: Slight reversal of cervical lordosis, likely positional and/or degenerative. No traumatic malalignment or traumatic facet widening.   VERTEBRAE:  No acute fracture. Vertebral body heights are maintained.   SPINAL CANAL/INTERVERTEBRAL DISCS: No high-grade canal stenosis. There is a spur complexes that slightly indent the ventral thecal sac at C3-C4, C5-C6, C6-C7 result in no greater than mild canal stenosis.   NEURAL FORAMINA: Multilevel uncovertebral joint and facet arthropathy notably contribute to mild right C2-C3 foraminal stenosis, severe right and mild left C3-C4 foraminal stenosis, severe right and moderate-severe left C4-C5 foraminal stenosis, moderate right C5-C6 foraminal stenosis, mild bilateral C6-7 foraminal stenosis.   OTHER: None.       CT THORACIC SPINE:   ALIGNMENT:  No traumatic spondylolisthesis or traumatic facet widening.   VERTEBRAE:  No acute fracture. Mild concavity to the upper T2 endplate is stable from prior study, consistent with remote fracture.   SPINAL CANAL/INTERVERTEBRAL DISCS: No high-grade spinal canal stenosis. Varying degrees of mild degenerative disc disease, mainly related to mild anterior endplate spurring without disc height loss or disc spur complexes.   PARASPINAL SOFT TISSUES:  No paravertebral soft tissue hematoma.   VISUALIZED CHEST: Please see separate report.     CT LUMBAR SPINE:   ALIGNMENT: Minimal grade 1 degenerative anterolisthesis of L5 on S1. No traumatic spondylolisthesis or traumatic facet widening.   VERTEBRAE: There are remote fractures of the bilateral L1, L2, and L4 transverse  processes and left L3 transverse process.   SPINAL CANAL/INTERVERTEBRAL DISCS: No high-grade spinal canal stenosis.   NEUROFORAMINA: There is multilevel facet arthropathy which is mild bilaterally at L2-L3, moderate on the right at L3-L4, moderate bilateral and L4-L5 and moderate severe at L5-S1. There is mild bilateral L3-L4 foraminal stenosis, mild-moderate left L4-5 foraminal stenosis and mild bilateral L5-S1 foraminal stenosis.   PARASPINAL SOFT TISSUES:  No paravertebral soft tissue hematoma.   VISUALIZED ABDOMEN: Please see separate report.       CT HEAD: 1. No acute intracranial abnormality or calvarial fracture.     CT MAXILLOFACIAL SKELETON: 1. No acute maxillofacial skeleton fracture.     CT CERVICAL SPINE: 1. No acute fracture or traumatic malalignment of the cervical spine. 2. Spondylotic changes of the cervical spine as detailed above.     CT THORACIC/LUMBAR SPINE: 1. No acute fracture or traumatic malalignment. 2. Remote minimally depressed compression fracture the upper T2 endplate. 3. Remote fracture deformities of the bilateral L1, L2, and L4 transverse processes and left L3 transverse process.   MACRO: None.   Signed by: Leonardo Celaya 11/18/2024 6:57 PM Dictation workstation:   [id number]    CT thoracic spine wo IV contrast    Result Date: 11/18/2024  Interpreted By:  Leonardo Celaya, STUDY: CT HEAD WO IV CONTRAST; CT CERVICAL SPINE WO IV CONTRAST; CT LUMBAR SPINE WO IV CONTRAST; CT THORACIC SPINE WO IV CONTRAST; CT FACIAL BONES WO IV CONTRAST; CT 3D RECONSTRUCTION;  11/18/2024 6:19 pm; 11/18/2024 6:21 pm   INDICATION: Signs/Symptoms:fall,head trauma; Signs/Symptoms:fal,neck pain; Signs/Symptoms:fall  back pain; Signs/Symptoms:fall, back pain; Signs/Symptoms:fall, facial trauma; Signs/Symptoms:fall     COMPARISON: CT PE chest 09/23/2024   ACCESSION NUMBER(S): [id number]; [id number]; [id number]; [id number]; [id number]; [id number]   ORDERING CLINICIAN: KEN GONZALEZ   TECHNIQUE:  Axial noncontrast CT images of head with coronal and sagittal reconstructed images. Axial noncontrast CT images of maxillofacial skeleton with coronal and sagittal reconstructed images. 3D maxillofacial skeleton reconstructions were performed on an independent workstation and provided for review. Axial noncontrast CT images of the cervical spine with coronal and sagittal reconstructed images. Multiplanar reformatted images of the thoracic and lumbar spine were obtained from the CTA chest and CT abdomen/pelvis raw data.   FINDINGS: CT HEAD:   BRAIN PARENCHYMA:  No acute intraparenchymal hemorrhage or parenchymal evidence of acute large territory ischemic infarct. Gray-white matter distinction is preserved. No mass-effect.   VENTRICLES and EXTRA-AXIAL SPACES:  No acute extra-axial or intraventricular hemorrhage. No effacement of cerebral sulci. The ventricles and sulci are age-concordant.   PARANASAL SINUSES/MASTOIDS:  No hemorrhage or air-fluid levels within the visualized paranasal sinuses. The mastoids are well aerated.   CALVARIUM/ORBITS:  No skull fracture. The orbits and globes are intact to the extent visualized.   EXTRACRANIAL SOFT TISSUES: No discernible hematoma.     CT MAXILLOFACIAL SKELETON:   FACIAL BONES: No acute facial bone fracture. The bony orbits are intact.   ORBITS: The globes, extraocular muscles and optic nerve sheath complexes are intact. No retrobulbar or subperiosteal hematoma.   SOFT TISSUES: No discernible acute abnormality.   PARANASAL SINUSES: No hemorrhage or air-fluid levels. Mucous retention cyst in the left maxillary sinus.   OTHER FINDINGS: None.     CT CERVICAL SPINE:   PREVERTEBRAL SOFT TISSUES: Within normal limits.   CRANIOCERVICAL JUNCTION: Intact.   ALIGNMENT: Slight reversal of cervical lordosis, likely positional and/or degenerative. No traumatic malalignment or traumatic facet widening.   VERTEBRAE:  No acute fracture. Vertebral body heights are maintained.   SPINAL  CANAL/INTERVERTEBRAL DISCS: No high-grade canal stenosis. There is a spur complexes that slightly indent the ventral thecal sac at C3-C4, C5-C6, C6-C7 result in no greater than mild canal stenosis.   NEURAL FORAMINA: Multilevel uncovertebral joint and facet arthropathy notably contribute to mild right C2-C3 foraminal stenosis, severe right and mild left C3-C4 foraminal stenosis, severe right and moderate-severe left C4-C5 foraminal stenosis, moderate right C5-C6 foraminal stenosis, mild bilateral C6-7 foraminal stenosis.   OTHER: None.       CT THORACIC SPINE:   ALIGNMENT:  No traumatic spondylolisthesis or traumatic facet widening.   VERTEBRAE:  No acute fracture. Mild concavity to the upper T2 endplate is stable from prior study, consistent with remote fracture.   SPINAL CANAL/INTERVERTEBRAL DISCS: No high-grade spinal canal stenosis. Varying degrees of mild degenerative disc disease, mainly related to mild anterior endplate spurring without disc height loss or disc spur complexes.   PARASPINAL SOFT TISSUES:  No paravertebral soft tissue hematoma.   VISUALIZED CHEST: Please see separate report.     CT LUMBAR SPINE:   ALIGNMENT: Minimal grade 1 degenerative anterolisthesis of L5 on S1. No traumatic spondylolisthesis or traumatic facet widening.   VERTEBRAE: There are remote fractures of the bilateral L1, L2, and L4 transverse processes and left L3 transverse process.   SPINAL CANAL/INTERVERTEBRAL DISCS: No high-grade spinal canal stenosis.   NEUROFORAMINA: There is multilevel facet arthropathy which is mild bilaterally at L2-L3, moderate on the right at L3-L4, moderate bilateral and L4-L5 and moderate severe at L5-S1. There is mild bilateral L3-L4 foraminal stenosis, mild-moderate left L4-5 foraminal stenosis and mild bilateral L5-S1 foraminal stenosis.   PARASPINAL SOFT TISSUES:  No paravertebral soft tissue hematoma.   VISUALIZED ABDOMEN: Please see separate report.       CT HEAD: 1. No acute intracranial  abnormality or calvarial fracture.     CT MAXILLOFACIAL SKELETON: 1. No acute maxillofacial skeleton fracture.     CT CERVICAL SPINE: 1. No acute fracture or traumatic malalignment of the cervical spine. 2. Spondylotic changes of the cervical spine as detailed above.     CT THORACIC/LUMBAR SPINE: 1. No acute fracture or traumatic malalignment. 2. Remote minimally depressed compression fracture the upper T2 endplate. 3. Remote fracture deformities of the bilateral L1, L2, and L4 transverse processes and left L3 transverse process.   MACRO: None.   Signed by: Leonardo Celaya 11/18/2024 6:57 PM Dictation workstation:   [id number]    CT lumbar spine wo IV contrast    Result Date: 11/18/2024  Interpreted By:  Leonardo Celaya, STUDY: CT HEAD WO IV CONTRAST; CT CERVICAL SPINE WO IV CONTRAST; CT LUMBAR SPINE WO IV CONTRAST; CT THORACIC SPINE WO IV CONTRAST; CT FACIAL BONES WO IV CONTRAST; CT 3D RECONSTRUCTION;  11/18/2024 6:19 pm; 11/18/2024 6:21 pm   INDICATION: Signs/Symptoms:fall,head trauma; Signs/Symptoms:fal,neck pain; Signs/Symptoms:fall  back pain; Signs/Symptoms:fall, back pain; Signs/Symptoms:fall, facial trauma; Signs/Symptoms:fall     COMPARISON: CT PE chest 09/23/2024   ACCESSION NUMBER(S): [id number]; [id number]; [id number]; [id number]; [id number]; [id number]   ORDERING CLINICIAN: KEN GONZALEZ   TECHNIQUE: Axial noncontrast CT images of head with coronal and sagittal reconstructed images. Axial noncontrast CT images of maxillofacial skeleton with coronal and sagittal reconstructed images. 3D maxillofacial skeleton reconstructions were performed on an independent workstation and provided for review. Axial noncontrast CT images of the cervical spine with coronal and sagittal reconstructed images. Multiplanar reformatted images of the thoracic and lumbar spine were obtained from the CTA chest and CT abdomen/pelvis raw data.   FINDINGS: CT HEAD:   BRAIN PARENCHYMA:  No acute intraparenchymal  hemorrhage or parenchymal evidence of acute large territory ischemic infarct. Gray-white matter distinction is preserved. No mass-effect.   VENTRICLES and EXTRA-AXIAL SPACES:  No acute extra-axial or intraventricular hemorrhage. No effacement of cerebral sulci. The ventricles and sulci are age-concordant.   PARANASAL SINUSES/MASTOIDS:  No hemorrhage or air-fluid levels within the visualized paranasal sinuses. The mastoids are well aerated.   CALVARIUM/ORBITS:  No skull fracture. The orbits and globes are intact to the extent visualized.   EXTRACRANIAL SOFT TISSUES: No discernible hematoma.     CT MAXILLOFACIAL SKELETON:   FACIAL BONES: No acute facial bone fracture. The bony orbits are intact.   ORBITS: The globes, extraocular muscles and optic nerve sheath complexes are intact. No retrobulbar or subperiosteal hematoma.   SOFT TISSUES: No discernible acute abnormality.   PARANASAL SINUSES: No hemorrhage or air-fluid levels. Mucous retention cyst in the left maxillary sinus.   OTHER FINDINGS: None.     CT CERVICAL SPINE:   PREVERTEBRAL SOFT TISSUES: Within normal limits.   CRANIOCERVICAL JUNCTION: Intact.   ALIGNMENT: Slight reversal of cervical lordosis, likely positional and/or degenerative. No traumatic malalignment or traumatic facet widening.   VERTEBRAE:  No acute fracture. Vertebral body heights are maintained.   SPINAL CANAL/INTERVERTEBRAL DISCS: No high-grade canal stenosis. There is a spur complexes that slightly indent the ventral thecal sac at C3-C4, C5-C6, C6-C7 result in no greater than mild canal stenosis.   NEURAL FORAMINA: Multilevel uncovertebral joint and facet arthropathy notably contribute to mild right C2-C3 foraminal stenosis, severe right and mild left C3-C4 foraminal stenosis, severe right and moderate-severe left C4-C5 foraminal stenosis, moderate right C5-C6 foraminal stenosis, mild bilateral C6-7 foraminal stenosis.   OTHER: None.       CT THORACIC SPINE:   ALIGNMENT:  No traumatic  spondylolisthesis or traumatic facet widening.   VERTEBRAE:  No acute fracture. Mild concavity to the upper T2 endplate is stable from prior study, consistent with remote fracture.   SPINAL CANAL/INTERVERTEBRAL DISCS: No high-grade spinal canal stenosis. Varying degrees of mild degenerative disc disease, mainly related to mild anterior endplate spurring without disc height loss or disc spur complexes.   PARASPINAL SOFT TISSUES:  No paravertebral soft tissue hematoma.   VISUALIZED CHEST: Please see separate report.     CT LUMBAR SPINE:   ALIGNMENT: Minimal grade 1 degenerative anterolisthesis of L5 on S1. No traumatic spondylolisthesis or traumatic facet widening.   VERTEBRAE: There are remote fractures of the bilateral L1, L2, and L4 transverse processes and left L3 transverse process.   SPINAL CANAL/INTERVERTEBRAL DISCS: No high-grade spinal canal stenosis.   NEUROFORAMINA: There is multilevel facet arthropathy which is mild bilaterally at L2-L3, moderate on the right at L3-L4, moderate bilateral and L4-L5 and moderate severe at L5-S1. There is mild bilateral L3-L4 foraminal stenosis, mild-moderate left L4-5 foraminal stenosis and mild bilateral L5-S1 foraminal stenosis.   PARASPINAL SOFT TISSUES:  No paravertebral soft tissue hematoma.   VISUALIZED ABDOMEN: Please see separate report.       CT HEAD: 1. No acute intracranial abnormality or calvarial fracture.     CT MAXILLOFACIAL SKELETON: 1. No acute maxillofacial skeleton fracture.     CT CERVICAL SPINE: 1. No acute fracture or traumatic malalignment of the cervical spine. 2. Spondylotic changes of the cervical spine as detailed above.     CT THORACIC/LUMBAR SPINE: 1. No acute fracture or traumatic malalignment. 2. Remote minimally depressed compression fracture the upper T2 endplate. 3. Remote fracture deformities of the bilateral L1, L2, and L4 transverse processes and left L3 transverse process.   MACRO: None.   Signed by: Leonardo Celaya 11/18/2024 6:57 PM  Dictation workstation:   [id number]    CT 3D reconstruction    Result Date: 11/18/2024  Interpreted By:  Leonardo Celaya, STUDY: CT HEAD WO IV CONTRAST; CT CERVICAL SPINE WO IV CONTRAST; CT LUMBAR SPINE WO IV CONTRAST; CT THORACIC SPINE WO IV CONTRAST; CT FACIAL BONES WO IV CONTRAST; CT 3D RECONSTRUCTION;  11/18/2024 6:19 pm; 11/18/2024 6:21 pm   INDICATION: Signs/Symptoms:fall,head trauma; Signs/Symptoms:fal,neck pain; Signs/Symptoms:fall  back pain; Signs/Symptoms:fall, back pain; Signs/Symptoms:fall, facial trauma; Signs/Symptoms:fall     COMPARISON: CT PE chest 09/23/2024   ACCESSION NUMBER(S): [id number]; [id number]; [id number]; [id number]; [id number]; [id number]   ORDERING CLINICIAN: KEN GONZALEZ   TECHNIQUE: Axial noncontrast CT images of head with coronal and sagittal reconstructed images. Axial noncontrast CT images of maxillofacial skeleton with coronal and sagittal reconstructed images. 3D maxillofacial skeleton reconstructions were performed on an independent workstation and provided for review. Axial noncontrast CT images of the cervical spine with coronal and sagittal reconstructed images. Multiplanar reformatted images of the thoracic and lumbar spine were obtained from the CTA chest and CT abdomen/pelvis raw data.   FINDINGS: CT HEAD:   BRAIN PARENCHYMA:  No acute intraparenchymal hemorrhage or parenchymal evidence of acute large territory ischemic infarct. Gray-white matter distinction is preserved. No mass-effect.   VENTRICLES and EXTRA-AXIAL SPACES:  No acute extra-axial or intraventricular hemorrhage. No effacement of cerebral sulci. The ventricles and sulci are age-concordant.   PARANASAL SINUSES/MASTOIDS:  No hemorrhage or air-fluid levels within the visualized paranasal sinuses. The mastoids are well aerated.   CALVARIUM/ORBITS:  No skull fracture. The orbits and globes are intact to the extent visualized.   EXTRACRANIAL SOFT TISSUES: No discernible hematoma.     CT  MAXILLOFACIAL SKELETON:   FACIAL BONES: No acute facial bone fracture. The bony orbits are intact.   ORBITS: The globes, extraocular muscles and optic nerve sheath complexes are intact. No retrobulbar or subperiosteal hematoma.   SOFT TISSUES: No discernible acute abnormality.   PARANASAL SINUSES: No hemorrhage or air-fluid levels. Mucous retention cyst in the left maxillary sinus.   OTHER FINDINGS: None.     CT CERVICAL SPINE:   PREVERTEBRAL SOFT TISSUES: Within normal limits.   CRANIOCERVICAL JUNCTION: Intact.   ALIGNMENT: Slight reversal of cervical lordosis, likely positional and/or degenerative. No traumatic malalignment or traumatic facet widening.   VERTEBRAE:  No acute fracture. Vertebral body heights are maintained.   SPINAL CANAL/INTERVERTEBRAL DISCS: No high-grade canal stenosis. There is a spur complexes that slightly indent the ventral thecal sac at C3-C4, C5-C6, C6-C7 result in no greater than mild canal stenosis.   NEURAL FORAMINA: Multilevel uncovertebral joint and facet arthropathy notably contribute to mild right C2-C3 foraminal stenosis, severe right and mild left C3-C4 foraminal stenosis, severe right and moderate-severe left C4-C5 foraminal stenosis, moderate right C5-C6 foraminal stenosis, mild bilateral C6-7 foraminal stenosis.   OTHER: None.       CT THORACIC SPINE:   ALIGNMENT:  No traumatic spondylolisthesis or traumatic facet widening.   VERTEBRAE:  No acute fracture. Mild concavity to the upper T2 endplate is stable from prior study, consistent with remote fracture.   SPINAL CANAL/INTERVERTEBRAL DISCS: No high-grade spinal canal stenosis. Varying degrees of mild degenerative disc disease, mainly related to mild anterior endplate spurring without disc height loss or disc spur complexes.   PARASPINAL SOFT TISSUES:  No paravertebral soft tissue hematoma.   VISUALIZED CHEST: Please see separate report.     CT LUMBAR SPINE:   ALIGNMENT: Minimal grade 1 degenerative anterolisthesis of L5 on S1.  No traumatic spondylolisthesis or traumatic facet widening.   VERTEBRAE: There are remote fractures of the bilateral L1, L2, and L4 transverse processes and left L3 transverse process.   SPINAL CANAL/INTERVERTEBRAL DISCS: No high-grade spinal canal stenosis.   NEUROFORAMINA: There is multilevel facet arthropathy which is mild bilaterally at L2-L3, moderate on the right at L3-L4, moderate bilateral and L4-L5 and moderate severe at L5-S1. There is mild bilateral L3-L4 foraminal stenosis, mild-moderate left L4-5 foraminal stenosis and mild bilateral L5-S1 foraminal stenosis.   PARASPINAL SOFT TISSUES:  No paravertebral soft tissue hematoma.   VISUALIZED ABDOMEN: Please see separate report.       CT HEAD: 1. No acute intracranial abnormality or calvarial fracture.     CT MAXILLOFACIAL SKELETON: 1. No acute maxillofacial skeleton fracture.     CT CERVICAL SPINE: 1. No acute fracture or traumatic malalignment of the cervical spine. 2. Spondylotic changes of the cervical spine as detailed above.     CT THORACIC/LUMBAR SPINE: 1. No acute fracture or traumatic malalignment. 2. Remote minimally depressed compression fracture the upper T2 endplate. 3. Remote fracture deformities of the bilateral L1, L2, and L4 transverse processes and left L3 transverse process.   MACRO: None.   Signed by: Leonardo Celaya 11/18/2024 6:57 PM Dictation workstation:   [id number]    XR hand 3+ views bilateral    Result Date: 11/18/2024  Interpreted By:  Nathen Lovett, STUDY: XR HAND 3+ VIEWS BILATERAL; XR WRIST 3+ VIEWS BILATERAL  11/18/2024 2:15 pm   INDICATION: Signs/Symptoms:b/l hand pain; Signs/Symptoms:b/l wrist pain   COMPARISON: None.   ACCESSION NUMBER(S): [id number]; [id number]   ORDERING CLINICIAN: KEN GONZALEZ   TECHNIQUE: Three views each of the bilateral hands and wrists including AP, oblique and lateral projections were obtained.   FINDINGS: There is no evidence of acute fracture or dislocation identified. Moderate to severe  hypertrophic degenerative changes are seen in the right trapezium 1st metacarpal articulation. Moderate degenerative changes are seen in the left trapezium 1st metacarpal articulation. Moderate to severe degenerative changes are seen in the right proximal interphalangeal joint. Minimal degenerative changes are seen in the remaining interphalangeal joints.       1. No fracture or dislocation. 2. Degenerative changes, as described above.   MACRO: None.   Signed by: Nathen Lovett 11/18/2024 2:38 PM Dictation workstation:   [id number]    XR wrist 3+ views bilateral    Result Date: 11/18/2024  Interpreted By:  Nathen Lovett, STUDY: XR HAND 3+ VIEWS BILATERAL; XR WRIST 3+ VIEWS BILATERAL  11/18/2024 2:15 pm   INDICATION: Signs/Symptoms:b/l hand pain; Signs/Symptoms:b/l wrist pain   COMPARISON: None.   ACCESSION NUMBER(S): [id number]; [id number]   ORDERING CLINICIAN: KEN GONZALEZ   TECHNIQUE: Three views each of the bilateral hands and wrists including AP, oblique and lateral projections were obtained.   FINDINGS: There is no evidence of acute fracture or dislocation identified. Moderate to severe hypertrophic degenerative changes are seen in the right trapezium 1st metacarpal articulation. Moderate degenerative changes are seen in the left trapezium 1st metacarpal articulation. Moderate to severe degenerative changes are seen in the right proximal interphalangeal joint. Minimal degenerative changes are seen in the remaining interphalangeal joints.       1. No fracture or dislocation. 2. Degenerative changes, as described above.   MACRO: None.   Signed by: Nathen Lovett 11/18/2024 2:38 PM Dictation workstation:   [id number]    XR foot 3+ views bilateral    Result Date: 11/18/2024  Interpreted By:  Nathen Lovett, STUDY: XR FOOT 3+ VIEWS BILATERAL  11/18/2024 2:15 pm   INDICATION: Signs/Symptoms:b/l foot pain   COMPARISON: 12/17/2021   ACCESSION NUMBER(S): [id number]   ORDERING CLINICIAN: KEN GONZALEZ   TECHNIQUE:  Three views each of the bilateral feet including AP , oblique and lateral projections were obtained.   FINDINGS: The patient is status post left 1st interphalangeal joint arthrodesis. Postoperative changes are seen involving the distal aspect of the 1st metatarsal. The patient is status post amputation through the left 2nd proximal interphalangeal joint and right 2nd metatarsophalangeal joint. There is no radiographic evidence of acute fracture or dislocation identified. Mild-to-moderate degenerative changes are seen in the 1st metatarsophalangeal joints bilaterally.       1. No acute fracture or dislocation identified. 2. Postoperative and degenerative changes, as described above.   MACRO: None.   Signed by: Nathen Lovett 11/18/2024 2:36 PM Dictation workstation:   [id number]    XR chest 1 view    Result Date: 11/18/2024  Interpreted By:  Nathen Lovett, STUDY: XR CHEST 1 VIEW  11/18/2024 2:15 pm   INDICATION: Signs/Symptoms:fall,near syncope   COMPARISON: 10/30/2024   ACCESSION NUMBER(S): [id number]   ORDERING CLINICIAN: KEN GONZALEZ   TECHNIQUE: A single AP portable radiograph of the chest was obtained.   FINDINGS: Multiple cardiac monitoring leads are seen over the chest.  Hazy airspace opacity is seen at the right lung base, similar to the prior study, and may represent scarring, atelectasis and/or pneumonia. No pneumothorax is identified. The cardiac silhouette is within normal limits for size.       Right basilar airspace opacity, as above. Clinical correlation and continued follow-up until clearing is recommended.   MACRO: None.   Signed by: Nathen Lovett 11/18/2024 2:33 PM Dictation workstation:   [id number]    XR tibia fibula bilateral 2 views    Result Date: 11/18/2024  Interpreted By:  Nathen Lovett, STUDY: XR TIBIA FIBULA BILATERAL 2 VIEWS 11/18/2024 2:15 pm   INDICATION: Signs/Symptoms:b/l foot pain   COMPARISON: None.   ACCESSION NUMBER(S): [id number]   ORDERING CLINICIAN: KEN GONZALEZ    TECHNIQUE: Four views each of the bilateral tibia and fibula including AP and lateral projections were obtained.   FINDINGS: There is no evidence of acute fracture or dislocation identified. The joint spaces are well preserved throughout without significant degenerative changes.       1. No fracture or dislocation.   MACRO: None.   Signed by: Nathen Lovett 11/18/2024 2:30 PM Dictation workstation:   [id number]    XR foot 1-2 views bilateral    Result Date: 11/17/2024  Interpreted By:  Parvez Ellison, STUDY: XR FOOT 1-2 VIEWS BILATERAL; ;  11/13/2024 1:19 pm   INDICATION: Signs/Symptoms:bilateral foot wounds, concern for osteomyelitis.   ,L97.512 Non-pressure chronic ulcer of other part of right foot with fat layer exposed,L97.522 Non-pressure chronic ulcer of other part of left foot with fat layer exposed   COMPARISON: None.   ACCESSION NUMBER(S): [id number]   ORDERING CLINICIAN: PHIL BETANCOURT   FINDINGS: Bilateral feet, two views of each   On the left are postsurgical changes in the 1st IP joint status post fusion and in the 1st metatarsal head status post osteotomy with fixation. Soft tissue edema about the 1st digit. No osseous destruction or erosions seen. There is flattening of the left 2nd metatarsal head consistent with avascular necrosis (Freiberg's disease). There is mild degenerative change in the midfoot articulations.   On the right there is mutation of the 2nd digit with a remote 2nd distal metatarsal fracture. There is moderate degenerative changes of 1st MTP joint and the 1st IP joint with lateral subluxation. Soft tissue edema present without osseous destruction or erosion seen radiographically.       No radiographic evidence of osteomyelitis in either foot. MRI can be performed for further evaluation Soft tissue edema about the left 1st toe with cellulitis in the differential. Postsurgical change bilaterally.   MACRO: None   Signed by: Parvez Ellison 11/17/2024 6:40 AM Dictation  workstation:   [id number]    XR chest 2 views    Result Date: 11/4/2024  Interpreted By:  Tayo Menjivar, STUDY: XR CHEST 2 VIEWS   INDICATION: Signs/Symptoms:pleural effusion.   COMPARISON: September 23, 2024   ACCESSION NUMBER(S): [id number]   ORDERING CLINICIAN: PRABHU POOLE   FINDINGS: Extensive chronic pleural plaque formation right worse than left similar to prior study with right basilar airspace disease and effusion unchanged.   No additional new findings. Multiple prior rib fractures.       Extensive chronic pleural plaque formation right worse than left similar to prior study with right basilar airspace disease and effusion unchanged.   Signed by: Tayo Menjivar 11/4/2024 5:13 PM Dictation workstation:   [id number]     Susceptibility data from last 90 days.  Collected Specimen Info Organism Amoxicillin/Clavulanate Ampicillin Ampicillin/Sulbactam Aztreonam Cefazolin Cefazolin (uncomplicated UTIs only) Cefepime Cefotaxime Ceftazidime Ceftriaxone Cefuroxime (oral)   11/22/24 Tissue from DIGIT THIRD, RIGHT FOOT Methicillin Resistant Staphylococcus aureus (MRSA)                Mixed Gram-Positive and Gram-Negative Bacteria              11/18/24 Urine from Clean Catch/Voided Escherichia coli  R  R  I  R  R  R  R  R  R  R    10/23/24 Tissue/Biopsy from Wound/Tissue Escherichia coli  R  R  I  R  R   R  R  R  R  R     Methicillin Resistant Staphylococcus aureus (MRSA)              09/23/24 Urine from Clean Catch/Voided Escherichia coli  R  R  I  R  R  R  R  R  R  R    09/18/24 Tissue/Biopsy from Wound/Tissue Methicillin Resistant Staphylococcus aureus (MRSA)                Mixed Gram-Positive and Gram-Negative Bacteria                Collected Specimen Info Organism Ciprofloxacin Clindamycin Ertapenem Erythromycin Gentamicin Levofloxacin Meropenem Nitrofurantoin Oxacillin Piperacillin/Tazobactam Tetracycline Trimethoprim/Sulfamethoxazole Vancomycin   11/22/24 Tissue from DIGIT THIRD, RIGHT FOOT Methicillin  Resistant Staphylococcus aureus (MRSA)                  Mixed Gram-Positive and Gram-Negative Bacteria                11/18/24 Urine from Clean Catch/Voided Escherichia coli  S   S   S   S  S   S   R    10/23/24 Tissue/Biopsy from Wound/Tissue Escherichia coli  S   S   S  S  S    S   R      Methicillin Resistant Staphylococcus aureus (MRSA)   R   R      R   R  S  S   09/23/24 Urine from Clean Catch/Voided Escherichia coli  S   S   S   S  S   S   R    09/18/24 Tissue/Biopsy from Wound/Tissue Methicillin Resistant Staphylococcus aureus (MRSA)   R   R      R   R  S  S     Mixed Gram-Positive and Gram-Negative Bacteria                      Assessment/Plan   69-year-old male with recurrent syncope secondary to orthostatic hypotension/autonomic dysfunction/autonomic neuropathy, restrictive lung disease on 4 to 5 L nasal cannula at baseline, PSVT on propranolol, CAD w/ CCTA (02/2024) - 50% stenosis of LAD - 25-50% stenosis of RCA, alcohol use disorder c/b severe alcoholic polyneuropathy with autonomic dysfunction  who presents at the direction of his podiatrist due to third right lower extremity digit infection, now POD #3 status post right third digit amputation with application of allograft to metatarsal heads; appreciate podiatry and infectious disease input.  Assessment & Plan  Foot infection    Osteomyelitis of third toe of right foot (Multi)    Plan:  - Patient stable to remain at current level of care  - Continuing Bactrim for a total of 7 days, today is day 2  - senna added, patient requests to hold off on laxative for the morning, thinks he can have BM  - continue medications, orders reviewed  - plan for SNF on discharge, will need pre-cert, discussed with [facility]  - VTE prophylaxis with subcutaneous heparin  - No indication for telemetry  - Code: DNR/DNI-CCA       I spent 35 minutes in the professional and overall care of this patient.    Peter Moser MD

## 2024-11-25 NOTE — NURSING NOTE
EOS- No acute changes throughout the shift. Patient received pain medication this morning with decrease in pain. Patient remains on 3.5L. Safety maintained and call light within reach.

## 2024-11-26 ENCOUNTER — APPOINTMENT (OUTPATIENT)
Dept: PRIMARY CARE | Facility: CLINIC | Age: 69
End: 2024-11-26
Payer: MEDICARE

## 2024-11-26 LAB
ANION GAP SERPL CALC-SCNC: 11 MMOL/L (ref 10–20)
BACTERIA BLD CULT: NORMAL
BACTERIA BLD CULT: NORMAL
BACTERIA SPEC CULT: ABNORMAL
BACTERIA SPEC CULT: ABNORMAL
BUN SERPL-MCNC: 16 MG/DL (ref 6–23)
CALCIUM SERPL-MCNC: 9.2 MG/DL (ref 8.6–10.3)
CHLORIDE SERPL-SCNC: 102 MMOL/L (ref 98–107)
CO2 SERPL-SCNC: 29 MMOL/L (ref 21–32)
CREAT SERPL-MCNC: 1.18 MG/DL (ref 0.5–1.3)
EGFRCR SERPLBLD CKD-EPI 2021: 66 ML/MIN/1.73M*2
ERYTHROCYTE [DISTWIDTH] IN BLOOD BY AUTOMATED COUNT: 16.7 % (ref 11.5–14.5)
GLUCOSE SERPL-MCNC: 103 MG/DL (ref 74–99)
GRAM STN SPEC: ABNORMAL
GRAM STN SPEC: ABNORMAL
HCT VFR BLD AUTO: 39.5 % (ref 41–52)
HGB BLD-MCNC: 11.9 G/DL (ref 13.5–17.5)
MAGNESIUM SERPL-MCNC: 1.99 MG/DL (ref 1.6–2.4)
MCH RBC QN AUTO: 29.4 PG (ref 26–34)
MCHC RBC AUTO-ENTMCNC: 30.1 G/DL (ref 32–36)
MCV RBC AUTO: 98 FL (ref 80–100)
NRBC BLD-RTO: 0 /100 WBCS (ref 0–0)
PLATELET # BLD AUTO: 249 X10*3/UL (ref 150–450)
POTASSIUM SERPL-SCNC: 4.2 MMOL/L (ref 3.5–5.3)
RBC # BLD AUTO: 4.05 X10*6/UL (ref 4.5–5.9)
SODIUM SERPL-SCNC: 138 MMOL/L (ref 136–145)
WBC # BLD AUTO: 9.6 X10*3/UL (ref 4.4–11.3)

## 2024-11-26 PROCEDURE — 99232 SBSQ HOSP IP/OBS MODERATE 35: CPT | Performed by: INTERNAL MEDICINE

## 2024-11-26 PROCEDURE — 83735 ASSAY OF MAGNESIUM: CPT | Performed by: INTERNAL MEDICINE

## 2024-11-26 PROCEDURE — 36415 COLL VENOUS BLD VENIPUNCTURE: CPT | Performed by: INTERNAL MEDICINE

## 2024-11-26 PROCEDURE — 94640 AIRWAY INHALATION TREATMENT: CPT

## 2024-11-26 PROCEDURE — 2500000001 HC RX 250 WO HCPCS SELF ADMINISTERED DRUGS (ALT 637 FOR MEDICARE OP): Performed by: INTERNAL MEDICINE

## 2024-11-26 PROCEDURE — 2500000002 HC RX 250 W HCPCS SELF ADMINISTERED DRUGS (ALT 637 FOR MEDICARE OP, ALT 636 FOR OP/ED): Performed by: INTERNAL MEDICINE

## 2024-11-26 PROCEDURE — 85027 COMPLETE CBC AUTOMATED: CPT | Performed by: INTERNAL MEDICINE

## 2024-11-26 PROCEDURE — 2500000005 HC RX 250 GENERAL PHARMACY W/O HCPCS: Performed by: INTERNAL MEDICINE

## 2024-11-26 PROCEDURE — 2500000002 HC RX 250 W HCPCS SELF ADMINISTERED DRUGS (ALT 637 FOR MEDICARE OP, ALT 636 FOR OP/ED)

## 2024-11-26 PROCEDURE — 80048 BASIC METABOLIC PNL TOTAL CA: CPT | Performed by: INTERNAL MEDICINE

## 2024-11-26 PROCEDURE — 2500000001 HC RX 250 WO HCPCS SELF ADMINISTERED DRUGS (ALT 637 FOR MEDICARE OP)

## 2024-11-26 PROCEDURE — 2500000004 HC RX 250 GENERAL PHARMACY W/ HCPCS (ALT 636 FOR OP/ED)

## 2024-11-26 PROCEDURE — 1100000001 HC PRIVATE ROOM DAILY

## 2024-11-26 ASSESSMENT — COGNITIVE AND FUNCTIONAL STATUS - GENERAL
TURNING FROM BACK TO SIDE WHILE IN FLAT BAD: A LITTLE
STANDING UP FROM CHAIR USING ARMS: TOTAL
DAILY ACTIVITIY SCORE: 19
TOILETING: A LITTLE
MOVING TO AND FROM BED TO CHAIR: A LITTLE
CLIMB 3 TO 5 STEPS WITH RAILING: TOTAL
MOVING FROM LYING ON BACK TO SITTING ON SIDE OF FLAT BED WITH BEDRAILS: A LITTLE
MOBILITY SCORE: 12
HELP NEEDED FOR BATHING: A LOT
DRESSING REGULAR UPPER BODY CLOTHING: A LITTLE
WALKING IN HOSPITAL ROOM: TOTAL
DRESSING REGULAR LOWER BODY CLOTHING: A LITTLE

## 2024-11-26 ASSESSMENT — PAIN - FUNCTIONAL ASSESSMENT
PAIN_FUNCTIONAL_ASSESSMENT: 0-10
PAIN_FUNCTIONAL_ASSESSMENT: 0-10

## 2024-11-26 ASSESSMENT — PAIN SCALES - GENERAL
PAINLEVEL_OUTOF10: 0 - NO PAIN
PAINLEVEL_OUTOF10: 0 - NO PAIN

## 2024-11-26 NOTE — PROGRESS NOTES
11/26/24 1017   Discharge Planning   Home or Post Acute Services Post acute facilities (Rehab/SNF/etc)   Type of Post Acute Facility Services Skilled nursing   Expected Discharge Disposition SNF  ([facility])   Does the patient need discharge transport arranged? Yes   Patient Choice   Patient / Family choosing to utilize agency / facility established prior to hospitalization Yes   Intensity of Service   Intensity of Service 0-30 min     Pt accepted at [facility]. Pre-cert started.

## 2024-11-26 NOTE — PROGRESS NOTES
Angus Redman is a 69 y.o. male on day 5 of admission presenting with Foot infection.    Subjective   Pt examined and evaluated at bedside, found resting comfortably.  Pt states that their pain is well controlled, denies any constitutional symptoms, and has no other complaints at this time.        Objective     Vascular: DP and PT pulses palpable 1/4 bilaterally.  CFT under 5 seconds when tested at distal digits.  Skin temp warm to warm from proximal to distal.  +1 pitting edema noted to BLE.     Neuro: Protective sensation absent, light tough sensation intact.  No Tinel's or Valleix's signs elicited.       Derm: Sub 1st met head bilaterally, right 5th digit, right hallux wounds all have Integra Bilayer in place with staples intact.  Incision site well approximated with sutures intact.  No dehiscence or necrosis noted.     Musc: Second and third digit amputation noted to right foot.  No other gross deformities noted.  No POP noted to any other area of the foot/ankle.      Last Recorded Vitals  Blood pressure 144/81, pulse 59, temperature 36.9 °C (98.4 °F), temperature source Temporal, resp. rate 18, height 1.829 m (6'), weight 115 kg (254 lb 3.1 oz), SpO2 96%.  Intake/Output last 3 Shifts:  I/O last 3 completed shifts:  In: 1800 (15.6 mL/kg) [P.O.:1800]  Out: 5150 (44.7 mL/kg) [Urine:5150 (1.2 mL/kg/hr)]  Weight: 115.3 kg     Relevant Results  Results for orders placed or performed during the hospital encounter of 11/21/24 (from the past 24 hours)   CBC   Result Value Ref Range    WBC 9.6 4.4 - 11.3 x10*3/uL    nRBC 0.0 0.0 - 0.0 /100 WBCs    RBC 4.05 (L) 4.50 - 5.90 x10*6/uL    Hemoglobin 11.9 (L) 13.5 - 17.5 g/dL    Hematocrit 39.5 (L) 41.0 - 52.0 %    MCV 98 80 - 100 fL    MCH 29.4 26.0 - 34.0 pg    MCHC 30.1 (L) 32.0 - 36.0 g/dL    RDW 16.7 (H) 11.5 - 14.5 %    Platelets 249 150 - 450 x10*3/uL   Magnesium   Result Value Ref Range    Magnesium 1.99 1.60 - 2.40 mg/dL   Basic Metabolic Panel   Result Value Ref  Range    Glucose 103 (H) 74 - 99 mg/dL    Sodium 138 136 - 145 mmol/L    Potassium 4.2 3.5 - 5.3 mmol/L    Chloride 102 98 - 107 mmol/L    Bicarbonate 29 21 - 32 mmol/L    Anion Gap 11 10 - 20 mmol/L    Urea Nitrogen 16 6 - 23 mg/dL    Creatinine 1.18 0.50 - 1.30 mg/dL    eGFR 66 >60 mL/min/1.73m*2    Calcium 9.2 8.6 - 10.3 mg/dL     *Note: Due to a large number of results and/or encounters for the requested time period, some results have not been displayed. A complete set of results can be found in Results Review.           Assessment/Plan   Assessment & Plan  Foot infection    Osteomyelitis of third toe of right foot (Multi)    Assessment:  - s/p 3rd digit amputation secondary to osteomyelitis, right foot (DOS: 11/22/24)  - s/p wound debridement with application of Integra Bilayer, left and right foot (DOS: 11/22/24)  - Cellulitis, LLE  - Alcoholic peripheral neuropathy     Plan:  - Patient examined and evaluated.  All findings discussed with patient to his satisfaction and understanding.  - Reviewed labs and chart.  Intraoperative bone culture obtained, pending.  - Systemic conditions managed by primary team.  Antibiotics managed by ID.  -Integra bilayer in place with staples intact bilaterally.  3rd digit amputation incision well-approximated with sutures intact; no dehiscence or necrosis noted.  Dressing changes to be performed every 1 to 2 days by podiatry.  - Patient to remain nonweightbearing due to graft placement, ok to use heels for pivot/transfer.  Patient would benefit from SNF placement to allow minimal weight bearing, pt amenable.  - Will continue to follow inpatient.  Please contact podiatry with any acute questions or concerns.       Boone Bull DPM   Podiatric Surgery       I spent >35 minutes in the professional and overall care of this patient.      Boone Bull DPM

## 2024-11-26 NOTE — CARE PLAN
Problem: Skin  Goal: Decreased wound size/increased tissue granulation at next dressing change  Outcome: Progressing  Goal: Participates in plan/prevention/treatment measures  Outcome: Progressing  Goal: Prevent/manage excess moisture  Outcome: Progressing  Goal: Prevent/minimize sheer/friction injuries  Outcome: Progressing  Goal: Promote/optimize nutrition  Outcome: Progressing  Goal: Promote skin healing  Outcome: Progressing     Problem: Discharge Planning  Goal: Discharge to home or other facility with appropriate resources  Outcome: Progressing       The clinical goals for the shift include plan of care    Over the shift, the patient did make progress toward the following goals.     Pateint stable this shift. Dressings to feet changed by podiatry this shift. Patient denies any pain or discomfort this shift.  Patient incontinent of bowel and bladder this shift, cleaned up by staff. Patient currently resting in bed, call light within reach, bed alarm on.

## 2024-11-26 NOTE — PROGRESS NOTES
Angus Redman is a 69 y.o. male on day 5 of admission presenting with Foot infection.    Subjective   Patient was laying in bed again today, he was able to have a large BM, he was continued to tolerate eating drinking was feeling better.  He made an facility choice of the [facility], he was satisfied with getting information from PT/OT regarding some exercises that he can do in bed, his pain was well-controlled and he had no additional questions comments or concerns today.    Objective     Physical Exam  General: Patient does not appear to be in any acute distress, alert, awake  Head: lacerations above bilateral eyebrows on forehead, significantly improved since yesterday  Eyes: Normal external exam  Cardiovascular: RRR, S1/S2, no murmurs, rubs, or gallops, radial pulses +2  Pulmonary: CTAB, no respiratory distress. On 3L NC  Abdomen: soft, non-tender, nondistended, no guarding or rebound, no masses noted  Neuro: A&O x3, no focal deficits, strength and sensation intact bilaterally  Extremities: BLE with erythema, L foot covered in dressing; R foot: Covered in Ace wrap dressing  Skin- Warm. Dry. No lesions, contusions, or erythema.  Psychiatric: Judgment intact. Appropriate mood and behavior    Last Recorded Vitals  Blood pressure 154/83, pulse 79, temperature 36.6 °C (97.9 °F), temperature source Temporal, resp. rate 18, height 1.829 m (6'), weight 115 kg (254 lb 3.1 oz), SpO2 94%.  Intake/Output last 3 Shifts:  I/O last 3 completed shifts:  In: 1800 (15.6 mL/kg) [P.O.:1800]  Out: 5150 (44.7 mL/kg) [Urine:5150 (1.2 mL/kg/hr)]  Weight: 115.3 kg     Relevant Results  Scheduled medications  amLODIPine, 2.5 mg, oral, Daily  aspirin, 81 mg, oral, Daily  atorvastatin, 40 mg, oral, Nightly  budesonide, 0.25 mg, nebulization, BID  DULoxetine, 60 mg, oral, BID  [Held by provider] empagliflozin, 10 mg, oral, Daily  escitalopram, 20 mg, oral, Daily  formoterol, 20 mcg, nebulization, q12h  heparin (porcine), 5,000 Units,  subcutaneous, q8h  levothyroxine, 50 mcg, oral, Daily before breakfast  mirtazapine, 15 mg, oral, Nightly  montelukast, 10 mg, oral, Nightly  pantoprazole, 40 mg, oral, BID  pregabalin, 150 mg, oral, q6h  propranolol, 10 mg, oral, BID  sennosides-docusate sodium, 1 tablet, oral, BID  spironolactone, 25 mg, oral, Daily  sulfamethoxazole-trimethoprim, 1 tablet, oral, q12h SHANTEL  tamsulosin, 0.4 mg, oral, Nightly  [Held by provider] torsemide, 20 mg, oral, Daily      Continuous medications     PRN medications  PRN medications: alum-mag hydroxide-simeth, busPIRone, ipratropium-albuteroL, oxyCODONE, oxygen    Results for orders placed or performed during the hospital encounter of 11/21/24 (from the past 24 hours)   CBC   Result Value Ref Range    WBC 9.6 4.4 - 11.3 x10*3/uL    nRBC 0.0 0.0 - 0.0 /100 WBCs    RBC 4.05 (L) 4.50 - 5.90 x10*6/uL    Hemoglobin 11.9 (L) 13.5 - 17.5 g/dL    Hematocrit 39.5 (L) 41.0 - 52.0 %    MCV 98 80 - 100 fL    MCH 29.4 26.0 - 34.0 pg    MCHC 30.1 (L) 32.0 - 36.0 g/dL    RDW 16.7 (H) 11.5 - 14.5 %    Platelets 249 150 - 450 x10*3/uL   Magnesium   Result Value Ref Range    Magnesium 1.99 1.60 - 2.40 mg/dL   Basic Metabolic Panel   Result Value Ref Range    Glucose 103 (H) 74 - 99 mg/dL    Sodium 138 136 - 145 mmol/L    Potassium 4.2 3.5 - 5.3 mmol/L    Chloride 102 98 - 107 mmol/L    Bicarbonate 29 21 - 32 mmol/L    Anion Gap 11 10 - 20 mmol/L    Urea Nitrogen 16 6 - 23 mg/dL    Creatinine 1.18 0.50 - 1.30 mg/dL    eGFR 66 >60 mL/min/1.73m*2    Calcium 9.2 8.6 - 10.3 mg/dL     *Note: Due to a large number of results and/or encounters for the requested time period, some results have not been displayed. A complete set of results can be found in Results Review.     ECG 12 lead    Result Date: 11/22/2024  Sinus rhythm with marked sinus arrhythmia with 1st degree AV block Otherwise normal ECG When compared with ECG of 18-NOV-2024 15:37, Premature atrial complexes are no longer Present TN interval  has increased QRS axis Shifted right    Vascular US lower extremity venous duplex right    Result Date: 11/21/2024            [facility] [id number] [id number]     Tel [phone] Fax [phone]  Vascular Lab Report  [facility] US LOWER EXTREMITY VENOUS DUPLEX RIGHT Patient Name:      PRUDENCIO DELGADO        Reading Physician:  [id number] Charlotte Carlson MD, RPVI Study Date:        11/21/2024           Ordering Provider:  [id number] NILTON JONES MRN/PID:           [id number]             Fellow: Accession#:        [id number]         Technologist:       Brii So RVT Date of Birth/Age: 1955 / 69 years Technologist 2: Gender:            M                    Encounter#:         [id number] Admission Status:  Emergency            Location Performed: [facility]  Diagnosis/ICD: Pain in right leg-M79.604 CPT Codes:     13711 Peripheral venous duplex scan for DVT Limited  Pertinent History: Obesity and PVD.  CONCLUSIONS:  Right Lower Venous: No evidence of acute deep vein thrombus visualized in the right lower extremity. Poor visualization of peroneal veins in grayscale. Peroneal veins visualized in segments. Additional Findings; Lymph nodes in groin largest measuring 1.27cm x 0.95 cm. Soft tissue edema visualized in the calf. Left Lower Venous: The left common femoral vein demonstrates normal spontaneous and respirophasic flow.  Comparison: Compared with study from 5/27/2021, no significant change.  Imaging & Doppler Findings:  Right                 Compressible Thrombus        Flow Distal External Iliac     Yes        None   Spontaneous/Phasic CFV                       Yes        None   Spontaneous/Phasic PFV                       Yes        None FV Proximal               Yes        None   Spontaneous/Phasic FV Mid                    Yes        None FV Distal                 Yes        None Popliteal                 Yes         None   Spontaneous/Phasic Peroneal                  Yes        None PTV                       Yes        None  Left        Flow CFV  Spontaneous/Phasic  [id number] CHRISTINE Kendrick MD Electronically signed by [id number] CHRISTINE Kendrick MD on 11/21/2024 at 7:45:39 PM  ** Final **     XR foot right 3+ views    Result Date: 11/21/2024  Interpreted By:  Tayo Menjivar, STUDY: XR FOOT RIGHT 3+ VIEWS   INDICATION: Signs/Symptoms:worsening drainage of 3rd digit, check for subcutaneous air.   COMPARISON: November 18, 2024   ACCESSION NUMBER(S): [id number]   ORDERING CLINICIAN: NILTON JONES   FINDINGS: Postsurgical changes of the right foot. 3rd digit soft tissue swelling noted.. No definite osseous abnormality seen. No of no definite soft tissue gas.   The post amputation changes 2nd digit unchanged.       Soft tissue swelling right 3rd digit, nonspecific. No deep soft tissue gas. If there is a high degree of concern for underlying deep soft tissue infection or osseous infection, MRI can be considered for further evaluation.   Signed by: Tayo Menjivar 11/21/2024 6:32 PM Dictation workstation:   [id number]    ECG 12 lead    Result Date: 11/20/2024  Sinus rhythm with frequent Premature atrial complexes Left anterior fascicular block Poor R-wave progression Abnormal ECG Confirmed by Donnell Riojas ([id number]) on 11/20/2024 2:49:31 PM    ECG 12 lead    Result Date: 11/20/2024  Sinus rhythm with 1st degree AV block with frequent Premature atrial complexes Leftward axis Abnormal ECG When compared with ECG of 24-SEP-2024 09:15, Sinus rhythm has replaced Ectopic atrial rhythm QRS axis Shifted left Confirmed by Donnell Riojas ([id number]) on 11/20/2024 2:48:49 PM    CT angio chest for pulmonary embolism    Result Date: 11/18/2024  Interpreted By:  Leonardo Celaya, STUDY: CT ANGIO CHEST FOR PULMONARY EMBOLISM; CT ABDOMEN PELVIS W IV CONTRAST;  11/18/2024 6:21 pm   INDICATION: Signs/Symptoms:fall. L flank pain. SOB before fall;  Signs/Symptoms:fall, generalized pain, down for 24h after fall.     COMPARISON: 06/19/2015 CT abdomen/pelvis; 09/23/2024 CT PE chest   ACCESSION NUMBER(S): [id number]; [id number]   ORDERING CLINICIAN: KEN MENDIETA   TECHNIQUE: Contiguous axial images of the chest, abdomen, and pelvis were obtained after the intravenous administration of iodinated contrast. Coronal and sagittal reformatted images were reconstructed from the axial data. CT chest was obtained using PE angiographic protocol with MIP images created on a separate independent workstation.   FINDINGS: CT CHEST:   MEDIASTINUM AND LYMPH NODES:  The esophageal wall appears within normal limits.  No enlarged intrathoracic or axillary lymph nodes by imaging criteria. No pneumomediastinum.   VESSELS:  Normal caliber thoracic aorta without dissection. Mild aortic atherosclerosis.   HEART: Normal size.  Moderate coronary artery calcifications. No significant pericardial effusion.   LUNG, AIRWAYS, PLEURA: There are calcified pleural plaques involving the right posterior hemithorax with pleural thickening. There is adjacent round atelectasis in the right lower lobe. There is minimal left basilar slight dependent subsegmental atelectasis. No consolidation, pulmonary edema, effusion, or pneumothorax. There is emphysema.   CHEST WALL SOFT TISSUES: No discernible acute abnormality.   OSSEOUS STRUCTURES: There are numerous bilateral chronic rib fractures. For example, there are chronic fractures of the posterior right 8th, 10th, 11th, and 12th ribs; posterior left 2nd-7th ribs, posterior left 9th-12th ribs, and multiple lateral and anterior ribs bilaterally. Healed clavicle fracture involving the left clavicular head.     CT ABDOMEN/PELVIS:   ABDOMINAL WALL: No significant abnormality.   LIVER: Cirrhotic liver morphology. No lesions.   BILE DUCTS: No significant intrahepatic or extrahepatic dilatation.   GALLBLADDER: No significant abnormality.    PANCREAS: No significant abnormality.   SPLEEN: The spleen is enlarged, measuring up to 14.5 cm in length.   ADRENALS: No significant abnormality.   KIDNEYS, URETERS, BLADDER: Moderately atrophic kidneys. No significant abnormality.   REPRODUCTIVE ORGANS: No significant abnormality.   VESSELS: Mild aortic atherosclerosis without AAA.   RETROPERITONEUM/LYMPH NODES: No acute retroperitoneal abnormality. No enlarged lymph nodes.   BOWEL/MESENTERY/PERITONEUM: There is a moderate amount of stool in the rectum distended up to 7.9 x 7.3 cm. No inflammatory bowel wall thickening or dilatation. Normal appendix.   No ascites, free air, or fluid collection.     MUSCULOSKELETAL: No acute osseous abnormality. No suspicious osseous lesion. Large area of heterotopic ossification arising from the right posteromedial femur resulting in marked narrowing of the ischiofemoral space. Healed fracture of the sacrococcygeal junction.       CT CHEST: 1. No acute pulmonary embolism. 2. Emphysema. 3. Right-sided calcified pleural plaques and round atelectasis which can be seen with prior asbestos exposure.   CT ABDOMEN/PELVIS: 1. No acute abnormality in the abdomen or pelvis.   2. Cirrhotic liver morphology and splenomegaly suggestive of portal hypertension.   MACRO: None.   Signed by: Leonardo Celaya 11/18/2024 7:12 PM Dictation workstation:   [id number]    CT abdomen pelvis w IV contrast    Result Date: 11/18/2024  Interpreted By:  Leonardo Celaya, STUDY: CT ANGIO CHEST FOR PULMONARY EMBOLISM; CT ABDOMEN PELVIS W IV CONTRAST;  11/18/2024 6:21 pm   INDICATION: Signs/Symptoms:fall. L flank pain. SOB before fall; Signs/Symptoms:fall, generalized pain, down for 24h after fall.     COMPARISON: 06/19/2015 CT abdomen/pelvis; 09/23/2024 CT PE chest   ACCESSION NUMBER(S): [id number]; [id number]   ORDERING CLINICIAN: KEN MENDIETA   TECHNIQUE: Contiguous axial images of the chest, abdomen, and pelvis were obtained after the  intravenous administration of iodinated contrast. Coronal and sagittal reformatted images were reconstructed from the axial data. CT chest was obtained using PE angiographic protocol with MIP images created on a separate independent workstation.   FINDINGS: CT CHEST:   MEDIASTINUM AND LYMPH NODES:  The esophageal wall appears within normal limits.  No enlarged intrathoracic or axillary lymph nodes by imaging criteria. No pneumomediastinum.   VESSELS:  Normal caliber thoracic aorta without dissection. Mild aortic atherosclerosis.   HEART: Normal size.  Moderate coronary artery calcifications. No significant pericardial effusion.   LUNG, AIRWAYS, PLEURA: There are calcified pleural plaques involving the right posterior hemithorax with pleural thickening. There is adjacent round atelectasis in the right lower lobe. There is minimal left basilar slight dependent subsegmental atelectasis. No consolidation, pulmonary edema, effusion, or pneumothorax. There is emphysema.   CHEST WALL SOFT TISSUES: No discernible acute abnormality.   OSSEOUS STRUCTURES: There are numerous bilateral chronic rib fractures. For example, there are chronic fractures of the posterior right 8th, 10th, 11th, and 12th ribs; posterior left 2nd-7th ribs, posterior left 9th-12th ribs, and multiple lateral and anterior ribs bilaterally. Healed clavicle fracture involving the left clavicular head.     CT ABDOMEN/PELVIS:   ABDOMINAL WALL: No significant abnormality.   LIVER: Cirrhotic liver morphology. No lesions.   BILE DUCTS: No significant intrahepatic or extrahepatic dilatation.   GALLBLADDER: No significant abnormality.   PANCREAS: No significant abnormality.   SPLEEN: The spleen is enlarged, measuring up to 14.5 cm in length.   ADRENALS: No significant abnormality.   KIDNEYS, URETERS, BLADDER: Moderately atrophic kidneys. No significant abnormality.   REPRODUCTIVE ORGANS: No significant abnormality.   VESSELS: Mild aortic atherosclerosis without  AAA.   RETROPERITONEUM/LYMPH NODES: No acute retroperitoneal abnormality. No enlarged lymph nodes.   BOWEL/MESENTERY/PERITONEUM: There is a moderate amount of stool in the rectum distended up to 7.9 x 7.3 cm. No inflammatory bowel wall thickening or dilatation. Normal appendix.   No ascites, free air, or fluid collection.     MUSCULOSKELETAL: No acute osseous abnormality. No suspicious osseous lesion. Large area of heterotopic ossification arising from the right posteromedial femur resulting in marked narrowing of the ischiofemoral space. Healed fracture of the sacrococcygeal junction.       CT CHEST: 1. No acute pulmonary embolism. 2. Emphysema. 3. Right-sided calcified pleural plaques and round atelectasis which can be seen with prior asbestos exposure.   CT ABDOMEN/PELVIS: 1. No acute abnormality in the abdomen or pelvis.   2. Cirrhotic liver morphology and splenomegaly suggestive of portal hypertension.   MACRO: None.   Signed by: Leonardo Celaya 11/18/2024 7:12 PM Dictation workstation:   [id number]    CT head wo IV contrast    Result Date: 11/18/2024  Interpreted By:  Leonardo Celaya, STUDY: CT HEAD WO IV CONTRAST; CT CERVICAL SPINE WO IV CONTRAST; CT LUMBAR SPINE WO IV CONTRAST; CT THORACIC SPINE WO IV CONTRAST; CT FACIAL BONES WO IV CONTRAST; CT 3D RECONSTRUCTION;  11/18/2024 6:19 pm; 11/18/2024 6:21 pm   INDICATION: Signs/Symptoms:fall,head trauma; Signs/Symptoms:fal,neck pain; Signs/Symptoms:fall  back pain; Signs/Symptoms:fall, back pain; Signs/Symptoms:fall, facial trauma; Signs/Symptoms:fall     COMPARISON: CT PE chest 09/23/2024   ACCESSION NUMBER(S): [id number]; [id number]; [id number]; [id number]; [id number]; [id number]   ORDERING CLINICIAN: KEN GONZALEZ   TECHNIQUE: Axial noncontrast CT images of head with coronal and sagittal reconstructed images. Axial noncontrast CT images of maxillofacial skeleton with coronal and sagittal reconstructed images. 3D maxillofacial skeleton  reconstructions were performed on an independent workstation and provided for review. Axial noncontrast CT images of the cervical spine with coronal and sagittal reconstructed images. Multiplanar reformatted images of the thoracic and lumbar spine were obtained from the CTA chest and CT abdomen/pelvis raw data.   FINDINGS: CT HEAD:   BRAIN PARENCHYMA:  No acute intraparenchymal hemorrhage or parenchymal evidence of acute large territory ischemic infarct. Gray-white matter distinction is preserved. No mass-effect.   VENTRICLES and EXTRA-AXIAL SPACES:  No acute extra-axial or intraventricular hemorrhage. No effacement of cerebral sulci. The ventricles and sulci are age-concordant.   PARANASAL SINUSES/MASTOIDS:  No hemorrhage or air-fluid levels within the visualized paranasal sinuses. The mastoids are well aerated.   CALVARIUM/ORBITS:  No skull fracture. The orbits and globes are intact to the extent visualized.   EXTRACRANIAL SOFT TISSUES: No discernible hematoma.     CT MAXILLOFACIAL SKELETON:   FACIAL BONES: No acute facial bone fracture. The bony orbits are intact.   ORBITS: The globes, extraocular muscles and optic nerve sheath complexes are intact. No retrobulbar or subperiosteal hematoma.   SOFT TISSUES: No discernible acute abnormality.   PARANASAL SINUSES: No hemorrhage or air-fluid levels. Mucous retention cyst in the left maxillary sinus.   OTHER FINDINGS: None.     CT CERVICAL SPINE:   PREVERTEBRAL SOFT TISSUES: Within normal limits.   CRANIOCERVICAL JUNCTION: Intact.   ALIGNMENT: Slight reversal of cervical lordosis, likely positional and/or degenerative. No traumatic malalignment or traumatic facet widening.   VERTEBRAE:  No acute fracture. Vertebral body heights are maintained.   SPINAL CANAL/INTERVERTEBRAL DISCS: No high-grade canal stenosis. There is a spur complexes that slightly indent the ventral thecal sac at C3-C4, C5-C6, C6-C7 result in no greater than mild canal stenosis.   NEURAL FORAMINA:  Multilevel uncovertebral joint and facet arthropathy notably contribute to mild right C2-C3 foraminal stenosis, severe right and mild left C3-C4 foraminal stenosis, severe right and moderate-severe left C4-C5 foraminal stenosis, moderate right C5-C6 foraminal stenosis, mild bilateral C6-7 foraminal stenosis.   OTHER: None.       CT THORACIC SPINE:   ALIGNMENT:  No traumatic spondylolisthesis or traumatic facet widening.   VERTEBRAE:  No acute fracture. Mild concavity to the upper T2 endplate is stable from prior study, consistent with remote fracture.   SPINAL CANAL/INTERVERTEBRAL DISCS: No high-grade spinal canal stenosis. Varying degrees of mild degenerative disc disease, mainly related to mild anterior endplate spurring without disc height loss or disc spur complexes.   PARASPINAL SOFT TISSUES:  No paravertebral soft tissue hematoma.   VISUALIZED CHEST: Please see separate report.     CT LUMBAR SPINE:   ALIGNMENT: Minimal grade 1 degenerative anterolisthesis of L5 on S1. No traumatic spondylolisthesis or traumatic facet widening.   VERTEBRAE: There are remote fractures of the bilateral L1, L2, and L4 transverse processes and left L3 transverse process.   SPINAL CANAL/INTERVERTEBRAL DISCS: No high-grade spinal canal stenosis.   NEUROFORAMINA: There is multilevel facet arthropathy which is mild bilaterally at L2-L3, moderate on the right at L3-L4, moderate bilateral and L4-L5 and moderate severe at L5-S1. There is mild bilateral L3-L4 foraminal stenosis, mild-moderate left L4-5 foraminal stenosis and mild bilateral L5-S1 foraminal stenosis.   PARASPINAL SOFT TISSUES:  No paravertebral soft tissue hematoma.   VISUALIZED ABDOMEN: Please see separate report.       CT HEAD: 1. No acute intracranial abnormality or calvarial fracture.     CT MAXILLOFACIAL SKELETON: 1. No acute maxillofacial skeleton fracture.     CT CERVICAL SPINE: 1. No acute fracture or traumatic malalignment of the cervical spine. 2. Spondylotic  changes of the cervical spine as detailed above.     CT THORACIC/LUMBAR SPINE: 1. No acute fracture or traumatic malalignment. 2. Remote minimally depressed compression fracture the upper T2 endplate. 3. Remote fracture deformities of the bilateral L1, L2, and L4 transverse processes and left L3 transverse process.   MACRO: None.   Signed by: Leonardo Celyaa 11/18/2024 6:57 PM Dictation workstation:   [id number]    CT maxillofacial bones wo IV contrast    Result Date: 11/18/2024  Interpreted By:  Leonardo Celaya, STUDY: CT HEAD WO IV CONTRAST; CT CERVICAL SPINE WO IV CONTRAST; CT LUMBAR SPINE WO IV CONTRAST; CT THORACIC SPINE WO IV CONTRAST; CT FACIAL BONES WO IV CONTRAST; CT 3D RECONSTRUCTION;  11/18/2024 6:19 pm; 11/18/2024 6:21 pm   INDICATION: Signs/Symptoms:fall,head trauma; Signs/Symptoms:fal,neck pain; Signs/Symptoms:fall  back pain; Signs/Symptoms:fall, back pain; Signs/Symptoms:fall, facial trauma; Signs/Symptoms:fall     COMPARISON: CT PE chest 09/23/2024   ACCESSION NUMBER(S): [id number]; [id number]; [id number]; [id number]; [id number]; [id number]   ORDERING CLINICIAN: KEN GONZALEZ   TECHNIQUE: Axial noncontrast CT images of head with coronal and sagittal reconstructed images. Axial noncontrast CT images of maxillofacial skeleton with coronal and sagittal reconstructed images. 3D maxillofacial skeleton reconstructions were performed on an independent workstation and provided for review. Axial noncontrast CT images of the cervical spine with coronal and sagittal reconstructed images. Multiplanar reformatted images of the thoracic and lumbar spine were obtained from the CTA chest and CT abdomen/pelvis raw data.   FINDINGS: CT HEAD:   BRAIN PARENCHYMA:  No acute intraparenchymal hemorrhage or parenchymal evidence of acute large territory ischemic infarct. Gray-white matter distinction is preserved. No mass-effect.   VENTRICLES and EXTRA-AXIAL SPACES:  No acute extra-axial or intraventricular  hemorrhage. No effacement of cerebral sulci. The ventricles and sulci are age-concordant.   PARANASAL SINUSES/MASTOIDS:  No hemorrhage or air-fluid levels within the visualized paranasal sinuses. The mastoids are well aerated.   CALVARIUM/ORBITS:  No skull fracture. The orbits and globes are intact to the extent visualized.   EXTRACRANIAL SOFT TISSUES: No discernible hematoma.     CT MAXILLOFACIAL SKELETON:   FACIAL BONES: No acute facial bone fracture. The bony orbits are intact.   ORBITS: The globes, extraocular muscles and optic nerve sheath complexes are intact. No retrobulbar or subperiosteal hematoma.   SOFT TISSUES: No discernible acute abnormality.   PARANASAL SINUSES: No hemorrhage or air-fluid levels. Mucous retention cyst in the left maxillary sinus.   OTHER FINDINGS: None.     CT CERVICAL SPINE:   PREVERTEBRAL SOFT TISSUES: Within normal limits.   CRANIOCERVICAL JUNCTION: Intact.   ALIGNMENT: Slight reversal of cervical lordosis, likely positional and/or degenerative. No traumatic malalignment or traumatic facet widening.   VERTEBRAE:  No acute fracture. Vertebral body heights are maintained.   SPINAL CANAL/INTERVERTEBRAL DISCS: No high-grade canal stenosis. There is a spur complexes that slightly indent the ventral thecal sac at C3-C4, C5-C6, C6-C7 result in no greater than mild canal stenosis.   NEURAL FORAMINA: Multilevel uncovertebral joint and facet arthropathy notably contribute to mild right C2-C3 foraminal stenosis, severe right and mild left C3-C4 foraminal stenosis, severe right and moderate-severe left C4-C5 foraminal stenosis, moderate right C5-C6 foraminal stenosis, mild bilateral C6-7 foraminal stenosis.   OTHER: None.       CT THORACIC SPINE:   ALIGNMENT:  No traumatic spondylolisthesis or traumatic facet widening.   VERTEBRAE:  No acute fracture. Mild concavity to the upper T2 endplate is stable from prior study, consistent with remote fracture.   SPINAL CANAL/INTERVERTEBRAL DISCS: No  high-grade spinal canal stenosis. Varying degrees of mild degenerative disc disease, mainly related to mild anterior endplate spurring without disc height loss or disc spur complexes.   PARASPINAL SOFT TISSUES:  No paravertebral soft tissue hematoma.   VISUALIZED CHEST: Please see separate report.     CT LUMBAR SPINE:   ALIGNMENT: Minimal grade 1 degenerative anterolisthesis of L5 on S1. No traumatic spondylolisthesis or traumatic facet widening.   VERTEBRAE: There are remote fractures of the bilateral L1, L2, and L4 transverse processes and left L3 transverse process.   SPINAL CANAL/INTERVERTEBRAL DISCS: No high-grade spinal canal stenosis.   NEUROFORAMINA: There is multilevel facet arthropathy which is mild bilaterally at L2-L3, moderate on the right at L3-L4, moderate bilateral and L4-L5 and moderate severe at L5-S1. There is mild bilateral L3-L4 foraminal stenosis, mild-moderate left L4-5 foraminal stenosis and mild bilateral L5-S1 foraminal stenosis.   PARASPINAL SOFT TISSUES:  No paravertebral soft tissue hematoma.   VISUALIZED ABDOMEN: Please see separate report.       CT HEAD: 1. No acute intracranial abnormality or calvarial fracture.     CT MAXILLOFACIAL SKELETON: 1. No acute maxillofacial skeleton fracture.     CT CERVICAL SPINE: 1. No acute fracture or traumatic malalignment of the cervical spine. 2. Spondylotic changes of the cervical spine as detailed above.     CT THORACIC/LUMBAR SPINE: 1. No acute fracture or traumatic malalignment. 2. Remote minimally depressed compression fracture the upper T2 endplate. 3. Remote fracture deformities of the bilateral L1, L2, and L4 transverse processes and left L3 transverse process.   MACRO: None.   Signed by: Leonardo Celaya 11/18/2024 6:57 PM Dictation workstation:   [id number]    CT cervical spine wo IV contrast    Result Date: 11/18/2024  Interpreted By:  Leonardo Celaya, STUDY: CT HEAD WO IV CONTRAST; CT CERVICAL SPINE WO IV CONTRAST; CT LUMBAR SPINE WO  IV CONTRAST; CT THORACIC SPINE WO IV CONTRAST; CT FACIAL BONES WO IV CONTRAST; CT 3D RECONSTRUCTION;  11/18/2024 6:19 pm; 11/18/2024 6:21 pm   INDICATION: Signs/Symptoms:fall,head trauma; Signs/Symptoms:fal,neck pain; Signs/Symptoms:fall  back pain; Signs/Symptoms:fall, back pain; Signs/Symptoms:fall, facial trauma; Signs/Symptoms:fall     COMPARISON: CT PE chest 09/23/2024   ACCESSION NUMBER(S): [id number]; [id number]; [id number]; [id number]; [id number]; [id number]   ORDERING CLINICIAN: KEN GONZALEZ   TECHNIQUE: Axial noncontrast CT images of head with coronal and sagittal reconstructed images. Axial noncontrast CT images of maxillofacial skeleton with coronal and sagittal reconstructed images. 3D maxillofacial skeleton reconstructions were performed on an independent workstation and provided for review. Axial noncontrast CT images of the cervical spine with coronal and sagittal reconstructed images. Multiplanar reformatted images of the thoracic and lumbar spine were obtained from the CTA chest and CT abdomen/pelvis raw data.   FINDINGS: CT HEAD:   BRAIN PARENCHYMA:  No acute intraparenchymal hemorrhage or parenchymal evidence of acute large territory ischemic infarct. Gray-white matter distinction is preserved. No mass-effect.   VENTRICLES and EXTRA-AXIAL SPACES:  No acute extra-axial or intraventricular hemorrhage. No effacement of cerebral sulci. The ventricles and sulci are age-concordant.   PARANASAL SINUSES/MASTOIDS:  No hemorrhage or air-fluid levels within the visualized paranasal sinuses. The mastoids are well aerated.   CALVARIUM/ORBITS:  No skull fracture. The orbits and globes are intact to the extent visualized.   EXTRACRANIAL SOFT TISSUES: No discernible hematoma.     CT MAXILLOFACIAL SKELETON:   FACIAL BONES: No acute facial bone fracture. The bony orbits are intact.   ORBITS: The globes, extraocular muscles and optic nerve sheath complexes are intact. No retrobulbar or subperiosteal  hematoma.   SOFT TISSUES: No discernible acute abnormality.   PARANASAL SINUSES: No hemorrhage or air-fluid levels. Mucous retention cyst in the left maxillary sinus.   OTHER FINDINGS: None.     CT CERVICAL SPINE:   PREVERTEBRAL SOFT TISSUES: Within normal limits.   CRANIOCERVICAL JUNCTION: Intact.   ALIGNMENT: Slight reversal of cervical lordosis, likely positional and/or degenerative. No traumatic malalignment or traumatic facet widening.   VERTEBRAE:  No acute fracture. Vertebral body heights are maintained.   SPINAL CANAL/INTERVERTEBRAL DISCS: No high-grade canal stenosis. There is a spur complexes that slightly indent the ventral thecal sac at C3-C4, C5-C6, C6-C7 result in no greater than mild canal stenosis.   NEURAL FORAMINA: Multilevel uncovertebral joint and facet arthropathy notably contribute to mild right C2-C3 foraminal stenosis, severe right and mild left C3-C4 foraminal stenosis, severe right and moderate-severe left C4-C5 foraminal stenosis, moderate right C5-C6 foraminal stenosis, mild bilateral C6-7 foraminal stenosis.   OTHER: None.       CT THORACIC SPINE:   ALIGNMENT:  No traumatic spondylolisthesis or traumatic facet widening.   VERTEBRAE:  No acute fracture. Mild concavity to the upper T2 endplate is stable from prior study, consistent with remote fracture.   SPINAL CANAL/INTERVERTEBRAL DISCS: No high-grade spinal canal stenosis. Varying degrees of mild degenerative disc disease, mainly related to mild anterior endplate spurring without disc height loss or disc spur complexes.   PARASPINAL SOFT TISSUES:  No paravertebral soft tissue hematoma.   VISUALIZED CHEST: Please see separate report.     CT LUMBAR SPINE:   ALIGNMENT: Minimal grade 1 degenerative anterolisthesis of L5 on S1. No traumatic spondylolisthesis or traumatic facet widening.   VERTEBRAE: There are remote fractures of the bilateral L1, L2, and L4 transverse processes and left L3 transverse process.   SPINAL CANAL/INTERVERTEBRAL  DISCS: No high-grade spinal canal stenosis.   NEUROFORAMINA: There is multilevel facet arthropathy which is mild bilaterally at L2-L3, moderate on the right at L3-L4, moderate bilateral and L4-L5 and moderate severe at L5-S1. There is mild bilateral L3-L4 foraminal stenosis, mild-moderate left L4-5 foraminal stenosis and mild bilateral L5-S1 foraminal stenosis.   PARASPINAL SOFT TISSUES:  No paravertebral soft tissue hematoma.   VISUALIZED ABDOMEN: Please see separate report.       CT HEAD: 1. No acute intracranial abnormality or calvarial fracture.     CT MAXILLOFACIAL SKELETON: 1. No acute maxillofacial skeleton fracture.     CT CERVICAL SPINE: 1. No acute fracture or traumatic malalignment of the cervical spine. 2. Spondylotic changes of the cervical spine as detailed above.     CT THORACIC/LUMBAR SPINE: 1. No acute fracture or traumatic malalignment. 2. Remote minimally depressed compression fracture the upper T2 endplate. 3. Remote fracture deformities of the bilateral L1, L2, and L4 transverse processes and left L3 transverse process.   MACRO: None.   Signed by: Leonardo Celaya 11/18/2024 6:57 PM Dictation workstation:   [id number]    CT thoracic spine wo IV contrast    Result Date: 11/18/2024  Interpreted By:  Leonardo Celaya, STUDY: CT HEAD WO IV CONTRAST; CT CERVICAL SPINE WO IV CONTRAST; CT LUMBAR SPINE WO IV CONTRAST; CT THORACIC SPINE WO IV CONTRAST; CT FACIAL BONES WO IV CONTRAST; CT 3D RECONSTRUCTION;  11/18/2024 6:19 pm; 11/18/2024 6:21 pm   INDICATION: Signs/Symptoms:fall,head trauma; Signs/Symptoms:fal,neck pain; Signs/Symptoms:fall  back pain; Signs/Symptoms:fall, back pain; Signs/Symptoms:fall, facial trauma; Signs/Symptoms:fall     COMPARISON: CT PE chest 09/23/2024   ACCESSION NUMBER(S): [id number]; [id number]; [id number]; [id number]; [id number]; [id number]   ORDERING CLINICIAN: KEN GONZALEZ   TECHNIQUE: Axial noncontrast CT images of head with coronal and sagittal  reconstructed images. Axial noncontrast CT images of maxillofacial skeleton with coronal and sagittal reconstructed images. 3D maxillofacial skeleton reconstructions were performed on an independent workstation and provided for review. Axial noncontrast CT images of the cervical spine with coronal and sagittal reconstructed images. Multiplanar reformatted images of the thoracic and lumbar spine were obtained from the CTA chest and CT abdomen/pelvis raw data.   FINDINGS: CT HEAD:   BRAIN PARENCHYMA:  No acute intraparenchymal hemorrhage or parenchymal evidence of acute large territory ischemic infarct. Gray-white matter distinction is preserved. No mass-effect.   VENTRICLES and EXTRA-AXIAL SPACES:  No acute extra-axial or intraventricular hemorrhage. No effacement of cerebral sulci. The ventricles and sulci are age-concordant.   PARANASAL SINUSES/MASTOIDS:  No hemorrhage or air-fluid levels within the visualized paranasal sinuses. The mastoids are well aerated.   CALVARIUM/ORBITS:  No skull fracture. The orbits and globes are intact to the extent visualized.   EXTRACRANIAL SOFT TISSUES: No discernible hematoma.     CT MAXILLOFACIAL SKELETON:   FACIAL BONES: No acute facial bone fracture. The bony orbits are intact.   ORBITS: The globes, extraocular muscles and optic nerve sheath complexes are intact. No retrobulbar or subperiosteal hematoma.   SOFT TISSUES: No discernible acute abnormality.   PARANASAL SINUSES: No hemorrhage or air-fluid levels. Mucous retention cyst in the left maxillary sinus.   OTHER FINDINGS: None.     CT CERVICAL SPINE:   PREVERTEBRAL SOFT TISSUES: Within normal limits.   CRANIOCERVICAL JUNCTION: Intact.   ALIGNMENT: Slight reversal of cervical lordosis, likely positional and/or degenerative. No traumatic malalignment or traumatic facet widening.   VERTEBRAE:  No acute fracture. Vertebral body heights are maintained.   SPINAL CANAL/INTERVERTEBRAL DISCS: No high-grade canal stenosis. There is a  spur complexes that slightly indent the ventral thecal sac at C3-C4, C5-C6, C6-C7 result in no greater than mild canal stenosis.   NEURAL FORAMINA: Multilevel uncovertebral joint and facet arthropathy notably contribute to mild right C2-C3 foraminal stenosis, severe right and mild left C3-C4 foraminal stenosis, severe right and moderate-severe left C4-C5 foraminal stenosis, moderate right C5-C6 foraminal stenosis, mild bilateral C6-7 foraminal stenosis.   OTHER: None.       CT THORACIC SPINE:   ALIGNMENT:  No traumatic spondylolisthesis or traumatic facet widening.   VERTEBRAE:  No acute fracture. Mild concavity to the upper T2 endplate is stable from prior study, consistent with remote fracture.   SPINAL CANAL/INTERVERTEBRAL DISCS: No high-grade spinal canal stenosis. Varying degrees of mild degenerative disc disease, mainly related to mild anterior endplate spurring without disc height loss or disc spur complexes.   PARASPINAL SOFT TISSUES:  No paravertebral soft tissue hematoma.   VISUALIZED CHEST: Please see separate report.     CT LUMBAR SPINE:   ALIGNMENT: Minimal grade 1 degenerative anterolisthesis of L5 on S1. No traumatic spondylolisthesis or traumatic facet widening.   VERTEBRAE: There are remote fractures of the bilateral L1, L2, and L4 transverse processes and left L3 transverse process.   SPINAL CANAL/INTERVERTEBRAL DISCS: No high-grade spinal canal stenosis.   NEUROFORAMINA: There is multilevel facet arthropathy which is mild bilaterally at L2-L3, moderate on the right at L3-L4, moderate bilateral and L4-L5 and moderate severe at L5-S1. There is mild bilateral L3-L4 foraminal stenosis, mild-moderate left L4-5 foraminal stenosis and mild bilateral L5-S1 foraminal stenosis.   PARASPINAL SOFT TISSUES:  No paravertebral soft tissue hematoma.   VISUALIZED ABDOMEN: Please see separate report.       CT HEAD: 1. No acute intracranial abnormality or calvarial fracture.     CT MAXILLOFACIAL SKELETON: 1. No  acute maxillofacial skeleton fracture.     CT CERVICAL SPINE: 1. No acute fracture or traumatic malalignment of the cervical spine. 2. Spondylotic changes of the cervical spine as detailed above.     CT THORACIC/LUMBAR SPINE: 1. No acute fracture or traumatic malalignment. 2. Remote minimally depressed compression fracture the upper T2 endplate. 3. Remote fracture deformities of the bilateral L1, L2, and L4 transverse processes and left L3 transverse process.   MACRO: None.   Signed by: Leonardo Celaya 11/18/2024 6:57 PM Dictation workstation:   [id number]    CT lumbar spine wo IV contrast    Result Date: 11/18/2024  Interpreted By:  Leonardo Celaya, STUDY: CT HEAD WO IV CONTRAST; CT CERVICAL SPINE WO IV CONTRAST; CT LUMBAR SPINE WO IV CONTRAST; CT THORACIC SPINE WO IV CONTRAST; CT FACIAL BONES WO IV CONTRAST; CT 3D RECONSTRUCTION;  11/18/2024 6:19 pm; 11/18/2024 6:21 pm   INDICATION: Signs/Symptoms:fall,head trauma; Signs/Symptoms:fal,neck pain; Signs/Symptoms:fall  back pain; Signs/Symptoms:fall, back pain; Signs/Symptoms:fall, facial trauma; Signs/Symptoms:fall     COMPARISON: CT PE chest 09/23/2024   ACCESSION NUMBER(S): [id number]; [id number]; [id number]; [id number]; [id number]; [id number]   ORDERING CLINICIAN: KEN GONZALEZ   TECHNIQUE: Axial noncontrast CT images of head with coronal and sagittal reconstructed images. Axial noncontrast CT images of maxillofacial skeleton with coronal and sagittal reconstructed images. 3D maxillofacial skeleton reconstructions were performed on an independent workstation and provided for review. Axial noncontrast CT images of the cervical spine with coronal and sagittal reconstructed images. Multiplanar reformatted images of the thoracic and lumbar spine were obtained from the CTA chest and CT abdomen/pelvis raw data.   FINDINGS: CT HEAD:   BRAIN PARENCHYMA:  No acute intraparenchymal hemorrhage or parenchymal evidence of acute large territory ischemic  infarct. Gray-white matter distinction is preserved. No mass-effect.   VENTRICLES and EXTRA-AXIAL SPACES:  No acute extra-axial or intraventricular hemorrhage. No effacement of cerebral sulci. The ventricles and sulci are age-concordant.   PARANASAL SINUSES/MASTOIDS:  No hemorrhage or air-fluid levels within the visualized paranasal sinuses. The mastoids are well aerated.   CALVARIUM/ORBITS:  No skull fracture. The orbits and globes are intact to the extent visualized.   EXTRACRANIAL SOFT TISSUES: No discernible hematoma.     CT MAXILLOFACIAL SKELETON:   FACIAL BONES: No acute facial bone fracture. The bony orbits are intact.   ORBITS: The globes, extraocular muscles and optic nerve sheath complexes are intact. No retrobulbar or subperiosteal hematoma.   SOFT TISSUES: No discernible acute abnormality.   PARANASAL SINUSES: No hemorrhage or air-fluid levels. Mucous retention cyst in the left maxillary sinus.   OTHER FINDINGS: None.     CT CERVICAL SPINE:   PREVERTEBRAL SOFT TISSUES: Within normal limits.   CRANIOCERVICAL JUNCTION: Intact.   ALIGNMENT: Slight reversal of cervical lordosis, likely positional and/or degenerative. No traumatic malalignment or traumatic facet widening.   VERTEBRAE:  No acute fracture. Vertebral body heights are maintained.   SPINAL CANAL/INTERVERTEBRAL DISCS: No high-grade canal stenosis. There is a spur complexes that slightly indent the ventral thecal sac at C3-C4, C5-C6, C6-C7 result in no greater than mild canal stenosis.   NEURAL FORAMINA: Multilevel uncovertebral joint and facet arthropathy notably contribute to mild right C2-C3 foraminal stenosis, severe right and mild left C3-C4 foraminal stenosis, severe right and moderate-severe left C4-C5 foraminal stenosis, moderate right C5-C6 foraminal stenosis, mild bilateral C6-7 foraminal stenosis.   OTHER: None.       CT THORACIC SPINE:   ALIGNMENT:  No traumatic spondylolisthesis or traumatic facet widening.   VERTEBRAE:  No acute  fracture. Mild concavity to the upper T2 endplate is stable from prior study, consistent with remote fracture.   SPINAL CANAL/INTERVERTEBRAL DISCS: No high-grade spinal canal stenosis. Varying degrees of mild degenerative disc disease, mainly related to mild anterior endplate spurring without disc height loss or disc spur complexes.   PARASPINAL SOFT TISSUES:  No paravertebral soft tissue hematoma.   VISUALIZED CHEST: Please see separate report.     CT LUMBAR SPINE:   ALIGNMENT: Minimal grade 1 degenerative anterolisthesis of L5 on S1. No traumatic spondylolisthesis or traumatic facet widening.   VERTEBRAE: There are remote fractures of the bilateral L1, L2, and L4 transverse processes and left L3 transverse process.   SPINAL CANAL/INTERVERTEBRAL DISCS: No high-grade spinal canal stenosis.   NEUROFORAMINA: There is multilevel facet arthropathy which is mild bilaterally at L2-L3, moderate on the right at L3-L4, moderate bilateral and L4-L5 and moderate severe at L5-S1. There is mild bilateral L3-L4 foraminal stenosis, mild-moderate left L4-5 foraminal stenosis and mild bilateral L5-S1 foraminal stenosis.   PARASPINAL SOFT TISSUES:  No paravertebral soft tissue hematoma.   VISUALIZED ABDOMEN: Please see separate report.       CT HEAD: 1. No acute intracranial abnormality or calvarial fracture.     CT MAXILLOFACIAL SKELETON: 1. No acute maxillofacial skeleton fracture.     CT CERVICAL SPINE: 1. No acute fracture or traumatic malalignment of the cervical spine. 2. Spondylotic changes of the cervical spine as detailed above.     CT THORACIC/LUMBAR SPINE: 1. No acute fracture or traumatic malalignment. 2. Remote minimally depressed compression fracture the upper T2 endplate. 3. Remote fracture deformities of the bilateral L1, L2, and L4 transverse processes and left L3 transverse process.   MACRO: None.   Signed by: Leonardo Celaya 11/18/2024 6:57 PM Dictation workstation:   [id number]    CT 3D reconstruction    Result  Date: 11/18/2024  Interpreted By:  Leonardo Celaya, STUDY: CT HEAD WO IV CONTRAST; CT CERVICAL SPINE WO IV CONTRAST; CT LUMBAR SPINE WO IV CONTRAST; CT THORACIC SPINE WO IV CONTRAST; CT FACIAL BONES WO IV CONTRAST; CT 3D RECONSTRUCTION;  11/18/2024 6:19 pm; 11/18/2024 6:21 pm   INDICATION: Signs/Symptoms:fall,head trauma; Signs/Symptoms:fal,neck pain; Signs/Symptoms:fall  back pain; Signs/Symptoms:fall, back pain; Signs/Symptoms:fall, facial trauma; Signs/Symptoms:fall     COMPARISON: CT PE chest 09/23/2024   ACCESSION NUMBER(S): [id number]; [id number]; [id number]; [id number]; [id number]; [id number]   ORDERING CLINICIAN: KEN GONZALEZ   TECHNIQUE: Axial noncontrast CT images of head with coronal and sagittal reconstructed images. Axial noncontrast CT images of maxillofacial skeleton with coronal and sagittal reconstructed images. 3D maxillofacial skeleton reconstructions were performed on an independent workstation and provided for review. Axial noncontrast CT images of the cervical spine with coronal and sagittal reconstructed images. Multiplanar reformatted images of the thoracic and lumbar spine were obtained from the CTA chest and CT abdomen/pelvis raw data.   FINDINGS: CT HEAD:   BRAIN PARENCHYMA:  No acute intraparenchymal hemorrhage or parenchymal evidence of acute large territory ischemic infarct. Gray-white matter distinction is preserved. No mass-effect.   VENTRICLES and EXTRA-AXIAL SPACES:  No acute extra-axial or intraventricular hemorrhage. No effacement of cerebral sulci. The ventricles and sulci are age-concordant.   PARANASAL SINUSES/MASTOIDS:  No hemorrhage or air-fluid levels within the visualized paranasal sinuses. The mastoids are well aerated.   CALVARIUM/ORBITS:  No skull fracture. The orbits and globes are intact to the extent visualized.   EXTRACRANIAL SOFT TISSUES: No discernible hematoma.     CT MAXILLOFACIAL SKELETON:   FACIAL BONES: No acute facial bone fracture. The bony  orbits are intact.   ORBITS: The globes, extraocular muscles and optic nerve sheath complexes are intact. No retrobulbar or subperiosteal hematoma.   SOFT TISSUES: No discernible acute abnormality.   PARANASAL SINUSES: No hemorrhage or air-fluid levels. Mucous retention cyst in the left maxillary sinus.   OTHER FINDINGS: None.     CT CERVICAL SPINE:   PREVERTEBRAL SOFT TISSUES: Within normal limits.   CRANIOCERVICAL JUNCTION: Intact.   ALIGNMENT: Slight reversal of cervical lordosis, likely positional and/or degenerative. No traumatic malalignment or traumatic facet widening.   VERTEBRAE:  No acute fracture. Vertebral body heights are maintained.   SPINAL CANAL/INTERVERTEBRAL DISCS: No high-grade canal stenosis. There is a spur complexes that slightly indent the ventral thecal sac at C3-C4, C5-C6, C6-C7 result in no greater than mild canal stenosis.   NEURAL FORAMINA: Multilevel uncovertebral joint and facet arthropathy notably contribute to mild right C2-C3 foraminal stenosis, severe right and mild left C3-C4 foraminal stenosis, severe right and moderate-severe left C4-C5 foraminal stenosis, moderate right C5-C6 foraminal stenosis, mild bilateral C6-7 foraminal stenosis.   OTHER: None.       CT THORACIC SPINE:   ALIGNMENT:  No traumatic spondylolisthesis or traumatic facet widening.   VERTEBRAE:  No acute fracture. Mild concavity to the upper T2 endplate is stable from prior study, consistent with remote fracture.   SPINAL CANAL/INTERVERTEBRAL DISCS: No high-grade spinal canal stenosis. Varying degrees of mild degenerative disc disease, mainly related to mild anterior endplate spurring without disc height loss or disc spur complexes.   PARASPINAL SOFT TISSUES:  No paravertebral soft tissue hematoma.   VISUALIZED CHEST: Please see separate report.     CT LUMBAR SPINE:   ALIGNMENT: Minimal grade 1 degenerative anterolisthesis of L5 on S1. No traumatic spondylolisthesis or traumatic facet widening.   VERTEBRAE: There  are remote fractures of the bilateral L1, L2, and L4 transverse processes and left L3 transverse process.   SPINAL CANAL/INTERVERTEBRAL DISCS: No high-grade spinal canal stenosis.   NEUROFORAMINA: There is multilevel facet arthropathy which is mild bilaterally at L2-L3, moderate on the right at L3-L4, moderate bilateral and L4-L5 and moderate severe at L5-S1. There is mild bilateral L3-L4 foraminal stenosis, mild-moderate left L4-5 foraminal stenosis and mild bilateral L5-S1 foraminal stenosis.   PARASPINAL SOFT TISSUES:  No paravertebral soft tissue hematoma.   VISUALIZED ABDOMEN: Please see separate report.       CT HEAD: 1. No acute intracranial abnormality or calvarial fracture.     CT MAXILLOFACIAL SKELETON: 1. No acute maxillofacial skeleton fracture.     CT CERVICAL SPINE: 1. No acute fracture or traumatic malalignment of the cervical spine. 2. Spondylotic changes of the cervical spine as detailed above.     CT THORACIC/LUMBAR SPINE: 1. No acute fracture or traumatic malalignment. 2. Remote minimally depressed compression fracture the upper T2 endplate. 3. Remote fracture deformities of the bilateral L1, L2, and L4 transverse processes and left L3 transverse process.   MACRO: None.   Signed by: Leonardo Celaya 11/18/2024 6:57 PM Dictation workstation:   [id number]    XR hand 3+ views bilateral    Result Date: 11/18/2024  Interpreted By:  Nathen Lovett, STUDY: XR HAND 3+ VIEWS BILATERAL; XR WRIST 3+ VIEWS BILATERAL  11/18/2024 2:15 pm   INDICATION: Signs/Symptoms:b/l hand pain; Signs/Symptoms:b/l wrist pain   COMPARISON: None.   ACCESSION NUMBER(S): [id number]; [id number]   ORDERING CLINICIAN: KEN GONZALEZ   TECHNIQUE: Three views each of the bilateral hands and wrists including AP, oblique and lateral projections were obtained.   FINDINGS: There is no evidence of acute fracture or dislocation identified. Moderate to severe hypertrophic degenerative changes are seen in the right trapezium 1st  metacarpal articulation. Moderate degenerative changes are seen in the left trapezium 1st metacarpal articulation. Moderate to severe degenerative changes are seen in the right proximal interphalangeal joint. Minimal degenerative changes are seen in the remaining interphalangeal joints.       1. No fracture or dislocation. 2. Degenerative changes, as described above.   MACRO: None.   Signed by: Nathen Lovett 11/18/2024 2:38 PM Dictation workstation:   [id number]    XR wrist 3+ views bilateral    Result Date: 11/18/2024  Interpreted By:  Nathen Lovett, STUDY: XR HAND 3+ VIEWS BILATERAL; XR WRIST 3+ VIEWS BILATERAL  11/18/2024 2:15 pm   INDICATION: Signs/Symptoms:b/l hand pain; Signs/Symptoms:b/l wrist pain   COMPARISON: None.   ACCESSION NUMBER(S): [id number]; [id number]   ORDERING CLINICIAN: KEN GONZALEZ   TECHNIQUE: Three views each of the bilateral hands and wrists including AP, oblique and lateral projections were obtained.   FINDINGS: There is no evidence of acute fracture or dislocation identified. Moderate to severe hypertrophic degenerative changes are seen in the right trapezium 1st metacarpal articulation. Moderate degenerative changes are seen in the left trapezium 1st metacarpal articulation. Moderate to severe degenerative changes are seen in the right proximal interphalangeal joint. Minimal degenerative changes are seen in the remaining interphalangeal joints.       1. No fracture or dislocation. 2. Degenerative changes, as described above.   MACRO: None.   Signed by: Nathen Lovett 11/18/2024 2:38 PM Dictation workstation:   [id number]    XR foot 3+ views bilateral    Result Date: 11/18/2024  Interpreted By:  Nathen Lovett, STUDY: XR FOOT 3+ VIEWS BILATERAL  11/18/2024 2:15 pm   INDICATION: Signs/Symptoms:b/l foot pain   COMPARISON: 12/17/2021   ACCESSION NUMBER(S): [id number]   ORDERING CLINICIAN: KNE GONZALEZ   TECHNIQUE: Three views each of the bilateral feet including AP , oblique and  lateral projections were obtained.   FINDINGS: The patient is status post left 1st interphalangeal joint arthrodesis. Postoperative changes are seen involving the distal aspect of the 1st metatarsal. The patient is status post amputation through the left 2nd proximal interphalangeal joint and right 2nd metatarsophalangeal joint. There is no radiographic evidence of acute fracture or dislocation identified. Mild-to-moderate degenerative changes are seen in the 1st metatarsophalangeal joints bilaterally.       1. No acute fracture or dislocation identified. 2. Postoperative and degenerative changes, as described above.   MACRO: None.   Signed by: Nathen Lovett 11/18/2024 2:36 PM Dictation workstation:   [id number]    XR chest 1 view    Result Date: 11/18/2024  Interpreted By:  Nathen Lovett, STUDY: XR CHEST 1 VIEW  11/18/2024 2:15 pm   INDICATION: Signs/Symptoms:fall,near syncope   COMPARISON: 10/30/2024   ACCESSION NUMBER(S): [id number]   ORDERING CLINICIAN: KEN GONZALEZ   TECHNIQUE: A single AP portable radiograph of the chest was obtained.   FINDINGS: Multiple cardiac monitoring leads are seen over the chest.  Hazy airspace opacity is seen at the right lung base, similar to the prior study, and may represent scarring, atelectasis and/or pneumonia. No pneumothorax is identified. The cardiac silhouette is within normal limits for size.       Right basilar airspace opacity, as above. Clinical correlation and continued follow-up until clearing is recommended.   MACRO: None.   Signed by: Nathen Lovett 11/18/2024 2:33 PM Dictation workstation:   [id number]    XR tibia fibula bilateral 2 views    Result Date: 11/18/2024  Interpreted By:  Nathen Lovett, STUDY: XR TIBIA FIBULA BILATERAL 2 VIEWS 11/18/2024 2:15 pm   INDICATION: Signs/Symptoms:b/l foot pain   COMPARISON: None.   ACCESSION NUMBER(S): [id number]   ORDERING CLINICIAN: KEN GONZALEZ   TECHNIQUE: Four views each of the bilateral tibia and fibula including  AP and lateral projections were obtained.   FINDINGS: There is no evidence of acute fracture or dislocation identified. The joint spaces are well preserved throughout without significant degenerative changes.       1. No fracture or dislocation.   MACRO: None.   Signed by: Nathen Lovett 11/18/2024 2:30 PM Dictation workstation:   [id number]    XR foot 1-2 views bilateral    Result Date: 11/17/2024  Interpreted By:  Parvez Ellison, STUDY: XR FOOT 1-2 VIEWS BILATERAL; ;  11/13/2024 1:19 pm   INDICATION: Signs/Symptoms:bilateral foot wounds, concern for osteomyelitis.   ,L97.512 Non-pressure chronic ulcer of other part of right foot with fat layer exposed,L97.522 Non-pressure chronic ulcer of other part of left foot with fat layer exposed   COMPARISON: None.   ACCESSION NUMBER(S): [id number]   ORDERING CLINICIAN: PHIL BETANCOURT   FINDINGS: Bilateral feet, two views of each   On the left are postsurgical changes in the 1st IP joint status post fusion and in the 1st metatarsal head status post osteotomy with fixation. Soft tissue edema about the 1st digit. No osseous destruction or erosions seen. There is flattening of the left 2nd metatarsal head consistent with avascular necrosis (Freiberg's disease). There is mild degenerative change in the midfoot articulations.   On the right there is mutation of the 2nd digit with a remote 2nd distal metatarsal fracture. There is moderate degenerative changes of 1st MTP joint and the 1st IP joint with lateral subluxation. Soft tissue edema present without osseous destruction or erosion seen radiographically.       No radiographic evidence of osteomyelitis in either foot. MRI can be performed for further evaluation Soft tissue edema about the left 1st toe with cellulitis in the differential. Postsurgical change bilaterally.   MACRO: None   Signed by: Parvez Ellison 11/17/2024 6:40 AM Dictation workstation:   [id number]    XR chest 2 views    Result Date:  11/4/2024  Interpreted By:  Tayo Menjivar, STUDY: XR CHEST 2 VIEWS   INDICATION: Signs/Symptoms:pleural effusion.   COMPARISON: September 23, 2024   ACCESSION NUMBER(S): [id number]   ORDERING CLINICIAN: PRABHU POOLE   FINDINGS: Extensive chronic pleural plaque formation right worse than left similar to prior study with right basilar airspace disease and effusion unchanged.   No additional new findings. Multiple prior rib fractures.       Extensive chronic pleural plaque formation right worse than left similar to prior study with right basilar airspace disease and effusion unchanged.   Signed by: Tayo Menjivar 11/4/2024 5:13 PM Dictation workstation:   [id number]     Susceptibility data from last 90 days.  Collected Specimen Info Organism Amoxicillin/Clavulanate Ampicillin Ampicillin/Sulbactam Aztreonam Cefazolin Cefazolin (uncomplicated UTIs only) Cefepime Cefotaxime Ceftazidime Ceftriaxone Cefuroxime (oral)   11/22/24 Tissue from DIGIT THIRD, RIGHT FOOT Methicillin Resistant Staphylococcus aureus (MRSA)                Mixed Gram-Positive and Gram-Negative Bacteria              11/18/24 Urine from Clean Catch/Voided Escherichia coli  R  R  I  R  R  R  R  R  R  R    10/23/24 Tissue/Biopsy from Wound/Tissue Escherichia coli  R  R  I  R  R   R  R  R  R  R     Methicillin Resistant Staphylococcus aureus (MRSA)              09/23/24 Urine from Clean Catch/Voided Escherichia coli  R  R  I  R  R  R  R  R  R  R    09/18/24 Tissue/Biopsy from Wound/Tissue Methicillin Resistant Staphylococcus aureus (MRSA)                Mixed Gram-Positive and Gram-Negative Bacteria                Collected Specimen Info Organism Ciprofloxacin Clindamycin Ertapenem Erythromycin Gentamicin Levofloxacin Meropenem Nitrofurantoin Oxacillin Piperacillin/Tazobactam Tetracycline Trimethoprim/Sulfamethoxazole Vancomycin   11/22/24 Tissue from DIGIT THIRD, RIGHT FOOT Methicillin Resistant Staphylococcus aureus (MRSA)   R   R      R   R  S  S      Mixed Gram-Positive and Gram-Negative Bacteria                11/18/24 Urine from Clean Catch/Voided Escherichia coli  S   S   S   S  S   S   R    10/23/24 Tissue/Biopsy from Wound/Tissue Escherichia coli  S   S   S  S  S    S   R      Methicillin Resistant Staphylococcus aureus (MRSA)   R   R      R   R  S  S   09/23/24 Urine from Clean Catch/Voided Escherichia coli  S   S   S   S  S   S   R    09/18/24 Tissue/Biopsy from Wound/Tissue Methicillin Resistant Staphylococcus aureus (MRSA)   R   R      R   R  S  S     Mixed Gram-Positive and Gram-Negative Bacteria                      Assessment/Plan   69-year-old male with recurrent syncope secondary to orthostatic hypotension/autonomic dysfunction/autonomic neuropathy, restrictive lung disease on 4 to 5 L nasal cannula at baseline, PSVT on propranolol, CAD w/ CCTA (02/2024) - 50% stenosis of LAD - 25-50% stenosis of RCA, alcohol use disorder c/b severe alcoholic polyneuropathy with autonomic dysfunction  who presents at the direction of his podiatrist due to third right lower extremity digit infection, now POD #3 status post right third digit amputation with application of allograft to metatarsal heads; appreciate podiatry and infectious disease input.  Assessment & Plan  Foot infection    Osteomyelitis of third toe of right foot (Multi)    Plan:  - Patient stable to remain at current level of care  - Continuing Bactrim for a total of 7 days, today is day 3  - senna added, patient requests to hold off on laxative for the morning, thinks he can have BM  - continue medications, orders reviewed  - plan for SNF on discharge, pre-CERT is started, pending placement at the [facility]  - VTE prophylaxis with subcutaneous heparin  - No indication for telemetry  - Code: DNR/DNI-CCA       I spent 35 minutes in the professional and overall care of this patient.    Peter Moser MD

## 2024-11-27 ENCOUNTER — APPOINTMENT (OUTPATIENT)
Dept: WOUND CARE | Facility: CLINIC | Age: 69
End: 2024-11-27
Payer: MEDICARE

## 2024-11-27 VITALS
HEART RATE: 79 BPM | WEIGHT: 254.19 LBS | HEIGHT: 72 IN | DIASTOLIC BLOOD PRESSURE: 80 MMHG | TEMPERATURE: 96.8 F | BODY MASS INDEX: 34.43 KG/M2 | RESPIRATION RATE: 18 BRPM | OXYGEN SATURATION: 94 % | SYSTOLIC BLOOD PRESSURE: 147 MMHG

## 2024-11-27 DIAGNOSIS — E03.9 HYPOTHYROIDISM, UNSPECIFIED TYPE: ICD-10-CM

## 2024-11-27 LAB
ANION GAP SERPL CALC-SCNC: 12 MMOL/L (ref 10–20)
BUN SERPL-MCNC: 19 MG/DL (ref 6–23)
CALCIUM SERPL-MCNC: 9.4 MG/DL (ref 8.6–10.3)
CHLORIDE SERPL-SCNC: 102 MMOL/L (ref 98–107)
CO2 SERPL-SCNC: 29 MMOL/L (ref 21–32)
CREAT SERPL-MCNC: 1.24 MG/DL (ref 0.5–1.3)
EGFRCR SERPLBLD CKD-EPI 2021: 62 ML/MIN/1.73M*2
ERYTHROCYTE [DISTWIDTH] IN BLOOD BY AUTOMATED COUNT: 16.5 % (ref 11.5–14.5)
GLUCOSE SERPL-MCNC: 100 MG/DL (ref 74–99)
HCT VFR BLD AUTO: 39.4 % (ref 41–52)
HGB BLD-MCNC: 11.9 G/DL (ref 13.5–17.5)
MAGNESIUM SERPL-MCNC: 1.9 MG/DL (ref 1.6–2.4)
MCH RBC QN AUTO: 29.1 PG (ref 26–34)
MCHC RBC AUTO-ENTMCNC: 30.2 G/DL (ref 32–36)
MCV RBC AUTO: 96 FL (ref 80–100)
NRBC BLD-RTO: 0 /100 WBCS (ref 0–0)
PLATELET # BLD AUTO: 256 X10*3/UL (ref 150–450)
POTASSIUM SERPL-SCNC: 3.8 MMOL/L (ref 3.5–5.3)
RBC # BLD AUTO: 4.09 X10*6/UL (ref 4.5–5.9)
SODIUM SERPL-SCNC: 139 MMOL/L (ref 136–145)
WBC # BLD AUTO: 10 X10*3/UL (ref 4.4–11.3)

## 2024-11-27 PROCEDURE — 83735 ASSAY OF MAGNESIUM: CPT | Performed by: INTERNAL MEDICINE

## 2024-11-27 PROCEDURE — 2500000002 HC RX 250 W HCPCS SELF ADMINISTERED DRUGS (ALT 637 FOR MEDICARE OP, ALT 636 FOR OP/ED)

## 2024-11-27 PROCEDURE — 82565 ASSAY OF CREATININE: CPT | Performed by: INTERNAL MEDICINE

## 2024-11-27 PROCEDURE — 2500000002 HC RX 250 W HCPCS SELF ADMINISTERED DRUGS (ALT 637 FOR MEDICARE OP, ALT 636 FOR OP/ED): Performed by: INTERNAL MEDICINE

## 2024-11-27 PROCEDURE — 2500000001 HC RX 250 WO HCPCS SELF ADMINISTERED DRUGS (ALT 637 FOR MEDICARE OP): Performed by: INTERNAL MEDICINE

## 2024-11-27 PROCEDURE — 85027 COMPLETE CBC AUTOMATED: CPT | Performed by: INTERNAL MEDICINE

## 2024-11-27 PROCEDURE — 97110 THERAPEUTIC EXERCISES: CPT | Mod: GP

## 2024-11-27 PROCEDURE — 99239 HOSP IP/OBS DSCHRG MGMT >30: CPT | Performed by: INTERNAL MEDICINE

## 2024-11-27 PROCEDURE — 36415 COLL VENOUS BLD VENIPUNCTURE: CPT | Performed by: INTERNAL MEDICINE

## 2024-11-27 PROCEDURE — 2500000001 HC RX 250 WO HCPCS SELF ADMINISTERED DRUGS (ALT 637 FOR MEDICARE OP)

## 2024-11-27 PROCEDURE — 2500000004 HC RX 250 GENERAL PHARMACY W/ HCPCS (ALT 636 FOR OP/ED)

## 2024-11-27 PROCEDURE — 94640 AIRWAY INHALATION TREATMENT: CPT

## 2024-11-27 PROCEDURE — 97535 SELF CARE MNGMENT TRAINING: CPT | Mod: GO,CO

## 2024-11-27 PROCEDURE — 2500000005 HC RX 250 GENERAL PHARMACY W/O HCPCS: Performed by: INTERNAL MEDICINE

## 2024-11-27 PROCEDURE — 97530 THERAPEUTIC ACTIVITIES: CPT | Mod: GP

## 2024-11-27 RX ORDER — LEVOTHYROXINE SODIUM 50 UG/1
50 TABLET ORAL
Qty: 90 TABLET | Refills: 1 | Status: SHIPPED | OUTPATIENT
Start: 2024-11-27

## 2024-11-27 RX ORDER — CETIRIZINE HYDROCHLORIDE 10 MG/1
10 TABLET ORAL ONCE
Status: COMPLETED | OUTPATIENT
Start: 2024-11-27 | End: 2024-11-27

## 2024-11-27 RX ORDER — SULFAMETHOXAZOLE AND TRIMETHOPRIM 800; 160 MG/1; MG/1
1 TABLET ORAL EVERY 12 HOURS SCHEDULED
Qty: 8 TABLET | Refills: 0 | Status: SHIPPED | OUTPATIENT
Start: 2024-11-27 | End: 2024-12-01

## 2024-11-27 ASSESSMENT — PAIN SCALES - GENERAL
PAINLEVEL_OUTOF10: 6
PAINLEVEL_OUTOF10: 2
PAINLEVEL_OUTOF10: 0 - NO PAIN
PAINLEVEL_OUTOF10: 8
PAINLEVEL_OUTOF10: 8

## 2024-11-27 ASSESSMENT — COGNITIVE AND FUNCTIONAL STATUS - GENERAL
DRESSING REGULAR UPPER BODY CLOTHING: A LOT
DRESSING REGULAR LOWER BODY CLOTHING: TOTAL
WALKING IN HOSPITAL ROOM: TOTAL
TOILETING: TOTAL
MOBILITY SCORE: 8
MOVING FROM LYING ON BACK TO SITTING ON SIDE OF FLAT BED WITH BEDRAILS: A LOT
MOVING FROM LYING ON BACK TO SITTING ON SIDE OF FLAT BED WITH BEDRAILS: A LITTLE
MOVING TO AND FROM BED TO CHAIR: A LOT
DRESSING REGULAR UPPER BODY CLOTHING: A LITTLE
HELP NEEDED FOR BATHING: A LOT
MOVING TO AND FROM BED TO CHAIR: TOTAL
TURNING FROM BACK TO SIDE WHILE IN FLAT BAD: A LITTLE
DRESSING REGULAR LOWER BODY CLOTHING: A LITTLE
CLIMB 3 TO 5 STEPS WITH RAILING: TOTAL
TOILETING: A LITTLE
WALKING IN HOSPITAL ROOM: TOTAL
PERSONAL GROOMING: A LOT
STANDING UP FROM CHAIR USING ARMS: A LOT
TURNING FROM BACK TO SIDE WHILE IN FLAT BAD: A LOT
MOBILITY SCORE: 12
HELP NEEDED FOR BATHING: A LITTLE
CLIMB 3 TO 5 STEPS WITH RAILING: TOTAL
DAILY ACTIVITIY SCORE: 11
PERSONAL GROOMING: A LITTLE
DAILY ACTIVITIY SCORE: 19
STANDING UP FROM CHAIR USING ARMS: TOTAL
EATING MEALS: A LITTLE

## 2024-11-27 ASSESSMENT — PAIN - FUNCTIONAL ASSESSMENT
PAIN_FUNCTIONAL_ASSESSMENT: 0-10

## 2024-11-27 ASSESSMENT — PAIN DESCRIPTION - DESCRIPTORS
DESCRIPTORS: SORE
DESCRIPTORS: CRAMPING
DESCRIPTORS: CRAMPING

## 2024-11-27 ASSESSMENT — ACTIVITIES OF DAILY LIVING (ADL): HOME_MANAGEMENT_TIME_ENTRY: 23

## 2024-11-27 NOTE — NURSING NOTE
1450: Attempted to call report to the [facility] at this time. No answer, 3N unit number left with  at facility and nurse will call back.     1408: Nurse called back from facility and report given by this RN at this time.     1410: Discharge note- Pt PIV removed prior to discharge. Discharge instructions and pt belongs sent with transporters to facility. Report called to nurse at facility prior to discharge. Pt discharge to the [facility] at this time.

## 2024-11-27 NOTE — PROGRESS NOTES
11/27/24 0849   Discharge Planning   Home or Post Acute Services Post acute facilities (Rehab/SNF/etc)   Type of Post Acute Facility Services Skilled nursing   Expected Discharge Disposition SNF   Does the patient need discharge transport arranged? Yes   RoundTrip coordination needed? Yes   Has discharge transport been arranged? No     Received message from direct precert team that patient has precert through 12/3 to go to [facility]. Medical team updated.

## 2024-11-27 NOTE — DISCHARGE SUMMARY
Discharge Diagnosis  Foot infection    Issues Requiring Follow-Up  It was a pleasure to meet you in the hospital  You were diagnosed with having osteomyelitis, or an infection in your bones of the toe  You had a toe amputation for the infection  You also had an IV antibiotic at first, this will be changed to an oral antibiotic for an additional 5 days  You will go to a skilled nursing facility due to needing to be off your feet until 12/6/24  None of your other previously scheduled medications were changed or altered otherwise  You will need to follow-up with Dr. Bull as well as your primary care doctor in the 2 weeks after this hospitalization        Test Results Pending At Discharge  Pending Labs       Order Current Status    Surgical Pathology Exam In process            Hospital Course  This is a very pleasant 69-year-old male with recurrent syncope secondary to orthostatic hypotension/autonomic dysfunction/autonomic neuropathy, restrictive lung disease on 4 to 5 L nasal cannula at baseline, PSVT on propranolol, CAD w/ CCTA (02/2024) - 50% stenosis of LAD - 25-50% stenosis of RCA, alcohol use disorder c/b severe alcoholic polyneuropathy with autonomic dysfunction who presents at the direction of his podiatrist due to third right lower extremity digit infection, now POD #4 status post right third digit amputation with application of allograft to metatarsal heads; patient to take antibiotic with bactrim for an additional 5 days, non-weight bearing until 12/6/24 and having wound care as noted in the discharge instructions for adaptic with saline moistened gauze, dry gauze, ABD, Kerlix, and Ace every other day; will need follow-up with Podiatry and primary care.    Greater than 30 minutes was spent facilitating this patients discharge from the hospital which included examining the patient, reconciling medications, and making arrangements for future care.    Peter Moser MD  [facility]  [place]  Internal Medicine    This document was generated in whole or in part using the Dragon One medical voice recognition software and there may be some incorrect words/wording, spelling, or punctuation errors that were not corrected prior to finalization in the medical record.    Pertinent Physical Exam At Time of Discharge  Physical Exam  General: Patient does not appear to be in any acute distress, alert, awake  Head: lacerations above bilateral eyebrows on forehead, significantly improved since yesterday  Eyes: Normal external exam  Cardiovascular: RRR, S1/S2, no murmurs, rubs, or gallops, radial pulses +2  Pulmonary: CTAB, no respiratory distress.  Abdomen: soft, non-tender, nondistended, no guarding or rebound, no masses noted  Neuro: A&O x3, no focal deficits, strength and sensation intact bilaterally  Extremities: BLE with erythema, L foot covered in dressing; R foot: Covered in Ace wrap dressing  Skin- Warm. Dry. No lesions, contusions, or erythema.  Psychiatric: Judgment intact. Appropriate mood and behavior    Home Medications     Medication List      START taking these medications     sulfamethoxazole-trimethoprim 800-160 mg tablet; Commonly known as:   Bactrim DS; Take 1 tablet by mouth every 12 hours for 8 doses.     CHANGE how you take these medications     atorvastatin 40 mg tablet; Commonly known as: Lipitor; Take 1 tablet (40   mg) by mouth once daily.; What changed: when to take this     CONTINUE taking these medications     * albuterol 90 mcg/actuation inhaler; Commonly known as: ProAir HFA;   Inhale 2 puffs every 6 hours if needed for wheezing.   * albuterol 2.5 mg /3 mL (0.083 %) nebulizer solution; Take 3 mL (2.5   mg) by nebulization every 6 hours if needed for wheezing.   alendronate 70 mg tablet; Commonly known as: Fosamax; Take 1 tablet by   mouth every 7 days. Take in the morning with a full glass of water at   least 30 minutes before first food, drink, or medications of the day.    amLODIPine 2.5 mg tablet; Commonly known as: Norvasc; Take 1 tablet (2.5   mg) by mouth once daily. For blood pressure   aspirin 81 mg chewable tablet; Chew and swallow 1 tablet by mouth daily.   busPIRone 5 mg tablet; Commonly known as: Buspar; Take 1 tablet by mouth   three times daily as needed for anxiety.   cefdinir 300 mg capsule; Commonly known as: Omnicef; Take 1 capsule (300   mg) by mouth 2 times a day.   cyanocobalamin 1,000 mcg tablet; Commonly known as: Vitamin B-12; Take 1   tablet (1,000 mcg) by mouth once daily. As directed   DULoxetine 60 mg DR capsule; Commonly known as: Cymbalta; Take 1 capsule   by mouth twice daily.   empagliflozin 10 mg; Commonly known as: Jardiance; Take 1 tablet (10 mg)   by mouth once daily.   ergocalciferol 1.25 MG (06509 UT) capsule; Commonly known as: Vitamin   D-2; Take 1 capsule (1,250 mcg) by mouth 1 (one) time per week.   escitalopram 20 mg tablet; Commonly known as: Lexapro; Take 1 tablet (20   mg) by mouth once daily.   FeroSuL tablet; Generic drug: ferrous sulfate (325 mg ferrous sulfate);   Take 1 tablet by mouth once daily with breakfast.   fexofenadine 180 mg tablet; Commonly known as: Allegra   folic acid 1 mg tablet; Commonly known as: Folvite; TAKE ONE TABLET BY   MOUTH EVERY DAY for folate deficiency   ipratropium 0.02 % nebulizer solution; Commonly known as: Atrovent; Take   2.5 mL (0.5 mg) by nebulization 4 times a day.   levothyroxine 50 mcg tablet; Commonly known as: Synthroid, Levoxyl; Take   1 tablet (50 mcg) by mouth once daily in the morning. Take before meals.   mirtazapine 15 mg tablet; Commonly known as: Remeron; Take 1 tablet (15   mg) by mouth once daily at bedtime.   montelukast 10 mg tablet; Commonly known as: Singulair; Take 1 tablet by   mouth once daily at bedtime.   pantoprazole 40 mg EC tablet; Commonly known as: ProtoNix; Take 1 tablet   (40 mg) by mouth 2 times a day.   pregabalin 150 mg capsule; Commonly known as: Lyrica; Take 1  capsule   (150 mg) by mouth every 6 hours.   propranolol 10 mg tablet; Commonly known as: Inderal; Take 1 tablet (10   mg) by mouth 2 times a day.   spironolactone 25 mg tablet; Commonly known as: Aldactone; Take 1 tablet   (25 mg) by mouth once daily.   tamsulosin 0.4 mg 24 hr capsule; Commonly known as: Flomax; Take 1   capsule (0.4 mg) by mouth once daily at bedtime.   torsemide 20 mg tablet; Commonly known as: Demadex; Take 1 tablet (20   mg) by mouth once daily.  * This list has 2 medication(s) that are the same as other medications   prescribed for you. Read the directions carefully, and ask your doctor or   other care provider to review them with you.       Outpatient Follow-Up  Future Appointments   Date Time Provider Department [place]   12/2/2024 10:00 AM [facility] RESPTHER1 PFT ROOM [address]   12/2/2024 10:30 AM ST RESPTHER1 PFT ROOM [address]   12/2/2024 11:30 AM ST RESPTHER1 PFT ROOM [address]   12/3/2024  1:40 PM Angel Harry DO [id number] [place]   12/9/2024  2:20 PM SHIRA Denis-CNP [id number] [facility]   1/29/2025  1:30 PM Artie Haskins MD [id number] [place]   3/26/2025  1:30 PM Artie Haskins MD [id number] [place]   4/8/2025  3:40 PM Angel Harry DO [id number] [place]       Peter Moser MD

## 2024-11-27 NOTE — PROGRESS NOTES
Angus Redman is a 69 y.o. male on day 6 of admission presenting with Foot infection.    Subjective   Pt examined and evaluated at bedside, found resting comfortably.  Pt set to be discharged to the [facility].  Pt states that their pain is well controlled, denies any constitutional symptoms, and has no other complaints at this time.        Objective     Vascular: DP and PT pulses palpable 1/4 bilaterally.  CFT under 5 seconds when tested at distal digits.  Skin temp warm to warm from proximal to distal.  +1 pitting edema noted to BLE.     Neuro: Protective sensation absent, light tough sensation intact.  No Tinel's or Valleix's signs elicited.       Derm: Sub 1st met head bilaterally, right 5th digit, right hallux wounds all have Integra Bilayer in place with staples intact.  Incision site well approximated with sutures intact.  No dehiscence or necrosis noted.     Musc: Second and third digit amputation noted to right foot.  No other gross deformities noted.  No POP noted to any other area of the foot/ankle.      Last Recorded Vitals  Blood pressure 126/65, pulse 80, temperature 35.8 °C (96.4 °F), temperature source Temporal, resp. rate 16, height 1.829 m (6'), weight 115 kg (254 lb 3.1 oz), SpO2 92%.  Intake/Output last 3 Shifts:  I/O last 3 completed shifts:  In: 1560 (13.5 mL/kg) [P.O.:1560]  Out: 4100 (35.6 mL/kg) [Urine:4100 (1 mL/kg/hr)]  Weight: 115.3 kg     Relevant Results  Results for orders placed or performed during the hospital encounter of 11/21/24 (from the past 24 hours)   CBC   Result Value Ref Range    WBC 10.0 4.4 - 11.3 x10*3/uL    nRBC 0.0 0.0 - 0.0 /100 WBCs    RBC 4.09 (L) 4.50 - 5.90 x10*6/uL    Hemoglobin 11.9 (L) 13.5 - 17.5 g/dL    Hematocrit 39.4 (L) 41.0 - 52.0 %    MCV 96 80 - 100 fL    MCH 29.1 26.0 - 34.0 pg    MCHC 30.2 (L) 32.0 - 36.0 g/dL    RDW 16.5 (H) 11.5 - 14.5 %    Platelets 256 150 - 450 x10*3/uL   Magnesium   Result Value Ref Range    Magnesium 1.90 1.60 - 2.40 mg/dL    Basic Metabolic Panel   Result Value Ref Range    Glucose 100 (H) 74 - 99 mg/dL    Sodium 139 136 - 145 mmol/L    Potassium 3.8 3.5 - 5.3 mmol/L    Chloride 102 98 - 107 mmol/L    Bicarbonate 29 21 - 32 mmol/L    Anion Gap 12 10 - 20 mmol/L    Urea Nitrogen 19 6 - 23 mg/dL    Creatinine 1.24 0.50 - 1.30 mg/dL    eGFR 62 >60 mL/min/1.73m*2    Calcium 9.4 8.6 - 10.3 mg/dL     *Note: Due to a large number of results and/or encounters for the requested time period, some results have not been displayed. A complete set of results can be found in Results Review.           Assessment/Plan   Assessment & Plan  Foot infection    Osteomyelitis of third toe of right foot (Multi)    Assessment:  - s/p 3rd digit amputation secondary to osteomyelitis, right foot (DOS: 11/22/24)  - s/p wound debridement with application of Integra Bilayer, left and right foot (DOS: 11/22/24)  - Cellulitis, LLE  - Alcoholic peripheral neuropathy     Plan:  - Patient examined and evaluated.  All findings discussed with patient to his satisfaction and understanding.  - Reviewed labs and chart.  Intraoperative bone culture obtained, pending.  - Systemic conditions managed by primary team.  Antibiotics managed by ID.  -Integra bilayer in place with staples intact bilaterally.  3rd digit amputation incision well-approximated with sutures intact; no dehiscence or necrosis noted.  Dressing changes to be performed every other day upon discharge.  - Patient to remain nonweightbearing due to graft placement, ok to use heels for pivot/transfer.  Patient would benefit from SNF placement to allow minimal weight bearing, pt amenable.  - Will continue to follow inpatient.  Please contact podiatry with any acute questions or concerns.       Boone Bull DPM   Podiatric Surgery       I spent >35 minutes in the professional and overall care of this patient.      Boone Bull DPM

## 2024-11-27 NOTE — DISCHARGE INSTRUCTIONS
It was a pleasure to meet you in the hospital  You were diagnosed with having osteomyelitis, or an infection in your bones of the toe  You had a toe amputation for the infection  You also had an IV antibiotic at first, this will be changed to an oral antibiotic for an additional 5 days  You will go to a skilled nursing facility due to needing to be off your feet until 12/6/24  None of your other previously scheduled medications were changed or altered otherwise  You will need to follow-up with Dr. Bull as well as your primary care doctor in the 2 weeks after this hospitalization

## 2024-11-27 NOTE — OP NOTE
Amputation Digit Foot (R), Debridement Foot (B), APPLICATION,ALLOGRAFT,SKIN (B) Operative Note     Date: 2024  OR Location: [facility] OR    Name: Angus Redman, : 1955, Age: 69 y.o., MRN: [id number], Sex: male    Diagnosis  Pre-op Diagnosis      * Osteomyelitis of third toe of right foot (Multi) [M86.9] Post-op Diagnosis     * Osteomyelitis of third toe of right foot (Multi) [M86.9]     Procedures  Amputation Digit Foot  54608 - ND AMPUTATION TOE METATARSOPHALANGEAL JOINT    Debridement Foot  08272 - ND DEBRIDEMENT MUSCLE &/FASCIA 1ST 20 SQ CM/<    APPLICATION,ALLOGRAFT,SKIN  20792 - ND HAJA SKN SUB GRFT T/A/L AREA/100SQ CM /<1ST 25      Surgeons      * Bonoe Bull - Primary    Resident/Fellow/Other Assistant:  Norris Ratliff DPM PGY-2    Staff:   Surgical Assistant:   Keeganulator: Mary Call Person: Donnell Call Person: Angel    Anesthesia Staff: Anesthesiologist: Ava Manzano MD  CRNA: SHIRA Marquez-CRNA  C-AA: TAYLOR Hardwick    Procedure Summary  Anesthesia: Monitor Anesthesia Care  ASA: III  Estimated Blood Loss: 5 mL  Intra-op Medications:   Administrations occurring from 1520 to 1631 on 24:   Medication Name Total Dose   propofol (Diprivan) injection 10 mg/mL Cannot be calculated   NaCl 0.9 % bolus Cannot be calculated              Anesthesia Record               Intraprocedure I/O Totals          Intake    NaCl 0.9 % bolus 100.00 mL    Total Intake 100 mL          Specimen:   ID Type Source Tests Collected by Time   1 : RIGHT TOE Tissue DIGIT THIRD, RIGHT FOOT SURGICAL PATHOLOGY EXAM Boone Bull DPM 2024 1522   A : RIGHT 3RD TOE BONE Tissue DIGIT THIRD, RIGHT FOOT TISSUE/WOUND CULTURE/SMEAR Boone Bull DPM 2024 1526                 Drains and/or Catheters:   External Urinary Catheter (Active)   Output (mL) 800 mL 24 0322       Tourniquet Times: None        Implants:  Implants       Type Name Action Serial No.      Implant MICROMATRIX  1000MG - [id number] Implanted      Graft WOUND MATRIX, INTEGRA, MESHED BILAYER, 2 X 2 IN, 1 SHEET - [id number] Implanted               Findings: All findings consistent with radiographic and clinical findings    Indications: Angus Redman is an 69 y.o. male who is having surgery for Osteomyelitis of third toe of right foot (Multi) [M86.9].     The patient was seen in the preoperative area. The risks, benefits, complications, treatment options, non-operative alternatives, expected recovery and outcomes were discussed with the patient. The possibilities of reaction to medication, pulmonary aspiration, injury to surrounding structures, bleeding, recurrent infection, the need for additional procedures, failure to diagnose a condition, and creating a complication requiring transfusion or operation were discussed with the patient. The patient concurred with the proposed plan, giving informed consent.  The site of surgery was properly noted/marked if necessary per policy. The patient has been actively warmed in preoperative area. Preoperative antibiotics have been ordered and given within 2 hours of incision. Venous thrombosis prophylaxis are not indicated.    Procedure Details:     Indication for Operation:    This patient presents for podiatric surgical intervention today. Patient has had unsuccessful conservative treatment in the past and wishes to have surgical intervention. All of the patient’s questions have been answered and concerns have been appropriately addressed including an explanation of risks and benefits of today’s planned surgical intervention. The patient states that have been NPO since midnight. An updated H&P and consent have been completed prior to today’s surgical intervention.     Operative Report:  The patient was transferred from the preop holding area to the OR and remained on cart in a secure supine position. The left and right lower extremity was prepped and draped in sterile aseptic technique.   An appropriate timeout was performed all in the room were in agreement. Attention was directed to the right third toe where a racquet incision was made around the MPJ of the 3rd toe. This incision was deepened to the level of the bone and utilizing sharp dissection, the distal aspect of the digit was removed at the distal interphalangeal joint level. It was clear after resection of the bone that the remaining bone left intact was noted to be hard, viable, and appeared to have no signs of osteomyelitis or other infection.  A rongeur was used to remove devitalized soft tissue. The wound was irrigated thoroughly with Irrisept. The wound was closed in layers with 3-0 Monocryl, 3-0 Vicryl, and 3-0 Nylon.    Attention then directed to the wounds noted to the sub 1st metatarsal heads bilaterally, right hallux, and right 5th digit. A full thickness excisional debridement was performed on all wounds with a #15 scalpel blade down to and including fascia and muscle layer.   Post debridement wound measurements are as follows:  Left sub 1st met head: 2.2 x 1.5 x 0.3cm  Right sub 1st met head: 2.3 x 1.2 x 0.2cm  Right hallux: 2.3 x 0.2 x 0.2cm  Right 5th digit: 0.9 x 0.5 x 0.2cm  Integra bilayer was applied to bilateral wounds and stapled in place. The grafts were covered with adaptic bilaterally and a dry, sterile, dressing was applied consisting of Adaptic over incision, 4x4s, abdominal padding, kerlix and a compression wrap. The patient tolerated the procedure and anesthesia well and without complication.he patient was transferred from the OR to PACU with all vital signs stable and vascular status unchanged to the left lower extremity.      Complications:  None; patient tolerated the procedure well.    Disposition: PACU - hemodynamically stable.  Condition: stable         Task Performed by RNFA or Surgical Assistant:            Additional Details:     Attending Attestation:   I was present for the entire procedure.    Boone DICKINSON  [place]  Phone Number: [phone]

## 2024-11-27 NOTE — PROGRESS NOTES
Occupational Therapy    OT Treatment    Patient Name: Angus Redman  MRN: [id number]  Today's Date: 11/27/2024  Time Calculation  Start Time: 1038  Stop Time: 1101  Time Calculation (min): 23 min       3028/3028-A    Assessment:  End of Session Communication: Bedside nurse  End of Session Patient Position: Bed, 3 rail up, Alarm on (bed placed in chair mode to promote upright/change in position)       Plan:  OT Frequency: 3 times per week  OT Discharge Recommendations: Moderate intensity level of continued care     Subjective      11/27/24 1038   OT Last Visit   OT Received On 11/27/24   General   Reason for Referral P/w R toe infection. Pt now s/p 3rd digit amputation secondary to osteomyelitis, right foot and s/p wound debridement with application of Integra Bilayer, left and right foot 11/22.   Referred By Dr. Moser   Past Medical History Relevant to Rehab recurrent syncope secondary to orthostatic hypotension/autonomic dysfunction/autonomic neuropathy, restrictive lung disease on 4 to 5 L nasal cannula at baseline, PSVT on propranolol, CAD w/ CCTA (02/2024) - 50% stenosis of LAD - 25-50% stenosis of RCA, alcohol use disorder c/b severe alcoholic polyneuropathy with autonomic dysfunction   Prior to Session Communication Bedside nurse   Patient Position Received Bed, 3 rail up;Alarm on   General Comment Pt is pleasant, cooperative and agreeable to tx; cleared for participation.   Precautions   LE Weight Bearing Status Left Non-Weight Bearing;Right Non-Weight Bearing  (OK for heel WB for pivot transfer ONLY)   Medical Precautions Fall precautions   Pain Assessment   Pain Assessment 0-10   0-10 (Numeric) Pain Score 8   Pain Type Acute pain   Pain Location Leg   Pain Orientation Right;Left  (thigh area, reports improves once resting in bed)   Pain Descriptors Cramping   Pain Interventions Repositioned;Rest   Cognition   Overall Cognitive Status WFL   UE Dressing   UE Dressing Level of Assistance Moderate assistance    UE Dressing Where Assessed Edge of bed   UE Dressing Comments to doff/don clean gown, assit to maintain unsupported sitting balance at times   Toileting   Toileting Level of Assistance Dependent   Where Assessed Bed level   Toileting Comments Pt noted to be incontinent in bed, Dep for kodak care while maintaining sidelying   Bed Mobility 1   Bed Mobility 1 Supine to sitting   Level of Assistance 1 Maximum assistance;+2   Bed Mobility Comments 1 HOB elevated, cues for initiation and use of bed rail; assist for LE and trunk management with draw sheet utilized for safe transition   Bed Mobility 2   Bed Mobility  2 Scooting   Level of Assistance 2 Maximum assistance   Bed Mobility Comments 2 Pt engaged in scooting toward HOB using B UE's and draw sheet, instructed in heel WB only   Bed Mobility 3   Bed Mobility 3 Sitting to supine   Level of Assistance 3 Maximum assistance;+2   Bed Mobility Comments 3 pt easily fatiuged and quickly attempting to return to bed, Max A x2 for safe transition and to boost up toward HOB   Bed Mobility 4   Bed Mobility 4 Rolling left;Rolling right   Level of Assistance 4 Moderate assistance;Moderate verbal cues   Bed Mobility Comments 4 rolling L<>R to complete kodak care and change linens, cues for technique and bed rail use   Static Sitting Balance   Static Sitting-Balance Support Feet supported;Bilateral upper extremity supported   Static Sitting-Level of Assistance Contact guard   Dynamic Sitting Balance   Dynamic Sitting-Balance Support Feet supported;No upper extremity supported   Dynamic Sitting-Level of Assistance Contact guard;Minimum assistance   Dynamic Sitting-Balance Trunk control activities   Dynamic Sitting-Comments retropulsive at times, cues to correct with fair effort   Therapeutic Exercise   Therapeutic Exercise Activity 1 pt completed ~1x5 elbow flexion/ext while seated EOB, completed quickly due to pain, fatigue and wanting to return to supine.   Therapeutic Activity    Therapeutic Activity 1 Pt engaged in static and mininal dynamic seated activities EOB incorporating unsupported sitting, UE therex to increase trunk strength, seated balance; pt demo'd ~4 minutes sitting tolerance prior to requesting to return to supine; CGA to Min A, occasional SBA.   IP OT Assessment   End of Session Communication Bedside nurse   End of Session Patient Position Bed, 3 rail up;Alarm on  (bed placed in chair mode to promote upright/change in position)   Inpatient Plan   OT Frequency 3 times per week   OT Discharge Recommendations Moderate intensity level of continued care     Outcome Measures:[facility] Daily Activity  Putting on and taking off regular lower body clothing: Total  Bathing (including washing, rinsing, drying): A lot  Putting on and taking off regular upper body clothing: A lot  Toileting, which includes using toilet, bedpan or urinal: Total  Taking care of personal grooming such as brushing teeth: A lot  Eating Meals: A little  Daily Activity - Total Score: 11  Education Documentation  No documentation found.  Education Comments  No comments found.            Goals:  Encounter Problems       Encounter Problems (Active)       Functional Balance       LTG - Patient will maintain sitting balance to allow for completion of daily activities (Progressing)       Start:  11/25/24    Expected End:  12/09/24               OT Transfers       STG - Patient will perform bed mobility mod assist (Progressing)       Start:  11/25/24    Expected End:  12/09/24            STG - Patient will perform toilet transfer mod assist (Progressing)       Start:  11/25/24    Expected End:  12/09/24                  Encounter Problems (Resolved)       Dressings Lower Extremities       STG - Patient to complete lower body dressing mod assist (Adequate for Discharge)       Start:  11/25/24    Expected End:  12/09/24    Resolved:  11/27/24            Grooming       STG - Patient completes grooming SUP (Adequate for  Discharge)       Start:  11/25/24    Expected End:  12/09/24    Resolved:  11/27/24

## 2024-11-27 NOTE — PROGRESS NOTES
Physical Therapy    Physical Therapy Treatment    Patient Name: Angus Redman  MRN: [id number]  Department: [facility] 3 N  Room: [facility]3028-A  Today's Date: 11/27/2024  Time Calculation  Start Time: 1038  Stop Time: 1101  Time Calculation (min): 23 min    Assessment/Plan   PT Assessment  Evaluation/Treatment Tolerance: Patient limited by fatigue, Patient limited by pain  Medical Staff Made Aware: Yes  End of Session Communication: Bedside nurse  Assessment Comment: Pt progressing appropriately and able to participate in supine/seated LE exercises, sitting balance activities and bed mobility tasks. Pt continues to demonstrate decreased strength, endurance and balance and will continue to benefit from skilled therapy during stay in this facility. He remains appropriate for moderate intensity therapy upon discharge.  End of Session Patient Position: Bed, 3 rail up, Alarm on (chair position, call light within reach)  PT Plan  Inpatient/Swing Bed or Outpatient: Inpatient  PT Plan  Treatment/Interventions: Bed mobility, Transfer training, Gait training, Stair training, Balance training, Neuromuscular re-education, Endurance training, Strengthening, Range of motion, Therapeutic exercise, Therapeutic activity, Home exercise program, Positioning, Postural re-education  PT Plan: Ongoing PT  PT Frequency: 3 times per week  PT Discharge Recommendations: Moderate intensity level of continued care  Equipment Recommended upon Discharge: Wheeled walker  PT Recommended Transfer Status: Assist x2, Assistive device (max A x2)  PT - OK to Discharge: Yes (Once medically cleared to next level of care.)    General Visit Information:   PT  Visit  PT Received On: 11/27/24  Response to Previous Treatment: Patient with no complaints from previous session.  General  Reason for Referral: P/w R toe infection. Pt now s/p 3rd digit amputation secondary to osteomyelitis, right foot and s/p wound debridement with application of Integra Bilayer, left and  right foot 11/22.  Referred By: Dr. Moser  Past Medical History Relevant to Rehab: recurrent syncope secondary to orthostatic hypotension/autonomic dysfunction/autonomic neuropathy, restrictive lung disease on 4 to 5 L nasal cannula at baseline, PSVT on propranolol, CAD w/ CCTA (02/2024) - 50% stenosis of LAD - 25-50% stenosis of RCA, alcohol use disorder c/b severe alcoholic polyneuropathy with autonomic dysfunction  Family/Caregiver Present: No  Co-Treatment: OT  Co-Treatment Reason: To maximize pt safety with functional mobility.  Prior to Session Communication: Bedside nurse  Patient Position Received: Bed, 3 rail up, Alarm on  Preferred Learning Style: verbal  General Comment: Pt pleasant and agreeable to work with therapy.    Subjective   Precautions:  Precautions  LE Weight Bearing Status: Left Non-Weight Bearing, Right Non-Weight Bearing (OK for heel WB for pivot transfer ONLY)  Medical Precautions: Fall precautions, Oxygen therapy device and L/min (4L O2 via NC)  Precautions Comment: Pt educated on WB precautions during session.    Objective   Pain:  Pain Assessment  Pain Assessment: 0-10  0-10 (Numeric) Pain Score: 8  Pain Type: Acute pain (white seated EOB, pt with diminished pain once returned to supine)  Pain Location: Leg  Pain Orientation: Proximal, Right, Left  Pain Descriptors: Cramping  Pain Interventions: Repositioned    Cognition:  Cognition  Overall Cognitive Status: Within Functional Limits    Postural Control:  Postural Control  Posture Comment: Poor, increased head/neck and trunk/hip flexion with pt having difficulty maintaining upright posture, cerviothoracic kyphosis  Static Sitting Balance  Static Sitting-Balance Support: Bilateral upper extremity supported, Feet supported  Static Sitting-Level of Assistance: Contact guard, Minimum assistance  Static Sitting-Comment/Number of Minutes: Bouts of SBA, pt seated for ~4 min before having to return to supine 2/2 BL proximal LE pain    Activity  Tolerance:  Activity Tolerance  Endurance: Decreased tolerance for upright activites    Treatments:  Therapeutic Exercise  Therapeutic Exercise Performed: Yes  Therapeutic Exercise Activity 1: Supine: AP x20 BL, QS x10, GS x10  Therapeutic Exercise Activity 2: Seated EOB: LAQ x5 BL  Therapeutic Exercise Activity 3: Supine in chair position in bed: SAQ x5 BL    Therapeutic Activity  Therapeutic Activity Performed: Yes  Therapeutic Activity 1: Pt sat EOB for ~4 min with UE/LE support CGA-min A with episodes of SBA with focus on orienting to position, maintaining upright posture and trunk control.    Bed Mobility  Bed Mobility: Yes  Bed Mobility 1  Bed Mobility 1: Supine to sitting, Scooting  Level of Assistance 1: Maximum assistance, Moderate verbal cues, Moderate tactile cues, +2  Bed Mobility Comments 1: HOB elevated and use of bed HR. Required assist with trunk, LEs and scooting glutes towards EOB with use of draw sheets. Verbal/tactile cues for proper hand placement and body mechanics.  Bed Mobility 2  Bed Mobility  2: Scooting  Level of Assistance 2: Maximum assistance, Moderate verbal cues  Bed Mobility Comments 2: Pt able to use UEs to assist with scooting glutes towards HOB with use of draw sheets. Pt educated on maintaining NWB precautions while scooting.  Bed Mobility 3  Bed Mobility 3: Sitting to supine  Level of Assistance 3: Maximum assistance, +2  Bed Mobility Comments 3: HOB flat  Bed Mobility 4  Bed Mobility 4: Rolling right, Rolling left  Level of Assistance 4: Moderate assistance, Moderate verbal cues  Bed Mobility Comments 4: HOB flat and use of draw sheets and bed HR. Increased time for kodak care activities.    Transfers  Transfer: No (Pt unable to tolerate transfer this date 2/2 pain.)    Outcome Measures:  [facility] Basic Mobility  Turning from your back to your side while in a flat bed without using bedrails: A lot  Moving from lying on your back to sitting on the side of a flat bed without using  bedrails: A lot  Moving to and from bed to chair (including a wheelchair): Total  Standing up from a chair using your arms (e.g. wheelchair or bedside chair): Total  To walk in hospital room: Total  Climbing 3-5 steps with railing: Total  Basic Mobility - Total Score: 8    Education Documentation  Precautions, taught by Rose Escobedo PT at 11/27/2024  1:36 PM.  Learner: Patient  Readiness: Acceptance  Method: Explanation  Response: Verbalizes Understanding, Demonstrated Understanding, Needs Reinforcement    Body Mechanics, taught by Rose Escobedo PT at 11/27/2024  1:36 PM.  Learner: Patient  Readiness: Acceptance  Method: Explanation  Response: Verbalizes Understanding, Demonstrated Understanding, Needs Reinforcement    Home Exercise Program, taught by Rose Escobedo PT at 11/27/2024  1:36 PM.  Learner: Patient  Readiness: Acceptance  Method: Explanation  Response: Verbalizes Understanding, Demonstrated Understanding, Needs Reinforcement    Mobility Training, taught by Rose Escobedo PT at 11/27/2024  1:36 PM.  Learner: Patient  Readiness: Acceptance  Method: Explanation  Response: Verbalizes Understanding, Demonstrated Understanding, Needs Reinforcement    Education Comments  No comments found.      OP EDUCATION:       Encounter Problems       Encounter Problems (Active)       Balance       Pt will demonstrate static/dynamic sitting balance for >/= 5 min CGA with UE/LE support without evidence of retro/lateral leaning or LOB.  (Progressing)       Start:  11/25/24    Expected End:  12/09/24               Mobility       Pt will complete supine, seated and standing exercises to maintain/improve overall strength with minimal verbal cues.   (Progressing)       Start:  11/25/24    Expected End:  12/09/24               PT Transfers       Pt will be able to complete all bed mobility tasks with use of bed HR mod A. (Progressing)       Start:  11/25/24    Expected End:  12/09/24            Pt will  be able to complete stand pivot transfer with FWW mod A demonstrating good safety awareness and proper body mechanics while maintaining heel WB.  (Progressing)       Start:  11/25/24    Expected End:  12/09/24               Safety       Pt will demonstrate ability to follow NWB precautions on BL Les during functional mobility tasks with minimal verbal cues and ability to verbally teach back precautions.   (Progressing)       Start:  11/25/24    Expected End:  12/09/24

## 2024-11-29 LAB
ATRIAL RATE: 80 BPM
P AXIS: 55 DEGREES
PR INTERVAL: 264 MS
Q ONSET: 213 MS
QRS COUNT: 13 BEATS
QRS DURATION: 82 MS
QT INTERVAL: 406 MS
QTC CALCULATION(BAZETT): 468 MS
QTC FREDERICIA: 447 MS
R AXIS: -14 DEGREES
T AXIS: 47 DEGREES
T OFFSET: 416 MS
VENTRICULAR RATE: 80 BPM

## 2024-12-02 ENCOUNTER — APPOINTMENT (OUTPATIENT)
Dept: RESPIRATORY THERAPY | Facility: HOSPITAL | Age: 69
End: 2024-12-02
Payer: MEDICARE

## 2024-12-02 NOTE — TELEPHONE ENCOUNTER
Spoke with patient.    He recently had a toe amputated and can not walk for at least 2 weeks.    He will come in to the office once he can walk again and complete the 6 min walk.

## 2024-12-03 ENCOUNTER — APPOINTMENT (OUTPATIENT)
Dept: CARDIOLOGY | Facility: CLINIC | Age: 69
End: 2024-12-03
Payer: MEDICARE

## 2024-12-03 LAB
LABORATORY COMMENT REPORT: NORMAL
PATH REPORT.FINAL DX SPEC: NORMAL
PATH REPORT.GROSS SPEC: NORMAL
PATH REPORT.RELEVANT HX SPEC: NORMAL
PATH REPORT.TOTAL CANCER: NORMAL

## 2024-12-04 ENCOUNTER — OFFICE VISIT (OUTPATIENT)
Dept: WOUND CARE | Facility: CLINIC | Age: 69
End: 2024-12-04
Payer: MEDICARE

## 2024-12-04 PROCEDURE — 11042 DBRDMT SUBQ TIS 1ST 20SQCM/<: CPT

## 2024-12-04 PROCEDURE — 11721 DEBRIDE NAIL 6 OR MORE: CPT

## 2024-12-09 ENCOUNTER — APPOINTMENT (OUTPATIENT)
Dept: PULMONOLOGY | Facility: HOSPITAL | Age: 69
End: 2024-12-09
Payer: MEDICARE

## 2024-12-11 ENCOUNTER — OFFICE VISIT (OUTPATIENT)
Dept: WOUND CARE | Facility: CLINIC | Age: 69
End: 2024-12-11
Payer: MEDICARE

## 2024-12-11 PROCEDURE — 11042 DBRDMT SUBQ TIS 1ST 20SQCM/<: CPT

## 2024-12-12 DIAGNOSIS — I25.10 ASCVD (ARTERIOSCLEROTIC CARDIOVASCULAR DISEASE): ICD-10-CM

## 2024-12-12 RX ORDER — ATORVASTATIN CALCIUM 40 MG/1
40 TABLET, FILM COATED ORAL NIGHTLY
Qty: 90 TABLET | Refills: 3 | Status: SHIPPED | OUTPATIENT
Start: 2024-12-12

## 2024-12-18 ENCOUNTER — APPOINTMENT (OUTPATIENT)
Dept: WOUND CARE | Facility: CLINIC | Age: 69
End: 2024-12-18
Payer: MEDICARE

## 2024-12-18 DIAGNOSIS — F41.9 ANXIETY: ICD-10-CM

## 2024-12-19 RX ORDER — MIRTAZAPINE 15 MG/1
15 TABLET, FILM COATED ORAL NIGHTLY
Qty: 90 TABLET | Refills: 1 | Status: SHIPPED | OUTPATIENT
Start: 2024-12-19

## 2024-12-23 ENCOUNTER — APPOINTMENT (OUTPATIENT)
Dept: PRIMARY CARE | Facility: CLINIC | Age: 69
End: 2024-12-23
Payer: MEDICARE

## 2024-12-26 ENCOUNTER — APPOINTMENT (OUTPATIENT)
Dept: RADIOLOGY | Facility: HOSPITAL | Age: 69
End: 2024-12-26
Payer: MEDICARE

## 2024-12-26 ENCOUNTER — PATIENT OUTREACH (OUTPATIENT)
Dept: PRIMARY CARE | Facility: CLINIC | Age: 69
End: 2024-12-26
Payer: MEDICARE

## 2024-12-26 ENCOUNTER — APPOINTMENT (OUTPATIENT)
Dept: CARDIOLOGY | Facility: HOSPITAL | Age: 69
End: 2024-12-26
Payer: MEDICARE

## 2024-12-26 ENCOUNTER — HOSPITAL ENCOUNTER (EMERGENCY)
Facility: HOSPITAL | Age: 69
Discharge: NURSING, REHAB OR LONG-TERM CARE | End: 2024-12-27
Attending: EMERGENCY MEDICINE
Payer: MEDICARE

## 2024-12-26 DIAGNOSIS — R06.02 SHORTNESS OF BREATH: ICD-10-CM

## 2024-12-26 DIAGNOSIS — J44.1 COPD EXACERBATION (MULTI): ICD-10-CM

## 2024-12-26 DIAGNOSIS — R73.09 ELEVATED GLUCOSE: Chronic | ICD-10-CM

## 2024-12-26 DIAGNOSIS — Z02.2 ENCOUNTER FOR EXAMINATION FOR ADMISSION TO NURSING HOME: Primary | ICD-10-CM

## 2024-12-26 LAB
ALBUMIN SERPL BCP-MCNC: 4 G/DL (ref 3.4–5)
ALP SERPL-CCNC: 153 U/L (ref 33–136)
ALT SERPL W P-5'-P-CCNC: 11 U/L (ref 10–52)
ANION GAP SERPL CALC-SCNC: 15 MMOL/L (ref 10–20)
AST SERPL W P-5'-P-CCNC: 14 U/L (ref 9–39)
BASOPHILS # BLD AUTO: 0.02 X10*3/UL (ref 0–0.1)
BASOPHILS NFR BLD AUTO: 0.2 %
BILIRUB SERPL-MCNC: 1 MG/DL (ref 0–1.2)
BNP SERPL-MCNC: 27 PG/ML (ref 0–99)
BUN SERPL-MCNC: 16 MG/DL (ref 6–23)
CALCIUM SERPL-MCNC: 10 MG/DL (ref 8.6–10.3)
CARDIAC TROPONIN I PNL SERPL HS: 3 NG/L (ref 0–20)
CARDIAC TROPONIN I PNL SERPL HS: 3 NG/L (ref 0–20)
CHLORIDE SERPL-SCNC: 94 MMOL/L (ref 98–107)
CO2 SERPL-SCNC: 34 MMOL/L (ref 21–32)
CREAT SERPL-MCNC: 1.35 MG/DL (ref 0.5–1.3)
EGFRCR SERPLBLD CKD-EPI 2021: 56 ML/MIN/1.73M*2
EOSINOPHIL # BLD AUTO: 0.1 X10*3/UL (ref 0–0.4)
EOSINOPHIL NFR BLD AUTO: 1 %
ERYTHROCYTE [DISTWIDTH] IN BLOOD BY AUTOMATED COUNT: 15.9 % (ref 11.5–14.5)
FLUAV RNA RESP QL NAA+PROBE: NOT DETECTED
FLUBV RNA RESP QL NAA+PROBE: NOT DETECTED
GLUCOSE SERPL-MCNC: 100 MG/DL (ref 74–99)
HCT VFR BLD AUTO: 38.5 % (ref 41–52)
HGB BLD-MCNC: 11.7 G/DL (ref 13.5–17.5)
IMM GRANULOCYTES # BLD AUTO: 0.06 X10*3/UL (ref 0–0.5)
IMM GRANULOCYTES NFR BLD AUTO: 0.6 % (ref 0–0.9)
INR PPP: 1.1 (ref 0.9–1.1)
LYMPHOCYTES # BLD AUTO: 1.63 X10*3/UL (ref 0.8–3)
LYMPHOCYTES NFR BLD AUTO: 15.9 %
MAGNESIUM SERPL-MCNC: 2.6 MG/DL (ref 1.6–2.4)
MCH RBC QN AUTO: 27.8 PG (ref 26–34)
MCHC RBC AUTO-ENTMCNC: 30.4 G/DL (ref 32–36)
MCV RBC AUTO: 91 FL (ref 80–100)
MONOCYTES # BLD AUTO: 0.66 X10*3/UL (ref 0.05–0.8)
MONOCYTES NFR BLD AUTO: 6.5 %
NEUTROPHILS # BLD AUTO: 7.75 X10*3/UL (ref 1.6–5.5)
NEUTROPHILS NFR BLD AUTO: 75.8 %
NRBC BLD-RTO: 0 /100 WBCS (ref 0–0)
PLATELET # BLD AUTO: 288 X10*3/UL (ref 150–450)
POTASSIUM SERPL-SCNC: 4.5 MMOL/L (ref 3.5–5.3)
PROT SERPL-MCNC: 8.2 G/DL (ref 6.4–8.2)
PROTHROMBIN TIME: 12.6 SECONDS (ref 9.8–12.8)
RBC # BLD AUTO: 4.21 X10*6/UL (ref 4.5–5.9)
SARS-COV-2 RNA RESP QL NAA+PROBE: NOT DETECTED
SODIUM SERPL-SCNC: 138 MMOL/L (ref 136–145)
WBC # BLD AUTO: 10.2 X10*3/UL (ref 4.4–11.3)

## 2024-12-26 PROCEDURE — 96374 THER/PROPH/DIAG INJ IV PUSH: CPT | Mod: 59

## 2024-12-26 PROCEDURE — 99285 EMERGENCY DEPT VISIT HI MDM: CPT | Mod: 25 | Performed by: EMERGENCY MEDICINE

## 2024-12-26 PROCEDURE — 99285 EMERGENCY DEPT VISIT HI MDM: CPT | Performed by: EMERGENCY MEDICINE

## 2024-12-26 PROCEDURE — 93005 ELECTROCARDIOGRAM TRACING: CPT

## 2024-12-26 PROCEDURE — 71045 X-RAY EXAM CHEST 1 VIEW: CPT | Performed by: STUDENT IN AN ORGANIZED HEALTH CARE EDUCATION/TRAINING PROGRAM

## 2024-12-26 PROCEDURE — 2500000001 HC RX 250 WO HCPCS SELF ADMINISTERED DRUGS (ALT 637 FOR MEDICARE OP)

## 2024-12-26 PROCEDURE — 2500000005 HC RX 250 GENERAL PHARMACY W/O HCPCS

## 2024-12-26 PROCEDURE — 2550000001 HC RX 255 CONTRASTS: Performed by: EMERGENCY MEDICINE

## 2024-12-26 PROCEDURE — 84484 ASSAY OF TROPONIN QUANT: CPT

## 2024-12-26 PROCEDURE — 2500000004 HC RX 250 GENERAL PHARMACY W/ HCPCS (ALT 636 FOR OP/ED)

## 2024-12-26 PROCEDURE — 71275 CT ANGIOGRAPHY CHEST: CPT

## 2024-12-26 PROCEDURE — 85025 COMPLETE CBC W/AUTO DIFF WBC: CPT

## 2024-12-26 PROCEDURE — 36415 COLL VENOUS BLD VENIPUNCTURE: CPT

## 2024-12-26 PROCEDURE — 85610 PROTHROMBIN TIME: CPT

## 2024-12-26 PROCEDURE — 80053 COMPREHEN METABOLIC PANEL: CPT

## 2024-12-26 PROCEDURE — 83735 ASSAY OF MAGNESIUM: CPT

## 2024-12-26 PROCEDURE — 93010 ELECTROCARDIOGRAM REPORT: CPT | Performed by: EMERGENCY MEDICINE

## 2024-12-26 PROCEDURE — 87636 SARSCOV2 & INF A&B AMP PRB: CPT

## 2024-12-26 PROCEDURE — 83880 ASSAY OF NATRIURETIC PEPTIDE: CPT

## 2024-12-26 PROCEDURE — 71045 X-RAY EXAM CHEST 1 VIEW: CPT

## 2024-12-26 RX ORDER — ONDANSETRON HYDROCHLORIDE 2 MG/ML
4 INJECTION, SOLUTION INTRAVENOUS ONCE
Status: COMPLETED | OUTPATIENT
Start: 2024-12-26 | End: 2024-12-26

## 2024-12-26 RX ORDER — ACETAMINOPHEN 325 MG/1
975 TABLET ORAL ONCE
Status: COMPLETED | OUTPATIENT
Start: 2024-12-26 | End: 2024-12-26

## 2024-12-26 RX ADMIN — ONDANSETRON 4 MG: 2 INJECTION INTRAMUSCULAR; INTRAVENOUS at 20:50

## 2024-12-26 RX ADMIN — Medication 4 L/MIN: at 20:50

## 2024-12-26 RX ADMIN — IOHEXOL 60 ML: 350 INJECTION, SOLUTION INTRAVENOUS at 22:04

## 2024-12-26 RX ADMIN — ACETAMINOPHEN 975 MG: 325 TABLET ORAL at 20:48

## 2024-12-26 RX ADMIN — Medication 4 L/MIN: at 19:36

## 2024-12-26 ASSESSMENT — PAIN DESCRIPTION - LOCATION: LOCATION: TOE (COMMENT WHICH ONE)

## 2024-12-26 ASSESSMENT — PAIN SCALES - GENERAL
PAINLEVEL_OUTOF10: 0 - NO PAIN
PAINLEVEL_OUTOF10: 0 - NO PAIN
PAINLEVEL_OUTOF10: 4
PAINLEVEL_OUTOF10: 0 - NO PAIN

## 2024-12-26 ASSESSMENT — COLUMBIA-SUICIDE SEVERITY RATING SCALE - C-SSRS
1. IN THE PAST MONTH, HAVE YOU WISHED YOU WERE DEAD OR WISHED YOU COULD GO TO SLEEP AND NOT WAKE UP?: NO
2. HAVE YOU ACTUALLY HAD ANY THOUGHTS OF KILLING YOURSELF?: NO
6. HAVE YOU EVER DONE ANYTHING, STARTED TO DO ANYTHING, OR PREPARED TO DO ANYTHING TO END YOUR LIFE?: NO

## 2024-12-26 ASSESSMENT — PAIN DESCRIPTION - ORIENTATION: ORIENTATION: RIGHT

## 2024-12-26 ASSESSMENT — PAIN - FUNCTIONAL ASSESSMENT: PAIN_FUNCTIONAL_ASSESSMENT: 0-10

## 2024-12-26 NOTE — ED TRIAGE NOTES
EMS states,patient has c/o increased sob since yesterday, just got discharged from rehab center [facility] back to home, recently had rt third toe amputation. Denies chest pains, dizziness, fever, chills, N, V.

## 2024-12-27 ENCOUNTER — APPOINTMENT (OUTPATIENT)
Dept: CARDIOLOGY | Facility: HOSPITAL | Age: 69
End: 2024-12-27
Payer: MEDICARE

## 2024-12-27 VITALS
RESPIRATION RATE: 18 BRPM | TEMPERATURE: 97.3 F | DIASTOLIC BLOOD PRESSURE: 81 MMHG | SYSTOLIC BLOOD PRESSURE: 158 MMHG | OXYGEN SATURATION: 96 % | HEART RATE: 87 BPM | WEIGHT: 250 LBS | BODY MASS INDEX: 33.86 KG/M2 | HEIGHT: 72 IN

## 2024-12-27 PROBLEM — Z02.2 ENCOUNTER FOR EXAMINATION FOR ADMISSION TO NURSING HOME: Status: ACTIVE | Noted: 2024-12-27

## 2024-12-27 PROBLEM — J44.1 COPD EXACERBATION (MULTI): Status: ACTIVE | Noted: 2024-12-27

## 2024-12-27 LAB
ATRIAL RATE: 71 BPM
ATRIAL RATE: 76 BPM
P AXIS: 77 DEGREES
P AXIS: 90 DEGREES
P OFFSET: 140 MS
P OFFSET: 143 MS
P ONSET: 111 MS
P ONSET: 111 MS
PR INTERVAL: 206 MS
PR INTERVAL: 206 MS
Q ONSET: 214 MS
Q ONSET: 214 MS
QRS COUNT: 12 BEATS
QRS COUNT: 13 BEATS
QRS DURATION: 78 MS
QRS DURATION: 84 MS
QT INTERVAL: 422 MS
QT INTERVAL: 426 MS
QTC CALCULATION(BAZETT): 462 MS
QTC CALCULATION(BAZETT): 474 MS
QTC FREDERICIA: 450 MS
QTC FREDERICIA: 456 MS
R AXIS: -14 DEGREES
R AXIS: -15 DEGREES
T AXIS: 33 DEGREES
T AXIS: 42 DEGREES
T OFFSET: 425 MS
T OFFSET: 427 MS
VENTRICULAR RATE: 71 BPM
VENTRICULAR RATE: 76 BPM

## 2024-12-27 PROCEDURE — 2500000004 HC RX 250 GENERAL PHARMACY W/ HCPCS (ALT 636 FOR OP/ED)

## 2024-12-27 PROCEDURE — 97161 PT EVAL LOW COMPLEX 20 MIN: CPT | Mod: GP

## 2024-12-27 PROCEDURE — 2500000005 HC RX 250 GENERAL PHARMACY W/O HCPCS

## 2024-12-27 PROCEDURE — 96376 TX/PRO/DX INJ SAME DRUG ADON: CPT

## 2024-12-27 PROCEDURE — 93005 ELECTROCARDIOGRAM TRACING: CPT

## 2024-12-27 PROCEDURE — 97166 OT EVAL MOD COMPLEX 45 MIN: CPT | Mod: GO

## 2024-12-27 RX ORDER — ONDANSETRON HYDROCHLORIDE 2 MG/ML
4 INJECTION, SOLUTION INTRAVENOUS ONCE
Status: COMPLETED | OUTPATIENT
Start: 2024-12-27 | End: 2024-12-27

## 2024-12-27 RX ADMIN — Medication 4 L/MIN: at 10:48

## 2024-12-27 RX ADMIN — ONDANSETRON 4 MG: 2 INJECTION INTRAMUSCULAR; INTRAVENOUS at 10:48

## 2024-12-27 ASSESSMENT — COGNITIVE AND FUNCTIONAL STATUS - GENERAL
MOVING TO AND FROM BED TO CHAIR: A LITTLE
EATING MEALS: A LITTLE
DRESSING REGULAR LOWER BODY CLOTHING: A LOT
WALKING IN HOSPITAL ROOM: A LITTLE
TOILETING: A LOT
MOBILITY SCORE: 15
CLIMB 3 TO 5 STEPS WITH RAILING: A LOT
MOVING FROM LYING ON BACK TO SITTING ON SIDE OF FLAT BED WITH BEDRAILS: A LOT
TURNING FROM BACK TO SIDE WHILE IN FLAT BAD: A LOT
DAILY ACTIVITIY SCORE: 15
DRESSING REGULAR UPPER BODY CLOTHING: A LITTLE
PERSONAL GROOMING: A LITTLE
STANDING UP FROM CHAIR USING ARMS: A LITTLE
HELP NEEDED FOR BATHING: A LOT

## 2024-12-27 ASSESSMENT — PAIN SCALES - GENERAL
PAINLEVEL_OUTOF10: 0 - NO PAIN
PAINLEVEL_OUTOF10: 8
PAINLEVEL_OUTOF10: 8
PAINLEVEL_OUTOF10: 0 - NO PAIN

## 2024-12-27 ASSESSMENT — PAIN - FUNCTIONAL ASSESSMENT
PAIN_FUNCTIONAL_ASSESSMENT: 0-10
PAIN_FUNCTIONAL_ASSESSMENT: 0-10

## 2024-12-27 ASSESSMENT — ACTIVITIES OF DAILY LIVING (ADL): BATHING_ASSISTANCE: MAXIMAL

## 2024-12-27 NOTE — PROGRESS NOTES
Occupational Therapy    Evaluation    Patient Name: Angus Redman  MRN: [id number]  Department: [facility] ED  Room: [place] [address]  Today's Date: 12/27/2024       Assessment  IP OT Assessment  OT Assessment: Patient would benefit from additional skilled rehab at a moderate intensity to fully maximize independence in adls, activity tolerance for adls, and functional mobility tasks for adls.  Prognosis: Good  Evaluation/Treatment Tolerance: Patient tolerated treatment well  End of Session Communication: Bedside nurse  End of Session Patient Position: Bed, 2 rail up (ED cot)  Plan:  Treatment Interventions: ADL retraining, Functional transfer training  OT Frequency: 3 times per week  OT Discharge Recommendations: Moderate intensity level of continued care  OT - OK to Discharge: Yes (to next level of care when cleared by medical team.)    Subjective   Current Problem:  No diagnosis found.  General:  General  Reason for Referral: activties of daily living  Referred By: Lauri  Past Medical History Relevant to Rehab: PMH: recent 3rd toe amp, HTN, HLD, CKD, CAD, 4L home O2, restrictive lung disease  Co-Treatment: PT  Co-Treatment Reason: patient safety  Prior to Session Communication: Bedside nurse  Patient Position Received: Bed, 2 rail up (ED cot)  Precautions:  Medical Precautions: Fall precautions  Post-Surgical Precautions:  (WBAT B LE)    Vital Sign (Past 2hrs)                Pain:  Pain Assessment  Pain Assessment: 0-10  0-10 (Numeric) Pain Score: 8  Pain Location: Foot  Pain Orientation: Right  Pain Interventions: Repositioned    Objective   Cognition:  Overall Cognitive Status: Within Functional Limits  Orientation Level: Oriented X4  Insight: Mild  Processing Speed: Delayed           Home Living:  Type of Home: House  Lives With: Alone  Home Living Comments: Patient lives alone, 2 floor home with 3 entry steps, 12 steps up to bed and full bath. 12 steps down to walk in shower in basement. Owns walker.   Prior  Function:  Prior Function Comments: Prior to recent snf stay, patient was independent at home with adls and home management. Does not drive. Patient was discharged from SNF on 12/24/24 and returned home.  IADL History:     ADL:  Eating Assistance: Minimal  Grooming Assistance: Moderate  Bathing Assistance: Maximal  UE Dressing Assistance: Maximal  LE Dressing Assistance: Maximal  Activity Tolerance:  Endurance: Tolerates less than 10 min exercise, no significant change in vital signs  Bed Mobility/Transfers: Bed Mobility  Bed Mobility: Yes (MAX X 2 sup to sit eob. MIN X2 sit to stand. MIN X2 side step w/RW along bed to reposition self. MIN X2 stand to sit and sit to supine in bed. Patient reports nausea, weakness, lightheadedness during mobility. Relieved in supine. All needs in reach.)      Sensation:  Light Touch: No apparent deficits  Strength:  Strength Comments: B UE functionally assessed, appears at least 3+/5 overall  Perception:     Coordination:  Movements are Fluid and Coordinated: Yes   Hand Function:  Hand Function  Gross Grasp: Functional    Outcome Measures: [facility] Daily Activity  Putting on and taking off regular lower body clothing: A lot  Bathing (including washing, rinsing, drying): A lot  Putting on and taking off regular upper body clothing: A little  Toileting, which includes using toilet, bedpan or urinal: A lot  Taking care of personal grooming such as brushing teeth: A little  Eating Meals: A little  Daily Activity - Total Score: 15         and    Education Documentation  No documentation found.  Education Comments  No comments found.      Goals:   Encounter Problems       Encounter Problems (Active)       OT Goals       Patient will complete UE adls with MOD I. (Progressing)       Start:  12/27/24    Expected End:  01/10/25            Patient will complete LE adls with MOD I. (Progressing)       Start:  12/27/24    Expected End:  01/10/25            Patient will complete transfers with MOD I.  (Progressing)       Start:  12/27/24    Expected End:  01/10/25            Patient will complete functional mobility tasks with MOD I. (Progressing)       Start:  12/27/24    Expected End:  01/10/25

## 2024-12-27 NOTE — ED PROVIDER NOTES
Emergency Department Provider Note        History of Present Illness     History provided by: Patient  Limitations to History: None  External Records Reviewed with Brief Summary: Discharge Summary from 11/2024 which showed admission for toe amputation    HPI:  Angus Redman is a 69 y.o. male paroxysmal atrial fibrillation not on anticoagulation, hyperlipidemia, hypertension, hypothyroidism, CKD, coronary artery disease, restrictive lung disease on 4 L nasal cannula presenting for shortness of breath.  He woke up this morning and felt like he had a hard time catching his breath.  He has been coughing for the last several days with brownish phlegm.  Denies any fevers or chills.  He had about 30 minutes of chest pain earlier in the day that has since resolved and does not have any pain here.  He was admitted last month for amputation of some of his toes.  He was discharged to a nursing facility.  He recently was allowed to weight-bear again was going through PT/OT.  He did not want to be the nursing home anymore and self discharged 2 days ago.  He went home with his family.  However, he is now realizing that he should not have left the facility as he is still feeling generally weak and feels as if he still needed the physical therapy.    Physical Exam   Triage vitals:  T 36.4 °C (97.5 °F) (Pt just placed in room, from [facility])  HR 83  /70  RR 20  O2 99 % Supplemental oxygen (4 L per NC.)    Physical Exam  Vitals and nursing note reviewed.   Constitutional:       General: He is not in acute distress.     Appearance: He is well-developed.   HENT:      Head: Normocephalic and atraumatic.      Right Ear: External ear normal.      Left Ear: External ear normal.      Nose: Nose normal.   Eyes:      General: No scleral icterus.     Conjunctiva/sclera: Conjunctivae normal.      Pupils: Pupils are equal, round, and reactive to light.   Cardiovascular:      Rate and Rhythm: Normal rate and regular rhythm.       Heart sounds: No murmur heard.  Pulmonary:      Effort: Pulmonary effort is normal. No respiratory distress.      Breath sounds: Normal breath sounds.   Abdominal:      Palpations: Abdomen is soft.      Tenderness: There is no abdominal tenderness.   Musculoskeletal:         General: No swelling.      Cervical back: Neck supple. No rigidity.   Skin:     General: Skin is warm and dry.   Neurological:      General: No focal deficit present.      Mental Status: He is alert.   Psychiatric:         Mood and Affect: Mood normal.          Medical Decision Making & ED Course   Medical Decision Makin y.o. male presenting for shortness of breath and generalized weakness.  On arrival, he is afebrile and hemodynamically stable.  He is on his baseline 4 L nasal cannula, no respiratory distress or hypoxia.  We will obtain labwork and imaging to evaluate for myocardial infarction, pneumothorax, pneumonia, pulmonary embolism.    CBC negative for leukocytosis.  Chemistry panel shows stable CKD.  Troponin negative x 2, flu and COVID-negative.  BNP normal.  Chest x-ray shows findings possible for pneumonia, but CT angio chest shows stable pleural effusions.  No pulmonary embolism.    Patient remains hemodynamically stable and on his baseline supplemental oxygen.  No hypoxia or respiratory distress.  At this time, does not appear to need inpatient treatment.  However, he does not feel able to take care of himself at home.  This was discussed with the hospitalist.  Recommends that we have PT/OT and social work evaluate him in the morning as he was recently admitted.  This was discussed with the patient he is agreeable.  Signed out to incoming physician pending PT/OT evaluation.    Leonardo Saldana DO, PGY 4  Emergency Medicine Resident    ----     Social Determinants of Health which Significantly Impact Care: None identified     EKG Independent Interpretation: EKG interpreted by myself. Please see ED Course for full  interpretation.    Independent Result Review and Interpretation: Relevant laboratory and radiographic results were reviewed and independently interpreted by myself.  As necessary, they are commented on in the ED Course.    Chronic conditions affecting the patient's care: As documented above in [facility]    The patient was discussed with the following consultants/services: None    Care Considerations: As documented above in [facility]    ED Course:  ED Course as of 12/27/24 0706   Thu Dec 26, 2024   2102 EKG taken 26 December 2024 at 2100 showing normal sinus rate and rhythm, normal axis, first-degree AV block with NM interval 206, QTc 474, otherwise normal intervals, no acute ST elevation or depression [DS]   2103 EKG taken 26 December 2024 at 1911 showing [DS]   2103  normal sinus rate and rhythm, normal axis, normal intervals except for first-degree AV block with a NM interval 206, no acute ST elevation or depression [DS]   2342 Patient signed out to me pending PT/OT evaluation in the morning possibly for placement [ME]      ED Course User Index  [DS] Juan J Myrick MD  [ME] Yaw Carreon DO     Disposition   Patient was signed out to Dr. Gamez at 0200 pending completion of their work-up.  Please see the next provider's transition of care note for the remainder of the patient's care.     Procedures   Procedures    Patient seen and discussed with ED attending physician.    Leonardo Saldana DO  Emergency Medicine     Leonardo Saldana DO  Resident  12/27/24 0706

## 2024-12-27 NOTE — ED NOTES
[facility] [phone].     Radha Trujillo, [facility] hospital  12/27/24 0558

## 2024-12-27 NOTE — PROGRESS NOTES
Physical Therapy    Physical Therapy Evaluation    Patient Name: Angus Redman  MRN: [id number]  Today's Date: 12/27/2024   Time Calculation  Start Time: 0912  Stop Time: 0925  Time Calculation (min): 13 min  AC22/AC22    Assessment/Plan   PT Assessment: Pt demonstrates impairments listed below.  Pt appears below baseline level of function and based on current level of function, pt would benefit from continued skilled therapy while in the hospital to ensure safety, decrease risk of falls, and regain strength/mobility back to baseline.  Once stable enough for discharge, pt would benefit from moderate intensity therapy prior to returning home.     PT Assessment Results: Decreased strength, Decreased range of motion, Impaired balance, Decreased mobility, Decreased cognition, Impaired judgement, Decreased safety awareness, Pain  Rehab Prognosis: Fair (2/2 non compliance)  Barriers to Discharge Home: Caregiver assistance, Physical needs  Caregiver Assistance: Patient lives alone and/or does not have reliable caregiver assistance  Physical Needs: Stair navigation to access bed limited by function/safety, Stair navigation to access bath limited by function/safety, Stair navigation into home limited by function/safety, Intermittent mobility assistance needed, Intermittent ADL assistance needed, High falls risk due to function or environment  Evaluation/Treatment Tolerance:  (limited 2/2 nausea)  Medical Staff Made Aware: Yes  End of Session Communication: Bedside nurse  End of Session Patient Position:  (supine in ED gurney with 2 rails up)  IP OR SWING BED PT PLAN  Inpatient or Swing Bed: Inpatient  PT Plan  Treatment/Interventions: Bed mobility, Transfer training, Gait training, Stair training, Balance training, Neuromuscular re-education, Strengthening, Endurance training, Range of motion, Therapeutic exercise, Therapeutic activity, Home exercise program  PT Plan: Ongoing PT  PT Frequency: 3 times per week  PT Discharge  Recommendations: Moderate intensity level of continued care  Equipment Recommended upon Discharge: Wheeled walker  PT Recommended Transfer Status: Assist x2 (Min-Max A)  PT - OK to Discharge: Yes - To next level of care when cleared by medical team    Subjective     Current Problem:  No diagnosis found.    Past Medical History:  Patient Active Problem List   Diagnosis    Abnormality of gait and mobility    Adenomatous polyp of colon    Alcoholic gastritis    Wears glasses    Vitamin D deficiency    Folic acid deficiency    Vitamin B12 deficiency    Use of cane as ambulatory aid    Urinary urgency    Tubular adenoma of colon    Skin cancer    Scalp hematoma    Sacroiliac pain    Reactive airway disease (HHS-HCC)    Postural dizziness    Peripheral neuropathy    Paroxysmal atrial fibrillation (Multi)    Osteopenia    Osteoarthritis    Orthostatic hypotension    Noncompliance with therapeutic plan    Male erectile disorder of organic origin    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)    Lumbar radiculopathy    Shoulder pain    Left elbow pain    Iron deficiency    Hypoxemia    Hypothyroidism    Hypokalemia    Hyperlipidemia    History of paroxysmal supraventricular tachycardia    Gastroesophageal reflux disease    Falling    Eczema    Early cataracts, bilateral    Situational depression    Depression, controlled    Cubital tunnel syndrome on left    Continuous chronic alcoholism (Multi)    Congenital pes planus    Colon polyps    Chronic pain of right knee    Pain in joints of both feet    Neuropathic pain of both feet    Left foot pain    Chronic hip pain after total replacement of right hip joint    Cellulitis    Trigger ring finger of left hand    Carpal tunnel syndrome    Benign essential hypertension    BPH with obstruction/lower urinary tract symptoms    Anxiety    Macrocytic anemia    Anemia    Amputation of toe of right foot (CMS-HCC)    ALT (SGPT) level raised    Allergic rhinitis    Back pain     History of left hip replacement    Elevated bilirubin    Elevated prostate specific antigen (PSA)    Frequent falls    History of medication noncompliance    Alcohol abuse with intoxication    Restrictive lung disease    Nocturnal hypoxemia    Peripheral vascular disease, unspecified (CMS-HCC)    Asthmatic breathing    Mild intermittent asthma    Fall    NSTEMI (non-ST elevated myocardial infarction) (Multi)    Traumatic rhabdomyolysis (CMS-[facility])    DNR (do not resuscitate)    Elevated glucose    Alcohol-induced polyneuropathy (Multi)    Stage 3a chronic kidney disease (Multi)    Heart failure    Pressure ulcer of left ankle, unstageable (Multi)    Foot infection    Osteomyelitis of third toe of right foot (Multi)       General Visit Information:  Per EMR: pt presenting for shortness of breath. He woke up this morning and felt like he had a hard time catching his breath. He has been coughing for the last several days with brownish phlegm. Denies any fevers or chills. He had about 30 minutes of chest pain earlier in the day that has since resolved and does not have any pain here. He was admitted last month for amputation of some of his toes. He was discharged to a nursing facility. He recently was allowed to weight-bear again was going through PT/OT. He did not want to be the nursing home anymore and self discharged 2 days ago. He went home with his family. However, he is now realizing that he should not have left the facility as he is still feeling generally weak and feels as if he still needed the physical therapy.     On arrival, pt supine in ED.  Pt in no apparent distress and agreeable to therapy.    General  Reason for Referral: impaired mobility, gait training  Referred By: Yaw Carreon  Co-Treatment: OT  Prior to Session Communication: Bedside nurse  Patient Position Received:  (supine in ED esvinrreece, 2 rails up)    Home Living/PLOF:  Of note, pt was discharged from [facility] on 11/27 to [facility].  Pt self  discharged from [facility] on 12/24.     Pt lives alone (with cats) in multi level home. Pt states there are 3 MELISSA to kitchen (main level). No bathroom on main level. Pt has full bath on basement level and full bath on upper level. 7 steps down to basement level. Has WIS with Gbs in basement but states he has been only sponge bathing 2/2 to wounds. Pt's bedroom is on upper level with 2+6+5 steps up with landings in between and B rails. Pt ambulates using rollator and states that stair negotiation has become difficult. Pt has wound nurse that visits 2x/wk. He wears 4 LNC at baseline.  Pt uses [employer] for groceries and states they bring the groceries inside for him.  Pt denies any recent falls when asked.    Precautions:  Precautions  Medical Precautions: Fall precautions, Oxygen therapy device and L/min (4LNC)  Post-Surgical Precautions:  (WBAT BLE)     Objective     Pain:  Pain Assessment  Pain Assessment: 0-10  0-10 (Numeric) Pain Score: 8  Pain Location: Foot  Pain Orientation: Right  Pain Interventions: Repositioned    Cognition:  Cognition  Overall Cognitive Status: Within Functional Limits  Orientation Level: Oriented X4  Insight: Mild  Processing Speed: Delayed    General Assessments:      Activity Tolerance  Endurance: Tolerates less than 10 min exercise, no significant change in vital signs  Sensation  Sensation Comment: pt denies any numbness/tingling    Static Sitting Balance  Static Sitting-Comment/Number of Minutes: fair plus  Dynamic Sitting Balance  Dynamic Sitting-Comments: fair  Static Standing Balance  Static Standing-Comment/Number of Minutes: fair  Dynamic Standing Balance  Dynamic Standing-Comments: fair minus    Extremity/Trunk Assessments:  BLE strength: appears at least 3/5 as evidenced functionally     Functional Mobility:  Bed mobility  Supine to sit: Max A x2; pt retropulsive during transfer requiring Max Vcs for safety     Sit to supine: Mod A x1; pt required assist at BLE      Transfers  Sit to stand: Min A x2; Vcs for hand placement with poor follow through     Stand to sit: Min A x2; impulsive with decreased eccentric control     Ambulation/Stairs  Pt side stepped to R along HOB with Min A x2 and FWW; pt c/o nausea and requested to lie back down     Outcome Measures:  [facility] Basic Mobility  Turning from your back to your side while in a flat bed without using bedrails: A lot  Moving from lying on your back to sitting on the side of a flat bed without using bedrails: A lot  Moving to and from bed to chair (including a wheelchair): A little  Standing up from a chair using your arms (e.g. wheelchair or bedside chair): A little  To walk in hospital room: A little  Climbing 3-5 steps with railing: A lot  Basic Mobility - Total Score: 15    Goals:  Encounter Problems       Encounter Problems (Active)       PT Problem       Pt will be able to perform all bed mobility tasks with Min A.  (Progressing)       Start:  12/27/24    Expected End:  01/10/25            Pt will perform all transfers with CGA and FWW with proper safety mechanics.   (Progressing)       Start:  12/27/24    Expected End:  01/10/25            Pt will ambulate 50 ft with CGA using FWW for improved functional independence.  (Progressing)       Start:  12/27/24    Expected End:  01/10/25            Pt will be able to negotiate 7 steps with 1 HR with Min A.  (Progressing)       Start:  12/27/24    Expected End:  01/10/25                 Education Documentation  Body Mechanics, taught by Yvette Phelps, PT at 12/27/2024 10:01 AM.  Learner: Patient  Readiness: Acceptance  Method: Explanation  Response: Verbalizes Understanding    Home Exercise Program, taught by Yvette Phelps, PT at 12/27/2024 10:01 AM.  Learner: Patient  Readiness: Acceptance  Method: Explanation  Response: Verbalizes Understanding    Mobility Training, taught by Yvette Phelps PT at 12/27/2024 10:01 AM.  Learner: Patient  Readiness: Acceptance  Method:  Explanation  Response: Verbalizes Understanding    Education Comments  No comments found.

## 2024-12-27 NOTE — PROGRESS NOTES
Introduced myself to patient. He would like to return to the [facility] for continued Rehab. He found that he was not strong enough to  function at home without help and supervision on ambulation. I called [facility] and spoke with nursing supervisor Abbey. Patient left on 12/24/24.  He will need precert to return. PT OT has been ordered.  States just starte weight bearing last week. Patient does want to return there. He states he has no one here that can help him if this gets denied. He has a sister Antonina Redman in [facility]. Permission to call her if denied  for possibility of moving in with her. Requested [facility] send referral.   1315 approved through 1/2 [employer] 1/2 auth#[id number] .  Requested [facility] set up transport.  [address] [facility] Vehicle you requested for Angus MONROY in unit/room AC22 on 12/27/2024 is scheduled to arrive at 5:00pm EST! [facility] is handling this ride and you can contact them at [phone].       Kiersten Ramos RN

## 2025-01-08 ENCOUNTER — OFFICE VISIT (OUTPATIENT)
Dept: WOUND CARE | Facility: CLINIC | Age: 70
End: 2025-01-08
Payer: MEDICARE

## 2025-01-08 PROCEDURE — 99212 OFFICE O/P EST SF 10 MIN: CPT

## 2025-01-13 ENCOUNTER — TELEPHONE (OUTPATIENT)
Dept: PRIMARY CARE | Facility: CLINIC | Age: 70
End: 2025-01-13
Payer: MEDICARE

## 2025-01-13 NOTE — TELEPHONE ENCOUNTER
Pt of Dr Yin Esteban called in to see if we could place orders for home health for Angus to start skilled nursing and PT/OT. Please advise option 1 for intake. Thank you!

## 2025-01-14 ENCOUNTER — PATIENT OUTREACH (OUTPATIENT)
Dept: PRIMARY CARE | Facility: CLINIC | Age: 70
End: 2025-01-14
Payer: MEDICARE

## 2025-01-14 DIAGNOSIS — L89.520: ICD-10-CM

## 2025-01-14 DIAGNOSIS — I50.9 ACUTE ON CHRONIC HEART FAILURE, UNSPECIFIED HEART FAILURE TYPE: ICD-10-CM

## 2025-01-14 NOTE — PROGRESS NOTES
Discharge facility: [facility]   Discharge diagnosis: foot infection, toe amputation  Admission date: 11/27/2025  Discharge date: 1/12/2025    PCP Appointment Date: 1/29/2025  Specialist Appointment Date: Wound Care 1/15/2025  Hospital Encounter and Summary: Linked   See Discharge assessment below for further details      Medications  Medications reviewed with patient/caregiver?: Yes (1/14/2025 11:53 AM)  Is the patient having any side effects they believe may be caused by any medication additions or changes?: No (1/14/2025 11:53 AM)  Does the patient have all medications ordered at discharge?: Yes (1/14/2025 11:53 AM)  Care Management Interventions: Provided patient education (1/14/2025 11:53 AM)  Prescription Comments: Patient's discharge medication list is in his sisters care and he was not able to review them with me.  No change in medications per the patient.  Patient has [facility] starting 1/14/2025 and has a list of the medications. (1/14/2025 11:53 AM)  Is the patient taking all medications as directed (includes completed medication regime)?: Yes (1/14/2025 11:53 AM)  Care Management Interventions: Provided patient education (1/14/2025 11:53 AM)    Appointments  Does the patient have a primary care provider?: Yes (1/14/2025 11:53 AM)  Care Management Interventions: Advised patient to make appointment (1/14/2025 11:53 AM)  Has the patient kept scheduled appointments due by today?: Yes (1/14/2025 11:53 AM)  Follow Up Tasks: Appointments (1/14/2025 11:53 AM)    Self Management  What is the home health agency?: [facility] (1/14/2025 11:53 AM)  Has home health visited the patient within 72 hours of discharge?: Yes (1/14/2025 11:53 AM)    Patient Teaching  Does the patient have access to their discharge instructions?: No (1/14/2025 11:53 AM)  What is the patient's perception of their health status since discharge?: Improving (1/14/2025 11:53 AM)  Patient/Caregiver Education Comments: I called  and sspoke with the patient who was discharged from the [facility] on 1/12/2025.  Patient was discharged due to insurance coverage and number of days allowed reached.  Patient left his discharge instructions in the back of his sisters car.  I sent a message on [facility] for a copy of the instructions.  [facility] who is visiting today will have a copy of the instructions.  Patient stated that he has all his medication and there are no changes. Home Care will review the medications.  Patient has food in the house from his sister and shops on [employer]-cart.  Wound care, Ottoniel, will come to the home for dressing changes every Monday and Friday.  Patient will do to the wound center on Wednesdays.  Patient has declines seeing Dr Haskins post discharge.  I discussed the importance of a hospital discharge appointment.  Patient stated that he has an appointment on 1/29/2025 and will call the office if he needs anything. (1/14/2025 11:53 AM)     TCM call completed on: 1/14/2025    The patient is scheduled for hospital follow up on (1/29/2025). This encounter does not qualify for TCM billing.  Pts appt falls outside the 14 day window. Message sent to office to see if sooner appt is avail.  Patient denies a sooner discharge appointment.  Dates offered to him.

## 2025-01-15 NOTE — PROGRESS NOTES
Offered patient this Friday 1/17 8:30 am too early. Please advise where/when to schedule patient's TCM. No other openings within 14 days

## 2025-01-16 ENCOUNTER — TELEPHONE (OUTPATIENT)
Dept: PRIMARY CARE | Facility: CLINIC | Age: 70
End: 2025-01-16
Payer: MEDICARE

## 2025-01-22 ENCOUNTER — APPOINTMENT (OUTPATIENT)
Dept: WOUND CARE | Facility: CLINIC | Age: 70
End: 2025-01-22
Payer: MEDICARE

## 2025-01-22 DIAGNOSIS — R39.15 URINARY URGENCY: ICD-10-CM

## 2025-01-23 RX ORDER — TAMSULOSIN HYDROCHLORIDE 0.4 MG/1
0.4 CAPSULE ORAL
Qty: 90 CAPSULE | Refills: 1 | Status: SHIPPED | OUTPATIENT
Start: 2025-01-23

## 2025-01-29 ENCOUNTER — OFFICE VISIT (OUTPATIENT)
Dept: WOUND CARE | Facility: CLINIC | Age: 70
End: 2025-01-29
Payer: MEDICARE

## 2025-01-29 ENCOUNTER — APPOINTMENT (OUTPATIENT)
Dept: PRIMARY CARE | Facility: CLINIC | Age: 70
End: 2025-01-29
Payer: MEDICARE

## 2025-01-29 VITALS
DIASTOLIC BLOOD PRESSURE: 74 MMHG | HEIGHT: 72 IN | WEIGHT: 247.4 LBS | HEART RATE: 76 BPM | BODY MASS INDEX: 33.51 KG/M2 | OXYGEN SATURATION: 94 % | SYSTOLIC BLOOD PRESSURE: 126 MMHG

## 2025-01-29 DIAGNOSIS — N18.31 STAGE 3A CHRONIC KIDNEY DISEASE (MULTI): ICD-10-CM

## 2025-01-29 DIAGNOSIS — I48.91 ATRIAL FIBRILLATION, UNSPECIFIED TYPE (MULTI): ICD-10-CM

## 2025-01-29 DIAGNOSIS — Z60.2 LIVES ALONE WITH HELP AVAILABLE: ICD-10-CM

## 2025-01-29 DIAGNOSIS — J44.89 ASTHMA WITH CHRONIC OBSTRUCTIVE PULMONARY DISEASE (COPD) (MULTI): ICD-10-CM

## 2025-01-29 DIAGNOSIS — J96.21 ACUTE AND CHRONIC RESPIRATORY FAILURE WITH HYPOXIA (MULTI): Primary | ICD-10-CM

## 2025-01-29 DIAGNOSIS — L97.512 FOOT ULCERATION, RIGHT, WITH FAT LAYER EXPOSED (MULTI): Primary | ICD-10-CM

## 2025-01-29 DIAGNOSIS — E66.811 CLASS 1 OBESITY WITH SERIOUS COMORBIDITY AND BODY MASS INDEX (BMI) OF 33.0 TO 33.9 IN ADULT, UNSPECIFIED OBESITY TYPE: ICD-10-CM

## 2025-01-29 DIAGNOSIS — I50.9 HEART FAILURE, UNSPECIFIED HF CHRONICITY, UNSPECIFIED HEART FAILURE TYPE: ICD-10-CM

## 2025-01-29 DIAGNOSIS — L89.890 PRESSURE ULCER OF OTHER SITE, UNSTAGEABLE (MULTI): ICD-10-CM

## 2025-01-29 DIAGNOSIS — F10.988: ICD-10-CM

## 2025-01-29 DIAGNOSIS — E11.43 TYPE 2 DIABETES MELLITUS WITH DIABETIC AUTONOMIC NEUROPATHY, WITHOUT LONG-TERM CURRENT USE OF INSULIN: ICD-10-CM

## 2025-01-29 PROCEDURE — 11042 DBRDMT SUBQ TIS 1ST 20SQCM/<: CPT

## 2025-01-29 PROCEDURE — 3074F SYST BP LT 130 MM HG: CPT | Performed by: INTERNAL MEDICINE

## 2025-01-29 PROCEDURE — G2211 COMPLEX E/M VISIT ADD ON: HCPCS | Performed by: INTERNAL MEDICINE

## 2025-01-29 PROCEDURE — 3078F DIAST BP <80 MM HG: CPT | Performed by: INTERNAL MEDICINE

## 2025-01-29 PROCEDURE — 1159F MED LIST DOCD IN RCRD: CPT | Performed by: INTERNAL MEDICINE

## 2025-01-29 PROCEDURE — 1160F RVW MEDS BY RX/DR IN RCRD: CPT | Performed by: INTERNAL MEDICINE

## 2025-01-29 PROCEDURE — 1157F ADVNC CARE PLAN IN RCRD: CPT | Performed by: INTERNAL MEDICINE

## 2025-01-29 PROCEDURE — 99214 OFFICE O/P EST MOD 30 MIN: CPT | Performed by: INTERNAL MEDICINE

## 2025-01-29 PROCEDURE — 3008F BODY MASS INDEX DOCD: CPT | Performed by: INTERNAL MEDICINE

## 2025-01-29 PROCEDURE — 1123F ACP DISCUSS/DSCN MKR DOCD: CPT | Performed by: INTERNAL MEDICINE

## 2025-01-29 SDOH — SOCIAL STABILITY - SOCIAL INSECURITY: PROBLEMS RELATED TO LIVING ALONE: Z60.2

## 2025-01-29 NOTE — PROGRESS NOTES
Subjective   Patient ID: Angus Redman is a 69 y.o. male who presents for Follow-up.    Here for follow-up  Recently out of rehab-discharged January 12  He continues to see his podiatry at the wound clinic  Monday and Friday he has the home nurse coming to do dressing changes  Occasional cough and some wheezing.  Using portable oxygen  His sister has retired and show is been in to help more often    Paperwork requested for a trash exemption  Not using alcohol presently               Review of Systems    Objective   /74 (BP Location: Left arm, Patient Position: Sitting, BP Cuff Size: Large adult)   Pulse 76   Ht 1.829 m (6')   Wt 112 kg (247 lb 6.4 oz)   SpO2 94%   BMI 33.55 kg/m²     Physical Exam  Constitutional:       Comments: Somewhat disheveled gentleman, appearing older than his stated age, in a wheelchair with portable oxygen, overweight, very pleasant  Eye exam revealed pupils equally round and reactive, with extraocular muscles intact. Normal sclera and eyelids.  No scleral icterus  Neck exam revealed no masses, adenopathy or thyromegaly. No neck pain on range of motion was noted.  Pulmonary exam revealed clear breath sounds bilaterally in all lung fields. No wheezes bronchitis or rales noted.  Cardiac exam revealed I/VI systolic ejection murmur, without gallops or rubs.  No back flank or abdominal discomfort  Extremities bilateral ankle edema, without ulcerations  Foot exam deferred to the wound clinic         Assessment/Plan   Problem List Items Addressed This Visit             ICD-10-CM    Stage 3a chronic kidney disease (Multi) N18.31    Heart failure I50.9    Acute and chronic respiratory failure with hypoxia (Multi) - Primary J96.21    Type 2 diabetes mellitus with diabetic autonomic neuropathy, without long-term current use of insulin E11.43    Lives alone with help available Z60.2    Alcohol use, unspecified with other alcohol-induced disorder (Multi) F10.988    Atrial fibrillation  (Multi) I48.91    Asthma with chronic obstructive pulmonary disease (COPD) (Multi) J44.89    Pressure ulcer of other site, unstageable (Multi) L89.890    Class 1 obesity with serious comorbidity and body mass index (BMI) of 33.0 to 33.9 in adult E66.811, Z68.33             Portions of this encounter note have been copied from my previous note dated 10/8/24  , which have been updated where appropriate and all reflect my current medical decision making from today.        Living situation-he lives alone in a house.  He no longer drives.  His   in .  His  sister - Antonina, lives in [facility], now retired.  The brother lives in a group home near [place] near their family home.      Home health documentation-forms completed and faxed back as requested    CODE STATUS-he has requested DNR comfort care and states he has worked with the  to get forms completed accordingly.  He has these at home.  He is will send those in and will update his electronic medical record to reflect this accordingly    Healthcare power of -reviewed at his  visit.  He has designated his sister, Antonina Redman as his legal agent of authority.  His formal written and notarized medical POA has been filed    Patient was recently discharged from an inpatient facility. Discharge medication list reviewed and reconciled with her electronic medical record medication list. Discharge orders reviewed, and we'll continue to follow active medical problems as outlined in this note.    Lab work reviewed from 2024, 2025,  CT angio chest-from 2024-reviewed      S/p acute rehab stay-discharged from rehab 2025, following admission  with worsening respiratory concerns with cough and shortness of breath.  He is on portable oxygen.  Chest CT without evidence of PE.  Chronic rib fractures-contributing to his restrictive component.  Otherwise no acute findings    S/p acute hospital  stay 12/26/2024-12/27/2024--discharged to rehab to help with rehab, complicated by insufficient services at home-he lives alone and his only relative his sister lives out of town    S/p right third  toe amputations-11/22/24-he will continue wound care with podiatry    Wound care plan-presently goes to the wound clinic Wednesdays where he sees his podiatrist Dr. Jang.  Monday and Friday, the home nurse comes out- [facility] to do dressing changes    Home health-PT and OT visits continue each once a week to optimize stamina strength and stability    Hospital admission 9/23/2024-9/25/2024-acute on chronic hypoxic respiratory failure from restrictive lung disease likely exacerbated by new onset heart failure-    Left pleural effusion-thoracentesis revealed exudative fluid-furosemide started.  He will follow-up with pulmonary later this week to review    History of heart failure-cardiac evaluation-he was referred to the advanced heart failure clinic with Dr. Harry February 2024-he has completed a 2-week Holter monitor and will see Dr. Harry soon.  He was reported to have a NSTEMI.  LVEF normal on last echo 9/23.             4/24 echo-normal EF, suboptimal echo              10/24-scheduled to meet with Dr. Angel Harry in the heart failure clinic 10/14/2024.  He will continue his cardiac regimen including furosemide      Restrictive lung disease-he will follow-up with his pulmonologist provider-10/21/2024-since discharge he has been on a nebulizer-albuterol and ipratropium provided    Polypharmacy-he will continue to optimize his compliance.  He has a home nurse and will meet with our pharmacy team 10/28/2024    Hypoxemia-with oxygen dependent chronic hypoxic respiratory failure-he was reportedly hypoxic overnight-we will ordered overnight oximeter            He is now on portable oxygen presumably from his pulmonologist.  He will continue.  He states that he feels better and has more stamina             He  remains on portable oxygen and will meet with his pulmonologist as scheduled.        Nutritional concerns-unfortunately he does not do a lot of cooking.  He gets food from [employer] which is delivered      Gait unsteadiness-Home physical therapy ordered last time           He needs help getting out of shower          10/24-he now has a life alert             He uses his rollator consistently             Will continue physical therapy Tuesdays and Thursdays.            1/25-he will continue OT and PT.  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  He uses a walker instead.  Unfortunately he does not have a chairlift for the stairs as he does not think this can fit with the layout of his stairs.  Portable oxygen also continues    Recurrent falls-he will use a cane or walker as he is able to.  Unfortunately he recently fell when he went out to the [employer] to  delivered items from the store.  He had to call the squad.           Presently using a walker-encouraged him to continue to optimize his safety              Now has a wheelchair to use in his kitchen             12/23-he continues to use the walker as much as he can around the house and kitchen.  He now has a life alert              10/24-fortunately no recent falls.  He will continue efforts with therapy to regain strength stamina and ambulation conditioning especially with his stairs              1/25-no recent falls.  He just got out of rehab January 12.  He will continue home PT    Hx MRSA feet infection - from past fall. Home wound RN done w/ regular visits. Plans to follow up to check feet prn             He was referred to the  emergency room at [facility] 3/6/2024 for worsening foot infections-he was changed to oral antibiotics as below and discharged             He has ongoing foot infections mainly on the left under his first MTP from difficult calluses-currently on Augmentin and doxycycline.  He is working with the wound clinic as well as his podiatrist who he will see tomorrow, Dr. Jang 3/13/2024            10/24-bilateral feet infections-ongoing issue-wound clinic visits every Wed continues with Dr. Jang along with home visits with wound care nurseOttoniel Monday and Fridays    Elevated creatinine-2.2 in ER 3/6/2024.  He will recheck that this afternoon                 Creatinine normalized on recheck fortunately           Regarding tenuous home situation-he lives alone but now no longer drives.  He is able to hire drivers to get him where he needs to go.  He is able to order groceries and have them delivered.  At this point he declines any further changes in his home situation            10/24- his friend Vicky moved away. His sister comes to town when she can. Now retired. He's managing so far on his own.    Nutritional concerns - He gets groceries by [employer] w/ home deliveries 1-2 x wk    Alcoholism with a history of alcoholic gastritis-unfortunately he is still drinking alcohol-apparently the groceries will deliver wine which she has converted to from gin, unfortunately.                7/23-presently drinking 2 bottles of white wine daily.  He refuses to cut back              12/23-he states he quit using alcohol in 10/23 when he promised his sister he would not drink.  He has not really missed that he states strongly encouraged long-term abstinence                3/24-he confirms that he remains off alcohol.  Encouragement/support provided               10/24-unfortunately he is drinking a bottle of wine again daily.  Encouraged him to stop using alcohol              1/25-not using alcohol presently.    History  hypertension-he will continue medications           Blood pressure much improved on recheck    Alcoholic gastritis-he will continue his PPI and limit alcohol use            12/23-he states he quit using alcohol in 10/23 when he promised his sister he would not drink.  He has not really missed that he states strongly encouraged long-term abstinence             10/24-fortunately no present dyspepsia issues    Asthma-improved with Breo.  He does not always use it    Allergic rhinitis-he is allergic to cats and he has several cats.  He will continue his Allegra and Flonase      Painful peripheral neuropathy-both feet are numb, but he feels the pain at times right lateral ankle area and hands.  He is on the gabapentin twice daily now that helps.  He will continue to work with the pain clinic           He continues to meet with the pain clinic-with that appointment 1/24 to continue his duloxetine and gabapentin.              7/24-right wrist arthritis and left arm pain main problems presently.  He is on twice daily gabapentin.  He plans to establish with the pain clinic near [place]             10/24-he will meet with the pain clinic as scheduled 10/9/2024             1/25-he will continue podiatry and wound care    Vitamin D deficiency-he will continue-refilled    Vision care-he had an eye exam with Dr. Lutz in Feb '24      Depression/anxiety-he will continue the BuSpar Cymbalta and Lexapro              10/24-confirm that he is using this    Flu shot-encouraged each September-updated 9/23           10/24-he will do this soon    Covid booster -encouraged each September updated 9/23        10/24-he will get a booster soon    RSV vacc - updated 9/23    Shingrix-completed in 2021    He will follow-up in 3 months, sooner as needed    Charting was completed using voice recognition technology and may include unintended errors.

## 2025-01-31 PROBLEM — I48.91 ATRIAL FIBRILLATION (MULTI): Status: ACTIVE | Noted: 2025-01-31

## 2025-01-31 PROBLEM — E66.811 CLASS 1 OBESITY WITH SERIOUS COMORBIDITY AND BODY MASS INDEX (BMI) OF 33.0 TO 33.9 IN ADULT: Status: ACTIVE | Noted: 2025-01-31

## 2025-01-31 PROBLEM — Z60.2 LIVES ALONE WITH HELP AVAILABLE: Status: ACTIVE | Noted: 2025-01-31

## 2025-01-31 PROBLEM — F10.988: Status: ACTIVE | Noted: 2025-01-31

## 2025-01-31 PROBLEM — J44.89 ASTHMA WITH CHRONIC OBSTRUCTIVE PULMONARY DISEASE (COPD) (MULTI): Status: ACTIVE | Noted: 2025-01-31

## 2025-01-31 PROBLEM — L89.890 PRESSURE ULCER OF OTHER SITE, UNSTAGEABLE (MULTI): Status: ACTIVE | Noted: 2025-01-31

## 2025-02-01 ENCOUNTER — DOCUMENTATION (OUTPATIENT)
Dept: CARE COORDINATION | Age: 70
End: 2025-02-01
Payer: MEDICARE

## 2025-02-01 LAB — BACTERIA SPEC AEROBE CULT: ABNORMAL

## 2025-02-01 NOTE — PROGRESS NOTES
DATE OF CALL: 2/1/2025    TIME OF CALL: 1530    RESULT CALLED BY: Blanca BROOKE From "[employer]" - [phone]    RESULT CALLED: Culture, Aerobic Bacteria collected 1/29/2025 at 1200 - Results: Heavy growth of Methicillin Resistant Staphylococcus Aureus (MRSA)    NOTIFIED: Boone Bull DPM  DATE NOTIFICATION: 2/1 2025  TIME NOTIFICATION: 1551    R/B VERIFIED: YES/NO - secure chat - responded at 1559 and 1605    DID RESULT HAVE TO BE ESCALATED? no  TO WHOM?

## 2025-02-05 ENCOUNTER — OFFICE VISIT (OUTPATIENT)
Dept: WOUND CARE | Facility: CLINIC | Age: 70
End: 2025-02-05
Payer: MEDICARE

## 2025-02-05 DIAGNOSIS — F33.41 RECURRENT MAJOR DEPRESSIVE DISORDER, IN PARTIAL REMISSION (CMS-HCC): ICD-10-CM

## 2025-02-05 PROCEDURE — 11042 DBRDMT SUBQ TIS 1ST 20SQCM/<: CPT

## 2025-02-05 RX ORDER — ESCITALOPRAM OXALATE 20 MG/1
20 TABLET ORAL DAILY
Qty: 30 TABLET | Refills: 0 | Status: SHIPPED | OUTPATIENT
Start: 2025-02-05

## 2025-02-06 ENCOUNTER — PATIENT OUTREACH (OUTPATIENT)
Dept: PRIMARY CARE | Facility: CLINIC | Age: 70
End: 2025-02-06
Payer: MEDICARE

## 2025-02-06 DIAGNOSIS — E11.43 TYPE 2 DIABETES MELLITUS WITH DIABETIC AUTONOMIC NEUROPATHY, WITHOUT LONG-TERM CURRENT USE OF INSULIN: ICD-10-CM

## 2025-02-06 DIAGNOSIS — I50.9 HEART FAILURE, UNSPECIFIED HF CHRONICITY, UNSPECIFIED HEART FAILURE TYPE: ICD-10-CM

## 2025-02-06 NOTE — PROGRESS NOTES
I called and spoke with the patient who stated that he is doing better.  Patient is going to the wound center every 3 weeks now due to insurance not covering the rides.  Patient had to take [employer] for 43$, and can't afford that every 2 weeks.  Patient continues to have home wound care 3 times a week.  Patient changed his medications to [employer] for ease of delivery.  Patient is very happy with [employer].  No medication refills needed.  Instructed to call with any questions or concerns.

## 2025-02-08 DIAGNOSIS — F33.41 RECURRENT MAJOR DEPRESSIVE DISORDER, IN PARTIAL REMISSION (CMS-HCC): ICD-10-CM

## 2025-02-10 RX ORDER — ESCITALOPRAM OXALATE 20 MG/1
20 TABLET ORAL DAILY
Qty: 30 TABLET | Refills: 0 | Status: SHIPPED | OUTPATIENT
Start: 2025-02-10

## 2025-02-11 ENCOUNTER — TELEPHONE (OUTPATIENT)
Dept: PRIMARY CARE | Facility: CLINIC | Age: 70
End: 2025-02-11
Payer: MEDICARE

## 2025-02-11 NOTE — TELEPHONE ENCOUNTER
FYI:  [facility] care called to report a fall- Pt fell on 1/31, pt refused hospitalization, pt stated he fell off bed     Jarret from [facility] # [phone]

## 2025-02-12 NOTE — TELEPHONE ENCOUNTER
Left message for Jarret in regards to fall and possibly getting pt more help in the home if needed.

## 2025-02-12 NOTE — TELEPHONE ENCOUNTER
Received voicemail from [facility] stating that pt is only eligible for 6 hours of services involving an aid and he will be having an aid come out this Friday 2/14. Pt just slid off his bed and there was no injury noted.

## 2025-02-18 DIAGNOSIS — F33.41 RECURRENT MAJOR DEPRESSIVE DISORDER, IN PARTIAL REMISSION (CMS-HCC): ICD-10-CM

## 2025-02-19 ENCOUNTER — OFFICE VISIT (OUTPATIENT)
Dept: WOUND CARE | Facility: CLINIC | Age: 70
End: 2025-02-19
Payer: MEDICARE

## 2025-02-19 DIAGNOSIS — G62.9 PERIPHERAL NEUROPATHY: ICD-10-CM

## 2025-02-19 PROCEDURE — 11042 DBRDMT SUBQ TIS 1ST 20SQCM/<: CPT

## 2025-02-19 RX ORDER — PREGABALIN 150 MG/1
150 CAPSULE ORAL EVERY 6 HOURS
Qty: 120 CAPSULE | Refills: 1 | OUTPATIENT
Start: 2025-02-19

## 2025-02-19 RX ORDER — ESCITALOPRAM OXALATE 20 MG/1
20 TABLET ORAL DAILY
Qty: 90 TABLET | Refills: 1 | Status: SHIPPED | OUTPATIENT
Start: 2025-02-19

## 2025-02-26 ENCOUNTER — OFFICE VISIT (OUTPATIENT)
Dept: WOUND CARE | Facility: CLINIC | Age: 70
End: 2025-02-26
Payer: MEDICARE

## 2025-02-26 PROCEDURE — 11042 DBRDMT SUBQ TIS 1ST 20SQCM/<: CPT

## 2025-02-27 ENCOUNTER — PATIENT OUTREACH (OUTPATIENT)
Dept: PRIMARY CARE | Facility: CLINIC | Age: 70
End: 2025-02-27
Payer: MEDICARE

## 2025-03-03 DIAGNOSIS — F41.9 ANXIETY: ICD-10-CM

## 2025-03-03 RX ORDER — BUSPIRONE HYDROCHLORIDE 5 MG/1
5 TABLET ORAL 3 TIMES DAILY PRN
Qty: 180 TABLET | Refills: 2 | Status: SHIPPED | OUTPATIENT
Start: 2025-03-03

## 2025-03-04 ENCOUNTER — APPOINTMENT (OUTPATIENT)
Dept: PAIN MEDICINE | Facility: CLINIC | Age: 70
End: 2025-03-04
Payer: MEDICARE

## 2025-03-04 ENCOUNTER — HOSPITAL ENCOUNTER (OUTPATIENT)
Dept: RADIOLOGY | Facility: CLINIC | Age: 70
Discharge: HOME | End: 2025-03-04
Payer: MEDICARE

## 2025-03-04 DIAGNOSIS — K70.30 ALCOHOLIC CIRRHOSIS OF LIVER WITHOUT ASCITES (MULTI): ICD-10-CM

## 2025-03-04 PROCEDURE — 76700 US EXAM ABDOM COMPLETE: CPT

## 2025-03-05 ENCOUNTER — APPOINTMENT (OUTPATIENT)
Dept: WOUND CARE | Facility: CLINIC | Age: 70
End: 2025-03-05
Payer: MEDICARE

## 2025-03-10 ENCOUNTER — PATIENT OUTREACH (OUTPATIENT)
Dept: PRIMARY CARE | Facility: CLINIC | Age: 70
End: 2025-03-10
Payer: MEDICARE

## 2025-03-11 NOTE — PROGRESS NOTES
Patient: Angus Redman    [id number]  : 1955 -- AGE 69 y.o.    Provider: MOISES Mcarthur     Location [facility]   Service Date: 3/19/2025         Department of Medicine  Division of Pulmonary, Critical Care, and Sleep Medicine         [facility]  Follow Up Visit Note        HISTORY OF PRESENT ILLNESS     HISTORY OF PRESENT ILLNESS   Angus Redman is a 69 y.o. male who presents to a [facility] for a follow up visit with concerns of COPD. I have independently interviewed and examined the patient in the office and reviewed available records.      Current History 2024    Acute on chronic hypoxic respiratory failure  Moderate to large pleural effusion - transudative   Bilateral pleural thickening  Possible underlying COPD with signs of acute exacerbation  Restrictive lung disease Pleural calcification  No history to suggest asbestos exposure    He would get dyspneic with 1 flight of stairs. He uses rollator to ambulate. Walking around his home he would have dyspnea. His wound care RN recommend he be seen in the hospital. He initially was placed on 6L. He is on 4L O2.  Currently  at rest is not short of breath. He has to stop for breath after walking about 100 meters or a few minutes (mMRC 3). He is unaware when things worsened.  He also denies orthopnea, but has tiffani and leg cellulitis . He has gained X 10 pounds in the last X 12 months. C/o cough in the am, with cloudy to brown secretions. He has a nebulizer helped with clearing secretions.  He also  wheezing, and denies  green, blood streaks, but no sputum. No night cough. No hemoptysis. No fever or shivering chills. He has a runny nose, and a tingling sensation in the back of his throat. Has seasonal allergies - dust mites, mold,pollen, and latex . He denies chest pain or heartburn.     Previous pulmonary history:   Multiple episodes of PNA non currently    He has no history of recurrent infections, or lung disease as a child.  He had no previous lung hx, never on oxygen or inhaler therapy.     Inhalers/nebulized medications: albuterol     Hospitalization History:  Has  been hospitalized over the last year for breathing related problem.      Sleep history:  Denies snoring, apnea, feeling tired during the day or taking naps during the day.       3/19/2025    On today's visit, the patient reports having more congestion, productive cough with clear frothy. C/o wheezing. He is short of breath at rest. He has pulsed O2 needs continuous flow.   He was in the skilled facility as he had toe ambutation and skin graft. And physically therapy. From Nov- middle of January.  He was getting worsening cough and getting worse. He was given cough medicine.   Denies chest pain   Denies runny nose or throat clearing  Denies GERD    He uses nebulizer 2-3 times a day which helps break up congestion.   On montelkast   Last eosinophils  626      REVIEW OF SYSTEMS     REVIEW OF SYSTEMS  Review of Systems   Respiratory:  Positive for cough and shortness of breath.    Skin:  Positive for wound.        Lower leg cellulitlits    All other systems reviewed and are negative.        ALLERGIES AND MEDICATIONS     ALLERGIES  Allergies   Allergen Reactions   • Lisinopril Hives   • Codeine GI Upset   • House Dust Mite Runny nose   • Hydrocodone-Acetaminophen Nausea/vomiting   • Latex Hives and Rash     Latex tube/connecter kit/gloves     • Mold Runny nose   • Tree And Shrub Pollen Runny nose       MEDICATIONS  Current Outpatient Medications   Medication Sig Dispense Refill   • albuterol (ProAir HFA) 90 mcg/actuation inhaler Inhale 2 puffs every 6 hours if needed for wheezing. 18 g 2   • albuterol 2.5 mg /3 mL (0.083 %) nebulizer solution Take 3 mL (2.5 mg) by nebulization every 6 hours if needed for wheezing. 120 mL 11   • alendronate (Fosamax) 70 mg tablet Take 1 tablet by mouth every 7 days. Take in  the morning with a full glass of water at least 30 minutes before first food, drink, or medications of the day. 12 tablet 3   • amLODIPine (Norvasc) 2.5 mg tablet Take 1 tablet (2.5 mg) by mouth once daily. For blood pressure 90 tablet 3   • aspirin 81 mg chewable tablet Chew and swallow 1 tablet by mouth daily. 90 tablet 3   • atorvastatin (Lipitor) 40 mg tablet Take 1 tablet (40 mg) by mouth once daily at bedtime. 90 tablet 3   • busPIRone (Buspar) 5 mg tablet Take 1 tablet by mouth three times daily as needed for anxiety. 180 tablet 2   • cyanocobalamin (Vitamin B-12) 1,000 mcg tablet Take 1 tablet (1,000 mcg) by mouth once daily. As directed 90 tablet 3   • DULoxetine (Cymbalta) 60 mg DR capsule Take 1 capsule (60 mg) by mouth 2 times a day. 60 capsule 2   • empagliflozin (Jardiance) 10 mg Take 1 tablet (10 mg) by mouth once daily. 30 tablet 11   • ergocalciferol (Vitamin D-2) 1.25 MG (01721 UT) capsule Take 1 capsule (1,250 mcg) by mouth 1 (one) time per week. 12 capsule 3   • escitalopram (Lexapro) 20 mg tablet Take 1 tablet by mouth once daily. 90 tablet 1   • ferrous sulfate, 325 mg ferrous sulfate, (FeroSuL) tablet Take 1 tablet by mouth once daily with breakfast. 90 tablet 3   • folic acid (Folvite) 1 mg tablet Take 1 tablet by mouth daily for folate deficiency. 90 tablet 2   • ipratropium (Atrovent) 0.02 % nebulizer solution Take 2.5 mL (0.5 mg) by nebulization 4 times a day. 120 mL 11   • levothyroxine (Synthroid, Levoxyl) 50 mcg tablet Take 1 tablet by mouth once daily in the morning before meals. 90 tablet 1   • mirtazapine (Remeron) 15 mg tablet Take 1 tablet by mouth once daily at bedtime. 90 tablet 1   • montelukast (Singulair) 10 mg tablet Take 1 tablet by mouth once daily at bedtime. 90 tablet 3   • pantoprazole (ProtoNix) 40 mg EC tablet Take 1 tablet (40 mg) by mouth 2 times a day. 180 tablet 3   • pregabalin (Lyrica) 150 mg capsule Take 1 capsule (150 mg) by mouth every 6 hours. 120 capsule 0    • propranolol (Inderal) 10 mg tablet Take 1 tablet (10 mg) by mouth 2 times a day. 270 tablet 3   • spironolactone (Aldactone) 25 mg tablet Take 1 tablet (25 mg) by mouth once daily. 30 tablet 11   • tamsulosin (Flomax) 0.4 mg 24 hr capsule Take 1 capsule by mouth once daily at bedtime. 90 capsule 1   • fexofenadine (Allegra) 180 mg tablet Take 1 tablet (180 mg) by mouth once daily as needed (as needed for allergies and congestion).     • fluticasone-umeclidin-vilanter (Trelegy Ellipta) 200-62.5-25 mcg blister with device Inhale 1 puff once daily. RINSE MOUTH AFTER EACH USE TO AVOID ORAL THRUSH. 28 each 3   • torsemide (Demadex) 20 mg tablet Take 1 tablet (20 mg) by mouth once daily. 30 tablet 11     No current facility-administered medications for this visit.         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), Laceration without foreign body of scalp, initial encounter (09/10/2015), Nausea (04/15/2014), 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 (07/27/2017), Osteomyelitis, unspecified (02/26/2022), Other chest pain (09/21/2013), Other conditions influencing health status, Other conditions influencing health status (10/08/2017), Other specified health status (07/23/2014), Patient's noncompliance with other medical treatment and regimen due to unspecified reason (10/28/2016), Patient's other noncompliance with medication regimen (06/04/2016), Personal history of (healed) stress fracture (12/30/2013), Personal history of diseases of  the skin and subcutaneous tissue (02/26/2022), Personal history of other diseases of the nervous system and sense organs (03/25/2016), Personal history of other diseases of the nervous system and sense organs (02/02/2016), Personal history of other endocrine, nutritional and metabolic disease (07/13/2015), Personal history of other specified conditions (07/28/2019), Personal history of other specified conditions (05/07/2018), Personal history of other specified conditions (06/25/2015), Unspecified fracture of left foot, initial encounter for closed fracture (06/04/2016), Unspecified injury of head, initial encounter (04/20/2016), Unspecified injury of unspecified elbow, initial encounter (04/07/2017), and Unsteadiness on feet (04/15/2014).       PAST SURGICAL HISTORY  Past Surgical History:   Procedure Laterality Date   • COLONOSCOPY  10/09/2013    Complete Colonoscopy   • OTHER SURGICAL HISTORY  07/28/2019    Insertion of cardiac monitor   • OTHER SURGICAL HISTORY  04/15/2014    Reported Hx Of Hip Replacement - Left Side   • OTHER SURGICAL HISTORY  01/28/2020    Hip replacement   • OTHER SURGICAL HISTORY  11/24/2015    Interrogation Of Implantable Loop Recorder By Physician In Person   • OTHER SURGICAL HISTORY  04/19/2017    Arthroscopy Elbow Left   • OTHER SURGICAL HISTORY  10/09/2013    Shoulder Surgery Left   • SKIN CANCER EXCISION  10/09/2013    Mohs Micrographic Surgery Face       IMMUNIZATION HISTORY  Immunization History   Administered Date(s) Administered   • Flu vaccine (IIV4), preservative free *Check age/dose* 09/09/2015, 09/01/2016, 10/06/2017   • Flu vaccine, quadrivalent, high-dose, preservative free, age 65y+ (FLUZONE) 09/13/2022   • Flu vaccine, trivalent, preservative free, HIGH-DOSE, age 65y+ (Fluzone) 09/19/2019   • Flu vaccine, trivalent, preservative free, age 6 months and greater (Fluarix/Fluzone/Flulaval) 09/21/2015   • Hep A, Unspecified 01/28/2015   • Hepatitis A vaccine, age 19 years and  greater (HAVRIX) 07/07/2014, 01/28/2015   • Influenza Nasal, Unspecified 09/30/2012   • Influenza, Seasonal, Quadrivalent, Adjuvanted 10/21/2020, 08/30/2021, 09/29/2023   • Influenza, seasonal, injectable 09/28/2012, 09/21/2015, 08/30/2021, 10/18/2023   • Influenza, trivalent, adjuvanted 09/18/2018   • Moderna COVID-19 vaccine, 12 years and older (50mcg/0.5mL)(Spikevax) 09/29/2023   • Moderna COVID-19 vaccine, bivalent, blue cap/gray label *Check age/dose* 09/13/2022   • Moderna SARS-CoV-2 Vaccination 02/08/2021, 02/13/2021, 03/13/2021, 11/08/2021, 04/01/2022, 09/13/2022   • PPD Test 02/08/2014   • Pneumococcal conjugate vaccine, 13-valent (PREVNAR 13) 06/02/2016   • Pneumococcal polysaccharide vaccine, 23-valent, age 2 years and older (PNEUMOVAX 23) 12/15/2013, 01/28/2015, 09/24/2020, 05/28/2021   • RESPIRATORY SYNCYTIAL VIRUS (RSV), ELIGIBLE PREGNANT PTS, 0.5 ML (ABRYSVO) 09/29/2023   • SARS-CoV-2, Unspecified 10/12/2023   • Td vaccine, age 7 years and older (TDVAX) 01/01/2005   • Tdap vaccine, age 7 year and older (BOOSTRIX, ADACEL) 07/21/2014, 11/18/2024   • Zoster vaccine, recombinant, adult (SHINGRIX) 10/21/2020, 05/11/2021   • Zoster, live 10/09/2013       SOCIAL HISTORY  He  reports that he quit smoking about 26 years ago. His smoking use included cigarettes. He has never used smokeless tobacco. He reports that he does not currently use alcohol. He reports that he does not use drugs. 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  DOES/DOES NOT: does have exposure to birds or exotic animals. Had pet birds for 10 years about in [id number] has 3 cats     FAMILY HISTORY  Family History   Problem Relation Name Age of Onset   • Other (cardiac pacemaker) Mother     • Coronary artery disease Mother     •  Other (history of heart artery stent) Mother     • Cancer Mother     • Other (kurtis cell cancer) Mother     • Arthritis Mother     • Mental illness Brother          living in handicapped assisted living facility permanently [Other]   • Other (angina pectoris) Paternal Grandmother       DOES/DOES NOT: does have a family history of pulmonary disease. Mother was heavy smoker   DOES/DOES NOT: does have a family history of cancer.  DOES/DOES NOT: does not have a family history of autoimmune disorders.    PHYSICAL EXAM     VITAL SIGNS: /78   Pulse 81   Resp 18   Ht 1.829 m (6')   Wt 114 kg (252 lb 3.2 oz)   SpO2 94% Comment: 3L  BMI 34.20 kg/m²  needs 4 L but runs out    PREVIOUS WEIGHTS:  Wt Readings from Last 3 Encounters:   03/19/25 114 kg (252 lb 3.2 oz)   01/29/25 112 kg (247 lb 6.4 oz)   12/26/24 113 kg (250 lb)       Physical Exam  Constitutional:       Appearance: Normal appearance. He is ill-appearing.   HENT:      Head: Normocephalic and atraumatic.      Right Ear: External ear normal.      Left Ear: External ear normal.      Nose: Nose normal.      Mouth/Throat:      Mouth: Mucous membranes are moist.      Pharynx: Oropharynx is clear.   Eyes:      Extraocular Movements: Extraocular movements intact.      Conjunctiva/sclera: Conjunctivae normal.      Pupils: Pupils are equal, round, and reactive to light.   Cardiovascular:      Rate and Rhythm: Normal rate and regular rhythm.      Pulses: Normal pulses.      Heart sounds: Normal heart sounds.   Pulmonary:      Effort: Pulmonary effort is normal.      Breath sounds: Wheezing present.   Abdominal:      General: Bowel sounds are normal.      Palpations: Abdomen is soft.   Musculoskeletal:         General: Normal range of motion.      Cervical back: Normal range of motion and neck supple.      Right lower leg: No edema.      Left lower leg: No edema.   Skin:     General: Skin is warm and dry.   Neurological:      General: No focal deficit present.       Mental Status: He is alert and oriented to person, place, and time. Mental status is at baseline.   Psychiatric:         Mood and Affect: Mood normal.         Behavior: Behavior normal.         Thought Content: Thought content normal.         Judgment: Judgment normal.         RESULTS/DATA     Pulmonary Function Test Results        Complete Pulmonary Function Test Pre/Post Bronchodialator (Spirometry Pre/Post/DLCO/Lung Volumes) 4/22/2024  Status: Final result     Study Result    Narrative & Impression   Spirometry shows no obstruction. There is no significant bronchodilator response. Lung volumes indicate a moderate restrictive defect. Spirometry (normal FEV1/FVC) and lung volumes (increased RV/TLC) suggest a complex restrictive ventilatory impairment. The DLCO corrected for hemoglobin is moderately reduced. Low DLCO with low/normal KCO is consistent with a loss of alveoli capillary structure with loss of lung volume. 6MWT: distance was 79 meters. Test was done on 3 LPM of supplemental O2. End of exercise SPO2 95%, HR 90.     Scans on Order [id number]      Pulmonary Function Test - Scan on 4/22/2024  2:22 PM                                          Chest Radiograph     XR CHEST 1 VIEW;  12/26/2024       INDICATION:  Signs/Symptoms:Short of breath.      COMPARISON:  CXR 11/18/2024      ACCESSION NUMBER(S):  [id number]      ORDERING CLINICIAN:  NICOLLE NICOLE      FINDINGS:  Chronic bilateral rib deformities, unchanged compared to prior. No  acute osseous abnormalities. Upper abdomen and soft tissues appear  unremarkable.      Cardiomediastinal contour is unchanged.      Right-sided pleural calcifications are again seen, better  characterized on prior CT. Small right pleural effusion persists.  Left costophrenic angle blunting may represent trace effusion or  thickening. No pneumothorax.      Slight obscuration of the left hemidiaphragm secondary to a patchy  retrocardiac opacity. Right basilar consolidation is  unchanged.      IMPRESSION:  Compared to 11/18/2024:  1. Patchy retrocardiac opacity may represent aspiration, atelectasis,  or pneumonia.  2. Right basilar consolidation is unchanged.  3. Small right pleural effusion is unchanged.  4. Increased left costophrenic angle blunting may represent a trace  effusion or thickening.  5. Chronic right pleural calcification.      MACRO:  None      Signed by: Easton Romano 12/26/2024 9:04 PM  Dictation workstation:   [id number]    Chest CT Scan     CT ANGIO CHEST FOR PULMONARY EMBOLISM;  12/26/2024           IMPRESSION:  No evidence of acute pulmonary embolism.      Multiple chronic left rib fractures and small left pleural effusion  and stable small right pleural effusion with pleural thickening and  pleural calcifications and basilar atelectasis.        Echocardiogram     TRANSTHORACIC ECHOCARDIOGRAM REPORT 4/11/2024        Patient Name:      PRUDENCIO DELGADO        Reading Physician:    [id number] Corey Hsu MD  Study Date:        4/11/2024            Ordering Provider:    [id number] SANDRA       PHYSICIAN INTERPRETATION:  Left Ventricle: The left ventricular systolic function is normal, with an estimated ejection fraction of 60-65%. There are no regional wall motion abnormalities. The left ventricular cavity size is normal. Left ventricular diastolic filling was not assessed.  Left Atrium: The left atrium is upper limits of normal in size. A bubble study using agitated saline was performed. Bubble study is negative.  Right Ventricle: The right ventricle is normal in size. There is normal right ventricular global systolic function.  Right Atrium: The right atrium is normal in size.  Aortic Valve: The aortic valve was not well visualized. There is no evidence of aortic valve regurgitation.  Mitral Valve: The mitral valve is normal in structure. There is no evidence of mitral valve regurgitation.  Tricuspid Valve: The  "tricuspid valve was not well visualized. There is trace tricuspid regurgitation. The right ventricular systolic pressure is unable to be estimated.  Pulmonic Valve: The pulmonic valve was not assessed. Pulmonic valve regurgitation was not assessed.  Pericardium: There is a trivial pericardial effusion.  Aorta: The aortic root is normal.  Systemic Veins: The inferior vena cava appears to be of normal size. There is IVC inspiratory collapse greater than 50%.        CONCLUSIONS:   1. Left ventricular systolic function is normal with a 60-65% estimated ejection fraction.   2. Poorly visualized anatomical structures due to suboptimal image quality.     QUANTITATIVE DATA SUMMARY:  TRICUSPID VALVE/RVSP:                    Normal Ranges:  IVC Diam: 2.10 cm          Labwork   Complete Blood Count  Lab Results   Component Value Date    WBC 10.2 12/26/2024    HGB 11.7 (L) 12/26/2024    HCT 38.5 (L) 12/26/2024    MCV 91 12/26/2024     12/26/2024       Peripheral Eosinophil Count/Percentage:   Eosinophils Absolute (x10*3/uL)   Date Value   12/26/2024 0.10     Eosinophils % (%)   Date Value   12/26/2024 1.0       Serum Immunoglobulin E:    No results found for: \"IGE\"       ASSESSMENT/PLAN     Mr. Redman is a 69 y.o. 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 (07/27/2017), Osteomyelitis, unspecified (02/26/2022), Other chest pain " (09/21/2013), Other conditions influencing health status, Other conditions influencing health status (10/08/2017), Other specified health status (07/23/2014), Patient's noncompliance with other medical treatment and regimen due to unspecified reason (10/28/2016), Patient's other noncompliance with medication regimen (06/04/2016), Personal history of (healed) stress fracture (12/30/2013), Personal history of diseases of the skin and subcutaneous tissue (02/26/2022), Personal history of other diseases of the nervous system and sense organs (03/25/2016), Personal history of other diseases of the nervous system and sense organs (02/02/2016), Personal history of other endocrine, nutritional and metabolic disease (07/13/2015), Personal history of other specified conditions (07/28/2019), Personal history of other specified conditions (05/07/2018), Personal history of other specified conditions (06/25/2015), Unspecified fracture of left foot, initial encounter for closed fracture (06/04/2016), Unspecified injury of head, initial encounter (04/20/2016), Unspecified injury of unspecified elbow, initial encounter (04/07/2017), and Unsteadiness on feet (04/15/2014). He presents to the [facility] for follow up of chronic respiratory failure     Problem List and Orders      Assessment and Plan / Recommendations:    30 pack year history, chronic respiratory failure history, history of asthma   - albuterol hfa 2 puffs or albuterol nebs every 4-6 hours as needed  PFT reveals no obstruction but lung volumes show moderate restrictive defect moderately diffusion pattern  6 minute walk test without desaturation on 3 L   - will repeat PFT in April   Elevated eosinophils - Last eosinophils  626 --->  cont montelukast   - stopped ICS/LABA as too hard to push button to expel - trial to switch ICS/LABA/LAMA 1 puff once day, rince and spit afterwards, sample given     Abnormal CT chest - Pleural  effusions  Had thoracentesis on the right in Sept 2024  Patient CT chest in late Dec with increased on the left. Patient on diuretics can repeat in 3 months   Patient with Pleural calcifications along the posterior aspect of the right         Chronic respiratory failure - will need continuous O2 from [employer] as needs continuous portable       Thank you for visiting the Pulmonary clinic today!       Return to clinic after _8-12_weeks and after PFTs and CXR  complete  or sooner if needed, can schedule with [facility]. Lor    Can schedule with Dr. Horowitz for review than can return   Lamar Cooper CNP  My office number is [phone] -     Best way to get a hold of me is to call my office --> Please do not send me follow my health messages  Any test results will be discussed at next visit -- please make sure to make a follow up appt after testing.

## 2025-03-12 ENCOUNTER — OFFICE VISIT (OUTPATIENT)
Dept: WOUND CARE | Facility: CLINIC | Age: 70
End: 2025-03-12
Payer: MEDICARE

## 2025-03-12 ENCOUNTER — OFFICE VISIT (OUTPATIENT)
Dept: PAIN MEDICINE | Facility: CLINIC | Age: 70
End: 2025-03-12
Payer: MEDICARE

## 2025-03-12 VITALS — HEART RATE: 68 BPM | SYSTOLIC BLOOD PRESSURE: 145 MMHG | DIASTOLIC BLOOD PRESSURE: 74 MMHG | TEMPERATURE: 98.1 F

## 2025-03-12 DIAGNOSIS — G62.9 PERIPHERAL NEUROPATHY: Primary | ICD-10-CM

## 2025-03-12 DIAGNOSIS — G57.93 NEUROPATHIC PAIN OF BOTH FEET: ICD-10-CM

## 2025-03-12 DIAGNOSIS — G89.29 CHRONIC LOW BACK PAIN, UNSPECIFIED BACK PAIN LATERALITY, UNSPECIFIED WHETHER SCIATICA PRESENT: ICD-10-CM

## 2025-03-12 DIAGNOSIS — M54.16 LUMBAR RADICULOPATHY: ICD-10-CM

## 2025-03-12 DIAGNOSIS — M54.50 CHRONIC LOW BACK PAIN, UNSPECIFIED BACK PAIN LATERALITY, UNSPECIFIED WHETHER SCIATICA PRESENT: ICD-10-CM

## 2025-03-12 PROCEDURE — 1157F ADVNC CARE PLAN IN RCRD: CPT

## 2025-03-12 PROCEDURE — 99213 OFFICE O/P EST LOW 20 MIN: CPT

## 2025-03-12 PROCEDURE — 1123F ACP DISCUSS/DSCN MKR DOCD: CPT

## 2025-03-12 PROCEDURE — 99213 OFFICE O/P EST LOW 20 MIN: CPT | Mod: 25

## 2025-03-12 PROCEDURE — 11042 DBRDMT SUBQ TIS 1ST 20SQCM/<: CPT

## 2025-03-12 PROCEDURE — 3077F SYST BP >= 140 MM HG: CPT

## 2025-03-12 PROCEDURE — 3078F DIAST BP <80 MM HG: CPT

## 2025-03-12 RX ORDER — PREGABALIN 150 MG/1
150 CAPSULE ORAL EVERY 6 HOURS
Qty: 120 CAPSULE | Refills: 0 | Status: SHIPPED | OUTPATIENT
Start: 2025-03-12 | End: 2025-04-11

## 2025-03-12 RX ORDER — DULOXETIN HYDROCHLORIDE 60 MG/1
60 CAPSULE, DELAYED RELEASE ORAL 2 TIMES DAILY
Qty: 60 CAPSULE | Refills: 2 | Status: SHIPPED | OUTPATIENT
Start: 2025-03-12

## 2025-03-12 ASSESSMENT — PAIN - FUNCTIONAL ASSESSMENT: PAIN_FUNCTIONAL_ASSESSMENT: 0-10

## 2025-03-12 ASSESSMENT — PAIN DESCRIPTION - DESCRIPTORS: DESCRIPTORS: ACHING;TINGLING

## 2025-03-12 ASSESSMENT — PAIN SCALES - GENERAL: PAINLEVEL_OUTOF10: 5 - MODERATE PAIN

## 2025-03-12 NOTE — PATIENT INSTRUCTIONS
Follow up in 1 month in office for pregabalin pill count. Please bring your medications to this appointment.

## 2025-03-12 NOTE — PROGRESS NOTES
"Subjective   Patient ID: Angus Redman is a 69 y.o. male who presents for Med Refill (Patient here for med refill, states bilateral feet, and hands is 5/10).  HPI    70 yo male presents today for medication refills. He is known to this clinic for low back and neuropathic pain. He is prescribed pregabalin 150mg four times daily, OARRS shows last fill through [facility] [employer] was on 11/01/2024. Patient states that he is taking the pregabalin as prescribed. We reviewed his [facility] pharmacy orders and the last dispensation of pregabalin was in November. He was hospitalized in November and discharged the beginning of January from a nursing home. States that he was discharged with \"some\" pills. He confirms that the medication is still effective and denies any side effects. Today his pain is rated 3/10 in the hands and feet. He is also taking duloxetine twice daily.     Review of Systems  All 13 systems were reviewed and are within normal levels except as noted below or per HPI. Positive and pertinent negative responses are noted below or in the HPI   Denied any fever or chills. No weight loss and no night sweats. No cough or sputum production. No diarrhea   Denies constipation.   No bladder and bowel incontinence and no other changes in bladder and bowel. No skin changes. Reports tiredness and fatigability only if the pain is not controlled.   Denied opioids diversion and abuse and denies alcoholism. Denies overuse of the pain medications.      Objective   Physical Exam  General   Alert and oriented x4, pleasant and cooperative.      HEENT  Pupils are equal and normal in size. Ears, nose, mouth, and throat appear to be WNL.  Head atraumatic, symmetric.      No signs of sedation or signs of withdrawal apparent.     Psychiatric   No signs of depression apparent. Appropriate mood and affect.     Neuro   No focal neurological deficit apparent. Ambulation at baseline using wheeled walker.      Respiratory  No respiratory distress, " respirations equal and unlabored.     Abdomen  Soft and nontender, no distention noted.     Skin  Warm, dry and intact. No skin markings supportive of recent IV drug usage .            Assessment/Plan     70 yo male with chronic low back pain associated with lumbar radiculopathy. Chronic neuropathic pain requiring maintenance with pregabalin.     I have personally reviewed the OARRS report for this patient. I have considered the risks of abuse, dependence, addiction and diversion. I believe that it is clinically appropriate for this patient to be prescribed this medication based on documented diagnosis.  I believe the benefits of the continuation of the opiate outweighs the risk. Patient has described positive response to the present medication therapy. Patient denies any side effects associated with the usage of the medication. Patient did not elicit any signs supportive of misuse or abuse. The review of the [place] Automated Reporting prescription is not suggestive of any worrisome pattern. Patient believes the usage of this medication has improved the quality of life and allow him to participate in day-to-day activity. Therefore  I recommend the continuation of this medication and I will be refilling the medication prescription.    The patient was counseled regarding diagnostic results, instructions for management, risk factor reductions, prognosis, patient and family education, impressions, risks and benefits of treatment options and importance of compliance with treatment.     Tox screen today for compliance.    Continue to take pregabalin 150mg four times daily. Follow up in office in one month for pill count.     Continue to take duloxetine as prescribed.     Explained plan to this patient, and patient verbalized understanding and agreement with the plan.     Please do not hesitate to contact the pain clinic after your visit with any questions or concerns at  M-F 8-4 pm     Patient was reminded not  to share medications, not to take prescription medications that were not prescribed to the patient, and not to increase or change dose without consulting the pain clinic. I advised the patient to always take the least amount of medication needed to keep symptoms under control.          Rosie Camejo, SHIRA-CNP 03/12/25 1:22 PM

## 2025-03-14 DIAGNOSIS — E53.8 FOLIC ACID DEFICIENCY: ICD-10-CM

## 2025-03-15 RX ORDER — FOLIC ACID 1 MG/1
TABLET ORAL
Qty: 90 TABLET | Refills: 2 | Status: SHIPPED | OUTPATIENT
Start: 2025-03-15

## 2025-03-19 ENCOUNTER — APPOINTMENT (OUTPATIENT)
Facility: CLINIC | Age: 70
End: 2025-03-19
Payer: MEDICARE

## 2025-03-19 VITALS
HEART RATE: 81 BPM | WEIGHT: 252.2 LBS | OXYGEN SATURATION: 94 % | HEIGHT: 72 IN | BODY MASS INDEX: 34.16 KG/M2 | RESPIRATION RATE: 18 BRPM | DIASTOLIC BLOOD PRESSURE: 78 MMHG | SYSTOLIC BLOOD PRESSURE: 130 MMHG

## 2025-03-19 DIAGNOSIS — J90 PLEURAL EFFUSION: ICD-10-CM

## 2025-03-19 DIAGNOSIS — Z99.81 CHRONIC HYPOXIC RESPIRATORY FAILURE, ON HOME OXYGEN THERAPY (MULTI): ICD-10-CM

## 2025-03-19 DIAGNOSIS — R06.02 SOB (SHORTNESS OF BREATH): Primary | ICD-10-CM

## 2025-03-19 DIAGNOSIS — J45.909 UNCOMPLICATED ASTHMA, UNSPECIFIED ASTHMA SEVERITY, UNSPECIFIED WHETHER PERSISTENT (HHS-HCC): ICD-10-CM

## 2025-03-19 DIAGNOSIS — J96.11 CHRONIC HYPOXIC RESPIRATORY FAILURE, ON HOME OXYGEN THERAPY (MULTI): ICD-10-CM

## 2025-03-19 DIAGNOSIS — J41.8 MIXED SIMPLE AND MUCOPURULENT CHRONIC BRONCHITIS (MULTI): ICD-10-CM

## 2025-03-19 PROCEDURE — 99214 OFFICE O/P EST MOD 30 MIN: CPT | Performed by: NURSE PRACTITIONER

## 2025-03-19 PROCEDURE — 1036F TOBACCO NON-USER: CPT | Performed by: NURSE PRACTITIONER

## 2025-03-19 PROCEDURE — 1157F ADVNC CARE PLAN IN RCRD: CPT | Performed by: NURSE PRACTITIONER

## 2025-03-19 PROCEDURE — 1123F ACP DISCUSS/DSCN MKR DOCD: CPT | Performed by: NURSE PRACTITIONER

## 2025-03-19 PROCEDURE — 1159F MED LIST DOCD IN RCRD: CPT | Performed by: NURSE PRACTITIONER

## 2025-03-19 PROCEDURE — 3075F SYST BP GE 130 - 139MM HG: CPT | Performed by: NURSE PRACTITIONER

## 2025-03-19 PROCEDURE — 3008F BODY MASS INDEX DOCD: CPT | Performed by: NURSE PRACTITIONER

## 2025-03-19 PROCEDURE — 3078F DIAST BP <80 MM HG: CPT | Performed by: NURSE PRACTITIONER

## 2025-03-19 RX ORDER — FLUTICASONE FUROATE, UMECLIDINIUM BROMIDE AND VILANTEROL TRIFENATATE 200; 62.5; 25 UG/1; UG/1; UG/1
1 POWDER RESPIRATORY (INHALATION) DAILY
Qty: 28 EACH | Refills: 3 | Status: SHIPPED | OUTPATIENT
Start: 2025-03-19

## 2025-03-19 ASSESSMENT — ENCOUNTER SYMPTOMS
WOUND: 1
OCCASIONAL FEELINGS OF UNSTEADINESS: 1
SHORTNESS OF BREATH: 1
COUGH: 1
DEPRESSION: 0
LOSS OF SENSATION IN FEET: 1

## 2025-03-19 ASSESSMENT — COPD QUESTIONNAIRES
QUESTION7_SLEEPQUALITY: 0 - I SLEEP SOUNDLY
CAT_TOTALSCORE: 28
QUESTION1_COUGHFREQUENCY: 5 - I COUGH ALL THE TIME
QUESTION3_CHESTTIGHTNESS: 0 - MY CHEST DOES NOT FEEL TIGHT AT ALL
QUESTION4_WALKINCLINE: 5 - WHEN I WALK UP A HILL OR ONE FLIGHT OF STAIRS I AM VERY BREATHLESS
QUESTION8_ENERGYLEVEL: 5 - I HAVE NO ENERGY AT ALL
QUESTION6_LEAVINGHOUSE: 5 - I AM NOT AT ALL CONFIDENT LEAVING MY HOME BECAUSE OF MY LUNG CONDITION
QUESTION5_HOMEACTIVITIES: 5 - I AM VERY LIMITED DOING ACTIVITIES AT HOME
QUESTION2_CHESTPHLEGM: 3

## 2025-03-19 NOTE — PATIENT INSTRUCTIONS
30 pack year history, chronic respiratory failure history   - albuterol hfa 2 puffs or albuterol nebs every 4-6 hours as needed  PFT reveals no obstruction but lung volumes show moderate restrictive defect moderately diffusion pattern  6 minute walk test without desaturation on 3 L   - will repeat PFT in April   Elevated eosinophils - Last eosinophils  626 --->  cont montelukast   - stopped ICS/LABA as too hard to push button to expel - trial to switch ICS/LABA/LAMA 1 puff once day, rince and spit afterwards, sample given     Pleural effusions  Had thoracentesis on the right in Sept 2024  Patient CT chest in late Dec with increased on the left. Patien ton diuretics can repeat in 3 months     Pleural calcifications along the posterior aspect of the right  lung and round atelectasis in the right lower lobe  - has exposure to asbestos   - can consider repeating CT in 3 months after Dec 23  No evidence of acute pulmonary embolism.        Chronic respiratory failure - will need continuous O2 from [employer] as needs continuous portable       Thank you for visiting the Pulmonary clinic today!       Return to clinic after _8-12_weeks and after PFTs and CXR  complete  or sooner if needed   Can schedule with Dr. Horowitz for review than can return   Lamar Cooper CNP  My office number is [phone] -     Best way to get a hold of me is to call my office --> Please do not send me follow my health messages  Any test results will be discussed at next visit -- please make sure to make a follow up appt after testing.

## 2025-03-21 ENCOUNTER — PATIENT OUTREACH (OUTPATIENT)
Dept: PRIMARY CARE | Facility: CLINIC | Age: 70
End: 2025-03-21
Payer: MEDICARE

## 2025-03-21 DIAGNOSIS — E11.43 TYPE 2 DIABETES MELLITUS WITH DIABETIC AUTONOMIC NEUROPATHY, WITHOUT LONG-TERM CURRENT USE OF INSULIN: ICD-10-CM

## 2025-03-21 DIAGNOSIS — I50.9 HEART FAILURE, UNSPECIFIED HF CHRONICITY, UNSPECIFIED HEART FAILURE TYPE: ICD-10-CM

## 2025-03-21 LAB
2-ETHYLIDENE-1,5-DIMETHYL-3,3-DIPHENYLPYRROLIDINE (EDDP) [MASS/VOLUME] IN SALIVA (ORAL FLUID) BY CONFIRMATORY METHOD: NORMAL NG/ML
6MAM SAL CFM-MCNC: NORMAL NG/ML
6MAM SAL QL SCN: NORMAL
ALPRAZ SAL CFM-MCNC: NORMAL NG/ML
AMPHET SAL CFM-MCNC: NORMAL NG/ML
AMPHETAMINES SAL QL SCN: NORMAL
BENZODIAZ SAL QL SCN: NORMAL
BUPRENORPHINE SAL CFM-MCNC: NORMAL NG/ML
BUPRENORPHINE SAL QL SCN: NORMAL
BZE SAL CFM-MCNC: NORMAL NG/ML
CANNABINOIDS SAL QL SCN: NORMAL
CARBOXYTHC SAL CFM-MCNC: NORMAL NG/ML
CHLORDIAZEP SAL CFM-MCNC: NORMAL NG/ML
CLONAZEPAM SAL CFM-MCNC: NORMAL NG/ML
COCAINE SAL CFM-MCNC: NORMAL NG/ML
COCAINE SAL QL SCN: NORMAL
CODEINE SAL CFM-MCNC: NORMAL NG/ML
DHC SAL CFM-MCNC: NORMAL NG/ML
DIAZEPAM SAL CFM-MCNC: NORMAL NG/ML
FENTANYL SAL CFM-MCNC: NORMAL NG/ML
FENTANYL SAL QL SCN: NORMAL
FLUNITRAZEPAM SAL CFM-MCNC: NORMAL NG/ML
FLURAZEPAM SAL CFM-MCNC: NORMAL NG/ML
HYDROCODONE SAL CFM-MCNC: NORMAL NG/ML
HYDROMORPHONE SAL CFM-MCNC: NORMAL NG/ML
LORAZEPAM SAL CFM-MCNC: NORMAL NG/ML
Lab: NEGATIVE NG/ML
Lab: POSITIVE NG/ML
METHADONE SAL CFM-MCNC: NORMAL NG/ML
METHADONE SAL QL SCN: NORMAL
METHAMPHET SAL CFM-MCNC: NORMAL NG/ML
MIDAZOLAM SAL CFM-MCNC: NORMAL NG/ML
MORPHINE SAL CFM-MCNC: NORMAL NG/ML
NALOXONE [MASS/VOLUME] IN SALIVA (ORAL FLUID) BY CONFIRMATORY METHOD: NORMAL NG/ML
NORBUPRENORPHINE SAL CFM-MCNC: NORMAL NG/ML
NORDIAZEPAM SAL CFM-MCNC: NORMAL NG/ML
NORHYDROCODONE SAL CFM-MCNC: NORMAL NG/ML
NOROXYCODONE SAL CFM-MCNC: NORMAL NG/ML
OPIATES SAL QL SCN: NORMAL
OXAZEPAM SAL CFM-MCNC: NORMAL NG/ML
OXYCODONE SAL CFM-MCNC: NORMAL NG/ML
OXYMORPHONE SAL CFM-MCNC: NORMAL NG/ML
PREGABALIN SAL CFM-MCNC: >1000 NG/ML
QUEST AMINOCLONAZEPAM, ORAL FLUID: NORMAL
TAPENTADOL SAL CFM-MCNC: NORMAL NG/ML
TAPENTADOL SAL QL SCN: NORMAL
TEMAZEPAM SAL CFM-MCNC: NORMAL NG/ML
TRAMADOL SAL CFM-MCNC: NORMAL NG/ML
TRAMADOL SAL QL SCN: NORMAL
TRIAZOLAM SAL CFM-MCNC: NORMAL NG/ML

## 2025-03-21 NOTE — PROGRESS NOTES
I called and spoke with the patient who stated that he was doing well.   Patient recentaly saw Pulmonary and was started on Trelegy.  Patient has not used it.  I instructed the patient to use it and rinse his mouth after use.  Patient was agreeable. Patient is using his nebulizer as directed. Patient will have his CT chest next week.  Patient's wounds are healing and he is hoping to be discharged from the wound clinic next week.  Patient recently came into a little money and was able to pay off all his bills.  Patient is also hoping to hire a cleaning service on a regular bases with the money.  Angus has an appointment next week with Dr Haskins.  Insurance will provide the ride.  Instructed to call with any questions or concerns.

## 2025-03-26 ENCOUNTER — OFFICE VISIT (OUTPATIENT)
Dept: WOUND CARE | Facility: CLINIC | Age: 70
End: 2025-03-26
Payer: MEDICARE

## 2025-03-26 ENCOUNTER — HOSPITAL ENCOUNTER (OUTPATIENT)
Dept: RADIOLOGY | Facility: CLINIC | Age: 70
Discharge: HOME | End: 2025-03-26
Payer: MEDICARE

## 2025-03-26 ENCOUNTER — APPOINTMENT (OUTPATIENT)
Dept: PRIMARY CARE | Facility: CLINIC | Age: 70
End: 2025-03-26
Payer: MEDICARE

## 2025-03-26 VITALS
HEIGHT: 72 IN | DIASTOLIC BLOOD PRESSURE: 70 MMHG | BODY MASS INDEX: 32.94 KG/M2 | SYSTOLIC BLOOD PRESSURE: 112 MMHG | WEIGHT: 243.2 LBS | OXYGEN SATURATION: 91 % | HEART RATE: 63 BPM

## 2025-03-26 DIAGNOSIS — J96.11 CHRONIC HYPOXIC RESPIRATORY FAILURE, ON HOME OXYGEN THERAPY: ICD-10-CM

## 2025-03-26 DIAGNOSIS — I48.0 PAROXYSMAL ATRIAL FIBRILLATION (MULTI): Primary | ICD-10-CM

## 2025-03-26 DIAGNOSIS — N18.31 STAGE 3A CHRONIC KIDNEY DISEASE (MULTI): ICD-10-CM

## 2025-03-26 DIAGNOSIS — J90 PLEURAL EFFUSION: ICD-10-CM

## 2025-03-26 DIAGNOSIS — R29.6 FREQUENT FALLS: ICD-10-CM

## 2025-03-26 DIAGNOSIS — J41.8 MIXED SIMPLE AND MUCOPURULENT CHRONIC BRONCHITIS (MULTI): ICD-10-CM

## 2025-03-26 DIAGNOSIS — F33.41 RECURRENT MAJOR DEPRESSIVE DISORDER, IN PARTIAL REMISSION (CMS-HCC): ICD-10-CM

## 2025-03-26 DIAGNOSIS — Z86.79 HISTORY OF PAROXYSMAL SUPRAVENTRICULAR TACHYCARDIA: ICD-10-CM

## 2025-03-26 DIAGNOSIS — J30.1 SEASONAL ALLERGIC RHINITIS DUE TO POLLEN: ICD-10-CM

## 2025-03-26 DIAGNOSIS — R26.9 GAIT DISTURBANCE: ICD-10-CM

## 2025-03-26 DIAGNOSIS — Z99.89 USES ROLLER WALKER: ICD-10-CM

## 2025-03-26 DIAGNOSIS — J98.4 RESTRICTIVE LUNG DISEASE: ICD-10-CM

## 2025-03-26 DIAGNOSIS — K29.20 ALCOHOLIC GASTRITIS WITHOUT BLEEDING, UNSPECIFIED CHRONICITY: ICD-10-CM

## 2025-03-26 DIAGNOSIS — I10 BENIGN ESSENTIAL HYPERTENSION: ICD-10-CM

## 2025-03-26 DIAGNOSIS — Z00.00 ROUTINE GENERAL MEDICAL EXAMINATION AT HEALTH CARE FACILITY: ICD-10-CM

## 2025-03-26 DIAGNOSIS — Z99.81 CHRONIC HYPOXIC RESPIRATORY FAILURE, ON HOME OXYGEN THERAPY: ICD-10-CM

## 2025-03-26 DIAGNOSIS — Z66 DNR (DO NOT RESUSCITATE): ICD-10-CM

## 2025-03-26 DIAGNOSIS — R27.8 WORSENED HANDWRITING: ICD-10-CM

## 2025-03-26 DIAGNOSIS — G62.1 ALCOHOL-INDUCED POLYNEUROPATHY (MULTI): ICD-10-CM

## 2025-03-26 DIAGNOSIS — Z60.2 LIVES ALONE WITH HELP AVAILABLE: ICD-10-CM

## 2025-03-26 DIAGNOSIS — E66.811 CLASS 1 OBESITY WITH SERIOUS COMORBIDITY AND BODY MASS INDEX (BMI) OF 32.0 TO 32.9 IN ADULT, UNSPECIFIED OBESITY TYPE: ICD-10-CM

## 2025-03-26 DIAGNOSIS — Z99.81 ON SUPPLEMENTAL OXYGEN THERAPY: ICD-10-CM

## 2025-03-26 DIAGNOSIS — F10.20: ICD-10-CM

## 2025-03-26 DIAGNOSIS — E11.43 TYPE 2 DIABETES MELLITUS WITH DIABETIC AUTONOMIC NEUROPATHY, WITHOUT LONG-TERM CURRENT USE OF INSULIN: ICD-10-CM

## 2025-03-26 LAB
6MAM SAL QL SCN: NEGATIVE NG/ML
AMPHETAMINES SAL QL SCN: NEGATIVE NG/ML
BENZODIAZ SAL QL SCN: NEGATIVE NG/ML
BUPRENORPHINE SAL QL SCN: NEGATIVE NG/ML
CANNABINOIDS SAL QL SCN: NEGATIVE NG/ML
COCAINE SAL QL SCN: NEGATIVE NG/ML
FENTANYL SAL QL SCN: NEGATIVE NG/ML
Lab: NEGATIVE NG/ML
Lab: POSITIVE NG/ML
METHADONE SAL QL SCN: NEGATIVE NG/ML
OPIATES SAL QL SCN: NEGATIVE NG/ML
PREGABALIN SAL CFM-MCNC: >1000 NG/ML
TAPENTADOL SAL QL SCN: NEGATIVE NG/ML
TRAMADOL SAL QL SCN: NEGATIVE NG/ML

## 2025-03-26 PROCEDURE — 1159F MED LIST DOCD IN RCRD: CPT | Performed by: INTERNAL MEDICINE

## 2025-03-26 PROCEDURE — 11721 DEBRIDE NAIL 6 OR MORE: CPT

## 2025-03-26 PROCEDURE — 99214 OFFICE O/P EST MOD 30 MIN: CPT | Performed by: INTERNAL MEDICINE

## 2025-03-26 PROCEDURE — 3008F BODY MASS INDEX DOCD: CPT | Performed by: INTERNAL MEDICINE

## 2025-03-26 PROCEDURE — 3074F SYST BP LT 130 MM HG: CPT | Performed by: INTERNAL MEDICINE

## 2025-03-26 PROCEDURE — 1123F ACP DISCUSS/DSCN MKR DOCD: CPT | Performed by: INTERNAL MEDICINE

## 2025-03-26 PROCEDURE — 71250 CT THORAX DX C-: CPT

## 2025-03-26 PROCEDURE — G2211 COMPLEX E/M VISIT ADD ON: HCPCS | Performed by: INTERNAL MEDICINE

## 2025-03-26 PROCEDURE — 3078F DIAST BP <80 MM HG: CPT | Performed by: INTERNAL MEDICINE

## 2025-03-26 PROCEDURE — 1157F ADVNC CARE PLAN IN RCRD: CPT | Performed by: INTERNAL MEDICINE

## 2025-03-26 PROCEDURE — 1170F FXNL STATUS ASSESSED: CPT | Performed by: INTERNAL MEDICINE

## 2025-03-26 PROCEDURE — G0439 PPPS, SUBSEQ VISIT: HCPCS | Performed by: INTERNAL MEDICINE

## 2025-03-26 PROCEDURE — 1160F RVW MEDS BY RX/DR IN RCRD: CPT | Performed by: INTERNAL MEDICINE

## 2025-03-26 PROCEDURE — 71250 CT THORAX DX C-: CPT | Performed by: RADIOLOGY

## 2025-03-26 PROCEDURE — 99397 PER PM REEVAL EST PAT 65+ YR: CPT | Performed by: INTERNAL MEDICINE

## 2025-03-26 SDOH — SOCIAL STABILITY - SOCIAL INSECURITY: PROBLEMS RELATED TO LIVING ALONE: Z60.2

## 2025-03-26 ASSESSMENT — PATIENT HEALTH QUESTIONNAIRE - PHQ9
SUM OF ALL RESPONSES TO PHQ9 QUESTIONS 1 AND 2: 0
1. LITTLE INTEREST OR PLEASURE IN DOING THINGS: NOT AT ALL
2. FEELING DOWN, DEPRESSED OR HOPELESS: NOT AT ALL
2. FEELING DOWN, DEPRESSED OR HOPELESS: NOT AT ALL
SUM OF ALL RESPONSES TO PHQ9 QUESTIONS 1 AND 2: 0
1. LITTLE INTEREST OR PLEASURE IN DOING THINGS: NOT AT ALL

## 2025-03-26 ASSESSMENT — ACTIVITIES OF DAILY LIVING (ADL)
BATHING: INDEPENDENT
PATIENT'S MEMORY ADEQUATE TO SAFELY COMPLETE DAILY ACTIVITIES?: YES
GROOMING: INDEPENDENT
DOING_HOUSEWORK: NEEDS ASSISTANCE
FEEDING YOURSELF: INDEPENDENT
TAKING_MEDICATION: INDEPENDENT
MANAGING_FINANCES: INDEPENDENT
ASSISTIVE_DEVICE: WALKER
JUDGMENT_ADEQUATE_SAFELY_COMPLETE_DAILY_ACTIVITIES: YES
TOILETING: INDEPENDENT
DRESSING: INDEPENDENT
GROCERY_SHOPPING: NEEDS ASSISTANCE
ADEQUATE_TO_COMPLETE_ADL: YES

## 2025-03-26 NOTE — PROGRESS NOTES
Subjective   Reason for Visit: Angus Redman is an 69 y.o. male here for a Medicare Wellness visit.     Past Medical, Surgical, and Family History reviewed and updated in chart.         Here for follow up and wellness visit.  Overall doing well for the most part.    For the most part doing well.  His foot ulcer has healed after months of care with the wound clinic.  Ongoing PT and OT continues at home  He requests a disability parking placard though he does not drive it makes it easier on his sister when she does visit and takes him places.  He did see pulmonology-he plans a CAT scan soon  He remains on oxygen          Patient Care Team:  Artie Haskins MD as PCP - General (Internal Medicine)  Artie Haskins MD as PCP - [employer] Ad[employer]age PCP  Evelyn Valentin RN as Care Manager (Case Management)  Braden No MD as On Call Attending Physician (Hospitalist)  Angel Hodge DO as On Call Attending Physician (Hospitalist)     Review of Systems    Objective   Vitals:  /70 (BP Location: Left arm, Patient Position: Sitting, BP Cuff Size: Large adult)   Pulse 63   Ht 1.829 m (6')   Wt 110 kg (243 lb 3.2 oz)   SpO2 91% Comment: room air  BMI 32.98 kg/m²       Physical Exam  Constitutional:       Comments: Somewhat disheveled male, overweight in a wheelchair with portable oxygen quite pleasant in no distress  Eye exam revealed pupils equally round and reactive, with extraocular muscles intact. Normal sclera and eyelids.  No scleral icterus  Neck exam revealed no masses, adenopathy or thyromegaly. No neck pain on range of motion was noted.  Pulmonary exam revealed clear breath sounds bilaterally in all lung fields. No wheezes bronchitis or rales noted.  Cardiac exam revealed I/VI systolic ejection murmur, without gallops or rubs.  No back flank or abdominal discomfort  Extremities bilateral trace pitting edema.  He had no stasis calf ulcers  Skin exam without rash  Gait exam deferred  Nonfocal  neurologic exam without obvious cranial or peripheral motor or sensory deficits as well as peripheral motor versus exam was not tested with his gait exam deferred today  Mental status-somewhat flat affect but no cognitive impairment or anxiousness or abnormal thoughts.  Somewhat [id number], his baseline         Assessment & Plan  Type 2 diabetes mellitus with diabetic autonomic neuropathy, without long-term current use of insulin    Orders:    Referral to Clinical Pharmacy; Future    Gait disturbance    Orders:    Disability Placard    Referral to Physical Therapy; Future    Worsened handwriting    Orders:    Referral to Occupational Therapy; Future    Paroxysmal atrial fibrillation (Multi)         Benign essential hypertension         History of paroxysmal supraventricular tachycardia         Seasonal allergic rhinitis due to pollen         Class 1 obesity with serious comorbidity and body mass index (BMI) of 32.0 to 32.9 in adult, unspecified obesity type         Routine general medical examination at health care facility    Orders:    1 Year Follow Up In Advanced [facility] Care - PCP - Wellness Exam; Future    DNR (do not resuscitate)         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, unspecified chronicity         Stage 3a chronic kidney disease (Multi)         Continuous chronic alcoholism (Multi)         Recurrent major depressive disorder, in partial remission (CMS-[facility])         Alcohol-induced polyneuropathy (Multi)         Frequent falls         Lives alone with help available         Uses roller walker         Restrictive lung disease         On supplemental oxygen therapy                Portions of this encounter note have been copied from my previous note dated 2025, which have been updated where appropriate and all reflect my current medical decision making from today.        Living situation-he lives alone in a house.  He no longer drives.  His   in .  His  sister - Antonina, lives in  [facility], now retired.  The brother lives in a group home near [place] near their family home.             3/25-his sister visits often helping him with appointments and other household details      Home health/home rehab-           3/25-Home PT and OT continue Home health documentation-forms completed and faxed back as requested    CODE STATUS-he has requested DNR comfort care and states he has worked with the  to get forms completed accordingly.  He has these at home.  He is will send those in and will update his electronic medical record to reflect this accordingly    Healthcare power of -reviewed at his 12/March/2024 visit.  He has designated his sister, Antonina Redman as his legal agent of authority.  His formal written and notarized medical POA has been filed      Lab work reviewed from 1/7/2025,  Abdominal ultrasound from 3/5/2025 reviewed          S/p acute rehab stay-discharged from rehab 1/12/2025, following admission December 27 with worsening respiratory concerns with cough and shortness of breath.  He is on portable oxygen.  Chest CT without evidence of PE.  Chronic rib fractures-contributing to his restrictive component.  Otherwise no acute findings    S/p acute hospital stay 12/26/2024-12/27/2024--discharged to rehab to help with rehab, complicated by insufficient services at home-he lives alone and his only relative his sister lives out of town    S/p right third  toe amputations-11/22/24-he will continue wound care with podiatry    Wound care plan-presently goes to the wound clinic Wednesdays where he sees his podiatrist Dr. Jang.  Monday and Friday, the home nurse comes out- [place] to do dressing changes               3/25-fortunately his foot ulcer has healed-he will see the wound clinic as scheduled in several weeks for follow-up    Home health-PT and OT visits continue each once a week to optimize stamina strength and stability    Hospital admission  9/23/2024-9/25/2024-acute on chronic hypoxic respiratory failure from restrictive lung disease likely exacerbated by new onset heart failure-    Left pleural effusion-thoracentesis revealed exudative fluid-furosemide started.  He will follow-up with pulmonary later this week to review    History of heart failure-cardiac evaluation-he was referred to the advanced heart failure clinic with Dr. Harry February 2024-he has completed a 2-week Holter monitor and will see Dr. Harry soon.  He was reported to have a NSTEMI.  LVEF normal on last echo 9/23.             4/24 echo-normal EF, suboptimal echo              10/24-scheduled to meet with Dr. Angel Harry in the heart failure clinic 10/14/2024.  He will continue his cardiac regimen including furosemide        Abnormal chest CT-he has a follow-up CT planned and will follow-up with his pulmonology provider soon    Restrictive lung disease-he will follow-up with his pulmonologist provider-10/21/2024-since discharge he has been on a nebulizer-albuterol and ipratropium provided    Polypharmacy-he will continue to optimize his compliance.  He has a home nurse and will meet with our pharmacy team 10/28/2024    Hypoxemia-with oxygen dependent chronic hypoxic respiratory failure-he was reportedly hypoxic overnight-we will ordered overnight oximeter            He is now on portable oxygen presumably from his pulmonologist.  He will continue.  He states that he feels better and has more stamina           3/25 he remains on portable oxygen and will meet with his pulmonologist as scheduled.        Nutritional concerns-unfortunately he does not do a lot of cooking.  He gets food from [employer] which is delivered      Gait unsteadiness-Home physical therapy ordered last time           He needs help getting out of shower          10/24-he now has a life alert             He uses his rollator consistently             Will continue physical therapy Tuesdays and Thursdays.             1/25-he will continue OT and PT.  Alcoholic peripheral neuropathy has complicated his balance and has increased his risk for falling              3/25-Home PT and OT continue.  He will use extreme caution with position changes.  Disability parking placard requested/provided      Home help         12/23  - Sharon from [employer], Tues for 4 hrs, and Valentina 3 hrs Friday - housekeeping             3/25-remarkably he is doing fairly well on his own though his sister visits and helps.  He states he cannot really afford much help as he once did    Home adaptive equipment-though he uses a wheelchair out of the house-he has stairs in his house and cannot use the wheelchair or scooter inside of his house.  He uses a walker instead.  Unfortunately he does not have a chairlift for the stairs as he does not think this can fit with the layout of his stairs.  Portable oxygen also continues    Recurrent falls-he will use a cane or walker as he is able to.  Unfortunately he recently fell when he went out to the [email or web address] to  delivered items from the store.  He had to call the squad.           Presently using a walker-encouraged him to continue to optimize his safety              Now has a wheelchair to use in his kitchen             12/23-he continues to use the walker as much as he can around the house and kitchen.  He now has a life alert              10/24-fortunately no recent falls.  He will continue efforts with therapy to regain strength stamina and ambulation conditioning especially with his stairs              1/25-no recent falls.  He just got out of rehab January 12.  He will continue home PT               3/25-no recent falls    Hx MRSA feet infection - from past fall. Home wound RN done w/ regular visits. Plans to follow up to check feet prn             He was referred to the emergency room at [facility] 3/6/2024 for worsening foot infections-he was changed to oral antibiotics as below and discharged              He has ongoing foot infections mainly on the left under his first MTP from difficult calluses-currently on Augmentin and doxycycline.  He is working with the wound clinic as well as his podiatrist who he will see tomorrow, Dr. Jang 3/13/2024            10/24-bilateral feet infections-ongoing issue-wound clinic visits every Wed continues with Dr. Jang along with home visits with wound care nurseOttoniel Monday and Fridays    Elevated creatinine-2.2 in ER 3/6/2024.  He will recheck that this afternoon                 Creatinine normalized on recheck fortunately           Regarding tenuous home situation-he lives alone but now no longer drives.  He is able to hire drivers to get him where he needs to go.  He is able to order groceries and have them delivered.  At this point he declines any further changes in his home situation            10/24- his friend Vicky moved away. His sister comes to town when she can. Now retired. He's managing so far on his own.    Nutritional concerns - He gets groceries by [employer] w/ home deliveries 1-2 x wk    Alcoholism with a history of alcoholic gastritis-unfortunately he is still drinking alcohol-apparently the groceries will deliver wine which she has converted to from gin, unfortunately.                7/23-presently drinking 2 bottles of white wine daily.  He refuses to cut back              12/23-he states he quit using alcohol in 10/23 when he promised his sister he would not drink.  He has not really missed that he states strongly encouraged long-term abstinence                3/24-he confirms that he remains off alcohol.  Encouragement/support provided               10/24-unfortunately he is drinking a bottle of wine again daily.  Encouraged him to stop using alcohol              1/25-not using alcohol presently.    History hypertension-he will continue medications           Blood pressure much improved on recheck    Alcoholic gastritis-he will continue his  PPI and limit alcohol use            12/23-he states he quit using alcohol in 10/23 when he promised his sister he would not drink.  He has not really missed that he states strongly encouraged long-term abstinence             10/24-fortunately no present dyspepsia issues    Asthma-improved with Breo.  He does not always use it    Allergic rhinitis-he is allergic to cats and he has several cats.  He will continue his Allegra and Flonase      Painful peripheral neuropathy-both feet are numb, but he feels the pain at times right lateral ankle area and hands.  He is on the gabapentin twice daily now that helps.  He will continue to work with the pain clinic           He continues to meet with the pain clinic-with that appointment 1/24 to continue his duloxetine and gabapentin.              7/24-right wrist arthritis and left arm pain main problems presently.  He is on twice daily gabapentin.  He plans to establish with the pain clinic near [place]             10/24-he will meet with the pain clinic as scheduled 10/9/2024             1/25-he will continue podiatry and wound care    Vitamin D deficiency-he will continue-refilled    Vision care-he had an eye exam with Dr. Lutz in Feb '24      Depression/anxiety-he will continue the BuSpar Cymbalta and Lexapro              10/24-confirm that he is using this    Flu shot-encouraged each September-updated 10/24             Covid booster -encouraged considering a COVID booster each fall.  Updated 10/24          RSV vacc - updated 9/23    Shingrix-completed in 2021    He will follow-up in 3-4  months, sooner as needed    Charting was completed using voice recognition technology and may include unintended errors.

## 2025-03-30 PROBLEM — Z99.81 ON SUPPLEMENTAL OXYGEN THERAPY: Status: ACTIVE | Noted: 2025-03-30

## 2025-03-30 PROBLEM — Z99.89 USES ROLLER WALKER: Status: ACTIVE | Noted: 2025-03-30

## 2025-03-30 ASSESSMENT — ACTIVITIES OF DAILY LIVING (ADL): ASSISTIVE_DEVICE: WALKER;OXYGEN;EYEGLASSES

## 2025-03-31 ENCOUNTER — APPOINTMENT (OUTPATIENT)
Dept: PHARMACY | Facility: HOSPITAL | Age: 70
End: 2025-03-31
Payer: MEDICARE

## 2025-03-31 DIAGNOSIS — E11.43 TYPE 2 DIABETES MELLITUS WITH DIABETIC AUTONOMIC NEUROPATHY, WITHOUT LONG-TERM CURRENT USE OF INSULIN: Primary | ICD-10-CM

## 2025-03-31 DIAGNOSIS — J44.89 ASTHMA WITH CHRONIC OBSTRUCTIVE PULMONARY DISEASE (COPD) (MULTI): ICD-10-CM

## 2025-03-31 NOTE — ASSESSMENT & PLAN NOTE
Patient with COPD that is poorly controlled. Based on CAT score, exacerbation history, and eosinophil count, patient is GOLD Group B and LABA +LAMA therapy is recommended. Eosinophils were elevated at last CBC check at 626 cells/uL on 03/04/25. Based on this he would also be a good candidate for ICS making Trelegy the best option for him. He was prescribed Trelegy by pulmonology on 03/19 but has not yet started it yet. He was instructed to start today as he has the medication at home.     Medication Changes:  CONTINUE  Albuterol 2.5 mg/3 mL nebulizer solution inhale 3 mLs every 6 hours if needed for wheezing  START  Trelegy 200-62.5-25 mcg/dose inhale 1 puff once daily     Future Considerations:  Trelegy is triple therapy, and with his history of struggling with MDIs in the past Trelegy's inhaler should be easier for him to use than Breztri  If Trelegy does not work could always try Breztri to see if he does better with it     Monitoring and Education:  Briefly went over administration of once daily Trelegy:   Open cap to load dose  Breathe out then place lips around mouthpiece and do not block vent   Inhale the medication using one steady long deep breath in through the mouth   Slide the cover up  Rinse mouth out with water and spit the water out after each dose   Explained to patient that using Trelegy daily should help with his breathing and maybe limit how often he needs to use the albuterol nebulizer solution     We will follow-up in ~1 month to see how patient has done with the Trelegy since starting hopefully today. He was provided with my contact information and encouraged to reach out with any questions and/or concerns.

## 2025-03-31 NOTE — PROGRESS NOTES
Clinical Pharmacy Appointment    Patient ID: Angus Redman is a 69 y.o. male who presents for COPD.    Pt is here for First appointment.     Referring Provider: Artie Haskins MD  PCP: Artie Haskins MD   Last visit with PCP: 03/26/2025   Next visit with PCP: 07/30/2025    Subjective   Drug Interactions  The following drug interactions were noted:    Propranolol x Albuterol and Trelegy: non-selective beta blockers can reduce the effectiveness of beta agonists   Alendronate x Ferrous Sulfate: Ferrous Sulfate can reduce the effectiveness of Alendronate when given concurrently, should avoid administration within 30 minutes after taking alendronate     Medication System Management  Adherence/Organization: reports that he does not forget to take his medications, lines his bottles up on a tray to help with organization  Affordability/Accessibility: no issues reported today, notes most of his medications are free and the Trelegy is ~$30 per month     Patient's Preferred Pharmacy    [facility] - [place], [place] - [address]  [address]  [facility] [address]  Phone: [phone] Fax: [phone]    [facility]  [address], [address]  [place] [address]  Phone: [phone] Fax: [phone]     Patient was referred to the clinical pharmacy team to go over medications and ensure patient is doing well with his COPD.     HPI  PULMONARY ASSESSMENT  Patient has been diagnosed with:  Asthma with chronic COPD  Does patient see pulmonology: Yes    Date: 03/19/2025  has had PFT's completed in last 2 years  FEV1 59% pred 04/22/24    Current Regimen  Albuterol 2.5 mg/3 mL nebulizer 3 mLs every 6 hours if needed for wheezing   Trelegy 200-62.5-25 mcg 1 puff once daily (has not yet started taking)  Ipratropium 0.02% nebulizer solution 2.5 mLs 4 times a day (not currently using)    Historical Treatment  Ipratropium-albuterol 0.5-2.5 mg/3 mL (using as  separate products now)  Albuterol HFA 90 mcg/act (unable to operate the inhaler)    Appropriate technique?   Patient is unable to use the Albuterol HFA due being unable to use the mechanism of the inhaler   No other issues with his other inhaled medications via the nebulizer     Adverse Effects: none reported today      Symptom Management  Current symptoms: dyspnea yesterday, states today he is doing much better  Triggers: change in weather  Alleviating factors: albuterol nebulizer  Exercise capacity: just walks around the house  How often do you use your rescue inhaler? Using albuterol nebulizer 3-4 times per day currently (not currently using maintenance inhaler yet)  mMRC score: 4 - reports shortness of breath when walking upstairs and sometimes when just sitting  CAT score: 30 based on reported symptoms today (high impact)    Exacerbation Hx  When was your last hospitalization for an exacerbation? 09/23/24  When was the last time you were treated with antibiotics and/or steroids? Never for COPD - was not given antibiotics or steroids at admission    Total number of hospitalizations d/t exacerbation: 1 in last year     Immunization History:  Influenza: 10/11/2024  PCV13: 06/02/2016  PPSV23: 05/28/2021  PCV20: None  COVID: 10/11/2024  RSV: 09/29/2023    Smoking History  He quit smoking approximately  26  years ago.      Objective   Allergies   Allergen Reactions    Lisinopril Hives    Codeine GI Upset    House Dust Mite Runny nose    Hydrocodone-Acetaminophen Nausea/vomiting    Latex Hives and Rash     Latex tube/connecter kit/gloves      Mold Runny nose    Tree And Shrub Pollen Runny nose     Social History     Social History Narrative    Not on file     Medication Reconciliation:  Discontinued:   Torsemide 20 mg (patient reports this was discontinued)  Fexofenadine 180 mg (patient reports no longer taking_    Medication Review  Current Outpatient Medications   Medication Instructions    albuterol (ProAir HFA) 90  mcg/actuation inhaler 2 puffs, inhalation, Every 6 hours PRN    albuterol 2.5 mg, nebulization, Every 6 hours PRN    alendronate (Fosamax) 70 mg tablet Take 1 tablet by mouth every 7 days. Take in the morning with a full glass of water at least 30 minutes before first food, drink, or medications of the day.    amLODIPine (NORVASC) 2.5 mg, oral, Daily, For blood pressure    aspirin 81 mg, oral, Daily    atorvastatin (LIPITOR) 40 mg, oral, Nightly    busPIRone (BUSPAR) 5 mg, oral, 3 times daily PRN    cyanocobalamin (VITAMIN B-12) 1,000 mcg, oral, Daily, As directed    DULoxetine (CYMBALTA) 60 mg, oral, 2 times daily    empagliflozin (JARDIANCE) 10 mg, oral, Daily    ergocalciferol (VITAMIN D-2) 1,250 mcg, oral, Once Weekly    escitalopram (LEXAPRO) 20 mg, oral, Daily    FeroSuL 325 mg, oral, Daily with breakfast    fluticasone-umeclidin-vilanter (Trelegy Ellipta) 200-62.5-25 mcg blister with device 1 puff, inhalation, Daily, RINSE MOUTH AFTER EACH USE TO AVOID ORAL THRUSH.    folic acid (Folvite) 1 mg tablet Take 1 tablet by mouth daily for folate deficiency.    ipratropium (ATROVENT) 0.5 mg, nebulization, 4 times daily    levothyroxine (SYNTHROID, LEVOXYL) 50 mcg, oral, Daily before breakfast    mirtazapine (REMERON) 15 mg, oral, Nightly    montelukast (SINGULAIR) 10 mg, oral, Nightly    pantoprazole (PROTONIX) 40 mg, oral, 2 times daily    pregabalin (LYRICA) 150 mg, oral, Every 6 hours    propranolol (INDERAL) 10 mg, oral, 2 times daily    spironolactone (ALDACTONE) 25 mg, oral, Daily    tamsulosin (FLOMAX) 0.4 mg, oral      Vitals  BP Readings from Last 2 Encounters:   03/26/25 112/70   03/19/25 130/78     BMI Readings from Last 1 Encounters:   03/26/25 32.98 kg/m²      Labs  A1C  Lab Results   Component Value Date    HGBA1C 4.9 12/19/2023    HGBA1C 4.7 02/23/2018     BMP  Lab Results   Component Value Date    CALCIUM 9.2 01/07/2025     01/07/2025    K 4.2 01/07/2025    CO2 31 01/07/2025    CL 97 (L)  "01/07/2025    BUN 15 01/07/2025    CREATININE 1.42 (H) 01/07/2025    EGFR 53 (L) 01/07/2025     LFTs  Lab Results   Component Value Date    ALT 11 12/26/2024    AST 14 12/26/2024    ALKPHOS 153 (H) 12/26/2024    BILITOT 1.0 12/26/2024     FLP  Lab Results   Component Value Date    TRIG 104 09/23/2024    CHOL 203 (H) 02/13/2024    LDLCALC 136 (H) 02/13/2024    HDL 40.6 02/13/2024     Urine Microalbumin  No results found for: \"MICROALBCREA\"    Weight Management  Wt Readings from Last 3 Encounters:   03/26/25 110 kg (243 lb 3.2 oz)   03/19/25 114 kg (252 lb 3.2 oz)   01/29/25 112 kg (247 lb 6.4 oz)     Estimated body mass index is 32.98 kg/m² as calculated from the following:    Height as of 3/26/25: 1.829 m (6').    Weight as of 3/26/25: 110 kg (243 lb 3.2 oz).     Assessment/Plan   Problem List Items Addressed This Visit       Asthma with chronic obstructive pulmonary disease (COPD) (Multi)     Patient with COPD that is poorly controlled. Based on CAT score, exacerbation history, and eosinophil count, patient is GOLD Group B and LABA +LAMA therapy is recommended. Eosinophils were elevated at last CBC check at 626 cells/uL on 03/04/25. Based on this he would also be a good candidate for ICS making Trelegy the best option for him. He was prescribed Trelegy by pulmonology on 03/19 but has not yet started it yet. He was instructed to start today as he has the medication at home.     Medication Changes:  CONTINUE  Albuterol 2.5 mg/3 mL nebulizer solution inhale 3 mLs every 6 hours if needed for wheezing  START  Trelegy 200-62.5-25 mcg/dose inhale 1 puff once daily     Future Considerations:  Trelegy is triple therapy, and with his history of struggling with MDIs in the past Trelegy's inhaler should be easier for him to use than Breztri  If Trelegy does not work could always try Breztri to see if he does better with it     Monitoring and Education:  Briefly went over administration of once daily Trelegy:   Open cap to load " dose  Breathe out then place lips around mouthpiece and do not block vent   Inhale the medication using one steady long deep breath in through the mouth   Slide the cover up  Rinse mouth out with water and spit the water out after each dose   Explained to patient that using Trelegy daily should help with his breathing and maybe limit how often he needs to use the albuterol nebulizer solution     We will follow-up in ~1 month to see how patient has done with the Trelegy since starting hopefully today. He was provided with my contact information and encouraged to reach out with any questions and/or concerns.          Type 2 diabetes mellitus with diabetic autonomic neuropathy, without long-term current use of insulin - Primary    Relevant Orders    Referral to Clinical Pharmacy     Pharmacy Follow-Up: 05/05/2025  PCP Follow-Up: 07/30/2025  Pulmonology Follow-Up: 05/27/2025    Continue all meds under the continuation of care with the referring provider and clinical pharmacy team.    Thank you,    Ricky Forte, PharmD   Clinical Pharmacist    Verbal consent to manage patient's drug therapy was obtained from the patient. They were informed they may decline to participate or withdraw from participation in pharmacy services at any time.

## 2025-04-01 ENCOUNTER — APPOINTMENT (OUTPATIENT)
Dept: RADIOLOGY | Facility: CLINIC | Age: 70
End: 2025-04-01
Payer: MEDICARE

## 2025-04-07 ENCOUNTER — TELEPHONE (OUTPATIENT)
Dept: PRIMARY CARE | Facility: CLINIC | Age: 70
End: 2025-04-07
Payer: MEDICARE

## 2025-04-07 NOTE — TELEPHONE ENCOUNTER
Noy called to let Dr Haskins know that pt will be receiving 6 visit of home health occupational therpay.    Noy can be reached at [phone]

## 2025-04-08 ENCOUNTER — APPOINTMENT (OUTPATIENT)
Dept: CARDIOLOGY | Facility: CLINIC | Age: 70
End: 2025-04-08
Payer: MEDICARE

## 2025-04-08 VITALS
DIASTOLIC BLOOD PRESSURE: 76 MMHG | SYSTOLIC BLOOD PRESSURE: 143 MMHG | OXYGEN SATURATION: 91 % | BODY MASS INDEX: 32.91 KG/M2 | HEIGHT: 72 IN | HEART RATE: 74 BPM | WEIGHT: 243 LBS

## 2025-04-08 DIAGNOSIS — I25.10 ASCVD (ARTERIOSCLEROTIC CARDIOVASCULAR DISEASE): ICD-10-CM

## 2025-04-08 DIAGNOSIS — I50.30 STAGE C DIASTOLIC HEART FAILURE: Primary | ICD-10-CM

## 2025-04-08 DIAGNOSIS — I10 ESSENTIAL HYPERTENSION: ICD-10-CM

## 2025-04-08 PROCEDURE — 1160F RVW MEDS BY RX/DR IN RCRD: CPT | Performed by: INTERNAL MEDICINE

## 2025-04-08 PROCEDURE — 3008F BODY MASS INDEX DOCD: CPT | Performed by: INTERNAL MEDICINE

## 2025-04-08 PROCEDURE — 99214 OFFICE O/P EST MOD 30 MIN: CPT | Performed by: INTERNAL MEDICINE

## 2025-04-08 PROCEDURE — 1123F ACP DISCUSS/DSCN MKR DOCD: CPT | Performed by: INTERNAL MEDICINE

## 2025-04-08 PROCEDURE — 3077F SYST BP >= 140 MM HG: CPT | Performed by: INTERNAL MEDICINE

## 2025-04-08 PROCEDURE — 3078F DIAST BP <80 MM HG: CPT | Performed by: INTERNAL MEDICINE

## 2025-04-08 PROCEDURE — 1159F MED LIST DOCD IN RCRD: CPT | Performed by: INTERNAL MEDICINE

## 2025-04-08 PROCEDURE — 1036F TOBACCO NON-USER: CPT | Performed by: INTERNAL MEDICINE

## 2025-04-08 PROCEDURE — 1157F ADVNC CARE PLAN IN RCRD: CPT | Performed by: INTERNAL MEDICINE

## 2025-04-08 RX ORDER — NAPROXEN SODIUM 220 MG/1
81 TABLET, FILM COATED ORAL DAILY
Qty: 90 TABLET | Refills: 3 | Status: SHIPPED | OUTPATIENT
Start: 2025-04-08

## 2025-04-08 RX ORDER — ATORVASTATIN CALCIUM 40 MG/1
40 TABLET, FILM COATED ORAL NIGHTLY
Qty: 90 TABLET | Refills: 3 | Status: SHIPPED | OUTPATIENT
Start: 2025-04-08

## 2025-04-08 RX ORDER — SPIRONOLACTONE 25 MG/1
25 TABLET ORAL DAILY
Qty: 30 TABLET | Refills: 11 | Status: SHIPPED | OUTPATIENT
Start: 2025-04-08 | End: 2026-04-08

## 2025-04-08 RX ORDER — AMLODIPINE BESYLATE 2.5 MG/1
2.5 TABLET ORAL DAILY
Qty: 90 TABLET | Refills: 3 | Status: SHIPPED | OUTPATIENT
Start: 2025-04-08

## 2025-04-08 RX ORDER — TORSEMIDE 20 MG/1
20 TABLET ORAL 3 TIMES WEEKLY
Qty: 36 TABLET | Refills: 3 | Status: SHIPPED | OUTPATIENT
Start: 2025-04-09 | End: 2026-04-09

## 2025-04-08 NOTE — PATIENT INSTRUCTIONS
"It was a pleasure seeing you today. Please contact myself or my team with any questions.     To reach Dr. Harry' office please call [phone] ([place]).   Fax: [phone]   To schedule an appointment call [phone]     If you have any questions or need cardiac medication refills, please call the Heart Failure office at [phone], option 6. You may also contact the  Heart Failure Nursing team via email at [email or web address] (Please include your name and date of birth).        1) Start torsemide 20 mg three days per week; when your swelling improves, you can take just \"as needed\"  2) Follow up in 6 months at /[facility]  "

## 2025-04-08 NOTE — PROGRESS NOTES
[facility] Advanced Heart Failure Clinic  Primary Care Physician: Artie Haskins MD  Referring Provider/Cardiologist: n/a     Date of Visit: 04/08/2025  3:40 PM EDT  Location of visit: [address]     HPI:   Mr. Redman is a 69M with a PMHx sig for stage C diastolic HF/HFpEF, pSVT, HTN, and oxygen dependent chronic hypoxic respiratory failure (4L via n/c) who returns to the  Advanced Heart Failure clinic for ongoing evaluation and management.     Interval hx:   Currently denies chest pain, palpitations, shortness of breath, orthopnea, PND.     He does not increasing LE edema and dyspnea.       Hospitalizations: 9/23-9/25/24 Respiratory failure  Admitted 11/18/2024 - 11/19/2024 for a great toe amputation   Admitted 11/21/2024 - 11/27/2024 for a foot infection      PMHx:  stage C diastolic HF/HFpEF, pSVT, nonobstructive CAD, HTN, and oxygen dependent chronic hypoxic respiratory failure    SocHx:  Lives alone in [place]  Former smoker (quit 1999). Denies ETOH, illicits    FamHx:  Father had heart disease       Current Outpatient Medications   Medication Sig Dispense Refill    albuterol (ProAir HFA) 90 mcg/actuation inhaler Inhale 2 puffs every 6 hours if needed for wheezing. (Patient not taking: Reported on 3/31/2025) 18 g 2    albuterol 2.5 mg /3 mL (0.083 %) nebulizer solution Take 3 mL (2.5 mg) by nebulization every 6 hours if needed for wheezing. 120 mL 11    alendronate (Fosamax) 70 mg tablet Take 1 tablet by mouth every 7 days. Take in the morning with a full glass of water at least 30 minutes before first food, drink, or medications of the day. 12 tablet 3    amLODIPine (Norvasc) 2.5 mg tablet Take 1 tablet (2.5 mg) by mouth once daily. For blood pressure 90 tablet 3    aspirin 81 mg chewable tablet Chew and swallow 1 tablet by mouth daily. 90 tablet 3    atorvastatin (Lipitor) 40 mg tablet Take 1 tablet (40 mg) by mouth once daily at bedtime. 90 tablet 3    busPIRone (Buspar) 5 mg  tablet Take 1 tablet by mouth three times daily as needed for anxiety. 180 tablet 2    cyanocobalamin (Vitamin B-12) 1,000 mcg tablet Take 1 tablet (1,000 mcg) by mouth once daily. As directed 90 tablet 3    DULoxetine (Cymbalta) 60 mg DR capsule Take 1 capsule (60 mg) by mouth 2 times a day. 60 capsule 2    empagliflozin (Jardiance) 10 mg Take 1 tablet (10 mg) by mouth once daily. 30 tablet 11    ergocalciferol (Vitamin D-2) 1.25 MG (31695 UT) capsule Take 1 capsule (1,250 mcg) by mouth 1 (one) time per week. 12 capsule 3    escitalopram (Lexapro) 20 mg tablet Take 1 tablet by mouth once daily. 90 tablet 1    ferrous sulfate, 325 mg ferrous sulfate, (FeroSuL) tablet Take 1 tablet by mouth once daily with breakfast. 90 tablet 3    fluticasone-umeclidin-vilanter (Trelegy Ellipta) 200-62.5-25 mcg blister with device Inhale 1 puff once daily. RINSE MOUTH AFTER EACH USE TO AVOID ORAL THRUSH. (Patient not taking: Reported on 3/31/2025) 28 each 3    folic acid (Folvite) 1 mg tablet Take 1 tablet by mouth daily for folate deficiency. 90 tablet 2    ipratropium (Atrovent) 0.02 % nebulizer solution Take 2.5 mL (0.5 mg) by nebulization 4 times a day. 120 mL 11    levothyroxine (Synthroid, Levoxyl) 50 mcg tablet Take 1 tablet by mouth once daily in the morning before meals. 90 tablet 1    mirtazapine (Remeron) 15 mg tablet Take 1 tablet by mouth once daily at bedtime. 90 tablet 1    montelukast (Singulair) 10 mg tablet Take 1 tablet by mouth once daily at bedtime. 90 tablet 3    pantoprazole (ProtoNix) 40 mg EC tablet Take 1 tablet (40 mg) by mouth 2 times a day. 180 tablet 3    pregabalin (Lyrica) 150 mg capsule Take 1 capsule (150 mg) by mouth every 6 hours. 120 capsule 0    propranolol (Inderal) 10 mg tablet Take 1 tablet (10 mg) by mouth 2 times a day. 270 tablet 3    spironolactone (Aldactone) 25 mg tablet Take 1 tablet (25 mg) by mouth once daily. 30 tablet 11    tamsulosin (Flomax) 0.4 mg 24 hr capsule Take 1 capsule by  mouth once daily at bedtime. 90 capsule 1     No current facility-administered medications for this visit.       Allergies   Allergen Reactions    Lisinopril Hives    Codeine GI Upset    House Dust Mite Runny nose    Hydrocodone-Acetaminophen Nausea/vomiting    Latex Hives and Rash     Latex tube/connecter kit/gloves      Mold Runny nose    Tree And Shrub Pollen Runny nose       Visit Vitals  /76 (BP Location: Right arm, Patient Position: Sitting)   Pulse 74   Ht 1.829 m (6')   Wt 110 kg (243 lb)   SpO2 91% Comment: 3L   BMI 32.96 kg/m²   Smoking Status Former   BSA 2.36 m²         Physical Exam:  On exam Mr. Redman appears his stated age, is alert and oriented x3, and in no acute distress. His sclera are anicteric and his oropharynx has moist mucous membranes. His neck is supple and without thyromegaly. The JVP is ~9 cm of water above the right atrium. His cardiac exam has regular rhythm, normal S1, S2. No S3/4. There are no murmurs. His lungs are clear to auscultation bilaterally and there is no dullness to percussion. His abdomen is soft, nontender with normoactive bowel sounds. There is no HJR. The extremities are warm and with 1+ pitting edema. The skin is dry. There is no rash present. The distal pulses are 2+ in all four extremities. His mood and affect are appropriate for todays encounter.       Cardiac Labs/Diagnostics:    Lab Results   Component Value Date    CREATININE 1.42 (H) 01/07/2025    BUN 15 01/07/2025     01/07/2025    K 4.2 01/07/2025    CL 97 (L) 01/07/2025    CO2 31 01/07/2025        Recent Labs     12/26/24  1948 11/18/24  1249 11/13/24  1300 10/30/24  1305 09/24/24  0535   BNP 27 41 52 69 395*       Echo (4/11/24):  1. Left ventricular systolic function is normal with a 60-65% estimated ejection fraction.  2. Poorly visualized anatomical structures due to suboptimal image quality.    Holter (2/21-3/6/24):  Sinus rhythm, no AF, occ SVT (longest lasting 20 seconds)    CCTA  (2/27/24):  1. Right dominant system  2. 50% stenosis appreciated at the level the prox/mid LAD-CT FFR of 0.82 in the mid LAD; 25-50% stenosis appreciated in the proximal/mid RCA - CT FFR results are not available..  3. Nonobstructive CAD revolving the remaining vessels    ECG (2/6/24):  Sinus rhythm (HR 98)    Echo (9/12/23):  1. Left ventricular systolic function is normal with a 60% estimated ejection fraction.  2. Sinus rhythm (HR 70s).      Impression/Plan:  Mr. Redman is a 69M with a PMHx sig for stage C diastolic HF/HFpEF, pSVT, HTN, and oxygen dependent chronic hypoxic respiratory failure (4L via n/c) who returns to the  Advanced Heart Failure clinic for ongoing evaluation and management. At the current time he has functional class II symptoms and is modestly hypervolemic on exam.     1) Stage C acute on chronic diastolic HF/HFpEF  Currently off of loop diuretics; will resume given increasing LE edema/dyspnea.   -resume torsemide 20 mg M-W-F until euvolemic, then prn  -c/w jardiance 10 mg daily, spironolactone 25 mg daily    2) CAD  Lipids (2/13/24): tChol 203, HDL 40, , Trigs 132  CCTA (2/2024) with nonobstructive CAD (CT FFR negative LAD). Echo with preserved biventricular function and no hemodynamically significant valvular disease.   -c/w ASA 81 mg daily, atorvastatin 40 mg daily  -will repeat lipids after starting statin    3) ? AF  States he has a history, but not on anticoagulation or any diagnostic tests showing this. 2 week holter with no evidence of AF.     4) pSVT  Currently on propranolol. Transient episodes noted on prior holter.   -c/w propranolol    5) HTN  BP improved.   -c/w amlodipine 2.5 mg daily      F/U: 6 months at /[facility]      ____________________________________________________________  Angel Harry DO  Section of Advanced Heart Failure and Cardiac Transplantation  Division of Cardiovascular Medicine  [facility]  [facility]  [place]

## 2025-04-09 ENCOUNTER — OFFICE VISIT (OUTPATIENT)
Dept: WOUND CARE | Facility: CLINIC | Age: 70
End: 2025-04-09
Payer: MEDICARE

## 2025-04-09 PROCEDURE — 11042 DBRDMT SUBQ TIS 1ST 20SQCM/<: CPT

## 2025-04-10 ENCOUNTER — PATIENT OUTREACH (OUTPATIENT)
Dept: PRIMARY CARE | Facility: CLINIC | Age: 70
End: 2025-04-10
Payer: MEDICARE

## 2025-04-10 ENCOUNTER — PATIENT OUTREACH (OUTPATIENT)
Dept: PRIMARY CARE | Facility: CLINIC | Age: 70
End: 2025-04-10

## 2025-04-10 DIAGNOSIS — N18.31 STAGE 3A CHRONIC KIDNEY DISEASE (MULTI): ICD-10-CM

## 2025-04-10 DIAGNOSIS — I10 BENIGN ESSENTIAL HYPERTENSION: ICD-10-CM

## 2025-04-10 NOTE — PROGRESS NOTES
I called and spoke with the patient who stated that he has a new wound on his left foot.  His right foot is healed.  Ottoniel from [facility] is coming out 3x a week to change the dressing.  Patient saw Cardiology and had some leg swelling.  Patient to restart Torsemide 20 mg daily.  Patient was evaluated by PT and OT from [facility].  He is waiting for insurance to approve.  Patient has food in the house and is managing better in the house.  No falls per the patient.  Instructed to call with any questions or concerns.

## 2025-04-11 ENCOUNTER — OFFICE VISIT (OUTPATIENT)
Dept: PAIN MEDICINE | Facility: CLINIC | Age: 70
End: 2025-04-11
Payer: MEDICARE

## 2025-04-11 VITALS
HEART RATE: 82 BPM | DIASTOLIC BLOOD PRESSURE: 74 MMHG | SYSTOLIC BLOOD PRESSURE: 136 MMHG | TEMPERATURE: 97.3 F | RESPIRATION RATE: 18 BRPM | OXYGEN SATURATION: 95 %

## 2025-04-11 DIAGNOSIS — G62.9 PERIPHERAL NEUROPATHY: ICD-10-CM

## 2025-04-11 PROCEDURE — 1125F AMNT PAIN NOTED PAIN PRSNT: CPT

## 2025-04-11 PROCEDURE — 1157F ADVNC CARE PLAN IN RCRD: CPT

## 2025-04-11 PROCEDURE — 1159F MED LIST DOCD IN RCRD: CPT

## 2025-04-11 PROCEDURE — 99212 OFFICE O/P EST SF 10 MIN: CPT

## 2025-04-11 PROCEDURE — 3078F DIAST BP <80 MM HG: CPT

## 2025-04-11 PROCEDURE — 3075F SYST BP GE 130 - 139MM HG: CPT

## 2025-04-11 PROCEDURE — 1123F ACP DISCUSS/DSCN MKR DOCD: CPT

## 2025-04-11 RX ORDER — PREGABALIN 150 MG/1
150 CAPSULE ORAL EVERY 6 HOURS
Qty: 120 CAPSULE | Refills: 2 | Status: SHIPPED | OUTPATIENT
Start: 2025-04-11 | End: 2025-07-10

## 2025-04-11 ASSESSMENT — PAIN - FUNCTIONAL ASSESSMENT: PAIN_FUNCTIONAL_ASSESSMENT: 0-10

## 2025-04-11 ASSESSMENT — PAIN SCALES - GENERAL
PAINLEVEL_OUTOF10: 8
PAINLEVEL_OUTOF10: 8

## 2025-04-11 NOTE — PROGRESS NOTES
Subjective   Patient ID: Angus Redman is a 69 y.o. male who presents for Med Refill.  HPI    70 yo male presents today for medication follow up. He is known to this clinic for neuropathic pain. He is maintained on pregabalin 150mg four times daily. Denies any side effects from the pregabalin. Pain today is rated 8/10 in the feet and 6-7/10 in the hands. He confirms that he is taking the medication as prescribed. The patient denies any side effects associated with the usage of the medication patient described the medication improving the quality of life and allowing participation in day-to-day activity patient denies otherwise any new complaint patient is requesting a refill. Patient denies usage of any other opiate. Patient denies usage of any recreational drugs. Patient confirms that the opiate prescription is being used as prescribed.       Review of Systems  All 13 systems were reviewed and are within normal levels except as noted below or per HPI. Positive and pertinent negative responses are noted below or in the HPI   Denied any fever or chills. No weight loss and no night sweats. No cough or sputum production. No diarrhea   Denies constipation.   No bladder and bowel incontinence and no other changes in bladder and bowel. No skin changes. Reports tiredness and fatigability only if the pain is not controlled.   Denied opioids diversion and abuse and denies alcoholism. Denies overuse of the pain medications.      Objective   Physical Exam  General   Alert and oriented x4, pleasant and cooperative.      HEENT  Pupils are equal and normal in size. Ears, nose, mouth, and throat appear to be WNL.  Head atraumatic, symmetric.      No signs of sedation or signs of withdrawal apparent.     Psychiatric   No signs of depression apparent. Appropriate mood and affect.     Neuro   No focal neurological deficit apparent. Ambulation at baseline using wheeled walker.      Respiratory  No respiratory distress, respirations  equal and unlabored.     Abdomen  Soft and nontender, no distention noted.     Skin  Warm, dry and intact. No skin markings supportive of recent IV drug usage .          Assessment/Plan     70 yo male with chronic bilateral lower extremity pain associated with neuropathy.     I have personally reviewed the OARRS report for this patient. I have considered the risks of abuse, dependence, addiction and diversion. I believe that it is clinically appropriate for this patient to be prescribed this medication based on documented diagnosis.  I believe the benefits of the continuation of the opiate outweighs the risk. Patient has described positive response to the present medication therapy. Patient denies any side effects associated with the usage of the medication. Patient did not elicit any signs supportive of misuse or abuse. The review of the [place] Automated Reporting prescription is not suggestive of any worrisome pattern. Patient believes the usage of this medication has improved the quality of life and allow him to participate in day-to-day activity. Therefore  I recommend the continuation of this medication and I will be refilling the medication prescription.    The patient was counseled regarding diagnostic results, instructions for management, risk factor reductions, prognosis, patient and family education, impressions, risks and benefits of treatment options and importance of compliance with treatment.     Continue to take pregabalin as prescribed.     Follow up in 3 months or as needed.     Explained plan to this patient, and patient verbalized understanding and agreement with the plan.     Please do not hesitate to contact the pain clinic after your visit with any questions or concerns at  M-F 8-4 pm     Patient was reminded not to share medications, not to take prescription medications that were not prescribed to the patient, and not to increase or change dose without consulting the pain clinic. I  advised the patient to always take the least amount of medication needed to keep symptoms under control.          SHIRA Kuo-CNP 04/11/25 1:52 PM

## 2025-04-16 ENCOUNTER — APPOINTMENT (OUTPATIENT)
Dept: WOUND CARE | Facility: CLINIC | Age: 70
End: 2025-04-16
Payer: MEDICARE

## 2025-04-17 DIAGNOSIS — Z99.81 CHRONIC HYPOXIC RESPIRATORY FAILURE, ON HOME OXYGEN THERAPY: Primary | ICD-10-CM

## 2025-04-17 DIAGNOSIS — J96.11 CHRONIC HYPOXIC RESPIRATORY FAILURE, ON HOME OXYGEN THERAPY: Primary | ICD-10-CM

## 2025-04-17 DIAGNOSIS — J45.909 UNCOMPLICATED ASTHMA, UNSPECIFIED ASTHMA SEVERITY, UNSPECIFIED WHETHER PERSISTENT (HHS-HCC): ICD-10-CM

## 2025-04-29 ENCOUNTER — APPOINTMENT (OUTPATIENT)
Facility: CLINIC | Age: 70
End: 2025-04-29
Payer: MEDICARE

## 2025-04-30 ENCOUNTER — OFFICE VISIT (OUTPATIENT)
Dept: WOUND CARE | Facility: CLINIC | Age: 70
End: 2025-04-30
Payer: MEDICARE

## 2025-04-30 PROCEDURE — 0JBR0ZZ EXCISION OF LEFT FOOT SUBCUTANEOUS TISSUE AND FASCIA, OPEN APPROACH: ICD-10-PCS | Performed by: STUDENT IN AN ORGANIZED HEALTH CARE EDUCATION/TRAINING PROGRAM

## 2025-04-30 PROCEDURE — 11042 DBRDMT SUBQ TIS 1ST 20SQCM/<: CPT

## 2025-05-01 ENCOUNTER — APPOINTMENT (OUTPATIENT)
Dept: RADIOLOGY | Facility: HOSPITAL | Age: 70
DRG: 617 | End: 2025-05-01
Payer: MEDICARE

## 2025-05-01 ENCOUNTER — HOSPITAL ENCOUNTER (OUTPATIENT)
Facility: HOSPITAL | Age: 70
Setting detail: SURGERY ADMIT
End: 2025-05-01
Attending: STUDENT IN AN ORGANIZED HEALTH CARE EDUCATION/TRAINING PROGRAM | Admitting: STUDENT IN AN ORGANIZED HEALTH CARE EDUCATION/TRAINING PROGRAM
Payer: MEDICARE

## 2025-05-01 ENCOUNTER — HOSPITAL ENCOUNTER (INPATIENT)
Facility: HOSPITAL | Age: 70
End: 2025-05-01
Attending: STUDENT IN AN ORGANIZED HEALTH CARE EDUCATION/TRAINING PROGRAM | Admitting: INTERNAL MEDICINE
Payer: MEDICARE

## 2025-05-01 DIAGNOSIS — S91.109A OPEN WOUND OF TOE, INITIAL ENCOUNTER: Primary | ICD-10-CM

## 2025-05-01 DIAGNOSIS — E53.8 VITAMIN B 12 DEFICIENCY: ICD-10-CM

## 2025-05-01 DIAGNOSIS — M86.9: ICD-10-CM

## 2025-05-01 DIAGNOSIS — R09.02 HYPOXEMIA: ICD-10-CM

## 2025-05-01 DIAGNOSIS — M86.9: Primary | ICD-10-CM

## 2025-05-01 DIAGNOSIS — M20.5X1 TOE CONTRACTURE, RIGHT: ICD-10-CM

## 2025-05-01 DIAGNOSIS — M86.9 OSTEOMYELITIS OF THIRD TOE OF RIGHT FOOT (MULTI): ICD-10-CM

## 2025-05-01 LAB
ALBUMIN SERPL BCP-MCNC: 4.2 G/DL (ref 3.4–5)
ALP SERPL-CCNC: 126 U/L (ref 33–136)
ALT SERPL W P-5'-P-CCNC: 12 U/L (ref 10–52)
ANION GAP SERPL CALC-SCNC: 12 MMOL/L (ref 10–20)
APPEARANCE UR: CLEAR
AST SERPL W P-5'-P-CCNC: 20 U/L (ref 9–39)
BACTERIA #/AREA URNS AUTO: ABNORMAL /HPF
BASOPHILS # BLD AUTO: 0.02 X10*3/UL (ref 0–0.1)
BASOPHILS NFR BLD AUTO: 0.2 %
BILIRUB SERPL-MCNC: 0.5 MG/DL (ref 0–1.2)
BILIRUB UR STRIP.AUTO-MCNC: NEGATIVE MG/DL
BUN SERPL-MCNC: 19 MG/DL (ref 6–23)
CALCIUM SERPL-MCNC: 9.1 MG/DL (ref 8.6–10.3)
CHLORIDE SERPL-SCNC: 100 MMOL/L (ref 98–107)
CO2 SERPL-SCNC: 31 MMOL/L (ref 21–32)
COLOR UR: ABNORMAL
CREAT SERPL-MCNC: 1.47 MG/DL (ref 0.5–1.3)
CRP SERPL-MCNC: 0.98 MG/DL
EGFRCR SERPLBLD CKD-EPI 2021: 51 ML/MIN/1.73M*2
EOSINOPHIL # BLD AUTO: 0.25 X10*3/UL (ref 0–0.4)
EOSINOPHIL NFR BLD AUTO: 2.1 %
ERYTHROCYTE [DISTWIDTH] IN BLOOD BY AUTOMATED COUNT: 15.4 % (ref 11.5–14.5)
ERYTHROCYTE [SEDIMENTATION RATE] IN BLOOD BY WESTERGREN METHOD: 84 MM/H (ref 0–20)
GLUCOSE SERPL-MCNC: 108 MG/DL (ref 74–99)
GLUCOSE UR STRIP.AUTO-MCNC: ABNORMAL MG/DL
HCT VFR BLD AUTO: 44.3 % (ref 41–52)
HGB BLD-MCNC: 13.6 G/DL (ref 13.5–17.5)
HYALINE CASTS #/AREA URNS AUTO: ABNORMAL /LPF
IMM GRANULOCYTES # BLD AUTO: 0.08 X10*3/UL (ref 0–0.5)
IMM GRANULOCYTES NFR BLD AUTO: 0.7 % (ref 0–0.9)
KETONES UR STRIP.AUTO-MCNC: ABNORMAL MG/DL
LEUKOCYTE ESTERASE UR QL STRIP.AUTO: ABNORMAL
LYMPHOCYTES # BLD AUTO: 1.66 X10*3/UL (ref 0.8–3)
LYMPHOCYTES NFR BLD AUTO: 13.9 %
MCH RBC QN AUTO: 28.9 PG (ref 26–34)
MCHC RBC AUTO-ENTMCNC: 30.7 G/DL (ref 32–36)
MCV RBC AUTO: 94 FL (ref 80–100)
MONOCYTES # BLD AUTO: 0.88 X10*3/UL (ref 0.05–0.8)
MONOCYTES NFR BLD AUTO: 7.4 %
MUCOUS THREADS #/AREA URNS AUTO: ABNORMAL /LPF
NEUTROPHILS # BLD AUTO: 9.04 X10*3/UL (ref 1.6–5.5)
NEUTROPHILS NFR BLD AUTO: 75.7 %
NITRITE UR QL STRIP.AUTO: ABNORMAL
NRBC BLD-RTO: 0 /100 WBCS (ref 0–0)
PH UR STRIP.AUTO: 6 [PH]
PLATELET # BLD AUTO: 234 X10*3/UL (ref 150–450)
POTASSIUM SERPL-SCNC: 4.2 MMOL/L (ref 3.5–5.3)
PROT SERPL-MCNC: 7.9 G/DL (ref 6.4–8.2)
PROT UR STRIP.AUTO-MCNC: ABNORMAL MG/DL
RBC # BLD AUTO: 4.71 X10*6/UL (ref 4.5–5.9)
RBC # UR STRIP.AUTO: NEGATIVE MG/DL
RBC #/AREA URNS AUTO: ABNORMAL /HPF
SODIUM SERPL-SCNC: 139 MMOL/L (ref 136–145)
SP GR UR STRIP.AUTO: 1.01
UROBILINOGEN UR STRIP.AUTO-MCNC: ABNORMAL MG/DL
WBC # BLD AUTO: 11.9 X10*3/UL (ref 4.4–11.3)
WBC #/AREA URNS AUTO: >50 /HPF
WBC CLUMPS #/AREA URNS AUTO: ABNORMAL /HPF

## 2025-05-01 PROCEDURE — 36415 COLL VENOUS BLD VENIPUNCTURE: CPT

## 2025-05-01 PROCEDURE — 96374 THER/PROPH/DIAG INJ IV PUSH: CPT

## 2025-05-01 PROCEDURE — 85652 RBC SED RATE AUTOMATED: CPT

## 2025-05-01 PROCEDURE — 87040 BLOOD CULTURE FOR BACTERIA: CPT | Mod: STJLAB

## 2025-05-01 PROCEDURE — 85025 COMPLETE CBC W/AUTO DIFF WBC: CPT

## 2025-05-01 PROCEDURE — 87086 URINE CULTURE/COLONY COUNT: CPT | Mod: STJLAB

## 2025-05-01 PROCEDURE — 2500000001 HC RX 250 WO HCPCS SELF ADMINISTERED DRUGS (ALT 637 FOR MEDICARE OP)

## 2025-05-01 PROCEDURE — 73630 X-RAY EXAM OF FOOT: CPT | Mod: RT

## 2025-05-01 PROCEDURE — 80053 COMPREHEN METABOLIC PANEL: CPT

## 2025-05-01 PROCEDURE — 73630 X-RAY EXAM OF FOOT: CPT | Mod: RIGHT SIDE | Performed by: RADIOLOGY

## 2025-05-01 PROCEDURE — 87077 CULTURE AEROBIC IDENTIFY: CPT | Mod: STJLAB

## 2025-05-01 PROCEDURE — 2500000002 HC RX 250 W HCPCS SELF ADMINISTERED DRUGS (ALT 637 FOR MEDICARE OP, ALT 636 FOR OP/ED)

## 2025-05-01 PROCEDURE — 2500000004 HC RX 250 GENERAL PHARMACY W/ HCPCS (ALT 636 FOR OP/ED): Mod: JZ

## 2025-05-01 PROCEDURE — 1100000001 HC PRIVATE ROOM DAILY

## 2025-05-01 PROCEDURE — 86140 C-REACTIVE PROTEIN: CPT

## 2025-05-01 PROCEDURE — 2500000001 HC RX 250 WO HCPCS SELF ADMINISTERED DRUGS (ALT 637 FOR MEDICARE OP): Performed by: STUDENT IN AN ORGANIZED HEALTH CARE EDUCATION/TRAINING PROGRAM

## 2025-05-01 PROCEDURE — 99285 EMERGENCY DEPT VISIT HI MDM: CPT | Performed by: STUDENT IN AN ORGANIZED HEALTH CARE EDUCATION/TRAINING PROGRAM

## 2025-05-01 PROCEDURE — 81001 URINALYSIS AUTO W/SCOPE: CPT

## 2025-05-01 RX ORDER — VANCOMYCIN 2 GRAM/500 ML IN 0.9 % SODIUM CHLORIDE INTRAVENOUS
2000 ONCE
Status: DISCONTINUED | OUTPATIENT
Start: 2025-05-01 | End: 2025-05-02

## 2025-05-01 RX ORDER — MIRTAZAPINE 15 MG/1
15 TABLET, FILM COATED ORAL NIGHTLY
Status: DISPENSED | OUTPATIENT
Start: 2025-05-01

## 2025-05-01 RX ORDER — PANTOPRAZOLE SODIUM 40 MG/1
40 TABLET, DELAYED RELEASE ORAL 2 TIMES DAILY
Status: DISPENSED | OUTPATIENT
Start: 2025-05-01

## 2025-05-01 RX ORDER — SPIRONOLACTONE 25 MG/1
25 TABLET ORAL DAILY
Status: DISPENSED | OUTPATIENT
Start: 2025-05-02

## 2025-05-01 RX ORDER — PREGABALIN 75 MG/1
150 CAPSULE ORAL EVERY 6 HOURS
Status: DISPENSED | OUTPATIENT
Start: 2025-05-01

## 2025-05-01 RX ORDER — HEPARIN SODIUM 5000 [USP'U]/ML
5000 INJECTION, SOLUTION INTRAVENOUS; SUBCUTANEOUS EVERY 8 HOURS
Status: DISPENSED | OUTPATIENT
Start: 2025-05-01

## 2025-05-01 RX ORDER — TAMSULOSIN HYDROCHLORIDE 0.4 MG/1
0.4 CAPSULE ORAL DAILY
Status: DISPENSED | OUTPATIENT
Start: 2025-05-01

## 2025-05-01 RX ORDER — ATORVASTATIN CALCIUM 40 MG/1
40 TABLET, FILM COATED ORAL NIGHTLY
Status: DISPENSED | OUTPATIENT
Start: 2025-05-01

## 2025-05-01 RX ORDER — AMLODIPINE BESYLATE 2.5 MG/1
2.5 TABLET ORAL DAILY
Status: DISPENSED | OUTPATIENT
Start: 2025-05-01

## 2025-05-01 RX ORDER — LEVOTHYROXINE SODIUM 50 UG/1
50 TABLET ORAL
Status: DISPENSED | OUTPATIENT
Start: 2025-05-02

## 2025-05-01 RX ORDER — VANCOMYCIN HYDROCHLORIDE 750 MG/150ML
750 INJECTION, SOLUTION INTRAVENOUS EVERY 12 HOURS
Status: DISCONTINUED | OUTPATIENT
Start: 2025-05-02 | End: 2025-05-02

## 2025-05-01 RX ORDER — PROPRANOLOL HYDROCHLORIDE 10 MG/1
10 TABLET ORAL 2 TIMES DAILY
Status: DISPENSED | OUTPATIENT
Start: 2025-05-01

## 2025-05-01 RX ORDER — ALBUTEROL SULFATE 0.83 MG/ML
2.5 SOLUTION RESPIRATORY (INHALATION) EVERY 6 HOURS PRN
Status: ACTIVE | OUTPATIENT
Start: 2025-05-01

## 2025-05-01 RX ORDER — VANCOMYCIN HYDROCHLORIDE 1 G/20ML
INJECTION, POWDER, LYOPHILIZED, FOR SOLUTION INTRAVENOUS DAILY PRN
Status: DISCONTINUED | OUTPATIENT
Start: 2025-05-01 | End: 2025-05-01

## 2025-05-01 RX ORDER — VANCOMYCIN HYDROCHLORIDE 1 G/20ML
INJECTION, POWDER, LYOPHILIZED, FOR SOLUTION INTRAVENOUS DAILY PRN
Status: DISPENSED | OUTPATIENT
Start: 2025-05-01

## 2025-05-01 RX ORDER — ESCITALOPRAM OXALATE 20 MG/1
20 TABLET ORAL DAILY
Status: DISPENSED | OUTPATIENT
Start: 2025-05-02

## 2025-05-01 RX ORDER — DULOXETIN HYDROCHLORIDE 60 MG/1
60 CAPSULE, DELAYED RELEASE ORAL 2 TIMES DAILY
Status: DISPENSED | OUTPATIENT
Start: 2025-05-01

## 2025-05-01 RX ORDER — NAPROXEN SODIUM 220 MG/1
81 TABLET, FILM COATED ORAL DAILY
Status: DISPENSED | OUTPATIENT
Start: 2025-05-01

## 2025-05-01 RX ORDER — POLYETHYLENE GLYCOL 3350 17 G/17G
17 POWDER, FOR SOLUTION ORAL DAILY
Status: DISPENSED | OUTPATIENT
Start: 2025-05-01

## 2025-05-01 RX ORDER — TORSEMIDE 20 MG/1
20 TABLET ORAL 3 TIMES WEEKLY
Status: DISPENSED | OUTPATIENT
Start: 2025-05-02

## 2025-05-01 RX ORDER — IPRATROPIUM BROMIDE 0.5 MG/2.5ML
0.5 SOLUTION RESPIRATORY (INHALATION) 4 TIMES DAILY PRN
Status: ACTIVE | OUTPATIENT
Start: 2025-05-01

## 2025-05-01 RX ORDER — MONTELUKAST SODIUM 10 MG/1
10 TABLET ORAL NIGHTLY
Status: DISPENSED | OUTPATIENT
Start: 2025-05-01

## 2025-05-01 RX ORDER — PREGABALIN 150 MG/1
150 CAPSULE ORAL ONCE
Status: COMPLETED | OUTPATIENT
Start: 2025-05-01 | End: 2025-05-01

## 2025-05-01 RX ADMIN — PANTOPRAZOLE SODIUM 40 MG: 40 TABLET, DELAYED RELEASE ORAL at 20:50

## 2025-05-01 RX ADMIN — PREGABALIN 150 MG: 150 CAPSULE ORAL at 22:34

## 2025-05-01 RX ADMIN — MONTELUKAST 10 MG: 10 TABLET, FILM COATED ORAL at 20:50

## 2025-05-01 RX ADMIN — ATORVASTATIN CALCIUM 40 MG: 40 TABLET, FILM COATED ORAL at 20:50

## 2025-05-01 RX ADMIN — TAMSULOSIN HYDROCHLORIDE 0.4 MG: 0.4 CAPSULE ORAL at 20:50

## 2025-05-01 RX ADMIN — PIPERACILLIN SODIUM AND TAZOBACTAM SODIUM 3.38 G: 3; .375 INJECTION, SOLUTION INTRAVENOUS at 23:40

## 2025-05-01 RX ADMIN — PIPERACILLIN SODIUM AND TAZOBACTAM SODIUM 4.5 G: 4; .5 INJECTION, SOLUTION INTRAVENOUS at 17:17

## 2025-05-01 RX ADMIN — PREGABALIN 150 MG: 150 CAPSULE ORAL at 17:12

## 2025-05-01 RX ADMIN — PROPRANOLOL HYDROCHLORIDE 10 MG: 20 TABLET ORAL at 20:50

## 2025-05-01 RX ADMIN — DULOXETINE HYDROCHLORIDE 60 MG: 60 CAPSULE, DELAYED RELEASE ORAL at 20:50

## 2025-05-01 RX ADMIN — MIRTAZAPINE 15 MG: 15 TABLET, FILM COATED ORAL at 20:50

## 2025-05-01 SDOH — ECONOMIC STABILITY: HOUSING INSECURITY: IN THE LAST 12 MONTHS, WAS THERE A TIME WHEN YOU WERE NOT ABLE TO PAY THE MORTGAGE OR RENT ON TIME?: NO

## 2025-05-01 SDOH — SOCIAL STABILITY: SOCIAL INSECURITY: ARE YOU OR HAVE YOU BEEN THREATENED OR ABUSED PHYSICALLY, EMOTIONALLY, OR SEXUALLY BY ANYONE?: NO

## 2025-05-01 SDOH — SOCIAL STABILITY: SOCIAL INSECURITY: WITHIN THE LAST YEAR, HAVE YOU BEEN AFRAID OF YOUR PARTNER OR EX-PARTNER?: NO

## 2025-05-01 SDOH — ECONOMIC STABILITY: FOOD INSECURITY: WITHIN THE PAST 12 MONTHS, THE FOOD YOU BOUGHT JUST DIDN'T LAST AND YOU DIDN'T HAVE MONEY TO GET MORE.: NEVER TRUE

## 2025-05-01 SDOH — ECONOMIC STABILITY: HOUSING INSECURITY: AT ANY TIME IN THE PAST 12 MONTHS, WERE YOU HOMELESS OR LIVING IN A SHELTER (INCLUDING NOW)?: NO

## 2025-05-01 SDOH — SOCIAL STABILITY: SOCIAL INSECURITY: WITHIN THE LAST YEAR, HAVE YOU BEEN HUMILIATED OR EMOTIONALLY ABUSED IN OTHER WAYS BY YOUR PARTNER OR EX-PARTNER?: NO

## 2025-05-01 SDOH — ECONOMIC STABILITY: HOUSING INSECURITY: IN THE PAST 12 MONTHS, HOW MANY TIMES HAVE YOU MOVED WHERE YOU WERE LIVING?: 0

## 2025-05-01 SDOH — SOCIAL STABILITY: SOCIAL INSECURITY: WERE YOU ABLE TO COMPLETE ALL THE BEHAVIORAL HEALTH SCREENINGS?: YES

## 2025-05-01 SDOH — ECONOMIC STABILITY: FOOD INSECURITY: HOW HARD IS IT FOR YOU TO PAY FOR THE VERY BASICS LIKE FOOD, HOUSING, MEDICAL CARE, AND HEATING?: NOT VERY HARD

## 2025-05-01 SDOH — ECONOMIC STABILITY: INCOME INSECURITY: IN THE PAST 12 MONTHS HAS THE ELECTRIC, GAS, OIL, OR WATER COMPANY THREATENED TO SHUT OFF SERVICES IN YOUR HOME?: NO

## 2025-05-01 SDOH — SOCIAL STABILITY: SOCIAL INSECURITY: ARE THERE ANY APPARENT SIGNS OF INJURIES/BEHAVIORS THAT COULD BE RELATED TO ABUSE/NEGLECT?: NO

## 2025-05-01 SDOH — ECONOMIC STABILITY: FOOD INSECURITY: WITHIN THE PAST 12 MONTHS, YOU WORRIED THAT YOUR FOOD WOULD RUN OUT BEFORE YOU GOT THE MONEY TO BUY MORE.: NEVER TRUE

## 2025-05-01 SDOH — SOCIAL STABILITY: SOCIAL INSECURITY: DOES ANYONE TRY TO KEEP YOU FROM HAVING/CONTACTING OTHER FRIENDS OR DOING THINGS OUTSIDE YOUR HOME?: NO

## 2025-05-01 SDOH — SOCIAL STABILITY: SOCIAL INSECURITY: DO YOU FEEL UNSAFE GOING BACK TO THE PLACE WHERE YOU ARE LIVING?: NO

## 2025-05-01 SDOH — SOCIAL STABILITY: SOCIAL INSECURITY: ABUSE: ADULT

## 2025-05-01 SDOH — SOCIAL STABILITY: SOCIAL INSECURITY: DO YOU FEEL ANYONE HAS EXPLOITED OR TAKEN ADVANTAGE OF YOU FINANCIALLY OR OF YOUR PERSONAL PROPERTY?: NO

## 2025-05-01 SDOH — SOCIAL STABILITY: SOCIAL INSECURITY: HAVE YOU HAD THOUGHTS OF HARMING ANYONE ELSE?: NO

## 2025-05-01 SDOH — ECONOMIC STABILITY: TRANSPORTATION INSECURITY: IN THE PAST 12 MONTHS, HAS LACK OF TRANSPORTATION KEPT YOU FROM MEDICAL APPOINTMENTS OR FROM GETTING MEDICATIONS?: NO

## 2025-05-01 SDOH — SOCIAL STABILITY: SOCIAL INSECURITY: HAS ANYONE EVER THREATENED TO HURT YOUR FAMILY OR YOUR PETS?: NO

## 2025-05-01 SDOH — SOCIAL STABILITY: SOCIAL INSECURITY: HAVE YOU HAD ANY THOUGHTS OF HARMING ANYONE ELSE?: NO

## 2025-05-01 ASSESSMENT — COLUMBIA-SUICIDE SEVERITY RATING SCALE - C-SSRS
6. HAVE YOU EVER DONE ANYTHING, STARTED TO DO ANYTHING, OR PREPARED TO DO ANYTHING TO END YOUR LIFE?: NO
2. HAVE YOU ACTUALLY HAD ANY THOUGHTS OF KILLING YOURSELF?: NO
1. IN THE PAST MONTH, HAVE YOU WISHED YOU WERE DEAD OR WISHED YOU COULD GO TO SLEEP AND NOT WAKE UP?: NO

## 2025-05-01 ASSESSMENT — COGNITIVE AND FUNCTIONAL STATUS - GENERAL
PATIENT BASELINE BEDBOUND: NO
MOBILITY SCORE: 22
MOBILITY SCORE: 22
WALKING IN HOSPITAL ROOM: A LITTLE
DAILY ACTIVITIY SCORE: 23
CLIMB 3 TO 5 STEPS WITH RAILING: A LITTLE
DRESSING REGULAR LOWER BODY CLOTHING: A LITTLE
WALKING IN HOSPITAL ROOM: A LITTLE
CLIMB 3 TO 5 STEPS WITH RAILING: A LITTLE
DRESSING REGULAR LOWER BODY CLOTHING: A LITTLE
DAILY ACTIVITIY SCORE: 23

## 2025-05-01 ASSESSMENT — LIFESTYLE VARIABLES
AUDIT-C TOTAL SCORE: 0
TOTAL SCORE: 0
HOW OFTEN DO YOU HAVE A DRINK CONTAINING ALCOHOL: NEVER
AUDIT-C TOTAL SCORE: 0
HAVE PEOPLE ANNOYED YOU BY CRITICIZING YOUR DRINKING: NO
HOW MANY STANDARD DRINKS CONTAINING ALCOHOL DO YOU HAVE ON A TYPICAL DAY: PATIENT DOES NOT DRINK
EVER HAD A DRINK FIRST THING IN THE MORNING TO STEADY YOUR NERVES TO GET RID OF A HANGOVER: NO
SKIP TO QUESTIONS 9-10: 1
HOW OFTEN DO YOU HAVE 6 OR MORE DRINKS ON ONE OCCASION: NEVER
HAVE YOU EVER FELT YOU SHOULD CUT DOWN ON YOUR DRINKING: NO
EVER FELT BAD OR GUILTY ABOUT YOUR DRINKING: NO

## 2025-05-01 ASSESSMENT — ACTIVITIES OF DAILY LIVING (ADL)
HEARING - RIGHT EAR: FUNCTIONAL
ASSISTIVE_DEVICE: WALKER;OXYGEN;EYEGLASSES
GROOMING: INDEPENDENT
WALKS IN HOME: NEEDS ASSISTANCE
FEEDING YOURSELF: INDEPENDENT
LACK_OF_TRANSPORTATION: NO
TOILETING: INDEPENDENT
DRESSING YOURSELF: INDEPENDENT
BATHING: INDEPENDENT
LACK_OF_TRANSPORTATION: NO
ADEQUATE_TO_COMPLETE_ADL: YES
HEARING - LEFT EAR: FUNCTIONAL
JUDGMENT_ADEQUATE_SAFELY_COMPLETE_DAILY_ACTIVITIES: YES
PATIENT'S MEMORY ADEQUATE TO SAFELY COMPLETE DAILY ACTIVITIES?: YES

## 2025-05-01 ASSESSMENT — PAIN - FUNCTIONAL ASSESSMENT: PAIN_FUNCTIONAL_ASSESSMENT: 0-10

## 2025-05-01 ASSESSMENT — PAIN SCALES - GENERAL
PAINLEVEL_OUTOF10: 0 - NO PAIN
PAINLEVEL_OUTOF10: 9

## 2025-05-01 NOTE — H&P
History Of Present Illness  Angus Redman is a 69 y.o. male with PMHx significant for HFpEF (EF 60-65% 4/2024), PSVT, HTN, COPD with chronic respiratory failure on 4 L NC at baseline, CAD, CKD 3, OA, Hx of alcohol abuse, MDD, Hx of tobacco abuse, who is presenting to [facility] from home due to right foot wounds.  The patient has been following with podiatry for the last 3 years at least, and was told by his podiatrist, Dr. Jang, that he should go to the hospital for evaluation of possible amputation.  The patient has had several foot wounds in the past, but that current wounds have been progressively worsening in the last month.  He denies any fevers, chills, chest pain, shortness of breath on his home NC, nausea, vomiting.  He does note significant neuropathy with burning pain in his feet, as well as increased odor of his urine.    PMHx: As Above  PSurgHx: Right toe amputation X2, hip surgery, elbow surgery  Allergies: Codeine, Vicodin, latex  SHx: Quit smoking 26 years ago, former 2 pack/day smoker for 30 years.  No alcohol use since last summer.  Denies any recreational drug use.  Lives at home with 2 cats.  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    ED Course  Vitals: /67, HR 99, RR 20, SpO2 94% on 4 L NC, T36.9  Labs: CBC demonstrating mild leukocytosis with WBC 11.9.  CMP demonstrating elevated creatinine 1.47, within baseline.  Imaging: XR right foot negative for osteomyelitis, but does demonstrate moderate to severe narrowing of the first metatarsophalangeal joint, mild lateral subluxation of the interphalangeal joint space of the great toe.  Interventions: Patient given vancomycin and Zosyn, as well as 1 dose of his pregabalin.  Podiatry was consulted, with plan for amputation tomorrow.     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), Laceration without foreign body of scalp, initial encounter (09/10/2015), Nausea (04/15/2014), 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 (07/27/2017), Osteomyelitis, unspecified (02/26/2022), Other chest pain (09/21/2013), Other conditions influencing health status, Other conditions influencing health status (10/08/2017), Other specified health status (07/23/2014), Patient's noncompliance with other medical treatment and regimen due to unspecified reason (10/28/2016), Patient's other noncompliance with medication regimen (06/04/2016), Personal history of (healed) stress fracture (12/30/2013), Personal history of diseases of the skin and subcutaneous tissue (02/26/2022), Personal history of other diseases of the nervous system and sense organs (03/25/2016), Personal history of other diseases of the nervous system and sense organs (02/02/2016), Personal history of other endocrine, nutritional and metabolic disease (07/13/2015), Personal history of other specified conditions (07/28/2019), Personal history of other specified conditions (05/07/2018), Personal history of other specified conditions (06/25/2015), Unspecified fracture of left foot, initial encounter for closed fracture (06/04/2016), Unspecified injury of head, initial encounter (04/20/2016), Unspecified injury of unspecified elbow, initial encounter (04/07/2017), and Unsteadiness on feet (04/15/2014).    Surgical History  He has a past surgical history that includes Colonoscopy (10/09/2013); Other surgical history (07/28/2019); Other surgical history (04/15/2014); Other surgical history (01/28/2020); Other surgical history (11/24/2015); Other surgical  history (04/19/2017); Other surgical history (10/09/2013); and Skin cancer excision (10/09/2013).     Social History  He reports that he quit smoking about 26 years ago. His smoking use included cigarettes. He has never used smokeless tobacco. He reports that he does not currently use alcohol. He reports that he does not use drugs.    Family History  Family History[1]     Allergies  Lisinopril, Codeine, House dust mite, Hydrocodone-acetaminophen, Latex, Mold, and Tree and shrub pollen    12 point review of systems was completed and is negative unless otherwise noted as above.     Physical Exam  Constitutional:       General: He is not in acute distress.     Appearance: He is obese. He is not ill-appearing.   HENT:      Head: Normocephalic and atraumatic.      Mouth/Throat:      Mouth: Mucous membranes are moist.      Pharynx: No posterior oropharyngeal erythema.   Eyes:      General: No scleral icterus.     Extraocular Movements: Extraocular movements intact.   Cardiovascular:      Rate and Rhythm: Normal rate and regular rhythm.      Pulses: Normal pulses.      Heart sounds: Normal heart sounds.   Pulmonary:      Effort: Pulmonary effort is normal. No respiratory distress.      Breath sounds: Wheezing (Expiratory wheeze) present. No rhonchi or rales.      Comments: On home 4 L NC  Abdominal:      General: Abdomen is flat. Bowel sounds are normal. There is no distension.      Palpations: Abdomen is soft.      Tenderness: There is no abdominal tenderness. There is no guarding.   Musculoskeletal:         General: No swelling.   Skin:     General: Skin is warm and dry.      Comments: There are wounds on the bilateral feet with significant crusting and some purulent drainage of the middle toe on the right foot.  S/p amputations of 2 toes on the right.   Neurological:      Mental Status: He is alert and oriented to person, place, and time. Mental status is at baseline.   Psychiatric:         Mood and Affect: Mood normal.        Last Recorded Vitals  /79   Pulse 99   Temp 36.9 °C (98.4 °F)   Resp 20   Wt 116 kg (255 lb)   SpO2 95%     Relevant Results  Scheduled medications  Scheduled Medications[2]  Continuous medications  Continuous Medications[3]  PRN medications  PRN Medications[4]  Results for orders placed or performed during the hospital encounter of 05/01/25 (from the past 24 hours)   CBC and Auto Differential   Result Value Ref Range    WBC 11.9 (H) 4.4 - 11.3 x10*3/uL    nRBC 0.0 0.0 - 0.0 /100 WBCs    RBC 4.71 4.50 - 5.90 x10*6/uL    Hemoglobin 13.6 13.5 - 17.5 g/dL    Hematocrit 44.3 41.0 - 52.0 %    MCV 94 80 - 100 fL    MCH 28.9 26.0 - 34.0 pg    MCHC 30.7 (L) 32.0 - 36.0 g/dL    RDW 15.4 (H) 11.5 - 14.5 %    Platelets 234 150 - 450 x10*3/uL    Neutrophils % 75.7 40.0 - 80.0 %    Immature Granulocytes %, Automated 0.7 0.0 - 0.9 %    Lymphocytes % 13.9 13.0 - 44.0 %    Monocytes % 7.4 2.0 - 10.0 %    Eosinophils % 2.1 0.0 - 6.0 %    Basophils % 0.2 0.0 - 2.0 %    Neutrophils Absolute 9.04 (H) 1.60 - 5.50 x10*3/uL    Immature Granulocytes Absolute, Automated 0.08 0.00 - 0.50 x10*3/uL    Lymphocytes Absolute 1.66 0.80 - 3.00 x10*3/uL    Monocytes Absolute 0.88 (H) 0.05 - 0.80 x10*3/uL    Eosinophils Absolute 0.25 0.00 - 0.40 x10*3/uL    Basophils Absolute 0.02 0.00 - 0.10 x10*3/uL   Comprehensive metabolic panel   Result Value Ref Range    Glucose 108 (H) 74 - 99 mg/dL    Sodium 139 136 - 145 mmol/L    Potassium 4.2 3.5 - 5.3 mmol/L    Chloride 100 98 - 107 mmol/L    Bicarbonate 31 21 - 32 mmol/L    Anion Gap 12 10 - 20 mmol/L    Urea Nitrogen 19 6 - 23 mg/dL    Creatinine 1.47 (H) 0.50 - 1.30 mg/dL    eGFR 51 (L) >60 mL/min/1.73m*2    Calcium 9.1 8.6 - 10.3 mg/dL    Albumin 4.2 3.4 - 5.0 g/dL    Alkaline Phosphatase 126 33 - 136 U/L    Total Protein 7.9 6.4 - 8.2 g/dL    AST 20 9 - 39 U/L    Bilirubin, Total 0.5 0.0 - 1.2 mg/dL    ALT 12 10 - 52 U/L     *Note: Due to a large number of results and/or encounters  for the requested time period, some results have not been displayed. A complete set of results can be found in Results Review.     XR foot right 3+ views  Result Date: 5/1/2025  STUDY: Foot Radiographs; 5/1/2025 16:14 INDICATION: Right foot pain, evaluate for evidence of osteomyelitis. COMPARISON: 11/21/2025 XR Foot. ACCESSION NUMBER(S): [id number] ORDERING CLINICIAN: DALTON ROSE TECHNIQUE:  Three view(s) of the right foot. FINDINGS:  There is no acute displaced fracture.  Moderate to severe narrowing of the first metatarsophalangeal joint.  Mild lateral subluxation at the interphalangeal joint space of the great toe.  No radiographic evidence of osteomyelitis.  There is obscuration of the second and third toes at the metatarsophalangeal joints.  There is pes planus deformity of the right foot.  Small plantar calcaneal spur.  No radiopaque foreign body.      1. No radiographic evidence of osteomyelitis.  MRI of the foot post contrast may prove additionally useful in detecting early osteomyelitis. 2. Moderate to severe narrowing of the first metatarsophalangeal joint. 3. Mild lateral subluxation at the interphalangeal joint space of the great toe. 4. Pes planus deformity of the right foot. Signed by Dheeraj De Anda MD       Assessment/Plan   Assessment & Plan  Open wound of toe, initial encounter      Patient is a 70 y/o M with PMHx significant for HFpEF (EF 60-65% 4/2024), PSVT, HTN, COPD with chronic respiratory failure on 4 L NC at baseline, CAD, CKD 3, OA, Hx of alcohol abuse, MDD, Hx of tobacco abuse, who initially presented at the request of his Podiatrist due to nonhealing foot wound with purulent drainage.  On initial evaluation the patient was HDS on RA.  Labs demonstrated leukocytosis, but otherwise largely unremarkable.  XR foot did not show osteo-, however per ED report the wound did probe to bone.  The patient was started on Vanco/Zosyn, podiatry was consulted with plan for possible amputation tomorrow,  the patient was admitted to the general medicine service for further management and evaluation.    # Open nonhealing right foot wound with purulent drainage  # Lower extremity neuropathy  # Hx of right toe amputation x2  # Leukocytosis  Prior tissue culture 11/2024 growing MRSA  -ESR/CRP pending  -Wound culture ordered  -XR negative for radiographic evidence of osteomyelitis  -Continue Vanco/Zosyn (5/1-)  -Continue home pregabalin  -NPO at midnight  -ID consulted for antibiotic assistance, appreciate recs  -Podiatry consulted, patient scheduled for surgical intervention, appreciate recs  -Wound care consulted, appreciate recs  -PT/OT    #?  UTI  Patient reports odor in his urine along with history of UTIs.  Will send UA and evaluate.  Patient already on antibiotics for the above problem.    # COPD with chronic hypoxic respiratory failure  -Saturating well on home 4 L NC.  -Continue home albuterol, ipratropium, montelukast  # HFpEF  -Continue GDMT and torsemide with hold parameters  # pSVT  -Continue home propranolol  -Optimize electrolytes with goal K >4.0 and Mg > 2.0  # CKD 3A  -Will hold home Jardiance in the preoperative setting  -Avoid nephrotoxic agents  -Renally dose medications as needed    Chronic conditions  # HTN - amlodipine  # CAD - aspirin  # OA  # Hx of alcohol abuse - encouraged continued cessation  # Hx of tobacco abuse - encouraged continued cessation  # MDD - Duloxetine, escitalopram, mirtazepine,   - Continue medications and management as appropriate    IVF: As needed  Diet: Cardiac, NPO at midnight for podiatry intervention  ABx: Vanco/Zosyn  Consults: Podiatry, ID, PT OT  DVT: Heparin    CODE STATUS: DNR    Dispo: Plan for amputation tomorrow with podiatry, will need monitoring post operatively and plan for antibiotics. Anticipate 2-3 midnights.    Case to be staffed with attending physician,  Donnell Bruno, DO  Internal Medicine PGY-2         [1]   Family History  Problem Relation Name Age of  Onset    Other (cardiac pacemaker) Mother      Coronary artery disease Mother      Other (history of heart artery stent) Mother      Cancer Mother      Other (kurtis cell cancer) Mother      Arthritis Mother      Mental illness Brother          living in handicapped assisted living facility permanently [Other]    Other (angina pectoris) Paternal Grandmother     [2] piperacillin-tazobactam, 4.5 g, intravenous, Once  vancomycin, 2,000 mg, intravenous, Once  [3]    [4]

## 2025-05-01 NOTE — CONSULTS
Vancomycin Dosing by Pharmacy- INITIAL    Angus Redman is a 69 y.o. year old male who Pharmacy has been consulted for vancomycin dosing for bone and joint infection. Based on the patient's indication and renal status this patient will be dosed based on a goal AUC of 400-600.     Renal function is currently stable.    Visit Vitals  /79   Pulse 99   Temp 36.9 °C (98.4 °F)   Resp 20        Lab Results   Component Value Date    CREATININE 1.47 (H) 2025    CREATININE 1.42 (H) 2025    CREATININE 1.35 (H) 2024    CREATININE 1.69 (H) 2024        Patient weight is as follows:   Vitals:    25 1521   Weight: 116 kg (255 lb)       Cultures:  No results found for the encounter in last 14 days.        No intake/output data recorded.  I/O during current shift:  No intake/output data recorded.    Temp (24hrs), Av.9 °C (98.4 °F), Min:36.9 °C (98.4 °F), Max:36.9 °C (98.4 °F)         Assessment/Plan     Patient will not be given a loading dose.  Will initiate vancomycin maintenance, a loading dose of 1500 mg followed by a dose of 750 mg 12 hours later.    This dosing regimen is predicted by [employer] to result in the following pharmacokinetic parameters:  Loading dose: 1500mg  Regimen: 750 mg IV every 12 hours.  Start time: 17:24 on 2025  Exposure target: AUC24 (range)400-600 mg/L.hr   [id number]: 298 mg/L.hr  AUC24,ss: 452 mg/L.hr  Probability of AUC24 > 400: 64 %  Ctrough,ss: 16 mg/L  Probability of Ctrough,ss > 20: 26 %      Follow-up level will be ordered on 5/3 at 0500 unless clinically indicated sooner.  Will continue to monitor renal function daily while on vancomycin and order serum creatinine at least every 48 hours if not already ordered.  Follow for continued vancomycin needs, clinical response, and signs/symptoms of toxicity.       Artie Ghosh, PharmD

## 2025-05-01 NOTE — ED PROVIDER NOTES
"EMERGENCY DEPARTMENT ENCOUNTER      Pt Name: Angus Redmna  MRN: [id number]  Birthdate 1955  Date of evaluation: 5/1/2025  Provider: Abran Salamanca DO    CHIEF COMPLAINT       Chief Complaint   Patient presents with    Foot Injury     Pt states he was sent in by his pcp to be admitted to have the \"remaining 3 toes on my R foot amputated\". Pt denies fever or chills. States he 9/10 foot pain as he did not take his prescribed pain meds today.          HISTORY OF PRESENT ILLNESS    Patient is a 69-year-old male presenting today with concern for a nonhealing wound on his right second toe.  Has had a wound there for a long time, has been on antibiotics outpatient without any significant improvement.  He states that he is here to have his toes amputated.  He follows with Dr. Jang from podiatry.  Denies any fevers chills nausea vomiting.  No chest pain shortness of breath.  No other systemic signs of infection on history.          Nursing Notes were reviewed.    PAST MEDICAL HISTORY   Medical History[1]      SURGICAL HISTORY     Surgical History[2]      CURRENT MEDICATIONS       Previous Medications    ALBUTEROL (PROAIR HFA) 90 MCG/ACTUATION INHALER    Inhale 2 puffs every 6 hours if needed for wheezing.    ALBUTEROL 2.5 MG /3 ML (0.083 %) NEBULIZER SOLUTION    Take 3 mL (2.5 mg) by nebulization every 6 hours if needed for wheezing.    ALENDRONATE (FOSAMAX) 70 MG TABLET    Take 1 tablet by mouth every 7 days. Take in the morning with a full glass of water at least 30 minutes before first food, drink, or medications of the day.    AMLODIPINE (NORVASC) 2.5 MG TABLET    Take 1 tablet (2.5 mg) by mouth once daily. For blood pressure    ASPIRIN 81 MG CHEWABLE TABLET    Chew 1 tablet (81 mg) once daily.    ATORVASTATIN (LIPITOR) 40 MG TABLET    Take 1 tablet (40 mg) by mouth once daily at bedtime.    BUSPIRONE (BUSPAR) 5 MG TABLET    Take 1 tablet by mouth three times daily as needed for anxiety.    CYANOCOBALAMIN " (VITAMIN B-12) 1,000 MCG TABLET    Take 1 tablet (1,000 mcg) by mouth once daily. As directed    DULOXETINE (CYMBALTA) 60 MG DR CAPSULE    Take 1 capsule (60 mg) by mouth 2 times a day.    EMPAGLIFLOZIN (JARDIANCE) 10 MG TABLET    Take 1 tablet (10 mg) by mouth once daily.    ERGOCALCIFEROL (VITAMIN D-2) 1.25 MG (76058 UT) CAPSULE    Take 1 capsule (1,250 mcg) by mouth 1 (one) time per week.    ESCITALOPRAM (LEXAPRO) 20 MG TABLET    Take 1 tablet by mouth once daily.    FERROUS SULFATE, 325 MG FERROUS SULFATE, (FEROSUL) TABLET    Take 1 tablet by mouth once daily with breakfast.    FLUTICASONE-UMECLIDIN-VILANTER (TRELEGY ELLIPTA) 200-62.5-25 MCG BLISTER WITH DEVICE    Inhale 1 puff once daily. RINSE MOUTH AFTER EACH USE TO AVOID ORAL THRUSH.    FOLIC ACID (FOLVITE) 1 MG TABLET    Take 1 tablet by mouth daily for folate deficiency.    IPRATROPIUM (ATROVENT) 0.02 % NEBULIZER SOLUTION    Take 2.5 mL (0.5 mg) by nebulization 4 times a day.    LEVOTHYROXINE (SYNTHROID, LEVOXYL) 50 MCG TABLET    Take 1 tablet by mouth once daily in the morning before meals.    MIRTAZAPINE (REMERON) 15 MG TABLET    Take 1 tablet by mouth once daily at bedtime.    MONTELUKAST (SINGULAIR) 10 MG TABLET    Take 1 tablet by mouth once daily at bedtime.    MUCUS CLEARING DEVICE DEVICE    Acapella device.    PANTOPRAZOLE (PROTONIX) 40 MG EC TABLET    Take 1 tablet (40 mg) by mouth 2 times a day.    PREGABALIN (LYRICA) 150 MG CAPSULE    Take 1 capsule (150 mg) by mouth every 6 hours.    PROPRANOLOL (INDERAL) 10 MG TABLET    Take 1 tablet (10 mg) by mouth 2 times a day.    SPIRONOLACTONE (ALDACTONE) 25 MG TABLET    Take 1 tablet (25 mg) by mouth once daily.    TAMSULOSIN (FLOMAX) 0.4 MG 24 HR CAPSULE    Take 1 capsule by mouth once daily at bedtime.    TORSEMIDE (DEMADEX) 20 MG TABLET    Take 1 tablet (20 mg) by mouth 3 times a week.       ALLERGIES     Lisinopril, Codeine, House dust mite, Hydrocodone-acetaminophen, Latex, Mold, and Tree and shrub  pollen    FAMILY HISTORY     Family History[3]       SOCIAL HISTORY     Social History[4]    SCREENINGS                        PHYSICAL EXAM    (up to 7 for level 4, 8 or more for level 5)     ED Triage Vitals [05/01/25 1521]   Temperature Heart Rate Respirations BP   36.9 °C (98.4 °F) 99 20 110/67      Pulse Ox Temp src Heart Rate Source Patient Position   94 % -- -- --      BP Location FiO2 (%)     -- --       Physical Exam  Vitals and nursing note reviewed.   Constitutional:       General: He is not in acute distress.     Appearance: Normal appearance. He is not ill-appearing.   HENT:      Head: Normocephalic and atraumatic.      Right Ear: External ear normal.      Left Ear: External ear normal.   Pulmonary:      Effort: No respiratory distress.   Musculoskeletal:      Comments: Previous amputated toes in the right foot.  The middle digit does have a nonhealing wound on the distal aspect, there is purulent drainage.   Skin:     Coloration: Skin is not jaundiced or pale.   Neurological:      General: No focal deficit present.      Mental Status: He is alert.   Psychiatric:         Mood and Affect: Mood normal.         Behavior: Behavior normal.          DIAGNOSTIC RESULTS     LABS:  Labs Reviewed   CBC WITH AUTO DIFFERENTIAL - Abnormal       Result Value    WBC 11.9 (*)     nRBC 0.0      RBC 4.71      Hemoglobin 13.6      Hematocrit 44.3      MCV 94      MCH 28.9      MCHC 30.7 (*)     RDW 15.4 (*)     Platelets 234      Neutrophils % 75.7      Immature Granulocytes %, Automated 0.7      Lymphocytes % 13.9      Monocytes % 7.4      Eosinophils % 2.1      Basophils % 0.2      Neutrophils Absolute 9.04 (*)     Immature Granulocytes Absolute, Automated 0.08      Lymphocytes Absolute 1.66      Monocytes Absolute 0.88 (*)     Eosinophils Absolute 0.25      Basophils Absolute 0.02     BLOOD CULTURE   BLOOD CULTURE   TISSUE/WOUND CULTURE/SMEAR   COMPREHENSIVE METABOLIC PANEL   URINALYSIS WITH REFLEX CULTURE AND  MICROSCOPIC    Narrative:     The following orders were created for panel order Urinalysis with Reflex Culture and Microscopic.  Procedure                               Abnormality         Status                     ---------                               -----------         ------                     Urinalysis with Reflex C...[[id number]]                                                 Extra Urine Gray Tube[[id number]]                                                         Please view results for these tests on the individual orders.   URINALYSIS WITH REFLEX CULTURE AND MICROSCOPIC   EXTRA URINE GRAY TUBE       All other labs were within normal range or not returned as of this dictation.    Imaging  XR foot right 3+ views    (Results Pending)        Procedures  Procedures     EMERGENCY DEPARTMENT COURSE/MDM:   Medical Decision Making  69-year-old male present today with a chief complaint of nonhealing wounds on the toes of his right foot.  Follows with Dr. Jang from podiatry.  Planning to have these amputated.  Was advised to come here for these amputations.  Otherwise on exam he does have what appears to be a nonhealing wound on the middle toe of the right foot, with purulent drainage from wound.  No systemic signs of infection.  Vital signs stable.  Basic labs including blood cultures ordered to evaluate for any evidence of worsening infection.  X-ray of the foot ordered as well.  Patient be discussed with his podiatrist, will need to be admitted for ongoing treatment.        Please see ED course for additional MDM.    ED Course as of 05/01/25 1648   Thu May 01, 2025   1647 Patient was admitted to the teaching service for management pending podiatry evaluation and planned amputation. [CL]      ED Course User Index  [CL] Abran Salamanca DO         Diagnoses as of 05/01/25 1648   Open wound of toe, initial encounter        XR foot right 3+ views    (Results Pending)       Patient and or family in agreement and  understanding of treatment plan.  All questions answered.      I reviewed the case with the attending ED physician. The attending ED physician agrees with the plan. Patient and/or patient´s representative was counseled regarding labs, imaging, likely diagnosis, and plan. All questions were answered.    ED Medications administered this visit:    Medications   piperacillin-tazobactam (Zosyn) 4.5 g in dextrose (iso)  mL (has no administration in time range)   vancomycin (Vancocin) 2,000 mg in sodium chloride 0.9%  mL (has no administration in time range)   pregabalin (Lyrica) capsule 150 mg (has no administration in time range)       New Prescriptions from this visit:    New Prescriptions    No medications on file       Follow-up:  No follow-up provider specified.      Final Impression:   1. Open wound of toe, initial encounter          (Please note that portions of this note were completed with a voice recognition program.  Efforts were made to edit the dictations but occasionally words are mis-transcribed.)       [1]   Past Medical History:  Diagnosis Date    Alcohol dependence, in remission 06/08/2022    Alcohol dependence in early, early partial, sustained full, or sustained partial remission    Alcohol dependence, in remission 06/08/2022    Alcohol dependence in early, early partial, sustained full, or sustained partial remission    Alcohol dependence, uncomplicated (Multi) 09/15/2022    Alcohol dependence, daily use    Dependence on other enabling machines and devices 09/24/2019    Walker as ambulation aid    Encounter for screening for other disorder 03/01/2021    Special screening for other conditions    Fracture of one rib, unspecified side, initial encounter for closed fracture 01/03/2014    Closed rib fracture    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    Laceration without foreign body of scalp, initial encounter 09/10/2015    Scalp  laceration    Nausea 04/15/2014    Nausea    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 07/27/2017    Skin ulcer of right ankle, limited to breakdown of skin    Osteomyelitis, unspecified 02/26/2022    Osteomyelitis of toe    Other chest pain 09/21/2013    Atypical chest pain    Other conditions influencing health status     Skin Cancer    Other conditions influencing health status 10/08/2017    Closed displaced fracture of olecranon process of left ulna with intra-articular extension, initial encounter    Other specified health status 07/23/2014    Foreign travel    Patient's noncompliance with other medical treatment and regimen due to unspecified reason 10/28/2016    Noncompliance with therapeutic plan    Patient's other noncompliance with medication regimen 06/04/2016    History of medication noncompliance    Personal history of (healed) stress fracture 12/30/2013    History of stress fracture    Personal history of diseases of the skin and subcutaneous tissue 02/26/2022    History of chronic skin ulcer    Personal history of other diseases of the nervous system and sense organs 03/25/2016    History of peripheral neuropathy    Personal history of other diseases of the nervous system and sense organs 02/02/2016    History of peripheral neuropathy    Personal history of other endocrine, nutritional and metabolic disease 07/13/2015    History of hypothyroidism    Personal history of other specified conditions 07/28/2019    History of syncope    Personal history of other specified conditions 05/07/2018    History of syncope    Personal history of other specified conditions 06/25/2015    History of nausea    Unspecified fracture of left foot, initial encounter for closed fracture 06/04/2016    Foot fracture, left    Unspecified injury of head, initial encounter 04/20/2016    Closed head injury, initial encounter    Unspecified injury of unspecified elbow, initial encounter 04/07/2017     Elbow injury    Unsteadiness on feet 04/15/2014    Unsteady gait   [2]   Past Surgical History:  Procedure Laterality Date    COLONOSCOPY  10/09/2013    Complete Colonoscopy    OTHER SURGICAL HISTORY  2019    Insertion of cardiac monitor    OTHER SURGICAL HISTORY  04/15/2014    Reported Hx Of Hip Replacement - Left Side    OTHER SURGICAL HISTORY  2020    Hip replacement    OTHER SURGICAL HISTORY  2015    Interrogation Of Implantable Loop Recorder By Physician In Person    OTHER SURGICAL HISTORY  2017    Arthroscopy Elbow Left    OTHER SURGICAL HISTORY  10/09/2013    Shoulder Surgery Left    SKIN CANCER EXCISION  10/09/2013    Mohs Micrographic Surgery Face   [3]   Family History  Problem Relation Name Age of Onset    Other (cardiac pacemaker) Mother      Coronary artery disease Mother      Other (history of heart artery stent) Mother      Cancer Mother      Other (kurtis cell cancer) Mother      Arthritis Mother      Mental illness Brother          living in handicapped assisted living facility permanently [Other]    Other (angina pectoris) Paternal Grandmother     [4]   Social History  Socioeconomic History    Marital status:    Tobacco Use    Smoking status: Former     Current packs/day: 0.00     Types: Cigarettes     Quit date:      Years since quittin.3    Smokeless tobacco: Never   Vaping Use    Vaping status: Never Used   Substance and Sexual Activity    Alcohol use: Not Currently    Drug use: Never     Social Drivers of Health     Financial Resource Strain: High Risk (2024)    Overall Financial Resource Strain (CARDIA)     Difficulty of Paying Living Expenses: Hard   Food Insecurity: No Food Insecurity (2024)    Hunger Vital Sign     Worried About Running Out of Food in the Last Year: Never true     Ran Out of Food in the Last Year: Never true   Transportation Needs: No Transportation Needs (2024)    PRAPARE - Transportation     Lack of Transportation  (Medical): No     Lack of Transportation (Non-Medical): No   Physical Activity: Inactive (11/21/2024)    Exercise Vital Sign     Days of Exercise per Week: 0 days     Minutes of Exercise per Session: 0 min   Intimate Partner Violence: Not At Risk (11/21/2024)    Humiliation, Afraid, Rape, and Kick questionnaire     Fear of Current or Ex-Partner: No     Emotionally Abused: No     Physically Abused: No     Sexually Abused: No   Housing Stability: Low Risk  (11/21/2024)    Housing Stability Vital Sign     Unable to Pay for Housing in the Last Year: No     Number of Times Moved in the Last Year: 1     Homeless in the Last Year: No        Abran Salamanca DO  Resident  05/01/25 4083

## 2025-05-02 ENCOUNTER — ANESTHESIA (OUTPATIENT)
Dept: OPERATING ROOM | Facility: HOSPITAL | Age: 70
End: 2025-05-02
Payer: MEDICARE

## 2025-05-02 ENCOUNTER — ANESTHESIA EVENT (OUTPATIENT)
Dept: OPERATING ROOM | Facility: HOSPITAL | Age: 70
End: 2025-05-02
Payer: MEDICARE

## 2025-05-02 ENCOUNTER — APPOINTMENT (OUTPATIENT)
Dept: RADIOLOGY | Facility: HOSPITAL | Age: 70
DRG: 617 | End: 2025-05-02
Payer: MEDICARE

## 2025-05-02 LAB
ALBUMIN SERPL BCP-MCNC: 3.8 G/DL (ref 3.4–5)
ANION GAP SERPL CALC-SCNC: 12 MMOL/L (ref 10–20)
BUN SERPL-MCNC: 17 MG/DL (ref 6–23)
CALCIUM SERPL-MCNC: 8.6 MG/DL (ref 8.6–10.3)
CHLORIDE SERPL-SCNC: 99 MMOL/L (ref 98–107)
CO2 SERPL-SCNC: 30 MMOL/L (ref 21–32)
CREAT SERPL-MCNC: 1.33 MG/DL (ref 0.5–1.3)
EGFRCR SERPLBLD CKD-EPI 2021: 57 ML/MIN/1.73M*2
ERYTHROCYTE [DISTWIDTH] IN BLOOD BY AUTOMATED COUNT: 15.6 % (ref 11.5–14.5)
GLUCOSE SERPL-MCNC: 98 MG/DL (ref 74–99)
HCT VFR BLD AUTO: 42.2 % (ref 41–52)
HGB BLD-MCNC: 12.7 G/DL (ref 13.5–17.5)
HOLD SPECIMEN: NORMAL
MAGNESIUM SERPL-MCNC: 2.62 MG/DL (ref 1.6–2.4)
MCH RBC QN AUTO: 28.7 PG (ref 26–34)
MCHC RBC AUTO-ENTMCNC: 30.1 G/DL (ref 32–36)
MCV RBC AUTO: 96 FL (ref 80–100)
NRBC BLD-RTO: 0 /100 WBCS (ref 0–0)
PHOSPHATE SERPL-MCNC: 4.1 MG/DL (ref 2.5–4.9)
PLATELET # BLD AUTO: 215 X10*3/UL (ref 150–450)
POTASSIUM SERPL-SCNC: 3.9 MMOL/L (ref 3.5–5.3)
RBC # BLD AUTO: 4.42 X10*6/UL (ref 4.5–5.9)
SODIUM SERPL-SCNC: 137 MMOL/L (ref 136–145)
WBC # BLD AUTO: 7.7 X10*3/UL (ref 4.4–11.3)

## 2025-05-02 PROCEDURE — 3700000002 HC GENERAL ANESTHESIA TIME - EACH INCREMENTAL 1 MINUTE: Performed by: STUDENT IN AN ORGANIZED HEALTH CARE EDUCATION/TRAINING PROGRAM

## 2025-05-02 PROCEDURE — 73630 X-RAY EXAM OF FOOT: CPT | Mod: RIGHT SIDE | Performed by: RADIOLOGY

## 2025-05-02 PROCEDURE — 1100000001 HC PRIVATE ROOM DAILY

## 2025-05-02 PROCEDURE — 2720000007 HC OR 272 NO HCPCS: Performed by: STUDENT IN AN ORGANIZED HEALTH CARE EDUCATION/TRAINING PROGRAM

## 2025-05-02 PROCEDURE — 2500000005 HC RX 250 GENERAL PHARMACY W/O HCPCS: Performed by: ANESTHESIOLOGIST ASSISTANT

## 2025-05-02 PROCEDURE — 2500000004 HC RX 250 GENERAL PHARMACY W/ HCPCS (ALT 636 FOR OP/ED): Mod: JZ

## 2025-05-02 PROCEDURE — 2500000004 HC RX 250 GENERAL PHARMACY W/ HCPCS (ALT 636 FOR OP/ED)

## 2025-05-02 PROCEDURE — 2500000005 HC RX 250 GENERAL PHARMACY W/O HCPCS: Performed by: STUDENT IN AN ORGANIZED HEALTH CARE EDUCATION/TRAINING PROGRAM

## 2025-05-02 PROCEDURE — 0Y6V0Z0 DETACHMENT AT RIGHT 4TH TOE, COMPLETE, OPEN APPROACH: ICD-10-PCS | Performed by: STUDENT IN AN ORGANIZED HEALTH CARE EDUCATION/TRAINING PROGRAM

## 2025-05-02 PROCEDURE — 0Y6P0Z0 DETACHMENT AT RIGHT 1ST TOE, COMPLETE, OPEN APPROACH: ICD-10-PCS | Performed by: STUDENT IN AN ORGANIZED HEALTH CARE EDUCATION/TRAINING PROGRAM

## 2025-05-02 PROCEDURE — 2500000002 HC RX 250 W HCPCS SELF ADMINISTERED DRUGS (ALT 637 FOR MEDICARE OP, ALT 636 FOR OP/ED)

## 2025-05-02 PROCEDURE — 85027 COMPLETE CBC AUTOMATED: CPT

## 2025-05-02 PROCEDURE — 83735 ASSAY OF MAGNESIUM: CPT

## 2025-05-02 PROCEDURE — 3600000008 HC OR TIME - EACH INCREMENTAL 1 MINUTE - PROCEDURE LEVEL THREE: Performed by: STUDENT IN AN ORGANIZED HEALTH CARE EDUCATION/TRAINING PROGRAM

## 2025-05-02 PROCEDURE — 51701 INSERT BLADDER CATHETER: CPT

## 2025-05-02 PROCEDURE — 80069 RENAL FUNCTION PANEL: CPT

## 2025-05-02 PROCEDURE — 2500000004 HC RX 250 GENERAL PHARMACY W/ HCPCS (ALT 636 FOR OP/ED): Mod: JZ | Performed by: ANESTHESIOLOGIST ASSISTANT

## 2025-05-02 PROCEDURE — 7100000001 HC RECOVERY ROOM TIME - INITIAL BASE CHARGE: Performed by: STUDENT IN AN ORGANIZED HEALTH CARE EDUCATION/TRAINING PROGRAM

## 2025-05-02 PROCEDURE — 3600000003 HC OR TIME - INITIAL BASE CHARGE - PROCEDURE LEVEL THREE: Performed by: STUDENT IN AN ORGANIZED HEALTH CARE EDUCATION/TRAINING PROGRAM

## 2025-05-02 PROCEDURE — 3700000001 HC GENERAL ANESTHESIA TIME - INITIAL BASE CHARGE: Performed by: STUDENT IN AN ORGANIZED HEALTH CARE EDUCATION/TRAINING PROGRAM

## 2025-05-02 PROCEDURE — 2500000001 HC RX 250 WO HCPCS SELF ADMINISTERED DRUGS (ALT 637 FOR MEDICARE OP)

## 2025-05-02 PROCEDURE — 7100000002 HC RECOVERY ROOM TIME - EACH INCREMENTAL 1 MINUTE: Performed by: STUDENT IN AN ORGANIZED HEALTH CARE EDUCATION/TRAINING PROGRAM

## 2025-05-02 PROCEDURE — 36415 COLL VENOUS BLD VENIPUNCTURE: CPT

## 2025-05-02 PROCEDURE — 2500000004 HC RX 250 GENERAL PHARMACY W/ HCPCS (ALT 636 FOR OP/ED): Performed by: STUDENT IN AN ORGANIZED HEALTH CARE EDUCATION/TRAINING PROGRAM

## 2025-05-02 PROCEDURE — 99223 1ST HOSP IP/OBS HIGH 75: CPT

## 2025-05-02 PROCEDURE — 73630 X-RAY EXAM OF FOOT: CPT | Mod: RT

## 2025-05-02 PROCEDURE — 0Y6X0Z0 DETACHMENT AT RIGHT 5TH TOE, COMPLETE, OPEN APPROACH: ICD-10-PCS | Performed by: STUDENT IN AN ORGANIZED HEALTH CARE EDUCATION/TRAINING PROGRAM

## 2025-05-02 RX ORDER — PROPOFOL 10 MG/ML
INJECTION, EMULSION INTRAVENOUS AS NEEDED
Status: DISCONTINUED | OUTPATIENT
Start: 2025-05-02 | End: 2025-05-02

## 2025-05-02 RX ORDER — BUPIVACAINE HYDROCHLORIDE 5 MG/ML
INJECTION, SOLUTION PERINEURAL AS NEEDED
Status: DISCONTINUED | OUTPATIENT
Start: 2025-05-02 | End: 2025-05-02 | Stop reason: HOSPADM

## 2025-05-02 RX ORDER — ACETAMINOPHEN 325 MG/1
325 TABLET ORAL EVERY 6 HOURS PRN
Status: ACTIVE | OUTPATIENT
Start: 2025-05-02

## 2025-05-02 RX ORDER — POVIDONE-IODINE 10 MG/G
OINTMENT TOPICAL AS NEEDED
Status: DISCONTINUED | OUTPATIENT
Start: 2025-05-02 | End: 2025-05-02 | Stop reason: HOSPADM

## 2025-05-02 RX ORDER — LIDOCAINE HYDROCHLORIDE 10 MG/ML
0.1 INJECTION, SOLUTION INFILTRATION; PERINEURAL ONCE
Status: DISCONTINUED | OUTPATIENT
Start: 2025-05-02 | End: 2025-05-02 | Stop reason: HOSPADM

## 2025-05-02 RX ORDER — ACETAMINOPHEN 325 MG/1
975 TABLET ORAL ONCE
Status: DISCONTINUED | OUTPATIENT
Start: 2025-05-02 | End: 2025-05-02 | Stop reason: HOSPADM

## 2025-05-02 RX ORDER — DIPHENHYDRAMINE HYDROCHLORIDE 50 MG/ML
12.5 INJECTION, SOLUTION INTRAMUSCULAR; INTRAVENOUS ONCE AS NEEDED
Status: DISCONTINUED | OUTPATIENT
Start: 2025-05-02 | End: 2025-05-02 | Stop reason: HOSPADM

## 2025-05-02 RX ORDER — HYDROMORPHONE HYDROCHLORIDE 0.2 MG/ML
0.2 INJECTION INTRAMUSCULAR; INTRAVENOUS; SUBCUTANEOUS EVERY 5 MIN PRN
Status: DISCONTINUED | OUTPATIENT
Start: 2025-05-02 | End: 2025-05-02 | Stop reason: HOSPADM

## 2025-05-02 RX ORDER — LABETALOL HYDROCHLORIDE 5 MG/ML
5 INJECTION, SOLUTION INTRAVENOUS ONCE AS NEEDED
Status: DISCONTINUED | OUTPATIENT
Start: 2025-05-02 | End: 2025-05-02 | Stop reason: HOSPADM

## 2025-05-02 RX ORDER — GLYCOPYRROLATE 0.2 MG/ML
INJECTION INTRAMUSCULAR; INTRAVENOUS AS NEEDED
Status: DISCONTINUED | OUTPATIENT
Start: 2025-05-02 | End: 2025-05-02

## 2025-05-02 RX ORDER — LANOLIN ALCOHOL/MO/W.PET/CERES
1000 CREAM (GRAM) TOPICAL DAILY
Qty: 90 TABLET | Refills: 3 | Status: SHIPPED | OUTPATIENT
Start: 2025-05-02

## 2025-05-02 RX ORDER — ALBUTEROL SULFATE 0.83 MG/ML
2.5 SOLUTION RESPIRATORY (INHALATION) ONCE AS NEEDED
Status: DISCONTINUED | OUTPATIENT
Start: 2025-05-02 | End: 2025-05-02 | Stop reason: HOSPADM

## 2025-05-02 RX ORDER — LIDOCAINE HYDROCHLORIDE 20 MG/ML
INJECTION, SOLUTION EPIDURAL; INFILTRATION; INTRACAUDAL; PERINEURAL AS NEEDED
Status: DISCONTINUED | OUTPATIENT
Start: 2025-05-02 | End: 2025-05-02

## 2025-05-02 RX ORDER — PHENYLEPHRINE HCL IN 0.9% NACL 1 MG/10 ML
SYRINGE (ML) INTRAVENOUS AS NEEDED
Status: DISCONTINUED | OUTPATIENT
Start: 2025-05-02 | End: 2025-05-02

## 2025-05-02 RX ORDER — FAMOTIDINE 10 MG/ML
20 INJECTION, SOLUTION INTRAVENOUS ONCE
Status: DISCONTINUED | OUTPATIENT
Start: 2025-05-02 | End: 2025-05-02 | Stop reason: HOSPADM

## 2025-05-02 RX ORDER — ONDANSETRON HYDROCHLORIDE 2 MG/ML
4 INJECTION, SOLUTION INTRAVENOUS ONCE AS NEEDED
Status: DISCONTINUED | OUTPATIENT
Start: 2025-05-02 | End: 2025-05-02 | Stop reason: HOSPADM

## 2025-05-02 RX ORDER — NORETHINDRONE AND ETHINYL ESTRADIOL 0.5-0.035
KIT ORAL AS NEEDED
Status: DISCONTINUED | OUTPATIENT
Start: 2025-05-02 | End: 2025-05-02

## 2025-05-02 RX ORDER — OXYCODONE AND ACETAMINOPHEN 5; 325 MG/1; MG/1
1 TABLET ORAL EVERY 4 HOURS PRN
Status: DISCONTINUED | OUTPATIENT
Start: 2025-05-02 | End: 2025-05-02 | Stop reason: HOSPADM

## 2025-05-02 RX ORDER — VANCOMYCIN 1.75 GRAM/500 ML IN 0.9 % SODIUM CHLORIDE INTRAVENOUS
1750 EVERY 24 HOURS
Status: DISPENSED | OUTPATIENT
Start: 2025-05-02

## 2025-05-02 RX ORDER — HYDRALAZINE HYDROCHLORIDE 20 MG/ML
5 INJECTION INTRAMUSCULAR; INTRAVENOUS EVERY 30 MIN PRN
Status: DISCONTINUED | OUTPATIENT
Start: 2025-05-02 | End: 2025-05-02 | Stop reason: HOSPADM

## 2025-05-02 RX ORDER — METOCLOPRAMIDE HYDROCHLORIDE 5 MG/ML
10 INJECTION INTRAMUSCULAR; INTRAVENOUS ONCE AS NEEDED
Status: DISCONTINUED | OUTPATIENT
Start: 2025-05-02 | End: 2025-05-02 | Stop reason: HOSPADM

## 2025-05-02 RX ORDER — LANOLIN ALCOHOL/MO/W.PET/CERES
1000 CREAM (GRAM) TOPICAL DAILY
Status: DISPENSED | OUTPATIENT
Start: 2025-05-02

## 2025-05-02 RX ORDER — HYDROMORPHONE HYDROCHLORIDE 0.2 MG/ML
0.2 INJECTION INTRAMUSCULAR; INTRAVENOUS; SUBCUTANEOUS
Status: DISCONTINUED | OUTPATIENT
Start: 2025-05-02 | End: 2025-05-03

## 2025-05-02 RX ORDER — SODIUM CHLORIDE, SODIUM LACTATE, POTASSIUM CHLORIDE, CALCIUM CHLORIDE 600; 310; 30; 20 MG/100ML; MG/100ML; MG/100ML; MG/100ML
100 INJECTION, SOLUTION INTRAVENOUS CONTINUOUS
Status: DISCONTINUED | OUTPATIENT
Start: 2025-05-02 | End: 2025-05-02 | Stop reason: HOSPADM

## 2025-05-02 RX ORDER — MORPHINE SULFATE 2 MG/ML
2 INJECTION, SOLUTION INTRAMUSCULAR; INTRAVENOUS EVERY 4 HOURS PRN
Status: DISCONTINUED | OUTPATIENT
Start: 2025-05-02 | End: 2025-05-04

## 2025-05-02 RX ORDER — OXYCODONE HYDROCHLORIDE 5 MG/1
5 TABLET ORAL EVERY 4 HOURS PRN
Status: DISCONTINUED | OUTPATIENT
Start: 2025-05-02 | End: 2025-05-02 | Stop reason: HOSPADM

## 2025-05-02 RX ADMIN — PROPOFOL 50 MG: 10 INJECTION, EMULSION INTRAVENOUS at 12:18

## 2025-05-02 RX ADMIN — Medication 100 MCG: at 12:51

## 2025-05-02 RX ADMIN — MORPHINE SULFATE 2 MG: 2 INJECTION, SOLUTION INTRAMUSCULAR; INTRAVENOUS at 21:07

## 2025-05-02 RX ADMIN — EPHEDRINE SULFATE 10 MG: 50 INJECTION, SOLUTION INTRAVENOUS at 12:33

## 2025-05-02 RX ADMIN — EPHEDRINE SULFATE 10 MG: 50 INJECTION, SOLUTION INTRAVENOUS at 12:29

## 2025-05-02 RX ADMIN — PROPRANOLOL HYDROCHLORIDE 10 MG: 20 TABLET ORAL at 08:39

## 2025-05-02 RX ADMIN — GLYCOPYRROLATE 0.4 MG: 0.2 INJECTION, SOLUTION INTRAMUSCULAR; INTRAVENOUS at 12:28

## 2025-05-02 RX ADMIN — VANCOMYCIN HYDROCHLORIDE 750 MG: 750 INJECTION, SOLUTION INTRAVENOUS at 05:24

## 2025-05-02 RX ADMIN — PROPOFOL 100 MCG/KG/MIN: 10 INJECTION, EMULSION INTRAVENOUS at 12:19

## 2025-05-02 RX ADMIN — EPHEDRINE SULFATE 10 MG: 50 INJECTION, SOLUTION INTRAVENOUS at 12:27

## 2025-05-02 RX ADMIN — DULOXETINE HYDROCHLORIDE 60 MG: 60 CAPSULE, DELAYED RELEASE ORAL at 21:09

## 2025-05-02 RX ADMIN — PREGABALIN 150 MG: 150 CAPSULE ORAL at 16:59

## 2025-05-02 RX ADMIN — HEPARIN SODIUM 5000 UNITS: 5000 INJECTION INTRAVENOUS; SUBCUTANEOUS at 16:59

## 2025-05-02 RX ADMIN — PROPRANOLOL HYDROCHLORIDE 10 MG: 20 TABLET ORAL at 21:09

## 2025-05-02 RX ADMIN — PREGABALIN 150 MG: 150 CAPSULE ORAL at 23:10

## 2025-05-02 RX ADMIN — PIPERACILLIN SODIUM AND TAZOBACTAM SODIUM 3.38 G: 3; .375 INJECTION, SOLUTION INTRAVENOUS at 08:38

## 2025-05-02 RX ADMIN — MORPHINE SULFATE 2 MG: 2 INJECTION, SOLUTION INTRAMUSCULAR; INTRAVENOUS at 15:31

## 2025-05-02 RX ADMIN — EPHEDRINE SULFATE 10 MG: 50 INJECTION, SOLUTION INTRAVENOUS at 12:36

## 2025-05-02 RX ADMIN — Medication 1750 MG: at 19:13

## 2025-05-02 RX ADMIN — AMLODIPINE BESYLATE 2.5 MG: 2.5 TABLET ORAL at 08:39

## 2025-05-02 RX ADMIN — LIDOCAINE HYDROCHLORIDE 100 MG: 20 INJECTION, SOLUTION EPIDURAL; INFILTRATION; INTRACAUDAL; PERINEURAL at 12:18

## 2025-05-02 RX ADMIN — Medication 100 MCG: at 12:39

## 2025-05-02 RX ADMIN — PIPERACILLIN SODIUM AND TAZOBACTAM SODIUM 3.38 G: 3; .375 INJECTION, SOLUTION INTRAVENOUS at 23:11

## 2025-05-02 RX ADMIN — PREGABALIN 150 MG: 150 CAPSULE ORAL at 05:23

## 2025-05-02 RX ADMIN — MIRTAZAPINE 15 MG: 15 TABLET, FILM COATED ORAL at 21:11

## 2025-05-02 RX ADMIN — PIPERACILLIN SODIUM AND TAZOBACTAM SODIUM 3.38 G: 3; .375 INJECTION, SOLUTION INTRAVENOUS at 15:25

## 2025-05-02 RX ADMIN — TAMSULOSIN HYDROCHLORIDE 0.4 MG: 0.4 CAPSULE ORAL at 21:08

## 2025-05-02 RX ADMIN — Medication 200 MCG: at 12:59

## 2025-05-02 RX ADMIN — LEVOTHYROXINE SODIUM 50 MCG: 0.05 TABLET ORAL at 06:08

## 2025-05-02 RX ADMIN — PANTOPRAZOLE SODIUM 40 MG: 40 TABLET, DELAYED RELEASE ORAL at 08:39

## 2025-05-02 RX ADMIN — Medication 1000 MCG: at 19:12

## 2025-05-02 RX ADMIN — Medication 100 MCG: at 12:55

## 2025-05-02 RX ADMIN — DULOXETINE HYDROCHLORIDE 60 MG: 60 CAPSULE, DELAYED RELEASE ORAL at 08:38

## 2025-05-02 RX ADMIN — ESCITALOPRAM OXALATE 20 MG: 20 TABLET ORAL at 08:39

## 2025-05-02 RX ADMIN — MONTELUKAST 10 MG: 10 TABLET, FILM COATED ORAL at 21:07

## 2025-05-02 RX ADMIN — SPIRONOLACTONE 25 MG: 25 TABLET ORAL at 08:38

## 2025-05-02 RX ADMIN — PANTOPRAZOLE SODIUM 40 MG: 40 TABLET, DELAYED RELEASE ORAL at 21:09

## 2025-05-02 RX ADMIN — SODIUM CHLORIDE, POTASSIUM CHLORIDE, SODIUM LACTATE AND CALCIUM CHLORIDE: 600; 310; 30; 20 INJECTION, SOLUTION INTRAVENOUS at 12:18

## 2025-05-02 RX ADMIN — TORSEMIDE 20 MG: 20 TABLET ORAL at 08:38

## 2025-05-02 RX ADMIN — ATORVASTATIN CALCIUM 40 MG: 40 TABLET, FILM COATED ORAL at 21:08

## 2025-05-02 RX ADMIN — EPHEDRINE SULFATE 10 MG: 50 INJECTION, SOLUTION INTRAVENOUS at 12:31

## 2025-05-02 SDOH — HEALTH STABILITY: MENTAL HEALTH: CURRENT SMOKER: 0

## 2025-05-02 ASSESSMENT — COGNITIVE AND FUNCTIONAL STATUS - GENERAL
DRESSING REGULAR LOWER BODY CLOTHING: A LITTLE
CLIMB 3 TO 5 STEPS WITH RAILING: A LITTLE
MOBILITY SCORE: 22
WALKING IN HOSPITAL ROOM: A LITTLE
DAILY ACTIVITIY SCORE: 23

## 2025-05-02 ASSESSMENT — PAIN DESCRIPTION - DESCRIPTORS: DESCRIPTORS: POUNDING

## 2025-05-02 ASSESSMENT — PAIN - FUNCTIONAL ASSESSMENT
PAIN_FUNCTIONAL_ASSESSMENT: 0-10

## 2025-05-02 ASSESSMENT — PAIN DESCRIPTION - ORIENTATION: ORIENTATION: RIGHT

## 2025-05-02 ASSESSMENT — PAIN SCALES - GENERAL
PAINLEVEL_OUTOF10: 6
PAINLEVEL_OUTOF10: 8
PAINLEVEL_OUTOF10: 5 - MODERATE PAIN
PAINLEVEL_OUTOF10: 0 - NO PAIN

## 2025-05-02 ASSESSMENT — PAIN DESCRIPTION - LOCATION: LOCATION: FOOT

## 2025-05-02 NOTE — CONSULTS
Wound Care Consult     Visit Date: 5/2/2025      Patient Name: Angus Redman         MRN: [id number]             Reason for Consult: Right foot 4th toe wound        Wound History: Closed surgical      Pertinent Labs: n/a      Assessment: Patient S/P amputation of 4th toe right foot. Will defer post-op dressing change orders to podiatry.                          Wound Plan: Will follow peripherally.      Salma Flores RN  5/2/2025  5:08 PM

## 2025-05-02 NOTE — ANESTHESIA POSTPROCEDURE EVALUATION
Patient: Angus Redman    Procedure Summary       Date: 05/02/25 Room / Location: [facility] OR 05 / Virtual [facility] OR    Anesthesia Start: 1213 Anesthesia Stop: 1322    Procedures:       AMPUTATION, TOE (Right)      AMPUTATION, FOOT (Right) Diagnosis:       Osteomyelitis of fourth toe of right foot (Multi)      Toe contracture, right      (Osteomyelitis of fourth toe of right foot (Multi) [M86.9])      (Toe contracture, right [M20.5X1])    Surgeons: Boone Bull DPM Responsible Provider: Juan Finnegan MD    Anesthesia Type: MAC ASA Status: 2            Anesthesia Type: MAC    Vitals Value Taken Time   /70 05/02/25 13:20   Temp 36 °C (96.8 °F) 05/02/25 13:20   Pulse 74 05/02/25 13:20   Resp 16 05/02/25 13:20   SpO2 89 % 05/02/25 13:22   Vitals shown include unfiled device data.    Anesthesia Post Evaluation    Patient location during evaluation: PACU  Patient participation: complete - patient participated  Level of consciousness: awake and alert  Pain management: satisfactory to patient  Airway patency: patent  Cardiovascular status: acceptable  Respiratory status: acceptable  Hydration status: acceptable  Postoperative Nausea and Vomiting: none        No notable events documented.

## 2025-05-02 NOTE — PROGRESS NOTES
Angus Redman is a 69 y.o. male on day 1 of admission presenting with Open wound of toe, initial encounter.      Subjective   NAEON. Feels well.        Objective     Last Recorded Vitals  /68   Pulse 69   Temp 36.6 °C (97.9 °F)   Resp 18   Wt 115 kg (254 lb 4.8 oz)   SpO2 95%   Intake/Output last 3 Shifts:    Intake/Output Summary (Last 24 hours) at 5/2/2025 1320  Last data filed at 5/2/2025 0900  Gross per 24 hour   Intake 300 ml   Output 1100 ml   Net -800 ml       Admission Weight  Weight: 116 kg (255 lb) (05/01/25 1521)    Daily Weight  05/01/25 : 115 kg (254 lb 4.8 oz)    Image Results  XR foot right 3+ views  Narrative: STUDY:  Foot Radiographs; 5/1/2025 16:14  INDICATION:  Right foot pain, evaluate for evidence of osteomyelitis.  COMPARISON:  11/21/2025 XR Foot.  ACCESSION NUMBER(S):  [id number]  ORDERING CLINICIAN:  DALTON ROSE  TECHNIQUE:  Three view(s) of the right foot.  FINDINGS:    There is no acute displaced fracture.  Moderate to severe narrowing of  the first metatarsophalangeal joint.  Mild lateral subluxation at the  interphalangeal joint space of the great toe.  No radiographic  evidence of osteomyelitis.  There is obscuration of the second and  third toes at the metatarsophalangeal joints.  There is pes planus  deformity of the right foot.  Small plantar calcaneal spur.  No  radiopaque foreign body.    Impression: 1. No radiographic evidence of osteomyelitis.  MRI of the foot post  contrast may prove additionally useful in detecting early  osteomyelitis.  2. Moderate to severe narrowing of the first metatarsophalangeal  joint.  3. Mild lateral subluxation at the interphalangeal joint space of the  great toe.  4. Pes planus deformity of the right foot.  Signed by Dheeraj De Anda MD      Physical Exam  Vitals reviewed.   Constitutional:       General: He is not in acute distress.     Appearance: Normal appearance. He is normal weight. He is not ill-appearing.   HENT:      Head:  Normocephalic and atraumatic.      Right Ear: External ear normal.      Left Ear: External ear normal.      Nose: Nose normal.      Mouth/Throat:      Mouth: Mucous membranes are moist.      Pharynx: Oropharynx is clear.   Eyes:      Extraocular Movements: Extraocular movements intact.      Pupils: Pupils are equal, round, and reactive to light.   Cardiovascular:      Rate and Rhythm: Normal rate and regular rhythm.      Pulses: Normal pulses.      Heart sounds: Normal heart sounds. No murmur heard.     No friction rub. No gallop.   Pulmonary:      Effort: Pulmonary effort is normal.      Breath sounds: Wheezing present. No rhonchi or rales.   Abdominal:      General: Abdomen is flat. Bowel sounds are normal. There is no distension.      Palpations: Abdomen is soft. There is no mass.      Tenderness: There is no abdominal tenderness.      Hernia: No hernia is present.   Musculoskeletal:         General: Deformity present. No swelling or tenderness. Normal range of motion.      Cervical back: Normal range of motion and neck supple.      Right lower leg: No edema.      Left lower leg: No edema.      Comments: B/l foot wounds   Skin:     General: Skin is warm and dry.      Capillary Refill: Capillary refill takes less than 2 seconds.   Neurological:      General: No focal deficit present.      Mental Status: He is alert and oriented to person, place, and time.   Psychiatric:         Mood and Affect: Mood normal.         Behavior: Behavior normal.         Relevant Results    Scheduled medications  Scheduled Medications[1]  Continuous medications  Continuous Medications[2]  PRN medications  PRN Medications[3]  Results for orders placed or performed during the hospital encounter of 05/01/25 (from the past 24 hours)   CBC and Auto Differential   Result Value Ref Range    WBC 11.9 (H) 4.4 - 11.3 x10*3/uL    nRBC 0.0 0.0 - 0.0 /100 WBCs    RBC 4.71 4.50 - 5.90 x10*6/uL    Hemoglobin 13.6 13.5 - 17.5 g/dL    Hematocrit 44.3  41.0 - 52.0 %    MCV 94 80 - 100 fL    MCH 28.9 26.0 - 34.0 pg    MCHC 30.7 (L) 32.0 - 36.0 g/dL    RDW 15.4 (H) 11.5 - 14.5 %    Platelets 234 150 - 450 x10*3/uL    Neutrophils % 75.7 40.0 - 80.0 %    Immature Granulocytes %, Automated 0.7 0.0 - 0.9 %    Lymphocytes % 13.9 13.0 - 44.0 %    Monocytes % 7.4 2.0 - 10.0 %    Eosinophils % 2.1 0.0 - 6.0 %    Basophils % 0.2 0.0 - 2.0 %    Neutrophils Absolute 9.04 (H) 1.60 - 5.50 x10*3/uL    Immature Granulocytes Absolute, Automated 0.08 0.00 - 0.50 x10*3/uL    Lymphocytes Absolute 1.66 0.80 - 3.00 x10*3/uL    Monocytes Absolute 0.88 (H) 0.05 - 0.80 x10*3/uL    Eosinophils Absolute 0.25 0.00 - 0.40 x10*3/uL    Basophils Absolute 0.02 0.00 - 0.10 x10*3/uL   Comprehensive metabolic panel   Result Value Ref Range    Glucose 108 (H) 74 - 99 mg/dL    Sodium 139 136 - 145 mmol/L    Potassium 4.2 3.5 - 5.3 mmol/L    Chloride 100 98 - 107 mmol/L    Bicarbonate 31 21 - 32 mmol/L    Anion Gap 12 10 - 20 mmol/L    Urea Nitrogen 19 6 - 23 mg/dL    Creatinine 1.47 (H) 0.50 - 1.30 mg/dL    eGFR 51 (L) >60 mL/min/1.73m*2    Calcium 9.1 8.6 - 10.3 mg/dL    Albumin 4.2 3.4 - 5.0 g/dL    Alkaline Phosphatase 126 33 - 136 U/L    Total Protein 7.9 6.4 - 8.2 g/dL    AST 20 9 - 39 U/L    Bilirubin, Total 0.5 0.0 - 1.2 mg/dL    ALT 12 10 - 52 U/L   Blood Culture    Specimen: Peripheral Venipuncture; Blood culture   Result Value Ref Range    Blood Culture Loaded on Instrument - Culture in progress    C-reactive protein   Result Value Ref Range    C-Reactive Protein 0.98 <1.00 mg/dL   Sedimentation Rate   Result Value Ref Range    Sedimentation Rate 84 (H) 0 - 20 mm/h   Blood Culture    Specimen: Peripheral Venipuncture; Blood culture   Result Value Ref Range    Blood Culture Loaded on Instrument - Culture in progress    Urinalysis with Reflex Culture and Microscopic   Result Value Ref Range    Color, Urine Light-Yellow Light-Yellow, Yellow, Dark-Yellow    Appearance, Urine Clear Clear     Specific Gravity, Urine 1.013 1.005 - 1.035    pH, Urine 6.0 5.0, 5.5, 6.0, 6.5, 7.0, 7.5, 8.0    Protein, Urine 20 (TRACE) NEGATIVE, 10 (TRACE), 20 (TRACE) mg/dL    Glucose, Urine 500 (3+) (A) Normal mg/dL    Blood, Urine NEGATIVE NEGATIVE mg/dL    Ketones, Urine 10 (1+) (A) NEGATIVE mg/dL    Bilirubin, Urine NEGATIVE NEGATIVE mg/dL    Urobilinogen, Urine 2 (1+) (A) Normal mg/dL    Nitrite, Urine 2+ (A) NEGATIVE    Leukocyte Esterase, Urine 500 Mike/uL (A) NEGATIVE   Extra Urine Gray Tube   Result Value Ref Range    Extra Tube Hold for add-ons.    Microscopic Only, Urine   Result Value Ref Range    WBC, Urine >50 (A) 1-5, NONE /HPF    WBC Clumps, Urine RARE Reference range not established. /HPF    RBC, Urine 3-5 NONE, 1-2, 3-5 /HPF    Bacteria, Urine 1+ (A) NONE SEEN /HPF    Mucus, Urine 1+ Reference range not established. /LPF    Hyaline Casts, Urine OCCASIONAL (A) NONE /LPF   CBC   Result Value Ref Range    WBC 7.7 4.4 - 11.3 x10*3/uL    nRBC 0.0 0.0 - 0.0 /100 WBCs    RBC 4.42 (L) 4.50 - 5.90 x10*6/uL    Hemoglobin 12.7 (L) 13.5 - 17.5 g/dL    Hematocrit 42.2 41.0 - 52.0 %    MCV 96 80 - 100 fL    MCH 28.7 26.0 - 34.0 pg    MCHC 30.1 (L) 32.0 - 36.0 g/dL    RDW 15.6 (H) 11.5 - 14.5 %    Platelets 215 150 - 450 x10*3/uL   Renal Function Panel   Result Value Ref Range    Glucose 98 74 - 99 mg/dL    Sodium 137 136 - 145 mmol/L    Potassium 3.9 3.5 - 5.3 mmol/L    Chloride 99 98 - 107 mmol/L    Bicarbonate 30 21 - 32 mmol/L    Anion Gap 12 10 - 20 mmol/L    Urea Nitrogen 17 6 - 23 mg/dL    Creatinine 1.33 (H) 0.50 - 1.30 mg/dL    eGFR 57 (L) >60 mL/min/1.73m*2    Calcium 8.6 8.6 - 10.3 mg/dL    Phosphorus 4.1 2.5 - 4.9 mg/dL    Albumin 3.8 3.4 - 5.0 g/dL   Magnesium   Result Value Ref Range    Magnesium 2.62 (H) 1.60 - 2.40 mg/dL     *Note: Due to a large number of results and/or encounters for the requested time period, some results have not been displayed. A complete set of results can be found in Results Review.      Imaging  XR foot right 3+ views  Result Date: 5/1/2025  1. No radiographic evidence of osteomyelitis.  MRI of the foot post contrast may prove additionally useful in detecting early osteomyelitis. 2. Moderate to severe narrowing of the first metatarsophalangeal joint. 3. Mild lateral subluxation at the interphalangeal joint space of the great toe. 4. Pes planus deformity of the right foot. Signed by Dheeraj De Anda MD      Cardiology, Vascular, and Other Imaging  No other imaging results found for the past 7 days                              Assessment & Plan  Open wound of toe, initial encounter    Osteomyelitis of fourth toe of right foot (Multi)    Toe contracture, right     68 y/o M with PMHx significant for HFpEF (EF 60-65% 4/2024), PSVT, HTN, COPD with chronic respiratory failure on 4 L NC at baseline, CAD, CKD 3, OA, Hx of alcohol abuse, MDD, Hx of tobacco abuse presenting for nonhealing foot wound with purulent drainage.  To undergo OR management.    # Open nonhealing right foot wound with purulent drainage  # Lower extremity neuropathy    Prior tissue culture 11/2024 growing MRSA  -ESR 84, CRP 0.98  -Wound culture ordered, will follow up surgical path  -XR negative for radiographic evidence of osteomyelitis  -Continue Vanco/Zosyn (5/1-)  -Continue home pregabalin  -NPO for OR procedure  -ID consulted for antibiotic assistance, appreciate recs  -Podiatry consulted, patient scheduled for surgical intervention, appreciate recs  -Wound care consulted, appreciate recs  -PT/OT     #?  UTI  Patient reports odor in his urine along with history of UTIs.    - Ucx pending  - continue abx as above    # COPD with chronic hypoxic respiratory failure  -Saturating well on home 4 L NC.  -Continue home albuterol, ipratropium, montelukast  # HFpEF  -Continue GDMT and torsemide with hold parameters  # pSVT  -Continue home propranolol  -Optimize electrolytes with goal K >4.0 and Mg > 2.0  # CKD 3A  -Will hold home Jardiance  in the preoperative setting  -Avoid nephrotoxic agents  -Renally dose medications as needed     Chronic conditions  # HTN - amlodipine  # CAD - aspirin  # OA  # Hx of alcohol abuse - encouraged continued cessation  # Hx of tobacco abuse - encouraged continued cessation  # MDD - Duloxetine, escitalopram, mirtazepine,   - Continue medications and management as appropriate     IVF: As needed  Diet: Cardiac, NPO for OR   ABx: Vanco/Zosyn  Consults: Podiatry, ID, PT OT  DVT: Heparin    Discussed with attending physician Dr. Naveen Diggs D.O.  PGY-2 Internal medicine resident           [1] acetaminophen, 975 mg, oral, Once  [Transfer Hold] amLODIPine, 2.5 mg, oral, Daily  [Held by provider] aspirin, 81 mg, oral, Daily  [Transfer Hold] atorvastatin, 40 mg, oral, Nightly  [Transfer Hold] DULoxetine, 60 mg, oral, BID  [Held by provider] empagliflozin, 10 mg, oral, Daily  [Transfer Hold] escitalopram, 20 mg, oral, Daily  famotidine, 20 mg, intravenous, Once  [Transfer Hold] heparin (porcine), 5,000 Units, subcutaneous, q8h  [Transfer Hold] levothyroxine, 50 mcg, oral, Daily before breakfast  lidocaine, 0.1 mL, subcutaneous, Once  [Transfer Hold] mirtazapine, 15 mg, oral, Nightly  [Transfer Hold] montelukast, 10 mg, oral, Nightly  [Transfer Hold] pantoprazole, 40 mg, oral, BID  [Transfer Hold] piperacillin-tazobactam, 3.375 g, intravenous, q8h  [Transfer Hold] polyethylene glycol, 17 g, oral, Daily  [Transfer Hold] pregabalin, 150 mg, oral, q6h  [Transfer Hold] propranolol, 10 mg, oral, BID  [Transfer Hold] spironolactone, 25 mg, oral, Daily  [Transfer Hold] tamsulosin, 0.4 mg, oral, Daily  [Transfer Hold] torsemide, 20 mg, oral, Once per day on Monday Wednesday Friday  [Transfer Hold] vancomycin, 1,750 mg, intravenous, q24h  [2] lactated Ringer's, 100 mL/hr  [3] PRN medications: [Transfer Hold] albuterol, albuterol, diphenhydrAMINE, hydrALAZINE, HYDROmorphone, HYDROmorphone, [Transfer Hold] ipratropium,  labetaloL, metoclopramide, ondansetron, oxyCODONE, oxyCODONE-acetaminophen, oxygen, [Transfer Hold] vancomycin

## 2025-05-02 NOTE — PROGRESS NOTES
Spiritual Care Visit  Spiritual Care Request    Reason for Visit:        Request Received From:       Focus of Care:  Visited With: Patient not available         Refer to :          Spiritual Care Assessment    Spiritual Assessment:                      Care Provided:       Sense of Community and or Pentec[employer]al Affiliation:  Mandaen         Addressed Needs/Concerns and/or Jamee Through:          Outcome:        Advance Directives:         Spiritual Care Annotation    Annotation:  The patient was out of his room, so I'll try another time.

## 2025-05-02 NOTE — ANESTHESIA PREPROCEDURE EVALUATION
Patient: Angus Redman    Procedure Information       Date/Time: 05/02/25 1040    Procedures:       AMPUTATION, TOE (Right)      AMPUTATION, FOOT (Right)    Location: [facility] OR 05 / [facility]    Surgeons: Boone Bull DPM            Relevant Problems   Cardiac   (+) Atrial fibrillation (Multi)   (+) Benign essential hypertension   (+) Hyperlipidemia   (+) NSTEMI (non-ST elevated myocardial infarction) (Multi)   (+) Paroxysmal atrial fibrillation (Multi)   (+) Peripheral vascular disease, unspecified (CMS-HCC)      Pulmonary   (+) Asthma with chronic obstructive pulmonary disease (COPD) (Multi)   (+) COPD exacerbation (Multi)   (+) Mild intermittent asthma   (+) Restrictive lung disease      Neuro   (+) Alcohol-induced polyneuropathy (Multi)   (+) Anxiety   (+) Carpal tunnel syndrome   (+) Cubital tunnel syndrome on left   (+) Depression, controlled   (+) Lumbar radiculopathy   (+) Major depressive disorder in partial remission   (+) Peripheral neuropathy   (+) Situational depression      GI   (+) Gastroesophageal reflux disease      /Renal   (+) BPH with obstruction/lower urinary tract symptoms      Endocrine   (+) Class 1 obesity with serious comorbidity and body mass index (BMI) of 33.0 to 33.9 in adult   (+) Hypothyroidism   (+) Type 2 diabetes mellitus with diabetic autonomic neuropathy, without long-term current use of insulin      Hematology   (+) Anemia   (+) Macrocytic anemia      Musculoskeletal   (+) Carpal tunnel syndrome   (+) Osteoarthritis      ID   (+) Foot infection   (+) Osteomyelitis of fourth toe of right foot (Multi)   (+) Osteomyelitis of third toe of right foot (Multi)      Skin   (+) Eczema   (+) Skin cancer       Clinical information reviewed:   Tobacco  Allergies  Meds   Med Hx  Surg Hx   Fam Hx  Soc Hx        NPO Detail:  No data recorded     Physical Exam    Airway  Mallampati: II  TM distance: >3 FB  Neck ROM: full  Mouth opening: 3 or more finger widths      Cardiovascular   Rhythm: regular  Rate: normal     Dental - normal exam     Pulmonary Breath sounds clear to auscultation     Abdominal            Anesthesia Plan    History of general anesthesia?: yes  History of complications of general anesthesia?: no    ASA 2     MAC     The patient is not a current smoker.    intravenous induction   Anesthetic plan and risks discussed with patient.    Plan discussed with CAA.

## 2025-05-02 NOTE — OP NOTE
AMPUTATION, TOE (R), AMPUTATION, FOOT (R) Operative Note     Date: 2025  OR Location: [facility]    Name: Angus Redman, : 1955, Age: 69 y.o., MRN: [id number], Sex: male    Diagnosis  Pre-op Diagnosis      * Osteomyelitis of fourth toe of right foot (Multi) [M86.9]     * Toe contracture, right [M20.5X1] Post-op Diagnosis     * Osteomyelitis of fourth toe of right foot (Multi) [M86.9]     * Toe contracture, right [M20.5X1]     Procedures  AMPUTATION, TOE  68915 - SD AMPUTATION TOE METATARSOPHALANGEAL JOINT    AMPUTATION, FOOT  57102 - SD AMPUTATION TOE METATARSOPHALANGEAL JOINT    AMPUTATION, FOOT  73780 - SD AMPUTATION TOE METATARSOPHALANGEAL JOINT      Surgeons      * Boone Bull - Primary    Resident/Fellow/Other Assistant:  Hira Cali PGY3 DPM    Staff:   Keeganulator: Jossie Call Person: Angel Call Person: Donnell    Anesthesia Staff: Anesthesiologist: Juan Finnegan MD  C-AA: TAYLOR Carter  DEVENDRA: Foreign Hernandez    Procedure Summary  Anesthesia: Monitor Anesthesia Care  ASA: II  Estimated Blood Loss: 15mL  Intra-op Medications:   Administrations occurring from 1040 to 1200 on 25:   Medication Name Total Dose   pregabalin (Lyrica) capsule 150 mg Cannot be calculated              Anesthesia Record               Intraprocedure I/O Totals       None           Specimen:   ID Type Source Tests Collected by Time   1 : 4TH DIGIT RIGHT FOOT Tissue DIGIT FOURTH, RIGHT FOOT SURGICAL PATHOLOGY EXAM Boone Bull DPM 2025 1234   2 : 1ST DIGIT RIGHT FOOT Tissue DIGIT FIRST, RIGHT FOOT SURGICAL PATHOLOGY EXAM Boone Bull DPM 2025 1234   3 : 5TH DIGIT RIGHT FOOT Tissue DIGIT FIFTH, RIGHT FOOT SURGICAL PATHOLOGY EXAM Boone Bull DPM 2025 1234                 Drains and/or Catheters: * None in log *    Tourniquet Times:         Implants:     Findings: Intraoperative findings were the same as preoperative findings clinically and radiographically.     Indications: Angus  ANGELA Redman is an 69 y.o. male who is having surgery for Osteomyelitis of fourth toe of right foot (Multi) [M86.9]  Toe contracture, right [M20.5X1].     The patient was seen in the preoperative area. The risks, benefits, complications, treatment options, non-operative alternatives, expected recovery and outcomes were discussed with the patient. The possibilities of reaction to medication, pulmonary aspiration, injury to surrounding structures, bleeding, recurrent infection, the need for additional procedures, failure to diagnose a condition, and creating a complication requiring transfusion or operation were discussed with the patient. The patient concurred with the proposed plan, giving informed consent.  The site of surgery was properly noted/marked if necessary per policy. The patient has been actively warmed in preoperative area. Preoperative antibiotics have been ordered and given within 1 hours of incision. Venous thrombosis prophylaxis have been ordered including unilateral sequential compression device    Procedure Details:     The patient with diagnosis as outlined above presents for podiatric surgical intervention today. The patient has attempted and failed conservative treatment as outlined in preoperative clinic notes and wishes to proceed with surgical intervention at this time. The nature of the deformity, problems anticipated procedures, recovery/convalescence, risks/complications including but not limited to numbness, CRPS, over/under correction, problems healing of soft tissue or bone, postoperative wound infection, wound dehiscence, DVT and/or persistent pain/disability have been explained to the patient in detail. An updated H&P and consent have been completed prior to today's surgical intervention. The patient states that they have been NPO since midnight. No guarantees were given or implied, but it is expected that the patient will have a favorable outcome.  It is with this understanding that we  proceed.     PRE-PROCEDURE INFORMATION:  In pre-op holding area, the Right lower extremity to be operated on was clearly marked and the patient verified correct laterality of the marking.  The patient was brought to the operating room and placed on the operating table in the supine position.  A timeout was performed in which identification of the correct patient, procedure, location, and materials was done. Following IV sedation, local anesthesia was obtained utilizing 10cc of  0.5% marcane plain. The Right foot was then scrubbed, prepped and draped in the usual aseptic manner.     Procedure Details:   Attention was directed to the Right 5th toe  toe where a fishmouth incision was made around the MPJ of the  toe. This incision was deepened to the level of the bone and utilizing sharp dissection, the digit was removed at the digit's metatarsophalangeal  joint level. It was clear after resection of the bone that the remaining bone left intact was viable and appeared to have no signs of osteomyelitis or other infection. The infected bone that had been amputated was not sent for pathology due to the patient wanting to keep the toe.    Attention was directed to the Right 4th toe  toe where a fishmouth incision was made around the MPJ of the  toe. This incision was deepened to the level of the bone and utilizing sharp dissection, the digit was removed at the digit's metatarsophalangeal  joint level. It was clear after resection of the bone that the remaining bone left intact was viable and appeared to have no signs of osteomyelitis or other infection. The infected bone that had been amputated was not sent for pathology due to the patient wanting to keep the toe.    Attention was directed to the Right 1st toe  toe where a fishmouth incision was made around the MPJ of the  toe. This incision was deepened to the level of the bone and utilizing sharp dissection, the digit was removed at the digit's metatarsophalangeal  joint  level. It was clear after resection of the bone that the remaining bone left intact was viable and appeared to have no signs of osteomyelitis or other infection. The infected bone that had been amputated was not sent for pathology due to the patient wanting to keep the toe. The 1st metatarsal head was resected with a sagittal saw of its dorsal and medial prominences. The 1st metatarsal head was then smoothed with a bone rasp .      All surgical wounds were irrigated thoroughly with saline with antibiotic in the solution. The subcutaneous tissue layer was then closed with 3-0 vicryl suture. The skin was re-approximated with 4-0 nylon suture.  Dressing was placed on the surgical extremity consisting of Betadine ointment, adaptic, 4x4's, Krelix., ACE wrap The patient was placed in a surgical shoe.    POSTOPERATIVE INFORMATION: The patient tolerated the above noted procedure and anesthesia well and was transferred to the PACU with vital signs stable, and vascular status intact with capillary refill intact to the most distal aspect of the operative extremity. Postoperative instructions reviewed in detail with the patient and family with written instructions provided. Patient will return to floor. Should any problems, questions, or concerns arise, primary team to ismael or [facility] podiatry team.    This is Hira Cali DPM dictating the operative note for Dr. Jorgito DPM.   Complications:  None; patient tolerated the procedure well.    Disposition: PACU - hemodynamically stable.  Condition: stable                 Additional Details: None    Attending Attestation: I was present and scrubbed for the entire procedure.    Boone Bull  Phone Number: [phone]

## 2025-05-02 NOTE — PROGRESS NOTES
Attempt to meet with patient at bedside. Patient is off the floor at this time. Will attempt again at a later time.     1455: Patient returned to room from procedure. Medical staff in room at this time. Will attempt again at a later time.

## 2025-05-02 NOTE — CARE PLAN
Problem: Pain - Adult  Goal: Verbalizes/displays adequate comfort level or baseline comfort level  Outcome: Progressing     Problem: Safety - Adult  Goal: Free from fall injury  Outcome: Progressing     Problem: Chronic Conditions and Co-morbidities  Goal: Patient's chronic conditions and co-morbidity symptoms are monitored and maintained or improved  Outcome: Progressing     Problem: Fall/Injury  Goal: Not fall by end of shift  Outcome: Progressing     Problem: Fall/Injury  Goal: Be free from injury by end of the shift  Outcome: Progressing     Problem: Pain  Goal: Turns in bed with improved pain control throughout the shift  Outcome: Progressing     Problem: Pain  Goal: Performs ADL's with improved pain control throughout shift  Outcome: Progressing     Problem: Skin  Goal: Promote skin healing  Outcome: Progressing     Problem: Skin  Goal: Participates in plan/prevention/treatment measures  Outcome: Progressing   The patient's goals for the shift include      The clinical goals for the shift include Patient will remain hemodynamically stable.  1000 To OR via bed for surgery.   1428 Return to room from PACU, VSS.  1900 Sitting in bed with alarm on and call light within reach, eating dinner without difficulty. No decline in assessment. Morphine given earlier with effective pain control.

## 2025-05-02 NOTE — CARE PLAN
The patient's goals for the shift include  sleep    The clinical goals for the shift include pt will remain free from injury      Problem: Safety - Adult  Goal: Free from fall injury  Outcome: Progressing

## 2025-05-02 NOTE — CONSULTS
Infectious Disease Consult    PATIENT NAME: Angus Redman    MRN: [id number]  SERVICE DATE:  5/2/2025   SERVICE TIME:  2:34 PM    SIGNATURE: Kathleen Patino MD    PRIMARY CARE PHYSICIAN: Artie Haskins MD  REASON FOR CONSULT: Right foot osteomyelitis  REQUESTING PHYSICIAN: Dr. Garcia        ASSESSMENT :   -Right fourth toe soft tissue infection, with possible osteomyelitis  -Status post amputation of 5/2  -Renal failure  -History of right third toe osteomyelitis status post amputation in November 2024  -Previous positive wound culture for MRSA  -History of chronic hypoxic respiratory failure, CAD, history of orthostatic hypotension,History of PVD     PLAN:  - Continue vancomycin and Zosyn empirically  - Closely monitor for antibiotics side effects including rash, Diarrhea/CDI, thrombocytopenia, MARIA L, etc.      Will continue to follow.          HPI  Most of the history was taken from the chart as the patient in the OR currently.  The patient was admitted with right fourth toe osteomyelitis and underwent amputation today.  The patient is known to me from right foot infection that time he had nonhealing third toe wound infection that was amputated.  His previous culture grew MRSA and E. coli ESBL from the wound.  Labs showed WBC of 7.7, creatinine 1.33, CRP 0.98.      CURRENT ALLERGIES:   Allergies as of 05/01/2025 - Reviewed 05/01/2025   Allergen Reaction Noted    Lisinopril Hives 01/29/2023    Codeine GI Upset 01/29/2023    House dust mite Runny nose 01/29/2023    Hydrocodone-acetaminophen Nausea/vomiting 03/12/2024    Latex Hives and Rash 01/29/2023    Mold Runny nose 01/29/2023    Tree and shrub pollen Runny nose 01/29/2023     MEDICATIONS:  Current Medications[1]         PHYSICAL EXAM:  Patient Vitals for the past 24 hrs:   BP Temp Temp src Pulse Resp SpO2 Height Weight   05/02/25 1428 133/79 35.1 °C (95.2 °F) Temporal 64 16 97 % -- --   05/02/25 1415 -- -- -- 74 14 99 % -- --   05/02/25 1400 173/90 -- -- 74 16 96 %  "-- --   05/02/25 1345 117/77 36.1 °C (97 °F) Temporal 80 14 97 % -- --   05/02/25 1330 112/59 -- -- 76 16 94 % -- --   05/02/25 1320 109/70 36 °C (96.8 °F) Temporal 74 16 98 % -- --   05/02/25 1319 109/70 36 °C (96.8 °F) Temporal 73 16 98 % -- --   05/02/25 1200 111/68 36.6 °C (97.9 °F) -- 69 18 95 % -- --   05/02/25 0800 142/77 35.9 °C (96.6 °F) Temporal 63 18 96 % -- --   05/02/25 0000 116/66 35.6 °C (96.1 °F) Temporal 60 19 96 % -- --   05/01/25 1849 148/70 35.8 °C (96.4 °F) Temporal 70 18 94 % 1.829 m (6' 0.01\") 115 kg (254 lb 4.8 oz)   05/01/25 1800 133/75 -- -- 78 18 97 % -- --   05/01/25 1600 110/79 -- -- -- -- 95 % -- --   05/01/25 1521 110/67 36.9 °C (98.4 °F) -- 99 20 94 % 1.829 m (6') 116 kg (255 lb)     Body mass index is 34.48 kg/m².  Admission feet pictures as below                Labs:  Lab Results   Component Value Date    WBC 7.7 05/02/2025    HGB 12.7 (L) 05/02/2025    HCT 42.2 05/02/2025    MCV 96 05/02/2025     05/02/2025     Lab Results   Component Value Date    GLUCOSE 98 05/02/2025    CALCIUM 8.6 05/02/2025     05/02/2025    K 3.9 05/02/2025    CO2 30 05/02/2025    CL 99 05/02/2025    BUN 17 05/02/2025    CREATININE 1.33 (H) 05/02/2025   ESR: --  Lab Results   Component Value Date    SEDRATE 84 (H) 05/01/2025     Lab Results   Component Value Date    CRP 0.98 05/01/2025     Lab Results   Component Value Date    ALT 12 05/01/2025    AST 20 05/01/2025    ALKPHOS 126 05/01/2025    BILITOT 0.5 05/01/2025       DATA:   Diagnostic tests reviewed for today's visit:    Labs this admission reviewed  Imagings this admission reviewed  Cultures: Reviewed        Thank you so much for this consultation         Kathleen Patino MD.   Infectious Disease Attending        This note was partially created using voice recognition software and is inherently subject to errors including those of syntax and \"sound-alike\" substitutions which may escape proofreading. In such instances, original meaning may be " extrapolated by contextual derivation            [1]   Current Facility-Administered Medications:     [Transfer Hold] albuterol 2.5 mg /3 mL (0.083 %) nebulizer solution 2.5 mg, 2.5 mg, nebulization, q6h PRN, Donnell Bruno DO    [Transfer Hold] amLODIPine (Norvasc) tablet 2.5 mg, 2.5 mg, oral, Daily, Donnell Bruno DO, 2.5 mg at 05/02/25 0839    [Held by provider] aspirin chewable tablet 81 mg, 81 mg, oral, Daily, Donnell Bruno DO    [Transfer Hold] atorvastatin (Lipitor) tablet 40 mg, 40 mg, oral, Nightly, Donnell Bruno DO, 40 mg at 05/01/25 2050    [Transfer Hold] DULoxetine (Cymbalta) DR capsule 60 mg, 60 mg, oral, BID, Donnell Bruno DO, 60 mg at 05/02/25 0838    [Held by provider] empagliflozin (Jardiance) tablet 10 mg, 10 mg, oral, Daily, Donnell Bruno DO    [Transfer Hold] escitalopram (Lexapro) tablet 20 mg, 20 mg, oral, Daily, Donnell Bruno DO, 20 mg at 05/02/25 0839    [Transfer Hold] heparin (porcine) injection 5,000 Units, 5,000 Units, subcutaneous, q8h, Donnell Bruno DO    [Transfer Hold] ipratropium (Atrovent) 0.02 % nebulizer solution 0.5 mg, 0.5 mg, nebulization, 4x daily PRN, Donnell Bruno DO    [Transfer Hold] levothyroxine (Synthroid, Levoxyl) tablet 50 mcg, 50 mcg, oral, Daily before breakfast, Donnell Bruno DO, 50 mcg at 05/02/25 0608    [Transfer Hold] mirtazapine (Remeron) tablet 15 mg, 15 mg, oral, Nightly, Donnell Bruno DO, 15 mg at 05/01/25 2050    [Transfer Hold] montelukast (Singulair) tablet 10 mg, 10 mg, oral, Nightly, Donnell Bruno DO, 10 mg at 05/01/25 2050    [Transfer Hold] pantoprazole (ProtoNix) EC tablet 40 mg, 40 mg, oral, BID, Donnell Bruno DO, 40 mg at 05/02/25 0839    [Transfer Hold] piperacillin-tazobactam (Zosyn) 3.375 g in dextrose (iso) IV 50 mL, 3.375 g, intravenous, q8h, Donnell Bruno DO, Last Rate: 0 mL/hr at 05/02/25 0340, 3.375 g at 05/02/25 0838    [Transfer Hold] polyethylene glycol (Glycolax, Miralax) packet 17 g, 17 g, oral, Daily, Donnlel Bruno DO    [Transfer  Hold] pregabalin (Lyrica) capsule 150 mg, 150 mg, oral, q6h, Donnell Bruno DO, 150 mg at 05/02/25 0523    [Transfer Hold] propranolol (Inderal) tablet 10 mg, 10 mg, oral, BID, Donnell Bruno DO, 10 mg at 05/02/25 0839    [Transfer Hold] spironolactone (Aldactone) tablet 25 mg, 25 mg, oral, Daily, Donnell Bruno DO, 25 mg at 05/02/25 0838    [Transfer Hold] tamsulosin (Flomax) 24 hr capsule 0.4 mg, 0.4 mg, oral, Daily, Donnell Bruno DO, 0.4 mg at 05/01/25 2050    [Transfer Hold] torsemide (Demadex) tablet 20 mg, 20 mg, oral, Once per day on Monday Wednesday Friday, Donnell Bruno DO, 20 mg at 05/02/25 0838    [Transfer Hold] vancomycin (Vancocin) in 0.9 % sodium chloride 500 mL IVPB 1,750 mg, 1,750 mg, intravenous, q24h, Rebeka Mayer, PharmD    [Transfer Hold] vancomycin (Vancocin) pharmacy to dose - pharmacy monitoring, , miscellaneous, Daily PRN, Donnell Bruno DO

## 2025-05-02 NOTE — PROGRESS NOTES
Vancomycin Dosing by Pharmacy- FOLLOW UP    Angus Redman is a 69 y.o. year old male who Pharmacy has been consulted for vancomycin dosing for osteomyelitis/septic arthritis. Based on the patient's indication and renal status this patient is being dosed based on a goal AUC of 400-600.     Renal function is currently improving.    Current vancomycin dose: 750 mg given every 12 hours **loading dose never given in ED**    Estimated vancomycin AUC on current dose: 435 mg/L.hr  but low prob of achieving (60%)    Visit Vitals  /77 (BP Location: Left arm, Patient Position: Lying)   Pulse 63   Temp 35.9 °C (96.6 °F) (Temporal)   Resp 18        Lab Results   Component Value Date    CREATININE 1.33 (H) 2025    CREATININE 1.47 (H) 2025    CREATININE 1.42 (H) 2025    CREATININE 1.35 (H) 2024        Patient weight is as follows:   Vitals:    25 1849   Weight: 115 kg (254 lb 4.8 oz)       Cultures:  No results found for the encounter in last 14 days.       I/O last 3 completed shifts:  In: 300 (2.6 mL/kg) [IV Piggyback:300]  Out: 600 (5.2 mL/kg) [Urine:600 (0.1 mL/kg/hr)]  Weight: 115.3 kg   I/O during current shift:  No intake/output data recorded.    Temp (24hrs), Av.1 °C (96.9 °F), Min:35.6 °C (96.1 °F), Max:36.9 °C (98.4 °F)      Assessment/Plan    Below goal AUC. Orders placed for new vancomcyin regimen of 1750mg every 24 hours to begin at 1700.     This dosing regimen is predicted by [employer] to result in the following pharmacokinetic parameters:  Regimen: 1750 mg IV every 24 hours.  Start time: 05:24 on 2025  Exposure target: AUC24 (range)400-600 mg/L.hr   [id number]: 394 mg/L.hr  AUC24,ss: 505 mg/L.hr  Probability of AUC24 > 400: 75 %  Ctrough,ss: 15.2 mg/L  Probability of Ctrough,ss > 20: 25 %    The next level will be obtained on 5/3 at 0500. May be obtained sooner if clinically indicated.   Will continue to monitor renal function daily while on vancomycin and order serum  creatinine at least every 48 hours if not already ordered.  Follow for continued vancomycin needs, clinical response, and signs/symptoms of toxicity.     Rebeka Mayer, PharmD, BCPS, BCIDP  Clinical Pharmacy Specialist, Infectious Diseases  [phone]

## 2025-05-03 LAB
ALBUMIN SERPL BCP-MCNC: 3.5 G/DL (ref 3.4–5)
ANION GAP SERPL CALC-SCNC: 11 MMOL/L (ref 10–20)
BUN SERPL-MCNC: 18 MG/DL (ref 6–23)
CALCIUM SERPL-MCNC: 8.4 MG/DL (ref 8.6–10.3)
CHLORIDE SERPL-SCNC: 99 MMOL/L (ref 98–107)
CO2 SERPL-SCNC: 31 MMOL/L (ref 21–32)
CREAT SERPL-MCNC: 1.39 MG/DL (ref 0.5–1.3)
EGFRCR SERPLBLD CKD-EPI 2021: 54 ML/MIN/1.73M*2
ERYTHROCYTE [DISTWIDTH] IN BLOOD BY AUTOMATED COUNT: 15.6 % (ref 11.5–14.5)
GLUCOSE SERPL-MCNC: 114 MG/DL (ref 74–99)
HCT VFR BLD AUTO: 37.2 % (ref 41–52)
HGB BLD-MCNC: 11.6 G/DL (ref 13.5–17.5)
MAGNESIUM SERPL-MCNC: 2.25 MG/DL (ref 1.6–2.4)
MCH RBC QN AUTO: 29.1 PG (ref 26–34)
MCHC RBC AUTO-ENTMCNC: 31.2 G/DL (ref 32–36)
MCV RBC AUTO: 94 FL (ref 80–100)
NRBC BLD-RTO: 0 /100 WBCS (ref 0–0)
PHOSPHATE SERPL-MCNC: 4.2 MG/DL (ref 2.5–4.9)
PLATELET # BLD AUTO: 213 X10*3/UL (ref 150–450)
POTASSIUM SERPL-SCNC: 4.2 MMOL/L (ref 3.5–5.3)
RBC # BLD AUTO: 3.98 X10*6/UL (ref 4.5–5.9)
SODIUM SERPL-SCNC: 137 MMOL/L (ref 136–145)
VANCOMYCIN SERPL-MCNC: 16.8 UG/ML (ref 5–20)
WBC # BLD AUTO: 10.2 X10*3/UL (ref 4.4–11.3)

## 2025-05-03 PROCEDURE — 2500000001 HC RX 250 WO HCPCS SELF ADMINISTERED DRUGS (ALT 637 FOR MEDICARE OP)

## 2025-05-03 PROCEDURE — 83735 ASSAY OF MAGNESIUM: CPT

## 2025-05-03 PROCEDURE — 99233 SBSQ HOSP IP/OBS HIGH 50: CPT

## 2025-05-03 PROCEDURE — 2500000004 HC RX 250 GENERAL PHARMACY W/ HCPCS (ALT 636 FOR OP/ED): Mod: JZ

## 2025-05-03 PROCEDURE — 1100000001 HC PRIVATE ROOM DAILY

## 2025-05-03 PROCEDURE — 80069 RENAL FUNCTION PANEL: CPT

## 2025-05-03 PROCEDURE — 97161 PT EVAL LOW COMPLEX 20 MIN: CPT | Mod: GP

## 2025-05-03 PROCEDURE — 2500000004 HC RX 250 GENERAL PHARMACY W/ HCPCS (ALT 636 FOR OP/ED)

## 2025-05-03 PROCEDURE — 80202 ASSAY OF VANCOMYCIN: CPT

## 2025-05-03 PROCEDURE — 2500000004 HC RX 250 GENERAL PHARMACY W/ HCPCS (ALT 636 FOR OP/ED): Mod: JZ | Performed by: STUDENT IN AN ORGANIZED HEALTH CARE EDUCATION/TRAINING PROGRAM

## 2025-05-03 PROCEDURE — 85027 COMPLETE CBC AUTOMATED: CPT

## 2025-05-03 PROCEDURE — 36415 COLL VENOUS BLD VENIPUNCTURE: CPT

## 2025-05-03 PROCEDURE — 84100 ASSAY OF PHOSPHORUS: CPT

## 2025-05-03 PROCEDURE — 2500000002 HC RX 250 W HCPCS SELF ADMINISTERED DRUGS (ALT 637 FOR MEDICARE OP, ALT 636 FOR OP/ED)

## 2025-05-03 RX ADMIN — PANTOPRAZOLE SODIUM 40 MG: 40 TABLET, DELAYED RELEASE ORAL at 08:13

## 2025-05-03 RX ADMIN — PANTOPRAZOLE SODIUM 40 MG: 40 TABLET, DELAYED RELEASE ORAL at 22:35

## 2025-05-03 RX ADMIN — PREGABALIN 150 MG: 150 CAPSULE ORAL at 16:26

## 2025-05-03 RX ADMIN — Medication 1750 MG: at 16:07

## 2025-05-03 RX ADMIN — LEVOTHYROXINE SODIUM 50 MCG: 0.05 TABLET ORAL at 05:54

## 2025-05-03 RX ADMIN — ESCITALOPRAM OXALATE 20 MG: 20 TABLET ORAL at 08:10

## 2025-05-03 RX ADMIN — HEPARIN SODIUM 5000 UNITS: 5000 INJECTION INTRAVENOUS; SUBCUTANEOUS at 08:29

## 2025-05-03 RX ADMIN — DULOXETINE HYDROCHLORIDE 60 MG: 60 CAPSULE, DELAYED RELEASE ORAL at 08:09

## 2025-05-03 RX ADMIN — MIRTAZAPINE 15 MG: 15 TABLET, FILM COATED ORAL at 22:35

## 2025-05-03 RX ADMIN — SPIRONOLACTONE 25 MG: 25 TABLET ORAL at 08:13

## 2025-05-03 RX ADMIN — Medication 1000 MCG: at 08:09

## 2025-05-03 RX ADMIN — HYDROMORPHONE HYDROCHLORIDE 0.5 MG: 1 INJECTION, SOLUTION INTRAMUSCULAR; INTRAVENOUS; SUBCUTANEOUS at 11:52

## 2025-05-03 RX ADMIN — HYDROMORPHONE HYDROCHLORIDE 0.5 MG: 1 INJECTION, SOLUTION INTRAMUSCULAR; INTRAVENOUS; SUBCUTANEOUS at 16:07

## 2025-05-03 RX ADMIN — PIPERACILLIN SODIUM AND TAZOBACTAM SODIUM 3.38 G: 3; .375 INJECTION, SOLUTION INTRAVENOUS at 08:08

## 2025-05-03 RX ADMIN — MORPHINE SULFATE 2 MG: 2 INJECTION, SOLUTION INTRAMUSCULAR; INTRAVENOUS at 01:43

## 2025-05-03 RX ADMIN — PROPRANOLOL HYDROCHLORIDE 10 MG: 20 TABLET ORAL at 23:23

## 2025-05-03 RX ADMIN — PREGABALIN 150 MG: 150 CAPSULE ORAL at 11:47

## 2025-05-03 RX ADMIN — PREGABALIN 150 MG: 150 CAPSULE ORAL at 05:54

## 2025-05-03 RX ADMIN — PROPRANOLOL HYDROCHLORIDE 10 MG: 20 TABLET ORAL at 08:09

## 2025-05-03 RX ADMIN — PIPERACILLIN SODIUM AND TAZOBACTAM SODIUM 3.38 G: 3; .375 INJECTION, SOLUTION INTRAVENOUS at 16:07

## 2025-05-03 RX ADMIN — MONTELUKAST 10 MG: 10 TABLET, FILM COATED ORAL at 22:35

## 2025-05-03 RX ADMIN — TAMSULOSIN HYDROCHLORIDE 0.4 MG: 0.4 CAPSULE ORAL at 22:35

## 2025-05-03 RX ADMIN — PREGABALIN 150 MG: 150 CAPSULE ORAL at 23:23

## 2025-05-03 RX ADMIN — DULOXETINE HYDROCHLORIDE 60 MG: 60 CAPSULE, DELAYED RELEASE ORAL at 22:35

## 2025-05-03 RX ADMIN — HYDROMORPHONE HYDROCHLORIDE 0.5 MG: 1 INJECTION, SOLUTION INTRAMUSCULAR; INTRAVENOUS; SUBCUTANEOUS at 23:24

## 2025-05-03 RX ADMIN — ATORVASTATIN CALCIUM 40 MG: 40 TABLET, FILM COATED ORAL at 22:35

## 2025-05-03 RX ADMIN — POLYETHYLENE GLYCOL 3350 17 G: 17 POWDER, FOR SOLUTION ORAL at 08:09

## 2025-05-03 RX ADMIN — AMLODIPINE BESYLATE 2.5 MG: 2.5 TABLET ORAL at 08:13

## 2025-05-03 RX ADMIN — HEPARIN SODIUM 5000 UNITS: 5000 INJECTION INTRAVENOUS; SUBCUTANEOUS at 01:43

## 2025-05-03 RX ADMIN — HYDROMORPHONE HYDROCHLORIDE 0.2 MG: 0.2 INJECTION, SOLUTION INTRAMUSCULAR; INTRAVENOUS; SUBCUTANEOUS at 08:08

## 2025-05-03 ASSESSMENT — COGNITIVE AND FUNCTIONAL STATUS - GENERAL
MOBILITY SCORE: 16
WALKING IN HOSPITAL ROOM: A LITTLE
CLIMB 3 TO 5 STEPS WITH RAILING: A LITTLE
MOVING TO AND FROM BED TO CHAIR: A LITTLE
WALKING IN HOSPITAL ROOM: A LITTLE
DRESSING REGULAR LOWER BODY CLOTHING: A LITTLE
TURNING FROM BACK TO SIDE WHILE IN FLAT BAD: A LITTLE
MOBILITY SCORE: 22
DAILY ACTIVITIY SCORE: 23
CLIMB 3 TO 5 STEPS WITH RAILING: TOTAL
STANDING UP FROM CHAIR USING ARMS: A LITTLE
MOVING FROM LYING ON BACK TO SITTING ON SIDE OF FLAT BED WITH BEDRAILS: A LITTLE

## 2025-05-03 ASSESSMENT — PAIN - FUNCTIONAL ASSESSMENT
PAIN_FUNCTIONAL_ASSESSMENT: 0-10

## 2025-05-03 ASSESSMENT — PAIN DESCRIPTION - ORIENTATION
ORIENTATION: LEFT

## 2025-05-03 ASSESSMENT — PAIN SCALES - GENERAL
PAINLEVEL_OUTOF10: 8
PAINLEVEL_OUTOF10: 0 - NO PAIN
PAINLEVEL_OUTOF10: 3
PAINLEVEL_OUTOF10: 0 - NO PAIN
PAINLEVEL_OUTOF10: 8
PAINLEVEL_OUTOF10: 3
PAINLEVEL_OUTOF10: 9

## 2025-05-03 ASSESSMENT — PAIN DESCRIPTION - LOCATION
LOCATION: FOOT

## 2025-05-03 ASSESSMENT — PAIN DESCRIPTION - DESCRIPTORS
DESCRIPTORS: ACHING

## 2025-05-03 NOTE — PROGRESS NOTES
Physical Therapy    Physical Therapy Evaluation    Patient Name: Angus Redman  MRN: [id number]  Department: [facility] [address]  Room: [facility]23022-A  Today's Date: 5/3/2025   Time Calculation  Start Time: 1445  Stop Time: 1513  Time Calculation (min): 28 min    Assessment/Plan   PT Assessment  PT Assessment Results: Decreased strength, Decreased range of motion, Decreased endurance, Decreased mobility, Impaired balance, Decreased safety awareness, Impaired sensation, Pain, Obesity  Rehab Prognosis: Good  Medical Staff Made Aware: Yes  End of Session Communication: Bedside nurse  Assessment Comment: Pt's impairments include generalized weakness, impaired balance, and decreased activity tolerance. Pt's functional limitations include bed mobility, transfers, gait and elevations. Pt would benefit from continued acute care PT during hospital LOS and upon dc at a moderate level intensity.  End of Session Patient Position: Bed, 3 rail up, Alarm on  IP OR SWING BED PT PLAN  Inpatient or Swing Bed: Inpatient  PT Plan  Treatment/Interventions: Bed mobility, Transfer training, Gait training, Stair training, Balance training, Neuromuscular re-education, Endurance training, Strengthening, Range of motion, Therapeutic activity, Therapeutic exercise, Home exercise program, Positioning, Postural re-education  PT Plan: Ongoing PT  PT Frequency: 3 times per week  PT Discharge Recommendations: Moderate intensity level of continued care  Equipment Recommended upon Discharge: Wheeled walker  PT Recommended Transfer Status: Assist x1, Assistive device  PT - OK to Discharge: Yes (Pt ok to dc from acute care PT to next level of care once cleared by medical team.)    Subjective   General Visit Information:  General  Reason for Referral: 68 yo M s/p digital amputation 1,4,5 right foot (DOS: 5/2/25)  Referred By: DO Naveen  Past Medical History Relevant to Rehab: Medical History[1]    Family/Caregiver Present: No  Prior to Session Communication: Bedside  nurse  Patient Position Received: Bed, 3 rail up, Alarm on  General Comment: Pt agreeable to PT assessment.  Home Living:  Home Living  Type of Home: House  Lives With: Alone  Home Adaptive Equipment: Walker rolling or standard  Home Layout: Multi-level (3 levels with 3-5 steps between each level with HR.)  Prior Level of Function:  Prior Function Per Pt/Caregiver Report  Leisure: has x2 cats  Prior Function Comments: IND with rollator walker for ambulation, IND with ADLs. Does not drive.  Precautions:  Precautions  LE Weight Bearing Status:  (WBAT via R heel only. Post-op shoe.)  Medical Precautions: Fall precautions  Precautions Comment: Pt has wounds/ace wrap dressings to BLE. Post-op shoe not present. Remained in room for eval.    Objective   Pain:  Pain Assessment  Pain Assessment: 0-10  0-10 (Numeric) Pain Score: 0 - No pain  Cognition:  Cognition  Overall Cognitive Status: Within Functional Limits  Orientation Level: Oriented X4    General Assessments:  Sensation  Light Touch:  (Numbness BLE/neuropathy)    Static Sitting Balance  Static Sitting-Comment/Number of Minutes: CGA-MinAx1 poor trunk control observed initially. Improved with time to SBAx1.  Dynamic Sitting Balance  Dynamic Sitting-Comments: CGA-MinAx1 to scoot    Static Standing Balance  Static Standing-Comment/Number of Minutes: CGA with FWW  Dynamic Standing Balance  Dynamic Standing-Comments: MinAx1 with FWW during ambulation; cues for heel contact only with R foot. Pt has trouble sustaining for significant distance and during transfers.  Functional Assessments:  Bed Mobility  Bed Mobility: Yes  Bed Mobility 1  Bed Mobility 1: Supine to sitting, Sitting to supine  Level of Assistance 1: Minimum assistance  Bed Mobility Comments 1: with bed rails. Cues for sequencing.    Transfers  Transfer: Yes  Transfer 1  Technique 1: Sit to stand, Stand to sit  Transfer Device 1: Walker, Gait belt  Transfer Level of Assistance 1: Minimum  assistance  Trials/Comments 1: x3 trials from EOB with bed height raised and rollator walker. Brakes applied by pt. MinAx1 to steady. x1 STS without AD, ModAx1 with RUE support. Pt requires cues for R heel contact only. Pt struggles to maintain during Sit>stand and stand to sit but once in standing can maintain.    Ambulation/Gait Training  Ambulation/Gait Training Performed: Yes  Ambulation/Gait Training 1  Surface 1: Level tile  Device 1: Rolling walker  Gait Support Devices: Gait belt  Comments/Distance (ft) 1: Pt amb in room 1x20' with rollator walker, CGA-MinAx1 with step-to pattern. Pt attempts to maintain R heel contact only but struggles to maintain after 8'. Pt safely returned to bed. RLE elevated with pillow.  Extremity/Trunk Assessments:  RLE   RLE : Exceptions to WFL  Strength RLE  RLE Overall Strength: Greater than or equal to 3/5 as evidenced by functional mobility  LLE   LLE : Exceptions to WFL  Strength LLE  LLE Overall Strength: Greater than or equal to 3/5 as evidenced by functional mobility  Outcome Measures:  [facility] Basic Mobility  Turning from your back to your side while in a flat bed without using bedrails: A little  Moving from lying on your back to sitting on the side of a flat bed without using bedrails: A little  Moving to and from bed to chair (including a wheelchair): A little  Standing up from a chair using your arms (e.g. wheelchair or bedside chair): A little  To walk in hospital room: A little  Climbing 3-5 steps with railing: Total  Basic Mobility - Total Score: 16    Encounter Problems       Encounter Problems (Active)       PT Problem       Bed mobility (Progressing)       Start:  05/03/25    Expected End:  05/17/25       Pt will perform supine<>sit with HOB flat, JENNA.           Transfers (Progressing)       Start:  05/03/25    Expected End:  05/17/25       Pt will perform all transfers with LRAD, JENAN, R heel contact only.           Gait (Progressing)       Start:  05/03/25     Expected End:  05/17/25       Pt will amb 150' with LRAD, JENNA with reciprocal gait, R heel contact only, upright posture and improved activity tolerance as demonstrated by vitals.           Stairs (Progressing)       Start:  05/03/25    Expected End:  05/17/25       Pt will ascend/descend 5 steps with LRAD, JENNA with R heel contact only.              Pain - Adult              Education Documentation  Mobility Training, taught by Birgit Joe, PT at 5/3/2025  3:39 PM.  Learner: Patient  Readiness: Acceptance  Method: Explanation  Response: Verbalizes Understanding, Demonstrated Understanding    Education Comments  No comments found.                 [1]   Past Medical History:  Diagnosis Date    Alcohol dependence, in remission 06/08/2022    Alcohol dependence in early, early partial, sustained full, or sustained partial remission    Alcohol dependence, in remission 06/08/2022    Alcohol dependence in early, early partial, sustained full, or sustained partial remission    Alcohol dependence, uncomplicated (Multi) 09/15/2022    Alcohol dependence, daily use    Dependence on other enabling machines and devices 09/24/2019    Walker as ambulation aid    Encounter for screening for other disorder 03/01/2021    Special screening for other conditions    Fracture of one rib, unspecified side, initial encounter for closed fracture 01/03/2014    Closed rib fracture    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    Laceration without foreign body of scalp, initial encounter 09/10/2015    Scalp laceration    Nausea 04/15/2014    Nausea    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 07/27/2017    Skin ulcer of right ankle, limited to breakdown of skin    Osteomyelitis, unspecified 02/26/2022    Osteomyelitis of toe    Other chest pain 09/21/2013    Atypical chest pain    Other conditions influencing health status     Skin Cancer    Other  conditions influencing health status 10/08/2017    Closed displaced fracture of olecranon process of left ulna with intra-articular extension, initial encounter    Other specified health status 07/23/2014    Foreign travel    Patient's noncompliance with other medical treatment and regimen due to unspecified reason 10/28/2016    Noncompliance with therapeutic plan    Patient's other noncompliance with medication regimen 06/04/2016    History of medication noncompliance    Personal history of (healed) stress fracture 12/30/2013    History of stress fracture    Personal history of diseases of the skin and subcutaneous tissue 02/26/2022    History of chronic skin ulcer    Personal history of other diseases of the nervous system and sense organs 03/25/2016    History of peripheral neuropathy    Personal history of other diseases of the nervous system and sense organs 02/02/2016    History of peripheral neuropathy    Personal history of other endocrine, nutritional and metabolic disease 07/13/2015    History of hypothyroidism    Personal history of other specified conditions 07/28/2019    History of syncope    Personal history of other specified conditions 05/07/2018    History of syncope    Personal history of other specified conditions 06/25/2015    History of nausea    Unspecified fracture of left foot, initial encounter for closed fracture 06/04/2016    Foot fracture, left    Unspecified injury of head, initial encounter 04/20/2016    Closed head injury, initial encounter    Unspecified injury of unspecified elbow, initial encounter 04/07/2017    Elbow injury    Unsteadiness on feet 04/15/2014    Unsteady gait

## 2025-05-03 NOTE — PROGRESS NOTES
Angus Redman is a 69 y.o. male on day 2 of admission presenting with Open wound of toe, initial encounter.      Subjective   Patient had some urinary retention last night requiring straight cath 609 mL.  Otherwise, NAEON. Feels well.  Patient is in good spirits this morning.       Objective     Last Recorded Vitals  /74 (BP Location: Right arm, Patient Position: Lying)   Pulse 65   Temp 36.7 °C (98.1 °F) (Temporal)   Resp 18   Wt 115 kg (254 lb 4.8 oz)   SpO2 94%   Intake/Output last 3 Shifts:    Intake/Output Summary (Last 24 hours) at 5/3/2025 1322  Last data filed at 5/3/2025 1134  Gross per 24 hour   Intake --   Output 2325 ml   Net -2325 ml       Admission Weight  Weight: 116 kg (255 lb) (05/01/25 1521)    Daily Weight  05/01/25 : 115 kg (254 lb 4.8 oz)    Image Results  XR foot right 3+ views  Narrative: STUDY:  Foot Radiographs; 5/1/2025 16:14  INDICATION:  Right foot pain, evaluate for evidence of osteomyelitis.  COMPARISON:  11/21/2025 XR Foot.  ACCESSION NUMBER(S):  [id number]  ORDERING CLINICIAN:  DALTON ROSE  TECHNIQUE:  Three view(s) of the right foot.  FINDINGS:    There is no acute displaced fracture.  Moderate to severe narrowing of  the first metatarsophalangeal joint.  Mild lateral subluxation at the  interphalangeal joint space of the great toe.  No radiographic  evidence of osteomyelitis.  There is obscuration of the second and  third toes at the metatarsophalangeal joints.  There is pes planus  deformity of the right foot.  Small plantar calcaneal spur.  No  radiopaque foreign body.    Impression: 1. No radiographic evidence of osteomyelitis.  MRI of the foot post  contrast may prove additionally useful in detecting early  osteomyelitis.  2. Moderate to severe narrowing of the first metatarsophalangeal  joint.  3. Mild lateral subluxation at the interphalangeal joint space of the  great toe.  4. Pes planus deformity of the right foot.  Signed by Dheeraj De Anda MD      Physical  Exam  Vitals reviewed.   Constitutional:       General: He is not in acute distress.     Appearance: Normal appearance. He is normal weight. He is not ill-appearing.   HENT:      Head: Normocephalic and atraumatic.   Cardiovascular:      Rate and Rhythm: Normal rate and regular rhythm.      Pulses: Normal pulses.      Heart sounds: Normal heart sounds. No murmur heard.  Pulmonary:      Effort: Pulmonary effort is normal. No respiratory distress.   Abdominal:      General: Abdomen is flat. Bowel sounds are normal. There is no distension.      Palpations: Abdomen is soft. There is no mass.      Tenderness: There is no abdominal tenderness.   Musculoskeletal:      Cervical back: Neck supple.      Right lower leg: No edema.      Left lower leg: No edema.      Comments: Feet wrapped bilaterally.  S/p right-sided toe amputations   Skin:     General: Skin is warm and dry.   Neurological:      General: No focal deficit present.      Mental Status: He is alert and oriented to person, place, and time.   Psychiatric:         Mood and Affect: Mood normal.         Behavior: Behavior normal.         Relevant Results    Scheduled medications  Scheduled Medications[1]  Continuous medications  Continuous Medications[2]  PRN medications  PRN Medications[3]  Results for orders placed or performed during the hospital encounter of 05/01/25 (from the past 24 hours)   CBC   Result Value Ref Range    WBC 10.2 4.4 - 11.3 x10*3/uL    nRBC 0.0 0.0 - 0.0 /100 WBCs    RBC 3.98 (L) 4.50 - 5.90 x10*6/uL    Hemoglobin 11.6 (L) 13.5 - 17.5 g/dL    Hematocrit 37.2 (L) 41.0 - 52.0 %    MCV 94 80 - 100 fL    MCH 29.1 26.0 - 34.0 pg    MCHC 31.2 (L) 32.0 - 36.0 g/dL    RDW 15.6 (H) 11.5 - 14.5 %    Platelets 213 150 - 450 x10*3/uL   Renal Function Panel   Result Value Ref Range    Glucose 114 (H) 74 - 99 mg/dL    Sodium 137 136 - 145 mmol/L    Potassium 4.2 3.5 - 5.3 mmol/L    Chloride 99 98 - 107 mmol/L    Bicarbonate 31 21 - 32 mmol/L    Anion Gap 11  10 - 20 mmol/L    Urea Nitrogen 18 6 - 23 mg/dL    Creatinine 1.39 (H) 0.50 - 1.30 mg/dL    eGFR 54 (L) >60 mL/min/1.73m*2    Calcium 8.4 (L) 8.6 - 10.3 mg/dL    Phosphorus 4.2 2.5 - 4.9 mg/dL    Albumin 3.5 3.4 - 5.0 g/dL   Magnesium   Result Value Ref Range    Magnesium 2.25 1.60 - 2.40 mg/dL   Vancomycin   Result Value Ref Range    Vancomycin 16.8 5.0 - 20.0 ug/mL     *Note: Due to a large number of results and/or encounters for the requested time period, some results have not been displayed. A complete set of results can be found in Results Review.     Imaging  XR foot right 3+ views  Result Date: 5/1/2025  1. No radiographic evidence of osteomyelitis.  MRI of the foot post contrast may prove additionally useful in detecting early osteomyelitis. 2. Moderate to severe narrowing of the first metatarsophalangeal joint. 3. Mild lateral subluxation at the interphalangeal joint space of the great toe. 4. Pes planus deformity of the right foot. Signed by Dheeraj De Anda MD      Cardiology, Vascular, and Other Imaging  No other imaging results found for the past 7 days                              Assessment & Plan  Open wound of toe, initial encounter    Osteomyelitis of fourth toe of right foot (Multi)    Toe contracture, right     68 y/o M with PMHx significant for HFpEF (EF 60-65% 4/2024), PSVT, HTN, COPD with chronic respiratory failure on 4 L NC at baseline, CAD, CKD 3, OA, Hx of alcohol abuse, MDD, Hx of tobacco abuse presenting for nonhealing foot wound with purulent drainage.  To undergo OR management.  S/p digital amputation 1,4,5 right foot 5/2/2025.    # Open nonhealing right foot wound with purulent drainage  #Osteomyelitis  # S/p digital amputation 1,4,5 right foot  # Lower extremity neuropathy  Prior tissue culture 11/2024 growing MRSA  -ESR 84, CRP 0.98  -Wound culture ordered, will follow up surgical path  -XR negative for radiographic evidence of osteomyelitis    Plan:  -Continue Vanco/Zosyn per ID  recommendations  -Continue home pregabalin  -Podiatry following, Betadine paint, Xeroform, gauze, ABD, Kerlix, Ace.  Dressings to be changed daily by podiatry.  - Plan to restart heparin tomorrow per podiatry  -Wound care consulted, appreciate recs  -PT/OT     #UTI  #Urinary retention  Patient reports odor in his urine along with history of UTIs.    -Urinary retention overnight 5/2 -straight cath as needed  - Ucx pending  - continue abx as above    # COPD with chronic hypoxic respiratory failure  -Saturating well on home 4 L NC.  -Continue home albuterol, ipratropium, montelukast  # HFpEF  -Continue GDMT and torsemide with hold parameters  # pSVT  -Continue home propranolol  -Optimize electrolytes with goal K >4.0 and Mg > 2.0  # CKD 3A  -Will hold home Jardiance in the preoperative setting  -Avoid nephrotoxic agents  -Renally dose medications as needed     Chronic conditions  # HTN - amlodipine  # CAD - aspirin  # OA  # Hx of alcohol abuse - encouraged continued cessation  # Hx of tobacco abuse - encouraged continued cessation  # MDD - Duloxetine, escitalopram, mirtazepine,   - Continue medications and management as appropriate     IVF: As needed  Diet: Cardiac   ABx: Vanco/Zosyn  Consults: Podiatry, ID, PT OT  DVT: Heparin held until tomorrow    Dispo: pending PT/OT recs, ID final recs     Discussed with attending physician Dr. Naveen Herr MD  Family medicine, PGY1           [1] amLODIPine, 2.5 mg, oral, Daily  [Held by provider] aspirin, 81 mg, oral, Daily  atorvastatin, 40 mg, oral, Nightly  cyanocobalamin, 1,000 mcg, oral, Daily  DULoxetine, 60 mg, oral, BID  [Held by provider] empagliflozin, 10 mg, oral, Daily  escitalopram, 20 mg, oral, Daily  [Held by provider] heparin (porcine), 5,000 Units, subcutaneous, q8h  levothyroxine, 50 mcg, oral, Daily before breakfast  mirtazapine, 15 mg, oral, Nightly  montelukast, 10 mg, oral, Nightly  pantoprazole, 40 mg, oral, BID  piperacillin-tazobactam, 3.375 g,  intravenous, q8h  polyethylene glycol, 17 g, oral, Daily  pregabalin, 150 mg, oral, q6h  propranolol, 10 mg, oral, BID  spironolactone, 25 mg, oral, Daily  tamsulosin, 0.4 mg, oral, Daily  torsemide, 20 mg, oral, Once per day on Monday Wednesday Friday  vancomycin, 1,750 mg, intravenous, q24h     [2]    [3] PRN medications: acetaminophen, albuterol, HYDROmorphone, ipratropium, morphine, vancomycin

## 2025-05-03 NOTE — PROGRESS NOTES
Nutrition Initial Assessment:   Nutrition Assessment    Reason for Assessment: Admission nursing screening (wound)    Patient is a 69 y.o. male presenting from home alone for open wound of toe. ID, podiatry and wound care on consult. Pt started on antibiotics. Concern for osteomyelitis and toe amputation  of right 4th toe completed today.      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 (07/27/2017), Osteomyelitis, unspecified (02/26/2022), Other chest pain (09/21/2013), Other conditions influencing health status, Other conditions influencing health status (10/08/2017), Other specified health status (07/23/2014), Patient's noncompliance with other medical treatment and regimen due to unspecified reason (10/28/2016), Patient's other noncompliance with medication regimen (06/04/2016), Personal history of (healed) stress fracture (12/30/2013), Personal history of diseases of the skin and subcutaneous tissue (02/26/2022), Personal history of other diseases of the nervous system and sense organs (03/25/2016), Personal history of other diseases of the nervous system and sense organs (02/02/2016), Personal history of other endocrine, nutritional and metabolic disease (07/13/2015), Personal history of other specified conditions (07/28/2019), Personal history of other specified conditions (05/07/2018), Personal history of other  "specified conditions (06/25/2015), Unspecified fracture of left foot, initial encounter for closed fracture (06/04/2016), Unspecified injury of head, initial encounter (04/20/2016), Unspecified injury of unspecified elbow, initial encounter (04/07/2017), and Unsteadiness on feet (04/15/2014).  Surgical History   has a past surgical history that includes Colonoscopy (10/09/2013); Other surgical history (07/28/2019); Other surgical history (04/15/2014); Other surgical history (01/28/2020); Other surgical history (11/24/2015); Other surgical history (04/19/2017); Other surgical history (10/09/2013); and Skin cancer excision (10/09/2013).        Nutrition History:  Energy Intake: Good > 75 %  Pain affecting nutrition status: N/A  Food and Nutrient History: Pt reports following a mostly vegetarian diet. Pt does eat eggs when cooked in foods. Pt endorses making pasta salad often w/veggies and likes to eat large quantities of fruit. Pt eats nuts and seed daily and drinks 3 Ensure orginals per day. Pt uses [employer] for groceries. Pt reports to be lactose intolerant; however, will eat pudding and ice cream and later deal w/consequences. Pt does not like yogurt (texture), but will eat cottage cheese (lactose free). Pt loves hummus and cooks w/butter.  Food Allergy: Other (Comment) (none per pt)  Food Intolerance: Milk/lactose (still eats/drinks some dairy)       Anthropometrics:  Height: 182.9 cm (6' 0.01\")   Weight: 115 kg (254 lb 4.8 oz)   BMI (Calculated): 34.48                            Weight History:   Wt Readings from Last 22 Encounters:   05/01/25 115 kg (254 lb 4.8 oz)   04/08/25 110 kg (243 lb)   03/26/25 110 kg (243 lb 3.2 oz)   03/19/25 114 kg (252 lb 3.2 oz)   01/29/25 112 kg (247 lb 6.4 oz)   12/26/24 113 kg (250 lb)   11/21/24 115 kg (254 lb 3.1 oz)   11/18/24 113 kg (250 lb)   10/21/24 113 kg (249 lb)   10/14/24 108 kg (237 lb)   10/08/24 108 kg (237 lb 12.8 oz)   09/25/24 112 kg (247 lb 12.8 oz)   07/24/24 " 112 kg (247 lb)   06/24/24 114 kg (251 lb)   05/06/24 111 kg (244 lb)   03/13/24 108 kg (238 lb)   03/12/24 105 kg (232 lb 6.4 oz)   03/06/24 104 kg (230 lb)   02/06/24 92.5 kg (204 lb)   12/19/23 92.5 kg (204 lb)   07/13/23 103 kg (227 lb 12.8 oz)   06/21/23 106 kg (234 lb)       Weight Change %:  Weight History / % Weight Change: pt endorses wt gain and is the heaviest he's ever been.  Significant Weight Loss: No    Nutrition Focused Physical Exam Findings:    Subcutaneous Fat Loss:   Orbital Fat Pads: Well nourished (slightly bulging fat pads)  Buccal Fat Pads: Well nourished (full, rounded cheeks)  Triceps: Well nourished (ample fat tissue)  Muscle Wasting:  Temporalis: Well nourished (well-defined muscle)  Pectoralis (Clavicular Region): Well nourished (clavicle not visible)  Quadriceps: Defer  Gastrocnemius: Defer  Edema:  Edema: +1 trace  Edema Location: BLE  Physical Findings:  Skin: Positive (right foot and toe wounds)    Nutrition Significant Labs:  CBC Trend:   Results from last 7 days   Lab Units 05/02/25  0716 05/01/25  1638   WBC AUTO x10*3/uL 7.7 11.9*   RBC AUTO x10*6/uL 4.42* 4.71   HEMOGLOBIN g/dL 12.7* 13.6   HEMATOCRIT % 42.2 44.3   MCV fL 96 94   PLATELETS AUTO x10*3/uL 215 234    , BMP Trend:   Results from last 7 days   Lab Units 05/02/25  0716 05/01/25  1638   GLUCOSE mg/dL 98 108*   CALCIUM mg/dL 8.6 9.1   SODIUM mmol/L 137 139   POTASSIUM mmol/L 3.9 4.2   CO2 mmol/L 30 31   CHLORIDE mmol/L 99 100   BUN mg/dL 17 19   CREATININE mg/dL 1.33* 1.47*    , A1C:  Lab Results   Component Value Date    HGBA1C 4.9 12/19/2023   , BG POCT trend:    , Liver Function Trend:   Results from last 7 days   Lab Units 05/01/25  1638   ALK PHOS U/L 126   AST U/L 20   ALT U/L 12   BILIRUBIN TOTAL mg/dL 0.5    , Renal Lab Trend:   Results from last 7 days   Lab Units 05/02/25  0716 05/01/25  1638   POTASSIUM mmol/L 3.9 4.2   PHOSPHORUS mg/dL 4.1  --    SODIUM mmol/L 137 139   MAGNESIUM mg/dL 2.62*  --    EGFR  mL/min/1.73m*2 57* 51*   BUN mg/dL 17 19   CREATININE mg/dL 1.33* 1.47*    , TPN/PPN Labs:   Results from last 7 days   Lab Units 05/02/25  0716 05/01/25  1638   GLUCOSE mg/dL 98 108*   POTASSIUM mmol/L 3.9 4.2   PHOSPHORUS mg/dL 4.1  --    MAGNESIUM mg/dL 2.62*  --    SODIUM mmol/L 137 139   CHLORIDE mmol/L 99 100   ALT U/L  --  12   AST U/L  --  20   ALK PHOS U/L  --  126   BILIRUBIN TOTAL mg/dL  --  0.5    , Vit D:   Lab Results   Component Value Date    VITD25 81 12/19/2023    , Vit B12:   Lab Results   Component Value Date    OVLRTGIX03 936 (H) 12/19/2023    , Iron Panel:   Lab Results   Component Value Date    IRON 69 12/19/2023    TIBC 191 (L) 12/19/2023    FERRITIN 671 (H) 03/13/2023    , Folate:   Lab Results   Component Value Date    FOLATE 11.4 12/19/2023        Nutrition Specific Medications:  Scheduled medications  Scheduled Medications[1]  Continuous medications  Continuous Medications[2]  PRN medications  PRN Medications[3]  0-10 (Numeric) Pain Score: 5 - Moderate pain      I/O:    ;      Dietary Orders (From admission, onward)       Start     Ordered    05/02/25 1712  Oral nutritional supplements  Until discontinued        Comments: vanilla   Question Answer Comment   Deliver with Breakfast    Deliver with Lunch    Deliver with Dinner    Select supplement: Ensure High Protein        05/02/25 1712    05/02/25 1435  Adult diet Regular  Diet effective now        Question:  Diet type  Answer:  Regular    05/02/25 1434    05/01/25 2021  May Participate in Room Service  ( ROOM SERVICE MAY PARTICIPATE)  Once        Question:  .  Answer:  Yes    05/01/25 2020                     Estimated Needs:   Total Energy Estimated Needs in 24 hours (kCal):  (9150-8957 (18-20kcal/kg))     Total Protein Estimated Needs in 24 Hours (g):  ( (1.2-1.5g/kg IBW))      Method for Estimating 24 Hour Fluid Needs: 1mL/kcal  Patient on Order Fluid Restriction: No        Nutrition Diagnosis   Malnutrition Diagnosis  Patient  has Malnutrition Diagnosis: No    Nutrition Diagnosis  Patient has Nutrition Diagnosis: Yes  Diagnosis Status (1): New  Nutrition Diagnosis 1: Increased nutrient needs  Related to (1): impaired skin integrity  As Evidenced by (1): multiple foot/toe wounds.       Nutrition Interventions/Recommendations   Nutrition prescription for oral nutrition    Nutrition Recommendations:  Individualized Nutrition Prescription Provided for : Diet: continue w/oral diet as ordered - Adult diet cardiac; 70gm fat, 2-3gm sodium, vegetarian diet + Ensure HP TID w/meals.    Nutrition Interventions/Goals:   Meals and Snacks: Fat-modified diet, Mineral-modified diet  Goal: will consume >75% of meals  Medical Food Supplement: Commercial beverage medical food supplement therapy  Goal: will provide vanilla Ensure HP TID w/meals (160kcal, 16g pro per 8oz)  Coordination of Care with Providers: Nursing (LINDSEY Ochoa)      Education Documentation  No documentation found.     Discussed increased nutrition needs in setting of toe infection. Encouraged protein intake. Discussed AYR ordering and menu in detail w/emphasis on identifying protein rich options while considering pt dietary preferences/restrictions. ONS reviewed.        Nutrition Monitoring and Evaluation   Food/Nutrient Related History Monitoring  Monitoring and Evaluation Plan: Estimated Energy Intake  Estimated Energy Intake: Energy intake greater or equal to 75% of estimated energy needs         Biochemical Data, Medical Tests and Procedures  Monitoring and Evaluation Plan: Electrolyte/renal panel, Glucose/endocrine profile  Electrolyte and Renal Panel: Electrolytes within normal limits  Glucose/Endocrine Profile: Glucose within normal limits ( mg/dL)    Physical Exam Findings  Monitoring and Evaluation Plan: Skin  Skin Finding: Impaired wound healing - Improved wound healing, Impaired wound healing - Skin to heal    Goal Status: New goal(s) identified    Time Spent (min): 45  minutes       Follow up 3-8 days  Last RD note 5/2/25       [1] amLODIPine, 2.5 mg, oral, Daily  [Held by provider] aspirin, 81 mg, oral, Daily  atorvastatin, 40 mg, oral, Nightly  cyanocobalamin, 1,000 mcg, oral, Daily  DULoxetine, 60 mg, oral, BID  [Held by provider] empagliflozin, 10 mg, oral, Daily  escitalopram, 20 mg, oral, Daily  heparin (porcine), 5,000 Units, subcutaneous, q8h  levothyroxine, 50 mcg, oral, Daily before breakfast  mirtazapine, 15 mg, oral, Nightly  montelukast, 10 mg, oral, Nightly  pantoprazole, 40 mg, oral, BID  piperacillin-tazobactam, 3.375 g, intravenous, q8h  polyethylene glycol, 17 g, oral, Daily  pregabalin, 150 mg, oral, q6h  propranolol, 10 mg, oral, BID  spironolactone, 25 mg, oral, Daily  tamsulosin, 0.4 mg, oral, Daily  torsemide, 20 mg, oral, Once per day on Monday Wednesday Friday  vancomycin, 1,750 mg, intravenous, q24h  [2]    [3] PRN medications: acetaminophen, albuterol, HYDROmorphone, ipratropium, morphine, vancomycin

## 2025-05-03 NOTE — PROGRESS NOTES
1000: pt making inappropriate jokes regarding his penis during straight catheterization with nursing student and instructor. RN asked pt to please refrain from making inappropriate comments to the nursing staff and ancillary staff.

## 2025-05-03 NOTE — CARE PLAN
The patient's goals for the shift include wound care post amputation and pain management    The clinical goals for the shift include see poc    Over the shift, the patient did not make progress toward the following goals. Barriers to progression include pain post operatively. Recommendations to address these barriers include pain medications prn ordered.

## 2025-05-03 NOTE — PROGRESS NOTES
Vancomycin Dosing by Pharmacy- FOLLOW UP    Angus Redman is a 69 y.o. year old male who Pharmacy has been consulted for vancomycin dosing for bone and joint infection. Based on the patient's indication and renal status this patient is being dosed based on a goal AUC of 400-600.     Renal function is currently stable.    Current vancomycin dose: 1750 mg given every 24 hours    Estimated vancomycin AUC on current dose: 594 mg/L.hr     Visit Vitals  /87 (BP Location: Right arm, Patient Position: Lying)   Pulse 91   Temp 36.7 °C (98.1 °F) (Temporal)   Resp 18        Lab Results   Component Value Date    CREATININE 1.39 (H) 2025    CREATININE 1.33 (H) 2025    CREATININE 1.47 (H) 2025    CREATININE 1.42 (H) 2025        Patient weight is as follows:   Vitals:    25 1849   Weight: 115 kg (254 lb 4.8 oz)       Cultures:  No results found for the encounter in last 14 days.       I/O last 3 completed shifts:  In: 200 (1.7 mL/kg) [IV Piggyback:200]  Out: 2800 (24.3 mL/kg) [Urine:2800 (0.7 mL/kg/hr)]  Weight: 115.3 kg   I/O during current shift:  I/O this shift:  In: -   Out: 400 [Urine:400]    Temp (24hrs), Av.2 °C (97.2 °F), Min:35.1 °C (95.2 °F), Max:36.9 °C (98.4 °F)      Assessment/Plan    Within goal AUC range. Continue current vancomycin regimen.    This dosing regimen is predicted by [employer] to result in the following pharmacokinetic parameters:  <<<<<PASTE [employer] DATA HERE>>>>>  Regimen: 1750 mg IV every 24 hours.  Start time: 19:13 on 2025  Exposure target: AUC24 (range)400-600 mg/L.hr   CAL89-36: 546 mg/L.hr  AUC24,ss: 594 mg/L.hr  Probability of AUC24 > 400: 99 %  Ctrough,ss: 17.2 mg/L  Probability of Ctrough,ss > 20: 29 %      The next level will be obtained on  at 0500 May be obtained sooner if clinically indicated.   Will continue to monitor renal function daily while on vancomycin and order serum creatinine at least every 48 hours if not already  ordered.  Follow for continued vancomycin needs, clinical response, and signs/symptoms of toxicity.       Gabriela Hoover, PharmD

## 2025-05-03 NOTE — PROGRESS NOTES
"Angus Redman is a 69 y.o. male on day 2 of admission presenting with Open wound of toe, initial encounter.    Subjective   Pt examined and evaluated at bedside, found resting comfortably.  Pt states that he was having significant pain overnight, and rates the pain 8/10 at worst.  Pt had some strikethrough of the dressing, which was reinforced with ABDs and ACE.  Pt states that their pain is well controlled, denies any constitutional symptoms, and has no other complaints at this time.        Objective     Vascular: DP and PT pulses palpable 2/4 bilaterally.  CFT under 5 seconds when tested at distal digits.  Skin temp warm to warm from proximal to distal.      Neuro: Protective sensation may absent, light tough sensation intact.  No Tinel's or Valleix's signs elicited.      Derm: Full-thickness ulceration noted to plantar aspect of first metatarsal head, left foot.  Wound base extends to subcutaneous tissue, no exposed bone or fascia.  No undermining, tracking, or tunneling.  No SOI noted at this time.     Left foot digital amputation sites 1, 4, 5 are well-approximated with sutures intact.  Mild maceration noted to anterior aspect of hallux amputation site.  Hematogenous drainage noted with oozing.  No active bleeding noted.  No dehiscence or necrosis.  No SOI noted at this time.    Musc: No gross deformities noted.  No POP noted to any other area of the foot/ankle.          Last Recorded Vitals  Blood pressure 114/74, pulse 65, temperature 36.7 °C (98.1 °F), temperature source Temporal, resp. rate 18, height 1.829 m (6' 0.01\"), weight 115 kg (254 lb 4.8 oz), SpO2 94%.  Intake/Output last 3 Shifts:  I/O last 3 completed shifts:  In: 200 (1.7 mL/kg) [IV Piggyback:200]  Out: 2800 (24.3 mL/kg) [Urine:2800 (0.7 mL/kg/hr)]  Weight: 115.3 kg     Relevant Results  Results for orders placed or performed during the hospital encounter of 05/01/25 (from the past 24 hours)   CBC   Result Value Ref Range    WBC 10.2 4.4 - 11.3 " x10*3/uL    nRBC 0.0 0.0 - 0.0 /100 WBCs    RBC 3.98 (L) 4.50 - 5.90 x10*6/uL    Hemoglobin 11.6 (L) 13.5 - 17.5 g/dL    Hematocrit 37.2 (L) 41.0 - 52.0 %    MCV 94 80 - 100 fL    MCH 29.1 26.0 - 34.0 pg    MCHC 31.2 (L) 32.0 - 36.0 g/dL    RDW 15.6 (H) 11.5 - 14.5 %    Platelets 213 150 - 450 x10*3/uL   Renal Function Panel   Result Value Ref Range    Glucose 114 (H) 74 - 99 mg/dL    Sodium 137 136 - 145 mmol/L    Potassium 4.2 3.5 - 5.3 mmol/L    Chloride 99 98 - 107 mmol/L    Bicarbonate 31 21 - 32 mmol/L    Anion Gap 11 10 - 20 mmol/L    Urea Nitrogen 18 6 - 23 mg/dL    Creatinine 1.39 (H) 0.50 - 1.30 mg/dL    eGFR 54 (L) >60 mL/min/1.73m*2    Calcium 8.4 (L) 8.6 - 10.3 mg/dL    Phosphorus 4.2 2.5 - 4.9 mg/dL    Albumin 3.5 3.4 - 5.0 g/dL   Magnesium   Result Value Ref Range    Magnesium 2.25 1.60 - 2.40 mg/dL   Vancomycin   Result Value Ref Range    Vancomycin 16.8 5.0 - 20.0 ug/mL     *Note: Due to a large number of results and/or encounters for the requested time period, some results have not been displayed. A complete set of results can be found in Results Review.       Assessment & Plan  Open wound of toe, initial encounter    Osteomyelitis of fourth toe of right foot (Multi)    Toe contracture, right        Assessment:  - s/p digital amputation 1,4,5 right foot (DOS: 5/2/25)  - Osteomyelitis 3rd digit, right foot  - Non-pressure ulcer, left and right foot      Plan:  - Pt examined and evaluated.  All findings discussed to patient to their satisfaction and understanding.  - Reviewed labs and chart.  No leukocytosis noted, vital stable.  - Systemic conditions managed by primary team, antibiotics managed by ID.  PT/OT recs pending.  - Performed dressing change consisting of Betadine paint, Xeroform, gauze, ABD, Kerlix, Ace.  Dressings to be changed daily by podiatry.  - Moderate hematogenous drainage noted to dressing, Heaparin held, will likely restart tomorrow.  - Will continue to follow inpatient.  Please  contact podiatry with any acute questions/concerns.    Boone Bull DPM  Podiatric Surgery  [facility]

## 2025-05-04 VITALS
HEIGHT: 72 IN | HEART RATE: 70 BPM | RESPIRATION RATE: 18 BRPM | WEIGHT: 254.3 LBS | SYSTOLIC BLOOD PRESSURE: 132 MMHG | DIASTOLIC BLOOD PRESSURE: 70 MMHG | TEMPERATURE: 98.1 F | OXYGEN SATURATION: 92 % | BODY MASS INDEX: 34.44 KG/M2

## 2025-05-04 LAB
ALBUMIN SERPL BCP-MCNC: 3.5 G/DL (ref 3.4–5)
ANION GAP SERPL CALC-SCNC: 11 MMOL/L (ref 10–20)
BACTERIA BLD CULT: NORMAL
BACTERIA BLD CULT: NORMAL
BACTERIA UR CULT: ABNORMAL
BUN SERPL-MCNC: 18 MG/DL (ref 6–23)
CALCIUM SERPL-MCNC: 9.1 MG/DL (ref 8.6–10.3)
CHLORIDE SERPL-SCNC: 98 MMOL/L (ref 98–107)
CO2 SERPL-SCNC: 33 MMOL/L (ref 21–32)
CREAT SERPL-MCNC: 1.36 MG/DL (ref 0.5–1.3)
EGFRCR SERPLBLD CKD-EPI 2021: 56 ML/MIN/1.73M*2
ERYTHROCYTE [DISTWIDTH] IN BLOOD BY AUTOMATED COUNT: 15.7 % (ref 11.5–14.5)
GLUCOSE SERPL-MCNC: 107 MG/DL (ref 74–99)
HCT VFR BLD AUTO: 38 % (ref 41–52)
HGB BLD-MCNC: 11.5 G/DL (ref 13.5–17.5)
MAGNESIUM SERPL-MCNC: 2.44 MG/DL (ref 1.6–2.4)
MCH RBC QN AUTO: 28.4 PG (ref 26–34)
MCHC RBC AUTO-ENTMCNC: 30.3 G/DL (ref 32–36)
MCV RBC AUTO: 94 FL (ref 80–100)
NRBC BLD-RTO: 0 /100 WBCS (ref 0–0)
PHOSPHATE SERPL-MCNC: 5.1 MG/DL (ref 2.5–4.9)
PLATELET # BLD AUTO: 190 X10*3/UL (ref 150–450)
POTASSIUM SERPL-SCNC: 3.9 MMOL/L (ref 3.5–5.3)
RBC # BLD AUTO: 4.05 X10*6/UL (ref 4.5–5.9)
SODIUM SERPL-SCNC: 138 MMOL/L (ref 136–145)
WBC # BLD AUTO: 8.3 X10*3/UL (ref 4.4–11.3)

## 2025-05-04 PROCEDURE — 85027 COMPLETE CBC AUTOMATED: CPT

## 2025-05-04 PROCEDURE — 2500000004 HC RX 250 GENERAL PHARMACY W/ HCPCS (ALT 636 FOR OP/ED)

## 2025-05-04 PROCEDURE — 80069 RENAL FUNCTION PANEL: CPT

## 2025-05-04 PROCEDURE — 2500000001 HC RX 250 WO HCPCS SELF ADMINISTERED DRUGS (ALT 637 FOR MEDICARE OP)

## 2025-05-04 PROCEDURE — 36415 COLL VENOUS BLD VENIPUNCTURE: CPT

## 2025-05-04 PROCEDURE — 97530 THERAPEUTIC ACTIVITIES: CPT | Mod: GO | Performed by: OCCUPATIONAL THERAPIST

## 2025-05-04 PROCEDURE — 2500000004 HC RX 250 GENERAL PHARMACY W/ HCPCS (ALT 636 FOR OP/ED): Mod: JZ

## 2025-05-04 PROCEDURE — 97165 OT EVAL LOW COMPLEX 30 MIN: CPT | Mod: GO | Performed by: OCCUPATIONAL THERAPIST

## 2025-05-04 PROCEDURE — 2500000002 HC RX 250 W HCPCS SELF ADMINISTERED DRUGS (ALT 637 FOR MEDICARE OP, ALT 636 FOR OP/ED)

## 2025-05-04 PROCEDURE — 2500000001 HC RX 250 WO HCPCS SELF ADMINISTERED DRUGS (ALT 637 FOR MEDICARE OP): Performed by: STUDENT IN AN ORGANIZED HEALTH CARE EDUCATION/TRAINING PROGRAM

## 2025-05-04 PROCEDURE — 2500000004 HC RX 250 GENERAL PHARMACY W/ HCPCS (ALT 636 FOR OP/ED): Performed by: STUDENT IN AN ORGANIZED HEALTH CARE EDUCATION/TRAINING PROGRAM

## 2025-05-04 PROCEDURE — 1100000001 HC PRIVATE ROOM DAILY

## 2025-05-04 PROCEDURE — 99232 SBSQ HOSP IP/OBS MODERATE 35: CPT

## 2025-05-04 PROCEDURE — 83735 ASSAY OF MAGNESIUM: CPT

## 2025-05-04 RX ORDER — HYDROMORPHONE HYDROCHLORIDE 0.2 MG/ML
0.2 INJECTION INTRAMUSCULAR; INTRAVENOUS; SUBCUTANEOUS
Status: ACTIVE | OUTPATIENT
Start: 2025-05-04

## 2025-05-04 RX ORDER — OXYCODONE HYDROCHLORIDE 5 MG/1
5 TABLET ORAL EVERY 6 HOURS PRN
Refills: 0 | Status: DISPENSED | OUTPATIENT
Start: 2025-05-04

## 2025-05-04 RX ORDER — TRAMADOL HYDROCHLORIDE 50 MG/1
50 TABLET ORAL EVERY 8 HOURS PRN
Status: ACTIVE | OUTPATIENT
Start: 2025-05-04

## 2025-05-04 RX ADMIN — PREGABALIN 150 MG: 150 CAPSULE ORAL at 11:25

## 2025-05-04 RX ADMIN — MONTELUKAST 10 MG: 10 TABLET, FILM COATED ORAL at 20:14

## 2025-05-04 RX ADMIN — PREGABALIN 150 MG: 150 CAPSULE ORAL at 17:02

## 2025-05-04 RX ADMIN — PIPERACILLIN SODIUM AND TAZOBACTAM SODIUM 3.38 G: 3; .375 INJECTION, SOLUTION INTRAVENOUS at 18:42

## 2025-05-04 RX ADMIN — PIPERACILLIN SODIUM AND TAZOBACTAM SODIUM 3.38 G: 3; .375 INJECTION, SOLUTION INTRAVENOUS at 08:14

## 2025-05-04 RX ADMIN — OXYCODONE HYDROCHLORIDE 5 MG: 5 TABLET ORAL at 20:55

## 2025-05-04 RX ADMIN — PROPRANOLOL HYDROCHLORIDE 10 MG: 20 TABLET ORAL at 10:18

## 2025-05-04 RX ADMIN — MIRTAZAPINE 15 MG: 15 TABLET, FILM COATED ORAL at 20:14

## 2025-05-04 RX ADMIN — PANTOPRAZOLE SODIUM 40 MG: 40 TABLET, DELAYED RELEASE ORAL at 08:13

## 2025-05-04 RX ADMIN — SPIRONOLACTONE 25 MG: 25 TABLET ORAL at 08:14

## 2025-05-04 RX ADMIN — Medication 1000 MCG: at 08:14

## 2025-05-04 RX ADMIN — DULOXETINE HYDROCHLORIDE 60 MG: 60 CAPSULE, DELAYED RELEASE ORAL at 20:14

## 2025-05-04 RX ADMIN — OXYCODONE HYDROCHLORIDE 5 MG: 5 TABLET ORAL at 14:30

## 2025-05-04 RX ADMIN — PREGABALIN 150 MG: 150 CAPSULE ORAL at 23:01

## 2025-05-04 RX ADMIN — HEPARIN SODIUM 5000 UNITS: 5000 INJECTION INTRAVENOUS; SUBCUTANEOUS at 17:02

## 2025-05-04 RX ADMIN — LEVOTHYROXINE SODIUM 50 MCG: 0.05 TABLET ORAL at 06:02

## 2025-05-04 RX ADMIN — DULOXETINE HYDROCHLORIDE 60 MG: 60 CAPSULE, DELAYED RELEASE ORAL at 08:13

## 2025-05-04 RX ADMIN — PIPERACILLIN SODIUM AND TAZOBACTAM SODIUM 3.38 G: 3; .375 INJECTION, SOLUTION INTRAVENOUS at 00:05

## 2025-05-04 RX ADMIN — AMLODIPINE BESYLATE 2.5 MG: 2.5 TABLET ORAL at 08:14

## 2025-05-04 RX ADMIN — HYDROMORPHONE HYDROCHLORIDE 0.5 MG: 1 INJECTION, SOLUTION INTRAMUSCULAR; INTRAVENOUS; SUBCUTANEOUS at 08:15

## 2025-05-04 RX ADMIN — Medication 1750 MG: at 16:15

## 2025-05-04 RX ADMIN — PANTOPRAZOLE SODIUM 40 MG: 40 TABLET, DELAYED RELEASE ORAL at 20:14

## 2025-05-04 RX ADMIN — PREGABALIN 150 MG: 150 CAPSULE ORAL at 06:02

## 2025-05-04 RX ADMIN — TAMSULOSIN HYDROCHLORIDE 0.4 MG: 0.4 CAPSULE ORAL at 20:14

## 2025-05-04 RX ADMIN — ATORVASTATIN CALCIUM 40 MG: 40 TABLET, FILM COATED ORAL at 20:14

## 2025-05-04 RX ADMIN — PROPRANOLOL HYDROCHLORIDE 10 MG: 20 TABLET ORAL at 23:00

## 2025-05-04 RX ADMIN — ESCITALOPRAM OXALATE 20 MG: 20 TABLET ORAL at 08:14

## 2025-05-04 SDOH — HEALTH STABILITY: MENTAL HEALTH
DO YOU FEEL STRESS - TENSE, RESTLESS, NERVOUS, OR ANXIOUS, OR UNABLE TO SLEEP AT NIGHT BECAUSE YOUR MIND IS TROUBLED ALL THE TIME - THESE DAYS?: NOT AT ALL

## 2025-05-04 SDOH — HEALTH STABILITY: PHYSICAL HEALTH: ON AVERAGE, HOW MANY MINUTES DO YOU ENGAGE IN EXERCISE AT THIS LEVEL?: 0 MIN

## 2025-05-04 SDOH — SOCIAL STABILITY: SOCIAL NETWORK
DO YOU BELONG TO ANY CLUBS OR ORGANIZATIONS SUCH AS CHURCH GROUPS, UNIONS, FRATERNAL OR ATHLETIC GROUPS, OR SCHOOL GROUPS?: NO

## 2025-05-04 SDOH — SOCIAL STABILITY: SOCIAL NETWORK: HOW OFTEN DO YOU ATTEND CHURCH OR RELIGIOUS SERVICES?: NEVER

## 2025-05-04 SDOH — SOCIAL STABILITY: SOCIAL NETWORK
IN A TYPICAL WEEK, HOW MANY TIMES DO YOU TALK ON THE PHONE WITH FAMILY, FRIENDS, OR NEIGHBORS?: MORE THAN THREE TIMES A WEEK

## 2025-05-04 SDOH — SOCIAL STABILITY: SOCIAL INSECURITY: ARE YOU MARRIED, WIDOWED, DIVORCED, SEPARATED, NEVER MARRIED, OR LIVING WITH A PARTNER?: WIDOWED

## 2025-05-04 SDOH — SOCIAL STABILITY: SOCIAL NETWORK: HOW OFTEN DO YOU ATTEND MEETINGS OF THE CLUBS OR ORGANIZATIONS YOU BELONG TO?: NEVER

## 2025-05-04 SDOH — SOCIAL STABILITY: SOCIAL NETWORK: HOW OFTEN DO YOU GET TOGETHER WITH FRIENDS OR RELATIVES?: NEVER

## 2025-05-04 SDOH — HEALTH STABILITY: PHYSICAL HEALTH: ON AVERAGE, HOW MANY DAYS PER WEEK DO YOU ENGAGE IN MODERATE TO STRENUOUS EXERCISE (LIKE A BRISK WALK)?: 0 DAYS

## 2025-05-04 ASSESSMENT — PAIN SCALES - GENERAL
PAINLEVEL_OUTOF10: 8
PAINLEVEL_OUTOF10: 7
PAINLEVEL_OUTOF10: 3
PAINLEVEL_OUTOF10: 9
PAINLEVEL_OUTOF10: 5 - MODERATE PAIN
PAINLEVEL_OUTOF10: 7
PAINLEVEL_OUTOF10: 8
PAINLEVEL_OUTOF10: 7

## 2025-05-04 ASSESSMENT — COGNITIVE AND FUNCTIONAL STATUS - GENERAL
TOILETING: A LOT
PERSONAL GROOMING: A LITTLE
HELP NEEDED FOR BATHING: A LOT
MOBILITY SCORE: 22
WALKING IN HOSPITAL ROOM: A LITTLE
DRESSING REGULAR LOWER BODY CLOTHING: A LITTLE
CLIMB 3 TO 5 STEPS WITH RAILING: A LITTLE
DAILY ACTIVITIY SCORE: 23
DAILY ACTIVITIY SCORE: 16
DRESSING REGULAR LOWER BODY CLOTHING: A LOT
DRESSING REGULAR UPPER BODY CLOTHING: A LITTLE

## 2025-05-04 ASSESSMENT — PAIN - FUNCTIONAL ASSESSMENT
PAIN_FUNCTIONAL_ASSESSMENT: 0-10

## 2025-05-04 ASSESSMENT — PAIN DESCRIPTION - LOCATION
LOCATION: FOOT

## 2025-05-04 ASSESSMENT — PAIN DESCRIPTION - DESCRIPTORS: DESCRIPTORS: ACHING

## 2025-05-04 ASSESSMENT — PAIN DESCRIPTION - ORIENTATION
ORIENTATION: RIGHT

## 2025-05-04 NOTE — PROGRESS NOTES
"Angus Redman is a 69 y.o. male on day 3 of admission presenting with Open wound of toe, initial encounter.    Subjective   Pt examined and evaluated at bedside, found resting comfortably.  Pt states that he was having some pain overnight, but it has improved from yesterday.   Pt states that their pain is well controlled, denies any constitutional symptoms, and has no other complaints at this time.        Objective     Vascular: DP and PT pulses palpable 2/4 bilaterally.  CFT under 5 seconds when tested at distal digits.  Skin temp warm to warm from proximal to distal.      Neuro: Protective sensation may absent, light tough sensation intact.  No Tinel's or Valleix's signs elicited.      Derm: Full-thickness ulceration noted to plantar aspect of first metatarsal head, left foot.  Wound base extends to subcutaneous tissue, no exposed bone or fascia.  No undermining, tracking, or tunneling.  No SOI noted at this time.     Left foot digital amputation sites 1, 4, 5 are well-approximated with sutures intact.  Mild maceration noted to anterior aspect of hallux amputation site.  Minimal drainage noted to dressing.  No active bleeding noted.  No dehiscence or necrosis.  No SOI noted at this time.    Musc: No gross deformities noted.  No POP noted to any other area of the foot/ankle.          Last Recorded Vitals  Blood pressure 140/75, pulse 60, temperature 36 °C (96.8 °F), temperature source Temporal, resp. rate 16, height 1.829 m (6' 0.01\"), weight 115 kg (254 lb 4.8 oz), SpO2 97%.  Intake/Output last 3 Shifts:  I/O last 3 completed shifts:  In: 2057 (17.8 mL/kg) [P.O.:957; IV Piggyback:1100]  Out: 4110 (35.6 mL/kg) [Urine:4110 (1 mL/kg/hr)]  Weight: 115.3 kg     Relevant Results  Results for orders placed or performed during the hospital encounter of 05/01/25 (from the past 24 hours)   CBC   Result Value Ref Range    WBC 8.3 4.4 - 11.3 x10*3/uL    nRBC 0.0 0.0 - 0.0 /100 WBCs    RBC 4.05 (L) 4.50 - 5.90 x10*6/uL    " Hemoglobin 11.5 (L) 13.5 - 17.5 g/dL    Hematocrit 38.0 (L) 41.0 - 52.0 %    MCV 94 80 - 100 fL    MCH 28.4 26.0 - 34.0 pg    MCHC 30.3 (L) 32.0 - 36.0 g/dL    RDW 15.7 (H) 11.5 - 14.5 %    Platelets 190 150 - 450 x10*3/uL   Renal Function Panel   Result Value Ref Range    Glucose 107 (H) 74 - 99 mg/dL    Sodium 138 136 - 145 mmol/L    Potassium 3.9 3.5 - 5.3 mmol/L    Chloride 98 98 - 107 mmol/L    Bicarbonate 33 (H) 21 - 32 mmol/L    Anion Gap 11 10 - 20 mmol/L    Urea Nitrogen 18 6 - 23 mg/dL    Creatinine 1.36 (H) 0.50 - 1.30 mg/dL    eGFR 56 (L) >60 mL/min/1.73m*2    Calcium 9.1 8.6 - 10.3 mg/dL    Phosphorus 5.1 (H) 2.5 - 4.9 mg/dL    Albumin 3.5 3.4 - 5.0 g/dL   Magnesium   Result Value Ref Range    Magnesium 2.44 (H) 1.60 - 2.40 mg/dL     *Note: Due to a large number of results and/or encounters for the requested time period, some results have not been displayed. A complete set of results can be found in Results Review.       Assessment & Plan  Open wound of toe, initial encounter    Osteomyelitis of fourth toe of right foot (Multi)    Toe contracture, right        Assessment:  - s/p digital amputation 1,4,5 right foot (DOS: 5/2/25)  - Osteomyelitis 3rd digit, right foot  - Non-pressure ulcer, left and right foot      Plan:  - Pt examined and evaluated.  All findings discussed to patient to their satisfaction and understanding.  - Reviewed labs and chart.  No leukocytosis noted, vital stable.  - Systemic conditions managed by primary team, antibiotics managed by ID.   - Partial weight bearing to heel only, right foot.  - Performed dressing change consisting of Betadine paint, Xeroform, gauze, ABD, Kerlix, Ace.  Dressings to be changed daily by podiatry.  - No active bleeding, minimal drainage noted to dressing.  Restarted Heparin.  - No contraindication to discharge from podiatry standpoint.  Pt to follow up at [facility] on Wednesday morning following discharge.  Pt to keep dressing clean, dry, and intact  utnil follow up.  Will continue to follow inpatient.  Please contact podiatry with any acute questions/concerns.    Boone Bull DPM  Podiatric Surgery  [facility]

## 2025-05-04 NOTE — PROGRESS NOTES
05/04/25 1549   Discharge Planning   Assistance Needed MUltiple walkers and rollators. Whellchair and scooter. Oxygen 4l baseline   Do you have animals or pets at home? Yes   Type of Animals or Pets 2 cats   Who is requesting discharge planning? Provider   Does the patient need discharge transport arranged? Yes   RoundTrip coordination needed? Yes   Has discharge transport been arranged? No   Patient Choice   Provider Choice list and CMS website (https://medicare.gov/care-compare#search) for post-acute Quality and Resource Measure Data were provided and reviewed with: Patient;Family   Patient / Family choosing to utilize agency / facility established prior to hospitalization No   Stroke Family Assessment   Stroke Family Assessment Needed No   Intensity of Service   Intensity of Service 0-30 min     Demographics confirmed. PCP is Dr. Haskins . Discussed SNF and Care Port choice list. . He will discuss with his sister.

## 2025-05-04 NOTE — NURSING NOTE
Patient transferred from [facility] at about 2315. He was oriented to room. Asking for snacks and something to drink. Voided 400ml per urinal. BLE wrapped up to the knees in ace wraps. Safety measures maintained.

## 2025-05-04 NOTE — CARE PLAN
The clinical goals for the shift include Pt will remain safe throughout the shift.      Problem: Pain - Adult  Goal: Verbalizes/displays adequate comfort level or baseline comfort level  Outcome: Progressing     Problem: Safety - Adult  Goal: Free from fall injury  Outcome: Progressing   Pt has remained safe throughout the shift.  Bed alarm on and call light within reach.

## 2025-05-04 NOTE — PROGRESS NOTES
Occupational Therapy  Evaluation    Patient Name: Angus Redman  MRN: [id number]  Today's Date: 5/4/2025  Time Calculation  Start Time: 1401  Stop Time: 1426  Time Calculation (min): 25 min  4110/4110-A    Assessment  IP OT Assessment  OT Assessment: Patient demonstrates decreased independence with self care, decreased independence with functional transfers, decreased balance, decreased strength and decreased endurance.  Patient will benefit from skilled OT to address deficits.  Anticipate patient will benefit from moderate intensity therapy post hospital stay to maximize safety and independence to return to prior level of living.  Prognosis: Good  Barriers to Discharge Home: Physical needs  Physical Needs: Intermittent ADL assistance needed  Evaluation/Treatment Tolerance: Patient tolerated treatment well  Medical Staff Made Aware: Yes  End of Session Communication: Bedside nurse  End of Session Patient Position: Bed, 2 rail up, Alarm on    Plan:  Treatment Interventions: ADL retraining, Functional transfer training, UE strengthening/ROM, Endurance training, Patient/family training  OT Frequency: 4 times per week  OT Discharge Recommendations: Moderate intensity level of continued care  Equipment Recommended upon Discharge: Wheeled walker  OT Recommended Transfer Status: Minimal assist  OT - OK to Discharge: Yes (to next level of care when medically cleared by physician)    Subjective     Current Problem:  1. Open wound of toe, initial encounter        2. Osteomyelitis of fourth toe of right foot (Multi)  Surgical Pathology Exam    Surgical Pathology Exam      3. Toe contracture, right  Surgical Pathology Exam    Surgical Pathology Exam          General:  General  Reason for Referral: 70 yo M s/p right digital amputation 1,4,5 right foot (DOS: 5/2/25)  Referred By: DO Naveen  Prior to Session Communication: Bedside nurse  Patient Position Received: Bed, 3 rail up, Alarm on  Preferred Learning Style: verbal,  visual  General Comment: Patient agreeable to therapy.    Precautions:  LE Weight Bearing Status: Heel Weight Bearing in Post-Op Shoe (right LE)  Medical Precautions: Fall precautions  Precautions Comment: no surgical shoe present.  RN aware.      Pain:  Pain Assessment  Pain Assessment: 0-10  0-10 (Numeric) Pain Score: 8  Pain Type: Surgical pain  Pain Location: Foot  Pain Orientation: Right  Pain Interventions: Repositioned    Objective     Cognition:  Overall Cognitive Status: Within Functional Limits             Home Living:  Type of Home: House  Lives With: Alone  Home Adaptive Equipment: Walker rolling or standard  Home Layout: Multi-level (5-8 steps to each level)     Prior Function:  Level of Teller: Independent with ADLs and functional transfers, Independent with homemaking with ambulation  Prior Function Comments: Patient uses rollator on lower level and FWW on upper level. Does not drive.  Sister lives 2 hours aware and comes to help when she can.    ADL:  LE Dressing Assistance: Moderate    Activity Tolerance:  Endurance: Endurance does not limit participation in activity    Bed Mobility/Transfers:   Bed Mobility  Bed Mobility: Yes  Bed Mobility 1  Bed Mobility 1: Supine to sitting, Sitting to supine  Level of Assistance 1: Minimum assistance  Transfers  Transfer: Yes  Transfer 1  Transfer From 1: Sit to  Transfer to 1: Stand  Technique 1: Sit to stand, Stand to sit  Transfer Device 1: Walker, Gait belt  Transfer Level of Assistance 1: Minimum assistance  Transfers 2  Transfer From 2: Bed to  Transfer to 2: Bed  Technique 2: Sit to stand, Stand to sit  Transfer Device 2: Walker, Gait belt  Transfer Level of Assistance 2: Minimum assistance    Ambulation/Gait Training:  Functional Mobility  Functional Mobility Performed: Yes (Patient ambulated with FWW with min A for ~75 feet.  Patient needed cues to maintain heel weight bearing on right foot.)         Extremities: RUE   RUE : Within Functional  Limits and LUE   LUE: Within Functional Limits    Outcome Measures: [facility] Daily Activity  Putting on and taking off regular lower body clothing: A lot  Bathing (including washing, rinsing, drying): A lot  Putting on and taking off regular upper body clothing: A little  Toileting, which includes using toilet, bedpan or urinal: A lot  Taking care of personal grooming such as brushing teeth: A little  Eating Meals: None  Daily Activity - Total Score: 16           EDUCATION:  Education  Individual(s) Educated: Patient  Education Provided: Fall precautons, Risk and benefits of OT discussed with patient or other  Plan of Care Discussed and Agreed Upon: yes  Patient Response to Education: Patient/Caregiver Verbalized Understanding of Information      Goals:   Encounter Problems       Encounter Problems (Active)       OT Goals       Patient will complete functional transfers with mod I. (Progressing)       Start:  05/04/25    Expected End:  05/18/25            Patient will complete toileting with mod I. (Progressing)       Start:  05/04/25    Expected End:  05/18/25            Patient will complete LE dressing with mod I. (Progressing)       Start:  05/04/25    Expected End:  05/18/25

## 2025-05-04 NOTE — CARE PLAN
The patient's goals for the shift include sleep    The clinical goals for the shift include pain control    Over the shift, the patient did not make progress toward the following goals.     Patient resting in long intervals, he did receive dilaudid for c/o pain to rt foot.    Voiding per urinal.      Problem: Pain - Adult  Goal: Verbalizes/displays adequate comfort level or baseline comfort level  Outcome: Progressing     Problem: Nutrition  Goal: Nutrient intake appropriate for maintaining nutritional needs  Outcome: Progressing     Problem: Fall/Injury  Goal: Verbalize understanding of personal risk factors for fall in the hospital  Outcome: Progressing     Problem: Pain  Goal: Turns in bed with improved pain control throughout the shift  Outcome: Progressing

## 2025-05-04 NOTE — PROGRESS NOTES
Angus Redman is a 69 y.o. male on day 3 of admission presenting with Open wound of toe, initial encounter.      Subjective   NAEON.   Voiding without difficulty.       Objective     Last Recorded Vitals  /75 (BP Location: Left arm, Patient Position: Lying)   Pulse 60   Temp 36 °C (96.8 °F) (Temporal)   Resp 16   Wt 115 kg (254 lb 4.8 oz)   SpO2 97%   Intake/Output last 3 Shifts:    Intake/Output Summary (Last 24 hours) at 5/4/2025 1129  Last data filed at 5/4/2025 0452  Gross per 24 hour   Intake 1937 ml   Output 2260 ml   Net -323 ml       Admission Weight  Weight: 116 kg (255 lb) (05/01/25 1521)    Daily Weight  05/01/25 : 115 kg (254 lb 4.8 oz)    Image Results  XR foot right 3+ views  Narrative: Interpreted By:  Paty Coley,   STUDY:  XR FOOT RIGHT 3+ VIEWS 5/2/2025 2:17 pm      INDICATION:  Signs/Symptoms:post op xray      COMPARISON:  05/01/2025      ACCESSION NUMBER(S):  [id number]      ORDERING CLINICIAN:  CARISSA BARONE      TECHNIQUE:  Three views of right foot done portably      FINDINGS:  There has now been amputation of the right great toe as well as the  4th and 5th toes at the level of the metatarsal phalangeal joints. No  additional changes are demonstrated.      There are old healed fractures of the 2nd and 5th metatarsals with  small plantar calcaneal spur.      Impression: Status post amputation of the right great toe, as well as 4th and 5th  toes.      Signed by: Paty Coley 5/4/2025 6:43 AM  Dictation workstation:   [id number]      Physical Exam  Vitals reviewed.   Constitutional:       General: He is not in acute distress.     Appearance: Normal appearance. He is normal weight. He is not ill-appearing.   HENT:      Head: Normocephalic and atraumatic.   Cardiovascular:      Rate and Rhythm: Normal rate and regular rhythm.      Pulses: Normal pulses.      Heart sounds: Normal heart sounds. No murmur heard.  Pulmonary:      Effort: Pulmonary effort is normal. No respiratory  distress.   Abdominal:      General: Abdomen is flat. Bowel sounds are normal. There is no distension.      Palpations: Abdomen is soft.   Musculoskeletal:      Cervical back: Neck supple.      Right lower leg: No edema.      Left lower leg: No edema.      Comments: Feet wrapped bilaterally.  S/p right-sided toe amputations   Skin:     General: Skin is warm and dry.   Neurological:      General: No focal deficit present.      Mental Status: He is alert and oriented to person, place, and time.   Psychiatric:         Mood and Affect: Mood normal.         Behavior: Behavior normal.         Relevant Results    Scheduled medications  Scheduled Medications[1]  Continuous medications  Continuous Medications[2]  PRN medications  PRN Medications[3]  Results for orders placed or performed during the hospital encounter of 05/01/25 (from the past 24 hours)   CBC   Result Value Ref Range    WBC 8.3 4.4 - 11.3 x10*3/uL    nRBC 0.0 0.0 - 0.0 /100 WBCs    RBC 4.05 (L) 4.50 - 5.90 x10*6/uL    Hemoglobin 11.5 (L) 13.5 - 17.5 g/dL    Hematocrit 38.0 (L) 41.0 - 52.0 %    MCV 94 80 - 100 fL    MCH 28.4 26.0 - 34.0 pg    MCHC 30.3 (L) 32.0 - 36.0 g/dL    RDW 15.7 (H) 11.5 - 14.5 %    Platelets 190 150 - 450 x10*3/uL   Renal Function Panel   Result Value Ref Range    Glucose 107 (H) 74 - 99 mg/dL    Sodium 138 136 - 145 mmol/L    Potassium 3.9 3.5 - 5.3 mmol/L    Chloride 98 98 - 107 mmol/L    Bicarbonate 33 (H) 21 - 32 mmol/L    Anion Gap 11 10 - 20 mmol/L    Urea Nitrogen 18 6 - 23 mg/dL    Creatinine 1.36 (H) 0.50 - 1.30 mg/dL    eGFR 56 (L) >60 mL/min/1.73m*2    Calcium 9.1 8.6 - 10.3 mg/dL    Phosphorus 5.1 (H) 2.5 - 4.9 mg/dL    Albumin 3.5 3.4 - 5.0 g/dL   Magnesium   Result Value Ref Range    Magnesium 2.44 (H) 1.60 - 2.40 mg/dL     *Note: Due to a large number of results and/or encounters for the requested time period, some results have not been displayed. A complete set of results can be found in Results Review.     Imaging  XR  foot right 3+ views  Result Date: 5/4/2025  Status post amputation of the right great toe, as well as 4th and 5th toes.   Signed by: Paty Coley 5/4/2025 6:43 AM Dictation workstation:   [id number]    XR foot right 3+ views  Result Date: 5/1/2025  1. No radiographic evidence of osteomyelitis.  MRI of the foot post contrast may prove additionally useful in detecting early osteomyelitis. 2. Moderate to severe narrowing of the first metatarsophalangeal joint. 3. Mild lateral subluxation at the interphalangeal joint space of the great toe. 4. Pes planus deformity of the right foot. Signed by Dheeraj De Anda MD      Cardiology, Vascular, and Other Imaging  No other imaging results found for the past 7 days                              Assessment & Plan  Open wound of toe, initial encounter    Osteomyelitis of fourth toe of right foot (Multi)    Toe contracture, right     68 y/o M with PMHx significant for HFpEF (EF 60-65% 4/2024), PSVT, HTN, COPD with chronic respiratory failure on 4 L NC at baseline, CAD, CKD 3, OA, Hx of alcohol abuse, MDD, Hx of tobacco abuse presenting for nonhealing foot wound with purulent drainage.  To undergo OR management.  S/p digital amputation 1,4,5 right foot 5/2/2025.    # Open nonhealing right foot wound with purulent drainage  #Osteomyelitis  # S/p digital amputation 1,4,5 right foot  # Lower extremity neuropathy  Prior tissue culture 11/2024 growing MRSA  -ESR 84, CRP 0.98  -Wound culture ordered, will follow up surgical path  -XR negative for radiographic evidence of osteomyelitis    Plan:  -Continue Vanco/Zosyn per ID recommendations.  Awaiting final recommendations per ID.  -Continue home pregabalin  -Podiatry following, Betadine paint, Xeroform, gauze, ABD, Kerlix, Ace.  Dressings to be changed daily by podiatry.  From a podiatry standpoint, patient cleared to discharge  - Heparin restarted  -PT/OT -pending placement.  Patient is undecided regarding inpatient rehab versus home with  PT     #UTI  #Urinary retention -improved  Patient reports odor in his urine along with history of UTIs.    -Urinary retention overnight 5/2 -straight cath as needed  - Ucx pending  - continue abx as above    # COPD with chronic hypoxic respiratory failure  -Saturating well on home 4 L NC.  -Continue home albuterol, ipratropium, montelukast  # HFpEF  -Continue GDMT and torsemide with hold parameters  # pSVT  -Continue home propranolol  -Optimize electrolytes with goal K >4.0 and Mg > 2.0  # CKD 3A  -Will hold home Jardiance in the preoperative setting  -Avoid nephrotoxic agents  -Renally dose medications as needed     Chronic conditions  # HTN - amlodipine  # CAD - aspirin  # OA  # Hx of alcohol abuse - encouraged continued cessation  # Hx of tobacco abuse - encouraged continued cessation  # MDD - Duloxetine, escitalopram, mirtazepine,   - Continue medications and management as appropriate     IVF: As needed  Diet: Cardiac   ABx: Vanco/Zosyn  Consults: Podiatry, ID, PT OT  DVT: Heparin     Dispo: pending PT/OT recs, ID final recs     Discussed with attending physician Dr. Naveen Herr MD  Family medicine, PGY1           [1] amLODIPine, 2.5 mg, oral, Daily  [Held by provider] aspirin, 81 mg, oral, Daily  atorvastatin, 40 mg, oral, Nightly  cyanocobalamin, 1,000 mcg, oral, Daily  DULoxetine, 60 mg, oral, BID  [Held by provider] empagliflozin, 10 mg, oral, Daily  escitalopram, 20 mg, oral, Daily  heparin (porcine), 5,000 Units, subcutaneous, q8h  levothyroxine, 50 mcg, oral, Daily before breakfast  mirtazapine, 15 mg, oral, Nightly  montelukast, 10 mg, oral, Nightly  pantoprazole, 40 mg, oral, BID  piperacillin-tazobactam, 3.375 g, intravenous, q8h  polyethylene glycol, 17 g, oral, Daily  pregabalin, 150 mg, oral, q6h  propranolol, 10 mg, oral, BID  spironolactone, 25 mg, oral, Daily  tamsulosin, 0.4 mg, oral, Daily  torsemide, 20 mg, oral, Once per day on Monday Wednesday Friday  vancomycin, 1,750 mg,  intravenous, q24h     [2]    [3] PRN medications: acetaminophen, albuterol, HYDROmorphone, ipratropium, oxyCODONE, traMADol, vancomycin

## 2025-05-05 ENCOUNTER — APPOINTMENT (OUTPATIENT)
Dept: PHARMACY | Facility: HOSPITAL | Age: 70
End: 2025-05-05
Payer: MEDICARE

## 2025-05-05 ENCOUNTER — APPOINTMENT (OUTPATIENT)
Facility: CLINIC | Age: 70
End: 2025-05-05
Payer: MEDICARE

## 2025-05-05 VITALS
HEIGHT: 72 IN | TEMPERATURE: 97.7 F | HEART RATE: 72 BPM | RESPIRATION RATE: 16 BRPM | SYSTOLIC BLOOD PRESSURE: 145 MMHG | WEIGHT: 254.3 LBS | BODY MASS INDEX: 34.44 KG/M2 | OXYGEN SATURATION: 94 % | DIASTOLIC BLOOD PRESSURE: 70 MMHG

## 2025-05-05 PROBLEM — M20.5X1: Status: RESOLVED | Noted: 2025-05-01 | Resolved: 2025-05-05

## 2025-05-05 PROBLEM — S91.109A OPEN WOUND OF TOE, INITIAL ENCOUNTER: Status: RESOLVED | Noted: 2025-05-01 | Resolved: 2025-05-05

## 2025-05-05 PROBLEM — M86.9: Status: RESOLVED | Noted: 2025-05-01 | Resolved: 2025-05-05

## 2025-05-05 LAB
ALBUMIN SERPL BCP-MCNC: 3.6 G/DL (ref 3.4–5)
ANION GAP SERPL CALC-SCNC: 11 MMOL/L (ref 10–20)
BACTERIA UR CULT: ABNORMAL
BUN SERPL-MCNC: 21 MG/DL (ref 6–23)
CALCIUM SERPL-MCNC: 9.4 MG/DL (ref 8.6–10.3)
CHLORIDE SERPL-SCNC: 98 MMOL/L (ref 98–107)
CO2 SERPL-SCNC: 34 MMOL/L (ref 21–32)
CREAT SERPL-MCNC: 1.28 MG/DL (ref 0.5–1.3)
EGFRCR SERPLBLD CKD-EPI 2021: 60 ML/MIN/1.73M*2
ERYTHROCYTE [DISTWIDTH] IN BLOOD BY AUTOMATED COUNT: 15.5 % (ref 11.5–14.5)
GLUCOSE SERPL-MCNC: 105 MG/DL (ref 74–99)
HCT VFR BLD AUTO: 38.6 % (ref 41–52)
HGB BLD-MCNC: 11.6 G/DL (ref 13.5–17.5)
MAGNESIUM SERPL-MCNC: 2.33 MG/DL (ref 1.6–2.4)
MCH RBC QN AUTO: 28.4 PG (ref 26–34)
MCHC RBC AUTO-ENTMCNC: 30.1 G/DL (ref 32–36)
MCV RBC AUTO: 94 FL (ref 80–100)
NRBC BLD-RTO: 0 /100 WBCS (ref 0–0)
PHOSPHATE SERPL-MCNC: 3.8 MG/DL (ref 2.5–4.9)
PLATELET # BLD AUTO: 199 X10*3/UL (ref 150–450)
POTASSIUM SERPL-SCNC: 3.8 MMOL/L (ref 3.5–5.3)
RBC # BLD AUTO: 4.09 X10*6/UL (ref 4.5–5.9)
SODIUM SERPL-SCNC: 139 MMOL/L (ref 136–145)
VANCOMYCIN SERPL-MCNC: 21.9 UG/ML (ref 5–20)
WBC # BLD AUTO: 7.3 X10*3/UL (ref 4.4–11.3)

## 2025-05-05 PROCEDURE — 36415 COLL VENOUS BLD VENIPUNCTURE: CPT

## 2025-05-05 PROCEDURE — 80202 ASSAY OF VANCOMYCIN: CPT

## 2025-05-05 PROCEDURE — 2500000001 HC RX 250 WO HCPCS SELF ADMINISTERED DRUGS (ALT 637 FOR MEDICARE OP)

## 2025-05-05 PROCEDURE — 99239 HOSP IP/OBS DSCHRG MGMT >30: CPT

## 2025-05-05 PROCEDURE — 83735 ASSAY OF MAGNESIUM: CPT

## 2025-05-05 PROCEDURE — 2500000004 HC RX 250 GENERAL PHARMACY W/ HCPCS (ALT 636 FOR OP/ED): Performed by: STUDENT IN AN ORGANIZED HEALTH CARE EDUCATION/TRAINING PROGRAM

## 2025-05-05 PROCEDURE — 80069 RENAL FUNCTION PANEL: CPT

## 2025-05-05 PROCEDURE — 2500000001 HC RX 250 WO HCPCS SELF ADMINISTERED DRUGS (ALT 637 FOR MEDICARE OP): Performed by: INTERNAL MEDICINE

## 2025-05-05 PROCEDURE — 85027 COMPLETE CBC AUTOMATED: CPT

## 2025-05-05 PROCEDURE — 2500000004 HC RX 250 GENERAL PHARMACY W/ HCPCS (ALT 636 FOR OP/ED)

## 2025-05-05 PROCEDURE — 2500000004 HC RX 250 GENERAL PHARMACY W/ HCPCS (ALT 636 FOR OP/ED): Mod: JZ

## 2025-05-05 PROCEDURE — 2500000002 HC RX 250 W HCPCS SELF ADMINISTERED DRUGS (ALT 637 FOR MEDICARE OP, ALT 636 FOR OP/ED)

## 2025-05-05 RX ORDER — AMOXICILLIN AND CLAVULANATE POTASSIUM 875; 125 MG/1; MG/1
1 TABLET, FILM COATED ORAL EVERY 12 HOURS SCHEDULED
Status: DISCONTINUED | OUTPATIENT
Start: 2025-05-05 | End: 2025-05-05 | Stop reason: HOSPADM

## 2025-05-05 RX ORDER — DOXYCYCLINE 100 MG/1
100 CAPSULE ORAL EVERY 12 HOURS SCHEDULED
Start: 2025-05-05 | End: 2025-05-19

## 2025-05-05 RX ORDER — DOXYCYCLINE 100 MG/1
100 CAPSULE ORAL EVERY 12 HOURS SCHEDULED
Status: DISCONTINUED | OUTPATIENT
Start: 2025-05-05 | End: 2025-05-05 | Stop reason: HOSPADM

## 2025-05-05 RX ORDER — IPRATROPIUM BROMIDE 0.5 MG/2.5ML
0.5 SOLUTION RESPIRATORY (INHALATION) 4 TIMES DAILY PRN
Start: 2025-05-05

## 2025-05-05 RX ORDER — AMOXICILLIN AND CLAVULANATE POTASSIUM 875; 125 MG/1; MG/1
1 TABLET, FILM COATED ORAL EVERY 12 HOURS SCHEDULED
Start: 2025-05-05 | End: 2025-05-19

## 2025-05-05 RX ADMIN — PREGABALIN 150 MG: 150 CAPSULE ORAL at 06:13

## 2025-05-05 RX ADMIN — DULOXETINE HYDROCHLORIDE 60 MG: 60 CAPSULE, DELAYED RELEASE ORAL at 10:15

## 2025-05-05 RX ADMIN — DOXYCYCLINE HYCLATE 100 MG: 100 CAPSULE ORAL at 14:16

## 2025-05-05 RX ADMIN — HEPARIN SODIUM 5000 UNITS: 5000 INJECTION INTRAVENOUS; SUBCUTANEOUS at 10:16

## 2025-05-05 RX ADMIN — HEPARIN SODIUM 5000 UNITS: 5000 INJECTION INTRAVENOUS; SUBCUTANEOUS at 02:01

## 2025-05-05 RX ADMIN — PIPERACILLIN SODIUM AND TAZOBACTAM SODIUM 3.38 G: 3; .375 INJECTION, SOLUTION INTRAVENOUS at 10:33

## 2025-05-05 RX ADMIN — POLYETHYLENE GLYCOL 3350 17 G: 17 POWDER, FOR SOLUTION ORAL at 10:16

## 2025-05-05 RX ADMIN — AMOXICILLIN AND CLAVULANATE POTASSIUM 1 TABLET: 875; 125 TABLET, FILM COATED ORAL at 14:15

## 2025-05-05 RX ADMIN — PANTOPRAZOLE SODIUM 40 MG: 40 TABLET, DELAYED RELEASE ORAL at 10:15

## 2025-05-05 RX ADMIN — PREGABALIN 150 MG: 150 CAPSULE ORAL at 14:15

## 2025-05-05 RX ADMIN — Medication 1000 MCG: at 10:15

## 2025-05-05 RX ADMIN — ESCITALOPRAM OXALATE 20 MG: 20 TABLET ORAL at 10:16

## 2025-05-05 RX ADMIN — PIPERACILLIN SODIUM AND TAZOBACTAM SODIUM 3.38 G: 3; .375 INJECTION, SOLUTION INTRAVENOUS at 02:01

## 2025-05-05 RX ADMIN — LEVOTHYROXINE SODIUM 50 MCG: 0.05 TABLET ORAL at 06:13

## 2025-05-05 ASSESSMENT — PAIN SCALES - GENERAL: PAINLEVEL_OUTOF10: 0 - NO PAIN

## 2025-05-05 ASSESSMENT — PAIN - FUNCTIONAL ASSESSMENT: PAIN_FUNCTIONAL_ASSESSMENT: 0-10

## 2025-05-05 NOTE — DISCHARGE INSTRUCTIONS
You were admitted for infection of your right foot. While you were here you underwent amputation of the 1st, 4th, and 5th toe. You should follow up with your podiatrist and wound care. You should also follow up with your PCP for a visit as soon as possible or within one (1) week.    --    Please call and follow up with your primary care provider. If patient's PCP is not available, please schedule with the Provider's Team Subgroup/Practice and schedule a follow up appointment in 2-3 days.     Please take all of your medications as directed on your AVS.    If you have any concerning symptoms, or worsening symptoms please call or return, such as chest pain, shortness of breath, new onset confusion, loss of sensation, or fever >103F.  Thank you for allowing us to participate in your health care!    -[facility] Inpatient Medicine Teaching Service.

## 2025-05-05 NOTE — PROGRESS NOTES
Vancomycin Dosing by Pharmacy- FOLLOW UP    Angus Redman is a 69 y.o. year old male who Pharmacy has been consulted for vancomycin dosing for bone and joint infection. Based on the patient's indication and renal status this patient is being dosed based on a goal AUC of 400-600.     Renal function is currently stable.    Current vancomycin dose: 1750 mg given every 24 hours    Estimated vancomycin AUC on current dose: 586 mg/L.hr     Visit Vitals  /66 (BP Location: Left arm, Patient Position: Lying)   Pulse 66   Temp 36.4 °C (97.5 °F) (Temporal)   Resp 16        Lab Results   Component Value Date    CREATININE 1.28 2025    CREATININE 1.36 (H) 2025    CREATININE 1.39 (H) 2025    CREATININE 1.33 (H) 2025        Patient weight is as follows:   Vitals:    25 1849   Weight: 115 kg (254 lb 4.8 oz)       Cultures:  Susceptibility data for the encounter in last 14 days.  Collected Specimen Info Organism   25 Urine from Clean Catch/Voided Escherichia coli        I/O last 3 completed shifts:  In: 1370 (11.9 mL/kg) [P.O.:720; IV Piggyback:650]  Out: 5775 (50.1 mL/kg) [Urine:5775 (1.4 mL/kg/hr)]  Weight: 115.3 kg   I/O during current shift:  No intake/output data recorded.    Temp (24hrs), Av.4 °C (97.5 °F), Min:36 °C (96.8 °F), Max:36.7 °C (98.1 °F)      Assessment/Plan    Within goal AUC range. Continue current vancomycin regimen.    This dosing regimen is predicted by [employer] to result in the following pharmacokinetic parameters:  <<<<<PASTE [employer] DATA HERE>>>>>  Regimen: 1750 mg IV every 24 hours.  Start time: 16:15 on 2025  Exposure target: AUC24 (range)400-600 mg/L.hr   [id number]: 578 mg/L.hr  AUC24,ss: 586 mg/L.hr  Probability of AUC24 > 400: 100 %  Ctrough,ss: 16.8 mg/L  Probability of Ctrough,ss > 20: 21 %    The next level will be obtained on  at 0500. May be obtained sooner if clinically indicated.   Will continue to monitor renal function daily while on  vancomycin and order serum creatinine at least every 48 hours if not already ordered.  Follow for continued vancomycin needs, clinical response, and signs/symptoms of toxicity.       Gabriela Hoover, PharmD

## 2025-05-05 NOTE — PROGRESS NOTES
Spiritual Care Visit  Spiritual Care Request    Reason for Visit:        Request Received From:       Focus of Care:  Visited With: Patient not available         Refer to :          Spiritual Care Assessment    Spiritual Assessment:                      Care Provided:       Sense of Community and or Rastafari Affiliation:  Zoroastrianism         Addressed Needs/Concerns and/or Jamee Through:          Outcome:        Advance Directives:         Spiritual Care Annotation    Annotation:  When I entered the room the patient was occupied with other things and asked me to return. When I did he was asleep.

## 2025-05-05 NOTE — PROGRESS NOTES
"Angus Redman is a 69 y.o. male on day 4 of admission presenting with Open wound of toe, initial encounter.    Subjective   Pt examined and evaluated at bedside, found resting comfortably.  Pt states that he has agreed to be discharged to SNF.  Pt states that their pain is well controlled, denies any constitutional symptoms, and has no other complaints at this time.        Objective     Vascular: DP and PT pulses palpable 2/4 bilaterally.  CFT under 5 seconds when tested at distal digits.  Skin temp warm to warm from proximal to distal.      Neuro: Protective sensation may absent, light tough sensation intact.  No Tinel's or Valleix's signs elicited.      Derm: Full-thickness ulceration noted to plantar aspect of first metatarsal head, left foot.  Wound base extends to subcutaneous tissue, no exposed bone or fascia.  No undermining, tracking, or tunneling.  No SOI noted at this time.     Left foot digital amputation sites 1, 4, 5 are well-approximated with sutures intact.  Minimal drainage noted to dressing.  No active bleeding noted.  No dehiscence or necrosis.  No SOI noted at this time.    Musc: No gross deformities noted.  No POP noted to any other area of the foot/ankle.          Last Recorded Vitals  Blood pressure 115/62, pulse 52, temperature 36.3 °C (97.3 °F), temperature source Temporal, resp. rate 16, height 1.829 m (6' 0.01\"), weight 115 kg (254 lb 4.8 oz), SpO2 94%.  Intake/Output last 3 Shifts:  I/O last 3 completed shifts:  In: 1370 (11.9 mL/kg) [P.O.:720; IV Piggyback:650]  Out: 5775 (50.1 mL/kg) [Urine:5775 (1.4 mL/kg/hr)]  Weight: 115.3 kg     Relevant Results  Results for orders placed or performed during the hospital encounter of 05/01/25 (from the past 24 hours)   Renal Function Panel   Result Value Ref Range    Glucose 105 (H) 74 - 99 mg/dL    Sodium 139 136 - 145 mmol/L    Potassium 3.8 3.5 - 5.3 mmol/L    Chloride 98 98 - 107 mmol/L    Bicarbonate 34 (H) 21 - 32 mmol/L    Anion Gap 11 10 - 20 " mmol/L    Urea Nitrogen 21 6 - 23 mg/dL    Creatinine 1.28 0.50 - 1.30 mg/dL    eGFR 60 (L) >60 mL/min/1.73m*2    Calcium 9.4 8.6 - 10.3 mg/dL    Phosphorus 3.8 2.5 - 4.9 mg/dL    Albumin 3.6 3.4 - 5.0 g/dL   Magnesium   Result Value Ref Range    Magnesium 2.33 1.60 - 2.40 mg/dL   Vancomycin   Result Value Ref Range    Vancomycin 21.9 (H) 5.0 - 20.0 ug/mL   CBC   Result Value Ref Range    WBC 7.3 4.4 - 11.3 x10*3/uL    nRBC 0.0 0.0 - 0.0 /100 WBCs    RBC 4.09 (L) 4.50 - 5.90 x10*6/uL    Hemoglobin 11.6 (L) 13.5 - 17.5 g/dL    Hematocrit 38.6 (L) 41.0 - 52.0 %    MCV 94 80 - 100 fL    MCH 28.4 26.0 - 34.0 pg    MCHC 30.1 (L) 32.0 - 36.0 g/dL    RDW 15.5 (H) 11.5 - 14.5 %    Platelets 199 150 - 450 x10*3/uL     *Note: Due to a large number of results and/or encounters for the requested time period, some results have not been displayed. A complete set of results can be found in Results Review.       Assessment & Plan  Open wound of toe, initial encounter    Osteomyelitis of fourth toe of right foot (Multi)    Toe contracture, right        Assessment:  - s/p digital amputation 1,4,5 right foot (DOS: 5/2/25)  - Osteomyelitis 3rd digit, right foot  - Non-pressure ulcer, left and right foot      Plan:  - Pt examined and evaluated.  All findings discussed to patient to their satisfaction and understanding.  - Reviewed labs and chart.  No leukocytosis noted, vital stable.  - Systemic conditions managed by primary team, antibiotics managed by ID.   - Partial weight bearing to heel only, right foot.  Ordered surgical shoe.  - Performed dressing change consisting of Betadine paint, Xeroform, gauze, ABD, Kerlix, Ace.  Dressings to be changed 3x/week upon discharge.  - No active bleeding, minimal drainage noted to dressing.   - No contraindication to discharge from podiatry standpoint.  Pt to follow up at [facility] on Wednesday morning following discharge.  Dressings to be changed 3x/week upon discharge.  Will continue to follow  inpatient.  Please contact podiatry with any acute questions/concerns.    Boone Bull DPM  Podiatric Surgery  [facility]

## 2025-05-05 NOTE — DISCHARGE SUMMARY
Discharge Diagnosis  Open wound of toe, initial encounter  1st, 4th, and 5th toe amputation of right foot       Issues Requiring Follow-Up  Podiatry and wound care for amputation of right 1st, 4th, and 5th toe    Discharge Meds     Medication List      START taking these medications     amoxicillin-clavulanate 875-125 mg tablet; Commonly known as: Augmentin;   Take 1 tablet by mouth every 12 hours for 14 days.   doxycycline 100 mg capsule; Commonly known as: Vibramycin; Take 1   capsule (100 mg) by mouth every 12 hours for 14 days. Take with at least 8   ounces (large glass) of water, do not lie down for 30 minutes after     CHANGE how you take these medications     albuterol 2.5 mg /3 mL (0.083 %) nebulizer solution; Take 3 mL (2.5 mg)   by nebulization every 6 hours if needed for wheezing.; What changed:   Another medication with the same name was removed. Continue taking this   medication, and follow the directions you see here.     CONTINUE taking these medications     alendronate 70 mg tablet; Commonly known as: Fosamax; Take 1 tablet by   mouth every 7 days. Take in the morning with a full glass of water at   least 30 minutes before first food, drink, or medications of the day.   amLODIPine 2.5 mg tablet; Commonly known as: Norvasc; Take 1 tablet (2.5   mg) by mouth once daily. For blood pressure   aspirin 81 mg chewable tablet; Chew 1 tablet (81 mg) once daily.   atorvastatin 40 mg tablet; Commonly known as: Lipitor; Take 1 tablet (40   mg) by mouth once daily at bedtime.   busPIRone 5 mg tablet; Commonly known as: Buspar; Take 1 tablet by mouth   three times daily as needed for anxiety.   cyanocobalamin 1,000 mcg tablet; Commonly known as: Vitamin B-12; Take 1   tablet by mouth once daily as directed.   DULoxetine 60 mg DR capsule; Commonly known as: Cymbalta; Take 1 capsule   (60 mg) by mouth 2 times a day.   empagliflozin 10 mg tablet; Commonly known as: Jardiance; Take 1 tablet   (10 mg) by mouth once  daily.   ergocalciferol 1250 mcg (50,000 units) capsule; Commonly known as:   Vitamin D-2; Take 1 capsule (1,250 mcg) by mouth 1 (one) time per week.   escitalopram 20 mg tablet; Commonly known as: Lexapro; Take 1 tablet by   mouth once daily.   FeroSuL 325 mg (65 mg iron) tablet; Generic drug: ferrous sulfate; Take   1 tablet by mouth once daily with breakfast.   folic acid 1 mg tablet; Commonly known as: Folvite; Take 1 tablet by   mouth daily for folate deficiency.   ipratropium 0.02 % nebulizer solution; Commonly known as: Atrovent; Take   2.5 mL (0.5 mg) by nebulization 4 times a day as needed for shortness of   breath or wheezing.   levothyroxine 50 mcg tablet; Commonly known as: Synthroid, Levoxyl; Take   1 tablet by mouth once daily in the morning before meals.   mirtazapine 15 mg tablet; Commonly known as: Remeron; Take 1 tablet by   mouth once daily at bedtime.   montelukast 10 mg tablet; Commonly known as: Singulair; Take 1 tablet by   mouth once daily at bedtime.   mucus clearing device device; Acapella device.   pantoprazole 40 mg EC tablet; Commonly known as: ProtoNix; Take 1 tablet   (40 mg) by mouth 2 times a day.   pregabalin 150 mg capsule; Commonly known as: Lyrica; Take 1 capsule   (150 mg) by mouth every 6 hours.   propranolol 10 mg tablet; Commonly known as: Inderal; Take 1 tablet (10   mg) by mouth 2 times a day.   spironolactone 25 mg tablet; Commonly known as: Aldactone; Take 1 tablet   (25 mg) by mouth once daily.   tamsulosin 0.4 mg 24 hr capsule; Commonly known as: Flomax; Take 1   capsule by mouth once daily at bedtime.   torsemide 20 mg tablet; Commonly known as: Demadex; Take 1 tablet (20   mg) by mouth 3 times a week.     STOP taking these medications     Trelegy Ellipta 200-62.5-25 mcg blister with device; Generic drug:   fluticasone-umeclidin-vilanter       Test Results Pending At Discharge  Pending Labs       Order Current Status    Surgical Pathology Exam In process    Blood  Culture Preliminary result    Blood Culture Preliminary result            Hospital Course  Angus Redman is a 68 y/o M with PMHx significant for HFpEF (EF 60-65% 4/2024), PSVT (on propanolol), HTN, COPD with chronic respiratory failure on 4 L NC at baseline, CAD, CKD 3, OA, and alcohol use disorder c/b severe alcoholic polyneuropathy with autonomic dysfunction that presented at direction of podiatric for nonhealing foot wound with purulent drainage. He was found to have osteomyelitis of the 3rd digit of right foot and subsequently underwent digital amputation of 1st, 4th, and 5th toes on right foot 5/2/2025 by podiatry. Patient was treated with with vancomycin and zosyn in the hospital (started on 5/2).     Patient also found to have a UTI with urine culture growing >100,000 CFU e.coli ESBL. ID felt this was not significant and most likely asymptomatic bacteruria. This is most likely secondary to urinary retention (with long history of UTIs), which could be due to alcoholic polyneuropathy. Patient is also on Jardiance.     PT saw patient and recommended moderate intensity level of care. He was hemodynamically stable and discharged with oral antibiotics of doxycycline and Augmentin x 14 days per infectious disease recommendations. OR pathology will need to be followed up on. He will need follow up with podiatry and wound care.    Pertinent Physical Exam At Time of Discharge  Physical Exam  HENT:      Head: Normocephalic and atraumatic.   Eyes:      Extraocular Movements: Extraocular movements intact.      Conjunctiva/sclera: Conjunctivae normal.   Cardiovascular:      Rate and Rhythm: Normal rate and regular rhythm.      Heart sounds: No murmur heard.  Pulmonary:      Effort: Pulmonary effort is normal. No respiratory distress.      Breath sounds: Normal breath sounds.   Abdominal:      General: Abdomen is flat. There is no distension.      Palpations: Abdomen is soft.      Tenderness: There is no abdominal tenderness.    Musculoskeletal:      Right lower leg: No edema.      Left lower leg: No edema.      Comments: Bandaged right foot   Skin:     General: Skin is warm and dry.      Findings: No rash.   Neurological:      Mental Status: He is alert.         Outpatient Follow-Up  Future Appointments   Date Time Provider Department Center   5/14/2025 10:15 AM Boone Bull DPM [id number] [facility]   5/27/2025  2:20 PM James Garrido DO [id number] [facility]   7/30/2025  2:30 PM Artie Haskins MD [id number] [facility]   10/13/2025  3:40 PM Angel Harry DO [id number] [facility]   11/25/2025  1:30 PM Artie Haskins MD [id number] [facility]   3/25/2026  1:00 PM Artie Haskins MD [id number] [facility]         Ana Maria Doty DO  Family Medicine PGY-1

## 2025-05-05 NOTE — PROGRESS NOTES
05/05/25 1029   Discharge Planning   Living Arrangements Alone   Support Systems Family members   Type of Residence Private residence   Home or Post Acute Services Post acute facilities (Rehab/SNF/etc)   Type of Post Acute Facility Services Skilled nursing   Expected Discharge Disposition SNF   Does the patient need discharge transport arranged? Yes   RoundTrip coordination needed? Yes   Patient Choice   Provider Choice list and CMS website (https://medicare.gov/care-compare#search) for post-acute Quality and Resource Measure Data were provided and reviewed with: Patient     Met with patient at bedside. Pt agrees he needs SNF placement. Requested DSC send referrals to [facility] 1. [facility] 2. [facility] 3. [facility]. If accepted, pt will need precert. SW updated.     1138 [facility] is able to accept. Requested direct auth team start precert.     1241 Precert obtained for [facility]. Medical team updated.     1415 Medically ready for discharge. Requested DSC send 7000, GF and AVS to [facility]. Transportation scheduled for 1700. Pt and facility notified. Pt will notify his sister.

## 2025-05-05 NOTE — PROGRESS NOTES
INPATIENT PROGRESS NOTES    PATIENT NAME: Angus Redman    MRN: [id number]  SERVICE DATE:  5/5/2025   SERVICE TIME:  1:23 PM    SIGNATURE: Kathleen Patino MD      ASSESSMENT :   -Right fourth toe soft tissue infection, with possible osteomyelitis  -Status post amputation of 1st, 4th, and 5th toes 5/2  -Per OR note the margins were healthy no evidence of soft bone  -Renal failure  -History of right third toe osteomyelitis status post amputation in November 2024  -Previous positive wound culture for MRSA  -Positive urine culture for E. coli ESBL likely asymptomatic bacteriuria  -History of chronic hypoxic respiratory failure, CAD, history of orthostatic hypotension,History of PVD     PLAN:  - Follow-up OR pathology  - Can be discharged on doxycycline and Augmentin for 10 to 14 days  - Closely monitor for antibiotics side effects including rash, Diarrhea/CDI, thrombocytopenia, MARIA L, etc.           SUBJECTIVE  Afebrile  No events overnight       OBJECTIVE  PHYSICAL EXAM:   Patient Vitals for the past 24 hrs:   BP Temp Temp src Pulse Resp SpO2   05/05/25 0800 115/62 36.3 °C (97.3 °F) Temporal 52 16 94 %   05/05/25 0000 127/66 36.4 °C (97.5 °F) Temporal 66 16 93 %   05/04/25 2000 132/70 36.7 °C (98.1 °F) Temporal 70 18 92 %   05/04/25 1500 118/60 36 °C (96.8 °F) Temporal 70 15 93 %         Gen: NAD  Neck: symmetric, no mass  Cardiovascular: RRR  Respiratory: No distress     Labs:  Lab Results   Component Value Date    WBC 7.3 05/05/2025    HGB 11.6 (L) 05/05/2025    HCT 38.6 (L) 05/05/2025    MCV 94 05/05/2025     05/05/2025     Lab Results   Component Value Date    GLUCOSE 105 (H) 05/05/2025    CALCIUM 9.4 05/05/2025     05/05/2025    K 3.8 05/05/2025    CO2 34 (H) 05/05/2025    CL 98 05/05/2025    BUN 21 05/05/2025    CREATININE 1.28 05/05/2025   ESR: --  Lab Results   Component Value Date    SEDRATE 84 (H) 05/01/2025     Lab Results   Component Value Date    CRP 0.98 05/01/2025     Lab Results   Component  "Value Date    ALT 12 05/01/2025    AST 20 05/01/2025    ALKPHOS 126 05/01/2025    BILITOT 0.5 05/01/2025         DATA:   Diagnostic tests reviewed for today's visit:    Labs this admission reviewed  Imagings this admission reviewed  Cultures: Reviewed        Kathleen Patino MD.   Infectious Disease Attending            This note was partially created using voice recognition software and is inherently subject to errors including those of syntax and \"sound-alike\" substitutions which may escape proofreading. In such instances, original meaning may be extrapolated by contextual derivation       "

## 2025-05-05 NOTE — PROGRESS NOTES
Physical Therapy                 Therapy Communication Note    Patient Name: Angus Redman  MRN: [id number]  Department: [facility]  Room: [address] [place], [place] [address]  Today's Date: 5/5/2025     Discipline: Physical Therapy    PT Missed Visit: Yes     Missed Visit Reason: Missed Visit Reason: Patient refused    Missed Time: Attempt    Comment: Multiple attempts made to see pt this date. One attempt pt was eating. On two attempts pt was using the urinal. On last attempt pt states he is leaving and going to [facility].

## 2025-05-06 LAB
BACTERIA BLD CULT: NORMAL
BACTERIA BLD CULT: NORMAL

## 2025-05-12 ENCOUNTER — NURSING HOME VISIT (OUTPATIENT)
Dept: POST ACUTE CARE | Facility: EXTERNAL LOCATION | Age: 70
End: 2025-05-12
Payer: MEDICARE

## 2025-05-12 DIAGNOSIS — M86.9 OSTEOMYELITIS OF THIRD TOE OF RIGHT FOOT (MULTI): ICD-10-CM

## 2025-05-12 DIAGNOSIS — Z91.199 NONCOMPLIANCE WITH THERAPEUTIC PLAN: Primary | ICD-10-CM

## 2025-05-12 DIAGNOSIS — I10 BENIGN ESSENTIAL HYPERTENSION: ICD-10-CM

## 2025-05-12 DIAGNOSIS — I73.9 PERIPHERAL VASCULAR DISEASE, UNSPECIFIED: ICD-10-CM

## 2025-05-12 DIAGNOSIS — E11.43 TYPE 2 DIABETES MELLITUS WITH DIABETIC AUTONOMIC NEUROPATHY, WITHOUT LONG-TERM CURRENT USE OF INSULIN: ICD-10-CM

## 2025-05-12 PROCEDURE — 99306 1ST NF CARE HIGH MDM 50: CPT | Performed by: STUDENT IN AN ORGANIZED HEALTH CARE EDUCATION/TRAINING PROGRAM

## 2025-05-12 ASSESSMENT — ENCOUNTER SYMPTOMS
PSYCHIATRIC NEGATIVE: 1
CARDIOVASCULAR NEGATIVE: 1
RESPIRATORY NEGATIVE: 1
MUSCULOSKELETAL NEGATIVE: 1
FATIGUE: 1
WOUND: 1
GASTROINTESTINAL NEGATIVE: 1
WEAKNESS: 1

## 2025-05-12 NOTE — LETTER
Patient: Angus Redman  : 1955    Encounter Date: 2025    Subjective  Patient ID: Angus Redman is a 69 y.o. male.    68 y/o M with PMHx significant for HFpEF (EF 60-65% 2024), PSVT (on propanolol), HTN, COPD with chronic respiratory failure on 4 L NC at baseline, CAD, CKD 3, OA, and alcohol use disorder c/b severe alcoholic polyneuropathy with autonomic dysfunction that presented at direction of podiatric for nonhealing foot wound with purulent drainage. He was found to have osteomyelitis of the 3rd digit of right foot and subsequently underwent digital amputation of 1st, 4th, and 5th toes on right foot 2025 by podiatry. Patient was treated with with vancomycin and zosyn in the hospital (started on ).            Patient also found to have a UTI with urine culture growing >100,000 CFU e.coli ESBL. ID felt this was not significant and most likely asymptomatic bacteruria. This is most likely secondary to urinary retention (with long history of UTIs), which could be due to alcoholic polyneuropathy. Patient is also on Jardiance.            PT saw patient and recommended moderate intensity level of care. He was hemodynamically stable and discharged with oral antibiotics of doxycycline and Augmentin x 14 days per infectious disease recommendations. OR pathology will need to be followed up on. He will need follow up with podiatry and wound care. Patient was admitted to SNF for further management.  States she is overall doing well, and states he is tolerating his antibiotics.  However, is adamant on going home.  He regards that he feels like he can take care of all his needs at home, and does not need to be in skilled nursing facility.  Still is on daily dressing changes, and he voices understanding with this and understands that he is on daily dressing changes.  He feels like he can handle these at home.  Understands the importance of continuous dressing changes and medication compliance.  Otherwise,  states no additional issues or concerns.         Review of Systems   Constitutional:  Positive for fatigue.   HENT: Negative.     Respiratory: Negative.     Cardiovascular: Negative.    Gastrointestinal: Negative.    Musculoskeletal: Negative.    Skin:  Positive for rash and wound.   Neurological:  Positive for weakness.   Psychiatric/Behavioral: Negative.         ObjectiveVitals Reviewed via facility EMR   Physical Exam  Constitutional:       General: He is not in acute distress.     Appearance: He is ill-appearing.   Eyes:      Pupils: Pupils are equal, round, and reactive to light.   Cardiovascular:      Rate and Rhythm: Normal rate and regular rhythm.      Pulses: Normal pulses.      Heart sounds: No murmur heard.  Pulmonary:      Effort: No respiratory distress.      Breath sounds: No wheezing.   Abdominal:      General: Abdomen is flat. Bowel sounds are normal. There is no distension.   Musculoskeletal:      Right lower leg: No edema.      Left lower leg: No edema.   Skin:     General: Skin is warm and dry.   Neurological:      Mental Status: He is alert. Mental status is at baseline.      Cranial Nerves: No cranial nerve deficit.      Motor: Weakness present.   Psychiatric:         Mood and Affect: Mood normal.         Behavior: Behavior normal.      Comments: Able to voice understanding of decisions         Assessment/Plan  Diagnoses and all orders for this visit:  Noncompliance with therapeutic plan  Benign essential hypertension  Peripheral vascular disease, unspecified (CMS-HCC)  Type 2 diabetes mellitus with diabetic autonomic neuropathy, without long-term current use of insulin  Osteomyelitis of third toe of right foot (Multi)  Patient seen and examined at bedside.  Hospital course reviewed medications reviewed and reconciled.  As per HPI, does prefer to be discharged back home with home health care.  Discussed with patient that this is a poor choice and that he is at high risk for further comorbidities  and issues related to his foot wounds as well as his multiple medical issues and he voices understanding with regards to this.  He request discharge from the facility.  We will set him up with home health care and try to set him up with his PCP for early discharge however due to multiple needs at this time will be an AMA discharge.  He is able to voice complete understanding of this and voiced potential risks associated with leaving the facility.  Discussed with staff.  No additional issues.     Reviewed and approved by JAMSE BAXTER on 5/12/25 at 8:45 PM.         Electronically Signed By: James Baxter DO   5/12/25  8:45 PM

## 2025-05-13 ENCOUNTER — PATIENT OUTREACH (OUTPATIENT)
Dept: PRIMARY CARE | Facility: CLINIC | Age: 70
End: 2025-05-13
Payer: MEDICARE

## 2025-05-13 DIAGNOSIS — E11.43 TYPE 2 DIABETES MELLITUS WITH DIABETIC AUTONOMIC NEUROPATHY, WITHOUT LONG-TERM CURRENT USE OF INSULIN: ICD-10-CM

## 2025-05-13 DIAGNOSIS — I10 BENIGN ESSENTIAL HYPERTENSION: ICD-10-CM

## 2025-05-13 NOTE — PROGRESS NOTES
Discharge facility: [facility]  Discharge diagnosis: Osteomyelitis  Admission date: 5/5/2025  Discharge date: 5/12/2025    PCP Appointment Date: 5/27/2025  Specialist Appointment Date: 5/14/2025  Hospital Encounter and Summary: Linked   See Discharge assessment below for further details      Medications  Medications reviewed with patient/caregiver?: Yes (5/13/2025 10:26 AM)  Is the patient having any side effects they believe may be caused by any medication additions or changes?: No (5/13/2025 10:26 AM)  Does the patient have all medications ordered at discharge?: Yes (5/13/2025 10:26 AM)  Care Management Interventions: Provided patient education (5/13/2025 10:26 AM)  Is the patient taking all medications as directed (includes completed medication regime)?: Yes (5/13/2025 10:26 AM)  Care Management Interventions: Provided patient education (5/13/2025 10:26 AM)  Medication Comments: Patient sent home with medications from the NH (5/13/2025 10:26 AM)    Appointments  Does the patient have a primary care provider?: Yes (5/13/2025 10:26 AM)  Care Management Interventions: Verified appointment date/time/provider (5/13/2025 10:26 AM)  Has the patient kept scheduled appointments due by today?: Yes (5/13/2025 10:26 AM)  Care Management Interventions: Educated on importance of keeping appointment (5/13/2025 10:26 AM)    Self Management  What is the home health agency?: [facility] (5/13/2025 10:26 AM)  Has home health visited the patient within 72 hours of discharge?: Call prior to 72 hours (5/13/2025 10:26 AM)  What Durable Medical Equipment (DME) was ordered?: NA (5/13/2025 10:26 AM)    Patient Teaching  Does the patient have access to their discharge instructions?: Yes (5/13/2025 10:26 AM)  Care Management Interventions: Reviewed instructions with patient (5/13/2025 10:26 AM)  What is the patient's perception of their health status since discharge?: Improving (5/13/2025 10:26 AM)  Is the patient/caregiver able to teach back the  hierarchy of who to call/visit for symptoms/problems? PCP, Specialist, Home Health nurse, Urgent Care, ED, 911: Yes (5/13/2025 10:26 AM)  Patient/Caregiver Education Comments: I called and spoke wkith the patient who stated that he was doing well.  Patient was discharged from [facility] 5/12/2025.  He has an appointment with Dr Bull at wound care 5/14/2025.  Home care will change his dressing at home 3x a week.  Patient is able to heel walk on the right foot.  Patient has food in the home from his sister and will order from [employer].  TCM appointment made. (5/13/2025 10:26 AM)      TCM outreach completed on : 5/13/2025  Discharge date: 5/12/2025  Pt has follow up on : 5/27/2025    Moderately complex & follow-up within 14 days- bill [id number] for hospital follow up.   Highly complex and follow-up visit within 7 days-can bill [id number] for hospital follow up    *virtual follow up needs modifier added (95 or GT)   *AWV AND TCM CAN BE BILLED TOGETHER WITH 25 MODIFIER

## 2025-05-13 NOTE — PROGRESS NOTES
Subjective   Patient ID: Angus Redman is a 69 y.o. male.    68 y/o M with PMHx significant for HFpEF (EF 60-65% 4/2024), PSVT (on propanolol), HTN, COPD with chronic respiratory failure on 4 L NC at baseline, CAD, CKD 3, OA, and alcohol use disorder c/b severe alcoholic polyneuropathy with autonomic dysfunction that presented at direction of podiatric for nonhealing foot wound with purulent drainage. He was found to have osteomyelitis of the 3rd digit of right foot and subsequently underwent digital amputation of 1st, 4th, and 5th toes on right foot 5/2/2025 by podiatry. Patient was treated with with vancomycin and zosyn in the hospital (started on 5/2).            Patient also found to have a UTI with urine culture growing >100,000 CFU e.coli ESBL. ID felt this was not significant and most likely asymptomatic bacteruria. This is most likely secondary to urinary retention (with long history of UTIs), which could be due to alcoholic polyneuropathy. Patient is also on Jardiance.            PT saw patient and recommended moderate intensity level of care. He was hemodynamically stable and discharged with oral antibiotics of doxycycline and Augmentin x 14 days per infectious disease recommendations. OR pathology will need to be followed up on. He will need follow up with podiatry and wound care. Patient was admitted to SNF for further management.  States she is overall doing well, and states he is tolerating his antibiotics.  However, is adamant on going home.  He regards that he feels like he can take care of all his needs at home, and does not need to be in skilled nursing facility.  Still is on daily dressing changes, and he voices understanding with this and understands that he is on daily dressing changes.  He feels like he can handle these at home.  Understands the importance of continuous dressing changes and medication compliance.  Otherwise, states no additional issues or concerns.         Review of Systems    Constitutional:  Positive for fatigue.   HENT: Negative.     Respiratory: Negative.     Cardiovascular: Negative.    Gastrointestinal: Negative.    Musculoskeletal: Negative.    Skin:  Positive for rash and wound.   Neurological:  Positive for weakness.   Psychiatric/Behavioral: Negative.         Objective Vitals Reviewed via facility EMR   Physical Exam  Constitutional:       General: He is not in acute distress.     Appearance: He is ill-appearing.   Eyes:      Pupils: Pupils are equal, round, and reactive to light.   Cardiovascular:      Rate and Rhythm: Normal rate and regular rhythm.      Pulses: Normal pulses.      Heart sounds: No murmur heard.  Pulmonary:      Effort: No respiratory distress.      Breath sounds: No wheezing.   Abdominal:      General: Abdomen is flat. Bowel sounds are normal. There is no distension.   Musculoskeletal:      Right lower leg: No edema.      Left lower leg: No edema.   Skin:     General: Skin is warm and dry.   Neurological:      Mental Status: He is alert. Mental status is at baseline.      Cranial Nerves: No cranial nerve deficit.      Motor: Weakness present.   Psychiatric:         Mood and Affect: Mood normal.         Behavior: Behavior normal.      Comments: Able to voice understanding of decisions         Assessment/Plan   Diagnoses and all orders for this visit:  Noncompliance with therapeutic plan  Benign essential hypertension  Peripheral vascular disease, unspecified (CMS-HCC)  Type 2 diabetes mellitus with diabetic autonomic neuropathy, without long-term current use of insulin  Osteomyelitis of third toe of right foot (Multi)  Patient seen and examined at bedside.  Hospital course reviewed medications reviewed and reconciled.  As per HPI, does prefer to be discharged back home with home health care.  Discussed with patient that this is a poor choice and that he is at high risk for further comorbidities and issues related to his foot wounds as well as his multiple  medical issues and he voices understanding with regards to this.  He request discharge from the facility.  We will set him up with home health care and try to set him up with his PCP for early discharge however due to multiple needs at this time will be an AMA discharge.  He is able to voice complete understanding of this and voiced potential risks associated with leaving the facility.  Discussed with staff.  No additional issues.     Reviewed and approved by ARIELLA BAXTER on 5/12/25 at 8:45 PM.

## 2025-05-14 ENCOUNTER — OFFICE VISIT (OUTPATIENT)
Dept: WOUND CARE | Facility: CLINIC | Age: 70
End: 2025-05-14
Payer: MEDICARE

## 2025-05-14 PROCEDURE — 11042 DBRDMT SUBQ TIS 1ST 20SQCM/<: CPT

## 2025-05-15 ENCOUNTER — APPOINTMENT (OUTPATIENT)
Dept: PHARMACY | Facility: HOSPITAL | Age: 70
End: 2025-05-15
Payer: MEDICARE

## 2025-05-19 ENCOUNTER — APPOINTMENT (OUTPATIENT)
Dept: PHARMACY | Facility: HOSPITAL | Age: 70
End: 2025-05-19
Payer: MEDICARE

## 2025-05-19 DIAGNOSIS — J44.89 ASTHMA WITH CHRONIC OBSTRUCTIVE PULMONARY DISEASE (COPD) (MULTI): Primary | ICD-10-CM

## 2025-05-19 DIAGNOSIS — E11.43 TYPE 2 DIABETES MELLITUS WITH DIABETIC AUTONOMIC NEUROPATHY, WITHOUT LONG-TERM CURRENT USE OF INSULIN: ICD-10-CM

## 2025-05-19 RX ORDER — FLUTICASONE FUROATE, UMECLIDINIUM BROMIDE AND VILANTEROL TRIFENATATE 200; 62.5; 25 UG/1; UG/1; UG/1
1 POWDER RESPIRATORY (INHALATION) DAILY
COMMUNITY

## 2025-05-19 NOTE — PROGRESS NOTES
Clinical Pharmacy Appointment    Patient ID: Angus Redman is a 69 y.o. male who presents for No chief complaint on file..    Pt is here for Follow Up appointment.     Referring Provider: Artie Haskins MD  PCP: Artie Haskins MD   Last visit with PCP: 03/26/2025   Next visit with PCP: 07/30/2025    Subjective   Drug Interactions  The following drug interactions were noted:    Propranolol x Albuterol and Trelegy: non-selective beta blockers can reduce the effectiveness of beta agonists   Alendronate x Ferrous Sulfate: Ferrous Sulfate can reduce the effectiveness of Alendronate when given concurrently, should avoid administration within 30 minutes after taking alendronate     Medication System Management  Adherence/Organization: reports that he does not forget to take his medications, lines his bottles up on a tray to help with organization  Affordability/Accessibility: no issues reported today, notes most of his medications are free and the Trelegy is ~$30 per month     Patient's Preferred Pharmacy    [facility] - [place], [place] - [address]  [address]  [facility] [address]  Phone: [phone] Fax: [phone]    [facility]  [address]  [place] [id number]  Phone: [phone] Fax: [phone]    [facility]  [address]  [address]  [place] [address]  Phone: [phone] Fax: [phone]     At last pharmacy encounter he had not yet started Trelegy which was prescribed by Pulmonology on 03/19. He was instructed to start that the day of our last encounter. We are following-up today to see how he has done with the medication.     HPI  PULMONARY ASSESSMENT  Patient has been diagnosed with: Asthma with chronic COPD  Does patient see pulmonology: Yes    Date: 03/19/2025  has had PFT's completed in last 2 years  FEV1 59% pred 04/22/24    Current Regimen  Albuterol 2.5 mg/3 mL nebulizer 3 mLs  every 6 hours if needed for wheezing   Trelegy 200-62.5-25 mcg 1 puff once daily   Ipratropium 0.02% nebulizer solution 2.5 mLs 4 times a day (not currently using)    Historical Treatment  Ipratropium-albuterol 0.5-2.5 mg/3 mL (using as separate products now)  Albuterol HFA 90 mcg/act (unable to operate the inhaler)    Appropriate technique?   Patient is unable to use the Albuterol HFA due being unable to use the mechanism of the inhaler, uses nebulizer solution instead   No other issues with his other inhaled medications via the nebulizer     Adverse Effects: none reported today      Symptom Management  Current symptoms: dyspnea yesterday, states today he is doing much better  Triggers: change in weather  Alleviating factors: albuterol nebulizer  Exercise capacity: just walks around the house  How often do you use your rescue inhaler? Using albuterol nebulizer 3-4 times per day currently (not currently using maintenance inhaler yet)  mMRC score: 4 - reports shortness of breath when walking upstairs and sometimes when just sitting  CAT score: 30 based on reported symptoms today (high impact)    Exacerbation Hx  When was your last hospitalization for an exacerbation? 09/23/24  When was the last time you were treated with antibiotics and/or steroids? Never for COPD - was not given antibiotics or steroids at admission    Total number of hospitalizations d/t exacerbation: 1 in last year     Immunization History:  Influenza: 10/11/2024  PCV13: 06/02/2016  PPSV23: 05/28/2021  PCV20: None  COVID: 10/11/2024  RSV: 09/29/2023    Smoking History  He quit smoking approximately 26 years ago.      Objective   Allergies   Allergen Reactions    Lisinopril Hives    Codeine GI Upset    House Dust Mite Runny nose    Hydrocodone-Acetaminophen Nausea/vomiting    Latex Hives and Rash     Latex tube/connecter kit/gloves      Mold Runny nose    Tree [place] Shrub Pollen Runny nose     Social History     Social History Narrative    Not on file      Medication Reconciliation:  Discontinued:   Torsemide 20 mg (patient reports this was discontinued)  Fexofenadine 180 mg (patient reports no longer taking_    Medication Review  Current Outpatient Medications   Medication Instructions    albuterol 2.5 mg, nebulization, Every 6 hours PRN    alendronate (Fosamax) 70 mg tablet Take 1 tablet by mouth every 7 days. Take in the morning with a full glass of water at least 30 minutes before first food, drink, or medications of the day.    amLODIPine (NORVASC) 2.5 mg, oral, Daily, For blood pressure    amoxicillin-clavulanate (Augmentin) 875-125 mg tablet 1 tablet, oral, Every 12 hours scheduled    aspirin 81 mg, oral, Daily    atorvastatin (LIPITOR) 40 mg, oral, Nightly    busPIRone (BUSPAR) 5 mg, oral, 3 times daily PRN    cyanocobalamin (VITAMIN B-12) 1,000 mcg, oral, Daily, as directed    doxycycline (VIBRAMYCIN) 100 mg, oral, Every 12 hours scheduled, Take with at least 8 ounces (large glass) of water, do not lie down for 30 minutes after    DULoxetine (CYMBALTA) 60 mg, oral, 2 times daily    empagliflozin (JARDIANCE) 10 mg, oral, Daily    ergocalciferol (VITAMIN D-2) 1,250 mcg, oral, Once Weekly    escitalopram (LEXAPRO) 20 mg, oral, Daily    FeroSuL 325 mg, oral, Daily with breakfast    folic acid (Folvite) 1 mg tablet Take 1 tablet by mouth daily for folate deficiency.    ipratropium (ATROVENT) 0.5 mg, nebulization, 4 times daily PRN    levothyroxine (SYNTHROID, LEVOXYL) 50 mcg, oral, Daily before breakfast    mirtazapine (REMERON) 15 mg, oral, Nightly    montelukast (SINGULAIR) 10 mg, oral, Nightly    mucus clearing device device Acapella device.    pantoprazole (PROTONIX) 40 mg, oral, 2 times daily    pregabalin (LYRICA) 150 mg, oral, Every 6 hours    propranolol (INDERAL) 10 mg, oral, 2 times daily    spironolactone (ALDACTONE) 25 mg, oral, Daily    tamsulosin (FLOMAX) 0.4 mg, oral    torsemide (DEMADEX) 20 mg, oral, 3 times weekly      Vitals  BP Readings  "from Last 2 Encounters:   05/05/25 145/70   04/11/25 136/74     BMI Readings from Last 1 Encounters:   05/01/25 34.48 kg/m²      Labs  A1C  Lab Results   Component Value Date    HGBA1C 4.9 12/19/2023    HGBA1C 4.7 02/23/2018     BMP  Lab Results   Component Value Date    CALCIUM 9.4 05/05/2025     05/05/2025    K 3.8 05/05/2025    CO2 34 (H) 05/05/2025    CL 98 05/05/2025    BUN 21 05/05/2025    CREATININE 1.28 05/05/2025    EGFR 60 (L) 05/05/2025     LFTs  Lab Results   Component Value Date    ALT 12 05/01/2025    AST 20 05/01/2025    ALKPHOS 126 05/01/2025    BILITOT 0.5 05/01/2025     FLP  Lab Results   Component Value Date    TRIG 104 09/23/2024    CHOL 203 (H) 02/13/2024    LDLCALC 136 (H) 02/13/2024    HDL 40.6 02/13/2024     Urine Microalbumin  No results found for: \"MICROALBCREA\"    Weight Management  Wt Readings from Last 3 Encounters:   05/01/25 115 kg (254 lb 4.8 oz)   04/08/25 110 kg (243 lb)   03/26/25 110 kg (243 lb 3.2 oz)     Estimated body mass index is 34.48 kg/m² as calculated from the following:    Height as of 5/1/25: 1.829 m (6' 0.01\").    Weight as of 5/1/25: 115 kg (254 lb 4.8 oz).     Assessment/Plan   Problem List Items Addressed This Visit    None      Pharmacy Follow-Up: ***   PCP Follow-Up: 07/30/2025  Pulmonology Follow-Up: 05/27/2025    Continue all meds under the continuation of care with the referring provider and clinical pharmacy team.    Thank you,    Ricky Forte, PharmD   Clinical Pharmacist    Verbal consent to manage patient's drug therapy was obtained from the patient. They were informed they may decline to participate or withdraw from participation in pharmacy services at any time.   " his breathing is typically better in the summer due to the warmer weather. Seasonal allergies can also be a trigger though.   He is doing well with the Trelegy inhaler and denies any issues with administration.     At this time, patient is doing well. No further scheduled follow-up with the pharmacy team is needed. He was encouraged to reach out with any questions and/or concerns.          Type 2 diabetes mellitus with diabetic autonomic neuropathy, without long-term current use of insulin     Pharmacy Follow-Up: As needed per patient or provider request   PCP Follow-Up: 07/30/2025  Pulmonology Follow-Up: 05/27/2025    Continue all meds under the continuation of care with the referring provider and clinical pharmacy team.    Thank you,    Ricky Forte, PharmD   Clinical Pharmacist    Verbal consent to manage patient's drug therapy was obtained from the patient. They were informed they may decline to participate or withdraw from participation in pharmacy services at any time.

## 2025-05-19 NOTE — ASSESSMENT & PLAN NOTE
Patient with COPD that is well controlled and improved. He started Trelegy after our last encounter and today reports that he is generally doing really well. Denies frequent symptoms and reports that he has not needed to use his albuterol nebulizer solution at all since he has returned home from SNF. Since he is doing well, I have no recommendations for changes to medication therapy today.     Medication Changes:  CONTINUE  Albuterol 2.5 mg/3 mL nebulizer solution inhale 3 mLs every 6 hours if needed for wheezing  Trelegy 200-62.5-25 mcg/dose inhale 1 puff once daily     Future Considerations:  Trelegy is triple therapy, and with his history of struggling with MDIs in the past Trelegy's inhaler should be easier for him to use than Breztri  If symptoms worsen on Trelegy in the future, could always try Breztri to see if he does better with it     Monitoring and Education:  He notes that his breathing is typically better in the summer due to the warmer weather. Seasonal allergies can also be a trigger though.   He is doing well with the Trelegy inhaler and denies any issues with administration.     At this time, patient is doing well. No further scheduled follow-up with the pharmacy team is needed. He was encouraged to reach out with any questions and/or concerns.

## 2025-05-20 DIAGNOSIS — Z99.81 CHRONIC HYPOXIC RESPIRATORY FAILURE, ON HOME OXYGEN THERAPY: Primary | ICD-10-CM

## 2025-05-20 DIAGNOSIS — J41.8 MIXED SIMPLE AND MUCOPURULENT CHRONIC BRONCHITIS (MULTI): ICD-10-CM

## 2025-05-20 DIAGNOSIS — J96.11 CHRONIC HYPOXIC RESPIRATORY FAILURE, ON HOME OXYGEN THERAPY: Primary | ICD-10-CM

## 2025-05-20 NOTE — PROGRESS NOTES
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  Patient will need Afflovest

## 2025-05-22 ENCOUNTER — APPOINTMENT (OUTPATIENT)
Dept: PRIMARY CARE | Facility: CLINIC | Age: 70
End: 2025-05-22
Payer: MEDICARE

## 2025-05-22 VITALS
BODY MASS INDEX: 34.05 KG/M2 | WEIGHT: 251.4 LBS | SYSTOLIC BLOOD PRESSURE: 110 MMHG | HEART RATE: 75 BPM | HEIGHT: 72 IN | TEMPERATURE: 98 F | DIASTOLIC BLOOD PRESSURE: 60 MMHG | RESPIRATION RATE: 16 BRPM | OXYGEN SATURATION: 98 %

## 2025-05-22 DIAGNOSIS — N39.0 URINARY TRACT INFECTION WITHOUT HEMATURIA, SITE UNSPECIFIED: ICD-10-CM

## 2025-05-22 DIAGNOSIS — G57.93 NEUROPATHIC PAIN OF BOTH FEET: ICD-10-CM

## 2025-05-22 DIAGNOSIS — M86.8X7 OTHER OSTEOMYELITIS OF RIGHT FOOT: Primary | ICD-10-CM

## 2025-05-22 DIAGNOSIS — I10 BENIGN ESSENTIAL HYPERTENSION: ICD-10-CM

## 2025-05-22 DIAGNOSIS — Z89.429: ICD-10-CM

## 2025-05-22 DIAGNOSIS — J44.89 ASTHMA WITH CHRONIC OBSTRUCTIVE PULMONARY DISEASE (COPD) (MULTI): ICD-10-CM

## 2025-05-22 DIAGNOSIS — Z99.81 ON SUPPLEMENTAL OXYGEN THERAPY: ICD-10-CM

## 2025-05-22 PROBLEM — M86.9 OSTEOMYELITIS OF RIGHT FOOT (MULTI): Status: ACTIVE | Noted: 2025-05-22

## 2025-05-22 PROCEDURE — 3008F BODY MASS INDEX DOCD: CPT | Performed by: INTERNAL MEDICINE

## 2025-05-22 PROCEDURE — 1036F TOBACCO NON-USER: CPT | Performed by: INTERNAL MEDICINE

## 2025-05-22 PROCEDURE — 1159F MED LIST DOCD IN RCRD: CPT | Performed by: INTERNAL MEDICINE

## 2025-05-22 PROCEDURE — 3078F DIAST BP <80 MM HG: CPT | Performed by: INTERNAL MEDICINE

## 2025-05-22 PROCEDURE — 1111F DSCHRG MED/CURRENT MED MERGE: CPT | Performed by: INTERNAL MEDICINE

## 2025-05-22 PROCEDURE — 3074F SYST BP LT 130 MM HG: CPT | Performed by: INTERNAL MEDICINE

## 2025-05-22 PROCEDURE — 99495 TRANSJ CARE MGMT MOD F2F 14D: CPT | Performed by: INTERNAL MEDICINE

## 2025-05-22 PROCEDURE — 1160F RVW MEDS BY RX/DR IN RCRD: CPT | Performed by: INTERNAL MEDICINE

## 2025-05-22 ASSESSMENT — ENCOUNTER SYMPTOMS
FREQUENCY: 0
EYES NEGATIVE: 1
NECK PAIN: 0
DIZZINESS: 0
EYE PAIN: 0
POLYPHAGIA: 0
SEIZURES: 0
DYSURIA: 0
TROUBLE SWALLOWING: 0
ALLERGIC/IMMUNOLOGIC NEGATIVE: 1
MYALGIAS: 0
DIARRHEA: 0
CARDIOVASCULAR NEGATIVE: 1
BRUISES/BLEEDS EASILY: 0
COUGH: 0
CHEST TIGHTNESS: 0
TREMORS: 0
SHORTNESS OF BREATH: 1
SINUS PAIN: 0
NERVOUS/ANXIOUS: 0
VOMITING: 0
CONSTIPATION: 0
ADENOPATHY: 0
LIGHT-HEADEDNESS: 0
SORE THROAT: 0
BLOOD IN STOOL: 0
FACIAL ASYMMETRY: 0
VOICE CHANGE: 0
FATIGUE: 1
BACK PAIN: 0
STRIDOR: 0
AGITATION: 0
SINUS PRESSURE: 0
APPETITE CHANGE: 0
SPEECH DIFFICULTY: 0
UNEXPECTED WEIGHT CHANGE: 0
DIFFICULTY URINATING: 0
NAUSEA: 0
PALPITATIONS: 0
HEADACHES: 0
FLANK PAIN: 0
HALLUCINATIONS: 0
WHEEZING: 0
SLEEP DISTURBANCE: 0
ABDOMINAL PAIN: 0
PSYCHIATRIC NEGATIVE: 1
ENDOCRINE NEGATIVE: 1
GASTROINTESTINAL NEGATIVE: 1
ACTIVITY CHANGE: 0
EYE DISCHARGE: 0
NECK STIFFNESS: 0
COLOR CHANGE: 0
NUMBNESS: 1
EYE REDNESS: 0
WEAKNESS: 0
ARTHRALGIAS: 0
JOINT SWELLING: 0
POLYDIPSIA: 0
WOUND: 0
CONFUSION: 0
HEMATOLOGIC/LYMPHATIC NEGATIVE: 1

## 2025-05-22 NOTE — PROGRESS NOTES
"Patient: Angus Redman  : 1955  PCP: Artie Haskins MD  MRN: [id number]  Program: Chronic Care Management (CMS)  Status: Enrolled  Effective Dates: 2023 - present  Responsible Staff: Evelyn Valentin RN  Social Drivers to be Addressed: No information to display    Transitional Care Management  Status: Enrolled  Effective Dates: 2025 - present  Responsible Staff: Evelyn Valentin RN  Social Drivers to be Addressed: Physical Activity, Social Connections, Tobacco Use         Angus Redman is a 69 y.o. male presenting today for follow-up after being discharged from the hospital 11 days ago. The main problem requiring admission was osteomyelitis . The discharge summary and/or Transitional Care Management documentation was reviewed. Medication reconciliation was performed as indicated via the \"Juan as Reviewed\" timestamp.     Angus Redman was contacted by Transitional Care Management services two days after his discharge. This encounter and supporting documentation was reviewed.    I reviewed available hospital records and discharge orders. Discussed hospital course with patient in details. I reviewed discharge medications, reconciled discharge medications and compared with outpatient medication list. All medications changes were discussed with patient in details. Current medication list was updated to reflect recent changes.     Patient underwent right 1st, 4th and 5th  toes amputation due to osteomyelitis (Dr. Bull). Completed treatment with iv and oral antibiotics.  After hospitalization went to [facility] for Rehab, then home with Home care and wound care.    Patient remains home bound, not able to leave the house without assistance.    Has difficulties going for apt to [facility].    Patient has long h/o alcohol induced neuropathy and bilateral leg wounds. Follows with podiatrist Dr. Bull. Currently his feet are covered with dressing, which is changed by wound care nurses, according to " patient wounds have been slowly  healing.  Patient  previously had 2nd and 3rd right toes amputated.  He walks with walker.   No longer drinking alcohol.    His urine test during hospitalization showed UTI, patient still c/o dysuria. Denies fever, chills.    This is 68 yo patient of Dr. Haskins with H/o CAD, MI, HTN, CHF. A. Fib, DM, CKD, BPH, Alcoholism, Depression, Alcohol induced neuropathy, osteomyelitis, COPD, Hypoxia on oxygen.    I reviewed patient's pertinent history from Epic records.         Review of Systems   Constitutional:  Positive for fatigue. Negative for activity change, appetite change and unexpected weight change.   HENT: Negative.  Negative for congestion, ear discharge, ear pain, hearing loss, mouth sores, nosebleeds, sinus pressure, sinus pain, sore throat, trouble swallowing and voice change.    Eyes: Negative.  Negative for pain, discharge, redness and visual disturbance.   Respiratory:  Positive for shortness of breath. Negative for cough, chest tightness, wheezing and stridor.    Cardiovascular: Negative.  Negative for chest pain, palpitations and leg swelling.   Gastrointestinal: Negative.  Negative for abdominal pain, blood in stool, constipation, diarrhea, nausea and vomiting.   Endocrine: Negative.  Negative for polydipsia, polyphagia and polyuria.   Genitourinary: Negative.  Negative for difficulty urinating, dysuria, flank pain, frequency and urgency.   Musculoskeletal:  Positive for gait problem. Negative for arthralgias, back pain, joint swelling, myalgias, neck pain and neck stiffness.   Skin: Negative.  Negative for color change, rash and wound.   Allergic/Immunologic: Negative.  Negative for environmental allergies, food allergies and immunocompromised state.   Neurological:  Positive for numbness. Negative for dizziness, tremors, seizures, syncope, facial asymmetry, speech difficulty, weakness, light-headedness and headaches.   Hematological: Negative.  Negative for adenopathy.  "Does not bruise/bleed easily.   Psychiatric/Behavioral: Negative.  Negative for agitation, behavioral problems, confusion, hallucinations, sleep disturbance and suicidal ideas. The patient is not nervous/anxious.    All other systems reviewed and are negative.      /60 (BP Location: Right arm, Patient Position: Sitting, BP Cuff Size: Adult)   Pulse 75   Temp 36.7 °C (98 °F) (Temporal)   Resp 16   Ht 1.829 m (6' 0.01\")   Wt 114 kg (251 lb 6.4 oz)   SpO2 98%   BMI 34.09 kg/m²     Physical Exam  Vitals and nursing note reviewed.   Constitutional:       General: He is not in acute distress.     Appearance: Normal appearance.      Comments: Wearing portable oxygen   HENT:      Head: Normocephalic and atraumatic.      Right Ear: External ear normal.      Left Ear: External ear normal.      Nose: Nose normal. No congestion or rhinorrhea.   Eyes:      General:         Right eye: No discharge.         Left eye: No discharge.      Extraocular Movements: Extraocular movements intact.      Conjunctiva/sclera: Conjunctivae normal.      Pupils: Pupils are equal, round, and reactive to light.   Cardiovascular:      Rate and Rhythm: Normal rate and regular rhythm.      Pulses: Normal pulses.      Heart sounds: Normal heart sounds. No murmur heard.     No friction rub. No gallop.   Pulmonary:      Effort: Pulmonary effort is normal. No respiratory distress.      Breath sounds: Normal breath sounds. No stridor. No wheezing, rhonchi or rales.   Chest:      Chest wall: No tenderness.   Abdominal:      General: Bowel sounds are normal.      Palpations: Abdomen is soft. There is no mass.      Tenderness: There is no abdominal tenderness. There is no guarding or rebound.   Musculoskeletal:         General: Swelling and deformity present. Normal range of motion.      Cervical back: Normal range of motion and neck supple. No rigidity or tenderness.      Right lower leg: No edema.      Left lower leg: No edema.      Comments: " Walks with walker  S/p right toes amputation, trace of right foot swelling noted   Lymphadenopathy:      Cervical: No cervical adenopathy.   Skin:     General: Skin is warm and dry.      Coloration: Skin is not jaundiced.      Findings: No bruising or erythema.   Neurological:      General: No focal deficit present.      Mental Status: He is alert and oriented to person, place, and time. Mental status is at baseline.      Cranial Nerves: No cranial nerve deficit.      Motor: No weakness.      Coordination: Coordination normal.      Gait: Gait normal.   Psychiatric:         Mood and Affect: Mood normal.         Behavior: Behavior normal.         Thought Content: Thought content normal.         Judgment: Judgment normal.         The complexity of medical decision making for this patient's transitional care is moderate.    Assessment/Plan   Problem List Items Addressed This Visit           ICD-10-CM       Cardiac and Vasculature    Benign essential hypertension I10    Stable. Will monitor.            Genitourinary and Reproductive    Urinary tract infection without hematuria N39.0    Obtain UA, U C+S.         Relevant Orders    Urinalysis with Reflex Microscopic    Urine Culture       Infectious Diseases    Osteomyelitis of right foot (Multi) - Primary M86.9    Stable. F/up with Home wound care and podiatrist Dr. Frazier            Musculoskeletal and Injuries    History of amputation of toe (Multi) Z89.429    F/up with podiatrist.            Neuro    Neuropathic pain of both feet G57.93    Continue current treatment. F/up with podiatrist and pain management.            Pulmonary and Pneumonias    Asthma with chronic obstructive pulmonary disease (COPD) (Multi) J44.89    Stable. Continue current treatment.         On supplemental oxygen therapy Z99.81    Continue oxygen.        It was a pleasure to see you today.  I would like to remind you about importance of a healthy lifestyle in order to improve your well-being  and live longer.  Try to engage in physical activities for at least 150 minutes per week.  Eat about 10 servings of fruits and vegetables daily. My advice is 2 servings of fruits and 8 servings of vegetables.  For vegetables choose at least half of them green and at least half of them fresh.  Please avoid sugar, salt, fried food and saturated fat.    F/up with Dr. Haskins as scheduled or sooner if needed.

## 2025-05-27 ENCOUNTER — APPOINTMENT (OUTPATIENT)
Facility: CLINIC | Age: 70
End: 2025-05-27
Payer: MEDICARE

## 2025-05-27 ENCOUNTER — TELEPHONE (OUTPATIENT)
Dept: PRIMARY CARE | Facility: CLINIC | Age: 70
End: 2025-05-27

## 2025-05-27 ENCOUNTER — APPOINTMENT (OUTPATIENT)
Dept: PRIMARY CARE | Facility: CLINIC | Age: 70
End: 2025-05-27
Payer: MEDICARE

## 2025-05-27 NOTE — TELEPHONE ENCOUNTER
FYI - Pt fell at home today - was advised to go to ER - right lower extremity is hot and red - pt refused - has appt with Dr Bull tomorrow

## 2025-05-28 ENCOUNTER — PATIENT OUTREACH (OUTPATIENT)
Dept: PRIMARY CARE | Facility: CLINIC | Age: 70
End: 2025-05-28
Payer: MEDICARE

## 2025-05-28 ENCOUNTER — DOCUMENTATION (OUTPATIENT)
Facility: CLINIC | Age: 70
End: 2025-05-28
Payer: MEDICARE

## 2025-05-28 DIAGNOSIS — J96.11 CHRONIC HYPOXIC RESPIRATORY FAILURE, ON HOME OXYGEN THERAPY: Primary | ICD-10-CM

## 2025-05-28 DIAGNOSIS — R06.02 SOB (SHORTNESS OF BREATH): ICD-10-CM

## 2025-05-28 DIAGNOSIS — Z99.81 CHRONIC HYPOXIC RESPIRATORY FAILURE, ON HOME OXYGEN THERAPY: Primary | ICD-10-CM

## 2025-05-28 NOTE — PROGRESS NOTES
Chart update:     Due to the severity of the patients COPD there is hyperinflation of the lungs which has resulted in the flattening of the diaphragm.    In regards to oxygenation:   Due to the severity of the patients' COPD patient will require NHV

## 2025-05-29 ENCOUNTER — PATIENT OUTREACH (OUTPATIENT)
Dept: PRIMARY CARE | Facility: CLINIC | Age: 70
End: 2025-05-29
Payer: MEDICARE

## 2025-05-29 DIAGNOSIS — M86.8X7 OTHER OSTEOMYELITIS OF RIGHT FOOT: ICD-10-CM

## 2025-05-29 DIAGNOSIS — I10 BENIGN ESSENTIAL HYPERTENSION: ICD-10-CM

## 2025-05-29 NOTE — TELEPHONE ENCOUNTER
Called patient again and left a message. Asked patient to call back with an update to see how he is doing.

## 2025-05-29 NOTE — PROGRESS NOTES
Care Management Monthly Outreach  Chart review completed  Confirmation of at least 2 patient identifiers  Change in insurance? No    Has patient been to ER/Urgent Care since last outreach? No    Last Office Visit with PCP: 5/22/2025   Next Office Visit with PCP: 7/30/2025   APC Collaboration: n/a    Chronic Conditions and Outreach Summary:   Other osteomyelitis of right foot    Benign essential hypertension    Medications:   Are there medication changes since last visit? No  Refills needed? No    Social Drivers of Health: Deferred  Care Gaps Addressed? Deferred  Care Plan addressed: Yes    Upcoming Appointments:   Future Appointments       Date / Time Provider Department Dept Phone    6/3/2025 10:00 AM James Garrido, DO [facility] [phone]    6/4/2025 10:15 AM Boone Bull DPM [facility] [phone]    7/30/2025 2:30 PM (Arrive by 2:15 PM) Artie Haskins MD [facility] [phone]    10/13/2025 3:40 PM Angel Harry DO [facility]     11/25/2025 1:30 PM (Arrive by 1:15 PM) Artie Haskins MD [facility] [phone]    3/25/2026 1:00 PM (Arrive by 12:45 PM) Artie Haskins MD [facility] [phone]          Blood Pressures Reviewed  BP Readings from Last 3 Encounters:   05/22/25 110/60   05/05/25 145/70   04/11/25 136/74     Labs Reviewed:  Lab Results   Component Value Date    CREATININE 1.28 05/05/2025    GLUCOSE 105 (H) 05/05/2025    ALKPHOS 126 05/01/2025    K 3.8 05/05/2025    PROT 7.9 05/01/2025     05/05/2025    CALCIUM 9.4 05/05/2025    AST 20 05/01/2025    ALT 12 05/01/2025    BUN 21 05/05/2025    MG 2.33 05/05/2025    PHOS 3.8 05/05/2025     09/23/2024    GFRMALE 70 10/10/2022     Lab Results   Component Value Date    TRIG 104 09/23/2024    CHOL 203 (H) 02/13/2024    LDLCALC 136 (H) 02/13/2024    HDL 40.6 02/13/2024     Lab Results   Component Value Date    HGBA1C 4.9 12/19/2023     HGBA1C 4.7 02/23/2018     Lab Results   Component Value Date    WBC 7.3 05/05/2025    RBC 4.09 (L) 05/05/2025    HGB 11.6 (L) 05/05/2025     05/05/2025   Patient had a fall 2 days ago and is not feeling well.  Per the patient, he is going to call [facility] and go to [facility].  Agreeable to continue monthly outreaches.  Encouraged to call if questions or concerns arise.    Evelyn Valentin RN

## 2025-05-30 ENCOUNTER — APPOINTMENT (OUTPATIENT)
Dept: RADIOLOGY | Facility: HOSPITAL | Age: 70
End: 2025-05-30
Payer: MEDICARE

## 2025-05-30 ENCOUNTER — PATIENT OUTREACH (OUTPATIENT)
Dept: PRIMARY CARE | Facility: CLINIC | Age: 70
End: 2025-05-30
Payer: MEDICARE

## 2025-05-30 ENCOUNTER — HOSPITAL ENCOUNTER (INPATIENT)
Facility: HOSPITAL | Age: 70
End: 2025-05-30
Attending: EMERGENCY MEDICINE | Admitting: STUDENT IN AN ORGANIZED HEALTH CARE EDUCATION/TRAINING PROGRAM
Payer: MEDICARE

## 2025-05-30 DIAGNOSIS — M86.8X7 OTHER OSTEOMYELITIS OF RIGHT FOOT: ICD-10-CM

## 2025-05-30 DIAGNOSIS — I50.30 STAGE C DIASTOLIC HEART FAILURE: ICD-10-CM

## 2025-05-30 DIAGNOSIS — M86.071 ACUTE HEMATOGENOUS OSTEOMYELITIS OF RIGHT FOOT (MULTI): ICD-10-CM

## 2025-05-30 DIAGNOSIS — G62.9 PERIPHERAL NEUROPATHY: ICD-10-CM

## 2025-05-30 DIAGNOSIS — L03.90 ACUTE CELLULITIS: Primary | ICD-10-CM

## 2025-05-30 DIAGNOSIS — K29.20 CHRONIC ALCOHOLIC GASTRITIS, PRESENCE OF BLEEDING UNSPECIFIED: ICD-10-CM

## 2025-05-30 DIAGNOSIS — M86.171 OTHER ACUTE OSTEOMYELITIS OF RIGHT FOOT: ICD-10-CM

## 2025-05-30 LAB
ALBUMIN SERPL BCP-MCNC: 3.8 G/DL (ref 3.4–5)
ALBUMIN SERPL BCP-MCNC: 3.8 G/DL (ref 3.4–5)
ALP SERPL-CCNC: 104 U/L (ref 33–136)
ALT SERPL W P-5'-P-CCNC: 17 U/L (ref 10–52)
ANION GAP SERPL CALC-SCNC: 13 MMOL/L (ref 10–20)
ANION GAP SERPL CALC-SCNC: 14 MMOL/L (ref 10–20)
APPEARANCE UR: CLEAR
AST SERPL W P-5'-P-CCNC: 26 U/L (ref 9–39)
BASOPHILS # BLD AUTO: 0.02 X10*3/UL (ref 0–0.1)
BASOPHILS NFR BLD AUTO: 0.2 %
BILIRUB SERPL-MCNC: 0.7 MG/DL (ref 0–1.2)
BILIRUB UR STRIP.AUTO-MCNC: NEGATIVE MG/DL
BUN SERPL-MCNC: 25 MG/DL (ref 6–23)
BUN SERPL-MCNC: 26 MG/DL (ref 6–23)
CALCIUM SERPL-MCNC: 9 MG/DL (ref 8.6–10.3)
CALCIUM SERPL-MCNC: 9.1 MG/DL (ref 8.6–10.3)
CHLORIDE SERPL-SCNC: 98 MMOL/L (ref 98–107)
CHLORIDE SERPL-SCNC: 98 MMOL/L (ref 98–107)
CO2 SERPL-SCNC: 26 MMOL/L (ref 21–32)
CO2 SERPL-SCNC: 28 MMOL/L (ref 21–32)
COLOR UR: ABNORMAL
CREAT SERPL-MCNC: 1.64 MG/DL (ref 0.5–1.3)
CREAT SERPL-MCNC: 1.73 MG/DL (ref 0.5–1.3)
CRP SERPL-MCNC: 8.7 MG/DL
EGFRCR SERPLBLD CKD-EPI 2021: 42 ML/MIN/1.73M*2
EGFRCR SERPLBLD CKD-EPI 2021: 44 ML/MIN/1.73M*2
EOSINOPHIL # BLD AUTO: 0.29 X10*3/UL (ref 0–0.4)
EOSINOPHIL NFR BLD AUTO: 2.8 %
ERYTHROCYTE [DISTWIDTH] IN BLOOD BY AUTOMATED COUNT: 15.2 % (ref 11.5–14.5)
ERYTHROCYTE [SEDIMENTATION RATE] IN BLOOD BY WESTERGREN METHOD: 100 MM/H (ref 0–20)
GLUCOSE SERPL-MCNC: 118 MG/DL (ref 74–99)
GLUCOSE SERPL-MCNC: 118 MG/DL (ref 74–99)
GLUCOSE UR STRIP.AUTO-MCNC: ABNORMAL MG/DL
HCT VFR BLD AUTO: 37.5 % (ref 41–52)
HGB BLD-MCNC: 11.9 G/DL (ref 13.5–17.5)
IMM GRANULOCYTES # BLD AUTO: 0.05 X10*3/UL (ref 0–0.5)
IMM GRANULOCYTES NFR BLD AUTO: 0.5 % (ref 0–0.9)
KETONES UR STRIP.AUTO-MCNC: NEGATIVE MG/DL
LACTATE SERPL-SCNC: 0.8 MMOL/L (ref 0.4–2)
LEUKOCYTE ESTERASE UR QL STRIP.AUTO: NEGATIVE
LYMPHOCYTES # BLD AUTO: 0.89 X10*3/UL (ref 0.8–3)
LYMPHOCYTES NFR BLD AUTO: 8.5 %
MAGNESIUM SERPL-MCNC: 2.35 MG/DL (ref 1.6–2.4)
MCH RBC QN AUTO: 28.3 PG (ref 26–34)
MCHC RBC AUTO-ENTMCNC: 31.7 G/DL (ref 32–36)
MCV RBC AUTO: 89 FL (ref 80–100)
MONOCYTES # BLD AUTO: 0.79 X10*3/UL (ref 0.05–0.8)
MONOCYTES NFR BLD AUTO: 7.6 %
NEUTROPHILS # BLD AUTO: 8.4 X10*3/UL (ref 1.6–5.5)
NEUTROPHILS NFR BLD AUTO: 80.4 %
NITRITE UR QL STRIP.AUTO: NEGATIVE
NRBC BLD-RTO: 0 /100 WBCS (ref 0–0)
PH UR STRIP.AUTO: 6 [PH]
PHOSPHATE SERPL-MCNC: 3.1 MG/DL (ref 2.5–4.9)
PLATELET # BLD AUTO: 221 X10*3/UL (ref 150–450)
POTASSIUM SERPL-SCNC: 4.6 MMOL/L (ref 3.5–5.3)
POTASSIUM SERPL-SCNC: 4.6 MMOL/L (ref 3.5–5.3)
PROT SERPL-MCNC: 7.7 G/DL (ref 6.4–8.2)
PROT UR STRIP.AUTO-MCNC: ABNORMAL MG/DL
RBC # BLD AUTO: 4.2 X10*6/UL (ref 4.5–5.9)
RBC # UR STRIP.AUTO: NEGATIVE MG/DL
RBC #/AREA URNS AUTO: NORMAL /HPF
SODIUM SERPL-SCNC: 133 MMOL/L (ref 136–145)
SODIUM SERPL-SCNC: 134 MMOL/L (ref 136–145)
SP GR UR STRIP.AUTO: 1.01
UROBILINOGEN UR STRIP.AUTO-MCNC: NORMAL MG/DL
WBC # BLD AUTO: 10.4 X10*3/UL (ref 4.4–11.3)
WBC #/AREA URNS AUTO: NORMAL /HPF

## 2025-05-30 PROCEDURE — 85025 COMPLETE CBC W/AUTO DIFF WBC: CPT | Performed by: EMERGENCY MEDICINE

## 2025-05-30 PROCEDURE — 85025 COMPLETE CBC W/AUTO DIFF WBC: CPT | Performed by: STUDENT IN AN ORGANIZED HEALTH CARE EDUCATION/TRAINING PROGRAM

## 2025-05-30 PROCEDURE — 71045 X-RAY EXAM CHEST 1 VIEW: CPT | Performed by: RADIOLOGY

## 2025-05-30 PROCEDURE — 99285 EMERGENCY DEPT VISIT HI MDM: CPT | Mod: 25 | Performed by: EMERGENCY MEDICINE

## 2025-05-30 PROCEDURE — 94640 AIRWAY INHALATION TREATMENT: CPT | Mod: 59

## 2025-05-30 PROCEDURE — 80053 COMPREHEN METABOLIC PANEL: CPT | Performed by: EMERGENCY MEDICINE

## 2025-05-30 PROCEDURE — 83735 ASSAY OF MAGNESIUM: CPT

## 2025-05-30 PROCEDURE — 87040 BLOOD CULTURE FOR BACTERIA: CPT | Mod: STJLAB | Performed by: PHYSICIAN ASSISTANT

## 2025-05-30 PROCEDURE — 99285 EMERGENCY DEPT VISIT HI MDM: CPT | Performed by: PHYSICIAN ASSISTANT

## 2025-05-30 PROCEDURE — 36415 COLL VENOUS BLD VENIPUNCTURE: CPT | Performed by: PHYSICIAN ASSISTANT

## 2025-05-30 PROCEDURE — 80069 RENAL FUNCTION PANEL: CPT | Mod: CCI

## 2025-05-30 PROCEDURE — 71045 X-RAY EXAM CHEST 1 VIEW: CPT

## 2025-05-30 PROCEDURE — 83605 ASSAY OF LACTIC ACID: CPT | Performed by: PHYSICIAN ASSISTANT

## 2025-05-30 PROCEDURE — 80053 COMPREHEN METABOLIC PANEL: CPT | Performed by: STUDENT IN AN ORGANIZED HEALTH CARE EDUCATION/TRAINING PROGRAM

## 2025-05-30 PROCEDURE — 81001 URINALYSIS AUTO W/SCOPE: CPT | Performed by: PHYSICIAN ASSISTANT

## 2025-05-30 PROCEDURE — 85652 RBC SED RATE AUTOMATED: CPT

## 2025-05-30 PROCEDURE — 36415 COLL VENOUS BLD VENIPUNCTURE: CPT | Performed by: STUDENT IN AN ORGANIZED HEALTH CARE EDUCATION/TRAINING PROGRAM

## 2025-05-30 PROCEDURE — 96365 THER/PROPH/DIAG IV INF INIT: CPT

## 2025-05-30 PROCEDURE — 96366 THER/PROPH/DIAG IV INF ADDON: CPT

## 2025-05-30 PROCEDURE — 73630 X-RAY EXAM OF FOOT: CPT | Mod: RIGHT SIDE | Performed by: RADIOLOGY

## 2025-05-30 PROCEDURE — 2500000004 HC RX 250 GENERAL PHARMACY W/ HCPCS (ALT 636 FOR OP/ED): Mod: JZ

## 2025-05-30 PROCEDURE — 2500000005 HC RX 250 GENERAL PHARMACY W/O HCPCS: Performed by: EMERGENCY MEDICINE

## 2025-05-30 PROCEDURE — 1100000001 HC PRIVATE ROOM DAILY

## 2025-05-30 PROCEDURE — 96367 TX/PROPH/DG ADDL SEQ IV INF: CPT

## 2025-05-30 PROCEDURE — 2500000004 HC RX 250 GENERAL PHARMACY W/ HCPCS (ALT 636 FOR OP/ED): Mod: JZ | Performed by: PHYSICIAN ASSISTANT

## 2025-05-30 PROCEDURE — 86140 C-REACTIVE PROTEIN: CPT

## 2025-05-30 PROCEDURE — 73630 X-RAY EXAM OF FOOT: CPT | Mod: RT

## 2025-05-30 RX ORDER — VANCOMYCIN HYDROCHLORIDE 1 G/20ML
INJECTION, POWDER, LYOPHILIZED, FOR SOLUTION INTRAVENOUS DAILY PRN
Status: DISCONTINUED | OUTPATIENT
Start: 2025-05-30 | End: 2025-06-05 | Stop reason: HOSPADM

## 2025-05-30 RX ORDER — ONDANSETRON HYDROCHLORIDE 2 MG/ML
4 INJECTION, SOLUTION INTRAVENOUS EVERY 8 HOURS PRN
Status: DISCONTINUED | OUTPATIENT
Start: 2025-05-30 | End: 2025-06-05 | Stop reason: HOSPADM

## 2025-05-30 RX ORDER — ONDANSETRON 4 MG/1
4 TABLET, ORALLY DISINTEGRATING ORAL EVERY 8 HOURS PRN
Status: DISCONTINUED | OUTPATIENT
Start: 2025-05-30 | End: 2025-06-05 | Stop reason: HOSPADM

## 2025-05-30 RX ORDER — VANCOMYCIN 2 GRAM/500 ML IN 0.9 % SODIUM CHLORIDE INTRAVENOUS
2 ONCE
Status: COMPLETED | OUTPATIENT
Start: 2025-05-30 | End: 2025-05-30

## 2025-05-30 RX ORDER — VANCOMYCIN/0.9 % SOD CHLORIDE 1.5G/250ML
1500 PLASTIC BAG, INJECTION (ML) INTRAVENOUS EVERY 24 HOURS
Status: DISCONTINUED | OUTPATIENT
Start: 2025-05-31 | End: 2025-06-04

## 2025-05-30 RX ORDER — POLYETHYLENE GLYCOL 3350 17 G/17G
17 POWDER, FOR SOLUTION ORAL DAILY
Status: DISCONTINUED | OUTPATIENT
Start: 2025-05-30 | End: 2025-06-05 | Stop reason: HOSPADM

## 2025-05-30 RX ORDER — ENOXAPARIN SODIUM 100 MG/ML
40 INJECTION SUBCUTANEOUS EVERY 24 HOURS
Status: DISCONTINUED | OUTPATIENT
Start: 2025-05-30 | End: 2025-06-05 | Stop reason: HOSPADM

## 2025-05-30 RX ORDER — SULFAMETHOXAZOLE AND TRIMETHOPRIM 800; 160 MG/1; MG/1
1 TABLET ORAL 2 TIMES DAILY
COMMUNITY
Start: 2025-05-24 | End: 2025-06-05 | Stop reason: HOSPADM

## 2025-05-30 RX ORDER — IPRATROPIUM BROMIDE AND ALBUTEROL SULFATE 2.5; .5 MG/3ML; MG/3ML
3 SOLUTION RESPIRATORY (INHALATION)
Status: DISCONTINUED | OUTPATIENT
Start: 2025-05-30 | End: 2025-05-31

## 2025-05-30 RX ADMIN — PIPERACILLIN SODIUM AND TAZOBACTAM SODIUM 4.5 G: 4; .5 INJECTION, SOLUTION INTRAVENOUS at 23:19

## 2025-05-30 RX ADMIN — HYDROMORPHONE HYDROCHLORIDE 0.5 MG: 1 INJECTION, SOLUTION INTRAMUSCULAR; INTRAVENOUS; SUBCUTANEOUS at 23:11

## 2025-05-30 RX ADMIN — PIPERACILLIN SODIUM AND TAZOBACTAM SODIUM 4.5 G: 4; .5 INJECTION, SOLUTION INTRAVENOUS at 16:25

## 2025-05-30 RX ADMIN — Medication 4 L/MIN: at 16:30

## 2025-05-30 RX ADMIN — Medication 2 G: at 17:11

## 2025-05-30 SDOH — SOCIAL STABILITY: SOCIAL INSECURITY: HAVE YOU HAD ANY THOUGHTS OF HARMING ANYONE ELSE?: NO

## 2025-05-30 SDOH — SOCIAL STABILITY: SOCIAL INSECURITY: DO YOU FEEL UNSAFE GOING BACK TO THE PLACE WHERE YOU ARE LIVING?: NO

## 2025-05-30 SDOH — SOCIAL STABILITY: SOCIAL INSECURITY: ARE THERE ANY APPARENT SIGNS OF INJURIES/BEHAVIORS THAT COULD BE RELATED TO ABUSE/NEGLECT?: NO

## 2025-05-30 SDOH — SOCIAL STABILITY: SOCIAL INSECURITY: ARE YOU OR HAVE YOU BEEN THREATENED OR ABUSED PHYSICALLY, EMOTIONALLY, OR SEXUALLY BY ANYONE?: NO

## 2025-05-30 SDOH — SOCIAL STABILITY: SOCIAL INSECURITY: HAS ANYONE EVER THREATENED TO HURT YOUR FAMILY OR YOUR PETS?: NO

## 2025-05-30 SDOH — SOCIAL STABILITY: SOCIAL INSECURITY: DOES ANYONE TRY TO KEEP YOU FROM HAVING/CONTACTING OTHER FRIENDS OR DOING THINGS OUTSIDE YOUR HOME?: NO

## 2025-05-30 SDOH — SOCIAL STABILITY: SOCIAL INSECURITY: ABUSE: ADULT

## 2025-05-30 SDOH — SOCIAL STABILITY: SOCIAL INSECURITY: HAVE YOU HAD THOUGHTS OF HARMING ANYONE ELSE?: NO

## 2025-05-30 SDOH — SOCIAL STABILITY: SOCIAL INSECURITY: DO YOU FEEL ANYONE HAS EXPLOITED OR TAKEN ADVANTAGE OF YOU FINANCIALLY OR OF YOUR PERSONAL PROPERTY?: NO

## 2025-05-30 SDOH — SOCIAL STABILITY: SOCIAL INSECURITY: WERE YOU ABLE TO COMPLETE ALL THE BEHAVIORAL HEALTH SCREENINGS?: YES

## 2025-05-30 ASSESSMENT — PAIN - FUNCTIONAL ASSESSMENT
PAIN_FUNCTIONAL_ASSESSMENT: 0-10

## 2025-05-30 ASSESSMENT — COGNITIVE AND FUNCTIONAL STATUS - GENERAL
MOBILITY SCORE: 24
PATIENT BASELINE BEDBOUND: NO
DAILY ACTIVITIY SCORE: 24

## 2025-05-30 ASSESSMENT — ENCOUNTER SYMPTOMS
COLOR CHANGE: 1
CONSTIPATION: 0
DIARRHEA: 0
FEVER: 0
PALPITATIONS: 0
WHEEZING: 0
CHILLS: 0
NAUSEA: 0
HEMATURIA: 1
WOUND: 1
VOMITING: 0

## 2025-05-30 ASSESSMENT — ACTIVITIES OF DAILY LIVING (ADL)
FEEDING YOURSELF: INDEPENDENT
ASSISTIVE_DEVICE: WALKER;OXYGEN;EYEGLASSES
BATHING: INDEPENDENT
PATIENT'S MEMORY ADEQUATE TO SAFELY COMPLETE DAILY ACTIVITIES?: YES
HEARING - LEFT EAR: FUNCTIONAL
DRESSING YOURSELF: INDEPENDENT
WALKS IN HOME: NEEDS ASSISTANCE
GROOMING: INDEPENDENT
HEARING - RIGHT EAR: FUNCTIONAL
TOILETING: INDEPENDENT
ADEQUATE_TO_COMPLETE_ADL: YES
JUDGMENT_ADEQUATE_SAFELY_COMPLETE_DAILY_ACTIVITIES: YES
LACK_OF_TRANSPORTATION: NO

## 2025-05-30 ASSESSMENT — PAIN DESCRIPTION - LOCATION
LOCATION: FOOT
LOCATION: TOE (COMMENT WHICH ONE)

## 2025-05-30 ASSESSMENT — PAIN SCALES - GENERAL
PAINLEVEL_OUTOF10: 6
PAINLEVEL_OUTOF10: 7
PAINLEVEL_OUTOF10: 0 - NO PAIN
PAINLEVEL_OUTOF10: 0 - NO PAIN

## 2025-05-30 ASSESSMENT — LIFESTYLE VARIABLES
HOW OFTEN DO YOU HAVE 6 OR MORE DRINKS ON ONE OCCASION: NEVER
HAVE PEOPLE ANNOYED YOU BY CRITICIZING YOUR DRINKING: NO
HAVE YOU EVER FELT YOU SHOULD CUT DOWN ON YOUR DRINKING: NO
TOTAL SCORE: 0
HOW MANY STANDARD DRINKS CONTAINING ALCOHOL DO YOU HAVE ON A TYPICAL DAY: PATIENT DOES NOT DRINK
SKIP TO QUESTIONS 9-10: 1
AUDIT-C TOTAL SCORE: 0
AUDIT-C TOTAL SCORE: 0
HOW OFTEN DO YOU HAVE A DRINK CONTAINING ALCOHOL: NEVER
EVER HAD A DRINK FIRST THING IN THE MORNING TO STEADY YOUR NERVES TO GET RID OF A HANGOVER: NO
EVER FELT BAD OR GUILTY ABOUT YOUR DRINKING: NO

## 2025-05-30 ASSESSMENT — PAIN DESCRIPTION - ORIENTATION: ORIENTATION: RIGHT

## 2025-05-30 ASSESSMENT — PAIN DESCRIPTION - PAIN TYPE: TYPE: ACUTE PAIN

## 2025-05-30 ASSESSMENT — PAIN DESCRIPTION - DESCRIPTORS: DESCRIPTORS: BURNING

## 2025-05-30 ASSESSMENT — PATIENT HEALTH QUESTIONNAIRE - PHQ9
2. FEELING DOWN, DEPRESSED OR HOPELESS: NOT AT ALL
SUM OF ALL RESPONSES TO PHQ9 QUESTIONS 1 & 2: 0
1. LITTLE INTEREST OR PLEASURE IN DOING THINGS: NOT AT ALL

## 2025-05-30 NOTE — ED PROVIDER NOTES
Emergency Department Encounter  [address]    Patient: Angus Redman  MRN: [id number]  : 1955  Date of Evaluation: 2025  ED Provider: Dhara Campoverde PA-C        History of Present Illness     This is a 69-year-old male with  PMHx significant for HFpEF (EF 60-65% 2024), PSVT (on propanolol), HTN, COPD with chronic respiratory failure on 4 L NC at baseline, CAD, CKD 3, OA, and alcohol use disorder c/b severe alcoholic polyneuropathy who presents to the ED with concern for infection to his right lower extremity.  He states that he has had cultures positive for MRSA previously.  He states that he hide multiple toes amputated approximately 3 weeks ago.  He states that he is currently on Bactrim.  He states that he has been taking this as prescribed, however has noticed increasing redness, pain, and swelling to the right lower extremity.  He does endorse purulent drainage from his surgical wound.  He denies any fevers or chills.  He has been ambulating with his walker.  He also endorses multiple falls at home.  He denies any significant headache.  He denies any visual changes, weakness, paresthesias or areas of decrease sensation.  He denies anticoagulation use.  He denies any significant injury from any of his falls and has been able to ambulate with his walker since his falls.      History provided by:  Patient   used: No             Visit Vitals  /82 (BP Location: Left arm, Patient Position: Lying)   Pulse 64   Temp 36.4 °C (97.5 °F) (Temporal)   Resp 16   Ht 1.829 m (6')   Wt 113 kg (250 lb)   SpO2 96%   BMI 33.91 kg/m²   Smoking Status Former   BSA 2.4 m²          Physical Exam       Triage vitals:  T 36.4 °C (97.5 °F)  HR 90  /61  RR 20  O2 94 % Supplemental oxygen    Physical Exam     Physical exam:   General: Vitals noted, no distress. Afebrile.   EENT:  Hearing grossly intact. Normal phonation. MMM. Airway patient. PERRL. EOMI.   Neck: No  midline tenderness or paraspinal tenderness. FROM.   Cardiac: Regular, rate, rhythm. Normal S1 and S2.  No murmurs, gallops, rubs.   Pulmonary: Good air exchange. Lungs clear bilaterally. No wheezes, rhonchi, rales. No accessory muscle use.   Abdomen: Soft, nonsurgical. Nontender. No peritoneal signs.   Back: No CVA tenderness. No midline tenderness or paraspinal tenderness. No obvious deformity or step off.   Extremities: No peripheral edema.  Full range of motion. Moves all extremities freely.  Right midfoot amputation.  Significant erythema and excess warmth noted to the right lower extremity compared to left.  Wound to the distal aspect of the right lower extremity with purulent drainage.  DP and PT pulses 2+ bilaterally.  There is a wound to the foot on the plantar aspect without any surrounding erythema or excess warmth.  No drainage.  See photos of bilateral lower extremities below.    Skin: No rash. Warm and Dry.   Neuro: No focal neurologic deficits. CN 2-12 grossly intact. Sensation equal bilaterally. No weakness.                     Results       Labs Reviewed   CBC WITH AUTO DIFFERENTIAL - Abnormal       Result Value    WBC 10.4      nRBC 0.0      RBC 4.20 (*)     Hemoglobin 11.9 (*)     Hematocrit 37.5 (*)     MCV 89      MCH 28.3      MCHC 31.7 (*)     RDW 15.2 (*)     Platelets 221      Neutrophils % 80.4      Immature Granulocytes %, Automated 0.5      Lymphocytes % 8.5      Monocytes % 7.6      Eosinophils % 2.8      Basophils % 0.2      Neutrophils Absolute 8.40 (*)     Immature Granulocytes Absolute, Automated 0.05      Lymphocytes Absolute 0.89      Monocytes Absolute 0.79      Eosinophils Absolute 0.29      Basophils Absolute 0.02     COMPREHENSIVE METABOLIC PANEL - Abnormal    Glucose 118 (*)     Sodium 134 (*)     Potassium 4.6      Chloride 98      Bicarbonate 28      Anion Gap 13      Urea Nitrogen 26 (*)     Creatinine 1.73 (*)     eGFR 42 (*)     Calcium 9.0      Albumin 3.8      Alkaline  Phosphatase 104      Total Protein 7.7      AST 26      Bilirubin, Total 0.7      ALT 17     URINALYSIS WITH REFLEX CULTURE AND MICROSCOPIC - Abnormal    Color, Urine Light-Yellow      Appearance, Urine Clear      Specific Gravity, Urine 1.013      pH, Urine 6.0      Protein, Urine 30 (1+) (*)     Glucose, Urine 500 (3+) (*)     Blood, Urine NEGATIVE      Ketones, Urine NEGATIVE      Bilirubin, Urine NEGATIVE      Urobilinogen, Urine Normal      Nitrite, Urine NEGATIVE      Leukocyte Esterase, Urine NEGATIVE     C-REACTIVE PROTEIN - Abnormal    C-Reactive Protein 8.70 (*)    SEDIMENTATION RATE, AUTOMATED - Abnormal    Sedimentation Rate 100 (*)    RENAL FUNCTION PANEL - Abnormal    Glucose 118 (*)     Sodium 133 (*)     Potassium 4.6      Chloride 98      Bicarbonate 26      Anion Gap 14      Urea Nitrogen 25 (*)     Creatinine 1.64 (*)     eGFR 44 (*)     Calcium 9.1      Phosphorus 3.1      Albumin 3.8     LACTATE - Normal    Lactate 0.8      Narrative:     Venipuncture immediately after or during the administration of Metamizole may lead to falsely low results. Testing should be performed immediately prior to Metamizole dosing.   MAGNESIUM - Normal    Magnesium 2.35     URINALYSIS MICROSCOPIC WITH REFLEX CULTURE - Normal    WBC, Urine 1-5      RBC, Urine NONE     BLOOD CULTURE   BLOOD CULTURE   URINALYSIS WITH REFLEX CULTURE AND MICROSCOPIC    Narrative:     The following orders were created for panel order Urinalysis with Reflex Culture and Microscopic.  Procedure                               Abnormality         Status                     ---------                               -----------         ------                     Urinalysis with Reflex C...[[id number]]  Abnormal            Final result               Extra Urine Gray Tube[[id number]]                            In process                   Please view results for these tests on the individual orders.   EXTRA URINE GRAY TUBE   CBC       XR foot right 3+  views   Final Result   Status post 1st through 5th digit amputation with chronic fractures   and similar osseous irregularity of the 1st metatarsal head.   Overlying soft tissue irregularity, swelling and possible   subcutaneous air noted. MRI may be obtained as clinically indicated   for evaluation of osteomyelitis.             MACRO:   None        Signed by: Sharlene Quiñones 5/30/2025 7:16 PM   Dictation workstation:   [id number]      XR chest 1 view   Final Result   1. Calcific pleural plaque right hemithorax.   2. Right basilar density may be secondary to atelectasis and/or   infiltrate in this patient with scarring/atelectasis at the pulmonary   bases on recent CT.   3. Small effusions are difficult to exclude and are suspected.        MACRO:   none        Signed by: Jerry Rodriguez 5/30/2025 4:48 PM   Dictation workstation:   [id number]            Medical Decision Making & ED Course         ED Course & MDM     Medical Decision Making  This is a 69-year-old male with  PMHx significant for HFpEF (EF 60-65% 4/2024), PSVT (on propanolol), HTN, COPD with chronic respiratory failure on 4 L NC at baseline, CAD, CKD 3, OA, and alcohol use disorder c/b severe alcoholic polyneuropathy who presents to the ED with concern for cellulitis to his right lower extremity.  Vital stable upon arrival to the ED.  On examination patient does have significant erythema and excess warmth noted to the left lower extremity compared to right.  Neurovascular intact throughout.  There is some purulent drainage around the recent surgical wounds.  Laboratory studies obtained and showed normal WBC at 10.4.  CMP showed patient's creatinine 1.73 which is slightly elevated versus his baseline.  Fluids not started at this time CT patient's history of heart failure.  P.o. fluids encouraged.  Chest x-ray obtained which showed calcified pleural plaque on the right hemithorax as well as a right basilar density likely secondary to atelectasis versus  infiltrate.  Patient denied any chest pain or shortness of breath at this time.  I have low suspicion for acute pneumonia.  Patient was covered with broad-spectrum antibiotics for his suspected cellulitis.  X-ray of patient's right foot obtained.  Patient's x-ray of his foot did show soft tissue swelling as well as questionable air noted around patient's recent amputation site.  Based on patient's stable vitals as well as normal labs and physical examination I have lower suspicion for necrotizing infection at this time and higher suspicion for this being postoperative.  Patient discussed with admission team and was accepted for admission for further treatment of his cellulitis and for further workup to determine if patient has osteomyelitis at this time.  Patient had no further questions at time of admission.         Diagnoses as of 05/30/25 2219   Acute cellulitis           Independent Result Review and Interpretation: X-ray right foot status post amputation with soft tissue swelling, area noted around patient's wounds, likely due to the wound being open at this time,     The patient was discussed with the following consultants/services: Hospitalist/Admitting Provider who accepted the patient for admission        Disposition   As a result of their workup, the patient will require admission to the hospital.  The patient was informed of his diagnosis.  The patient was given the opportunity to ask questions and I answered them. The patient agreed to be admitted to the hospital.    Procedures     This was a shared visit with an ED attending.  The patient was seen and discussed with the ED attending    Procedures    Dhara Campoverde PA-C  Emergency Medicine      Dhara Campoverde PA-C  05/30/25 221

## 2025-05-30 NOTE — H&P
Internal Medicine    Admission H&P      Patient Angus Redman PCP Artie Haskins MD    MRN [id number]  Admission Date 5/30/2025      Chief Complaint/Reason for Admission:  Angus is a 69 y.o. male who presented today with wound check of his right lower extremity.    Subjective    Subjective   History of Presenting Illness  Mr. Angus Redman is a 69 y.o. male with a primary complaint of increased level of pain in the right lower extremity. PMHx HFpEF (EF 60-65% 4/2024), PSVT (on propanolol), HTN, COPD with chronic respiratory failure on 4 L NC at baseline, CAD, CKD 3, OA, and alcohol use disorder c/b severe alcoholic polyneuropathy.  He was instructed that he should go to the emergency department for a more thorough evaluation of his right lower extremity when he was at the wound clinic today.  The patient has been experiencing increased levels of pain up to his knee on the right lower extremity in relation to this increasing redness that he has been developing as well.  His legs are always swelling and he takes medication for the swelling but he feels like there is even some increased levels of swelling on the right lower extremity.  He has not noted any purulence from the wounds, he has been taking Bactrim for treating the infection on his right lower extremity.  The patient has been experiencing increased levels of falling while at his house as well, he is post to go and see his podiatrist on Wednesday but due to a fall he was not able to see his podiatrist.  The patient had multiple digits on his right lower extremity amputated approximately 3 weeks ago, he has also previously tested positive for MRSA and has been treated for osteomyelitis before.  The patient is experiencing these increased levels of falls but he is also still able to ambulate on his rollator with increased levels of pain on the right lower extremity.  The patient does not have any complaints of fever, chills, nausea, vomiting, chest pain, or  shortness of breath.  The patient says that he has been urinating blood the day before but he has not urinated blood today.  The patient is currently experiencing an 8 out of 10 on the pain scale and the medications he is taking at home are not giving him any relief.  He has already been experiencing polyneuropathy on all 4 of his distal extremities and this has not changed with this recent episode of increased redness and swelling of his right lower extremity.    ED course:  V/S:  /67   Pulse 68   Temp 36.4 °C (97.5 °F) (Temporal)   Resp 18   Ht 1.829 m (6')   Wt 113 kg (250 lb)   SpO2 97%   BMI 33.91 kg/m²   Smoking Status Former   BSA 2.4 m²     Labs: CBC, CMP, magnesium, lactate, CRP, ESR  EKG: No EKG provided  Imaging: X-ray foot, x-ray chest  Intervention: Patient given Zosyn, vancomycin    Past Medical History  HFpEF (EF 60-65% 4/2024), PSVT (on propanolol), HTN, COPD with chronic respiratory failure on 4 L NC at baseline, CAD, CKD 3, OA, and alcohol use disorder c/b severe alcoholic polyneuropathy    Past Surgical History   Surgical removal of right lower extremity digits    Social History  Lives at home, no family nearby, previous smoker, previous drinker, previous use of illicit drugs    Family History  Reviewed and noncontributory to today's presentation unless otherwise noted.    Allergies:  As documented in EMR were reviewed and discussed with patient.      CODE STATUS: DNR and No Intubation and No ICU        Home Medication:  Prior to Admission medications    Medication Sig Start Date End Date Taking? Authorizing Provider   amLODIPine (Norvasc) 2.5 mg tablet Take 1 tablet (2.5 mg) by mouth once daily. For blood pressure 4/8/25  Yes Angel Harry, DO   aspirin 81 mg chewable tablet Chew 1 tablet (81 mg) once daily. 4/8/25  Yes Angel Harry DO   atorvastatin (Lipitor) 40 mg tablet Take 1 tablet (40 mg) by mouth once daily at bedtime. 4/8/25  Yes Angel Harry, DO   busPIRone  (Buspar) 5 mg tablet Take 1 tablet by mouth three times daily as needed for anxiety. 3/3/25  Yes Artie Haskins MD   cyanocobalamin (Vitamin B-12) 1,000 mcg tablet Take 1 tablet by mouth once daily as directed. 5/2/25  Yes Artie Haskins MD   DULoxetine (Cymbalta) 60 mg DR capsule Take 1 capsule (60 mg) by mouth 2 times a day. 3/12/25  Yes MOISES Kuo   empagliflozin (Jardiance) 10 mg tablet Take 1 tablet (10 mg) by mouth once daily. 4/8/25 4/8/26 Yes Angel Harry DO   escitalopram (Lexapro) 20 mg tablet Take 1 tablet by mouth once daily. 2/19/25  Yes Artie Haskins MD   folic acid (Folvite) 1 mg tablet Take 1 tablet by mouth daily for folate deficiency. 3/15/25  Yes Artie Haskins MD   levothyroxine (Synthroid, Levoxyl) 50 mcg tablet Take 1 tablet by mouth once daily in the morning before meals. 11/27/24  Yes Artie Haskins MD   mirtazapine (Remeron) 15 mg tablet Take 1 tablet by mouth once daily at bedtime. 12/19/24  Yes Artie Haskins MD   montelukast (Singulair) 10 mg tablet Take 1 tablet by mouth once daily at bedtime. 9/12/24  Yes Artie Haskins MD   pantoprazole (ProtoNix) 40 mg EC tablet Take 1 tablet (40 mg) by mouth 2 times a day. 8/16/24  Yes Artie Haskins MD   pregabalin (Lyrica) 150 mg capsule Take 1 capsule (150 mg) by mouth every 6 hours. 4/11/25 7/10/25 Yes MOISES Kuo   propranolol (Inderal) 10 mg tablet Take 1 tablet (10 mg) by mouth 2 times a day. 8/30/24  Yes Artie Haskins MD   spironolactone (Aldactone) 25 mg tablet Take 1 tablet (25 mg) by mouth once daily. 4/8/25 4/8/26 Yes Angel Harry DO   sulfamethoxazole-trimethoprim (Bactrim DS) 800-160 mg tablet Take 1 tablet by mouth 2 times a day. 5/24/25 6/2/25 Yes Historical Provider, MD   tamsulosin (Flomax) 0.4 mg 24 hr capsule Take 1 capsule by mouth once daily at bedtime. 1/23/25  Yes Artie Haskins MD   torsemide (Demadex) 20 mg tablet Take 1 tablet (20 mg) by mouth 3 times a week.  Patient taking differently: Take 1  tablet (20 mg) by mouth once daily. 4/9/25 4/9/26 Yes Angel Harry DO   albuterol 2.5 mg /3 mL (0.083 %) nebulizer solution Take 3 mL (2.5 mg) by nebulization every 6 hours if needed for wheezing. 10/8/24 10/8/25  Artie Haskins MD   alendronate (Fosamax) 70 mg tablet Take 1 tablet by mouth every 7 days. Take in the morning with a full glass of water at least 30 minutes before first food, drink, or medications of the day.  Patient taking differently: Take 1 tablet (70 mg) by mouth every 7 days. On Mondays 9/12/24   Artie Haskins MD   ergocalciferol (Vitamin D-2) 1.25 MG (74165 UT) capsule Take 1 capsule (1,250 mcg) by mouth 1 (one) time per week. 10/8/24   Artie Haskins MD   ferrous sulfate, 325 mg ferrous sulfate, (FeroSuL) tablet Take 1 tablet by mouth once daily with breakfast. 9/12/24   Artie Haskins MD   fluticasone-umeclidin-vilanter (Trelegy Ellipta) 200-62.5-25 mcg blister with device Inhale 1 puff once daily.    Historical Provider, MD   ipratropium (Atrovent) 0.02 % nebulizer solution Take 2.5 mL (0.5 mg) by nebulization 4 times a day as needed for shortness of breath or wheezing. 5/5/25   Ana Maria Doty DO   mucus clearing device device Acapella device. 4/17/25   SHIRA Mcarthur-CNP          Review of System:  Review of Systems   Constitutional:  Negative for chills and fever.   Respiratory:  Negative for wheezing.    Cardiovascular:  Positive for leg swelling. Negative for chest pain and palpitations.   Gastrointestinal:  Negative for constipation, diarrhea, nausea and vomiting.   Genitourinary:  Positive for hematuria.   Skin:  Positive for color change and wound. Negative for rash.       Objective    Objective   Vital Signs:  Visit Vitals  /55 (BP Location: Left arm, Patient Position: Lying)   Pulse 74   Temp 36.1 °C (97 °F) (Temporal)   Resp 18   Ht 1.829 m (6')   Wt 118 kg (260 lb 8 oz)   SpO2 (!) 87% Comment: placed back on O2  post tx   BMI 35.33 kg/m²   Smoking Status  Former   BSA 2.45 m²        Physical Examination:  General: Patient does not appear to be in any acute distress, alert, awake  Head: Normocephalic, Atraumatic   Eyes: Normal external exam, EOMI  ENT: Normal external inspection of ears and nose. Oropharynx normal.  Cardiovascular: RRR, S1/S2, no murmurs, rubs, or gallops, radial pulses +2  Pulmonary: Diffuse wheezing, no respiratory distress.  Abdomen: +BS, soft, non-tender, nondistended, no guarding or rebound, no masses noted  Neuro: A&O x3, CN intact, no focal deficits, strength intact bilaterally, sensation not intact in the extremities due to polyneuropathy which is chronic for this patient  MSK: No joint swelling, normal movements of all extremities. Passive ROM intact  Extremities: 2+ pitting edema appreciated in lower extremities bilaterally, no cyanosis  Skin- Warm. Dry.  Erythema of the right lower extremity with multiple wounds and healing over wounds over the previous surgical sites, no purulence.  Visual detail of the wounds provided in the media.  Psychiatric: Judgment intact. Appropriate mood and behavior      Laboratory Data:  Results for orders placed or performed during the hospital encounter of 05/30/25 (from the past 24 hours)   CBC with Differential   Result Value Ref Range    WBC 10.4 4.4 - 11.3 x10*3/uL    nRBC 0.0 0.0 - 0.0 /100 WBCs    RBC 4.20 (L) 4.50 - 5.90 x10*6/uL    Hemoglobin 11.9 (L) 13.5 - 17.5 g/dL    Hematocrit 37.5 (L) 41.0 - 52.0 %    MCV 89 80 - 100 fL    MCH 28.3 26.0 - 34.0 pg    MCHC 31.7 (L) 32.0 - 36.0 g/dL    RDW 15.2 (H) 11.5 - 14.5 %    Platelets 221 150 - 450 x10*3/uL    Neutrophils % 80.4 40.0 - 80.0 %    Immature Granulocytes %, Automated 0.5 0.0 - 0.9 %    Lymphocytes % 8.5 13.0 - 44.0 %    Monocytes % 7.6 2.0 - 10.0 %    Eosinophils % 2.8 0.0 - 6.0 %    Basophils % 0.2 0.0 - 2.0 %    Neutrophils Absolute 8.40 (H) 1.60 - 5.50 x10*3/uL    Immature Granulocytes Absolute, Automated 0.05 0.00 - 0.50 x10*3/uL     Lymphocytes Absolute 0.89 0.80 - 3.00 x10*3/uL    Monocytes Absolute 0.79 0.05 - 0.80 x10*3/uL    Eosinophils Absolute 0.29 0.00 - 0.40 x10*3/uL    Basophils Absolute 0.02 0.00 - 0.10 x10*3/uL   Comprehensive Metabolic Panel   Result Value Ref Range    Glucose 118 (H) 74 - 99 mg/dL    Sodium 134 (L) 136 - 145 mmol/L    Potassium 4.6 3.5 - 5.3 mmol/L    Chloride 98 98 - 107 mmol/L    Bicarbonate 28 21 - 32 mmol/L    Anion Gap 13 10 - 20 mmol/L    Urea Nitrogen 26 (H) 6 - 23 mg/dL    Creatinine 1.73 (H) 0.50 - 1.30 mg/dL    eGFR 42 (L) >60 mL/min/1.73m*2    Calcium 9.0 8.6 - 10.3 mg/dL    Albumin 3.8 3.4 - 5.0 g/dL    Alkaline Phosphatase 104 33 - 136 U/L    Total Protein 7.7 6.4 - 8.2 g/dL    AST 26 9 - 39 U/L    Bilirubin, Total 0.7 0.0 - 1.2 mg/dL    ALT 17 10 - 52 U/L   C-reactive protein   Result Value Ref Range    C-Reactive Protein 8.70 (H) <1.00 mg/dL   Sedimentation Rate   Result Value Ref Range    Sedimentation Rate 100 (H) 0 - 20 mm/h   Renal function panel   Result Value Ref Range    Glucose 118 (H) 74 - 99 mg/dL    Sodium 133 (L) 136 - 145 mmol/L    Potassium 4.6 3.5 - 5.3 mmol/L    Chloride 98 98 - 107 mmol/L    Bicarbonate 26 21 - 32 mmol/L    Anion Gap 14 10 - 20 mmol/L    Urea Nitrogen 25 (H) 6 - 23 mg/dL    Creatinine 1.64 (H) 0.50 - 1.30 mg/dL    eGFR 44 (L) >60 mL/min/1.73m*2    Calcium 9.1 8.6 - 10.3 mg/dL    Phosphorus 3.1 2.5 - 4.9 mg/dL    Albumin 3.8 3.4 - 5.0 g/dL   Magnesium   Result Value Ref Range    Magnesium 2.35 1.60 - 2.40 mg/dL   Lactate   Result Value Ref Range    Lactate 0.8 0.4 - 2.0 mmol/L   Blood Culture    Specimen: Peripheral Venipuncture; Blood culture   Result Value Ref Range    Blood Culture Loaded on Instrument - Culture in progress    Blood Culture    Specimen: Peripheral Venipuncture; Blood culture   Result Value Ref Range    Blood Culture Loaded on Instrument - Culture in progress    Urinalysis with Reflex Culture and Microscopic   Result Value Ref Range    Color, Urine  Light-Yellow Light-Yellow, Yellow, Dark-Yellow    Appearance, Urine Clear Clear    Specific Gravity, Urine 1.013 1.005 - 1.035    pH, Urine 6.0 5.0, 5.5, 6.0, 6.5, 7.0, 7.5, 8.0    Protein, Urine 30 (1+) (A) NEGATIVE, 10 (TRACE), 20 (TRACE) mg/dL    Glucose, Urine 500 (3+) (A) Normal mg/dL    Blood, Urine NEGATIVE NEGATIVE mg/dL    Ketones, Urine NEGATIVE NEGATIVE mg/dL    Bilirubin, Urine NEGATIVE NEGATIVE mg/dL    Urobilinogen, Urine Normal Normal mg/dL    Nitrite, Urine NEGATIVE NEGATIVE    Leukocyte Esterase, Urine NEGATIVE NEGATIVE   Urinalysis Microscopic   Result Value Ref Range    WBC, Urine 1-5 1-5, NONE /HPF    RBC, Urine NONE NONE, 1-2, 3-5 /HPF       Imaging:  Imaging  XR foot right 3+ views  Result Date: 5/30/2025  Status post 1st through 5th digit amputation with chronic fractures and similar osseous irregularity of the 1st metatarsal head. Overlying soft tissue irregularity, swelling and possible subcutaneous air noted. MRI may be obtained as clinically indicated for evaluation of osteomyelitis.     MACRO: None   Signed by: Sharlene Quiñones 5/30/2025 7:16 PM Dictation workstation:   [id number]    XR chest 1 view  Result Date: 5/30/2025  1. Calcific pleural plaque right hemithorax. 2. Right basilar density may be secondary to atelectasis and/or infiltrate in this patient with scarring/atelectasis at the pulmonary bases on recent CT. 3. Small effusions are difficult to exclude and are suspected.   MACRO: none   Signed by: Jerry Rodriguez 5/30/2025 4:48 PM Dictation workstation:   [id number]      Cardiology, Vascular, and Other Imaging  No other imaging results found for the past 2 days       Medications:  Scheduled medications  Scheduled Medications[1]  Continuous medications  Continuous Medications[2]  PRN medications  PRN Medications[3]    Assessment    Assessment & Plan   Mr. Angus Redman is a 69 y.o. male who presents with acute cellulitis and pain with ambulation.  The patient has a history of  HFpEF (EF 60-65% 4/2024), PSVT (on propanolol), HTN, COPD with chronic respiratory failure on 4 L NC at baseline, CAD, CKD 3, OA, and alcohol use disorder c/b severe alcoholic polyneuropathy.  Patient presents to the emergency department with erythematous and swollen right lower extremity, no purulence is draining from the surgical sites where the digits were amputated.  The patient's labs on admission combined with signs and symptoms the patient exhibiting given no concern for major systemic infection stemming from the right lower extremity, creatinine levels are slightly higher than normal creatinine levels.  The antibiotics were started appropriately for this patient and will be continued with his admission.  The patient is able to tolerate oral liquids but he will be placed n.p.o. for when he has a discussion with podiatry, podiatry is on consult for this patient.  The patient will receive supportive care and management of his chronic pathologies in combination with the acute cellulitis and osteomyelitis will be performed.    Assessment & Plan  Acute cellulitis         # Acute cellulitis  - Zosyn 4.5 g every 8 hours  - Vancomycin    #Osteomyelitis  -Zosyn 4.5 g every 8 hours  - Vancomycin  - Evidence seen of osteomyelitis on x-ray  - ESR and CRP elevated    #Paroxysmal atrial fibrillation  #Hyperlipidemia  #Hypertension  #COPD  - DuoNebs as needed    #Chronic kidney disease  #Heart failure    - Complete home med rec's  - Dilaudid for pain as needed  - Zofran for nausea as needed  - Morning labs CBC, magnesium, RFP      Diet: Regular, n.p.o. at midnight  DVT prophylaxis: Lovenox  Telemetry: Not indicated  Consults: Podiatry, PT, OT,       Code Status: DNR and No Intubation and No ICU       Dispo: Will remain in the hospital    Case seen and discussed with attending  Raymundo Roberts DO  PGY-1 Emergency Medicine  This note has been transcribed using Dragon voice recognition system and there is a  possibility of unintentional typing misprints.  Any information found to be copied from previous providers is done in the best interest of the patient to provide accurate, quality, and continuity of care.    Senior Resident Addendum:  Patient seen and examined independently of intern resident.     Mr Angus Redman is a very pleasant 68yo gentleman with PMHx of recent admission for osteomyelitis s/p amputation 1st, 4th and 5th toes, Recurrent syncope secondary to orthostatic hypotension/autonomic dysfunction/autonomic neuropathy, restrictive lung disease on 4 to 5 L nasal cannula at baseline, PSVT on propranolol, CAD w/ CCTA (02/2024) - 50% stenosis of LAD - 25-50% stenosis of RCA, alcohol use disorder c/b severe alcoholic polyneuropathy with autonomic dysfunction who presents for right lower extremity palpated swelling as well as purulent drainage from surgical wound.  No fevers or chills.  Has been on Bactrim for the last several days and recently finished a course of doxycycline and Augmentin x14d after recent discharge 5/5/2025 in which he underwent amputation of right 1st, 4th and 5th toe.  Discharged to SNF however left AMA within 24 hours. OR pathology returned back with gangrenous necrosis with ulceration/wound and include acute inflammatory exudate reaching the deep subcutaneous tissues.  Does have a history of MRSA positive wound culture January 2025.  He does state he has been ambulating on his feet despite being told not to and was unable to attend his wound care center appointment this past Wednesday.  Last seen in the wound care center 5/14.  Also states has had multiple falls at home due to deconditioning and denies hitting head.    In the ED he was hemodynamically stable.  On physical exam, nontoxic appearing elderly male.  RRR, no murmurs.  CTA.  No focal deficits.  Right lower extremity with erythema, warmth to above the knee.  Right lower extremity foot wound with obvious purulent drainage.  See  media.  +2 DP and PT pulses bilaterally. labs revealing MARIA L with creatinine of 1.73 (baseline 1.3-1.4), likely falsely elevated in setting of Bactrim use, stable chronic anemia and no leukocytosis.  Lactate 0.8.  CRP 8.7 and ESR of 100.  Foot x-rays without obvious osteomyelitis, revealing chronic fractures.  X-ray does note possible subcutaneous air however has had recent surgical instrumentation.  I suspicion for necrotizing infection is low given compartments are soft and patient is not in excruciating pain.    Podiatry, Mr Bull was made aware via [facility] of patient's admission for cellulitis in setting of surgical site infection. Will see patient in morning and decide if further surgical intevention/MRI is needed. Vanc, Zosyn continued and blood cultures pending.  Home meds of torsemide, Aldactone held due to MARIA L, Jardiance held due to possible procedure.    -Letty Reynolds D.O. PGY-3         [1] amLODIPine, 2.5 mg, oral, Daily  aspirin, 81 mg, oral, Daily  atorvastatin, 40 mg, oral, Nightly  budesonide, 0.25 mg, nebulization, BID   And  ipratropium-albuteroL, 3 mL, nebulization, 4x daily  busPIRone, 5 mg, oral, TID  DULoxetine, 60 mg, oral, BID  [Held by provider] empagliflozin, 10 mg, oral, Daily  enoxaparin, 40 mg, subcutaneous, q24h  escitalopram, 20 mg, oral, Daily  ferrous sulfate, 1 tablet, oral, Daily with breakfast  levothyroxine, 50 mcg, oral, Daily  mirtazapine, 15 mg, oral, Nightly  montelukast, 10 mg, oral, Nightly  pantoprazole, 40 mg, oral, BID AC  piperacillin-tazobactam, 4.5 g, intravenous, q8h  polyethylene glycol, 17 g, oral, Daily  pregabalin, 150 mg, oral, TID  propranolol, 10 mg, oral, BID  [Held by provider] spironolactone, 25 mg, oral, Daily  tamsulosin, 0.4 mg, oral, Daily  [Held by provider] torsemide, 20 mg, oral, Daily  vancomycin, 1,500 mg, intravenous, q24h     [2]    [3] PRN medications: HYDROmorphone, ondansetron ODT **OR** ondansetron, oxygen, vancomycin

## 2025-05-31 LAB
ALBUMIN SERPL BCP-MCNC: 3.1 G/DL (ref 3.4–5)
ANION GAP SERPL CALC-SCNC: 11 MMOL/L (ref 10–20)
BASOPHILS # BLD AUTO: 0.02 X10*3/UL (ref 0–0.1)
BASOPHILS NFR BLD AUTO: 0.4 %
BUN SERPL-MCNC: 19 MG/DL (ref 6–23)
CALCIUM SERPL-MCNC: 8.4 MG/DL (ref 8.6–10.3)
CHLORIDE SERPL-SCNC: 102 MMOL/L (ref 98–107)
CO2 SERPL-SCNC: 29 MMOL/L (ref 21–32)
CREAT SERPL-MCNC: 1.41 MG/DL (ref 0.5–1.3)
EGFRCR SERPLBLD CKD-EPI 2021: 53 ML/MIN/1.73M*2
EOSINOPHIL # BLD AUTO: 0.43 X10*3/UL (ref 0–0.4)
EOSINOPHIL NFR BLD AUTO: 7.6 %
ERYTHROCYTE [DISTWIDTH] IN BLOOD BY AUTOMATED COUNT: 15.4 % (ref 11.5–14.5)
GLUCOSE SERPL-MCNC: 98 MG/DL (ref 74–99)
HCT VFR BLD AUTO: 33 % (ref 41–52)
HGB BLD-MCNC: 10.3 G/DL (ref 13.5–17.5)
HOLD SPECIMEN: NORMAL
IMM GRANULOCYTES # BLD AUTO: 0.04 X10*3/UL (ref 0–0.5)
IMM GRANULOCYTES NFR BLD AUTO: 0.7 % (ref 0–0.9)
LYMPHOCYTES # BLD AUTO: 0.98 X10*3/UL (ref 0.8–3)
LYMPHOCYTES NFR BLD AUTO: 17.4 %
MAGNESIUM SERPL-MCNC: 2.4 MG/DL (ref 1.6–2.4)
MCH RBC QN AUTO: 28.3 PG (ref 26–34)
MCHC RBC AUTO-ENTMCNC: 31.2 G/DL (ref 32–36)
MCV RBC AUTO: 91 FL (ref 80–100)
MONOCYTES # BLD AUTO: 0.7 X10*3/UL (ref 0.05–0.8)
MONOCYTES NFR BLD AUTO: 12.4 %
NEUTROPHILS # BLD AUTO: 3.47 X10*3/UL (ref 1.6–5.5)
NEUTROPHILS NFR BLD AUTO: 61.5 %
NRBC BLD-RTO: 0 /100 WBCS (ref 0–0)
PHOSPHATE SERPL-MCNC: 4.4 MG/DL (ref 2.5–4.9)
PLATELET # BLD AUTO: 175 X10*3/UL (ref 150–450)
POTASSIUM SERPL-SCNC: 3.6 MMOL/L (ref 3.5–5.3)
RBC # BLD AUTO: 3.64 X10*6/UL (ref 4.5–5.9)
SODIUM SERPL-SCNC: 138 MMOL/L (ref 136–145)
WBC # BLD AUTO: 5.6 X10*3/UL (ref 4.4–11.3)

## 2025-05-31 PROCEDURE — 2500000002 HC RX 250 W HCPCS SELF ADMINISTERED DRUGS (ALT 637 FOR MEDICARE OP, ALT 636 FOR OP/ED)

## 2025-05-31 PROCEDURE — 99223 1ST HOSP IP/OBS HIGH 75: CPT

## 2025-05-31 PROCEDURE — 2500000004 HC RX 250 GENERAL PHARMACY W/ HCPCS (ALT 636 FOR OP/ED): Mod: JZ

## 2025-05-31 PROCEDURE — 80069 RENAL FUNCTION PANEL: CPT

## 2025-05-31 PROCEDURE — 2500000002 HC RX 250 W HCPCS SELF ADMINISTERED DRUGS (ALT 637 FOR MEDICARE OP, ALT 636 FOR OP/ED): Performed by: STUDENT IN AN ORGANIZED HEALTH CARE EDUCATION/TRAINING PROGRAM

## 2025-05-31 PROCEDURE — 2500000001 HC RX 250 WO HCPCS SELF ADMINISTERED DRUGS (ALT 637 FOR MEDICARE OP)

## 2025-05-31 PROCEDURE — 83735 ASSAY OF MAGNESIUM: CPT

## 2025-05-31 PROCEDURE — 1100000001 HC PRIVATE ROOM DAILY

## 2025-05-31 PROCEDURE — 85025 COMPLETE CBC W/AUTO DIFF WBC: CPT

## 2025-05-31 PROCEDURE — 94640 AIRWAY INHALATION TREATMENT: CPT

## 2025-05-31 PROCEDURE — 36415 COLL VENOUS BLD VENIPUNCTURE: CPT

## 2025-05-31 PROCEDURE — 2500000005 HC RX 250 GENERAL PHARMACY W/O HCPCS: Performed by: EMERGENCY MEDICINE

## 2025-05-31 PROCEDURE — 2500000004 HC RX 250 GENERAL PHARMACY W/ HCPCS (ALT 636 FOR OP/ED): Mod: JZ | Performed by: STUDENT IN AN ORGANIZED HEALTH CARE EDUCATION/TRAINING PROGRAM

## 2025-05-31 PROCEDURE — 87077 CULTURE AEROBIC IDENTIFY: CPT | Mod: STJLAB | Performed by: STUDENT IN AN ORGANIZED HEALTH CARE EDUCATION/TRAINING PROGRAM

## 2025-05-31 PROCEDURE — 0JBQ0ZZ EXCISION OF RIGHT FOOT SUBCUTANEOUS TISSUE AND FASCIA, OPEN APPROACH: ICD-10-PCS | Performed by: STUDENT IN AN ORGANIZED HEALTH CARE EDUCATION/TRAINING PROGRAM

## 2025-05-31 PROCEDURE — 97161 PT EVAL LOW COMPLEX 20 MIN: CPT | Mod: GP

## 2025-05-31 PROCEDURE — 2500000004 HC RX 250 GENERAL PHARMACY W/ HCPCS (ALT 636 FOR OP/ED): Mod: JZ | Performed by: INTERNAL MEDICINE

## 2025-05-31 RX ORDER — BUSPIRONE HYDROCHLORIDE 5 MG/1
5 TABLET ORAL 3 TIMES DAILY
Status: DISCONTINUED | OUTPATIENT
Start: 2025-05-31 | End: 2025-06-05 | Stop reason: HOSPADM

## 2025-05-31 RX ORDER — ATORVASTATIN CALCIUM 40 MG/1
40 TABLET, FILM COATED ORAL NIGHTLY
Status: DISCONTINUED | OUTPATIENT
Start: 2025-05-31 | End: 2025-06-05 | Stop reason: HOSPADM

## 2025-05-31 RX ORDER — DULOXETIN HYDROCHLORIDE 60 MG/1
60 CAPSULE, DELAYED RELEASE ORAL 2 TIMES DAILY
Status: DISCONTINUED | OUTPATIENT
Start: 2025-05-31 | End: 2025-06-05 | Stop reason: HOSPADM

## 2025-05-31 RX ORDER — PANTOPRAZOLE SODIUM 40 MG/1
40 TABLET, DELAYED RELEASE ORAL
Status: DISCONTINUED | OUTPATIENT
Start: 2025-05-31 | End: 2025-06-05 | Stop reason: HOSPADM

## 2025-05-31 RX ORDER — TORSEMIDE 20 MG/1
20 TABLET ORAL DAILY
Status: DISCONTINUED | OUTPATIENT
Start: 2025-05-31 | End: 2025-06-05 | Stop reason: HOSPADM

## 2025-05-31 RX ORDER — IPRATROPIUM BROMIDE AND ALBUTEROL SULFATE 2.5; .5 MG/3ML; MG/3ML
3 SOLUTION RESPIRATORY (INHALATION)
Status: DISCONTINUED | OUTPATIENT
Start: 2025-05-31 | End: 2025-06-05 | Stop reason: HOSPADM

## 2025-05-31 RX ORDER — SPIRONOLACTONE 25 MG/1
25 TABLET ORAL DAILY
Status: DISCONTINUED | OUTPATIENT
Start: 2025-05-31 | End: 2025-06-05 | Stop reason: HOSPADM

## 2025-05-31 RX ORDER — NAPROXEN SODIUM 220 MG/1
81 TABLET, FILM COATED ORAL DAILY
Status: DISCONTINUED | OUTPATIENT
Start: 2025-05-31 | End: 2025-06-05 | Stop reason: HOSPADM

## 2025-05-31 RX ORDER — TAMSULOSIN HYDROCHLORIDE 0.4 MG/1
0.4 CAPSULE ORAL DAILY
Status: DISCONTINUED | OUTPATIENT
Start: 2025-05-31 | End: 2025-06-05 | Stop reason: HOSPADM

## 2025-05-31 RX ORDER — MONTELUKAST SODIUM 10 MG/1
10 TABLET ORAL NIGHTLY
Status: DISCONTINUED | OUTPATIENT
Start: 2025-05-31 | End: 2025-06-05 | Stop reason: HOSPADM

## 2025-05-31 RX ORDER — PREGABALIN 150 MG/1
150 CAPSULE ORAL 3 TIMES DAILY
Status: DISCONTINUED | OUTPATIENT
Start: 2025-05-31 | End: 2025-06-05 | Stop reason: HOSPADM

## 2025-05-31 RX ORDER — MIRTAZAPINE 15 MG/1
15 TABLET, FILM COATED ORAL NIGHTLY
Status: DISCONTINUED | OUTPATIENT
Start: 2025-05-31 | End: 2025-06-05 | Stop reason: HOSPADM

## 2025-05-31 RX ORDER — BUDESONIDE 0.25 MG/2ML
0.25 INHALANT ORAL
Status: DISCONTINUED | OUTPATIENT
Start: 2025-05-31 | End: 2025-06-05 | Stop reason: HOSPADM

## 2025-05-31 RX ORDER — AMLODIPINE BESYLATE 2.5 MG/1
2.5 TABLET ORAL DAILY
Status: DISCONTINUED | OUTPATIENT
Start: 2025-05-31 | End: 2025-06-05 | Stop reason: HOSPADM

## 2025-05-31 RX ORDER — IPRATROPIUM BROMIDE AND ALBUTEROL SULFATE 2.5; .5 MG/3ML; MG/3ML
3 SOLUTION RESPIRATORY (INHALATION)
Status: DISCONTINUED | OUTPATIENT
Start: 2025-05-31 | End: 2025-05-31

## 2025-05-31 RX ORDER — BUDESONIDE 0.25 MG/2ML
0.25 INHALANT ORAL
Status: DISCONTINUED | OUTPATIENT
Start: 2025-05-31 | End: 2025-05-31

## 2025-05-31 RX ORDER — FERROUS SULFATE 325(65) MG
1 TABLET ORAL
Status: DISCONTINUED | OUTPATIENT
Start: 2025-05-31 | End: 2025-06-05 | Stop reason: HOSPADM

## 2025-05-31 RX ORDER — PROPRANOLOL HYDROCHLORIDE 20 MG/1
10 TABLET ORAL 2 TIMES DAILY
Status: DISCONTINUED | OUTPATIENT
Start: 2025-05-31 | End: 2025-06-05 | Stop reason: HOSPADM

## 2025-05-31 RX ORDER — LEVOTHYROXINE SODIUM 50 UG/1
50 TABLET ORAL DAILY
Status: DISCONTINUED | OUTPATIENT
Start: 2025-05-31 | End: 2025-06-05 | Stop reason: HOSPADM

## 2025-05-31 RX ORDER — ESCITALOPRAM OXALATE 20 MG/1
20 TABLET ORAL DAILY
Status: DISCONTINUED | OUTPATIENT
Start: 2025-05-31 | End: 2025-06-05 | Stop reason: HOSPADM

## 2025-05-31 RX ADMIN — MONTELUKAST 10 MG: 10 TABLET, FILM COATED ORAL at 20:11

## 2025-05-31 RX ADMIN — PREGABALIN 150 MG: 150 CAPSULE ORAL at 15:00

## 2025-05-31 RX ADMIN — IPRATROPIUM BROMIDE AND ALBUTEROL SULFATE 3 ML: 2.5; .5 SOLUTION RESPIRATORY (INHALATION) at 01:04

## 2025-05-31 RX ADMIN — GUAIFENESIN, DEXTROMETHORPHAN HBR 1 TABLET: 600; 30 TABLET ORAL at 16:02

## 2025-05-31 RX ADMIN — ATORVASTATIN CALCIUM 40 MG: 40 TABLET, FILM COATED ORAL at 03:28

## 2025-05-31 RX ADMIN — BUDESONIDE 0.25 MG: 0.25 INHALANT RESPIRATORY (INHALATION) at 19:50

## 2025-05-31 RX ADMIN — Medication 4 L/MIN: at 13:25

## 2025-05-31 RX ADMIN — DULOXETINE HYDROCHLORIDE 60 MG: 60 CAPSULE, DELAYED RELEASE ORAL at 08:27

## 2025-05-31 RX ADMIN — IPRATROPIUM BROMIDE AND ALBUTEROL SULFATE 3 ML: 2.5; .5 SOLUTION RESPIRATORY (INHALATION) at 19:50

## 2025-05-31 RX ADMIN — ATORVASTATIN CALCIUM 40 MG: 40 TABLET, FILM COATED ORAL at 20:11

## 2025-05-31 RX ADMIN — AMPICILLIN SODIUM AND SULBACTAM SODIUM 3 G: 2; 1 INJECTION, POWDER, FOR SOLUTION INTRAMUSCULAR; INTRAVENOUS at 17:01

## 2025-05-31 RX ADMIN — Medication 4 L/MIN: at 08:37

## 2025-05-31 RX ADMIN — Medication 1500 MG: at 15:00

## 2025-05-31 RX ADMIN — Medication 4 L/MIN: at 19:50

## 2025-05-31 RX ADMIN — ESCITALOPRAM OXALATE 20 MG: 20 TABLET ORAL at 08:27

## 2025-05-31 RX ADMIN — DULOXETINE HYDROCHLORIDE 60 MG: 60 CAPSULE, DELAYED RELEASE ORAL at 20:11

## 2025-05-31 RX ADMIN — PANTOPRAZOLE SODIUM 40 MG: 40 TABLET, DELAYED RELEASE ORAL at 06:06

## 2025-05-31 RX ADMIN — LEVOTHYROXINE SODIUM 50 MCG: 0.05 TABLET ORAL at 06:06

## 2025-05-31 RX ADMIN — IPRATROPIUM BROMIDE AND ALBUTEROL SULFATE 3 ML: 2.5; .5 SOLUTION RESPIRATORY (INHALATION) at 08:37

## 2025-05-31 RX ADMIN — PREGABALIN 150 MG: 150 CAPSULE ORAL at 20:11

## 2025-05-31 RX ADMIN — FERROUS SULFATE TAB 325 MG (65 MG ELEMENTAL FE) 1 TABLET: 325 (65 FE) TAB at 08:27

## 2025-05-31 RX ADMIN — AMLODIPINE BESYLATE 2.5 MG: 2.5 TABLET ORAL at 08:27

## 2025-05-31 RX ADMIN — PROPRANOLOL HYDROCHLORIDE 10 MG: 20 TABLET ORAL at 20:11

## 2025-05-31 RX ADMIN — BUSPIRONE HYDROCHLORIDE 5 MG: 5 TABLET ORAL at 15:00

## 2025-05-31 RX ADMIN — TAMSULOSIN HYDROCHLORIDE 0.4 MG: 0.4 CAPSULE ORAL at 08:27

## 2025-05-31 RX ADMIN — BUDESONIDE 0.25 MG: 0.25 INHALANT RESPIRATORY (INHALATION) at 08:37

## 2025-05-31 RX ADMIN — BUSPIRONE HYDROCHLORIDE 5 MG: 5 TABLET ORAL at 08:27

## 2025-05-31 RX ADMIN — MONTELUKAST 10 MG: 10 TABLET, FILM COATED ORAL at 03:29

## 2025-05-31 RX ADMIN — MIRTAZAPINE 15 MG: 15 TABLET, FILM COATED ORAL at 20:11

## 2025-05-31 RX ADMIN — MIRTAZAPINE 15 MG: 15 TABLET, FILM COATED ORAL at 03:29

## 2025-05-31 RX ADMIN — PIPERACILLIN SODIUM AND TAZOBACTAM SODIUM 4.5 G: 4; .5 INJECTION, SOLUTION INTRAVENOUS at 08:27

## 2025-05-31 RX ADMIN — ASPIRIN 81 MG CHEWABLE TABLET 81 MG: 81 TABLET CHEWABLE at 08:27

## 2025-05-31 RX ADMIN — PROPRANOLOL HYDROCHLORIDE 10 MG: 20 TABLET ORAL at 08:27

## 2025-05-31 RX ADMIN — PANTOPRAZOLE SODIUM 40 MG: 40 TABLET, DELAYED RELEASE ORAL at 15:00

## 2025-05-31 RX ADMIN — BUSPIRONE HYDROCHLORIDE 5 MG: 5 TABLET ORAL at 20:11

## 2025-05-31 RX ADMIN — ENOXAPARIN SODIUM 40 MG: 40 INJECTION SUBCUTANEOUS at 20:11

## 2025-05-31 RX ADMIN — IPRATROPIUM BROMIDE AND ALBUTEROL SULFATE 3 ML: 2.5; .5 SOLUTION RESPIRATORY (INHALATION) at 13:25

## 2025-05-31 RX ADMIN — PREGABALIN 150 MG: 150 CAPSULE ORAL at 08:27

## 2025-05-31 SDOH — SOCIAL STABILITY: SOCIAL INSECURITY: WITHIN THE LAST YEAR, HAVE YOU BEEN AFRAID OF YOUR PARTNER OR EX-PARTNER?: NO

## 2025-05-31 SDOH — ECONOMIC STABILITY: HOUSING INSECURITY: IN THE PAST 12 MONTHS, HOW MANY TIMES HAVE YOU MOVED WHERE YOU WERE LIVING?: 0

## 2025-05-31 SDOH — ECONOMIC STABILITY: FOOD INSECURITY: HOW HARD IS IT FOR YOU TO PAY FOR THE VERY BASICS LIKE FOOD, HOUSING, MEDICAL CARE, AND HEATING?: NOT VERY HARD

## 2025-05-31 SDOH — ECONOMIC STABILITY: TRANSPORTATION INSECURITY: IN THE PAST 12 MONTHS, HAS LACK OF TRANSPORTATION KEPT YOU FROM MEDICAL APPOINTMENTS OR FROM GETTING MEDICATIONS?: NO

## 2025-05-31 SDOH — ECONOMIC STABILITY: FOOD INSECURITY: WITHIN THE PAST 12 MONTHS, YOU WORRIED THAT YOUR FOOD WOULD RUN OUT BEFORE YOU GOT THE MONEY TO BUY MORE.: NEVER TRUE

## 2025-05-31 SDOH — ECONOMIC STABILITY: HOUSING INSECURITY: IN THE LAST 12 MONTHS, WAS THERE A TIME WHEN YOU WERE NOT ABLE TO PAY THE MORTGAGE OR RENT ON TIME?: NO

## 2025-05-31 SDOH — ECONOMIC STABILITY: HOUSING INSECURITY: AT ANY TIME IN THE PAST 12 MONTHS, WERE YOU HOMELESS OR LIVING IN A SHELTER (INCLUDING NOW)?: NO

## 2025-05-31 SDOH — ECONOMIC STABILITY: FOOD INSECURITY: WITHIN THE PAST 12 MONTHS, THE FOOD YOU BOUGHT JUST DIDN'T LAST AND YOU DIDN'T HAVE MONEY TO GET MORE.: NEVER TRUE

## 2025-05-31 SDOH — ECONOMIC STABILITY: INCOME INSECURITY: IN THE PAST 12 MONTHS HAS THE ELECTRIC, GAS, OIL, OR WATER COMPANY THREATENED TO SHUT OFF SERVICES IN YOUR HOME?: NO

## 2025-05-31 SDOH — SOCIAL STABILITY: SOCIAL INSECURITY: WITHIN THE LAST YEAR, HAVE YOU BEEN HUMILIATED OR EMOTIONALLY ABUSED IN OTHER WAYS BY YOUR PARTNER OR EX-PARTNER?: NO

## 2025-05-31 ASSESSMENT — COGNITIVE AND FUNCTIONAL STATUS - GENERAL
MOBILITY SCORE: 17
TURNING FROM BACK TO SIDE WHILE IN FLAT BAD: A LITTLE
MOVING TO AND FROM BED TO CHAIR: A LITTLE
STANDING UP FROM CHAIR USING ARMS: A LITTLE
MOVING TO AND FROM BED TO CHAIR: A LITTLE
WALKING IN HOSPITAL ROOM: A LITTLE
CLIMB 3 TO 5 STEPS WITH RAILING: A LOT
DAILY ACTIVITIY SCORE: 23
DRESSING REGULAR LOWER BODY CLOTHING: A LITTLE
MOBILITY SCORE: 19
WALKING IN HOSPITAL ROOM: A LITTLE
STANDING UP FROM CHAIR USING ARMS: A LITTLE
CLIMB 3 TO 5 STEPS WITH RAILING: A LOT
MOVING FROM LYING ON BACK TO SITTING ON SIDE OF FLAT BED WITH BEDRAILS: A LITTLE

## 2025-05-31 ASSESSMENT — ENCOUNTER SYMPTOMS
SHORTNESS OF BREATH: 0
NAUSEA: 0
LIGHT-HEADEDNESS: 0
CHILLS: 0
HALLUCINATIONS: 0
WHEEZING: 0
FEVER: 0
DIZZINESS: 0
EYE REDNESS: 0
VOMITING: 0
CONFUSION: 0

## 2025-05-31 ASSESSMENT — PAIN SCALES - GENERAL
PAINLEVEL_OUTOF10: 9
PAINLEVEL_OUTOF10: 0 - NO PAIN
PAINLEVEL_OUTOF10: 0 - NO PAIN

## 2025-05-31 ASSESSMENT — PAIN - FUNCTIONAL ASSESSMENT
PAIN_FUNCTIONAL_ASSESSMENT: 0-10
PAIN_FUNCTIONAL_ASSESSMENT: 0-10

## 2025-05-31 ASSESSMENT — ACTIVITIES OF DAILY LIVING (ADL): LACK_OF_TRANSPORTATION: NO

## 2025-05-31 NOTE — HOSPITAL COURSE
Mr Angus Redman is a very pleasant 68yo gentleman with PMHx of recent admission for osteomyelitis s/p amputation 1st, 4th and 5th toes, Recurrent syncope secondary to orthostatic hypotension/autonomic dysfunction/autonomic neuropathy, restrictive lung disease on 4 to 5 L nasal cannula at baseline, PSVT on propranolol, CAD w/ CCTA (02/2024) - 50% stenosis of LAD - 25-50% stenosis of RCA, alcohol use disorder c/b severe alcoholic polyneuropathy with autonomic dysfunction who presents for right lower extremity palpated swelling as well as purulent drainage from surgical wound.  No fevers or chills.  Has been on Bactrim for the last several days and recently finished a course of doxycycline and Augmentin x14d after recent discharge 5/5/2025 in which he underwent amputation of right 1st, 4th and 5th toe.  Discharged to SNF however left AMA within 24 hours. OR pathology returned back with gangrenous necrosis with ulceration/wound and include acute inflammatory exudate reaching the deep subcutaneous tissues.  Does have a history of MRSA positive wound culture January 2025.  He does state he has been ambulating on his feet despite being told not to and was unable to attend his wound care center appointment this past Wednesday.  Last seen in the wound care center 5/14.  Also states has had multiple falls at home due to deconditioning and denies hitting head.    In the ED he was hemodynamically stable.  labs revealing MARIA L with creatinine of 1.73 (baseline 1.3-1.4), likely falsely elevated in setting of Bactrim use, stable chronic anemia and no leukocytosis.  Lactate 0.8.  CRP 8.7 and ESR of 100.  Foot x-rays without obvious osteomyelitis, but patient was started on broad spectrum antibiotics initially with zosy, switched to unasyn and vanc per ID. He received bedside debridement. MRI was ordered given elevation in inflammatory markers.

## 2025-05-31 NOTE — CONSULTS
Consults   Reason For Consult  Cellulitis, RLE    History Of Present Illness  Angus Redman is a 69 y.o. male presenting with cellulitis, RLE.  Patient is well-known to this provider, and is seen regularly at [facility].  Patient missed his last appointment that was scheduled for Wednesday, stating that he had a fall and could not get up.  Patient admits to 3 falls over a period of 2 days earlier this week, all mechanical.  Patient denies losing consciousness or hitting head, but states that he was unable to get up.  Patient states that during 1 fall he was on the floor for 14 hours.  Patient states that his [facility] nurse has been coming to the house to perform dressing changes regularly.  Patient states that he noticed some redness last Friday, he was instructed to report to the ED, but did not.  Patient denies any constitutional symptoms, and has no other complaints at this time.     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), Osteomyelitis, unspecified (02/26/2022), Other chest pain (09/21/2013), Other conditions influencing health status, Other conditions influencing health status (10/08/2017), Other specified health status (07/23/2014), Patient's noncompliance with other medical treatment and regimen due to unspecified reason (10/28/2016), Patient's other noncompliance with medication regimen  (06/04/2016), Personal history of (healed) stress fracture (12/30/2013), Personal history of diseases of the skin and subcutaneous tissue (02/26/2022), Personal history of other diseases of the nervous system and sense organs (03/25/2016), Personal history of other diseases of the nervous system and sense organs (02/02/2016), Personal history of other endocrine, nutritional and metabolic disease (07/13/2015), Personal history of other specified conditions (07/28/2019), Personal history of other specified conditions (05/07/2018), Personal history of other specified conditions (06/25/2015), Unspecified fracture of left foot, initial encounter for closed fracture (06/04/2016), Unspecified injury of head, initial encounter (04/20/2016), Unspecified injury of unspecified elbow, initial encounter (04/07/2017), and Unsteadiness on feet (04/15/2014).    Surgical History  He has a past surgical history that includes Colonoscopy (10/09/2013); Other surgical history (07/28/2019); Other surgical history (04/15/2014); Other surgical history (01/28/2020); Other surgical history (11/24/2015); Other surgical history (04/19/2017); Other surgical history (10/09/2013); and Skin cancer excision (10/09/2013).     Social History  He reports that he quit smoking about 26 years ago. His smoking use included cigarettes. He has never used smokeless tobacco. He reports that he does not currently use alcohol. He reports that he does not use drugs.    Family History  Family History[1]     Allergies  Lisinopril, Codeine, House dust mite, Hydrocodone-acetaminophen, Latex, Mold, and Tree and shrub pollen    Review of Systems  Review of Systems   Constitutional:  Negative for chills and fever.   HENT:  Negative for nosebleeds and sneezing.    Eyes:  Negative for redness.   Respiratory:  Negative for shortness of breath and wheezing.    Cardiovascular:  Positive for leg swelling.   Gastrointestinal:  Negative for nausea and vomiting.   Endocrine:  Negative for cold intolerance and heat intolerance.   Allergic/Immunologic: Positive for environmental allergies.   Neurological:  Negative for dizziness and light-headedness.   Psychiatric/Behavioral:  Negative for behavioral problems, confusion and hallucinations.          Physical Exam  Vascular: DP and PT pulses palpable 2/4 bilaterally.  CFT under 5 seconds when tested at distal digits.  Skin temp warm to warm from proximal to distal.       Neuro: Protective sensation may absent, light tough sensation intact.  No Tinel's or Valleix's signs elicited.       Derm: Full-thickness ulceration noted to plantar aspect of first metatarsal head, left foot.  Wound base extends to subcutaneous tissue, no exposed bone or fascia.  No undermining, tracking, or tunneling.  No SOI noted at this time.   - Full-thickness ulceration noted to anterior aspect of hallux amputation site, right foot.  Wound extends to subcutaneous tissue, wound base approximately 80% fibrotic, 20% granular.  Serosanguineous drainage with mild periwound maceration.  Wound probes to bone, which appears hard and intact.  Periwound erythema noted with proximal streaking; +2 edema noted to RLE.  -Sutures remain at amputation sites digit 4, 5 right foot.  Incision sites well coapted.     Musc: No gross deformities noted.  No POP noted to any other area of the foot/ankle.        Medications  Scheduled medications  Scheduled Medications[2]  Continuous medications  Continuous Medications[3]  PRN medications  PRN Medications[4]     Last Recorded Vitals  Blood pressure 146/69, pulse 57, temperature 36.2 °C (97.2 °F), temperature source Temporal, resp. rate 20, height 1.829 m (6'), weight 118 kg (260 lb 8 oz), SpO2 98%.    Relevant Results  Results for orders placed or performed during the hospital encounter of 05/30/25 (from the past 24 hours)   CBC with Differential   Result Value Ref Range    WBC 10.4 4.4 - 11.3 x10*3/uL    nRBC 0.0 0.0 - 0.0 /100 WBCs    RBC  4.20 (L) 4.50 - 5.90 x10*6/uL    Hemoglobin 11.9 (L) 13.5 - 17.5 g/dL    Hematocrit 37.5 (L) 41.0 - 52.0 %    MCV 89 80 - 100 fL    MCH 28.3 26.0 - 34.0 pg    MCHC 31.7 (L) 32.0 - 36.0 g/dL    RDW 15.2 (H) 11.5 - 14.5 %    Platelets 221 150 - 450 x10*3/uL    Neutrophils % 80.4 40.0 - 80.0 %    Immature Granulocytes %, Automated 0.5 0.0 - 0.9 %    Lymphocytes % 8.5 13.0 - 44.0 %    Monocytes % 7.6 2.0 - 10.0 %    Eosinophils % 2.8 0.0 - 6.0 %    Basophils % 0.2 0.0 - 2.0 %    Neutrophils Absolute 8.40 (H) 1.60 - 5.50 x10*3/uL    Immature Granulocytes Absolute, Automated 0.05 0.00 - 0.50 x10*3/uL    Lymphocytes Absolute 0.89 0.80 - 3.00 x10*3/uL    Monocytes Absolute 0.79 0.05 - 0.80 x10*3/uL    Eosinophils Absolute 0.29 0.00 - 0.40 x10*3/uL    Basophils Absolute 0.02 0.00 - 0.10 x10*3/uL   Comprehensive Metabolic Panel   Result Value Ref Range    Glucose 118 (H) 74 - 99 mg/dL    Sodium 134 (L) 136 - 145 mmol/L    Potassium 4.6 3.5 - 5.3 mmol/L    Chloride 98 98 - 107 mmol/L    Bicarbonate 28 21 - 32 mmol/L    Anion Gap 13 10 - 20 mmol/L    Urea Nitrogen 26 (H) 6 - 23 mg/dL    Creatinine 1.73 (H) 0.50 - 1.30 mg/dL    eGFR 42 (L) >60 mL/min/1.73m*2    Calcium 9.0 8.6 - 10.3 mg/dL    Albumin 3.8 3.4 - 5.0 g/dL    Alkaline Phosphatase 104 33 - 136 U/L    Total Protein 7.7 6.4 - 8.2 g/dL    AST 26 9 - 39 U/L    Bilirubin, Total 0.7 0.0 - 1.2 mg/dL    ALT 17 10 - 52 U/L   C-reactive protein   Result Value Ref Range    C-Reactive Protein 8.70 (H) <1.00 mg/dL   Sedimentation Rate   Result Value Ref Range    Sedimentation Rate 100 (H) 0 - 20 mm/h   Renal function panel   Result Value Ref Range    Glucose 118 (H) 74 - 99 mg/dL    Sodium 133 (L) 136 - 145 mmol/L    Potassium 4.6 3.5 - 5.3 mmol/L    Chloride 98 98 - 107 mmol/L    Bicarbonate 26 21 - 32 mmol/L    Anion Gap 14 10 - 20 mmol/L    Urea Nitrogen 25 (H) 6 - 23 mg/dL    Creatinine 1.64 (H) 0.50 - 1.30 mg/dL    eGFR 44 (L) >60 mL/min/1.73m*2    Calcium 9.1 8.6 - 10.3  mg/dL    Phosphorus 3.1 2.5 - 4.9 mg/dL    Albumin 3.8 3.4 - 5.0 g/dL   Magnesium   Result Value Ref Range    Magnesium 2.35 1.60 - 2.40 mg/dL   Lactate   Result Value Ref Range    Lactate 0.8 0.4 - 2.0 mmol/L   Blood Culture    Specimen: Peripheral Venipuncture; Blood culture   Result Value Ref Range    Blood Culture Loaded on Instrument - Culture in progress    Blood Culture    Specimen: Peripheral Venipuncture; Blood culture   Result Value Ref Range    Blood Culture Loaded on Instrument - Culture in progress    Urinalysis with Reflex Culture and Microscopic   Result Value Ref Range    Color, Urine Light-Yellow Light-Yellow, Yellow, Dark-Yellow    Appearance, Urine Clear Clear    Specific Gravity, Urine 1.013 1.005 - 1.035    pH, Urine 6.0 5.0, 5.5, 6.0, 6.5, 7.0, 7.5, 8.0    Protein, Urine 30 (1+) (A) NEGATIVE, 10 (TRACE), 20 (TRACE) mg/dL    Glucose, Urine 500 (3+) (A) Normal mg/dL    Blood, Urine NEGATIVE NEGATIVE mg/dL    Ketones, Urine NEGATIVE NEGATIVE mg/dL    Bilirubin, Urine NEGATIVE NEGATIVE mg/dL    Urobilinogen, Urine Normal Normal mg/dL    Nitrite, Urine NEGATIVE NEGATIVE    Leukocyte Esterase, Urine NEGATIVE NEGATIVE   Extra Urine Gray Tube   Result Value Ref Range    Extra Tube Hold for add-ons.    Urinalysis Microscopic   Result Value Ref Range    WBC, Urine 1-5 1-5, NONE /HPF    RBC, Urine NONE NONE, 1-2, 3-5 /HPF   Renal Function Panel   Result Value Ref Range    Glucose 98 74 - 99 mg/dL    Sodium 138 136 - 145 mmol/L    Potassium 3.6 3.5 - 5.3 mmol/L    Chloride 102 98 - 107 mmol/L    Bicarbonate 29 21 - 32 mmol/L    Anion Gap 11 10 - 20 mmol/L    Urea Nitrogen 19 6 - 23 mg/dL    Creatinine 1.41 (H) 0.50 - 1.30 mg/dL    eGFR 53 (L) >60 mL/min/1.73m*2    Calcium 8.4 (L) 8.6 - 10.3 mg/dL    Phosphorus 4.4 2.5 - 4.9 mg/dL    Albumin 3.1 (L) 3.4 - 5.0 g/dL   Magnesium   Result Value Ref Range    Magnesium 2.40 1.60 - 2.40 mg/dL   CBC and Auto Differential   Result Value Ref Range    WBC 5.6 4.4 -  11.3 x10*3/uL    nRBC 0.0 0.0 - 0.0 /100 WBCs    RBC 3.64 (L) 4.50 - 5.90 x10*6/uL    Hemoglobin 10.3 (L) 13.5 - 17.5 g/dL    Hematocrit 33.0 (L) 41.0 - 52.0 %    MCV 91 80 - 100 fL    MCH 28.3 26.0 - 34.0 pg    MCHC 31.2 (L) 32.0 - 36.0 g/dL    RDW 15.4 (H) 11.5 - 14.5 %    Platelets 175 150 - 450 x10*3/uL    Neutrophils % 61.5 40.0 - 80.0 %    Immature Granulocytes %, Automated 0.7 0.0 - 0.9 %    Lymphocytes % 17.4 13.0 - 44.0 %    Monocytes % 12.4 2.0 - 10.0 %    Eosinophils % 7.6 0.0 - 6.0 %    Basophils % 0.4 0.0 - 2.0 %    Neutrophils Absolute 3.47 1.60 - 5.50 x10*3/uL    Immature Granulocytes Absolute, Automated 0.04 0.00 - 0.50 x10*3/uL    Lymphocytes Absolute 0.98 0.80 - 3.00 x10*3/uL    Monocytes Absolute 0.70 0.05 - 0.80 x10*3/uL    Eosinophils Absolute 0.43 (H) 0.00 - 0.40 x10*3/uL    Basophils Absolute 0.02 0.00 - 0.10 x10*3/uL     *Note: Due to a large number of results and/or encounters for the requested time period, some results have not been displayed. A complete set of results can be found in Results Review.      Imaging  XR foot right 3+ views  Result Date: 5/30/2025  Status post 1st through 5th digit amputation with chronic fractures and similar osseous irregularity of the 1st metatarsal head. Overlying soft tissue irregularity, swelling and possible subcutaneous air noted. MRI may be obtained as clinically indicated for evaluation of osteomyelitis.     MACRO: None   Signed by: Sharlene Quiñones 5/30/2025 7:16 PM Dictation workstation:   [id number]    XR chest 1 view  Result Date: 5/30/2025  1. Calcific pleural plaque right hemithorax. 2. Right basilar density may be secondary to atelectasis and/or infiltrate in this patient with scarring/atelectasis at the pulmonary bases on recent CT. 3. Small effusions are difficult to exclude and are suspected.   MACRO: none   Signed by: Jerry Rodriguez 5/30/2025 4:48 PM Dictation workstation:   [id number]      Cardiology, Vascular, and Other Imaging  No other  imaging results found for the past 2 days        Assessment/Plan     Assessment:  - Non-pressure ulcer, left and right foot  - Peripheral neuropathy    Plan:  - Pt examined and evaluated.  All findings discussed to patient to their satisfaction and understanding.  - Reviewed labs and chart.  No evidence of soft tissue emphysema or osteomyelitis noted on radiographs.  No leukocytosis noted, vital stable.  ESR/CRP elevated, lactate WNL.  - Systemic conditions managed by primary team, antibiotics managed by ID.  - Performed dressing change consisting of Betadine paint, 4 x 4 gauze, ABD, Kerlix, Ace.  Dressings to be changed daily by podiatry.  - Erythema and edema significantly improved from initial presentation.  No emergent surgical intervention indicated at this time.  - After verbal consent was obtained, performed full-thickness excisional debridement of right foot wound to and including subcutaneous tissue with a #15 scalpel blade, iris scissors, and forceps.  Pressure hemostasis, patient tolerated procedure well without complications.  - Removed all sutures from right foot amputation sites.  - Will continue to follow inpatient.  Please contact podiatry with any acute questions/concerns.    - Thank you for the consult.     Boone Bull DPM  Podiatric Surgery  Doc Halo/Hiram BAUTISTA spent >35 minutes in the professional and overall care of this patient.      Boone Bull DPM        [1]   Family History  Problem Relation Name Age of Onset    Other (cardiac pacemaker) Mother      Coronary artery disease Mother      Other (history of heart artery stent) Mother      Cancer Mother      Other (kurtis cell cancer) Mother      Arthritis Mother      Mental illness Brother          living in handicapped assisted living facility permanently [Other]    Other (angina pectoris) Paternal Grandmother     [2] amLODIPine, 2.5 mg, oral, Daily  aspirin, 81 mg, oral, Daily  atorvastatin, 40 mg, oral, Nightly  budesonide, 0.25  mg, nebulization, BID   And  ipratropium-albuteroL, 3 mL, nebulization, 4x daily  busPIRone, 5 mg, oral, TID  DULoxetine, 60 mg, oral, BID  [Held by provider] empagliflozin, 10 mg, oral, Daily  enoxaparin, 40 mg, subcutaneous, q24h  escitalopram, 20 mg, oral, Daily  ferrous sulfate, 1 tablet, oral, Daily with breakfast  levothyroxine, 50 mcg, oral, Daily  mirtazapine, 15 mg, oral, Nightly  montelukast, 10 mg, oral, Nightly  pantoprazole, 40 mg, oral, BID AC  piperacillin-tazobactam, 4.5 g, intravenous, q8h  polyethylene glycol, 17 g, oral, Daily  pregabalin, 150 mg, oral, TID  propranolol, 10 mg, oral, BID  [Held by provider] spironolactone, 25 mg, oral, Daily  tamsulosin, 0.4 mg, oral, Daily  [Held by provider] torsemide, 20 mg, oral, Daily  vancomycin, 1,500 mg, intravenous, q24h  [3]    [4] PRN medications: HYDROmorphone, ondansetron ODT **OR** ondansetron, oxygen, vancomycin

## 2025-05-31 NOTE — CONSULTS
Consults  Reason for Consult:  Evaluation and management of right lower extremity cellulitis    Patient is seen at the request of Dr. Angel Hodge    Subjective   History of Present Illness:  Angus Redman is a 69 y.o. male who presented on 5/30/2025 with right lower extremity redness.  He says that he is followed by podiatry on a regular basis for a chronic right foot problems.  He had amputation of one of his right toes in 2024 and then had amputation of the rest of the toes approximately 1 month ago.  He first noticed redness 1 week prior to admission.  He was advised to go to the emergency department but waited.  He then was placed 3 days prior to admission on Bactrim.  He took the antibiotic but the redness persisted.  he suffered a fall and could not get up.  EMS was called and he was brought into the emergency department on 5/30/2025 for further care.  On arrival he had a temperature 36.4 with white blood count 10.4 and a creatinine 1.7.  He was placed on vancomycin and Zosyn.  He was seen by podiatry this morning who debrided his right foot ulcer at the base of the right great toe amputation site and collected a culture.  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), Laceration without foreign body of scalp, initial encounter (09/10/2015), Nausea (04/15/2014), 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 (07/27/2017), Osteomyelitis, unspecified (02/26/2022), Other chest pain (09/21/2013), Other  conditions influencing health status, Other conditions influencing health status (10/08/2017), Other specified health status (07/23/2014), Patient's noncompliance with other medical treatment and regimen due to unspecified reason (10/28/2016), Patient's other noncompliance with medication regimen (06/04/2016), Personal history of (healed) stress fracture (12/30/2013), Personal history of diseases of the skin and subcutaneous tissue (02/26/2022), Personal history of other diseases of the nervous system and sense organs (03/25/2016), Personal history of other diseases of the nervous system and sense organs (02/02/2016), Personal history of other endocrine, nutritional and metabolic disease (07/13/2015), Personal history of other specified conditions (07/28/2019), Personal history of other specified conditions (05/07/2018), Personal history of other specified conditions (06/25/2015), Unspecified fracture of left foot, initial encounter for closed fracture (06/04/2016), Unspecified injury of head, initial encounter (04/20/2016), Unspecified injury of unspecified elbow, initial encounter (04/07/2017), and Unsteadiness on feet (04/15/2014).    has a past surgical history that includes Colonoscopy (10/09/2013); Other surgical history (07/28/2019); Other surgical history (04/15/2014); Other surgical history (01/28/2020); Other surgical history (11/24/2015); Other surgical history (04/19/2017); Other surgical history (10/09/2013); and Skin cancer excision (10/09/2013).     Social History:   reports that he quit smoking about 26 years ago. His smoking use included cigarettes. He has never used smokeless tobacco. He reports that he does not currently use alcohol. He reports that he does not use drugs.     He lives alone with his 2 pet cats    Family History:  No sick contacts    Review of Systems:  Right lower extremity redness and pain    Allergies:  Lisinopril, Codeine, House dust mite, Hydrocodone-acetaminophen, Latex,  Mold, and Tree and shrub pollen      Objective   Current Medications[1]    Physical Exam:  /69   Pulse 57   Temp 36.2 °C (97.2 °F) (Temporal)   Resp 20   Wt 118 kg (260 lb 8 oz)   SpO2 98%    General: no acute distress, lying in bed  HEENT: pink pharynx  CVS: RRR  Resp: decreased breath sounds in bases  ABD: soft, NT, ND  EXT: no edema  Skin: Right lower extremity with erythema to just below the right knee; there is edema; his right foot is wrapped.  He says the podiatry just wrapped it back up    Relevant Results:    Labs:  Results from last 72 hours   Lab Units 05/31/25  0654 05/30/25  1419   SODIUM mmol/L 138 133*  134*   POTASSIUM mmol/L 3.6 4.6  4.6   CHLORIDE mmol/L 102 98  98   BUN mg/dL 19 25*  26*   CREATININE mg/dL 1.41* 1.64*  1.73*   MAGNESIUM mg/dL 2.40 2.35   PHOSPHORUS mg/dL 4.4 3.1     Results from last 72 hours   Lab Units 05/31/25  0654 05/30/25  1419   WBC AUTO x10*3/uL 5.6 10.4   HEMOGLOBIN g/dL 10.3* 11.9*   HEMATOCRIT % 33.0* 37.5*   PLATELETS AUTO x10*3/uL 175 221       Microbiology data: I have personally and independently reviewed and intrepreted the lab results  1/29/2025 wound culture shows MRSA resistant to clindamycin and tetracycline; sensitive to Bactrim and vancomycin  5/30/2025 blood cultures are pending  5/31/2025 wound culture is pending    Imaging data: I have personally and independently reviewed and interpreted the imaging studies  5/30/2025 chest x-ray shows no acute issues   5/30/2025 x-ray right foot shows chronic fracture       Assessment/Plan     MY IMPRESSION & RECOMMENDATIONS:  EtOH polyneuropathy with right lower extremity cellulitis.  He recently had the remainder of his toes on his right foot amputated.  He has a right foot neuropathic ulcer that was debrided this morning and a wound culture was collected.  There is concern for osteomyelitis since it probes to bone.  I would recommend that we continue with the vancomycin.  We can change the Zosyn to  Unasyn for now.  We can adjust the treatment based on further culture results.    - Continue vancomycin for now and monitor levels  - Can change Zosyn to Unasyn  - Await wound culture  - Await blood cultures    Other issues:  #Hypertension   #Hyperlipidemia  #CAD  #COPD  #Acute kidney injury on chronic kidney disease stage IIIa, which can affect the dosing of antimicrobials  #Osteoarthritis  #HFpEF  #Paroxysmal atrial fibrillation    ~I have personally and independently reviewed and interpreted the laboratory tests, imaging studies, and the documentation from other healthcare providers.  I am monitoring for side effects from vancomycin and Unasyn, which can include rash, diarrhea, and nephrotoxicity.  His prognosis is uncertain due to his ongoing right foot problems.      Jerry Lozano MD            [1]   Current Facility-Administered Medications   Medication Dose Route Frequency Provider Last Rate Last Admin    amLODIPine (Norvasc) tablet 2.5 mg  2.5 mg oral Daily Letty Reynolds DO   2.5 mg at 05/31/25 0827    aspirin chewable tablet 81 mg  81 mg oral Daily Letty Reynolds DO   81 mg at 05/31/25 0827    atorvastatin (Lipitor) tablet 40 mg  40 mg oral Nightly Letty Reynolds DO   40 mg at 05/31/25 0328    budesonide (Pulmicort) 0.25 mg/2 mL nebulizer solution 0.25 mg  0.25 mg nebulization BID Angel Hodge DO        And    ipratropium-albuteroL (Duo-Neb) 0.5-2.5 mg/3 mL nebulizer solution 3 mL  3 mL nebulization TID Angel Hodge DO        busPIRone (Buspar) tablet 5 mg  5 mg oral TID Letty Reynolds DO   5 mg at 05/31/25 0827    DULoxetine (Cymbalta) DR capsule 60 mg  60 mg oral BID eLtty Reynolds DO   60 mg at 05/31/25 0827    [Held by provider] empagliflozin (Jardiance) tablet 10 mg  10 mg oral Daily Letty Reynolds DO        enoxaparin (Lovenox) syringe 40 mg  40 mg subcutaneous q24h Letty Reynolds DO        escitalopram (Lexapro) tablet 20 mg  20 mg oral Daily Letty Reynolds DO   20 mg at 05/31/25 0827    ferrous  sulfate 325 mg (65 mg elemental) tablet 1 tablet  1 tablet oral Daily with breakfast Letty Reynolds DO   1 tablet at 05/31/25 0827    HYDROmorphone (Dilaudid) injection 0.5 mg  0.5 mg intravenous q3h PRN Raymundo Roberts DO   0.5 mg at 05/30/25 2311    levothyroxine (Synthroid, Levoxyl) tablet 50 mcg  50 mcg oral Daily Letty Reynolds DO   50 mcg at 05/31/25 0606    mirtazapine (Remeron) tablet 15 mg  15 mg oral Nightly Letty Reynolds DO   15 mg at 05/31/25 0329    montelukast (Singulair) tablet 10 mg  10 mg oral Nightly Letty Reynolds DO   10 mg at 05/31/25 0329    ondansetron ODT (Zofran-ODT) disintegrating tablet 4 mg  4 mg oral q8h PRN Raymundo Roberts DO        Or    ondansetron (Zofran) injection 4 mg  4 mg intravenous q8h PRN Raymundo Roberts DO        oxygen (O2) therapy   inhalation Continuous PRN - O2/gases Victor Manuel Moore DO   4 L/min at 05/31/25 0837    pantoprazole (ProtoNix) EC tablet 40 mg  40 mg oral BID AC Letty Reynolds DO   40 mg at 05/31/25 0606    piperacillin-tazobactam (Zosyn) 4.5 g in dextrose (iso)  mL  4.5 g intravenous q8h Raymundo Roberts DO   Stopped at 05/31/25 1146    polyethylene glycol (Glycolax, Miralax) packet 17 g  17 g oral Daily Raymundo Roberts DO        pregabalin (Lyrica) capsule 150 mg  150 mg oral TID Letty Reynolds DO   150 mg at 05/31/25 0827    propranolol (Inderal) tablet 10 mg  10 mg oral BID Letty Reynolds DO   10 mg at 05/31/25 0827    [Held by provider] spironolactone (Aldactone) tablet 25 mg  25 mg oral Daily Letty Reynolds DO        tamsulosin (Flomax) 24 hr capsule 0.4 mg  0.4 mg oral Daily Letty Reynolds DO   0.4 mg at 05/31/25 0827    [Held by provider] torsemide (Demadex) tablet 20 mg  20 mg oral Daily Letty Reynolds, DO        vancomycin (Vancocin) pharmacy to dose - pharmacy monitoring   miscellaneous Daily PRN Raymundo Roberts, DO        vancomycin 1,500 mg in NS  mL  1,500 mg intravenous q24h Angel Hodge, DO

## 2025-05-31 NOTE — CARE PLAN
The patient's goals for the shift include      The clinical goals for the shift include Patient will remain safe and free from falls/injury.

## 2025-05-31 NOTE — CONSULTS
Vancomycin Dosing by Pharmacy- INITIAL    Angus Redman is a 69 y.o. year old male who Pharmacy has been consulted for vancomycin dosing for cellulitis, skin and soft tissue. Based on the patient's indication and renal status this patient will be dosed based on a goal AUC of 400-600.     Renal function is currently stable.    Visit Vitals  /86 (BP Location: Left arm, Patient Position: Sitting)   Pulse 64   Temp 36.4 °C (97.5 °F) (Temporal)   Resp 16        Lab Results   Component Value Date    CREATININE 1.73 (H) 2025    CREATININE 1.28 2025    CREATININE 1.36 (H) 2025    CREATININE 1.39 (H) 2025        Patient weight is as follows:   Vitals:    25 1350   Weight: 113 kg (250 lb)       Cultures:  No results found for the encounter in last 14 days.        No intake/output data recorded.  I/O during current shift:  I/O this shift:  In: 500 [IV Piggyback:500]  Out: -     Temp (24hrs), Av.4 °C (97.5 °F), Min:36.4 °C (97.5 °F), Max:36.4 °C (97.5 °F)         Assessment/Plan     Patient will not be given a loading dose.  Will initiate vancomycin maintenance, 1500 mg every 24 hours.    This dosing regimen is predicted by [employer] to result in the following pharmacokinetic parameters:  Loading dose: N/A  Regimen: 1500 mg IV every 24 hours.  Start time: 17:11 on 2025  Exposure target: AUC24 (range) 400-600 mg/L.hr   [id number]: 484 mg/L.hr  AUC24,ss: 553 mg/L.hr  Probability of AUC24 > 400: 82 %  Ctrough,ss: 17.7 mg/L  Probability of Ctrough,ss > 20: 39 %      Follow-up level will be ordered on  at 0500 unless clinically indicated sooner.  Will continue to monitor renal function daily while on vancomycin and order serum creatinine at least every 48 hours if not already ordered.  Follow for continued vancomycin needs, clinical response, and signs/symptoms of toxicity.       Artie Ghosh, PharmD

## 2025-05-31 NOTE — CARE PLAN
The patient's goals for the shift include      The clinical goals for the shift include Patient will remain safe and free from falls/injury.      Problem: Pain - Adult  Goal: Verbalizes/displays adequate comfort level or baseline comfort level  Outcome: Progressing     Problem: Safety - Adult  Goal: Free from fall injury  Outcome: Progressing     Problem: Skin  Goal: Decreased wound size/increased tissue granulation at next dressing change  Outcome: Progressing  Goal: Participates in plan/prevention/treatment measures  Outcome: Progressing  Goal: Prevent/manage excess moisture  Outcome: Progressing  Goal: Prevent/minimize sheer/friction injuries  Outcome: Progressing  Goal: Promote/optimize nutrition  Outcome: Progressing  Goal: Promote skin healing  Outcome: Progressing

## 2025-05-31 NOTE — PROGRESS NOTES
Angus Redman is a 69 y.o. male on day 1 of admission presenting with Acute cellulitis.      Subjective   Patient seen and examined this morning at bedside.  No acute event overnight.  Patient still has swollen right leg, he denies fever, chills, nausea or vomiting.  Patient has no new symptoms       Objective     Last Recorded Vitals  BP (!) 120/94 (BP Location: Left arm, Patient Position: Lying) Comment: RN notified  Pulse 78   Temp 36.3 °C (97.3 °F) (Temporal)   Resp 24   Wt 118 kg (260 lb 8 oz)   SpO2 91%   Intake/Output last 3 Shifts:    Intake/Output Summary (Last 24 hours) at 5/31/2025 1656  Last data filed at 5/31/2025 0326  Gross per 24 hour   Intake 600 ml   Output --   Net 600 ml       Admission Weight  Weight: 113 kg (250 lb) (05/30/25 1350)    Daily Weight  05/30/25 : 118 kg (260 lb 8 oz)    Image Results  XR foot right 3+ views  Narrative: Interpreted By:  Sharlene Quiñones,   STUDY:  XR FOOT RIGHT 3+ VIEWS; ;  5/30/2025 6:45 pm      INDICATION:  Signs/Symptoms:R foot pain/swelling.          COMPARISON:  05/02/2025      ACCESSION NUMBER(S):  [id number]      ORDERING CLINICIAN:  KEN GONZALEZ      FINDINGS:  Three views of the right foot      Redemonstrated postsurgical changes of 1st through 5th digit  amputations at the level of the MTP. A chronic deformity most  pronounced at the 2nd metatarsal joint again noted. Sclerotic and  slightly irregular appearance of the head of the 1st metatarsal again  noted. Probable remote fracture of the distal 5th metatarsal similar  to prior imaging. Overlying soft tissue with irregularity and  possible air noted. Diffuse soft tissue swelling about the ankle and  forefoot.      Scattered mild degenerative changes including the midfoot. Calcaneal  plantar enthesophyte.      Impression: Status post 1st through 5th digit amputation with chronic fractures  and similar osseous irregularity of the 1st metatarsal head.  Overlying soft tissue irregularity, swelling and  possible  subcutaneous air noted. MRI may be obtained as clinically indicated  for evaluation of osteomyelitis.          MACRO:  None      Signed by: Sharlene Quiñones 5/30/2025 7:16 PM  Dictation workstation:   [id number]  XR chest 1 view  Narrative: Interpreted By:  Jerry Rodriguez,   STUDY:  XR CHEST 1 VIEW; 5/30/2025 4:15 pm      INDICATION:  CLINICAL INFORMATION: Signs/Symptoms:sob.      COMPARISON:  Chest radiograph 12/26/2024. CT chest 03/26/2025      ACCESSION NUMBER(S):  [id number]      ORDERING CLINICIAN:  KEN GONZALEZ      TECHNIQUE:  Portable chest one view.      FINDINGS:  The cardiac size is indeterminate in view of the AP projection.  Calcific pleural plaque is present on the right. The pulmonary  vasculature is slightly indistinct suggesting mild pulmonary vascular  congestion. More focal atelectasis and/or infiltrate is present at  the right base. Small effusions may be obscured on this portable  study.      Impression: 1. Calcific pleural plaque right hemithorax.  2. Right basilar density may be secondary to atelectasis and/or  infiltrate in this patient with scarring/atelectasis at the pulmonary  bases on recent CT.  3. Small effusions are difficult to exclude and are suspected.      MACRO:  none      Signed by: Jerry Rodriguez 5/30/2025 4:48 PM  Dictation workstation:   [id number]      Physical Exam  Constitutional:       Appearance: Normal appearance.   HENT:      Head: Normocephalic and atraumatic.      Mouth/Throat:      Mouth: Mucous membranes are moist.   Cardiovascular:      Rate and Rhythm: Normal rate.      Heart sounds: No murmur heard.     No gallop.   Pulmonary:      Breath sounds: No wheezing.   Abdominal:      Palpations: Abdomen is soft.      Tenderness: There is no abdominal tenderness.   Musculoskeletal:      Right lower leg: No edema.      Left lower leg: No edema.      Comments: Right lower extremity redness and swelling, right foot wrapped   Skin:     General: Skin is warm.    Neurological:      Mental Status: He is alert and oriented to person, place, and time.         Relevant Results  Scheduled medications  Scheduled Medications[1]  Continuous medications  Continuous Medications[2]  PRN medications  PRN Medications[3]  Results for orders placed or performed during the hospital encounter of 05/30/25 (from the past 24 hours)   Renal Function Panel   Result Value Ref Range    Glucose 98 74 - 99 mg/dL    Sodium 138 136 - 145 mmol/L    Potassium 3.6 3.5 - 5.3 mmol/L    Chloride 102 98 - 107 mmol/L    Bicarbonate 29 21 - 32 mmol/L    Anion Gap 11 10 - 20 mmol/L    Urea Nitrogen 19 6 - 23 mg/dL    Creatinine 1.41 (H) 0.50 - 1.30 mg/dL    eGFR 53 (L) >60 mL/min/1.73m*2    Calcium 8.4 (L) 8.6 - 10.3 mg/dL    Phosphorus 4.4 2.5 - 4.9 mg/dL    Albumin 3.1 (L) 3.4 - 5.0 g/dL   Magnesium   Result Value Ref Range    Magnesium 2.40 1.60 - 2.40 mg/dL   CBC and Auto Differential   Result Value Ref Range    WBC 5.6 4.4 - 11.3 x10*3/uL    nRBC 0.0 0.0 - 0.0 /100 WBCs    RBC 3.64 (L) 4.50 - 5.90 x10*6/uL    Hemoglobin 10.3 (L) 13.5 - 17.5 g/dL    Hematocrit 33.0 (L) 41.0 - 52.0 %    MCV 91 80 - 100 fL    MCH 28.3 26.0 - 34.0 pg    MCHC 31.2 (L) 32.0 - 36.0 g/dL    RDW 15.4 (H) 11.5 - 14.5 %    Platelets 175 150 - 450 x10*3/uL    Neutrophils % 61.5 40.0 - 80.0 %    Immature Granulocytes %, Automated 0.7 0.0 - 0.9 %    Lymphocytes % 17.4 13.0 - 44.0 %    Monocytes % 12.4 2.0 - 10.0 %    Eosinophils % 7.6 0.0 - 6.0 %    Basophils % 0.4 0.0 - 2.0 %    Neutrophils Absolute 3.47 1.60 - 5.50 x10*3/uL    Immature Granulocytes Absolute, Automated 0.04 0.00 - 0.50 x10*3/uL    Lymphocytes Absolute 0.98 0.80 - 3.00 x10*3/uL    Monocytes Absolute 0.70 0.05 - 0.80 x10*3/uL    Eosinophils Absolute 0.43 (H) 0.00 - 0.40 x10*3/uL    Basophils Absolute 0.02 0.00 - 0.10 x10*3/uL     *Note: Due to a large number of results and/or encounters for the requested time period, some results have not been displayed. A complete set  of results can be found in Results Review.     XR foot right 3+ views  Result Date: 5/30/2025  Interpreted By:  Sharlene Quiñones, STUDY: XR FOOT RIGHT 3+ VIEWS; ;  5/30/2025 6:45 pm   INDICATION: Signs/Symptoms:R foot pain/swelling.     COMPARISON: 05/02/2025   ACCESSION NUMBER(S): [id number]   ORDERING CLINICIAN: KEN GONZALEZ   FINDINGS: Three views of the right foot   Redemonstrated postsurgical changes of 1st through 5th digit amputations at the level of the MTP. A chronic deformity most pronounced at the 2nd metatarsal joint again noted. Sclerotic and slightly irregular appearance of the head of the 1st metatarsal again noted. Probable remote fracture of the distal 5th metatarsal similar to prior imaging. Overlying soft tissue with irregularity and possible air noted. Diffuse soft tissue swelling about the ankle and forefoot.   Scattered mild degenerative changes including the midfoot. Calcaneal plantar enthesophyte.       Status post 1st through 5th digit amputation with chronic fractures and similar osseous irregularity of the 1st metatarsal head. Overlying soft tissue irregularity, swelling and possible subcutaneous air noted. MRI may be obtained as clinically indicated for evaluation of osteomyelitis.     MACRO: None   Signed by: Sharlene Quiñones 5/30/2025 7:16 PM Dictation workstation:   [id number]    XR chest 1 view  Result Date: 5/30/2025  Interpreted By:  Jerry Rodriguez, STUDY: XR CHEST 1 VIEW; 5/30/2025 4:15 pm   INDICATION: CLINICAL INFORMATION: Signs/Symptoms:sob.   COMPARISON: Chest radiograph 12/26/2024. CT chest 03/26/2025   ACCESSION NUMBER(S): [id number]   ORDERING CLINICIAN: KEN GONZALEZ   TECHNIQUE: Portable chest one view.   FINDINGS: The cardiac size is indeterminate in view of the AP projection. Calcific pleural plaque is present on the right. The pulmonary vasculature is slightly indistinct suggesting mild pulmonary vascular congestion. More focal atelectasis and/or infiltrate is  present at the right base. Small effusions may be obscured on this portable study.       1. Calcific pleural plaque right hemithorax. 2. Right basilar density may be secondary to atelectasis and/or infiltrate in this patient with scarring/atelectasis at the pulmonary bases on recent CT. 3. Small effusions are difficult to exclude and are suspected.   MACRO: none   Signed by: Jerry Rodriguez 5/30/2025 4:48 PM Dictation workstation:   [id number]    XR foot right 3+ views  Result Date: 5/4/2025  Interpreted By:  Paty Coley, STUDY: XR FOOT RIGHT 3+ VIEWS 5/2/2025 2:17 pm   INDICATION: Signs/Symptoms:post op xray   COMPARISON: 05/01/2025   ACCESSION NUMBER(S): [id number]   ORDERING CLINICIAN: CARISSA BARONE   TECHNIQUE: Three views of right foot done portably   FINDINGS: There has now been amputation of the right great toe as well as the 4th and 5th toes at the level of the metatarsal phalangeal joints. No additional changes are demonstrated.   There are old healed fractures of the 2nd and 5th metatarsals with small plantar calcaneal spur.       Status post amputation of the right great toe, as well as 4th and 5th toes.   Signed by: Paty Coley 5/4/2025 6:43 AM Dictation workstation:   [id number]      Assessment & Plan  Acute cellulitis    Mr. Angus Redman is a 69 y.o. male who presents with acute cellulitis and pain with ambulation.  The patient has a history of HFpEF (EF 60-65% 4/2024), PSVT (on propanolol), HTN, COPD with chronic respiratory failure on 4 L NC at baseline, CAD, CKD 3, OA, and alcohol use disorder c/b severe alcoholic polyneuropathy.  Patient presents to the emergency department with erythematous and swollen right lower extremity, no purulence is draining from the surgical sites where the digits were amputated.  The patient's labs on admission combined with signs and symptoms the patient exhibiting given no concern for major systemic infection stemming from the right lower extremity, creatinine  levels are slightly higher than normal creatinine levels.  The antibiotics were started appropriately for this patient and will be continued with his admission.  The patient is able to tolerate oral liquids but he will be placed n.p.o. for when he has a discussion with podiatry, podiatry is on consult for this patient.  The patient will receive supportive care and management of his chronic pathologies in combination with the acute cellulitis and osteomyelitis will be performed.       Assessment & Plan  Acute cellulitis           # Purulent right lower limb cellulitis  #Concern for osteomyelitis  #S/p amputation of right Pharis, 4th and 5th toe on 5/5/2025  #S/p Augmentin, doxycycline and Bactrim  - ID switch Zosyn to Unasyn  - Continue vancomycin  -Podiatry consultation, appreciate recommendations. Jorgito recommended continue antibiotic and no emergent surgical intervention indicated.  Full-thickness excisional debridement of right foot wound  to and including subcutaneous tissue.  - ID consultation, appreciate recommendation  - Wound culture--pending  - Blood culture--- pending  - May consider MRI tomorrow to rule out osteomyelitis, will wait for podiatry recommendations.  - PT/OT recommended moderate intensity level of care    #Paroxysmal atrial fibrillation  #Hyperlipidemia  #Hypertension  #COPD  - DuoNebs as needed     #Chronic kidney disease  #Heart failure     - Complete home med rec's  - Dilaudid for pain as needed  - Zofran for nausea as needed  - Morning labs CBC, magnesium, RFP        Diet: Regular, n.p.o. at midnight  DVT prophylaxis: Lovenox  Telemetry: Not indicated  Consults: Podiatry, PT, OT,         Code Status: DNR and No Intubation and No ICU     Dispo: Anticipate 2-3 midnight for improvement and rule out of osteomyelitis           Wily Gonzales MD  Internal medicine, PGY1         [1] amLODIPine, 2.5 mg, oral, Daily  ampicillin-sulbactam, 3 g, intravenous, q6h  aspirin, 81 mg,  oral, Daily  atorvastatin, 40 mg, oral, Nightly  budesonide, 0.25 mg, nebulization, BID   And  ipratropium-albuteroL, 3 mL, nebulization, TID  busPIRone, 5 mg, oral, TID  DULoxetine, 60 mg, oral, BID  [Held by provider] empagliflozin, 10 mg, oral, Daily  enoxaparin, 40 mg, subcutaneous, q24h  escitalopram, 20 mg, oral, Daily  ferrous sulfate, 1 tablet, oral, Daily with breakfast  levothyroxine, 50 mcg, oral, Daily  mirtazapine, 15 mg, oral, Nightly  montelukast, 10 mg, oral, Nightly  pantoprazole, 40 mg, oral, BID AC  polyethylene glycol, 17 g, oral, Daily  pregabalin, 150 mg, oral, TID  propranolol, 10 mg, oral, BID  [Held by provider] spironolactone, 25 mg, oral, Daily  tamsulosin, 0.4 mg, oral, Daily  [Held by provider] torsemide, 20 mg, oral, Daily  vancomycin, 1,500 mg, intravenous, q24h  [2]    [3] PRN medications: dextromethorphan-guaifenesin, HYDROmorphone, ondansetron ODT **OR** ondansetron, oxygen, vancomycin

## 2025-05-31 NOTE — PROGRESS NOTES
Physical Therapy    Physical Therapy Evaluation & Treatment    Patient Name: Angus Redman  MRN: [id number]  Today's Date: 5/31/2025   Time Calculation  Start Time: 1056  Stop Time: 1112  Time Calculation (min): 16 min  3116/3116-A    Assessment/Plan   PT Assessment  PT Assessment Results: Decreased endurance, Decreased mobility, Pain  Rehab Prognosis: Good  Evaluation/Treatment Tolerance: Patient limited by pain  Medical Staff Made Aware: Yes  Strengths: Ability to acquire knowledge, Attitude of self  End of Session Communication: Bedside nurse  Assessment Comment: Expect a slow and gradual progress.  End of Session Patient Position: Bed, 3 rail up, Alarm on  IP OR SWING BED PT PLAN  Inpatient or Swing Bed: Inpatient  PT Plan  Treatment/Interventions: Bed mobility, Transfer training, Gait training, Stair training, Endurance training, Therapeutic activity, Strengthening  PT Plan: Ongoing PT  PT Frequency: 4 times per week  PT Discharge Recommendations: Moderate intensity level of continued care  Equipment Recommended upon Discharge: Wheeled walker, Wheelchair  PT Recommended Transfer Status: Assist x1, Contact guard  PT - OK to Discharge: Yes (When medically allowed.)    Current Problem:  Problem List[1]    Subjective     General Visit Information:  General  Reason for Referral: Acute cellulitis R leg/foot. / Impaired Mobility, gait.  Referred By: JOSE CARLOS DICKINSON  Past Medical History Relevant to Rehab: On admit 5/30 c/o pain with walking.  Had an amputation of R 1,4, and 5 toes on 5/5/25. Was sent to SNF for rehab, but left  in 24 hrs..   H/O HF p EF, HTN, COPD, Resp. failure, uses 4L O2, CAD, O-A, P-A.fib..  Family/Caregiver Present: No  Prior to Session Communication: Bedside nurse  Patient Position Received: Bed, 3 rail up, Alarm on  Preferred Learning Style: verbal, visual  General Comment: Had an IV.    Home Living:  Home Living  Type of Home: House  Lives With: Alone  Home Adaptive Equipment: Walker rolling or  standard, Wheelchair-manual  Home Layout: Multi-level  Home Access: Stairs to enter with rails  Entrance Stairs-Number of Steps: 5 stairs into kitchen level.  Home Living Comments: Sponge bathes.  16 stairs up to bedroom.  Sleeps in a chair with ottoman on 1st floor.    Prior Level of Function:  Prior Function Per Pt/Caregiver Report  Level of [place]: Independent with homemaking with ambulation  Ambulatory Assistance: Independent  Prior Function Comments: Uses a rollator or walks behind a w/c at home.    Precautions:  Precautions  Medical Precautions: Fall precautions  Precautions Comment: DNR and no intubation.    Vital Signs:     Objective     Pain:  Pain Assessment  Pain Assessment: 0-10  0-10 (Numeric) Pain Score: 9  Pain Type: Chronic pain  Pain Location: Foot  Pain Orientation: Right (and L foot was a 7/10.)  Pain Interventions: Medication (See MAR), Elevated, Rest    Cognition:  Cognition  Overall Cognitive Status: Within Functional Limits    General Assessments:  General Observation  General Observation: Flat affect, but cooperative.   Activity Tolerance  Endurance: Decreased tolerance for upright activites     Strength  Strength Comments: WFL in LE's.        Postural Control  Postural Control: Within Functional Limits  Posture Comment: 5'9'' ht..          Functional Assessments:     Bed Mobility  Bed Mobility: Yes  Bed Mobility 1  Bed Mobility 1: Supine to sitting, Sitting to supine  Level of Assistance 1: Minimum assistance (x 1.)  Transfers  Transfer: Yes  Transfer 1  Transfer From 1: Sit to, Stand to  Transfer Device 1: Walker  Transfer Level of Assistance 1: Contact guard  Ambulation/Gait Training  Ambulation/Gait Training Performed: Yes  Ambulation/Gait Training 1  Surface 1: Level tile  Device 1: Rolling walker  Gait Support Devices: Gait belt  Assistance 1: Contact guard  Comments/Distance (ft) 1: x 5 ft.. Fwd./Bwd..  Declines to walk farther due to pain.  Stairs  Stairs: No        Extremity/Trunk Assessments:        RLE   RLE : Within Functional Limits  LLE   LLE : Within Functional Limits    Treatments:           Bed Mobility  Bed Mobility: Yes  Bed Mobility 1  Bed Mobility 1: Supine to sitting, Sitting to supine  Level of Assistance 1: Minimum assistance (x 1.)  Ambulation/Gait Training  Ambulation/Gait Training Performed: Yes  Ambulation/Gait Training 1  Surface 1: Level tile  Device 1: Rolling walker  Gait Support Devices: Gait belt  Assistance 1: Contact guard  Comments/Distance (ft) 1: x 5 ft.. Fwd./Bwd..  Declines to walk farther due to pain.  Transfers  Transfer: Yes  Transfer 1  Transfer From 1: Sit to, Stand to  Transfer Device 1: Walker  Transfer Level of Assistance 1: Contact guard  Stairs  Stairs: No    Outcome Measures:  [facility] Basic Mobility  Turning from your back to your side while in a flat bed without using bedrails: A little  Moving from lying on your back to sitting on the side of a flat bed without using bedrails: A little  Moving to and from bed to chair (including a wheelchair): A little  Standing up from a chair using your arms (e.g. wheelchair or bedside chair): A little  To walk in hospital room: A little  Climbing 3-5 steps with railing: A lot  Basic Mobility - Total Score: 17     Goals:  Encounter Problems       Encounter Problems (Active)       Mobility       STG - Patient will ambulate x  ft. with w/w, CGA.   (Progressing)       Start:  05/31/25    Expected End:  06/14/25               PT Transfers       STG - Patient will perform bed mobility with ModIf. I.   (Progressing)       Start:  05/31/25    Expected End:  06/14/25            STG - Patient will transfer sit to and from stand INTO A W/W, with Modif. I.   (Progressing)       Start:  05/31/25    Expected End:  06/14/25               Pain - Adult          Safety       STG - Patient uses gait belt during all transfers, AND W/W AMB. TRNG..  (Progressing)       Start:  05/31/25    Expected End:   06/14/25                 Education Documentation  Body Mechanics, taught by Khurram Capellan, PT at 5/31/2025  3:49 PM.  Learner: Patient  Readiness: Acceptance  Method: Demonstration, Explanation  Response: Demonstrated Understanding, Needs Reinforcement    Home Exercise Program, taught by Khurrma Capellan, PT at 5/31/2025  3:49 PM.  Learner: Patient  Readiness: Acceptance  Method: Demonstration, Explanation  Response: Demonstrated Understanding, Needs Reinforcement    Mobility Training, taught by Khurram Capellan, PT at 5/31/2025  3:49 PM.  Learner: Patient  Readiness: Acceptance  Method: Demonstration, Explanation  Response: Demonstrated Understanding, Needs Reinforcement    Education Comments  No comments found.           [1]   Patient Active Problem List  Diagnosis    Abnormality of gait and mobility    Adenomatous polyp of colon    Alcoholic gastritis    Wears glasses    Vitamin D deficiency    Folic acid deficiency    Vitamin B12 deficiency    Use of cane as ambulatory aid    Urinary urgency    Tubular adenoma of colon    Skin cancer    Scalp hematoma    Sacroiliac pain    Reactive airway disease (HHS-HCC)    Postural dizziness    Peripheral neuropathy    Paroxysmal atrial fibrillation (Multi)    Osteopenia    Osteoarthritis    Orthostatic hypotension    Noncompliance with therapeutic plan    Male erectile disorder of organic origin    Major depressive disorder in partial remission    Lumbar radiculopathy    Shoulder pain    Left elbow pain    Iron deficiency    Hypoxemia    Hypothyroidism    Hypokalemia    Hyperlipidemia    History of paroxysmal supraventricular tachycardia    Gastroesophageal reflux disease    Falling    Eczema    Early cataracts, bilateral    Situational depression    Depression, controlled    Cubital tunnel syndrome on left    Continuous chronic alcoholism (Multi)    Congenital pes planus    Colon polyps    Chronic pain of right knee    Pain in joints of both feet    Neuropathic  pain of both feet    Left foot pain    Chronic hip pain after total replacement of right hip joint    Cellulitis    Trigger ring finger of left hand    Carpal tunnel syndrome    Benign essential hypertension    BPH with obstruction/lower urinary tract symptoms    Anxiety    Macrocytic anemia    Anemia    Amputation of toe of right foot    ALT (SGPT) level raised    Allergic rhinitis    Back pain    History of left hip replacement    Elevated bilirubin    Elevated prostate specific antigen (PSA)    Frequent falls    History of medication noncompliance    Alcohol abuse with intoxication    Restrictive lung disease    Nocturnal hypoxemia    Peripheral vascular disease, unspecified    Asthmatic breathing    Mild intermittent asthma    Fall    NSTEMI (non-ST elevated myocardial infarction) (Multi)    Traumatic rhabdomyolysis    DNR (do not resuscitate)    Elevated glucose    Alcohol-induced polyneuropathy (Multi)    Stage 3a chronic kidney disease (Multi)    Heart failure    Pressure ulcer of left ankle, unstageable (Multi)    Foot infection    Osteomyelitis of third toe of right foot (Multi)    Encounter for examination for admission to nursing home    COPD exacerbation (Multi)    Acute and chronic respiratory failure with hypoxia    Type 2 diabetes mellitus with diabetic autonomic neuropathy, without long-term current use of insulin    Lives alone with help available    Alcohol use, unspecified with other alcohol-induced disorder (Multi)    Atrial fibrillation (Multi)    Asthma with chronic obstructive pulmonary disease (COPD) (Multi)    Pressure ulcer of other site, unstageable (Multi)    Class 1 obesity with serious comorbidity and body mass index (BMI) of 33.0 to 33.9 in adult    Uses roller walker    On supplemental oxygen therapy    Osteomyelitis of right foot (Multi)    History of amputation of toe (Multi)    Urinary tract infection without hematuria    Acute cellulitis

## 2025-06-01 ENCOUNTER — APPOINTMENT (OUTPATIENT)
Dept: RADIOLOGY | Facility: HOSPITAL | Age: 70
End: 2025-06-01
Payer: MEDICARE

## 2025-06-01 VITALS
HEART RATE: 63 BPM | DIASTOLIC BLOOD PRESSURE: 51 MMHG | SYSTOLIC BLOOD PRESSURE: 105 MMHG | HEIGHT: 72 IN | RESPIRATION RATE: 16 BRPM | TEMPERATURE: 97.5 F | BODY MASS INDEX: 35.28 KG/M2 | OXYGEN SATURATION: 97 % | WEIGHT: 260.5 LBS

## 2025-06-01 DIAGNOSIS — R39.15 URINARY URGENCY: ICD-10-CM

## 2025-06-01 DIAGNOSIS — E53.8 VITAMIN B 12 DEFICIENCY: ICD-10-CM

## 2025-06-01 DIAGNOSIS — E03.9 HYPOTHYROIDISM, UNSPECIFIED TYPE: ICD-10-CM

## 2025-06-01 DIAGNOSIS — K29.20 CHRONIC ALCOHOLIC GASTRITIS, PRESENCE OF BLEEDING UNSPECIFIED: ICD-10-CM

## 2025-06-01 DIAGNOSIS — F41.9 ANXIETY: ICD-10-CM

## 2025-06-01 LAB
ALBUMIN SERPL BCP-MCNC: 3.2 G/DL (ref 3.4–5)
ANION GAP SERPL CALC-SCNC: 10 MMOL/L (ref 10–20)
BACTERIA BLD CULT: NORMAL
BACTERIA BLD CULT: NORMAL
BACTERIA SPEC CULT: ABNORMAL
BASOPHILS # BLD AUTO: 0.02 X10*3/UL (ref 0–0.1)
BASOPHILS NFR BLD AUTO: 0.2 %
BUN SERPL-MCNC: 14 MG/DL (ref 6–23)
CALCIUM SERPL-MCNC: 8.6 MG/DL (ref 8.6–10.3)
CHLORIDE SERPL-SCNC: 101 MMOL/L (ref 98–107)
CO2 SERPL-SCNC: 32 MMOL/L (ref 21–32)
CREAT SERPL-MCNC: 1.23 MG/DL (ref 0.5–1.3)
EGFRCR SERPLBLD CKD-EPI 2021: 63 ML/MIN/1.73M*2
EOSINOPHIL # BLD AUTO: 0.4 X10*3/UL (ref 0–0.4)
EOSINOPHIL NFR BLD AUTO: 4.8 %
ERYTHROCYTE [DISTWIDTH] IN BLOOD BY AUTOMATED COUNT: 15.1 % (ref 11.5–14.5)
GLUCOSE SERPL-MCNC: 102 MG/DL (ref 74–99)
GRAM STN SPEC: ABNORMAL
GRAM STN SPEC: ABNORMAL
HCT VFR BLD AUTO: 35 % (ref 41–52)
HGB BLD-MCNC: 10.9 G/DL (ref 13.5–17.5)
IMM GRANULOCYTES # BLD AUTO: 0.05 X10*3/UL (ref 0–0.5)
IMM GRANULOCYTES NFR BLD AUTO: 0.6 % (ref 0–0.9)
LYMPHOCYTES # BLD AUTO: 1.19 X10*3/UL (ref 0.8–3)
LYMPHOCYTES NFR BLD AUTO: 14.1 %
MAGNESIUM SERPL-MCNC: 2.19 MG/DL (ref 1.6–2.4)
MCH RBC QN AUTO: 28.4 PG (ref 26–34)
MCHC RBC AUTO-ENTMCNC: 31.1 G/DL (ref 32–36)
MCV RBC AUTO: 91 FL (ref 80–100)
MONOCYTES # BLD AUTO: 0.75 X10*3/UL (ref 0.05–0.8)
MONOCYTES NFR BLD AUTO: 8.9 %
NEUTROPHILS # BLD AUTO: 6.01 X10*3/UL (ref 1.6–5.5)
NEUTROPHILS NFR BLD AUTO: 71.4 %
NRBC BLD-RTO: 0 /100 WBCS (ref 0–0)
PHOSPHATE SERPL-MCNC: 2.8 MG/DL (ref 2.5–4.9)
PLATELET # BLD AUTO: 209 X10*3/UL (ref 150–450)
POTASSIUM SERPL-SCNC: 4.1 MMOL/L (ref 3.5–5.3)
RBC # BLD AUTO: 3.84 X10*6/UL (ref 4.5–5.9)
SODIUM SERPL-SCNC: 139 MMOL/L (ref 136–145)
VANCOMYCIN TROUGH SERPL-MCNC: 18.2 UG/ML (ref 5–20)
WBC # BLD AUTO: 8.4 X10*3/UL (ref 4.4–11.3)

## 2025-06-01 PROCEDURE — 83735 ASSAY OF MAGNESIUM: CPT

## 2025-06-01 PROCEDURE — 94640 AIRWAY INHALATION TREATMENT: CPT

## 2025-06-01 PROCEDURE — 85025 COMPLETE CBC W/AUTO DIFF WBC: CPT

## 2025-06-01 PROCEDURE — 2500000004 HC RX 250 GENERAL PHARMACY W/ HCPCS (ALT 636 FOR OP/ED): Mod: JZ

## 2025-06-01 PROCEDURE — 2500000005 HC RX 250 GENERAL PHARMACY W/O HCPCS: Performed by: EMERGENCY MEDICINE

## 2025-06-01 PROCEDURE — 2500000002 HC RX 250 W HCPCS SELF ADMINISTERED DRUGS (ALT 637 FOR MEDICARE OP, ALT 636 FOR OP/ED): Performed by: STUDENT IN AN ORGANIZED HEALTH CARE EDUCATION/TRAINING PROGRAM

## 2025-06-01 PROCEDURE — 76937 US GUIDE VASCULAR ACCESS: CPT

## 2025-06-01 PROCEDURE — 2500000001 HC RX 250 WO HCPCS SELF ADMINISTERED DRUGS (ALT 637 FOR MEDICARE OP)

## 2025-06-01 PROCEDURE — 2500000004 HC RX 250 GENERAL PHARMACY W/ HCPCS (ALT 636 FOR OP/ED): Mod: JZ | Performed by: INTERNAL MEDICINE

## 2025-06-01 PROCEDURE — 2500000002 HC RX 250 W HCPCS SELF ADMINISTERED DRUGS (ALT 637 FOR MEDICARE OP, ALT 636 FOR OP/ED)

## 2025-06-01 PROCEDURE — 80069 RENAL FUNCTION PANEL: CPT

## 2025-06-01 PROCEDURE — 73718 MRI LOWER EXTREMITY W/O DYE: CPT | Mod: RT

## 2025-06-01 PROCEDURE — 2500000004 HC RX 250 GENERAL PHARMACY W/ HCPCS (ALT 636 FOR OP/ED): Mod: JZ | Performed by: STUDENT IN AN ORGANIZED HEALTH CARE EDUCATION/TRAINING PROGRAM

## 2025-06-01 PROCEDURE — 36415 COLL VENOUS BLD VENIPUNCTURE: CPT

## 2025-06-01 PROCEDURE — 99233 SBSQ HOSP IP/OBS HIGH 50: CPT

## 2025-06-01 PROCEDURE — 80202 ASSAY OF VANCOMYCIN: CPT | Performed by: STUDENT IN AN ORGANIZED HEALTH CARE EDUCATION/TRAINING PROGRAM

## 2025-06-01 PROCEDURE — 73718 MRI LOWER EXTREMITY W/O DYE: CPT | Mod: RIGHT SIDE | Performed by: RADIOLOGY

## 2025-06-01 PROCEDURE — 1100000001 HC PRIVATE ROOM DAILY

## 2025-06-01 RX ORDER — BENZONATATE 100 MG/1
100 CAPSULE ORAL 3 TIMES DAILY PRN
Status: DISCONTINUED | OUTPATIENT
Start: 2025-06-01 | End: 2025-06-05 | Stop reason: HOSPADM

## 2025-06-01 RX ADMIN — AMLODIPINE BESYLATE 2.5 MG: 2.5 TABLET ORAL at 08:46

## 2025-06-01 RX ADMIN — IPRATROPIUM BROMIDE AND ALBUTEROL SULFATE 3 ML: 2.5; .5 SOLUTION RESPIRATORY (INHALATION) at 13:38

## 2025-06-01 RX ADMIN — PANTOPRAZOLE SODIUM 40 MG: 40 TABLET, DELAYED RELEASE ORAL at 14:59

## 2025-06-01 RX ADMIN — ENOXAPARIN SODIUM 40 MG: 40 INJECTION SUBCUTANEOUS at 20:37

## 2025-06-01 RX ADMIN — BUSPIRONE HYDROCHLORIDE 5 MG: 5 TABLET ORAL at 20:37

## 2025-06-01 RX ADMIN — Medication 4 L/MIN: at 19:44

## 2025-06-01 RX ADMIN — AMPICILLIN SODIUM AND SULBACTAM SODIUM 3 G: 2; 1 INJECTION, POWDER, FOR SOLUTION INTRAMUSCULAR; INTRAVENOUS at 00:22

## 2025-06-01 RX ADMIN — ASPIRIN 81 MG CHEWABLE TABLET 81 MG: 81 TABLET CHEWABLE at 08:46

## 2025-06-01 RX ADMIN — AMPICILLIN SODIUM AND SULBACTAM SODIUM 3 G: 2; 1 INJECTION, POWDER, FOR SOLUTION INTRAMUSCULAR; INTRAVENOUS at 20:34

## 2025-06-01 RX ADMIN — TAMSULOSIN HYDROCHLORIDE 0.4 MG: 0.4 CAPSULE ORAL at 08:46

## 2025-06-01 RX ADMIN — PREGABALIN 150 MG: 150 CAPSULE ORAL at 20:36

## 2025-06-01 RX ADMIN — AMPICILLIN SODIUM AND SULBACTAM SODIUM 3 G: 2; 1 INJECTION, POWDER, FOR SOLUTION INTRAMUSCULAR; INTRAVENOUS at 11:00

## 2025-06-01 RX ADMIN — IPRATROPIUM BROMIDE AND ALBUTEROL SULFATE 3 ML: 2.5; .5 SOLUTION RESPIRATORY (INHALATION) at 19:43

## 2025-06-01 RX ADMIN — BENZONATATE 100 MG: 100 CAPSULE ORAL at 20:49

## 2025-06-01 RX ADMIN — BUSPIRONE HYDROCHLORIDE 5 MG: 5 TABLET ORAL at 14:59

## 2025-06-01 RX ADMIN — FERROUS SULFATE TAB 325 MG (65 MG ELEMENTAL FE) 1 TABLET: 325 (65 FE) TAB at 08:46

## 2025-06-01 RX ADMIN — HYDROMORPHONE HYDROCHLORIDE 0.5 MG: 1 INJECTION, SOLUTION INTRAMUSCULAR; INTRAVENOUS; SUBCUTANEOUS at 23:29

## 2025-06-01 RX ADMIN — MIRTAZAPINE 15 MG: 15 TABLET, FILM COATED ORAL at 20:37

## 2025-06-01 RX ADMIN — AMPICILLIN SODIUM AND SULBACTAM SODIUM 3 G: 2; 1 INJECTION, POWDER, FOR SOLUTION INTRAMUSCULAR; INTRAVENOUS at 05:46

## 2025-06-01 RX ADMIN — BUDESONIDE 0.25 MG: 0.25 INHALANT RESPIRATORY (INHALATION) at 07:57

## 2025-06-01 RX ADMIN — ATORVASTATIN CALCIUM 40 MG: 40 TABLET, FILM COATED ORAL at 20:37

## 2025-06-01 RX ADMIN — IPRATROPIUM BROMIDE AND ALBUTEROL SULFATE 3 ML: 2.5; .5 SOLUTION RESPIRATORY (INHALATION) at 07:57

## 2025-06-01 RX ADMIN — Medication 4 L/MIN: at 07:57

## 2025-06-01 RX ADMIN — BUSPIRONE HYDROCHLORIDE 5 MG: 5 TABLET ORAL at 08:46

## 2025-06-01 RX ADMIN — DULOXETINE HYDROCHLORIDE 60 MG: 60 CAPSULE, DELAYED RELEASE ORAL at 20:37

## 2025-06-01 RX ADMIN — BUDESONIDE 0.25 MG: 0.25 INHALANT RESPIRATORY (INHALATION) at 19:43

## 2025-06-01 RX ADMIN — DULOXETINE HYDROCHLORIDE 60 MG: 60 CAPSULE, DELAYED RELEASE ORAL at 08:45

## 2025-06-01 RX ADMIN — Medication 1500 MG: at 15:00

## 2025-06-01 RX ADMIN — MONTELUKAST 10 MG: 10 TABLET, FILM COATED ORAL at 20:37

## 2025-06-01 RX ADMIN — Medication 4 L/MIN: at 13:38

## 2025-06-01 RX ADMIN — PROPRANOLOL HYDROCHLORIDE 10 MG: 20 TABLET ORAL at 20:37

## 2025-06-01 RX ADMIN — PREGABALIN 150 MG: 150 CAPSULE ORAL at 08:46

## 2025-06-01 RX ADMIN — ESCITALOPRAM OXALATE 20 MG: 20 TABLET ORAL at 08:45

## 2025-06-01 RX ADMIN — PANTOPRAZOLE SODIUM 40 MG: 40 TABLET, DELAYED RELEASE ORAL at 05:46

## 2025-06-01 RX ADMIN — GUAIFENESIN, DEXTROMETHORPHAN HBR 1 TABLET: 600; 30 TABLET ORAL at 04:24

## 2025-06-01 RX ADMIN — PREGABALIN 150 MG: 150 CAPSULE ORAL at 14:59

## 2025-06-01 RX ADMIN — PROPRANOLOL HYDROCHLORIDE 10 MG: 20 TABLET ORAL at 08:46

## 2025-06-01 RX ADMIN — LEVOTHYROXINE SODIUM 50 MCG: 0.05 TABLET ORAL at 05:46

## 2025-06-01 SDOH — HEALTH STABILITY: MENTAL HEALTH
DO YOU FEEL STRESS - TENSE, RESTLESS, NERVOUS, OR ANXIOUS, OR UNABLE TO SLEEP AT NIGHT BECAUSE YOUR MIND IS TROUBLED ALL THE TIME - THESE DAYS?: ONLY A LITTLE

## 2025-06-01 SDOH — SOCIAL STABILITY: SOCIAL NETWORK: HOW OFTEN DO YOU GET TOGETHER WITH FRIENDS OR RELATIVES?: NEVER

## 2025-06-01 SDOH — SOCIAL STABILITY: SOCIAL INSECURITY: ARE YOU MARRIED, WIDOWED, DIVORCED, SEPARATED, NEVER MARRIED, OR LIVING WITH A PARTNER?: WIDOWED

## 2025-06-01 SDOH — HEALTH STABILITY: PHYSICAL HEALTH: ON AVERAGE, HOW MANY MINUTES DO YOU ENGAGE IN EXERCISE AT THIS LEVEL?: 0 MIN

## 2025-06-01 SDOH — SOCIAL STABILITY: SOCIAL NETWORK: HOW OFTEN DO YOU ATTEND MEETINGS OF THE CLUBS OR ORGANIZATIONS YOU BELONG TO?: NEVER

## 2025-06-01 SDOH — HEALTH STABILITY: PHYSICAL HEALTH: ON AVERAGE, HOW MANY DAYS PER WEEK DO YOU ENGAGE IN MODERATE TO STRENUOUS EXERCISE (LIKE A BRISK WALK)?: 0 DAYS

## 2025-06-01 SDOH — SOCIAL STABILITY: SOCIAL NETWORK: HOW OFTEN DO YOU ATTEND CHURCH OR RELIGIOUS SERVICES?: NEVER

## 2025-06-01 ASSESSMENT — PAIN DESCRIPTION - ORIENTATION: ORIENTATION: ANTERIOR;RIGHT

## 2025-06-01 ASSESSMENT — PAIN - FUNCTIONAL ASSESSMENT: PAIN_FUNCTIONAL_ASSESSMENT: 0-10

## 2025-06-01 ASSESSMENT — PAIN SCALES - GENERAL
PAINLEVEL_OUTOF10: 8
PAINLEVEL_OUTOF10: 0 - NO PAIN

## 2025-06-01 ASSESSMENT — PAIN DESCRIPTION - LOCATION: LOCATION: SHOULDER

## 2025-06-01 ASSESSMENT — PAIN DESCRIPTION - DESCRIPTORS: DESCRIPTORS: ACHING

## 2025-06-01 NOTE — PROGRESS NOTES
06/01/25 1015   Discharge Planning   Assistance Needed Residence [facility]  and [facility], rollator and wheelchair   Number of Stairs to Enter Residence 3   Number of Stairs Within Residence 17   Who is requesting discharge planning? Provider   Home or Post Acute Services In home services   Type of Home Care Services Home nursing visits   Expected Discharge Disposition SNF   Does the patient need discharge transport arranged? Yes   RoundTrip coordination needed? No   Has discharge transport been arranged? No   Patient Choice   Provider Choice list and CMS website (https://medicare.gov/care-compare#search) for post-acute Quality and Resource Measure Data were provided and reviewed with: Patient   Patient / Family choosing to utilize agency / facility established prior to hospitalization No   Stroke Family Assessment   Stroke Family Assessment Needed No   Intensity of Service   Intensity of Service 0-30 min     Introduced myself and my role to patient. Confirmed demographics. PCP is Dr. Haskins. Uses  pharmacy. [facility]. Residence [facility] has been seeing patient. Does want to continue with them if returns home. Notified them of hospitalization. Has had 3 reported falls.Last fall he was not able to get up.  Waiting on final culture results. He is agreeable to go to a SNF for continued rehab and wound care. Choicelist form Care Port given. O2 4L baseline from [facility].

## 2025-06-01 NOTE — ASSESSMENT & PLAN NOTE
70yo gentleman with PMHx of recent admission for osteomyelitis s/p amputation 1st, 4th and 5th toes, Recurrent syncope secondary to orthostatic hypotension/autonomic dysfunction/autonomic neuropathy, restrictive lung disease on 4 to 5 L nasal cannula at baseline, PSVT on propranolol, CAD w/ CCTA (02/2024) - 50% stenosis of LAD - 25-50% stenosis of RCA, alcohol use disorder c/b severe alcoholic polyneuropathy with autonomic dysfunction who presents for right lower extremity  swelling as well as purulent drainage from surgical wound.  Patient recently discharged on 5/5/2025 at that time he underwent amputation of right 1st, 4th and 5th toe, discharged home with Augmentin and doxycycline for 14 days.  Discharged to SNF however he left AMA after 24.  Patient started on antibiotic and admitted for further workup and evaluation.

## 2025-06-01 NOTE — PROGRESS NOTES
Vancomycin Dosing by Pharmacy- FOLLOW UP    Angus Redman is a 69 y.o. year old male who Pharmacy has been consulted for vancomycin dosing for cellulitis, skin and soft tissue. Based on the patient's indication and renal status this patient is being dosed based on a goal AUC of 400-600.     Renal function is currently stable.    Current vancomycin dose: 1500 mg given every 24 hours    Estimated vancomycin AUC on current dose: 539 mg/L.hr     Visit Vitals  /58 (BP Location: Left arm, Patient Position: Lying)   Pulse 81   Temp 36.1 °C (97 °F) (Temporal)   Resp 20        Lab Results   Component Value Date    CREATININE 1.23 2025    CREATININE 1.41 (H) 2025    CREATININE 1.73 (H) 2025    CREATININE 1.64 (H) 2025        Patient weight is as follows:   Vitals:    25 2230   Weight: 118 kg (260 lb 8 oz)       Cultures:  No results found for the encounter in last 14 days.       I/O last 3 completed shifts:  In: 1400 (11.8 mL/kg) [IV Piggyback:1400]  Out: 2425 (20.5 mL/kg) [Urine:2425 (0.6 mL/kg/hr)]  Weight: 118.2 kg   I/O during current shift:  No intake/output data recorded.    Temp (24hrs), Av.1 °C (97 °F), Min:36 °C (96.8 °F), Max:36.3 °C (97.3 °F)      Assessment/Plan    Within goal AUC range. Continue current vancomycin regimen.    This dosing regimen is predicted by [employer] to result in the following pharmacokinetic parameters:  <<<<<PASTE [employer] DATA HERE>>>>>  Regimen: 1500 mg IV every 24 hours.  Start time: 15:00 on 2025  Exposure target: AUC24 (range) 400-600 mg/L.hr   [id number]: 517 mg/L.hr  AUC24,ss: 539 mg/L.hr  Probability of AUC24 > 400: 98 %  Ctrough,ss: 15.9 mg/L  Probability of Ctrough,ss > 20: 17 %      The next level will be obtained on  at 0500. May be obtained sooner if clinically indicated.   Will continue to monitor renal function daily while on vancomycin and order serum creatinine at least every 48 hours if not already ordered.  Follow for  continued vancomycin needs, clinical response, and signs/symptoms of toxicity.       Gabriela Hoover, PharmD

## 2025-06-01 NOTE — PROGRESS NOTES
Angus Redman is a 69 y.o. male on day 2 of admission presenting with Acute cellulitis.      Subjective   Patient seen and examined this morning at bedside.  No acute event overnight.  Patient reported he has right foot pain despite he is known to have neuropathy.  He denies fever, chest pain, shortness of breath, chills.       Objective     Last Recorded Vitals  /58 (BP Location: Left arm, Patient Position: Lying)   Pulse 81   Temp 36.1 °C (97 °F) (Temporal)   Resp 20   Wt 118 kg (260 lb 8 oz)   SpO2 95%   Intake/Output last 3 Shifts:    Intake/Output Summary (Last 24 hours) at 6/1/2025 0810  Last data filed at 6/1/2025 0645  Gross per 24 hour   Intake 800 ml   Output 2425 ml   Net -1625 ml       Admission Weight  Weight: 113 kg (250 lb) (05/30/25 1350)    Daily Weight  05/30/25 : 118 kg (260 lb 8 oz)    Image Results  XR foot right 3+ views  Narrative: Interpreted By:  Sharlene Quiñones,   STUDY:  XR FOOT RIGHT 3+ VIEWS; ;  5/30/2025 6:45 pm      INDICATION:  Signs/Symptoms:R foot pain/swelling.          COMPARISON:  05/02/2025      ACCESSION NUMBER(S):  [id number]      ORDERING CLINICIAN:  KEN GONZALEZ      FINDINGS:  Three views of the right foot      Redemonstrated postsurgical changes of 1st through 5th digit  amputations at the level of the MTP. A chronic deformity most  pronounced at the 2nd metatarsal joint again noted. Sclerotic and  slightly irregular appearance of the head of the 1st metatarsal again  noted. Probable remote fracture of the distal 5th metatarsal similar  to prior imaging. Overlying soft tissue with irregularity and  possible air noted. Diffuse soft tissue swelling about the ankle and  forefoot.      Scattered mild degenerative changes including the midfoot. Calcaneal  plantar enthesophyte.      Impression: Status post 1st through 5th digit amputation with chronic fractures  and similar osseous irregularity of the 1st metatarsal head.  Overlying soft tissue irregularity,  swelling and possible  subcutaneous air noted. MRI may be obtained as clinically indicated  for evaluation of osteomyelitis.          MACRO:  None      Signed by: Sharlene Quiñones 5/30/2025 7:16 PM  Dictation workstation:   [id number]  XR chest 1 view  Narrative: Interpreted By:  Jerry Rodriguez,   STUDY:  XR CHEST 1 VIEW; 5/30/2025 4:15 pm      INDICATION:  CLINICAL INFORMATION: Signs/Symptoms:sob.      COMPARISON:  Chest radiograph 12/26/2024. CT chest 03/26/2025      ACCESSION NUMBER(S):  [id number]      ORDERING CLINICIAN:  KEN GONZALEZ      TECHNIQUE:  Portable chest one view.      FINDINGS:  The cardiac size is indeterminate in view of the AP projection.  Calcific pleural plaque is present on the right. The pulmonary  vasculature is slightly indistinct suggesting mild pulmonary vascular  congestion. More focal atelectasis and/or infiltrate is present at  the right base. Small effusions may be obscured on this portable  study.      Impression: 1. Calcific pleural plaque right hemithorax.  2. Right basilar density may be secondary to atelectasis and/or  infiltrate in this patient with scarring/atelectasis at the pulmonary  bases on recent CT.  3. Small effusions are difficult to exclude and are suspected.      MACRO:  none      Signed by: Jerry Rodriguez 5/30/2025 4:48 PM  Dictation workstation:   [id number]      Physical Exam  Constitutional:       Appearance: Normal appearance.   HENT:      Head: Normocephalic and atraumatic.      Mouth/Throat:      Mouth: Mucous membranes are moist.   Cardiovascular:      Heart sounds: No murmur heard.  Pulmonary:      Breath sounds: No wheezing or rhonchi.   Abdominal:      Palpations: Abdomen is soft.      Tenderness: There is no abdominal tenderness.   Musculoskeletal:      Right lower leg: Edema present.      Left lower leg: No edema.      Comments: Right foot wrapped  Right leg erythematous, warm   Skin:     General: Skin is warm.   Neurological:      Mental Status: He  is alert and oriented to person, place, and time.      Motor: No weakness.         Relevant Results  Scheduled medications  Scheduled Medications[1]  Continuous medications  Continuous Medications[2]  PRN medications  PRN Medications[3]  Results for orders placed or performed during the hospital encounter of 05/30/25 (from the past 24 hours)   Vancomycin, Trough   Result Value Ref Range    Vancomycin, Trough 18.2 5.0 - 20.0 ug/mL   Renal Function Panel   Result Value Ref Range    Glucose 102 (H) 74 - 99 mg/dL    Sodium 139 136 - 145 mmol/L    Potassium 4.1 3.5 - 5.3 mmol/L    Chloride 101 98 - 107 mmol/L    Bicarbonate 32 21 - 32 mmol/L    Anion Gap 10 10 - 20 mmol/L    Urea Nitrogen 14 6 - 23 mg/dL    Creatinine 1.23 0.50 - 1.30 mg/dL    eGFR 63 >60 mL/min/1.73m*2    Calcium 8.6 8.6 - 10.3 mg/dL    Phosphorus 2.8 2.5 - 4.9 mg/dL    Albumin 3.2 (L) 3.4 - 5.0 g/dL   Magnesium   Result Value Ref Range    Magnesium 2.19 1.60 - 2.40 mg/dL   CBC and Auto Differential   Result Value Ref Range    WBC 8.4 4.4 - 11.3 x10*3/uL    nRBC 0.0 0.0 - 0.0 /100 WBCs    RBC 3.84 (L) 4.50 - 5.90 x10*6/uL    Hemoglobin 10.9 (L) 13.5 - 17.5 g/dL    Hematocrit 35.0 (L) 41.0 - 52.0 %    MCV 91 80 - 100 fL    MCH 28.4 26.0 - 34.0 pg    MCHC 31.1 (L) 32.0 - 36.0 g/dL    RDW 15.1 (H) 11.5 - 14.5 %    Platelets 209 150 - 450 x10*3/uL    Neutrophils % 71.4 40.0 - 80.0 %    Immature Granulocytes %, Automated 0.6 0.0 - 0.9 %    Lymphocytes % 14.1 13.0 - 44.0 %    Monocytes % 8.9 2.0 - 10.0 %    Eosinophils % 4.8 0.0 - 6.0 %    Basophils % 0.2 0.0 - 2.0 %    Neutrophils Absolute 6.01 (H) 1.60 - 5.50 x10*3/uL    Immature Granulocytes Absolute, Automated 0.05 0.00 - 0.50 x10*3/uL    Lymphocytes Absolute 1.19 0.80 - 3.00 x10*3/uL    Monocytes Absolute 0.75 0.05 - 0.80 x10*3/uL    Eosinophils Absolute 0.40 0.00 - 0.40 x10*3/uL    Basophils Absolute 0.02 0.00 - 0.10 x10*3/uL     *Note: Due to a large number of results and/or encounters for the  requested time period, some results have not been displayed. A complete set of results can be found in Results Review.     XR foot right 3+ views  Result Date: 5/30/2025  Interpreted By:  Sharlene Quiñones, STUDY: XR FOOT RIGHT 3+ VIEWS; ;  5/30/2025 6:45 pm   INDICATION: Signs/Symptoms:R foot pain/swelling.     COMPARISON: 05/02/2025   ACCESSION NUMBER(S): [id number]   ORDERING CLINICIAN: KEN GONZALEZ   FINDINGS: Three views of the right foot   Redemonstrated postsurgical changes of 1st through 5th digit amputations at the level of the MTP. A chronic deformity most pronounced at the 2nd metatarsal joint again noted. Sclerotic and slightly irregular appearance of the head of the 1st metatarsal again noted. Probable remote fracture of the distal 5th metatarsal similar to prior imaging. Overlying soft tissue with irregularity and possible air noted. Diffuse soft tissue swelling about the ankle and forefoot.   Scattered mild degenerative changes including the midfoot. Calcaneal plantar enthesophyte.       Status post 1st through 5th digit amputation with chronic fractures and similar osseous irregularity of the 1st metatarsal head. Overlying soft tissue irregularity, swelling and possible subcutaneous air noted. MRI may be obtained as clinically indicated for evaluation of osteomyelitis.     MACRO: None   Signed by: Sharlene Quiñones 5/30/2025 7:16 PM Dictation workstation:   [id number]    XR chest 1 view  Result Date: 5/30/2025  Interpreted By:  Jerry Rodriguez, STUDY: XR CHEST 1 VIEW; 5/30/2025 4:15 pm   INDICATION: CLINICAL INFORMATION: Signs/Symptoms:sob.   COMPARISON: Chest radiograph 12/26/2024. CT chest 03/26/2025   ACCESSION NUMBER(S): [id number]   ORDERING CLINICIAN: KEN GONZALEZ   TECHNIQUE: Portable chest one view.   FINDINGS: The cardiac size is indeterminate in view of the AP projection. Calcific pleural plaque is present on the right. The pulmonary vasculature is slightly indistinct suggesting mild  pulmonary vascular congestion. More focal atelectasis and/or infiltrate is present at the right base. Small effusions may be obscured on this portable study.       1. Calcific pleural plaque right hemithorax. 2. Right basilar density may be secondary to atelectasis and/or infiltrate in this patient with scarring/atelectasis at the pulmonary bases on recent CT. 3. Small effusions are difficult to exclude and are suspected.   MACRO: none   Signed by: Jerry Rodriguez 5/30/2025 4:48 PM Dictation workstation:   [id number]    XR foot right 3+ views  Result Date: 5/4/2025  Interpreted By:  Paty Coley, STUDY: XR FOOT RIGHT 3+ VIEWS 5/2/2025 2:17 pm   INDICATION: Signs/Symptoms:post op xray   COMPARISON: 05/01/2025   ACCESSION NUMBER(S): [id number]   ORDERING CLINICIAN: CARISSA BARONE   TECHNIQUE: Three views of right foot done portably   FINDINGS: There has now been amputation of the right great toe as well as the 4th and 5th toes at the level of the metatarsal phalangeal joints. No additional changes are demonstrated.   There are old healed fractures of the 2nd and 5th metatarsals with small plantar calcaneal spur.       Status post amputation of the right great toe, as well as 4th and 5th toes.   Signed by: Paty Coley 5/4/2025 6:43 AM Dictation workstation:   [id number]    Assessment & Plan  Acute cellulitis      70yo gentleman with PMHx of recent admission for osteomyelitis s/p amputation 1st, 4th and 5th toes, Recurrent syncope secondary to orthostatic hypotension/autonomic dysfunction/autonomic neuropathy, restrictive lung disease on 4 to 5 L nasal cannula at baseline, PSVT on propranolol, CAD w/ CCTA (02/2024) - 50% stenosis of LAD - 25-50% stenosis of RCA, alcohol use disorder c/b severe alcoholic polyneuropathy with autonomic dysfunction who presents for right lower extremity  swelling as well as purulent drainage from surgical wound.  Patient recently discharged on 5/5/2025 at that time he underwent  amputation of right 1st, 4th and 5th toe, discharged home with Augmentin and doxycycline for 14 days.  Discharged to SNF however he left AMA after 24.  Patient started on antibiotic and admitted for further workup and evaluation.  # Purulent right lower limb cellulitis  #Concern for osteomyelitis  #S/p amputation of right Pharis, 4th and 5th toe on 5/5/2025  #S/p Augmentin, doxycycline and Bactrim    -Continue Unasyn and vancomycin  -Podiatry consultation, appreciate recommendations. Jorgito recommended continue antibiotic and no emergent surgical intervention indicated.  Full-thickness excisional debridement of right foot wound  to and including subcutaneous tissue.  -Wound dressing daily by podiatry  - ID consultation, appreciate recommendation  - Wound culture--no polymorphonuclear leukocytes/rare gram-positive cocci  - Blood culture--- no growth at day 1  - MRI for right foot proxetil osteomyelitis--pending  - PT/OT recommended moderate intensity level of care  -Patient agree with going to SNF after discharge     Global plan of care  Diet: Cardiac diet  DVT prophylaxis: Lovenox  Telemetry: Not indicated  Consults: Podiatry, PT, OT,   Code Status: DNR and No Intubation and No ICU   Dispo: Anticipate 2-3 midnight for improvement and rule out of osteomyelitis           Wily Gonzales MD  Internal medicine, PGY1       [1] amLODIPine, 2.5 mg, oral, Daily  ampicillin-sulbactam, 3 g, intravenous, q6h  aspirin, 81 mg, oral, Daily  atorvastatin, 40 mg, oral, Nightly  budesonide, 0.25 mg, nebulization, BID   And  ipratropium-albuteroL, 3 mL, nebulization, TID  busPIRone, 5 mg, oral, TID  DULoxetine, 60 mg, oral, BID  [Held by provider] empagliflozin, 10 mg, oral, Daily  enoxaparin, 40 mg, subcutaneous, q24h  escitalopram, 20 mg, oral, Daily  ferrous sulfate, 1 tablet, oral, Daily with breakfast  levothyroxine, 50 mcg, oral, Daily  mirtazapine, 15 mg, oral, Nightly  montelukast, 10 mg, oral,  Nightly  pantoprazole, 40 mg, oral, BID AC  polyethylene glycol, 17 g, oral, Daily  pregabalin, 150 mg, oral, TID  propranolol, 10 mg, oral, BID  [Held by provider] spironolactone, 25 mg, oral, Daily  tamsulosin, 0.4 mg, oral, Daily  [Held by provider] torsemide, 20 mg, oral, Daily  vancomycin, 1,500 mg, intravenous, q24h  [2]    [3] PRN medications: dextromethorphan-guaifenesin, HYDROmorphone, ondansetron ODT **OR** ondansetron, oxygen, vancomycin

## 2025-06-01 NOTE — PROGRESS NOTES
Angus Redman is a 69 y.o. male on day 2 of admission presenting with Acute cellulitis.    Subjective   Pt examined and evaluated at bedside, found resting comfortably.  Patient states that he did not sleep well last night.  Pt states that their pain is well controlled, denies any constitutional symptoms, and has no other complaints at this time.        Objective     Vascular: DP and PT pulses palpable 2/4 bilaterally.  CFT under 5 seconds when tested at distal digits.  Skin temp warm to warm from proximal to distal.       Neuro: Protective sensation may absent, light tough sensation intact.  No Tinel's or Valleix's signs elicited.       Derm: Full-thickness ulceration noted to plantar aspect of first metatarsal head, left foot.  Wound base extends to subcutaneous tissue, no exposed bone or fascia.  No undermining, tracking, or tunneling.  No SOI noted at this time.   - Full thickness ulceration noted to subfirst metatarsal head, left foot.  Wound extends to subcutaneous tissue, no exposed bone or fascia.  No undermining, tracking, or tunneling.  Periwound hyperkeratotic tissue noted.  Minimal drainage.  No SOI noted at this time.  - Full-thickness ulceration noted to anterior aspect of hallux amputation site, right foot.  Wound extends to subcutaneous tissue, wound base approximately 80% fibrotic, 20% granular.  Serosanguineous drainage with mild periwound maceration.  Wound probes to bone, which appears hard and intact.  Periwound erythema noted with proximal streaking; +2 edema noted to RLE..     Musc: Digital amputations noted to right foot. No POP noted to any other area of the foot/ankle.          Last Recorded Vitals  Blood pressure 116/55, pulse 69, temperature 36.6 °C (97.9 °F), temperature source Temporal, resp. rate 16, height 1.829 m (6'), weight 118 kg (260 lb 8 oz), SpO2 94%.  Intake/Output last 3 Shifts:  I/O last 3 completed shifts:  In: 1400 (11.8 mL/kg) [IV Piggyback:1400]  Out: 2425 (20.5 mL/kg)  [Urine:2425 (0.6 mL/kg/hr)]  Weight: 118.2 kg     Relevant Results  Results for orders placed or performed during the hospital encounter of 05/30/25 (from the past 24 hours)   Vancomycin, Trough   Result Value Ref Range    Vancomycin, Trough 18.2 5.0 - 20.0 ug/mL   Renal Function Panel   Result Value Ref Range    Glucose 102 (H) 74 - 99 mg/dL    Sodium 139 136 - 145 mmol/L    Potassium 4.1 3.5 - 5.3 mmol/L    Chloride 101 98 - 107 mmol/L    Bicarbonate 32 21 - 32 mmol/L    Anion Gap 10 10 - 20 mmol/L    Urea Nitrogen 14 6 - 23 mg/dL    Creatinine 1.23 0.50 - 1.30 mg/dL    eGFR 63 >60 mL/min/1.73m*2    Calcium 8.6 8.6 - 10.3 mg/dL    Phosphorus 2.8 2.5 - 4.9 mg/dL    Albumin 3.2 (L) 3.4 - 5.0 g/dL   Magnesium   Result Value Ref Range    Magnesium 2.19 1.60 - 2.40 mg/dL   CBC and Auto Differential   Result Value Ref Range    WBC 8.4 4.4 - 11.3 x10*3/uL    nRBC 0.0 0.0 - 0.0 /100 WBCs    RBC 3.84 (L) 4.50 - 5.90 x10*6/uL    Hemoglobin 10.9 (L) 13.5 - 17.5 g/dL    Hematocrit 35.0 (L) 41.0 - 52.0 %    MCV 91 80 - 100 fL    MCH 28.4 26.0 - 34.0 pg    MCHC 31.1 (L) 32.0 - 36.0 g/dL    RDW 15.1 (H) 11.5 - 14.5 %    Platelets 209 150 - 450 x10*3/uL    Neutrophils % 71.4 40.0 - 80.0 %    Immature Granulocytes %, Automated 0.6 0.0 - 0.9 %    Lymphocytes % 14.1 13.0 - 44.0 %    Monocytes % 8.9 2.0 - 10.0 %    Eosinophils % 4.8 0.0 - 6.0 %    Basophils % 0.2 0.0 - 2.0 %    Neutrophils Absolute 6.01 (H) 1.60 - 5.50 x10*3/uL    Immature Granulocytes Absolute, Automated 0.05 0.00 - 0.50 x10*3/uL    Lymphocytes Absolute 1.19 0.80 - 3.00 x10*3/uL    Monocytes Absolute 0.75 0.05 - 0.80 x10*3/uL    Eosinophils Absolute 0.40 0.00 - 0.40 x10*3/uL    Basophils Absolute 0.02 0.00 - 0.10 x10*3/uL     *Note: Due to a large number of results and/or encounters for the requested time period, some results have not been displayed. A complete set of results can be found in Results Review.      Assessment & Plan  Acute  cellulitis      Assessment:  - Non-pressure ulcer, left and right foot  - Peripheral neuropathy     Plan:  - Pt examined and evaluated.  All findings discussed to patient to their satisfaction and understanding.  - Reviewed labs and chart.  No evidence of soft tissue emphysema or osteomyelitis noted on radiographs.  No leukocytosis noted, vital stable.  ESR/CRP elevated, lactate WNL.  MRI ordered of right foot, pending.  - Systemic conditions managed by primary team, antibiotics managed by ID.  - Performed dressing change consisting of Betadine paint, 4 x 4 gauze, ABD, Kerlix, Ace.  Dressings to be changed daily by podiatry.  - Erythema and edema significantly improved from initial presentation.  No emergent surgical intervention indicated at this time.  -Patient likely to be discharged to SNF.  Will continue to follow inpatient.  Please contact podiatry with any acute questions/concerns.      Boone Bull DPM  Podiatric Surgery  Doc Halo/Hiram BAUTISTA spent >35 minutes in the professional and overall care of this patient.        Boone Bull DPM

## 2025-06-01 NOTE — CARE PLAN
The patient's goals for the shift include      The clinical goals for the shift include jerometey

## 2025-06-02 ENCOUNTER — ANESTHESIA (OUTPATIENT)
Dept: OPERATING ROOM | Facility: HOSPITAL | Age: 70
End: 2025-06-02
Payer: MEDICARE

## 2025-06-02 ENCOUNTER — APPOINTMENT (OUTPATIENT)
Dept: RADIOLOGY | Facility: HOSPITAL | Age: 70
End: 2025-06-02
Payer: MEDICARE

## 2025-06-02 ENCOUNTER — ANESTHESIA EVENT (OUTPATIENT)
Dept: OPERATING ROOM | Facility: HOSPITAL | Age: 70
End: 2025-06-02
Payer: MEDICARE

## 2025-06-02 DIAGNOSIS — G62.9 PERIPHERAL NEUROPATHY: ICD-10-CM

## 2025-06-02 LAB
ALBUMIN SERPL BCP-MCNC: 3.1 G/DL (ref 3.4–5)
ANION GAP SERPL CALC-SCNC: 11 MMOL/L (ref 10–20)
BACTERIA SPEC CULT: ABNORMAL
BASOPHILS # BLD AUTO: 0.02 X10*3/UL (ref 0–0.1)
BASOPHILS NFR BLD AUTO: 0.3 %
BUN SERPL-MCNC: 18 MG/DL (ref 6–23)
CALCIUM SERPL-MCNC: 8.5 MG/DL (ref 8.6–10.3)
CHLORIDE SERPL-SCNC: 103 MMOL/L (ref 98–107)
CO2 SERPL-SCNC: 29 MMOL/L (ref 21–32)
CREAT SERPL-MCNC: 1.21 MG/DL (ref 0.5–1.3)
EGFRCR SERPLBLD CKD-EPI 2021: 64 ML/MIN/1.73M*2
EOSINOPHIL # BLD AUTO: 0.46 X10*3/UL (ref 0–0.4)
EOSINOPHIL NFR BLD AUTO: 6.1 %
ERYTHROCYTE [DISTWIDTH] IN BLOOD BY AUTOMATED COUNT: 15.1 % (ref 11.5–14.5)
GLUCOSE SERPL-MCNC: 123 MG/DL (ref 74–99)
GRAM STN SPEC: ABNORMAL
GRAM STN SPEC: ABNORMAL
HCT VFR BLD AUTO: 33.4 % (ref 41–52)
HGB BLD-MCNC: 10.5 G/DL (ref 13.5–17.5)
HOLD SPECIMEN: NORMAL
IMM GRANULOCYTES # BLD AUTO: 0.08 X10*3/UL (ref 0–0.5)
IMM GRANULOCYTES NFR BLD AUTO: 1.1 % (ref 0–0.9)
LYMPHOCYTES # BLD AUTO: 1.57 X10*3/UL (ref 0.8–3)
LYMPHOCYTES NFR BLD AUTO: 21 %
MAGNESIUM SERPL-MCNC: 2.22 MG/DL (ref 1.6–2.4)
MCH RBC QN AUTO: 29.2 PG (ref 26–34)
MCHC RBC AUTO-ENTMCNC: 31.4 G/DL (ref 32–36)
MCV RBC AUTO: 93 FL (ref 80–100)
MONOCYTES # BLD AUTO: 0.8 X10*3/UL (ref 0.05–0.8)
MONOCYTES NFR BLD AUTO: 10.7 %
NEUTROPHILS # BLD AUTO: 4.55 X10*3/UL (ref 1.6–5.5)
NEUTROPHILS NFR BLD AUTO: 60.8 %
NRBC BLD-RTO: 0 /100 WBCS (ref 0–0)
PHOSPHATE SERPL-MCNC: 4.1 MG/DL (ref 2.5–4.9)
PLATELET # BLD AUTO: 221 X10*3/UL (ref 150–450)
POTASSIUM SERPL-SCNC: 3.8 MMOL/L (ref 3.5–5.3)
RBC # BLD AUTO: 3.59 X10*6/UL (ref 4.5–5.9)
SODIUM SERPL-SCNC: 139 MMOL/L (ref 136–145)
WBC # BLD AUTO: 7.5 X10*3/UL (ref 4.4–11.3)

## 2025-06-02 PROCEDURE — 2500000001 HC RX 250 WO HCPCS SELF ADMINISTERED DRUGS (ALT 637 FOR MEDICARE OP)

## 2025-06-02 PROCEDURE — 2500000004 HC RX 250 GENERAL PHARMACY W/ HCPCS (ALT 636 FOR OP/ED): Performed by: STUDENT IN AN ORGANIZED HEALTH CARE EDUCATION/TRAINING PROGRAM

## 2025-06-02 PROCEDURE — 2720000007 HC OR 272 NO HCPCS: Performed by: STUDENT IN AN ORGANIZED HEALTH CARE EDUCATION/TRAINING PROGRAM

## 2025-06-02 PROCEDURE — 36415 COLL VENOUS BLD VENIPUNCTURE: CPT

## 2025-06-02 PROCEDURE — 83735 ASSAY OF MAGNESIUM: CPT

## 2025-06-02 PROCEDURE — 94640 AIRWAY INHALATION TREATMENT: CPT

## 2025-06-02 PROCEDURE — 2500000002 HC RX 250 W HCPCS SELF ADMINISTERED DRUGS (ALT 637 FOR MEDICARE OP, ALT 636 FOR OP/ED)

## 2025-06-02 PROCEDURE — 99232 SBSQ HOSP IP/OBS MODERATE 35: CPT

## 2025-06-02 PROCEDURE — 99100 ANES PT EXTEME AGE<1 YR&>70: CPT | Performed by: ANESTHESIOLOGY

## 2025-06-02 PROCEDURE — 3700000001 HC GENERAL ANESTHESIA TIME - INITIAL BASE CHARGE: Performed by: STUDENT IN AN ORGANIZED HEALTH CARE EDUCATION/TRAINING PROGRAM

## 2025-06-02 PROCEDURE — 80069 RENAL FUNCTION PANEL: CPT

## 2025-06-02 PROCEDURE — 85025 COMPLETE CBC W/AUTO DIFF WBC: CPT

## 2025-06-02 PROCEDURE — 73620 X-RAY EXAM OF FOOT: CPT | Mod: RT

## 2025-06-02 PROCEDURE — A11044 PR DEBRIDEMENT, SKIN, SUB-Q TISSUE,MUSCLE,BONE,=<20 SQ CM: Performed by: ANESTHESIOLOGY

## 2025-06-02 PROCEDURE — A11044 PR DEBRIDEMENT, SKIN, SUB-Q TISSUE,MUSCLE,BONE,=<20 SQ CM: Performed by: NURSE ANESTHETIST, CERTIFIED REGISTERED

## 2025-06-02 PROCEDURE — 0QBN0ZZ EXCISION OF RIGHT METATARSAL, OPEN APPROACH: ICD-10-PCS | Performed by: STUDENT IN AN ORGANIZED HEALTH CARE EDUCATION/TRAINING PROGRAM

## 2025-06-02 PROCEDURE — 3700000002 HC GENERAL ANESTHESIA TIME - EACH INCREMENTAL 1 MINUTE: Performed by: STUDENT IN AN ORGANIZED HEALTH CARE EDUCATION/TRAINING PROGRAM

## 2025-06-02 PROCEDURE — 7100000001 HC RECOVERY ROOM TIME - INITIAL BASE CHARGE: Performed by: STUDENT IN AN ORGANIZED HEALTH CARE EDUCATION/TRAINING PROGRAM

## 2025-06-02 PROCEDURE — 2500000004 HC RX 250 GENERAL PHARMACY W/ HCPCS (ALT 636 FOR OP/ED): Performed by: NURSE ANESTHETIST, CERTIFIED REGISTERED

## 2025-06-02 PROCEDURE — 2500000004 HC RX 250 GENERAL PHARMACY W/ HCPCS (ALT 636 FOR OP/ED)

## 2025-06-02 PROCEDURE — 1100000001 HC PRIVATE ROOM DAILY

## 2025-06-02 PROCEDURE — 2500000002 HC RX 250 W HCPCS SELF ADMINISTERED DRUGS (ALT 637 FOR MEDICARE OP, ALT 636 FOR OP/ED): Performed by: STUDENT IN AN ORGANIZED HEALTH CARE EDUCATION/TRAINING PROGRAM

## 2025-06-02 PROCEDURE — 7100000002 HC RECOVERY ROOM TIME - EACH INCREMENTAL 1 MINUTE: Performed by: STUDENT IN AN ORGANIZED HEALTH CARE EDUCATION/TRAINING PROGRAM

## 2025-06-02 PROCEDURE — 3600000008 HC OR TIME - EACH INCREMENTAL 1 MINUTE - PROCEDURE LEVEL THREE: Performed by: STUDENT IN AN ORGANIZED HEALTH CARE EDUCATION/TRAINING PROGRAM

## 2025-06-02 PROCEDURE — 3600000003 HC OR TIME - INITIAL BASE CHARGE - PROCEDURE LEVEL THREE: Performed by: STUDENT IN AN ORGANIZED HEALTH CARE EDUCATION/TRAINING PROGRAM

## 2025-06-02 PROCEDURE — 2500000004 HC RX 250 GENERAL PHARMACY W/ HCPCS (ALT 636 FOR OP/ED): Performed by: INTERNAL MEDICINE

## 2025-06-02 PROCEDURE — 73620 X-RAY EXAM OF FOOT: CPT | Mod: RIGHT SIDE | Performed by: STUDENT IN AN ORGANIZED HEALTH CARE EDUCATION/TRAINING PROGRAM

## 2025-06-02 RX ORDER — ALBUTEROL SULFATE 0.83 MG/ML
2.5 SOLUTION RESPIRATORY (INHALATION) ONCE AS NEEDED
Status: DISCONTINUED | OUTPATIENT
Start: 2025-06-02 | End: 2025-06-02 | Stop reason: HOSPADM

## 2025-06-02 RX ORDER — LIDOCAINE HYDROCHLORIDE 10 MG/ML
0.1 INJECTION, SOLUTION INFILTRATION; PERINEURAL ONCE
Status: DISCONTINUED | OUTPATIENT
Start: 2025-06-02 | End: 2025-06-02 | Stop reason: HOSPADM

## 2025-06-02 RX ORDER — SODIUM CHLORIDE, SODIUM LACTATE, POTASSIUM CHLORIDE, CALCIUM CHLORIDE 600; 310; 30; 20 MG/100ML; MG/100ML; MG/100ML; MG/100ML
100 INJECTION, SOLUTION INTRAVENOUS CONTINUOUS
Status: DISCONTINUED | OUTPATIENT
Start: 2025-06-02 | End: 2025-06-02 | Stop reason: HOSPADM

## 2025-06-02 RX ORDER — FENTANYL CITRATE 50 UG/ML
12.5 INJECTION, SOLUTION INTRAMUSCULAR; INTRAVENOUS EVERY 5 MIN PRN
Status: DISCONTINUED | OUTPATIENT
Start: 2025-06-02 | End: 2025-06-02 | Stop reason: HOSPADM

## 2025-06-02 RX ORDER — SODIUM CHLORIDE, SODIUM LACTATE, POTASSIUM CHLORIDE, CALCIUM CHLORIDE 600; 310; 30; 20 MG/100ML; MG/100ML; MG/100ML; MG/100ML
100 INJECTION, SOLUTION INTRAVENOUS CONTINUOUS
Status: ACTIVE | OUTPATIENT
Start: 2025-06-02 | End: 2025-06-03

## 2025-06-02 RX ORDER — ACETAMINOPHEN 325 MG/1
975 TABLET ORAL ONCE
Status: DISCONTINUED | OUTPATIENT
Start: 2025-06-02 | End: 2025-06-02 | Stop reason: HOSPADM

## 2025-06-02 RX ORDER — OXYCODONE HYDROCHLORIDE 5 MG/1
5 TABLET ORAL EVERY 4 HOURS PRN
Status: DISCONTINUED | OUTPATIENT
Start: 2025-06-02 | End: 2025-06-02 | Stop reason: HOSPADM

## 2025-06-02 RX ORDER — HYDROMORPHONE HYDROCHLORIDE 1 MG/ML
1 INJECTION, SOLUTION INTRAMUSCULAR; INTRAVENOUS; SUBCUTANEOUS EVERY 5 MIN PRN
Status: DISCONTINUED | OUTPATIENT
Start: 2025-06-02 | End: 2025-06-02 | Stop reason: HOSPADM

## 2025-06-02 RX ORDER — MIDAZOLAM HYDROCHLORIDE 1 MG/ML
INJECTION, SOLUTION INTRAMUSCULAR; INTRAVENOUS AS NEEDED
Status: DISCONTINUED | OUTPATIENT
Start: 2025-06-02 | End: 2025-06-02

## 2025-06-02 RX ORDER — MEPERIDINE HYDROCHLORIDE 50 MG/ML
12.5 INJECTION INTRAMUSCULAR; INTRAVENOUS; SUBCUTANEOUS EVERY 10 MIN PRN
Status: DISCONTINUED | OUTPATIENT
Start: 2025-06-02 | End: 2025-06-02 | Stop reason: HOSPADM

## 2025-06-02 RX ORDER — DIPHENHYDRAMINE HYDROCHLORIDE 50 MG/ML
12.5 INJECTION, SOLUTION INTRAMUSCULAR; INTRAVENOUS ONCE AS NEEDED
Status: DISCONTINUED | OUTPATIENT
Start: 2025-06-02 | End: 2025-06-02 | Stop reason: HOSPADM

## 2025-06-02 RX ORDER — HYDRALAZINE HYDROCHLORIDE 20 MG/ML
5 INJECTION INTRAMUSCULAR; INTRAVENOUS EVERY 30 MIN PRN
Status: DISCONTINUED | OUTPATIENT
Start: 2025-06-02 | End: 2025-06-02 | Stop reason: HOSPADM

## 2025-06-02 RX ORDER — ONDANSETRON HYDROCHLORIDE 2 MG/ML
4 INJECTION, SOLUTION INTRAVENOUS ONCE AS NEEDED
Status: DISCONTINUED | OUTPATIENT
Start: 2025-06-02 | End: 2025-06-02 | Stop reason: HOSPADM

## 2025-06-02 RX ORDER — AMOXICILLIN 250 MG
2 CAPSULE ORAL NIGHTLY
Status: DISCONTINUED | OUTPATIENT
Start: 2025-06-02 | End: 2025-06-05 | Stop reason: HOSPADM

## 2025-06-02 RX ORDER — MIDAZOLAM HYDROCHLORIDE 1 MG/ML
1 INJECTION, SOLUTION INTRAMUSCULAR; INTRAVENOUS ONCE AS NEEDED
Status: DISCONTINUED | OUTPATIENT
Start: 2025-06-02 | End: 2025-06-02 | Stop reason: HOSPADM

## 2025-06-02 RX ORDER — ACETAMINOPHEN 325 MG/1
975 TABLET ORAL EVERY 8 HOURS
Status: DISCONTINUED | OUTPATIENT
Start: 2025-06-02 | End: 2025-06-05 | Stop reason: HOSPADM

## 2025-06-02 RX ORDER — FENTANYL CITRATE 50 UG/ML
INJECTION, SOLUTION INTRAMUSCULAR; INTRAVENOUS AS NEEDED
Status: DISCONTINUED | OUTPATIENT
Start: 2025-06-02 | End: 2025-06-02

## 2025-06-02 RX ORDER — BUPIVACAINE HYDROCHLORIDE 5 MG/ML
INJECTION, SOLUTION PERINEURAL AS NEEDED
Status: DISCONTINUED | OUTPATIENT
Start: 2025-06-02 | End: 2025-06-02 | Stop reason: HOSPADM

## 2025-06-02 RX ORDER — PROPOFOL 10 MG/ML
INJECTION, EMULSION INTRAVENOUS CONTINUOUS PRN
Status: DISCONTINUED | OUTPATIENT
Start: 2025-06-02 | End: 2025-06-02

## 2025-06-02 RX ADMIN — PROPOFOL 20 MG: 10 INJECTION, EMULSION INTRAVENOUS at 17:06

## 2025-06-02 RX ADMIN — PROPOFOL 25 MCG/KG/MIN: 10 INJECTION, EMULSION INTRAVENOUS at 17:09

## 2025-06-02 RX ADMIN — IPRATROPIUM BROMIDE AND ALBUTEROL SULFATE 3 ML: 2.5; .5 SOLUTION RESPIRATORY (INHALATION) at 08:54

## 2025-06-02 RX ADMIN — AMPICILLIN SODIUM AND SULBACTAM SODIUM 3 G: 2; 1 INJECTION, POWDER, FOR SOLUTION INTRAMUSCULAR; INTRAVENOUS at 06:43

## 2025-06-02 RX ADMIN — MIDAZOLAM 1 MG: 1 INJECTION INTRAMUSCULAR; INTRAVENOUS at 17:03

## 2025-06-02 RX ADMIN — AMPICILLIN SODIUM AND SULBACTAM SODIUM 3 G: 2; 1 INJECTION, POWDER, FOR SOLUTION INTRAMUSCULAR; INTRAVENOUS at 13:39

## 2025-06-02 RX ADMIN — FENTANYL CITRATE 25 MCG: 50 INJECTION, SOLUTION INTRAMUSCULAR; INTRAVENOUS at 17:04

## 2025-06-02 RX ADMIN — PREGABALIN 150 MG: 150 CAPSULE ORAL at 09:11

## 2025-06-02 RX ADMIN — ASPIRIN 81 MG CHEWABLE TABLET 81 MG: 81 TABLET CHEWABLE at 09:11

## 2025-06-02 RX ADMIN — ACETAMINOPHEN 975 MG: 325 TABLET ORAL at 09:11

## 2025-06-02 RX ADMIN — DULOXETINE HYDROCHLORIDE 60 MG: 60 CAPSULE, DELAYED RELEASE ORAL at 09:10

## 2025-06-02 RX ADMIN — AMLODIPINE BESYLATE 2.5 MG: 2.5 TABLET ORAL at 09:11

## 2025-06-02 RX ADMIN — ATORVASTATIN CALCIUM 40 MG: 40 TABLET, FILM COATED ORAL at 20:34

## 2025-06-02 RX ADMIN — FENTANYL CITRATE 25 MCG: 50 INJECTION, SOLUTION INTRAMUSCULAR; INTRAVENOUS at 17:08

## 2025-06-02 RX ADMIN — ESCITALOPRAM OXALATE 20 MG: 20 TABLET ORAL at 09:11

## 2025-06-02 RX ADMIN — IPRATROPIUM BROMIDE AND ALBUTEROL SULFATE 3 ML: 2.5; .5 SOLUTION RESPIRATORY (INHALATION) at 14:50

## 2025-06-02 RX ADMIN — BUSPIRONE HYDROCHLORIDE 5 MG: 5 TABLET ORAL at 15:21

## 2025-06-02 RX ADMIN — ENOXAPARIN SODIUM 40 MG: 40 INJECTION SUBCUTANEOUS at 20:35

## 2025-06-02 RX ADMIN — PREGABALIN 150 MG: 150 CAPSULE ORAL at 15:21

## 2025-06-02 RX ADMIN — BUSPIRONE HYDROCHLORIDE 5 MG: 5 TABLET ORAL at 20:33

## 2025-06-02 RX ADMIN — ACETAMINOPHEN 975 MG: 325 TABLET ORAL at 01:23

## 2025-06-02 RX ADMIN — PANTOPRAZOLE SODIUM 40 MG: 40 TABLET, DELAYED RELEASE ORAL at 06:44

## 2025-06-02 RX ADMIN — PROPRANOLOL HYDROCHLORIDE 10 MG: 20 TABLET ORAL at 20:34

## 2025-06-02 RX ADMIN — IPRATROPIUM BROMIDE AND ALBUTEROL SULFATE 3 ML: 2.5; .5 SOLUTION RESPIRATORY (INHALATION) at 20:33

## 2025-06-02 RX ADMIN — DULOXETINE HYDROCHLORIDE 60 MG: 60 CAPSULE, DELAYED RELEASE ORAL at 20:34

## 2025-06-02 RX ADMIN — SODIUM CHLORIDE, SODIUM LACTATE, POTASSIUM CHLORIDE, AND CALCIUM CHLORIDE 100 ML/HR: .6; .31; .03; .02 INJECTION, SOLUTION INTRAVENOUS at 13:39

## 2025-06-02 RX ADMIN — MONTELUKAST 10 MG: 10 TABLET, FILM COATED ORAL at 20:34

## 2025-06-02 RX ADMIN — PROPRANOLOL HYDROCHLORIDE 10 MG: 20 TABLET ORAL at 09:11

## 2025-06-02 RX ADMIN — TAMSULOSIN HYDROCHLORIDE 0.4 MG: 0.4 CAPSULE ORAL at 09:11

## 2025-06-02 RX ADMIN — AMPICILLIN SODIUM AND SULBACTAM SODIUM 3 G: 2; 1 INJECTION, POWDER, FOR SOLUTION INTRAMUSCULAR; INTRAVENOUS at 17:36

## 2025-06-02 RX ADMIN — FERROUS SULFATE TAB 325 MG (65 MG ELEMENTAL FE) 1 TABLET: 325 (65 FE) TAB at 09:16

## 2025-06-02 RX ADMIN — PREGABALIN 150 MG: 150 CAPSULE ORAL at 20:34

## 2025-06-02 RX ADMIN — MIRTAZAPINE 15 MG: 15 TABLET, FILM COATED ORAL at 20:33

## 2025-06-02 RX ADMIN — PANTOPRAZOLE SODIUM 40 MG: 40 TABLET, DELAYED RELEASE ORAL at 15:21

## 2025-06-02 RX ADMIN — LEVOTHYROXINE SODIUM 50 MCG: 0.05 TABLET ORAL at 06:43

## 2025-06-02 RX ADMIN — Medication 1500 MG: at 15:21

## 2025-06-02 RX ADMIN — BUDESONIDE 0.25 MG: 0.25 INHALANT RESPIRATORY (INHALATION) at 20:33

## 2025-06-02 RX ADMIN — BUSPIRONE HYDROCHLORIDE 5 MG: 5 TABLET ORAL at 09:11

## 2025-06-02 RX ADMIN — BUDESONIDE 0.25 MG: 0.25 INHALANT RESPIRATORY (INHALATION) at 08:54

## 2025-06-02 RX ADMIN — AMPICILLIN SODIUM AND SULBACTAM SODIUM 3 G: 2; 1 INJECTION, POWDER, FOR SOLUTION INTRAMUSCULAR; INTRAVENOUS at 00:39

## 2025-06-02 ASSESSMENT — COGNITIVE AND FUNCTIONAL STATUS - GENERAL
MOVING TO AND FROM BED TO CHAIR: A LITTLE
MOVING FROM LYING ON BACK TO SITTING ON SIDE OF FLAT BED WITH BEDRAILS: A LITTLE
DAILY ACTIVITIY SCORE: 18
MOBILITY SCORE: 18
TOILETING: A LITTLE
DRESSING REGULAR UPPER BODY CLOTHING: A LITTLE
WALKING IN HOSPITAL ROOM: A LITTLE
EATING MEALS: A LITTLE
DRESSING REGULAR LOWER BODY CLOTHING: A LITTLE
STANDING UP FROM CHAIR USING ARMS: A LITTLE
PERSONAL GROOMING: A LITTLE
CLIMB 3 TO 5 STEPS WITH RAILING: A LITTLE
HELP NEEDED FOR BATHING: A LITTLE
TURNING FROM BACK TO SIDE WHILE IN FLAT BAD: A LITTLE

## 2025-06-02 ASSESSMENT — PAIN - FUNCTIONAL ASSESSMENT
PAIN_FUNCTIONAL_ASSESSMENT: 0-10
PAIN_FUNCTIONAL_ASSESSMENT: 0-10
PAIN_FUNCTIONAL_ASSESSMENT: UNABLE TO SELF-REPORT
PAIN_FUNCTIONAL_ASSESSMENT: 0-10

## 2025-06-02 ASSESSMENT — PAIN SCALES - GENERAL
PAINLEVEL_OUTOF10: 0 - NO PAIN
PAINLEVEL_OUTOF10: 8
PAINLEVEL_OUTOF10: 0 - NO PAIN
PAINLEVEL_OUTOF10: 8

## 2025-06-02 ASSESSMENT — PAIN DESCRIPTION - DESCRIPTORS: DESCRIPTORS: ACHING

## 2025-06-02 NOTE — SIGNIFICANT EVENT
PODIATRIC MEDICINE & SURGERY - SIGNIFICANT EVENT NOTE  S/P soft tissue and osseous debridement right foot (DOS 06/02/25, Dr. Bull)    No intra-op complications.  Please resume all medications including antibiotics, anticoagulants, and/or pain medication as per Medicine/ID.  Resume prior appropriate diet.  NWB to surgical extremity today.Surgical shoe ordered  May elevate surgical extremity.  Please keep dressing clean, dry and intact.  May reinforce dressings with 4x4s, ABDs, Kerlix, and light ACE bandage for strikethrough bleeding.  Podiatry to change dressing daily.    Bethel Scott, ISAACM PGY-1

## 2025-06-02 NOTE — PROGRESS NOTES
Nutrition Initial Assessment:   Nutrition Assessment         Patient is a 69 y.o. male presenting with wound check of his right lower extremity. Pt diagnosed with EtOH polyneuropathy with right foot neuropathic MRSA infected ulcer status post debridement and right lower extremity cellulitis & now being treated with IV antibiotics.  Since there is concern for osteomyelitis, pt to receive a PICC line and a 6-week course of vancomycin. Pt pending discharge to [facility].    PMHx HFpEF (EF 60-65% 4/2024), PSVT (on propanolol), HTN, COPD with chronic respiratory failure on 4 L NC at baseline, CAD, CKD 3, OA, and alcohol use disorder c/b severe alcoholic polyneuropathy.     Nutrition History:  Energy Intake: Good > 75 %  Food and Nutrient History: Pt seen 1 month ago and continues with similar story: He follows a mostly vegetarian diet. He does eat eggs when cooked in foods. He also endorses making pasta salad often w/veggies and likes to eat large quantities of fruit. Pt eats nuts and seeds daily and drinks 3 Ensure orginals per day. Pt uses [employer] for groceries. He reports to be lactose intolerant; however, will eat pudding and ice cream and later deal w/consequences. Pt does not like yogurt (texture), but will eat cottage cheese (lactose free). Pt loves hummus and cooks w/butter.  Vitamin/Herbal Supplement Use: Vitamin D, B1 and B12  Food Allergy:  (none per patient)  Food Intolerance: Milk/lactose  Pattern of Alcohol Consumption: no current intake reported       Anthropometrics:  Height: 182.9 cm (6')   Weight: 118 kg (260 lb 8 oz)   BMI (Calculated): 35.32                            Weight History:   Wt Readings from Last 10 Encounters:   05/30/25 118 kg (260 lb 8 oz)   05/22/25 114 kg (251 lb 6.4 oz)   05/01/25 115 kg (254 lb 4.8 oz)   04/08/25 110 kg (243 lb)   03/26/25 110 kg (243 lb 3.2 oz)   03/19/25 114 kg (252 lb 3.2 oz)   01/29/25 112 kg (247 lb 6.4 oz)   12/26/24 113 kg (250 lb)   11/21/24 115  kg (254 lb 3.1 oz)   11/18/24 113 kg (250 lb)       Weight Change %:  Significant Weight Loss: No    Nutrition Focused Physical Exam Findings:    Subcutaneous Fat Loss:   Defer Subcutaneous Fat Loss Assessment: Defer all  Defer All Reason: Pt appears well nourished.  Muscle Wasting:  Defer Muscle Wasting Assessment: Defer all  Defer All Reason: Pt appears well nourished.  Edema:  Edema: +2 mild  Edema Location: +2 edema noted to RLE per chart  Physical Findings:  Skin: Positive (infection in the recent right lower extremity digit amputation site.)  Positive Skin Findings: Impaired wound healing    Nutrition Significant Labs:  CBC Trend:   Results from last 7 days   Lab Units 06/03/25  0624 06/02/25  0605 06/01/25  0735 05/31/25  0654   WBC AUTO x10*3/uL 6.9 7.5 8.4 5.6   RBC AUTO x10*6/uL 3.27* 3.59* 3.84* 3.64*   HEMOGLOBIN g/dL 9.2* 10.5* 10.9* 10.3*   HEMATOCRIT % 31.4* 33.4* 35.0* 33.0*   MCV fL 96 93 91 91   PLATELETS AUTO x10*3/uL 210 221 209 175    , BMP Trend:   Results from last 7 days   Lab Units 06/03/25  0624 06/02/25  0605 06/01/25  0735 05/31/25  0654   GLUCOSE mg/dL 112* 123* 102* 98   CALCIUM mg/dL 8.3* 8.5* 8.6 8.4*   SODIUM mmol/L 140 139 139 138   POTASSIUM mmol/L 3.9 3.8 4.1 3.6   CO2 mmol/L 34* 29 32 29   CHLORIDE mmol/L 102 103 101 102   BUN mg/dL 18 18 14 19   CREATININE mg/dL 1.19 1.21 1.23 1.41*    , A1C:  Lab Results   Component Value Date    HGBA1C 4.9 12/19/2023   , BG POCT trend:        Nutrition Specific Medications:  Scheduled medications  Scheduled Medications[1]  Continuous medications  Continuous Medications[2]  PRN medications  PRN Medications[3]      I/O:   Last BM Date: 06/01/25; Stool Appearance: Soft, Loose (05/31/25 1700)    Dietary Orders (From admission, onward)       Start     Ordered    06/02/25 1817  Adult diet Regular  Diet effective now        Question:  Diet type  Answer:  Regular    06/02/25 1816    05/31/25 1258  Oral nutritional supplements  Until discontinued         Question Answer Comment   Deliver with All meals    Select supplement: Ensure High Protein        05/31/25 1258    05/30/25 8674  May Participate in Room Service  ( ROOM SERVICE MAY PARTICIPATE)  Once        Question:  .  Answer:  Yes    05/30/25 2358                     Estimated Needs:   Total Energy Estimated Needs in 24 hours (kCal): 2565 kCal  Method for Estimating Needs: MSJ (1973) x 1.3     Method for Estimating 24 Hour Protein Needs:  (1.2-1.5g/kg IBW)     Method for Estimating 24 Hour Fluid Needs: 1 mL/kcal        Nutrition Diagnosis   Malnutrition Diagnosis  Patient has Malnutrition Diagnosis: No    Nutrition Diagnosis  Patient has Nutrition Diagnosis: Yes  Diagnosis Status (1): New  Nutrition Diagnosis 1: Increased nutrient needs  Related to (1): impaired skin integrity  As Evidenced by (1): right lower extremity with infected ulcer to right foot  Additional Nutrition Diagnosis: Diagnosis 2  Diagnosis Status (2): New  Nutrition Diagnosis 2: Obesity class II  Related to (2): lifestyle choices  As Evidenced by (2): BMI = 35.33 kg/m2       Nutrition Interventions/Recommendations   Nutrition prescription for oral nutrition    Nutrition Recommendations:  Individualized Nutrition Prescription Provided for : Continue REGULAR diet wupplemented with ENSURE HP 3x/day    Nutrition Interventions/Goals:   Goal: ENSURE HP = 160kcal/16g protein each 8-ounce      Education Documentation  No documentation found.            Nutrition Monitoring and Evaluation   Food/Nutrient Related History Monitoring  Monitoring and Evaluation Plan: Intake / amount of food  Intake / Amount of food: Consumes at least 75% or more of meals/snacks/supplements    Anthropometric Measurements  Monitoring and Evaluation Plan: BMI  Body Mass: Body mass index (BMI) within normal limits  Criteria: <30         Physical Exam Findings  Monitoring and Evaluation Plan: Skin  Skin Finding: Impaired wound healing - Improved wound healing    Goal  Status: New goal(s) identified    Time Spent (min): 30 minutes              [1] acetaminophen, 975 mg, oral, q8h  amLODIPine, 2.5 mg, oral, Daily  aspirin, 81 mg, oral, Daily  atorvastatin, 40 mg, oral, Nightly  budesonide, 0.25 mg, nebulization, BID   And  ipratropium-albuteroL, 3 mL, nebulization, TID  busPIRone, 5 mg, oral, TID  DULoxetine, 60 mg, oral, BID  [Held by provider] empagliflozin, 10 mg, oral, Daily  enoxaparin, 40 mg, subcutaneous, q24h  escitalopram, 20 mg, oral, Daily  ferrous sulfate, 1 tablet, oral, Daily with breakfast  levothyroxine, 50 mcg, oral, Daily  lidocaine, 5 mL, infiltration, Once  mirtazapine, 15 mg, oral, Nightly  montelukast, 10 mg, oral, Nightly  pantoprazole, 40 mg, oral, BID AC  polyethylene glycol, 17 g, oral, Daily  pregabalin, 150 mg, oral, TID  propranolol, 10 mg, oral, BID  sennosides-docusate sodium, 2 tablet, oral, Nightly  [Held by provider] spironolactone, 25 mg, oral, Daily  tamsulosin, 0.4 mg, oral, Daily  [Held by provider] torsemide, 20 mg, oral, Daily  vancomycin, 1,500 mg, intravenous, q24h     [2]    [3] PRN medications: alteplase, benzonatate, HYDROmorphone, ondansetron ODT **OR** ondansetron, oxygen, vancomycin

## 2025-06-02 NOTE — PROGRESS NOTES
06/02/25 1029   Discharge Planning   Living Arrangements Alone   Home or Post Acute Services Post acute facilities (Rehab/SNF/etc)   Type of Post Acute Facility Services Skilled nursing   Expected Discharge Disposition SNF   Does the patient need discharge transport arranged? Yes   RoundTrip coordination needed? Yes   Has discharge transport been arranged? No     Patient is scheduled for surgery with Dr. Bull this afternoon. Patient's preference is 1. [facility] and 2. [facility]. Message sent to [facility] to send referrals for wound management and therapy.

## 2025-06-02 NOTE — PROGRESS NOTES
"Occupational Therapy                 Therapy Communication Note    Patient Name: Angus Redman  MRN: [id number]  Department: [facility]  Room: 3116/3116-A  Today's Date: 6/2/2025     Discipline: Occupational Therapy    Missed Time: Attempt    Comment: Received orders for OT eval 5/31. Completed chart review, and pt with plans for surgery this date (1700).    Pt was brought to the ED secondary to concern of infection R foot.    Per EMR:  \"Patient missed his last appointment that was scheduled for Wednesday, stating that he had a fall and could not get up. Patient admits to 3 falls over a period of 2 days earlier this week, all mechanical. Patient denies losing consciousness or hitting head, but states that he was unable to get up. Patient states that during 1 fall he was on the floor for 14 hours. Patient states that his [facility] nurse has been coming to the house to perform dressing changes regularly. Patient states that he noticed some redness last Friday, he was instructed to report to the ED, but did not.\"    MRI R foot:   IMPRESSION:  Findings consistent with osteomyelitis of the 1st metatarsal head  adjacent to a skin wound.      Post amputation changes of the 1st through 5th digits.      Prior 2nd and 5th metatarsal neck fractures again noted.      MACRO:  Critical Finding:  See findings. Notification was initiated on  6/1/2025 at 4:20 pm by  Tayo Menjivar.  (**-Y[facility]-**)    Will hold OT eval and complete as appropriate.               "

## 2025-06-02 NOTE — OP NOTE
Soft tissue and osseous debridement of right foot wound (R) Operative Note     Date: 2025  OR Location: [facility] OR    Name: Angus Redman, : 1955, Age: 69 y.o., MRN: [id number], Sex: male    Diagnosis  Pre-op Diagnosis      * Other acute osteomyelitis of right foot [M86.171]     * Other osteomyelitis of right foot [M86.8X7] Post-op Diagnosis     * Other acute osteomyelitis of right foot [M86.171]     * Other osteomyelitis of right foot [M86.8X7]     Procedures  Soft tissue and osseous debridement of right foot wound  78316 - AR DEBRIDEMENT BONE 1ST 20 SQ CM/<      Surgeons      * Boone Bull - Primary    Resident/Fellow/Other Assistant:  Bethel Scott DPM PGY-1    Staff:   Surgical Assistant:   Sallie: Mary Call Person: Angel    Anesthesia Staff: Anesthesiologist: Cirilo Thomas DO  CRNA: SHIRA Martin-CRNA    Procedure Summary  Anesthesia: General  ASA: IV  Estimated Blood Loss: 75cc  Intra-op Medications:   Administrations occurring from 1700 to 1828 on 25:   Medication Name Total Dose   BUPivacaine HCl (Marcaine) 0.5 % (5 mg/mL) injection 10 mL   acetaminophen (Tylenol) tablet 975 mg Cannot be calculated   amLODIPine (Norvasc) tablet 2.5 mg Cannot be calculated   ampicillin-sulbactam (Unasyn) 3 g in sodium chloride 0.9%  mL 173.33 mL   aspirin chewable tablet 81 mg Cannot be calculated   atorvastatin (Lipitor) tablet 40 mg Cannot be calculated   benzonatate (Tessalon) capsule 100 mg Cannot be calculated   busPIRone (Buspar) tablet 5 mg Cannot be calculated   DULoxetine (Cymbalta) DR capsule 60 mg Cannot be calculated   enoxaparin (Lovenox) syringe 40 mg Cannot be calculated   escitalopram (Lexapro) tablet 20 mg Cannot be calculated   ferrous sulfate 325 mg (65 mg elemental) tablet 1 tablet Cannot be calculated   HYDROmorphone (Dilaudid) injection 0.5 mg Cannot be calculated   levothyroxine (Synthroid, Levoxyl) tablet 50 mcg Cannot be calculated   mirtazapine  (Remeron) tablet 15 mg Cannot be calculated   montelukast (Singulair) tablet 10 mg Cannot be calculated   pantoprazole (ProtoNix) EC tablet 40 mg Cannot be calculated   polyethylene glycol (Glycolax, Miralax) packet 17 g Cannot be calculated   pregabalin (Lyrica) capsule 150 mg Cannot be calculated   propranolol (Inderal) tablet 10 mg Cannot be calculated   sennosides-docusate sodium (Kelsey-Colace) 8.6-50 mg per tablet 2 tablet Cannot be calculated   tamsulosin (Flomax) 24 hr capsule 0.4 mg Cannot be calculated   vancomycin (Vancocin) pharmacy to dose - pharmacy monitoring Cannot be calculated   vancomycin 1,500 mg in NS  mL Cannot be calculated   fentaNYL (Sublimaze) injection 50 mcg/mL 50 mcg   midazolam (Versed) injection 1 mg/mL 1 mg   propofol (Diprivan) injection 10 mg/mL 125.79 mg              Anesthesia Record               Intraprocedure I/O Totals       None           Specimen:   ID Type Source Tests Collected by Time   1 : FIRST METATARSAL HEAD Tissue DIGIT FIRST, RIGHT FOOT SURGICAL PATHOLOGY EXAM Boone Bull DPM 6/2/2025 1735                 Drains and/or Catheters: * None in log *    Tourniquet Times:         Implants:     Findings: Same as pre-op    Indications: Angus Redman is an 69 y.o. male who is having surgery for Other acute osteomyelitis of right foot [M86.171]  Other osteomyelitis of right foot [M86.8X7].     The patient was seen in the preoperative area. The risks, benefits, complications, treatment options, non-operative alternatives, expected recovery and outcomes were discussed with the patient. The possibilities of reaction to medication, pulmonary aspiration, injury to surrounding structures, bleeding, recurrent infection, the need for additional procedures, failure to diagnose a condition, and creating a complication requiring transfusion or operation were discussed with the patient. The patient concurred with the proposed plan, giving informed consent.  The site of  surgery was properly noted/marked if necessary per policy. The patient has been actively warmed in preoperative area. Preoperative antibiotics are not indicated.     Procedure Details: Soft Tissue and osseous debridement R foot  At this time attention was then directed to the right operative lower extremity where an ulceration was noted. Sharp excisional debridement was performed to remove non-viable tissue down to and including the level of bone. There was mild purulence expressed. The area was inspected and any areas of tracking, especially along the course of tendons, were also drained and irrigated appropriately.  At this time, the first metatarsal was visualized showing signs of infection.  Using a sagittal saw, a cut was made into the bone removing the metatarsal head.  A ronjour was then utilized to remove all non-viable bone.  Healthy bone was then visualized. Pulse lavage was used to further debride and irrigate the wound.  A specimen of non-viable bone that had been debrided was collected for culture and pathology.  Post-debridement measurements were 4.2 x 5.1 x 0.6 probing to 1st met head.     The wound was closed utilizing 3-0 prolene for deep and 4-0 nylon for skin.  A dry, sterile dressing was applied consisting of Betadine-soaked Adaptic, 4x4 gauze, abdominal padding, and a compression wrap. The patient tolerated the procedure and anesthesia well and without complication.    The patient tolerated the above noted procedure and anesthesia well and was transferred to the PACU with vital signs stable, and vascular status intact to the distal aspect of the operative extremity. After an appropriate period of post-operative recovery, it is anticipated that the patient will return to the floor.        Evidence of Infection: No   Complications:  None; patient tolerated the procedure well.    Disposition: PACU - hemodynamically stable.  Condition: stable       Additional Details: None    Attending Attestation:  I was present and scrubbed for the entire procedure.    Boone Bull  Phone Number: [phone]

## 2025-06-02 NOTE — PROGRESS NOTES
Angus Redman is a 69 y.o. male on day 3 of admission presenting with Acute cellulitis.      Subjective   Patient stating some right lower extremity pain today but overall he feels good.  States that Isadora came in to talk to him and he does have osteomyelitis and they would like to amputate more of his foot.  Otherwise no acute events overnight.       Objective     Last Recorded Vitals  /76 (BP Location: Left arm, Patient Position: Lying)   Pulse 57   Temp 36.1 °C (97 °F) (Temporal)   Resp 18   Wt 118 kg (260 lb 8 oz)   SpO2 95%   Intake/Output last 3 Shifts:    Intake/Output Summary (Last 24 hours) at 6/2/2025 1716  Last data filed at 6/2/2025 0850  Gross per 24 hour   Intake 1760 ml   Output 1280 ml   Net 480 ml       Admission Weight  Weight: 113 kg (250 lb) (05/30/25 1350)    Daily Weight  05/30/25 : 118 kg (260 lb 8 oz)    Image Results  MR foot right wo IV contrast  Narrative: Interpreted By:  Tayo Menjivar,   STUDY:  MR FOOT RIGHT WO IV CONTRAST;      INDICATION:  Signs/Symptoms:ulcer, right foot.      COMPARISON:  Plain film radiographs of the right foot performed on May 30, 2025      ACCESSION NUMBER(S):  [id number]      ORDERING CLINICIAN:  CARISSA BARONE      TECHNIQUE:  Multiplanar multisequential MRI right foot without contrast.      FINDINGS:  Postsurgical changes of amputation of the 1st through 5th digits at  the metatarsophalangeal joints.      At the 1st metatarsal head there is loss of cortical definition with  a significant amount of marrow edema adjacent to a skin wound.  Findings consistent with osteomyelitis.      Remote fracture of the 2nd metatarsal neck unchanged.      Remote 5th metatarsal neck fracture unchanged.      There are no other foci suggestive of osteomyelitis.      There is significant subcutaneous edema about the dorsum of the foot  mostly medial. This is consistent with cellulitis.      There is no evidence of focal fluid collection or abscess.      Mild  midfoot arthrosis.      No evidence of acute fracture.      No marrow replacement lesions.      Impression: Findings consistent with osteomyelitis of the 1st metatarsal head  adjacent to a skin wound.      Post amputation changes of the 1st through 5th digits.      Prior 2nd and 5th metatarsal neck fractures again noted.      MACRO:  Critical Finding:  See findings. Notification was initiated on  6/1/2025 at 4:20 pm by  Tayo Menjivar.  (**-YCF-**)      Signed by: Tayo Menjivar 6/1/2025 4:20 PM  Dictation workstation:   [id number]      Physical Exam  Constitutional:       Appearance: Normal appearance.   HENT:      Head: Normocephalic and atraumatic.      Nose: Nose normal.      Mouth/Throat:      Mouth: Mucous membranes are moist.   Eyes:      Extraocular Movements: Extraocular movements intact.   Cardiovascular:      Rate and Rhythm: Normal rate and regular rhythm.      Pulses: Normal pulses.      Heart sounds: No murmur heard.  Pulmonary:      Effort: Pulmonary effort is normal. No respiratory distress.      Breath sounds: Normal breath sounds.      Comments: Breathing comfortably on nasal cannula  Abdominal:      General: Abdomen is flat. Bowel sounds are normal. There is no distension.      Palpations: Abdomen is soft.      Tenderness: There is no abdominal tenderness.   Musculoskeletal:      Left lower leg: No edema.      Comments: Right lower extremity foot wrapped, there is erythema on the right lower extremity anterior tibial area tracking up the leg a bit   Neurological:      General: No focal deficit present.      Mental Status: He is alert.   Psychiatric:         Mood and Affect: Mood normal.         Behavior: Behavior normal.         Relevant Results  Scheduled medications  Scheduled Medications[1]  Continuous medications  Continuous Medications[2]  PRN medications  PRN Medications[3]  MR foot right wo IV contrast  Result Date: 6/1/2025  Interpreted By:  Tayo Menjivar, STUDY: MR FOOT RIGHT WO IV CONTRAST;    INDICATION: Signs/Symptoms:ulcer, right foot.   COMPARISON: Plain film radiographs of the right foot performed on May 30, 2025   ACCESSION NUMBER(S): [id number]   ORDERING CLINICIAN: CARISSA BARONE   TECHNIQUE: Multiplanar multisequential MRI right foot without contrast.   FINDINGS: Postsurgical changes of amputation of the 1st through 5th digits at the metatarsophalangeal joints.   At the 1st metatarsal head there is loss of cortical definition with a significant amount of marrow edema adjacent to a skin wound. Findings consistent with osteomyelitis.   Remote fracture of the 2nd metatarsal neck unchanged.   Remote 5th metatarsal neck fracture unchanged.   There are no other foci suggestive of osteomyelitis.   There is significant subcutaneous edema about the dorsum of the foot mostly medial. This is consistent with cellulitis.   There is no evidence of focal fluid collection or abscess.   Mild midfoot arthrosis.   No evidence of acute fracture.   No marrow replacement lesions.       Findings consistent with osteomyelitis of the 1st metatarsal head adjacent to a skin wound.   Post amputation changes of the 1st through 5th digits.   Prior 2nd and 5th metatarsal neck fractures again noted.   MACRO: Critical Finding:  See findings. Notification was initiated on 6/1/2025 at 4:20 pm by  Tayo Menjivar.  (**-YCF-**)   Signed by: Tayo Menjivar 6/1/2025 4:20 PM Dictation workstation:   [id number]    Results for orders placed or performed during the hospital encounter of 05/30/25 (from the past 24 hours)   Renal Function Panel   Result Value Ref Range    Glucose 123 (H) 74 - 99 mg/dL    Sodium 139 136 - 145 mmol/L    Potassium 3.8 3.5 - 5.3 mmol/L    Chloride 103 98 - 107 mmol/L    Bicarbonate 29 21 - 32 mmol/L    Anion Gap 11 10 - 20 mmol/L    Urea Nitrogen 18 6 - 23 mg/dL    Creatinine 1.21 0.50 - 1.30 mg/dL    eGFR 64 >60 mL/min/1.73m*2    Calcium 8.5 (L) 8.6 - 10.3 mg/dL    Phosphorus 4.1 2.5 - 4.9 mg/dL    Albumin 3.1  (L) 3.4 - 5.0 g/dL   Magnesium   Result Value Ref Range    Magnesium 2.22 1.60 - 2.40 mg/dL   CBC and Auto Differential   Result Value Ref Range    WBC 7.5 4.4 - 11.3 x10*3/uL    nRBC 0.0 0.0 - 0.0 /100 WBCs    RBC 3.59 (L) 4.50 - 5.90 x10*6/uL    Hemoglobin 10.5 (L) 13.5 - 17.5 g/dL    Hematocrit 33.4 (L) 41.0 - 52.0 %    MCV 93 80 - 100 fL    MCH 29.2 26.0 - 34.0 pg    MCHC 31.4 (L) 32.0 - 36.0 g/dL    RDW 15.1 (H) 11.5 - 14.5 %    Platelets 221 150 - 450 x10*3/uL    Neutrophils % 60.8 40.0 - 80.0 %    Immature Granulocytes %, Automated 1.1 (H) 0.0 - 0.9 %    Lymphocytes % 21.0 13.0 - 44.0 %    Monocytes % 10.7 2.0 - 10.0 %    Eosinophils % 6.1 0.0 - 6.0 %    Basophils % 0.3 0.0 - 2.0 %    Neutrophils Absolute 4.55 1.60 - 5.50 x10*3/uL    Immature Granulocytes Absolute, Automated 0.08 0.00 - 0.50 x10*3/uL    Lymphocytes Absolute 1.57 0.80 - 3.00 x10*3/uL    Monocytes Absolute 0.80 0.05 - 0.80 x10*3/uL    Eosinophils Absolute 0.46 (H) 0.00 - 0.40 x10*3/uL    Basophils Absolute 0.02 0.00 - 0.10 x10*3/uL   SST TOP   Result Value Ref Range    Extra Tube Hold for add-ons.      *Note: Due to a large number of results and/or encounters for the requested time period, some results have not been displayed. A complete set of results can be found in Results Review.           Assessment & Plan  Acute cellulitis    Osteomyelitis of right foot (Multi)    70yo gentleman with PMHx of recent admission for osteomyelitis s/p amputation 1st, 4th and 5th toes, Recurrent syncope secondary to orthostatic hypotension/autonomic dysfunction/autonomic neuropathy, restrictive lung disease on 4 to 5 L nasal cannula at baseline, PSVT on propranolol, CAD w/ CCTA (02/2024) - 50% stenosis of LAD - 25-50% stenosis of RCA, alcohol use disorder c/b severe alcoholic polyneuropathy with autonomic dysfunction who presents for right lower extremity  swelling as well as purulent drainage from surgical wound.  Patient recently discharged on 5/5/2025 at  that time he underwent amputation of right 1st, 4th and 5th toe, discharged home with Augmentin and doxycycline for 14 days.  Discharged to SNF however he left AMA after 24.  Patient started on antibiotic and admitted for further workup and evaluation.    # Purulent right lower limb cellulitis  #Concern for osteomyelitis  #S/p amputation of right Pharis, 4th and 5th toe on 5/5/2025  #S/p Augmentin, doxycycline and Bactrim     -Continue Unasyn and vancomycin  - MRI showing osteomyelitis of first metatarsal  - Podiatry will be taking the patient to surgery today for further amputation  -Wound dressing daily by podiatry  - ID consultation, appreciate recommendation  - Wound culture--no polymorphonuclear leukocytes/rare gram-positive cocci  - Blood culture--- no growth at day 2  - PT/OT recommended moderate intensity level of care  -Patient agree with going to SNF after discharge      Global plan of care  Diet: Cardiac diet after procedure  DVT prophylaxis: Lovenox  Telemetry: Not indicated  Consults: Podiatry, PT, OT,   Code Status: DNR and No Intubation and No ICU   Dispo: Anticipate 2-3 days           Flaquita Hernadez, DO  Family Med, PGY-1       [1] [Transfer Hold] acetaminophen, 975 mg, oral, q8h  [Transfer Hold] amLODIPine, 2.5 mg, oral, Daily  [Transfer Hold] ampicillin-sulbactam, 3 g, intravenous, q6h  [Transfer Hold] aspirin, 81 mg, oral, Daily  [Transfer Hold] atorvastatin, 40 mg, oral, Nightly  [Transfer Hold] budesonide, 0.25 mg, nebulization, BID   And  [Transfer Hold] ipratropium-albuteroL, 3 mL, nebulization, TID  [Transfer Hold] busPIRone, 5 mg, oral, TID  [Transfer Hold] DULoxetine, 60 mg, oral, BID  [Held by provider] empagliflozin, 10 mg, oral, Daily  [Transfer Hold] enoxaparin, 40 mg, subcutaneous, q24h  [Transfer Hold] escitalopram, 20 mg, oral, Daily  [Transfer Hold] ferrous sulfate, 1 tablet, oral, Daily with breakfast  [Transfer Hold] levothyroxine, 50 mcg, oral,  Daily  [Transfer Hold] mirtazapine, 15 mg, oral, Nightly  [Transfer Hold] montelukast, 10 mg, oral, Nightly  [Transfer Hold] pantoprazole, 40 mg, oral, BID AC  [Transfer Hold] polyethylene glycol, 17 g, oral, Daily  [Transfer Hold] pregabalin, 150 mg, oral, TID  [Transfer Hold] propranolol, 10 mg, oral, BID  [Transfer Hold] sennosides-docusate sodium, 2 tablet, oral, Nightly  [Held by provider] spironolactone, 25 mg, oral, Daily  [Transfer Hold] tamsulosin, 0.4 mg, oral, Daily  [Held by provider] torsemide, 20 mg, oral, Daily  [Transfer Hold] vancomycin, 1,500 mg, intravenous, q24h  [2] lactated Ringer's, 100 mL/hr, Last Rate: 100 mL/hr (06/02/25 2785)  [3] PRN medications: [Transfer Hold] benzonatate, BUPivacaine HCl, [Transfer Hold] HYDROmorphone, [Transfer Hold] ondansetron ODT **OR** [Transfer Hold] ondansetron, oxygen, [Transfer Hold] vancomycin

## 2025-06-02 NOTE — ANESTHESIA POSTPROCEDURE EVALUATION
Patient: Angus Redman    Procedure Summary       Date: 06/02/25 Room / Location: [facility] OR 05 / [facility]    Anesthesia Start: 1655 Anesthesia Stop: 1802    Procedure: Soft tissue and osseous debridement of right foot wound (Right) Diagnosis:       Other acute osteomyelitis of right foot      Other osteomyelitis of right foot      (Other acute osteomyelitis of right foot [M86.171])      (Other osteomyelitis of right foot [M86.8X7])    Surgeons: Boone Bull DPM Responsible Provider: Cirilo Thomas DO    Anesthesia Type: general ASA Status: 4            Anesthesia Type: general    Vitals Value Taken Time   /62 06/02/25 18:03        Pulse 60 06/02/25 18:08   Resp 10 06/02/25 18:08   SpO2 95 % 06/02/25 18:08   Vitals shown include unfiled device data.    Anesthesia Post Evaluation    Patient location during evaluation: PACU  Patient participation: complete - patient participated  Level of consciousness: awake and alert  Pain management: adequate  Airway patency: patent  Cardiovascular status: acceptable  Respiratory status: acceptable  Hydration status: acceptable  Postoperative Nausea and Vomiting: none        No notable events documented.

## 2025-06-02 NOTE — ANESTHESIA PREPROCEDURE EVALUATION
Patient: Angus Redman    Procedure Information       Date/Time: 06/02/25 1700    Procedure: Soft tissue and osseous debridement of right foot wound (Right)    Location: [facility] 05 / [facility]    Surgeons: Boone Bull DPM            Relevant Problems   Cardiac   (+) Atrial fibrillation (Multi)   (+) Benign essential hypertension   (+) Hyperlipidemia   (+) NSTEMI (non-ST elevated myocardial infarction) (Multi)   (+) Paroxysmal atrial fibrillation (Multi)   (+) Peripheral vascular disease, unspecified      Pulmonary   (+) Asthma with chronic obstructive pulmonary disease (COPD) (Multi)   (+) COPD exacerbation (Multi)   (+) Mild intermittent asthma   (+) Restrictive lung disease      Neuro   (+) Alcohol-induced polyneuropathy (Multi)   (+) Anxiety   (+) Carpal tunnel syndrome   (+) Cubital tunnel syndrome on left   (+) Depression, controlled   (+) Lumbar radiculopathy   (+) Major depressive disorder in partial remission   (+) Peripheral neuropathy   (+) Situational depression      GI   (+) Gastroesophageal reflux disease      /Renal   (+) BPH with obstruction/lower urinary tract symptoms   (+) Urinary tract infection without hematuria      Endocrine   (+) Class 1 obesity with serious comorbidity and body mass index (BMI) of 33.0 to 33.9 in adult   (+) Hypothyroidism   (+) Type 2 diabetes mellitus with diabetic autonomic neuropathy, without long-term current use of insulin      Hematology   (+) Anemia   (+) Macrocytic anemia      Musculoskeletal   (+) Carpal tunnel syndrome   (+) Osteoarthritis      ID   (+) Foot infection   (+) Osteomyelitis of right foot (Multi)   (+) Osteomyelitis of third toe of right foot (Multi)   (+) Urinary tract infection without hematuria      Skin   (+) Eczema   (+) Skin cancer       Clinical information reviewed:   Tobacco  Allergies  Meds  Problems  Med Hx  Surg Hx   Fam Hx  Soc   Hx        NPO Detail:  No data recorded     Physical Exam    Airway  Mallampati: II  TM  distance: >3 FB  Neck ROM: full  Mouth opening: 3 or more finger widths     Cardiovascular - normal exam   Dental    Pulmonary - normal exam   Abdominal - normal exam         Anesthesia Plan    History of general anesthesia?: yes  History of complications of general anesthesia?: no    ASA 4     MAC     intravenous induction   Anesthetic plan and risks discussed with patient.    Plan discussed with CRNA.

## 2025-06-02 NOTE — PROGRESS NOTES
Physical Therapy                 Therapy Communication Note    Patient Name: Angus Redman  MRN: [id number]  Today's Date: 6/2/2025     Discipline: Physical Therapy    Room 3116    Missed Time:   Missed Visit Reason:   Comment:       06/02/25 1640   PT  Visit   PT Missed Visit Yes   Missed Visit Reason Patient in a medical procedure,     Surgery with Dr. Bull, Soft tissue and osseous debridement of right foot wound this PM.    Will see when cleared to resume PT.

## 2025-06-03 ENCOUNTER — APPOINTMENT (OUTPATIENT)
Dept: RADIOLOGY | Facility: HOSPITAL | Age: 70
End: 2025-06-03
Payer: MEDICARE

## 2025-06-03 ENCOUNTER — APPOINTMENT (OUTPATIENT)
Facility: CLINIC | Age: 70
End: 2025-06-03
Payer: MEDICARE

## 2025-06-03 LAB
ALBUMIN SERPL BCP-MCNC: 2.9 G/DL (ref 3.4–5)
ANION GAP SERPL CALC-SCNC: 8 MMOL/L (ref 10–20)
BASOPHILS # BLD AUTO: 0.02 X10*3/UL (ref 0–0.1)
BASOPHILS NFR BLD AUTO: 0.3 %
BUN SERPL-MCNC: 18 MG/DL (ref 6–23)
CALCIUM SERPL-MCNC: 8.3 MG/DL (ref 8.6–10.3)
CHLORIDE SERPL-SCNC: 102 MMOL/L (ref 98–107)
CO2 SERPL-SCNC: 34 MMOL/L (ref 21–32)
CREAT SERPL-MCNC: 1.19 MG/DL (ref 0.5–1.3)
EGFRCR SERPLBLD CKD-EPI 2021: 65 ML/MIN/1.73M*2
EOSINOPHIL # BLD AUTO: 0.36 X10*3/UL (ref 0–0.4)
EOSINOPHIL NFR BLD AUTO: 5.2 %
ERYTHROCYTE [DISTWIDTH] IN BLOOD BY AUTOMATED COUNT: 15.1 % (ref 11.5–14.5)
GLUCOSE SERPL-MCNC: 112 MG/DL (ref 74–99)
HCT VFR BLD AUTO: 31.4 % (ref 41–52)
HGB BLD-MCNC: 9.2 G/DL (ref 13.5–17.5)
IMM GRANULOCYTES # BLD AUTO: 0.07 X10*3/UL (ref 0–0.5)
IMM GRANULOCYTES NFR BLD AUTO: 1 % (ref 0–0.9)
LYMPHOCYTES # BLD AUTO: 1.23 X10*3/UL (ref 0.8–3)
LYMPHOCYTES NFR BLD AUTO: 17.8 %
MAGNESIUM SERPL-MCNC: 1.97 MG/DL (ref 1.6–2.4)
MCH RBC QN AUTO: 28.1 PG (ref 26–34)
MCHC RBC AUTO-ENTMCNC: 29.3 G/DL (ref 32–36)
MCV RBC AUTO: 96 FL (ref 80–100)
MONOCYTES # BLD AUTO: 0.64 X10*3/UL (ref 0.05–0.8)
MONOCYTES NFR BLD AUTO: 9.2 %
NEUTROPHILS # BLD AUTO: 4.6 X10*3/UL (ref 1.6–5.5)
NEUTROPHILS NFR BLD AUTO: 66.5 %
NRBC BLD-RTO: 0 /100 WBCS (ref 0–0)
PHOSPHATE SERPL-MCNC: 4.5 MG/DL (ref 2.5–4.9)
PLATELET # BLD AUTO: 210 X10*3/UL (ref 150–450)
POTASSIUM SERPL-SCNC: 3.9 MMOL/L (ref 3.5–5.3)
RBC # BLD AUTO: 3.27 X10*6/UL (ref 4.5–5.9)
SODIUM SERPL-SCNC: 140 MMOL/L (ref 136–145)
WBC # BLD AUTO: 6.9 X10*3/UL (ref 4.4–11.3)

## 2025-06-03 PROCEDURE — 97165 OT EVAL LOW COMPLEX 30 MIN: CPT | Mod: GO

## 2025-06-03 PROCEDURE — 80069 RENAL FUNCTION PANEL: CPT

## 2025-06-03 PROCEDURE — 2500000004 HC RX 250 GENERAL PHARMACY W/ HCPCS (ALT 636 FOR OP/ED)

## 2025-06-03 PROCEDURE — 2500000001 HC RX 250 WO HCPCS SELF ADMINISTERED DRUGS (ALT 637 FOR MEDICARE OP)

## 2025-06-03 PROCEDURE — 2500000004 HC RX 250 GENERAL PHARMACY W/ HCPCS (ALT 636 FOR OP/ED): Mod: JZ

## 2025-06-03 PROCEDURE — 2500000002 HC RX 250 W HCPCS SELF ADMINISTERED DRUGS (ALT 637 FOR MEDICARE OP, ALT 636 FOR OP/ED)

## 2025-06-03 PROCEDURE — 99232 SBSQ HOSP IP/OBS MODERATE 35: CPT

## 2025-06-03 PROCEDURE — 83735 ASSAY OF MAGNESIUM: CPT

## 2025-06-03 PROCEDURE — 2500000005 HC RX 250 GENERAL PHARMACY W/O HCPCS

## 2025-06-03 PROCEDURE — 97116 GAIT TRAINING THERAPY: CPT | Mod: GP,CQ

## 2025-06-03 PROCEDURE — 97530 THERAPEUTIC ACTIVITIES: CPT | Mod: GP,CQ

## 2025-06-03 PROCEDURE — 85025 COMPLETE CBC W/AUTO DIFF WBC: CPT

## 2025-06-03 PROCEDURE — 94640 AIRWAY INHALATION TREATMENT: CPT

## 2025-06-03 PROCEDURE — 97112 NEUROMUSCULAR REEDUCATION: CPT | Mod: GO

## 2025-06-03 PROCEDURE — 1100000001 HC PRIVATE ROOM DAILY

## 2025-06-03 PROCEDURE — 36415 COLL VENOUS BLD VENIPUNCTURE: CPT

## 2025-06-03 RX ORDER — OXYCODONE HYDROCHLORIDE 10 MG/1
10 TABLET ORAL ONCE
Refills: 0 | Status: COMPLETED | OUTPATIENT
Start: 2025-06-03 | End: 2025-06-03

## 2025-06-03 RX ORDER — TAMSULOSIN HYDROCHLORIDE 0.4 MG/1
0.4 CAPSULE ORAL
Qty: 90 CAPSULE | Refills: 3 | Status: SHIPPED | OUTPATIENT
Start: 2025-06-03

## 2025-06-03 RX ORDER — PANTOPRAZOLE SODIUM 40 MG/1
40 TABLET, DELAYED RELEASE ORAL 2 TIMES DAILY
Qty: 180 TABLET | Refills: 3 | Status: SHIPPED | OUTPATIENT
Start: 2025-06-03

## 2025-06-03 RX ORDER — VANCOMYCIN HYDROCHLORIDE 1 G/20ML
1.5 INJECTION, POWDER, LYOPHILIZED, FOR SOLUTION INTRAVENOUS DAILY
Qty: 58.5 G | Refills: 0 | Status: SHIPPED
Start: 2025-06-03 | End: 2025-07-12

## 2025-06-03 RX ORDER — LEVOTHYROXINE SODIUM 50 UG/1
50 TABLET ORAL
Qty: 90 TABLET | Refills: 1 | Status: SHIPPED | OUTPATIENT
Start: 2025-06-03

## 2025-06-03 RX ORDER — ERGOCALCIFEROL 1.25 MG/1
1.25 CAPSULE ORAL
Qty: 12 CAPSULE | Refills: 3 | Status: SHIPPED | OUTPATIENT
Start: 2025-06-08

## 2025-06-03 RX ORDER — MIRTAZAPINE 15 MG/1
15 TABLET, FILM COATED ORAL NIGHTLY
Qty: 90 TABLET | Refills: 1 | Status: SHIPPED | OUTPATIENT
Start: 2025-06-03

## 2025-06-03 RX ORDER — PREGABALIN 150 MG/1
150 CAPSULE ORAL EVERY 6 HOURS
Qty: 120 CAPSULE | Refills: 0 | Status: SHIPPED | OUTPATIENT
Start: 2025-06-07 | End: 2025-07-07

## 2025-06-03 RX ORDER — LIDOCAINE HYDROCHLORIDE 10 MG/ML
5 INJECTION, SOLUTION EPIDURAL; INFILTRATION; INTRACAUDAL; PERINEURAL ONCE
Status: COMPLETED | OUTPATIENT
Start: 2025-06-03 | End: 2025-06-04

## 2025-06-03 RX ADMIN — LEVOTHYROXINE SODIUM 50 MCG: 0.05 TABLET ORAL at 06:39

## 2025-06-03 RX ADMIN — PANTOPRAZOLE SODIUM 40 MG: 40 TABLET, DELAYED RELEASE ORAL at 16:26

## 2025-06-03 RX ADMIN — DULOXETINE HYDROCHLORIDE 60 MG: 60 CAPSULE, DELAYED RELEASE ORAL at 10:25

## 2025-06-03 RX ADMIN — PREGABALIN 150 MG: 150 CAPSULE ORAL at 16:27

## 2025-06-03 RX ADMIN — Medication 4 L/MIN: at 13:54

## 2025-06-03 RX ADMIN — TAMSULOSIN HYDROCHLORIDE 0.4 MG: 0.4 CAPSULE ORAL at 10:27

## 2025-06-03 RX ADMIN — MONTELUKAST 10 MG: 10 TABLET, FILM COATED ORAL at 21:31

## 2025-06-03 RX ADMIN — PANTOPRAZOLE SODIUM 40 MG: 40 TABLET, DELAYED RELEASE ORAL at 06:39

## 2025-06-03 RX ADMIN — PROPRANOLOL HYDROCHLORIDE 10 MG: 20 TABLET ORAL at 21:31

## 2025-06-03 RX ADMIN — BUDESONIDE 0.25 MG: 0.25 INHALANT RESPIRATORY (INHALATION) at 19:55

## 2025-06-03 RX ADMIN — FERROUS SULFATE TAB 325 MG (65 MG ELEMENTAL FE) 1 TABLET: 325 (65 FE) TAB at 10:29

## 2025-06-03 RX ADMIN — DULOXETINE HYDROCHLORIDE 60 MG: 60 CAPSULE, DELAYED RELEASE ORAL at 21:30

## 2025-06-03 RX ADMIN — AMPICILLIN SODIUM AND SULBACTAM SODIUM 3 G: 2; 1 INJECTION, POWDER, FOR SOLUTION INTRAMUSCULAR; INTRAVENOUS at 06:40

## 2025-06-03 RX ADMIN — ATORVASTATIN CALCIUM 40 MG: 40 TABLET, FILM COATED ORAL at 21:30

## 2025-06-03 RX ADMIN — MIRTAZAPINE 15 MG: 15 TABLET, FILM COATED ORAL at 21:31

## 2025-06-03 RX ADMIN — BUDESONIDE 0.25 MG: 0.25 INHALANT RESPIRATORY (INHALATION) at 09:29

## 2025-06-03 RX ADMIN — PREGABALIN 150 MG: 150 CAPSULE ORAL at 21:30

## 2025-06-03 RX ADMIN — Medication 1500 MG: at 16:26

## 2025-06-03 RX ADMIN — PROPRANOLOL HYDROCHLORIDE 10 MG: 20 TABLET ORAL at 10:28

## 2025-06-03 RX ADMIN — ACETAMINOPHEN 975 MG: 325 TABLET ORAL at 16:30

## 2025-06-03 RX ADMIN — IPRATROPIUM BROMIDE AND ALBUTEROL SULFATE 3 ML: 2.5; .5 SOLUTION RESPIRATORY (INHALATION) at 13:54

## 2025-06-03 RX ADMIN — ESCITALOPRAM OXALATE 20 MG: 20 TABLET ORAL at 10:30

## 2025-06-03 RX ADMIN — AMPICILLIN SODIUM AND SULBACTAM SODIUM 3 G: 2; 1 INJECTION, POWDER, FOR SOLUTION INTRAMUSCULAR; INTRAVENOUS at 00:51

## 2025-06-03 RX ADMIN — BUSPIRONE HYDROCHLORIDE 5 MG: 5 TABLET ORAL at 21:31

## 2025-06-03 RX ADMIN — BUSPIRONE HYDROCHLORIDE 5 MG: 5 TABLET ORAL at 16:26

## 2025-06-03 RX ADMIN — POLYETHYLENE GLYCOL 3350 17 G: 17 POWDER, FOR SOLUTION ORAL at 10:25

## 2025-06-03 RX ADMIN — BUSPIRONE HYDROCHLORIDE 5 MG: 5 TABLET ORAL at 10:27

## 2025-06-03 RX ADMIN — Medication 4 L/MIN: at 19:55

## 2025-06-03 RX ADMIN — IPRATROPIUM BROMIDE AND ALBUTEROL SULFATE 3 ML: 2.5; .5 SOLUTION RESPIRATORY (INHALATION) at 19:55

## 2025-06-03 RX ADMIN — ASPIRIN 81 MG CHEWABLE TABLET 81 MG: 81 TABLET CHEWABLE at 10:26

## 2025-06-03 RX ADMIN — OXYCODONE HYDROCHLORIDE 10 MG: 10 TABLET ORAL at 21:39

## 2025-06-03 RX ADMIN — BENZONATATE 100 MG: 100 CAPSULE ORAL at 10:29

## 2025-06-03 RX ADMIN — ENOXAPARIN SODIUM 40 MG: 40 INJECTION SUBCUTANEOUS at 19:49

## 2025-06-03 RX ADMIN — ACETAMINOPHEN 975 MG: 325 TABLET ORAL at 00:51

## 2025-06-03 RX ADMIN — AMLODIPINE BESYLATE 2.5 MG: 2.5 TABLET ORAL at 10:27

## 2025-06-03 RX ADMIN — PREGABALIN 150 MG: 150 CAPSULE ORAL at 10:26

## 2025-06-03 RX ADMIN — HYDROMORPHONE HYDROCHLORIDE 0.5 MG: 1 INJECTION, SOLUTION INTRAMUSCULAR; INTRAVENOUS; SUBCUTANEOUS at 19:49

## 2025-06-03 RX ADMIN — IPRATROPIUM BROMIDE AND ALBUTEROL SULFATE 3 ML: 2.5; .5 SOLUTION RESPIRATORY (INHALATION) at 09:29

## 2025-06-03 RX ADMIN — HYDROMORPHONE HYDROCHLORIDE 0.5 MG: 1 INJECTION, SOLUTION INTRAMUSCULAR; INTRAVENOUS; SUBCUTANEOUS at 12:57

## 2025-06-03 RX ADMIN — ACETAMINOPHEN 975 MG: 325 TABLET ORAL at 10:27

## 2025-06-03 ASSESSMENT — PAIN - FUNCTIONAL ASSESSMENT
PAIN_FUNCTIONAL_ASSESSMENT: 0-10

## 2025-06-03 ASSESSMENT — COGNITIVE AND FUNCTIONAL STATUS - GENERAL
DRESSING REGULAR UPPER BODY CLOTHING: A LOT
WALKING IN HOSPITAL ROOM: A LITTLE
MOBILITY SCORE: 15
DRESSING REGULAR LOWER BODY CLOTHING: TOTAL
PERSONAL GROOMING: A LOT
MOVING TO AND FROM BED TO CHAIR: A LITTLE
TURNING FROM BACK TO SIDE WHILE IN FLAT BAD: A LOT
STANDING UP FROM CHAIR USING ARMS: A LITTLE
TOILETING: TOTAL
HELP NEEDED FOR BATHING: TOTAL
DAILY ACTIVITIY SCORE: 10
MOVING FROM LYING ON BACK TO SITTING ON SIDE OF FLAT BED WITH BEDRAILS: A LOT
EATING MEALS: A LITTLE
CLIMB 3 TO 5 STEPS WITH RAILING: A LOT

## 2025-06-03 ASSESSMENT — PAIN DESCRIPTION - DESCRIPTORS
DESCRIPTORS: STABBING
DESCRIPTORS: ACHING

## 2025-06-03 ASSESSMENT — PAIN SCALES - GENERAL
PAINLEVEL_OUTOF10: 0 - NO PAIN
PAINLEVEL_OUTOF10: 5 - MODERATE PAIN
PAINLEVEL_OUTOF10: 10 - WORST POSSIBLE PAIN
PAINLEVEL_OUTOF10: 7
PAINLEVEL_OUTOF10: 9
PAINLEVEL_OUTOF10: 0 - NO PAIN
PAINLEVEL_OUTOF10: 10 - WORST POSSIBLE PAIN
PAINLEVEL_OUTOF10: 9
PAINLEVEL_OUTOF10: 9

## 2025-06-03 ASSESSMENT — ACTIVITIES OF DAILY LIVING (ADL): BATHING_ASSISTANCE: MAXIMAL

## 2025-06-03 ASSESSMENT — PAIN DESCRIPTION - ORIENTATION
ORIENTATION: RIGHT;ANTERIOR;LOWER
ORIENTATION: RIGHT;ANTERIOR;LOWER

## 2025-06-03 ASSESSMENT — PAIN DESCRIPTION - LOCATION
LOCATION: FOOT

## 2025-06-03 NOTE — PROGRESS NOTES
For follow-up of:  Right lower extremity cellulitis    Subjective   Interval History:  He says his right leg feels better       Objective     Current Medications[1]     Last Recorded Vitals  /63 (BP Location: Right arm, Patient Position: Lying)   Pulse 51   Temp 36.1 °C (97 °F) (Temporal)   Resp 20   Wt 118 kg (260 lb 8 oz)   SpO2 96%   General: no acute distress, lying in bed  HEENT: pharynx not examined  CVS: RRR  Resp: decreased breath sounds in bases  ABD: soft, NT, ND  EXT: Much less erythema of the right leg; right foot wrapped  Skin: no rash     Relevant Results:    Labs:   Results from last 72 hours   Lab Units 06/03/25  0624 06/02/25  0605 06/01/25  0735   SODIUM mmol/L 140 139 139   POTASSIUM mmol/L 3.9 3.8 4.1   CHLORIDE mmol/L 102 103 101   BUN mg/dL 18 18 14   CREATININE mg/dL 1.19 1.21 1.23   MAGNESIUM mg/dL 1.97 2.22 2.19   PHOSPHORUS mg/dL 4.5 4.1 2.8     Results from last 72 hours   Lab Units 06/03/25  0624 06/02/25  0605 06/01/25  0735   WBC AUTO x10*3/uL 6.9 7.5 8.4   HEMOGLOBIN g/dL 9.2* 10.5* 10.9*   HEMATOCRIT % 31.4* 33.4* 35.0*   PLATELETS AUTO x10*3/uL 210 221 209       Microbiology data: I have personally and independently reviewed and intrepreted the lab results  1/29/2025 wound culture shows MRSA resistant to clindamycin and tetracycline; sensitive to Bactrim and vancomycin  5/30/2025 blood cultures show no growth  5/31/2025 wound culture shows MRSA    Imaging data: I have personally and independently reviewed and interpreted the imaging studies  5/30/2025 chest x-ray shows no acute issues   5/30/2025 x-ray right foot shows chronic fracture    Assessment/Plan     MY IMPRESSION & RECOMMENDATIONS:  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  Since there is concern for osteomyelitis, I will arrange for a PICC line and a 6-week course of vancomycin.     -  Continue vancomycin with plans for a 6-week course up through 7/12/2025  - Will arrange for a PICC line     Other issues:  #Hypertension   #Hyperlipidemia  #CAD  #COPD  #Acute kidney injury on chronic kidney disease stage IIIa, which can affect the dosing of antimicrobials  #Osteoarthritis  #HFpEF  #Paroxysmal atrial fibrillation     ~I have personally and independently reviewed and interpreted the laboratory tests, imaging studies, and the documentation from other healthcare providers.  I am monitoring for side effects from vancomycin as outlined in a previous note.  His prognosis remains uncertain due to his ongoing right foot problems.          Jerry Lozano MD          [1]   Current Facility-Administered Medications   Medication Dose Route Frequency Provider Last Rate Last Admin    acetaminophen (Tylenol) tablet 975 mg  975 mg oral q8h Bethel Scott, DPM   975 mg at 06/03/25 1027    amLODIPine (Norvasc) tablet 2.5 mg  2.5 mg oral Daily Bethel Scott, DPM   2.5 mg at 06/03/25 1027    aspirin chewable tablet 81 mg  81 mg oral Daily Bethel Sonia, DPM   81 mg at 06/03/25 1026    atorvastatin (Lipitor) tablet 40 mg  40 mg oral Nightly Bethel Sonia, DPM   40 mg at 06/02/25 2034    benzonatate (Tessalon) capsule 100 mg  100 mg oral TID PRN Bethel Scott, DPM   100 mg at 06/03/25 1029    budesonide (Pulmicort) 0.25 mg/2 mL nebulizer solution 0.25 mg  0.25 mg nebulization BID Bethel Scott, DPM   0.25 mg at 06/03/25 0929    And    ipratropium-albuteroL (Duo-Neb) 0.5-2.5 mg/3 mL nebulizer solution 3 mL  3 mL nebulization TID Bethel Chuni, DPM   3 mL at 06/03/25 0929    busPIRone (Buspar) tablet 5 mg  5 mg oral TID Bethel Scott, DPM   5 mg at 06/03/25 1027    DULoxetine (Cymbalta) DR capsule 60 mg  60 mg oral BID Bethel Scott, DPM   60 mg at 06/03/25 1025    [Held by provider] empagliflozin (Jardiance) tablet 10 mg  10 mg oral Daily Anastasiadaljit Montero DO        enoxaparin (Lovenox) syringe 40 mg  40 mg subcutaneous q24h Bethel  ISAAC ScottM   40 mg at 06/02/25 2035    escitalopram (Lexapro) tablet 20 mg  20 mg oral Daily ISAAC WhartonM   20 mg at 06/03/25 1030    ferrous sulfate 325 mg (65 mg elemental) tablet 1 tablet  1 tablet oral Daily with breakfast Bethel Scott DPM   1 tablet at 06/03/25 1029    HYDROmorphone (Dilaudid) injection 0.5 mg  0.5 mg intravenous q3h PRN ISAAC WhartonM   0.5 mg at 06/01/25 2329    lactated Ringer's infusion  100 mL/hr intravenous Continuous Anastasia Montero  mL/hr at 06/02/25 2248 100 mL/hr at 06/02/25 2248    levothyroxine (Synthroid, Levoxyl) tablet 50 mcg  50 mcg oral Daily ISAAC WhartonM   50 mcg at 06/03/25 0639    mirtazapine (Remeron) tablet 15 mg  15 mg oral Nightly ISAAC WhartonM   15 mg at 06/02/25 2033    montelukast (Singulair) tablet 10 mg  10 mg oral Nightly ISAAC WhartonM   10 mg at 06/02/25 2034    ondansetron ODT (Zofran-ODT) disintegrating tablet 4 mg  4 mg oral q8h PRN Bethel Scott DPM        Or    ondansetron (Zofran) injection 4 mg  4 mg intravenous q8h PRN Bethel Scott DPM        oxygen (O2) therapy   inhalation Continuous PRN - O2/gases Bethel Scott DPM   4 L/min at 06/01/25 1944    pantoprazole (ProtoNix) EC tablet 40 mg  40 mg oral BID AC ISAAC WhartonM   40 mg at 06/03/25 0639    polyethylene glycol (Glycolax, Miralax) packet 17 g  17 g oral Daily Bethel Scott DPM   17 g at 06/03/25 1025    pregabalin (Lyrica) capsule 150 mg  150 mg oral TID ISAAC WhartonM   150 mg at 06/03/25 1026    propranolol (Inderal) tablet 10 mg  10 mg oral BID ISAAC WhartonM   10 mg at 06/03/25 1028    sennosides-docusate sodium (Kelsey-Colace) 8.6-50 mg per tablet 2 tablet  2 tablet oral Nightly Bethel Scott DPM        [Held by provider] spironolactone (Aldactone) tablet 25 mg  25 mg oral Daily Anastasia Montero DO        tamsulosin (Flomax) 24 hr capsule 0.4 mg  0.4 mg oral Daily Bethel Scott DPM   0.4 mg at 06/03/25 1027    [Held by provider] torsemide (Demadex) tablet 20  mg  20 mg oral Daily Anastasia Montero DO        vancomycin (Vancocin) pharmacy to dose - pharmacy monitoring   miscellaneous Daily PRN Bethel Scott DPM        vancomycin 1,500 mg in NS  mL  1,500 mg intravenous q24h Bethel Scott DPM   Stopped at 06/02/25 0310

## 2025-06-03 NOTE — PROGRESS NOTES
06/03/25 0907   Discharge Planning   Home or Post Acute Services Post acute facilities (Rehab/SNF/etc)   Type of Post Acute Facility Services Skilled nursing   Type of Home Care Services Home nursing visits   Expected Discharge Disposition SNF   Does the patient need discharge transport arranged? Yes   RoundTrip coordination needed? Yes   Has discharge transport been arranged? No     [facility] cannot accept. [facility] accepted.   Foot debridement on 06/02. Surgery notes and culture results forwarded through care port to [facility]. Message sent to attending for estimated date of discharge.     0910: Dr. Bull: patient ready for discharge when final antibiotic is determined.     1245: Final antibiotic for Vancomycin through 07/12. Updated facility through [facility].     1345: Patient agreeable to going to [facility] for skilled care. Message sent to social work.

## 2025-06-03 NOTE — PROGRESS NOTES
"Physical Therapy    Physical Therapy Treatment    Patient Name: Angus Redman  MRN: [id number]  Today's Date: 6/3/2025  Time Calculation  Start Time: 1559  Stop Time: 1624  Time Calculation (min): 25 min     3116/3116-A     06/03/25 1559   PT  Visit   PT Received On 06/03/25   General   Reason for Referral Acute cellulitis R leg/foot. / Impaired Mobility, gait. S/p right foot debridement yesterday. Per Podiatry notes yesterday, pt NWB R LE. with Surgical shoe   Referred By JOSE CARLOS DICKINSON   Past Medical History Relevant to Rehab On admit 5/30 c/o pain with walking.  Had an amputation of R 1,4, and 5 toes on 5/5/25. Was sent to SNF for rehab, but left  in 24 hrs..   H/O HF p EF, HTN, COPD, Resp. failure, uses 4L O2, CAD, O-A, P-A.fib..   Family/Caregiver Present No   Prior to Session Communication Bedside nurse   Patient Position Received Bed, 3 rail up;Alarm on   Preferred Learning Style verbal;visual   General Comment Spoke with PT DPT. about  patinent having right foot debridement surgery yesterday. PT DPT reviewed EMR and saw new WB status, \"NWB RLE with surgical shoe.\" I contacted Dr. Bull by secure chat to clarify WB status. Doctor wants \"NWB R LE\" but if pt cannot safely. maintain NWB R LE \"he can \"bear weight on right heel for transfers.\" Pt. was in bed when I arrived. he was agreeable to PT. He had no c/o pain.   Precautions   Medical Precautions Fall precautions   Precautions Comment NWB R LE. bed/chair alarm   Cognition   Orientation Level Oriented X4   Insight Mild  (limited regarding NWB RL E and mobility safety.)   Impulsive Mildly   Postural Control   Postural Control WFL   Posture Comment Kyphotic, Rounded shoulders.   Static Sitting Balance   Static Sitting-Comment/Number of Minutes Pt. sat on the EOB  without support.   Static Standing Balance   Static Standing-Comment/Number of Minutes Needed the walekr for support. not able maintiain NWB R  LE.   Dynamic Standing Balance   Dynamic " Standing-Comments No loss of balance. Pt. not able to maintain NWB R LE. Pt maintained WB on his Right.   Bed Mobility   Bed Mobility Yes   Bed Mobility 1   Bed Mobility 1 Supine to sitting;Sitting to supine   Level of Assistance 1 Moderate assistance   Bed Mobility Comments 1 HOB elevated. Pt struggled to move to the EOB.   Ambulation/Gait Training   Ambulation/Gait Training Performed Yes   Ambulation/Gait Training 1   Surface 1 Level tile   Device 1 Rolling walker   Gait Support Devices Gait belt   Assistance 1 Contact guard   Comments/Distance (ft) 1 Pt. ambulated with the wheeled walker. Pt. not able to maintain NWB R LE. Cues for walker use and to bear weight on the walker. Pt. ambulaed 4 steps forward and backward. Cues for walker and step sequecing.   Transfers   Transfer Yes   Transfer 1   Technique 1 Sit to stand;Stand to sit   Transfer Device 1 Walker   Transfer Level of Assistance 1 Minimum assistance   Trials/Comments 1 Pt needed min assist to stand  Pt bore weight on right heel.Posterior lean in standing.   Stairs   Stairs No   Activity Tolerance   Endurance Decreased tolerance for upright activites   PT Assessment   PT Assessment Results Decreased endurance;Decreased mobility;Pain   Rehab Prognosis Good   End of Session Communication Bedside nurse   Assessment Comment Expect a slow and gradual progress.   End of Session Patient Position Bed, 3 rail up;Alarm on   PT Plan   PT Plan Ongoing PT   PT Frequency 4 times per week   PT Discharge Recommendations Moderate intensity level of continued care   Equipment Recommended upon Discharge Wheeled walker;Wheelchair         Assessment/Plan   PT Assessment  PT Assessment Results: Decreased endurance, Decreased mobility, Pain  Rehab Prognosis: Good  End of Session Communication: Bedside nurse  Assessment Comment: Expect a slow and gradual progress.  End of Session Patient Position: Bed, 3 rail up, Alarm on     PT Plan  Treatment/Interventions: Bed mobility,  Transfer training, Gait training, Stair training, Endurance training, Therapeutic activity, Strengthening  PT Plan: Ongoing PT  PT Frequency: 4 times per week  PT Discharge Recommendations: Moderate intensity level of continued care  Equipment Recommended upon Discharge: Wheeled walker, Wheelchair  PT Recommended Transfer Status: Assist x1, Contact guard  PT - OK to Discharge: Yes (When medically allowed.)    Outcome Measures:  [facility] Basic Mobility  Turning from your back to your side while in a flat bed without using bedrails: A lot  Moving from lying on your back to sitting on the side of a flat bed without using bedrails: A lot  Moving to and from bed to chair (including a wheelchair): A little  Standing up from a chair using your arms (e.g. wheelchair or bedside chair): A little  To walk in hospital room: A little  Climbing 3-5 steps with railing: A lot  Basic Mobility - Total Score: 15                             EDUCATION:     Education Documentation  Precautions, taught by Danie Guzman PTA at 6/3/2025  4:53 PM.  Learner: Patient  Readiness: Acceptance  Method: Explanation, Demonstration  Response: Needs Reinforcement, Verbalizes Understanding    Body Mechanics, taught by Danie Guzman PTA at 6/3/2025  4:53 PM.  Learner: Patient  Readiness: Acceptance  Method: Explanation, Demonstration  Response: Needs Reinforcement, Verbalizes Understanding    Mobility Training, taught by Danie Guzman PTA at 6/3/2025  4:53 PM.  Learner: Patient  Readiness: Acceptance  Method: Explanation, Demonstration  Response: Needs Reinforcement, Verbalizes Understanding    Education Comments  No comments found.        GOALS:  Encounter Problems       Encounter Problems (Active)       Mobility       STG - Patient will ambulate x  ft. with w/w, CGA.   (Progressing)       Start:  05/31/25    Expected End:  06/14/25               PT Transfers       STG - Patient will perform bed mobility with ModIf. I.   (Progressing)        Start:  05/31/25    Expected End:  06/14/25            STG - Patient will transfer sit to and from stand INTO A W/W, with Modif. I.   (Progressing)       Start:  05/31/25    Expected End:  06/14/25               Pain - Adult          Safety       STG - Patient uses gait belt during all transfers, AND W/W AMB. TRNG..  (Progressing)       Start:  05/31/25    Expected End:  06/14/25

## 2025-06-03 NOTE — PROGRESS NOTES
06/03/25 1551   Discharge Planning   Home or Post Acute Services Post acute facilities (Rehab/SNF/etc)   Type of Post Acute Facility Services Skilled nursing   Expected Discharge Disposition SNF  ([facility])     [facility] will submit for precert but needs updated therapy evals. Will send them once completed.

## 2025-06-03 NOTE — PROGRESS NOTES
Spiritual Care Visit  Spiritual Care Request    Reason for Visit:  Routine Visit: Introduction  Continue Visiting: Yes     Request Received From:       Focus of Care:  Visited With: Patient (I visited yesterday.)         Refer to :          Spiritual Care Assessment    Spiritual Assessment:                      Care Provided:  Intended Effects: Establish rapport and connectedness, Kacy affirmation, Build relationship of care and support, Demonstrate caring and concern (I visited yesterday.)  Methods: Offer spiritual/Mormon support  Interventions: [place], Perform a Mormon rite or ritual    Sense of Community and or Scientologist Affiliation:  Adventist         Addressed Needs/Concerns and/or Jamee Through:  Scientologist Encounters  Scientologist Needs: Literature (I visited yesterday.)  Sacramental Encounters  Communion: Patient wants communion (I visited yesterday.)  Communion Given Indicator: No  Sacrament of Sick-Anointing: Anointed, Patient requested anointing    Outcome:  Outcome of Spiritual Care Visit: Affirmation, [place], Comfort/healing presence (I visited yesterday.)     Advance Directives:         Spiritual Care Annotation    Annotation:

## 2025-06-03 NOTE — PROGRESS NOTES
Occupational Therapy    Occupational Therapy    Evaluation    Patient Name: Angus Redman  MRN: [id number]  Today's Date: 6/3/2025  Time Calculation  Start Time: 1253  Stop Time: 1312  Time Calculation (min): 19 min  3116/3116-A    Assessment  IP OT Assessment  OT Assessment: Pt cooperative with OT. PT with decreased endurance and requires increased assist with ADL's and mobility. Recommend SNF to increase safety and independence with ADL's.  Prognosis: Fair  End of Session Communication: Bedside nurse  End of Session Patient Position: Bed, 3 rail up, Alarm on    Plan:  Treatment Interventions: ADL retraining, Functional transfer training, UE strengthening/ROM, Endurance training, Neuromuscular reeducation  OT Frequency: 3 times per week  OT Discharge Recommendations: Moderate intensity level of continued care (SNF)  Equipment Recommended upon Discharge: Wheeled walker, Wheelchair  OT Recommended Transfer Status: Dependent  OT - OK to Discharge: Yes (to next level of care)    Subjective     Current Problem:  1. Acute cellulitis        2. Other acute osteomyelitis of right foot  Case Request Operating Room: Soft tissue and osseous debridement of right foot wound    Case Request Operating Room: Soft tissue and osseous debridement of right foot wound    Surgical Pathology Exam    Surgical Pathology Exam      3. Other osteomyelitis of right foot  Case Request Operating Room: Soft tissue and osseous debridement of right foot wound    Case Request Operating Room: Soft tissue and osseous debridement of right foot wound    Surgical Pathology Exam    Surgical Pathology Exam      4. Acute hematogenous osteomyelitis of right foot (Multi)  Basic Metabolic Panel    C-Reactive Protein    CBC    Sedimentation Rate    Vancomycin, Trough    vancomycin (Vancocin) 1,000 mg vial for injection          General:  General  Reason for Referral: ADL's; Safety Assessment  Referred By: JOSE CARLOS DICKINSON DO  Past Medical History Relevant to Rehab:  On admit 5/30 c/o pain with walking.  Had an amputation of R 1,4, and 5 toes on 5/5/25. Was sent to SNF for rehab, but left  in 24 hrs..   H/O HF p EF, HTN, COPD, Resp. failure, uses 4L O2, CAD, O-A, P-A.fib.; I&D R foot 6/2  Family/Caregiver Present: No  Prior to Session Communication: Bedside nurse  Patient Position Received: Bed, 3 rail up, Alarm on    Per EMR:   Mr. Angus Redman is a 69 y.o. male who presents with acute cellulitis and pain with ambulation.  The patient has a history of HFpEF (EF 60-65% 4/2024), PSVT (on propanolol), HTN, COPD with chronic respiratory failure on 4 L NC at baseline, CAD, CKD 3, OA, and alcohol use disorder c/b severe alcoholic polyneuropathy.  Patient presents to the emergency department with erythematous and swollen right lower extremity, no purulence is draining from the surgical sites where the digits were amputated.  The patient's labs on admission combined with signs and symptoms the patient exhibiting given no concern for major systemic infection stemming from the right lower extremity, creatinine levels are slightly higher than normal creatinine levels.  The antibiotics were started appropriately for this patient and will be continued with his admission.  The patient is able to tolerate oral liquids but he will be placed n.p.o. for when he has a discussion with podiatry, podiatry is on consult for this patient.  The patient will receive supportive care and management of his chronic pathologies in combination with the acute cellulitis and osteomyelitis will be performed.     Case Time: Procedures: Surgeons:   6/2/2025  5:17 PM Soft tissue and osseous debridement of right foot wound    Boone Bull DPM          Precautions:  LE Weight Bearing Status: NWB R foot (with surgical shoe)  Medical Precautions: Fall precautions  Precautions Comment: bed/chair alarm; pt required cuing to maintain NWB while sitting EOB     Pain:  Pain Assessment  Pain Assessment: 0-10  0-10  "(Numeric) Pain Score: 9 (Pt stated, \"I wasnt in pain until now, and now it is really bad\". Pt's RN to give pt pain meds)  Pain Location: Foot  Pain Orientation: Right  Patient's Stated Pain Goal: No pain    Objective     Cognition:  Overall Cognitive Status: Within Functional Limits  Orientation Level: Oriented X4    Home Living:  Home Living Comments: Pt lives alone in a 2 floor house. Pt has 5 stairs up to kitchen, and then a full flight (broken up with landings (2+7+1)) to get to 2nd floor. Pt then stated there are 5 stairs up to the 3rd floor. From the main level, pt has 12 (5+7) stairs down to the basement     Prior Function:  Prior Function Comments: Pt was completing ADL's independently. Pt sponge bathes and does not use the shower. Pt was not clear if he was using wheelchair vs rollator at home. Pt has had 3 recent falls, and was not able to get up after the last fall. Pt sleeps in recliner most nights.    IADL History:  IADL Comments: Pt was able to complete light cooking/cleaning. Pt orders groceries through [employer] and uses Ly[employer] to get to appointments.    ADL:  Eating Assistance: Minimal  Grooming Assistance: Moderate  Bathing Assistance: Maximal  UE Dressing Assistance: Maximal  LE Dressing Assistance: Moderate  Toileting Assistance with Device: Total    Activity Tolerance:  Endurance: Decreased tolerance for upright activites    Bed Mobility/Transfers:   Supine-sit: Max A  Sit-supine: Max A     Sit-stand: not appropriate    Sitting Balance:  Static Sitting Balance  Static Sitting-Comment/Number of Minutes: fair+  Dynamic Sitting Balance  Dynamic Sitting-Comments: fair    Standing Balance:  NT     Sensation:  Sensation Comment: decreased sensation B feet    Strength:  Strength Comments: JEFFREY USHAW's WFL      Extremities: RUE   RUE : Within Functional Limits and LUE   LUE: Within Functional Limits    Outcome Measures: [facility] Daily Activity  Putting on and taking off regular lower body clothing: Total  Bathing " (including washing, rinsing, drying): Total  Putting on and taking off regular upper body clothing: A lot  Toileting, which includes using toilet, bedpan or urinal: Total  Taking care of personal grooming such as brushing teeth: A lot  Eating Meals: A little  Daily Activity - Total Score: 10                       EDUCATION:  Education  Individual(s) Educated: Patient  Education Provided:  (safety)  Education Documentation  Body Mechanics, taught by Maira Nino OT at 6/3/2025  5:17 PM.  Learner: Patient  Readiness: Acceptance  Method: Explanation  Response: Verbalizes Understanding    Precautions, taught by Maira Nino OT at 6/3/2025  5:17 PM.  Learner: Patient  Readiness: Acceptance  Method: Explanation  Response: Verbalizes Understanding    Education Comments  No comments found.        Goals:   Encounter Problems       Encounter Problems (Active)       OT Goals       OT Goal 1 (Progressing)       Start:  06/03/25    Expected End:  06/17/25       Pt will complete all bed mobility with CGA safely            OT Goal 2 (Progressing)       Start:  06/03/25    Expected End:  06/17/25       Pt with complete all transfers safely with Mod A of 1-2            OT Goal 3 (Progressing)       Start:  06/03/25    Expected End:  06/17/25       Pt will complete UB dressing ADL's with CGA using adaptive device(s) as needed           OT Goal 4 (Progressing)       Start:  06/03/25    Expected End:  06/17/25       Pt will complete LB dressing with Min A using adaptive device as needed                 Treatment: Pt required Max A to complete supine-sit and scoot hips to EOB. Pt with fair sit balance on EOB. Pt required continued cuing to avoid weightbearing through R LE. Pt attempted to scoot hips to HOB, but was unsuccessful. Pt was not appropriate to try to stand. Pt required Max A of 2 to return to supine in bed. Pt remained in bed with bed alarm on and call light within reach.

## 2025-06-03 NOTE — PROGRESS NOTES
Angus Redman is a 69 y.o. male on day 4 of admission presenting with Acute cellulitis.    Subjective   Pt examined and evaluated at bedside, found resting comfortably.  Patient has been reviewing his MyChart, and has questions about his labs.  Pt states that their pain is well controlled, denies any constitutional symptoms, and has no other complaints at this time.        Objective     Vascular: DP and PT pulses palpable 2/4 bilaterally.  CFT under 5 seconds when tested at distal digits.  Skin temp warm to warm from proximal to distal.       Neuro: Protective sensation may absent, light tough sensation intact.  No Tinel's or Valleix's signs elicited.       Derm: Full-thickness ulceration noted to plantar aspect of first metatarsal head, left foot.  Wound base extends to subcutaneous tissue, no exposed bone or fascia.  No undermining, tracking, or tunneling.  No SOI noted at this time.   - Full thickness ulceration noted to subfirst metatarsal head, left foot.  Wound extends to subcutaneous tissue, no exposed bone or fascia.  No undermining, tracking, or tunneling.  Periwound hyperkeratotic tissue noted.  Minimal drainage.  No SOI noted at this time.  - Right foot wound debridement site is well-approximated with sutures intact.  No dehiscence or necrosis noted.  Erythema noted to right lower extremity is improved from initial presentation.     Musc: Digital amputations noted to right foot. No POP noted to any other area of the foot/ankle.          Last Recorded Vitals  Blood pressure 105/61, pulse 68, temperature 36.1 °C (97 °F), temperature source Temporal, resp. rate 16, height 1.829 m (6'), weight 118 kg (260 lb 8 oz), SpO2 95%.  Intake/Output last 3 Shifts:  I/O last 3 completed shifts:  In: 3030 (25.6 mL/kg) [P.O.:720; I.V.:1110 (9.4 mL/kg); IV Piggyback:1200]  Out: 3030 (25.6 mL/kg) [Urine:3030 (0.7 mL/kg/hr)]  Weight: 118.2 kg     Relevant Results  Results for orders placed or performed during the hospital  encounter of 05/30/25 (from the past 24 hours)   Renal Function Panel   Result Value Ref Range    Glucose 112 (H) 74 - 99 mg/dL    Sodium 140 136 - 145 mmol/L    Potassium 3.9 3.5 - 5.3 mmol/L    Chloride 102 98 - 107 mmol/L    Bicarbonate 34 (H) 21 - 32 mmol/L    Anion Gap 8 (L) 10 - 20 mmol/L    Urea Nitrogen 18 6 - 23 mg/dL    Creatinine 1.19 0.50 - 1.30 mg/dL    eGFR 65 >60 mL/min/1.73m*2    Calcium 8.3 (L) 8.6 - 10.3 mg/dL    Phosphorus 4.5 2.5 - 4.9 mg/dL    Albumin 2.9 (L) 3.4 - 5.0 g/dL   Magnesium   Result Value Ref Range    Magnesium 1.97 1.60 - 2.40 mg/dL   CBC and Auto Differential   Result Value Ref Range    WBC 6.9 4.4 - 11.3 x10*3/uL    nRBC 0.0 0.0 - 0.0 /100 WBCs    RBC 3.27 (L) 4.50 - 5.90 x10*6/uL    Hemoglobin 9.2 (L) 13.5 - 17.5 g/dL    Hematocrit 31.4 (L) 41.0 - 52.0 %    MCV 96 80 - 100 fL    MCH 28.1 26.0 - 34.0 pg    MCHC 29.3 (L) 32.0 - 36.0 g/dL    RDW 15.1 (H) 11.5 - 14.5 %    Platelets 210 150 - 450 x10*3/uL    Neutrophils % 66.5 40.0 - 80.0 %    Immature Granulocytes %, Automated 1.0 (H) 0.0 - 0.9 %    Lymphocytes % 17.8 13.0 - 44.0 %    Monocytes % 9.2 2.0 - 10.0 %    Eosinophils % 5.2 0.0 - 6.0 %    Basophils % 0.3 0.0 - 2.0 %    Neutrophils Absolute 4.60 1.60 - 5.50 x10*3/uL    Immature Granulocytes Absolute, Automated 0.07 0.00 - 0.50 x10*3/uL    Lymphocytes Absolute 1.23 0.80 - 3.00 x10*3/uL    Monocytes Absolute 0.64 0.05 - 0.80 x10*3/uL    Eosinophils Absolute 0.36 0.00 - 0.40 x10*3/uL    Basophils Absolute 0.02 0.00 - 0.10 x10*3/uL     *Note: Due to a large number of results and/or encounters for the requested time period, some results have not been displayed. A complete set of results can be found in Results Review.      Assessment & Plan  Acute cellulitis      Osteomyelitis of right foot (Multi)    Assessment:  - Non-pressure ulcer, left and right foot  - Peripheral neuropathy     Plan:  - Pt examined and evaluated.  All findings discussed to patient to their satisfaction and  understanding.  - Reviewed labs and chart.  No evidence of soft tissue emphysema or osteomyelitis noted on radiographs.  No leukocytosis noted, vital stable.  Intraoperative bone culture obtained, pending.  - Systemic conditions managed by primary team, antibiotics managed by ID.  - Performed dressing change consisting of Betadine ointment, Vaseline gauze, 4 x 4 gauze, ABD, Kerlix, Ace.  Dressings to be changed every other day upon discharge.  -No contraindication to discharge from podiatry standpoint once antibiotic selected.  Patient likely to be discharged to SNF.  Will continue to follow inpatient.  Please contact podiatry with any acute questions/concerns.      Boone Bull DPM  Podiatric Surgery  Doc Halo/Hiram BAUTISTA spent >35 minutes in the professional and overall care of this patient.        Boone Bull DPM

## 2025-06-03 NOTE — PROGRESS NOTES
Angus Redman is a 69 y.o. male on day 4 of admission presenting with Acute cellulitis.      Subjective   Patient seen and examined at bedside this morning he is postop day 1 from debridement of the first metatarsal or with podiatry.  He is doing well after surgery, states his pain is well-controlled.  He is inquiring about going to rehab.    Slept okay overnight, no acute events overnight.       Objective     Last Recorded Vitals  /62 (BP Location: Right arm)   Pulse 61   Temp 35.8 °C (96.4 °F) (Temporal)   Resp 18   Wt 118 kg (260 lb 8 oz)   SpO2 95%   Intake/Output last 3 Shifts:    Intake/Output Summary (Last 24 hours) at 6/3/2025 1707  Last data filed at 6/3/2025 1600  Gross per 24 hour   Intake 2030 ml   Output 1950 ml   Net 80 ml       Admission Weight  Weight: 113 kg (250 lb) (05/30/25 1350)    Daily Weight  05/30/25 : 118 kg (260 lb 8 oz)    Image Results  XR foot right 1-2 views  Narrative: Interpreted By:  Yaya Cannon,   STUDY:  XR FOOT RIGHT 1-2 VIEWS; ;  6/2/2025 9:09 pm      INDICATION:  Signs/Symptoms:Post op x-ray, soft tissue and osseous debridement  right foot, removal 1st metatarsal.          COMPARISON:  None.      ACCESSION NUMBER(S):  [id number]      ORDERING CLINICIAN:  CARISSA BARONE      FINDINGS:  Interval resection of the midbody of the 1st metatarsal with mild  soft tissue swelling and induration. Minimal subcutaneous air as  expected postoperatively. Prior resection of the phalanges throughout  the foot.      Impression: Mild soft tissue swelling the foot as expected postoperatively status  post resection of the mid body 1st metatarsal.          MACRO:  None      Signed by: Yaya Cannon 6/2/2025 9:37 PM  Dictation workstation:   [id number]      Physical Exam  Constitutional:       General: He is not in acute distress.     Appearance: Normal appearance.   HENT:      Head: Normocephalic and atraumatic.      Nose: Nose normal.      Mouth/Throat:      Mouth:  Mucous membranes are moist.   Eyes:      Extraocular Movements: Extraocular movements intact.   Cardiovascular:      Rate and Rhythm: Normal rate and regular rhythm.      Pulses: Normal pulses.      Heart sounds: No murmur heard.  Pulmonary:      Effort: Pulmonary effort is normal. No respiratory distress.      Breath sounds: Normal breath sounds.      Comments: Breathing comfortably on nasal cannula  Abdominal:      General: Abdomen is flat. Bowel sounds are normal. There is no distension.      Palpations: Abdomen is soft.      Tenderness: There is no abdominal tenderness.   Musculoskeletal:      Left lower leg: No edema.      Comments: Right lower extremity foot wrapped, there is erythema on the right lower extremity anterior tibial area stable from yesterday, mild warmth compared to the LLE   Skin:     Capillary Refill: Capillary refill takes less than 2 seconds.   Neurological:      General: No focal deficit present.      Mental Status: He is alert.   Psychiatric:         Mood and Affect: Mood normal.         Behavior: Behavior normal.         Relevant Results  Scheduled medications  Scheduled Medications[1]  Continuous medications  Continuous Medications[2]  PRN medications  PRN Medications[3]  XR foot right 1-2 views  Result Date: 6/2/2025  Interpreted By:  Yaya Cannon, STUDY: XR FOOT RIGHT 1-2 VIEWS; ;  6/2/2025 9:09 pm   INDICATION: Signs/Symptoms:Post op x-ray, soft tissue and osseous debridement right foot, removal 1st metatarsal.     COMPARISON: None.   ACCESSION NUMBER(S): [id number]   ORDERING CLINICIAN: CARISSA BARONE   FINDINGS: Interval resection of the midbody of the 1st metatarsal with mild soft tissue swelling and induration. Minimal subcutaneous air as expected postoperatively. Prior resection of the phalanges throughout the foot.       Mild soft tissue swelling the foot as expected postoperatively status post resection of the mid body 1st metatarsal.     MACRO: None   Signed by:  Yaya Gardneron 6/2/2025 9:37 PM Dictation workstation:   [id number]    Results for orders placed or performed during the hospital encounter of 05/30/25 (from the past 24 hours)   Renal Function Panel   Result Value Ref Range    Glucose 112 (H) 74 - 99 mg/dL    Sodium 140 136 - 145 mmol/L    Potassium 3.9 3.5 - 5.3 mmol/L    Chloride 102 98 - 107 mmol/L    Bicarbonate 34 (H) 21 - 32 mmol/L    Anion Gap 8 (L) 10 - 20 mmol/L    Urea Nitrogen 18 6 - 23 mg/dL    Creatinine 1.19 0.50 - 1.30 mg/dL    eGFR 65 >60 mL/min/1.73m*2    Calcium 8.3 (L) 8.6 - 10.3 mg/dL    Phosphorus 4.5 2.5 - 4.9 mg/dL    Albumin 2.9 (L) 3.4 - 5.0 g/dL   Magnesium   Result Value Ref Range    Magnesium 1.97 1.60 - 2.40 mg/dL   CBC and Auto Differential   Result Value Ref Range    WBC 6.9 4.4 - 11.3 x10*3/uL    nRBC 0.0 0.0 - 0.0 /100 WBCs    RBC 3.27 (L) 4.50 - 5.90 x10*6/uL    Hemoglobin 9.2 (L) 13.5 - 17.5 g/dL    Hematocrit 31.4 (L) 41.0 - 52.0 %    MCV 96 80 - 100 fL    MCH 28.1 26.0 - 34.0 pg    MCHC 29.3 (L) 32.0 - 36.0 g/dL    RDW 15.1 (H) 11.5 - 14.5 %    Platelets 210 150 - 450 x10*3/uL    Neutrophils % 66.5 40.0 - 80.0 %    Immature Granulocytes %, Automated 1.0 (H) 0.0 - 0.9 %    Lymphocytes % 17.8 13.0 - 44.0 %    Monocytes % 9.2 2.0 - 10.0 %    Eosinophils % 5.2 0.0 - 6.0 %    Basophils % 0.3 0.0 - 2.0 %    Neutrophils Absolute 4.60 1.60 - 5.50 x10*3/uL    Immature Granulocytes Absolute, Automated 0.07 0.00 - 0.50 x10*3/uL    Lymphocytes Absolute 1.23 0.80 - 3.00 x10*3/uL    Monocytes Absolute 0.64 0.05 - 0.80 x10*3/uL    Eosinophils Absolute 0.36 0.00 - 0.40 x10*3/uL    Basophils Absolute 0.02 0.00 - 0.10 x10*3/uL     *Note: Due to a large number of results and/or encounters for the requested time period, some results have not been displayed. A complete set of results can be found in Results Review.             Assessment & Plan  Acute cellulitis        Osteomyelitis of right foot (Multi)    70yo gentleman with PMHx of recent  admission for osteomyelitis s/p amputation 1st, 4th and 5th toes, Recurrent syncope secondary to orthostatic hypotension/autonomic dysfunction/autonomic neuropathy, restrictive lung disease on 4 to 5 L nasal cannula at baseline, PSVT on propranolol, CAD w/ CCTA (02/2024) - 50% stenosis of LAD - 25-50% stenosis of RCA, alcohol use disorder c/b severe alcoholic polyneuropathy with autonomic dysfunction who presents for right lower extremity  swelling as well as purulent drainage from surgical wound.  Patient recently discharged on 5/5/2025 at that time he underwent amputation of right 1st, 4th and 5th toe, discharged home with Augmentin and doxycycline for 14 days.  Discharged to SNF however he left AMA after 24. Patient started on antibiotic and admitted for further workup and evaluation. He underwent bedside debridement on 5/31 with concern for a deep soft tissue infection.  MRI completed showing osteomyelitis of the first metatarsal head.  Podiatry completed OR debridement on 6/2/2025.  He remains on vancomycin for cellulitis/osteomyelitis    # Purulent right lower limb cellulitis  #Osteomyelitis first metatarsal  #S/p amputation of right Pharis, 4th and 5th toe on 5/5/2025  - MRI showing osteomyelitis of first metatarsal  - S/p debridement of the first metatarsal in the OR with podiatry on 6/2/2025, tissue and bone cultures sent, follow-up results  - Continue with vancomycin as previous culture from skin wound was growing MRSA, ID also recommends vancomycin for 6 weeks through 7/12/2025 and discharged with a PICC line  -Wound dressing daily by podiatry  - ID consultation, appreciate recommendation  - PT/OT recommended moderate intensity level of care  -Patient agree with going to SNF after discharge - precert once therapy evals are in         Global plan of care  Diet: Cardiac diet  DVT prophylaxis: Lovenox  Telemetry: Not indicated  Consults: Podiatry, PT, OT,   Code Status: DNR and No Intubation and  No ICU   Dispo: possibly tomorrow once pre-cert comes through           Flaquita Hernadez DO  [facility], PGY-1         [1] acetaminophen, 975 mg, oral, q8h  amLODIPine, 2.5 mg, oral, Daily  aspirin, 81 mg, oral, Daily  atorvastatin, 40 mg, oral, Nightly  budesonide, 0.25 mg, nebulization, BID   And  ipratropium-albuteroL, 3 mL, nebulization, TID  busPIRone, 5 mg, oral, TID  DULoxetine, 60 mg, oral, BID  [Held by provider] empagliflozin, 10 mg, oral, Daily  enoxaparin, 40 mg, subcutaneous, q24h  escitalopram, 20 mg, oral, Daily  ferrous sulfate, 1 tablet, oral, Daily with breakfast  levothyroxine, 50 mcg, oral, Daily  lidocaine, 5 mL, infiltration, Once  mirtazapine, 15 mg, oral, Nightly  montelukast, 10 mg, oral, Nightly  pantoprazole, 40 mg, oral, BID AC  polyethylene glycol, 17 g, oral, Daily  pregabalin, 150 mg, oral, TID  propranolol, 10 mg, oral, BID  sennosides-docusate sodium, 2 tablet, oral, Nightly  [Held by provider] spironolactone, 25 mg, oral, Daily  tamsulosin, 0.4 mg, oral, Daily  [Held by provider] torsemide, 20 mg, oral, Daily  vancomycin, 1,500 mg, intravenous, q24h  [2]    [3] PRN medications: alteplase, benzonatate, HYDROmorphone, ondansetron ODT **OR** ondansetron, oxygen, vancomycin

## 2025-06-04 ENCOUNTER — APPOINTMENT (OUTPATIENT)
Dept: RADIOLOGY | Facility: HOSPITAL | Age: 70
End: 2025-06-04
Payer: MEDICARE

## 2025-06-04 ENCOUNTER — APPOINTMENT (OUTPATIENT)
Dept: WOUND CARE | Facility: CLINIC | Age: 70
End: 2025-06-04
Payer: MEDICARE

## 2025-06-04 LAB
ALBUMIN SERPL BCP-MCNC: 3.3 G/DL (ref 3.4–5)
ANION GAP SERPL CALC-SCNC: 11 MMOL/L (ref 10–20)
BACTERIA BLD CULT: NORMAL
BACTERIA BLD CULT: NORMAL
BASOPHILS # BLD AUTO: 0.02 X10*3/UL (ref 0–0.1)
BASOPHILS NFR BLD AUTO: 0.3 %
BUN SERPL-MCNC: 21 MG/DL (ref 6–23)
CALCIUM SERPL-MCNC: 8.7 MG/DL (ref 8.6–10.3)
CHLORIDE SERPL-SCNC: 100 MMOL/L (ref 98–107)
CO2 SERPL-SCNC: 33 MMOL/L (ref 21–32)
CREAT SERPL-MCNC: 1.15 MG/DL (ref 0.5–1.3)
EGFRCR SERPLBLD CKD-EPI 2021: 68 ML/MIN/1.73M*2
EOSINOPHIL # BLD AUTO: 0.42 X10*3/UL (ref 0–0.4)
EOSINOPHIL NFR BLD AUTO: 5.9 %
ERYTHROCYTE [DISTWIDTH] IN BLOOD BY AUTOMATED COUNT: 15.1 % (ref 11.5–14.5)
GLUCOSE SERPL-MCNC: 96 MG/DL (ref 74–99)
HCT VFR BLD AUTO: 34.4 % (ref 41–52)
HGB BLD-MCNC: 10.1 G/DL (ref 13.5–17.5)
IMM GRANULOCYTES # BLD AUTO: 0.09 X10*3/UL (ref 0–0.5)
IMM GRANULOCYTES NFR BLD AUTO: 1.3 % (ref 0–0.9)
LYMPHOCYTES # BLD AUTO: 1.49 X10*3/UL (ref 0.8–3)
LYMPHOCYTES NFR BLD AUTO: 20.8 %
MAGNESIUM SERPL-MCNC: 2.06 MG/DL (ref 1.6–2.4)
MCH RBC QN AUTO: 27.7 PG (ref 26–34)
MCHC RBC AUTO-ENTMCNC: 29.4 G/DL (ref 32–36)
MCV RBC AUTO: 94 FL (ref 80–100)
MONOCYTES # BLD AUTO: 0.63 X10*3/UL (ref 0.05–0.8)
MONOCYTES NFR BLD AUTO: 8.8 %
NEUTROPHILS # BLD AUTO: 4.5 X10*3/UL (ref 1.6–5.5)
NEUTROPHILS NFR BLD AUTO: 62.9 %
NRBC BLD-RTO: 0 /100 WBCS (ref 0–0)
PHOSPHATE SERPL-MCNC: 4.4 MG/DL (ref 2.5–4.9)
PLATELET # BLD AUTO: 253 X10*3/UL (ref 150–450)
POTASSIUM SERPL-SCNC: 4.2 MMOL/L (ref 3.5–5.3)
RBC # BLD AUTO: 3.65 X10*6/UL (ref 4.5–5.9)
SODIUM SERPL-SCNC: 140 MMOL/L (ref 136–145)
VANCOMYCIN SERPL-MCNC: 24 UG/ML (ref 5–20)
WBC # BLD AUTO: 7.2 X10*3/UL (ref 4.4–11.3)

## 2025-06-04 PROCEDURE — 99232 SBSQ HOSP IP/OBS MODERATE 35: CPT

## 2025-06-04 PROCEDURE — 80202 ASSAY OF VANCOMYCIN: CPT

## 2025-06-04 PROCEDURE — 2500000004 HC RX 250 GENERAL PHARMACY W/ HCPCS (ALT 636 FOR OP/ED)

## 2025-06-04 PROCEDURE — C1769 GUIDE WIRE: HCPCS

## 2025-06-04 PROCEDURE — C1751 CATH, INF, PER/CENT/MIDLINE: HCPCS

## 2025-06-04 PROCEDURE — 85025 COMPLETE CBC W/AUTO DIFF WBC: CPT

## 2025-06-04 PROCEDURE — 87081 CULTURE SCREEN ONLY: CPT | Mod: STJLAB | Performed by: INTERNAL MEDICINE

## 2025-06-04 PROCEDURE — 80069 RENAL FUNCTION PANEL: CPT

## 2025-06-04 PROCEDURE — 97110 THERAPEUTIC EXERCISES: CPT | Mod: GP,CQ

## 2025-06-04 PROCEDURE — 36415 COLL VENOUS BLD VENIPUNCTURE: CPT

## 2025-06-04 PROCEDURE — 2500000004 HC RX 250 GENERAL PHARMACY W/ HCPCS (ALT 636 FOR OP/ED): Performed by: PHARMACIST

## 2025-06-04 PROCEDURE — 2780000003 HC OR 278 NO HCPCS

## 2025-06-04 PROCEDURE — 36573 INSJ PICC RS&I 5 YR+: CPT

## 2025-06-04 PROCEDURE — 83735 ASSAY OF MAGNESIUM: CPT

## 2025-06-04 PROCEDURE — 02HV33Z INSERTION OF INFUSION DEVICE INTO SUPERIOR VENA CAVA, PERCUTANEOUS APPROACH: ICD-10-PCS | Performed by: STUDENT IN AN ORGANIZED HEALTH CARE EDUCATION/TRAINING PROGRAM

## 2025-06-04 PROCEDURE — 71045 X-RAY EXAM CHEST 1 VIEW: CPT | Performed by: RADIOLOGY

## 2025-06-04 PROCEDURE — 1100000001 HC PRIVATE ROOM DAILY

## 2025-06-04 PROCEDURE — 36569 INSJ PICC 5 YR+ W/O IMAGING: CPT | Mod: RT | Performed by: RADIOLOGY

## 2025-06-04 PROCEDURE — 2500000005 HC RX 250 GENERAL PHARMACY W/O HCPCS

## 2025-06-04 PROCEDURE — 2500000002 HC RX 250 W HCPCS SELF ADMINISTERED DRUGS (ALT 637 FOR MEDICARE OP, ALT 636 FOR OP/ED)

## 2025-06-04 PROCEDURE — 94640 AIRWAY INHALATION TREATMENT: CPT

## 2025-06-04 PROCEDURE — 97116 GAIT TRAINING THERAPY: CPT | Mod: GP,CQ

## 2025-06-04 PROCEDURE — 2500000001 HC RX 250 WO HCPCS SELF ADMINISTERED DRUGS (ALT 637 FOR MEDICARE OP)

## 2025-06-04 PROCEDURE — 2500000004 HC RX 250 GENERAL PHARMACY W/ HCPCS (ALT 636 FOR OP/ED): Performed by: INTERNAL MEDICINE

## 2025-06-04 PROCEDURE — 71045 X-RAY EXAM CHEST 1 VIEW: CPT

## 2025-06-04 PROCEDURE — 2720000007 HC OR 272 NO HCPCS

## 2025-06-04 RX ORDER — VANCOMYCIN HYDROCHLORIDE 1.25 G/250ML
1250 INJECTION, SOLUTION INTRAVITREAL EVERY 24 HOURS
Status: DISCONTINUED | OUTPATIENT
Start: 2025-06-04 | End: 2025-06-05 | Stop reason: HOSPADM

## 2025-06-04 RX ADMIN — BUSPIRONE HYDROCHLORIDE 5 MG: 5 TABLET ORAL at 15:47

## 2025-06-04 RX ADMIN — ASPIRIN 81 MG CHEWABLE TABLET 81 MG: 81 TABLET CHEWABLE at 09:19

## 2025-06-04 RX ADMIN — PREGABALIN 150 MG: 150 CAPSULE ORAL at 20:40

## 2025-06-04 RX ADMIN — PREGABALIN 150 MG: 150 CAPSULE ORAL at 09:19

## 2025-06-04 RX ADMIN — LIDOCAINE HYDROCHLORIDE 50 MG: 10 INJECTION, SOLUTION EPIDURAL; INFILTRATION; INTRACAUDAL; PERINEURAL at 11:31

## 2025-06-04 RX ADMIN — TAMSULOSIN HYDROCHLORIDE 0.4 MG: 0.4 CAPSULE ORAL at 09:20

## 2025-06-04 RX ADMIN — VANCOMYCIN HYDROCHLORIDE 1250 MG: 1.25 INJECTION, SOLUTION INTRAVITREAL at 15:47

## 2025-06-04 RX ADMIN — FERROUS SULFATE TAB 325 MG (65 MG ELEMENTAL FE) 1 TABLET: 325 (65 FE) TAB at 09:28

## 2025-06-04 RX ADMIN — DULOXETINE HYDROCHLORIDE 60 MG: 60 CAPSULE, DELAYED RELEASE ORAL at 20:39

## 2025-06-04 RX ADMIN — Medication 4 L/MIN: at 07:59

## 2025-06-04 RX ADMIN — PREGABALIN 150 MG: 150 CAPSULE ORAL at 15:46

## 2025-06-04 RX ADMIN — LEVOTHYROXINE SODIUM 50 MCG: 0.05 TABLET ORAL at 06:39

## 2025-06-04 RX ADMIN — IPRATROPIUM BROMIDE AND ALBUTEROL SULFATE 3 ML: 2.5; .5 SOLUTION RESPIRATORY (INHALATION) at 07:57

## 2025-06-04 RX ADMIN — BUSPIRONE HYDROCHLORIDE 5 MG: 5 TABLET ORAL at 09:20

## 2025-06-04 RX ADMIN — ACETAMINOPHEN 975 MG: 325 TABLET ORAL at 09:20

## 2025-06-04 RX ADMIN — BUSPIRONE HYDROCHLORIDE 5 MG: 5 TABLET ORAL at 20:40

## 2025-06-04 RX ADMIN — BUDESONIDE 0.25 MG: 0.25 INHALANT RESPIRATORY (INHALATION) at 20:30

## 2025-06-04 RX ADMIN — ENOXAPARIN SODIUM 40 MG: 40 INJECTION SUBCUTANEOUS at 20:40

## 2025-06-04 RX ADMIN — PANTOPRAZOLE SODIUM 40 MG: 40 TABLET, DELAYED RELEASE ORAL at 06:39

## 2025-06-04 RX ADMIN — SENNOSIDES AND DOCUSATE SODIUM 2 TABLET: 50; 8.6 TABLET ORAL at 20:39

## 2025-06-04 RX ADMIN — PROPRANOLOL HYDROCHLORIDE 10 MG: 20 TABLET ORAL at 20:39

## 2025-06-04 RX ADMIN — BUDESONIDE 0.25 MG: 0.25 INHALANT RESPIRATORY (INHALATION) at 07:57

## 2025-06-04 RX ADMIN — ESCITALOPRAM OXALATE 20 MG: 20 TABLET ORAL at 09:19

## 2025-06-04 RX ADMIN — POLYETHYLENE GLYCOL 3350 17 G: 17 POWDER, FOR SOLUTION ORAL at 09:19

## 2025-06-04 RX ADMIN — BENZONATATE 100 MG: 100 CAPSULE ORAL at 16:14

## 2025-06-04 RX ADMIN — PANTOPRAZOLE SODIUM 40 MG: 40 TABLET, DELAYED RELEASE ORAL at 15:47

## 2025-06-04 RX ADMIN — MIRTAZAPINE 15 MG: 15 TABLET, FILM COATED ORAL at 20:40

## 2025-06-04 RX ADMIN — AMLODIPINE BESYLATE 2.5 MG: 2.5 TABLET ORAL at 09:20

## 2025-06-04 RX ADMIN — PROPRANOLOL HYDROCHLORIDE 10 MG: 20 TABLET ORAL at 09:19

## 2025-06-04 RX ADMIN — IPRATROPIUM BROMIDE AND ALBUTEROL SULFATE 3 ML: 2.5; .5 SOLUTION RESPIRATORY (INHALATION) at 20:30

## 2025-06-04 RX ADMIN — ACETAMINOPHEN 975 MG: 325 TABLET ORAL at 01:56

## 2025-06-04 RX ADMIN — ATORVASTATIN CALCIUM 40 MG: 40 TABLET, FILM COATED ORAL at 20:40

## 2025-06-04 RX ADMIN — DULOXETINE HYDROCHLORIDE 60 MG: 60 CAPSULE, DELAYED RELEASE ORAL at 09:20

## 2025-06-04 RX ADMIN — ACETAMINOPHEN 975 MG: 325 TABLET ORAL at 16:58

## 2025-06-04 RX ADMIN — MONTELUKAST 10 MG: 10 TABLET, FILM COATED ORAL at 20:40

## 2025-06-04 ASSESSMENT — COGNITIVE AND FUNCTIONAL STATUS - GENERAL
MOVING FROM LYING ON BACK TO SITTING ON SIDE OF FLAT BED WITH BEDRAILS: A LOT
MOVING TO AND FROM BED TO CHAIR: A LITTLE
STANDING UP FROM CHAIR USING ARMS: A LITTLE
CLIMB 3 TO 5 STEPS WITH RAILING: A LITTLE
DAILY ACTIVITIY SCORE: 18
EATING MEALS: A LITTLE
MOBILITY SCORE: 18
HELP NEEDED FOR BATHING: A LITTLE
MOBILITY SCORE: 15
MOVING TO AND FROM BED TO CHAIR: A LITTLE
DRESSING REGULAR UPPER BODY CLOTHING: A LITTLE
TURNING FROM BACK TO SIDE WHILE IN FLAT BAD: A LITTLE
TURNING FROM BACK TO SIDE WHILE IN FLAT BAD: A LOT
MOVING FROM LYING ON BACK TO SITTING ON SIDE OF FLAT BED WITH BEDRAILS: A LITTLE
TOILETING: A LITTLE
WALKING IN HOSPITAL ROOM: A LITTLE
WALKING IN HOSPITAL ROOM: A LITTLE
DRESSING REGULAR LOWER BODY CLOTHING: A LITTLE
PERSONAL GROOMING: A LITTLE
CLIMB 3 TO 5 STEPS WITH RAILING: A LOT
STANDING UP FROM CHAIR USING ARMS: A LITTLE

## 2025-06-04 ASSESSMENT — PAIN SCALES - GENERAL
PAINLEVEL_OUTOF10: 0 - NO PAIN
PAINLEVEL_OUTOF10: 8
PAINLEVEL_OUTOF10: 0 - NO PAIN

## 2025-06-04 ASSESSMENT — PAIN - FUNCTIONAL ASSESSMENT: PAIN_FUNCTIONAL_ASSESSMENT: 0-10

## 2025-06-04 NOTE — PROGRESS NOTES
Physical Therapy    Physical Therapy    Physical Therapy Treatment    Patient Name: Angus Redman  MRN: [id number]  Today's Date: 6/4/2025  Time Calculation  Start Time: 0950  Stop Time: 1020  Time Calculation (min): 30 min     3116/3116-A    Assessment/Plan   PT Assessment  PT Assessment Results: Decreased endurance, Decreased mobility, Pain  End of Session Communication: Bedside nurse  Assessment Comment: Pt fatigus quickly upon exertion and requires multiple seated rest breaks. Pt educated/instructed on WB status- fair follow through. Pt was left in bed with fall safety activated and call light in reach  End of Session Patient Position: Bed, 3 rail up, Alarm on  PT Plan  Inpatient/Swing Bed or Outpatient: Inpatient  PT Plan  Treatment/Interventions: Bed mobility, Transfer training, Gait training, Stair training, Endurance training, Therapeutic activity, Strengthening  PT Plan: Ongoing PT  PT Frequency: 4 times per week  PT Discharge Recommendations: Moderate intensity level of continued care  Equipment Recommended upon Discharge: Wheeled walker, Wheelchair  PT Recommended Transfer Status: Assist x1, Contact guard  PT - OK to Discharge: Yes (When medically allowed.)    Outcome Measures:  [facility] Basic Mobility  Turning from your back to your side while in a flat bed without using bedrails: A lot  Moving from lying on your back to sitting on the side of a flat bed without using bedrails: A lot  Moving to and from bed to chair (including a wheelchair): A little  Standing up from a chair using your arms (e.g. wheelchair or bedside chair): A little  To walk in hospital room: A little  Climbing 3-5 steps with railing: A lot  Basic Mobility - Total Score: 15            06/04/25 0950   PT  Visit   PT Received On 06/04/25   Response to Previous Treatment Patient with no complaints from previous session.   General   Reason for Referral Acute cellulitis R leg/foot. / Impaired Mobility, gait. S/p right foot debridement  yesterday. Per Podiatry notes yesterday, pt NWB R LE. with Surgical shoe   Referred By JOSE CARLOS DICKINSON   Past Medical History Relevant to Rehab On admit 5/30 c/o pain with walking.  Had an amputation of R 1,4, and 5 toes on 5/5/25. Was sent to SNF for rehab, but left  in 24 hrs..   H/O HF p EF, HTN, COPD, Resp. failure, uses 4L O2, CAD, O-A, P-A.fib..   Prior to Session Communication Bedside nurse   Patient Position Received Bed, 3 rail up;Alarm off, not on at start of session   Preferred Learning Style verbal;visual   General Comment pt agreeable to therapy, cleared by RN   Precautions   LE Weight Bearing Status Heel Weight Bearing in Post-Op Shoe  (RLE)   Medical Precautions Fall precautions;Oxygen therapy device and L/min  (4L O2 via NC)   Precautions Comment NWB R LE. bed/chair alarm   Pain Assessment   Pain Assessment 0-10   0-10 (Numeric) Pain Score 8   Pain Location Leg   Pain Orientation Right   Pain Interventions Repositioned   Cognition   Overall Cognitive Status WFL   Orientation Level Oriented X4   Therapeutic Exercise   Therapeutic Exercise Performed Yes   Therapeutic Exercise Activity 1 DF/PF x10   Therapeutic Exercise Activity 2 QS x10   Therapeutic Exercise Activity 3 GS x10   Bed Mobility   Bed Mobility Yes   Bed Mobility 1   Bed Mobility 1 Supine to sitting;Sitting to supine;Scooting   Level of Assistance 1 Moderate assistance   Bed Mobility Comments 1 HOB elevated, assist at trunk   Ambulation/Gait Training   Ambulation/Gait Training Performed Yes   Ambulation/Gait Training 1   Surface 1 Level tile   Device 1 Rolling walker   Gait Support Devices Gait belt   Assistance 1 Contact guard   Quality of Gait 1 NBOS;Inconsistent stride length;Decreased step length;Forward flexed posture   Comments/Distance (ft) 1 ~4 steps forward/backward x2 trials. pt requires seated rest break between attempts to manage fatigue. Pt requires cues for proper sequencing however demonstrates difficulty maintaining NWB  precaution. Pt instructed on heel WB- fair follow through   Transfers   Transfer Yes   Transfer 1   Transfer From 1 Bed to   Transfer to 1 Stand   Technique 1 Sit to stand;Stand to sit   Transfer Device 1 Walker;Gait belt   Transfer Level of Assistance 1 Minimum assistance;Minimal verbal cues   Trials/Comments 1 x3 STS to improve transfer technique. pt requires cues on proper hand placement and anterior weight shift.   Activity Tolerance   Endurance Decreased tolerance for upright activites   PT Assessment   PT Assessment Results Decreased endurance;Decreased mobility;Pain   End of Session Communication Bedside nurse   Assessment Comment Pt fatigus quickly upon exertion and requires multiple seated rest breaks. Pt educated/instructed on WB status- fair follow through. Pt was left in bed with fall safety activated and call light in reach   End of Session Patient Position Bed, 3 rail up;Alarm on   PT Plan   Inpatient/Swing Bed or Outpatient Inpatient   PT Plan   PT Plan Ongoing PT   PT Frequency 4 times per week   PT Discharge Recommendations Moderate intensity level of continued care   Equipment Recommended upon Discharge Wheeled walker;Wheelchair                 EDUCATION:     Education Documentation  No documentation found.  Education Comments  No comments found.        GOALS:  Encounter Problems       Encounter Problems (Active)       Mobility       STG - Patient will ambulate x  ft. with w/w, CGA.   (Progressing)       Start:  05/31/25    Expected End:  06/14/25               PT Transfers       STG - Patient will perform bed mobility with ModIf. I.   (Progressing)       Start:  05/31/25    Expected End:  06/14/25            STG - Patient will transfer sit to and from stand INTO A W/W, with Modif. I.   (Progressing)       Start:  05/31/25    Expected End:  06/14/25               Pain - Adult          Safety       STG - Patient uses gait belt during all transfers, AND W/W AMB. TRNG..  (Progressing)        Start:  05/31/25    Expected End:  06/14/25

## 2025-06-04 NOTE — CONSULTS
Reason For Consult  PICC placement for extended antibiotic therapy.    Allergies  Lisinopril, Codeine, House dust mite, Hydrocodone-acetaminophen, Latex, Mold, and Tree and shrub pollen     Assessment/Plan     Patient verbalized understanding of risks/benefits/line care and maintenance/infection prevention. Patient signed electronic consent. Single lumen PowerPICC Catheter placed to ELY basilic vein using ultrasound guidance and Modified Seldinger technique. Brisk blood return noted and line flushes easily. Unable to confirm picc tip site with EKG, CXR ordered, awaiting confirmation.  Site free from bleeding/hematoma. Line secured with Stat-lock, Biopatch placed and Tegaderm central line dressing placed.  Curos cap placed to end of  catheter. See LDA for PICC details. Bedside RN updated. Patient's bed in  lowest position, bed alarm on/functioning, call light w/n reach.        Love Kyle RN    Addendum:    CXR showed PICC incorrectly positioned. Exchange attempted, unable to get catheter to drop to appropriate vein despite multiple attempts/troubleshooting techniques. Catheter removed, sheath retained and covered with Tegaderm. Patient brought to IR 3 and new PICC placed under fluroscopy by Dr. Escoto. Site free from bleeding/hematoma, secured with statlock, biopatch placed, covered with Tegaderm central line dressing. Catheter placement verified  with fluoroscopy. Line has haddad, non-pulsatile blood return and flushes easily. Curos cap placed to end of catheter. LDA updated. Patient taken back to room 3116 by this RN, bed in lowest position and call light w/n reach. Bedside RN updated.      PICC ready for immediate use.    Love Kyle, RN

## 2025-06-04 NOTE — PROGRESS NOTES
For follow-up of:  Right lower extremity cellulitis    Subjective   Interval History:  He reports no new problems.       Objective     Current Medications[1]     Last Recorded Vitals  /85 (BP Location: Right arm, Patient Position: Lying)   Pulse 56   Temp 36 °C (96.8 °F) (Temporal)   Resp 16   Wt 118 kg (260 lb 8 oz)   SpO2 94%   General: no acute distress, lying in bed  HEENT: pharynx not examined  CVS: RRR  Resp: decreased breath sounds in bases  ABD: soft, NT, ND  EXT: Mild erythema of the right leg; right foot wrapped  Skin: Right upper extremity PICC line is clean dry and intact    Relevant Results:    Labs:   Results from last 72 hours   Lab Units 06/04/25  0555 06/03/25  0624 06/02/25  0605   SODIUM mmol/L 140 140 139   POTASSIUM mmol/L 4.2 3.9 3.8   CHLORIDE mmol/L 100 102 103   BUN mg/dL 21 18 18   CREATININE mg/dL 1.15 1.19 1.21   MAGNESIUM mg/dL 2.06 1.97 2.22   PHOSPHORUS mg/dL 4.4 4.5 4.1     Results from last 72 hours   Lab Units 06/04/25  0555 06/03/25  0624 06/02/25  0605   WBC AUTO x10*3/uL 7.2 6.9 7.5   HEMOGLOBIN g/dL 10.1* 9.2* 10.5*   HEMATOCRIT % 34.4* 31.4* 33.4*   PLATELETS AUTO x10*3/uL 253 210 221       Microbiology data: I have personally and independently reviewed and intrepreted the lab results  1/29/2025 wound culture shows MRSA resistant to clindamycin and tetracycline; sensitive to Bactrim and vancomycin  5/30/2025 blood cultures show no growth  5/31/2025 wound culture shows MRSA    Imaging data: I have personally and independently reviewed and interpreted the imaging studies  5/30/2025 chest x-ray shows no acute issues   5/30/2025 x-ray right foot shows chronic fracture    Assessment/Plan     MY IMPRESSION & RECOMMENDATIONS:  EtOH polyneuropathy with right foot neuropathic MRSA infected ulcer status post debridement and right lower extremity cellulitis, for which he remains on IV antibiotics.  He had his PICC line placed.  It would be reasonable to discharge him on  vancomycin when ready.       - Can discharge to ECF when ready on IV vancomycin through 7/12/2025  - Collect weekly blood work while on IV antibiotics  - Follow-up in ID clinic in approximately 5 weeks     Other issues:  #Hypertension   #Hyperlipidemia  #CAD  #COPD  #Acute kidney injury on chronic kidney disease stage IIIa, which can affect the dosing of antimicrobials  #Osteoarthritis  #HFpEF  #Paroxysmal atrial fibrillation     ~I have personally and independently reviewed and interpreted the laboratory tests, imaging studies, and the documentation from other healthcare providers.  I am monitoring for side effects from vancomycin as outlined in a previous note.  His prognosis still uncertain due to his ongoing right foot problems.        Jerry Lozano MD            [1]   Current Facility-Administered Medications   Medication Dose Route Frequency Provider Last Rate Last Admin    acetaminophen (Tylenol) tablet 975 mg  975 mg oral q8h Bethel Scott DPM   975 mg at 06/04/25 0920    alteplase (Cathflo Activase) injection 2 mg  2 mg intra-catheter PRN Jerry Lozano MD        amLODIPine (Norvasc) tablet 2.5 mg  2.5 mg oral Daily Bethel Scott DPM   2.5 mg at 06/04/25 0920    aspirin chewable tablet 81 mg  81 mg oral Daily Bethel Scott DPM   81 mg at 06/04/25 0919    atorvastatin (Lipitor) tablet 40 mg  40 mg oral Nightly Bethel Scott DPM   40 mg at 06/03/25 2130    benzonatate (Tessalon) capsule 100 mg  100 mg oral TID PRN Bethel Scott DPM   100 mg at 06/03/25 1029    budesonide (Pulmicort) 0.25 mg/2 mL nebulizer solution 0.25 mg  0.25 mg nebulization BID Bethel Scott DPM   0.25 mg at 06/04/25 0757    And    ipratropium-albuteroL (Duo-Neb) 0.5-2.5 mg/3 mL nebulizer solution 3 mL  3 mL nebulization TID Bethel Scott DPM   3 mL at 06/04/25 0757    busPIRone (Buspar) tablet 5 mg  5 mg oral TID Bethel Scott DPM   5 mg at 06/04/25 0920    DULoxetine (Cymbalta) DR capsule 60 mg  60 mg oral BID Bethel Scott, DPALOK   60  mg at 06/04/25 0920    [Held by provider] empagliflozin (Jardiance) tablet 10 mg  10 mg oral Daily Anastasia Montero DO        enoxaparin (Lovenox) syringe 40 mg  40 mg subcutaneous q24h ISAAC WhartonM   40 mg at 06/03/25 1949    escitalopram (Lexapro) tablet 20 mg  20 mg oral Daily ISAAC WhartonM   20 mg at 06/04/25 0919    ferrous sulfate 325 mg (65 mg elemental) tablet 1 tablet  1 tablet oral Daily with breakfast ISAAC WhartonM   1 tablet at 06/04/25 0928    HYDROmorphone (Dilaudid) injection 0.5 mg  0.5 mg intravenous q3h PRN Bethel Scott DPM   0.5 mg at 06/03/25 1949    levothyroxine (Synthroid, Levoxyl) tablet 50 mcg  50 mcg oral Daily Bethel Scott DPM   50 mcg at 06/04/25 0639    mirtazapine (Remeron) tablet 15 mg  15 mg oral Nightly ISAAC WhartonM   15 mg at 06/03/25 2131    montelukast (Singulair) tablet 10 mg  10 mg oral Nightly ISAAC WhartonM   10 mg at 06/03/25 2131    ondansetron ODT (Zofran-ODT) disintegrating tablet 4 mg  4 mg oral q8h PRN Bethel Scott DPM        Or    ondansetron (Zofran) injection 4 mg  4 mg intravenous q8h PRN Bethel Scott DPM        oxygen (O2) therapy   inhalation Continuous PRN - O2/gases ISAAC WhartonM   4 L/min at 06/04/25 0759    pantoprazole (ProtoNix) EC tablet 40 mg  40 mg oral BID AC ISAAC WhartonM   40 mg at 06/04/25 0639    polyethylene glycol (Glycolax, Miralax) packet 17 g  17 g oral Daily ISAAC WhartonM   17 g at 06/04/25 0919    pregabalin (Lyrica) capsule 150 mg  150 mg oral TID ISAAC WhartonM   150 mg at 06/04/25 0919    propranolol (Inderal) tablet 10 mg  10 mg oral BID Bethel Scott DPM   10 mg at 06/04/25 0919    sennosides-docusate sodium (Kelsey-Colace) 8.6-50 mg per tablet 2 tablet  2 tablet oral Nightly Bethel Scott DPM        [Held by provider] spironolactone (Aldactone) tablet 25 mg  25 mg oral Daily Anastasia Montero DO        tamsulosin (Flomax) 24 hr capsule 0.4 mg  0.4 mg oral Daily Bethel Scott DPM   0.4 mg at 06/04/25  0920    [Held by provider] torsemide (Demadex) tablet 20 mg  20 mg oral Daily Anastasia Montero,         vancomycin (Vancocin) pharmacy to dose - pharmacy monitoring   miscellaneous Daily PRN Bethel Scott DPM        vancomycin 1,250 mg in D5W  mL  1,250 mg intravenous q24h Rebeka Mayer, PharmD

## 2025-06-04 NOTE — CARE PLAN
The patient's goals for the shift include staying comfortable.    The clinical goals for the shift include maintaining adequate pain control for the pt through the end of the shift.  Over the shift, the patient did make progress on these goals.  Pt's pain brought down from a 10 to a 7/10.  RN notified on call MD at 2130.  Pt had just received his Dilaudid at 2000.  Pt received one dose 10 mg Oxycodone per MD orders.  Call light within reach.  Bed alarm on.

## 2025-06-04 NOTE — PROGRESS NOTES
06/04/25 0838   Discharge Planning   Home or Post Acute Services Post acute facilities (Rehab/SNF/etc)   Type of Post Acute Facility Services Skilled nursing   Expected Discharge Disposition SNF   Does the patient need discharge transport arranged? Yes   RoundTrip coordination needed? Yes   Has discharge transport been arranged? No     Therapy is recommending a moderate intensity therapy program at discharge with an ampac of 15/10. Updates sent to [facility] to initiate the pre cert.  Patient lives alone with little support. He will require IV Vancomycin for 6 weeks till July 12, therapy, and wound management.     1525: pre cert pending for admission to [facility].

## 2025-06-04 NOTE — PROGRESS NOTES
Vancomycin Dosing by Pharmacy- FOLLOW UP    Angus Redman is a 69 y.o. year old male who Pharmacy has been consulted for vancomycin dosing for osteomyelitis/septic arthritis. Based on the patient's indication and renal status this patient is being dosed based on a goal AUC of 400-600.     Renal function is currently stable.    Current vancomycin dose: 1500 mg given every 24 hours    Estimated vancomycin AUC on current dose: 573 mg/L.hr     Visit Vitals  /85 (BP Location: Right arm, Patient Position: Lying)   Pulse 56   Temp 36 °C (96.8 °F) (Temporal)   Resp 16        Lab Results   Component Value Date    CREATININE 1.15 2025    CREATININE 1.19 2025    CREATININE 1.21 2025    CREATININE 1.23 2025        Patient weight is as follows:   Vitals:    25 2230   Weight: 118 kg (260 lb 8 oz)       Cultures:  Susceptibility data for the encounter in last 14 days.  Collected Specimen Info Organism Clindamycin Erythromycin Oxacillin Tetracycline Trimethoprim/Sulfamethoxazole Vancomycin   25 Tissue/Biopsy from Wound/Tissue Methicillin Resistant Staphylococcus aureus (MRSA)  S  R  R  S  S  S        I/O last 3 completed shifts:  In: 3570 (30.2 mL/kg) [P.O.:1060; I.V.:1110 (9.4 mL/kg); IV Piggyback:1400]  Out: 3750 (31.7 mL/kg) [Urine:3750 (0.9 mL/kg/hr)]  Weight: 118.2 kg   I/O during current shift:  No intake/output data recorded.    Temp (24hrs), Av.7 °C (96.3 °F), Min:35.2 °C (95.4 °F), Max:36 °C (96.8 °F)      Assessment/Plan    Within goal AUC range, however reduce dose to 1250mg IV q24h to lower change of toxicity since patient to be on 6 weeks of therapy.    This dosing regimen is predicted by [employer] to result in the following pharmacokinetic parameters:  Regimen: 1250 mg IV every 24 hours.  Start time: 16:00 on 2025  Exposure target: AUC24 (range) 400-600 mg/L.hr   QQA10-70: 504 mg/L.hr  AUC24,ss: 484 mg/L.hr  Probability of AUC24 > 400: 94 %  Ctrough,ss: 14.9  mg/L  Probability of Ctrough,ss > 20: 6 %    The next level will be obtained on 6/6 at 0500. May be obtained sooner if clinically indicated.   Will continue to monitor renal function daily while on vancomycin and order serum creatinine at least every 48 hours if not already ordered.  Follow for continued vancomycin needs, clinical response, and signs/symptoms of toxicity.     Rebeka Mayer, PharmD, BCPS, BCIDP  Clinical Pharmacy Specialist, Infectious Diseases  [phone]

## 2025-06-04 NOTE — CARE PLAN
The patient's goals for the shift include      The clinical goals for the shift include PT.WILL BE SEN BY PCP/CONSULTS,RECIEVE PRESCRIBED MEDS/TX'S.

## 2025-06-04 NOTE — PROGRESS NOTES
Angus Redman is a 69 y.o. male on day 5 of admission presenting with Acute cellulitis.    Subjective   Pt examined and evaluated at bedside, found resting comfortably.  Patient just had PICC line placed. Pt states that their pain is well controlled, denies any constitutional symptoms, and has no other complaints at this time.        Objective     Vascular: DP and PT pulses palpable 2/4 bilaterally.  CFT under 5 seconds when tested at distal digits.  Skin temp warm to warm from proximal to distal.       Neuro: Protective sensation may absent, light tough sensation intact.  No Tinel's or Valleix's signs elicited.       Derm: Full-thickness ulceration noted to plantar aspect of first metatarsal head, left foot.  Wound base extends to subcutaneous tissue, no exposed bone or fascia.  No undermining, tracking, or tunneling.  No SOI noted at this time.   - Full thickness ulceration noted to subfirst metatarsal head, left foot.  Wound extends to subcutaneous tissue, no exposed bone or fascia.  No undermining, tracking, or tunneling.  Periwound hyperkeratotic tissue noted.  Minimal drainage.  No SOI noted at this time.  - Right foot wound debridement site is well-approximated with sutures intact.  No dehiscence or necrosis noted.  Erythema noted to right lower extremity is improved from initial presentation.     Musc: Digital amputations noted to right foot. No POP noted to any other area of the foot/ankle.          Last Recorded Vitals  Blood pressure 118/85, pulse 56, temperature 36 °C (96.8 °F), temperature source Temporal, resp. rate 16, height 1.829 m (6'), weight 118 kg (260 lb 8 oz), SpO2 94%.  Intake/Output last 3 Shifts:  I/O last 3 completed shifts:  In: 3570 (30.2 mL/kg) [P.O.:1060; I.V.:1110 (9.4 mL/kg); IV Piggyback:1400]  Out: 3750 (31.7 mL/kg) [Urine:3750 (0.9 mL/kg/hr)]  Weight: 118.2 kg     Relevant Results  Results for orders placed or performed during the hospital encounter of 05/30/25 (from the past 24  hours)   Renal Function Panel   Result Value Ref Range    Glucose 96 74 - 99 mg/dL    Sodium 140 136 - 145 mmol/L    Potassium 4.2 3.5 - 5.3 mmol/L    Chloride 100 98 - 107 mmol/L    Bicarbonate 33 (H) 21 - 32 mmol/L    Anion Gap 11 10 - 20 mmol/L    Urea Nitrogen 21 6 - 23 mg/dL    Creatinine 1.15 0.50 - 1.30 mg/dL    eGFR 68 >60 mL/min/1.73m*2    Calcium 8.7 8.6 - 10.3 mg/dL    Phosphorus 4.4 2.5 - 4.9 mg/dL    Albumin 3.3 (L) 3.4 - 5.0 g/dL   Magnesium   Result Value Ref Range    Magnesium 2.06 1.60 - 2.40 mg/dL   CBC and Auto Differential   Result Value Ref Range    WBC 7.2 4.4 - 11.3 x10*3/uL    nRBC 0.0 0.0 - 0.0 /100 WBCs    RBC 3.65 (L) 4.50 - 5.90 x10*6/uL    Hemoglobin 10.1 (L) 13.5 - 17.5 g/dL    Hematocrit 34.4 (L) 41.0 - 52.0 %    MCV 94 80 - 100 fL    MCH 27.7 26.0 - 34.0 pg    MCHC 29.4 (L) 32.0 - 36.0 g/dL    RDW 15.1 (H) 11.5 - 14.5 %    Platelets 253 150 - 450 x10*3/uL    Neutrophils % 62.9 40.0 - 80.0 %    Immature Granulocytes %, Automated 1.3 (H) 0.0 - 0.9 %    Lymphocytes % 20.8 13.0 - 44.0 %    Monocytes % 8.8 2.0 - 10.0 %    Eosinophils % 5.9 0.0 - 6.0 %    Basophils % 0.3 0.0 - 2.0 %    Neutrophils Absolute 4.50 1.60 - 5.50 x10*3/uL    Immature Granulocytes Absolute, Automated 0.09 0.00 - 0.50 x10*3/uL    Lymphocytes Absolute 1.49 0.80 - 3.00 x10*3/uL    Monocytes Absolute 0.63 0.05 - 0.80 x10*3/uL    Eosinophils Absolute 0.42 (H) 0.00 - 0.40 x10*3/uL    Basophils Absolute 0.02 0.00 - 0.10 x10*3/uL   Vancomycin   Result Value Ref Range    Vancomycin 24.0 (H) 5.0 - 20.0 ug/mL     *Note: Due to a large number of results and/or encounters for the requested time period, some results have not been displayed. A complete set of results can be found in Results Review.      Assessment & Plan  Acute cellulitis      Osteomyelitis of right foot (Multi)    Assessment:  - Non-pressure ulcer, left and right foot  - Peripheral neuropathy     Plan:  - Pt examined and evaluated.  All findings discussed to  patient to their satisfaction and understanding.  - Reviewed labs and chart.  No evidence of soft tissue emphysema or osteomyelitis noted on radiographs.  No leukocytosis noted, vital stable.  Intraoperative bone culture shows MRSA.  - Systemic conditions managed by primary team, antibiotics managed by ID; Vancomycin IV via PICC for 6 weeks.  - Performed dressing change consisting of Betadine ointment, Vaseline gauze, 4 x 4 gauze, ABD, Kerlix, Ace.  Dressings to be changed every other day upon discharge.  - No contraindication to discharge from podiatry standpoint.  Patient likely to be discharged to SNF, pending pre-cert.  Will continue to follow inpatient.  Please contact podiatry with any acute questions/concerns.      Boone Bull DPM  Podiatric Surgery  Doc Halo/Hiram BAUTISTA spent >35 minutes in the professional and overall care of this patient.        Boone Bull DPM

## 2025-06-04 NOTE — PROGRESS NOTES
Subjective   Overnight, patient received a dose of Dilaudid and oxycodone 10 mg for pain.  He is otherwise hemodynamically stable and afebrile.  At bedside this morning, he experiences some pain with movement of his right foot but tolerable.  No other complaints reported.    Objective     Last Recorded Vitals:  Blood pressure 118/85, pulse 56, temperature 36 °C (96.8 °F), temperature source Temporal, resp. rate 16, height 1.829 m (6'), weight 118 kg (260 lb 8 oz), SpO2 94%.    Intake/Output last 3 Shifts:  I/O last 3 completed shifts:  In: 3570 (30.2 mL/kg) [P.O.:1060; I.V.:1110 (9.4 mL/kg); IV Piggyback:1400]  Out: 3750 (31.7 mL/kg) [Urine:3750 (0.9 mL/kg/hr)]  Weight: 118.2 kg     Physical Exam  Vitals and nursing note reviewed.   Constitutional:       General: He is not in acute distress.  Eyes:      General: No scleral icterus.     Conjunctiva/sclera: Conjunctivae normal.   Cardiovascular:      Rate and Rhythm: Normal rate and regular rhythm.      Pulses: Normal pulses.      Heart sounds: Normal heart sounds. No murmur heard.  Pulmonary:      Effort: Pulmonary effort is normal. No respiratory distress.      Breath sounds: Wheezing (faint) present. No rhonchi or rales.      Comments: Satting well on room air.  Abdominal:      General: There is no distension.      Palpations: Abdomen is soft.      Tenderness: There is no abdominal tenderness.   Musculoskeletal:      Right lower leg: Edema (with diffuse erythema) present.      Left lower leg: No edema.      Comments: Right foot is wrapped with Ace bandage and it is slightly tender to palpation more prominent on the dorsal-lateral aspect of the foot.  Can plantarflex and dorsiflex without difficulty.   Skin:     General: Skin is warm and dry.   Neurological:      Mental Status: He is alert and oriented to person, place, and time.         Medications:   Continuous Medications[1]    Scheduled Medications[2]    PRN Medications[3]    Pertinent Labs:  Results for orders  placed or performed during the hospital encounter of 05/30/25 (from the past 24 hours)   Renal Function Panel   Result Value Ref Range    Glucose 96 74 - 99 mg/dL    Sodium 140 136 - 145 mmol/L    Potassium 4.2 3.5 - 5.3 mmol/L    Chloride 100 98 - 107 mmol/L    Bicarbonate 33 (H) 21 - 32 mmol/L    Anion Gap 11 10 - 20 mmol/L    Urea Nitrogen 21 6 - 23 mg/dL    Creatinine 1.15 0.50 - 1.30 mg/dL    eGFR 68 >60 mL/min/1.73m*2    Calcium 8.7 8.6 - 10.3 mg/dL    Phosphorus 4.4 2.5 - 4.9 mg/dL    Albumin 3.3 (L) 3.4 - 5.0 g/dL   Magnesium   Result Value Ref Range    Magnesium 2.06 1.60 - 2.40 mg/dL   CBC and Auto Differential   Result Value Ref Range    WBC 7.2 4.4 - 11.3 x10*3/uL    nRBC 0.0 0.0 - 0.0 /100 WBCs    RBC 3.65 (L) 4.50 - 5.90 x10*6/uL    Hemoglobin 10.1 (L) 13.5 - 17.5 g/dL    Hematocrit 34.4 (L) 41.0 - 52.0 %    MCV 94 80 - 100 fL    MCH 27.7 26.0 - 34.0 pg    MCHC 29.4 (L) 32.0 - 36.0 g/dL    RDW 15.1 (H) 11.5 - 14.5 %    Platelets 253 150 - 450 x10*3/uL    Neutrophils % 62.9 40.0 - 80.0 %    Immature Granulocytes %, Automated 1.3 (H) 0.0 - 0.9 %    Lymphocytes % 20.8 13.0 - 44.0 %    Monocytes % 8.8 2.0 - 10.0 %    Eosinophils % 5.9 0.0 - 6.0 %    Basophils % 0.3 0.0 - 2.0 %    Neutrophils Absolute 4.50 1.60 - 5.50 x10*3/uL    Immature Granulocytes Absolute, Automated 0.09 0.00 - 0.50 x10*3/uL    Lymphocytes Absolute 1.49 0.80 - 3.00 x10*3/uL    Monocytes Absolute 0.63 0.05 - 0.80 x10*3/uL    Eosinophils Absolute 0.42 (H) 0.00 - 0.40 x10*3/uL    Basophils Absolute 0.02 0.00 - 0.10 x10*3/uL   Vancomycin   Result Value Ref Range    Vancomycin 24.0 (H) 5.0 - 20.0 ug/mL     *Note: Due to a large number of results and/or encounters for the requested time period, some results have not been displayed. A complete set of results can be found in Results Review.       Imaging:   Imaging  XR chest 1 view  Result Date: 6/4/2025  Right PICC line should not be used as currently positioned. At least 9 cm of catheter  extends cranially into the internal jugular vein. Right PICC line should be repositioned or replaced.   MACRO: None   Signed by: Manuel Meléndez 6/4/2025 1:05 PM Dictation workstation:   [id number]    XR foot right 1-2 views  Result Date: 6/2/2025  Mild soft tissue swelling the foot as expected postoperatively status post resection of the mid body 1st metatarsal.     MACRO: None   Signed by: Yaya Cannon 6/2/2025 9:37 PM Dictation workstation:   [id number]      Cardiology, Vascular, and Other Imaging  Bedside PICC Imaging  Result Date: 6/4/2025  These images are not reportable by radiology and will not be interpreted by  Radiologists.        Assessment/Plan   68yo gentleman with PMHx of recent admission for osteomyelitis s/p amputation 1st, 4th and 5th toes, Recurrent syncope secondary to orthostatic hypotension/autonomic dysfunction/autonomic neuropathy, restrictive lung disease on 4 to 5 L nasal cannula at baseline, PSVT on propranolol, CAD w/ CCTA (02/2024) - 50% stenosis of LAD - 25-50% stenosis of RCA, alcohol use disorder c/b severe alcoholic polyneuropathy with autonomic dysfunction who presents for right lower extremity  swelling as well as purulent drainage from surgical wound.  Patient recently discharged on 5/5/2025 at that time he underwent amputation of right 1st, 4th and 5th toe, discharged home with Augmentin and doxycycline for 14 days.  Discharged to SNF however he left AMA after 24. Patient started on antibiotic and admitted for further workup and evaluation. He underwent bedside debridement on 5/31 with concern for a deep soft tissue infection.  MRI completed showing osteomyelitis of the first metatarsal head.  Podiatry completed OR debridement on 6/2/2025.  He remains on vancomycin for cellulitis/osteomyelitis.    # Purulent right lower limb cellulitis  # Osteomyelitis first metatarsal  # S/p amputation of right 1st, 4th, and 5th toes on 5/5/2025  MRI showing osteomyelitis of first  metatarsal  S/p debridement of the first metatarsal in the OR with podiatry on 6/2/2025.  Intraoperative tissue and bone cultures grew MRSA.  ID started patient on vancomycin which he will be completing for a total of 6 weeks (end date: 7/12/2025).  Patient had a PICC line placed today 6/4.  Plan:  -Continue with vancomycin  -PT/OT recommended moderate intensity level of care.  Case management awaiting for precertification to [facility] with on [place].     Chronic conditions:  Recurrent syncope secondary to orthostatic hypotension/autonomic dysfunction/autonomic neuropathy  Restrictive lung disease on 4 to 5 L nasal cannula at baseline  PSVT on propranolol  CAD w/ CCTA (02/2024) - 50% stenosis of LAD - 25-50% stenosis of RCA  Alcohol use disorder c/b severe alcoholic polyneuropathy c/b recurrent lower extremity wounds     Fluid: As needed  Electrolytes: Replete with goal K>4 and Mg>2  DVT prophylaxis: Lovenox  Diet: Regular  Bowel regimen: MiraLAX, Kelsey-Colace  CODE STATUS: DNR and DNI no ICU  Disposition: Pending precertification; discharge plan to [facility]    Emergency Contact: Extended Emergency Contact Information  Primary Emergency Contact: Antonina Redman  [place] Phone: [phone]  Relation: Sibling    Patient was seen and case discussed with the attending.  Letty Gomez,   Internal Medicine PGY-1  Date: June 4, 2025       [1]    [2] acetaminophen, 975 mg, oral, q8h  amLODIPine, 2.5 mg, oral, Daily  aspirin, 81 mg, oral, Daily  atorvastatin, 40 mg, oral, Nightly  budesonide, 0.25 mg, nebulization, BID   And  ipratropium-albuteroL, 3 mL, nebulization, TID  busPIRone, 5 mg, oral, TID  DULoxetine, 60 mg, oral, BID  [Held by provider] empagliflozin, 10 mg, oral, Daily  enoxaparin, 40 mg, subcutaneous, q24h  escitalopram, 20 mg, oral, Daily  ferrous sulfate, 1 tablet, oral, Daily with breakfast  levothyroxine, 50 mcg, oral, Daily  mirtazapine, 15 mg, oral, Nightly  montelukast, 10 mg, oral,  Nightly  pantoprazole, 40 mg, oral, BID AC  polyethylene glycol, 17 g, oral, Daily  pregabalin, 150 mg, oral, TID  propranolol, 10 mg, oral, BID  sennosides-docusate sodium, 2 tablet, oral, Nightly  [Held by provider] spironolactone, 25 mg, oral, Daily  tamsulosin, 0.4 mg, oral, Daily  [Held by provider] torsemide, 20 mg, oral, Daily  vancomycin, 1,250 mg, intravenous, q24h     [3] PRN medications: alteplase, benzonatate, HYDROmorphone, ondansetron ODT **OR** ondansetron, oxygen, vancomycin

## 2025-06-05 VITALS
BODY MASS INDEX: 35.28 KG/M2 | RESPIRATION RATE: 18 BRPM | OXYGEN SATURATION: 93 % | HEIGHT: 72 IN | WEIGHT: 260.5 LBS | SYSTOLIC BLOOD PRESSURE: 104 MMHG | HEART RATE: 74 BPM | DIASTOLIC BLOOD PRESSURE: 55 MMHG | TEMPERATURE: 96.8 F

## 2025-06-05 LAB
ALBUMIN SERPL BCP-MCNC: 3.1 G/DL (ref 3.4–5)
ANION GAP SERPL CALC-SCNC: 9 MMOL/L (ref 10–20)
BASOPHILS # BLD AUTO: 0.01 X10*3/UL (ref 0–0.1)
BASOPHILS NFR BLD AUTO: 0.1 %
BUN SERPL-MCNC: 22 MG/DL (ref 6–23)
CALCIUM SERPL-MCNC: 9 MG/DL (ref 8.6–10.3)
CHLORIDE SERPL-SCNC: 101 MMOL/L (ref 98–107)
CO2 SERPL-SCNC: 37 MMOL/L (ref 21–32)
CREAT SERPL-MCNC: 1.21 MG/DL (ref 0.5–1.3)
EGFRCR SERPLBLD CKD-EPI 2021: 64 ML/MIN/1.73M*2
EOSINOPHIL # BLD AUTO: 0.32 X10*3/UL (ref 0–0.4)
EOSINOPHIL NFR BLD AUTO: 4.8 %
ERYTHROCYTE [DISTWIDTH] IN BLOOD BY AUTOMATED COUNT: 15.1 % (ref 11.5–14.5)
GLUCOSE SERPL-MCNC: 92 MG/DL (ref 74–99)
HCT VFR BLD AUTO: 31.6 % (ref 41–52)
HGB BLD-MCNC: 9.4 G/DL (ref 13.5–17.5)
IMM GRANULOCYTES # BLD AUTO: 0.09 X10*3/UL (ref 0–0.5)
IMM GRANULOCYTES NFR BLD AUTO: 1.3 % (ref 0–0.9)
LYMPHOCYTES # BLD AUTO: 1.43 X10*3/UL (ref 0.8–3)
LYMPHOCYTES NFR BLD AUTO: 21.3 %
MAGNESIUM SERPL-MCNC: 2.01 MG/DL (ref 1.6–2.4)
MCH RBC QN AUTO: 28 PG (ref 26–34)
MCHC RBC AUTO-ENTMCNC: 29.7 G/DL (ref 32–36)
MCV RBC AUTO: 94 FL (ref 80–100)
MONOCYTES # BLD AUTO: 0.58 X10*3/UL (ref 0.05–0.8)
MONOCYTES NFR BLD AUTO: 8.6 %
NEUTROPHILS # BLD AUTO: 4.29 X10*3/UL (ref 1.6–5.5)
NEUTROPHILS NFR BLD AUTO: 63.9 %
NRBC BLD-RTO: 0 /100 WBCS (ref 0–0)
PHOSPHATE SERPL-MCNC: 4.4 MG/DL (ref 2.5–4.9)
PLATELET # BLD AUTO: 261 X10*3/UL (ref 150–450)
POTASSIUM SERPL-SCNC: 4.1 MMOL/L (ref 3.5–5.3)
RBC # BLD AUTO: 3.36 X10*6/UL (ref 4.5–5.9)
SODIUM SERPL-SCNC: 143 MMOL/L (ref 136–145)
STAPHYLOCOCCUS SPEC CULT: NORMAL
WBC # BLD AUTO: 6.7 X10*3/UL (ref 4.4–11.3)

## 2025-06-05 PROCEDURE — 2500000004 HC RX 250 GENERAL PHARMACY W/ HCPCS (ALT 636 FOR OP/ED): Mod: JZ

## 2025-06-05 PROCEDURE — 2500000004 HC RX 250 GENERAL PHARMACY W/ HCPCS (ALT 636 FOR OP/ED): Performed by: PHARMACIST

## 2025-06-05 PROCEDURE — 94640 AIRWAY INHALATION TREATMENT: CPT

## 2025-06-05 PROCEDURE — 80069 RENAL FUNCTION PANEL: CPT

## 2025-06-05 PROCEDURE — 2500000004 HC RX 250 GENERAL PHARMACY W/ HCPCS (ALT 636 FOR OP/ED)

## 2025-06-05 PROCEDURE — 2500000001 HC RX 250 WO HCPCS SELF ADMINISTERED DRUGS (ALT 637 FOR MEDICARE OP)

## 2025-06-05 PROCEDURE — 85025 COMPLETE CBC W/AUTO DIFF WBC: CPT

## 2025-06-05 PROCEDURE — 83735 ASSAY OF MAGNESIUM: CPT

## 2025-06-05 PROCEDURE — 2500000002 HC RX 250 W HCPCS SELF ADMINISTERED DRUGS (ALT 637 FOR MEDICARE OP, ALT 636 FOR OP/ED)

## 2025-06-05 PROCEDURE — 99239 HOSP IP/OBS DSCHRG MGMT >30: CPT

## 2025-06-05 PROCEDURE — 2500000005 HC RX 250 GENERAL PHARMACY W/O HCPCS

## 2025-06-05 RX ORDER — SPIRONOLACTONE 25 MG/1
25 TABLET ORAL DAILY
Qty: 30 TABLET | Refills: 11 | Status: SHIPPED | OUTPATIENT
Start: 2025-06-06

## 2025-06-05 RX ORDER — TORSEMIDE 20 MG/1
20 TABLET ORAL DAILY
Status: CANCELLED
Start: 2025-06-05

## 2025-06-05 RX ORDER — FUROSEMIDE 10 MG/ML
40 INJECTION INTRAMUSCULAR; INTRAVENOUS ONCE
Status: COMPLETED | OUTPATIENT
Start: 2025-06-05 | End: 2025-06-05

## 2025-06-05 RX ORDER — TORSEMIDE 20 MG/1
20 TABLET ORAL DAILY
Start: 2025-06-06

## 2025-06-05 RX ADMIN — FUROSEMIDE 40 MG: 10 INJECTION, SOLUTION INTRAMUSCULAR; INTRAVENOUS at 09:59

## 2025-06-05 RX ADMIN — MONTELUKAST 10 MG: 10 TABLET, FILM COATED ORAL at 20:38

## 2025-06-05 RX ADMIN — BUSPIRONE HYDROCHLORIDE 5 MG: 5 TABLET ORAL at 09:26

## 2025-06-05 RX ADMIN — IPRATROPIUM BROMIDE AND ALBUTEROL SULFATE 3 ML: 2.5; .5 SOLUTION RESPIRATORY (INHALATION) at 08:16

## 2025-06-05 RX ADMIN — BUDESONIDE 0.25 MG: 0.25 INHALANT RESPIRATORY (INHALATION) at 19:54

## 2025-06-05 RX ADMIN — MIRTAZAPINE 15 MG: 15 TABLET, FILM COATED ORAL at 20:38

## 2025-06-05 RX ADMIN — AMLODIPINE BESYLATE 2.5 MG: 2.5 TABLET ORAL at 09:27

## 2025-06-05 RX ADMIN — PREGABALIN 150 MG: 150 CAPSULE ORAL at 16:01

## 2025-06-05 RX ADMIN — PROPRANOLOL HYDROCHLORIDE 10 MG: 20 TABLET ORAL at 20:38

## 2025-06-05 RX ADMIN — IPRATROPIUM BROMIDE AND ALBUTEROL SULFATE 3 ML: 2.5; .5 SOLUTION RESPIRATORY (INHALATION) at 19:54

## 2025-06-05 RX ADMIN — LEVOTHYROXINE SODIUM 50 MCG: 0.05 TABLET ORAL at 05:20

## 2025-06-05 RX ADMIN — VANCOMYCIN HYDROCHLORIDE 1250 MG: 1.25 INJECTION, SOLUTION INTRAVITREAL at 16:01

## 2025-06-05 RX ADMIN — PREGABALIN 150 MG: 150 CAPSULE ORAL at 09:28

## 2025-06-05 RX ADMIN — PREGABALIN 150 MG: 150 CAPSULE ORAL at 20:38

## 2025-06-05 RX ADMIN — BUSPIRONE HYDROCHLORIDE 5 MG: 5 TABLET ORAL at 20:38

## 2025-06-05 RX ADMIN — DULOXETINE HYDROCHLORIDE 60 MG: 60 CAPSULE, DELAYED RELEASE ORAL at 09:27

## 2025-06-05 RX ADMIN — ESCITALOPRAM OXALATE 20 MG: 20 TABLET ORAL at 09:29

## 2025-06-05 RX ADMIN — IPRATROPIUM BROMIDE AND ALBUTEROL SULFATE 3 ML: 2.5; .5 SOLUTION RESPIRATORY (INHALATION) at 13:52

## 2025-06-05 RX ADMIN — PANTOPRAZOLE SODIUM 40 MG: 40 TABLET, DELAYED RELEASE ORAL at 05:19

## 2025-06-05 RX ADMIN — Medication 4 L/MIN: at 13:52

## 2025-06-05 RX ADMIN — FERROUS SULFATE TAB 325 MG (65 MG ELEMENTAL FE) 1 TABLET: 325 (65 FE) TAB at 09:27

## 2025-06-05 RX ADMIN — PROPRANOLOL HYDROCHLORIDE 10 MG: 20 TABLET ORAL at 09:28

## 2025-06-05 RX ADMIN — ASPIRIN 81 MG CHEWABLE TABLET 81 MG: 81 TABLET CHEWABLE at 09:35

## 2025-06-05 RX ADMIN — BUDESONIDE 0.25 MG: 0.25 INHALANT RESPIRATORY (INHALATION) at 08:16

## 2025-06-05 RX ADMIN — Medication 4 L/MIN: at 08:16

## 2025-06-05 RX ADMIN — ATORVASTATIN CALCIUM 40 MG: 40 TABLET, FILM COATED ORAL at 20:38

## 2025-06-05 RX ADMIN — ACETAMINOPHEN 975 MG: 325 TABLET ORAL at 00:24

## 2025-06-05 RX ADMIN — ACETAMINOPHEN 975 MG: 325 TABLET ORAL at 17:50

## 2025-06-05 RX ADMIN — BUSPIRONE HYDROCHLORIDE 5 MG: 5 TABLET ORAL at 16:01

## 2025-06-05 RX ADMIN — ACETAMINOPHEN 975 MG: 325 TABLET ORAL at 09:26

## 2025-06-05 RX ADMIN — DULOXETINE HYDROCHLORIDE 60 MG: 60 CAPSULE, DELAYED RELEASE ORAL at 20:38

## 2025-06-05 RX ADMIN — TAMSULOSIN HYDROCHLORIDE 0.4 MG: 0.4 CAPSULE ORAL at 09:26

## 2025-06-05 RX ADMIN — PANTOPRAZOLE SODIUM 40 MG: 40 TABLET, DELAYED RELEASE ORAL at 16:00

## 2025-06-05 RX ADMIN — Medication 4 L/MIN: at 19:54

## 2025-06-05 RX ADMIN — HYDROMORPHONE HYDROCHLORIDE 0.5 MG: 1 INJECTION, SOLUTION INTRAMUSCULAR; INTRAVENOUS; SUBCUTANEOUS at 17:50

## 2025-06-05 RX ADMIN — ENOXAPARIN SODIUM 40 MG: 40 INJECTION SUBCUTANEOUS at 20:38

## 2025-06-05 ASSESSMENT — COGNITIVE AND FUNCTIONAL STATUS - GENERAL
DRESSING REGULAR UPPER BODY CLOTHING: A LITTLE
EATING MEALS: A LITTLE
PERSONAL GROOMING: A LITTLE
TOILETING: A LITTLE
MOBILITY SCORE: 18
STANDING UP FROM CHAIR USING ARMS: A LITTLE
CLIMB 3 TO 5 STEPS WITH RAILING: A LITTLE
DRESSING REGULAR LOWER BODY CLOTHING: A LITTLE
MOVING FROM LYING ON BACK TO SITTING ON SIDE OF FLAT BED WITH BEDRAILS: A LITTLE
TURNING FROM BACK TO SIDE WHILE IN FLAT BAD: A LITTLE
HELP NEEDED FOR BATHING: A LITTLE
DAILY ACTIVITIY SCORE: 18
WALKING IN HOSPITAL ROOM: A LITTLE
MOVING TO AND FROM BED TO CHAIR: A LITTLE

## 2025-06-05 ASSESSMENT — PAIN DESCRIPTION - LOCATION: LOCATION: BACK

## 2025-06-05 ASSESSMENT — PAIN DESCRIPTION - DESCRIPTORS: DESCRIPTORS: ACHING

## 2025-06-05 ASSESSMENT — PAIN - FUNCTIONAL ASSESSMENT: PAIN_FUNCTIONAL_ASSESSMENT: 0-10

## 2025-06-05 ASSESSMENT — PAIN SCALES - GENERAL
PAINLEVEL_OUTOF10: 0 - NO PAIN
PAINLEVEL_OUTOF10: 8
PAINLEVEL_OUTOF10: 4

## 2025-06-05 NOTE — NURSING NOTE
1500 assumed care of pt.  1529 report given to Sylvia at [facility].  Notified physician facility asking for a script for Lyrica to be sent with pt.   1753 pt medicated for pain. New gown and bedding applied.

## 2025-06-05 NOTE — DISCHARGE INSTRUCTIONS
You were seen in the hospital for right toe bone infection.  Please follow up with your PCP in the next week regarding hospitalization stay and follow-up with infectious disease in 5 weeks.    Please take all of your medications as directed. Please start your spironolactone and torsemide tomorrow 6/6/25    New medications:  -Infuse vancomycin daily via PICC line until 7/12/2025    Wound care instructions:  -Performed dressing change consisting of Betadine ointment, Vaseline gauze, 4 x 4 gauze, ABD, Kerlix, Ace.  Dressings to be changed every other day upon discharge for right lower extremity.    If you have any worsening symptoms such as shortness of breath, chest pain, high fevers, confusion, or concerning symptoms, please call your doctor or return to the hospital.    Thank you for allowing us to participate in your health care.    -[facility].

## 2025-06-05 NOTE — PROGRESS NOTES
06/05/25 0838   Discharge Planning   Living Arrangements Alone   Home or Post Acute Services Post acute facilities (Rehab/SNF/etc)   Type of Post Acute Facility Services Skilled nursing   Type of Home Care Services Home nursing visits   Expected Discharge Disposition SNF   Does the patient need discharge transport arranged? Yes   RoundTrip coordination needed? Yes   Has discharge transport been arranged? No     Inquiry sent to the facility for pre cert status.     1600: Insurance authorized admission to [facility] on [address]. Message sent to [facility] to arrange for transport.  Wheelchair transport arranged for 1830. Patient notified and states there is no one else to call. Nursing notified.

## 2025-06-05 NOTE — CARE PLAN
The patient's goals for the shift include      The clinical goals for the shift include Patient will remain hemodynamically stable throughout shift.      Problem: Pain - Adult  Goal: Verbalizes/displays adequate comfort level or baseline comfort level  Outcome: Progressing     Problem: Safety - Adult  Goal: Free from fall injury  Outcome: Progressing     Problem: Skin  Goal: Decreased wound size/increased tissue granulation at next dressing change  Outcome: Progressing  Goal: Participates in plan/prevention/treatment measures  Outcome: Progressing  Goal: Prevent/manage excess moisture  Outcome: Progressing  Goal: Prevent/minimize sheer/friction injuries  Outcome: Progressing  Goal: Promote/optimize nutrition  Outcome: Progressing  Goal: Promote skin healing  Outcome: Progressing

## 2025-06-05 NOTE — PROGRESS NOTES
.dbbFor follow-up of:  Right lower extremity cellulitis    Subjective   Interval History:  He reports no new problems.       Objective     Current Medications[1]     Last Recorded Vitals  /72 (BP Location: Left arm, Patient Position: Lying)   Pulse 71   Temp 36 °C (96.8 °F) (Temporal)   Resp 18   Wt 118 kg (260 lb 8 oz)   SpO2 96%   General: no acute distress, lying in bed  HEENT: pharynx not examined  CVS: RRR  Resp: decreased breath sounds in bases  ABD: soft, NT, ND  EXT: Mild erythema of the right leg; right foot wrapped  Skin: Right upper extremity PICC line is clean dry and intact    Relevant Results:    Labs:   Results from last 72 hours   Lab Units 06/05/25  0534 06/04/25  0555 06/03/25  0624   SODIUM mmol/L 143 140 140   POTASSIUM mmol/L 4.1 4.2 3.9   CHLORIDE mmol/L 101 100 102   BUN mg/dL 22 21 18   CREATININE mg/dL 1.21 1.15 1.19   MAGNESIUM mg/dL 2.01 2.06 1.97   PHOSPHORUS mg/dL 4.4 4.4 4.5     Results from last 72 hours   Lab Units 06/05/25  0534 06/04/25  0555 06/03/25  0624   WBC AUTO x10*3/uL 6.7 7.2 6.9   HEMOGLOBIN g/dL 9.4* 10.1* 9.2*   HEMATOCRIT % 31.6* 34.4* 31.4*   PLATELETS AUTO x10*3/uL 261 253 210       Microbiology data: I have personally and independently reviewed and intrepreted the lab results  1/29/2025 wound culture shows MRSA resistant to clindamycin and tetracycline; sensitive to Bactrim and vancomycin  5/30/2025 blood cultures show no growth  5/31/2025 wound culture shows MRSA    Imaging data: I have personally and independently reviewed and interpreted the imaging studies  5/30/2025 chest x-ray shows no acute issues   5/30/2025 x-ray right foot shows chronic fracture    Assessment/Plan     MY IMPRESSION & RECOMMENDATIONS:  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  He is approaching discharge and can go on IV vancomycin.      - Can discharge to  ECF when ready on IV vancomycin through 7/12/2025  - Collect weekly blood work while on IV antibiotics  - Follow-up in ID clinic in approximately 5 weeks     Other issues:  #Hypertension   #Hyperlipidemia  #CAD  #COPD  #Acute kidney injury on chronic kidney disease stage IIIa, which can affect the dosing of antimicrobials  #Osteoarthritis  #HFpEF  #Paroxysmal atrial fibrillation     ~I have personally and independently reviewed and interpreted the laboratory tests, imaging studies, and the documentation from other healthcare providers.  I am monitoring for side effects from vancomycin as outlined in a previous note.  His prognosis remains uncertain due to his ongoing right foot problems.        Jerry Lozano MD            [1]   Current Facility-Administered Medications   Medication Dose Route Frequency Provider Last Rate Last Admin    acetaminophen (Tylenol) tablet 975 mg  975 mg oral q8h Bethel Scott DPM   975 mg at 06/05/25 0926    alteplase (Cathflo Activase) injection 2 mg  2 mg intra-catheter PRN Jerry Lozano MD        amLODIPine (Norvasc) tablet 2.5 mg  2.5 mg oral Daily Bethel Scott DPM   2.5 mg at 06/05/25 0927    aspirin chewable tablet 81 mg  81 mg oral Daily Bethel Scott DPM   81 mg at 06/05/25 0935    atorvastatin (Lipitor) tablet 40 mg  40 mg oral Nightly Bethel Scott DPM   40 mg at 06/04/25 2040    benzonatate (Tessalon) capsule 100 mg  100 mg oral TID PRN Bethel Scott DPM   100 mg at 06/04/25 1614    budesonide (Pulmicort) 0.25 mg/2 mL nebulizer solution 0.25 mg  0.25 mg nebulization BID Bethel Scott DPM   0.25 mg at 06/05/25 0816    And    ipratropium-albuteroL (Duo-Neb) 0.5-2.5 mg/3 mL nebulizer solution 3 mL  3 mL nebulization TID Bethel Scott DPM   3 mL at 06/05/25 1352    busPIRone (Buspar) tablet 5 mg  5 mg oral TID Bethel Scott DPM   5 mg at 06/05/25 1601    DULoxetine (Cymbalta) DR capsule 60 mg  60 mg oral BID Bethel Scott DPM   60 mg at 06/05/25 0927    [Held by provider]  empagliflozin (Jardiance) tablet 10 mg  10 mg oral Daily Anastasia Montero DO        enoxaparin (Lovenox) syringe 40 mg  40 mg subcutaneous q24h ISAAC WhartonM   40 mg at 06/04/25 2040    escitalopram (Lexapro) tablet 20 mg  20 mg oral Daily ISAAC WhartonM   20 mg at 06/05/25 0929    ferrous sulfate 325 mg (65 mg elemental) tablet 1 tablet  1 tablet oral Daily with breakfast Bethel Scott DPM   1 tablet at 06/05/25 0927    HYDROmorphone (Dilaudid) injection 0.5 mg  0.5 mg intravenous q3h PRN Bethel Scott DPM   0.5 mg at 06/03/25 1949    levothyroxine (Synthroid, Levoxyl) tablet 50 mcg  50 mcg oral Daily ISAAC WhartonM   50 mcg at 06/05/25 0520    mirtazapine (Remeron) tablet 15 mg  15 mg oral Nightly ISAAC WhartonM   15 mg at 06/04/25 2040    montelukast (Singulair) tablet 10 mg  10 mg oral Nightly ISAAC WhartonM   10 mg at 06/04/25 2040    ondansetron ODT (Zofran-ODT) disintegrating tablet 4 mg  4 mg oral q8h PRN Bethel Scott DPM        Or    ondansetron (Zofran) injection 4 mg  4 mg intravenous q8h PRN Bethel Scott DPM        oxygen (O2) therapy   inhalation Continuous PRN - O2/gases Bethel Scott DPM   4 L/min at 06/05/25 1352    pantoprazole (ProtoNix) EC tablet 40 mg  40 mg oral BID AC ISAAC WhartonM   40 mg at 06/05/25 1600    polyethylene glycol (Glycolax, Miralax) packet 17 g  17 g oral Daily ISAAC WhartonM   17 g at 06/04/25 0919    pregabalin (Lyrica) capsule 150 mg  150 mg oral TID ISAAC WhartonM   150 mg at 06/05/25 1601    propranolol (Inderal) tablet 10 mg  10 mg oral BID ISAAC WhartonM   10 mg at 06/05/25 0928    sennosides-docusate sodium (Kelsey-Colace) 8.6-50 mg per tablet 2 tablet  2 tablet oral Nightly Bethel Scott DPM   2 tablet at 06/04/25 2039    spironolactone (Aldactone) tablet 25 mg  25 mg oral Daily Letty Gomez DO        tamsulosin (Flomax) 24 hr capsule 0.4 mg  0.4 mg oral Daily Bethel Scott DPM   0.4 mg at 06/05/25 0926    torsemide (Demadex) tablet 20 mg   20 mg oral Daily Letty Gomez,         vancomycin (Vancocin) pharmacy to dose - pharmacy monitoring   miscellaneous Daily PRN Bethel Scott DPM        vancomycin 1,250 mg in D5W  mL  1,250 mg intravenous q24h Rebeka Mayer, PharmD 200 mL/hr at 06/05/25 1601 1,250 mg at 06/05/25 1601

## 2025-06-05 NOTE — DISCHARGE SUMMARY
Discharge Diagnosis  Osteomyelitis of the right first metatarsal bone secondary to MRSA infection  Superimposed cellulitis    Issues Requiring Follow-Up  Follow-up with PCP regarding hospitalization stay  Follow-up with infectious disease regarding right foot osteomyelitis and antibiotics management    Discharge Meds     Medication List      START taking these medications     vancomycin 1,000 mg vial for injection; Commonly known as: Vancocin;   Infuse 1.5 g into a venous catheter once daily. Obtain weekly labs: BMP,   CBC, Sed rate, CRP, and Vancomycin trough. Fax to Dr. Mg at   [phone].     CHANGE how you take these medications     pregabalin 150 mg capsule; Commonly known as: Lyrica; Take 1 capsule   (150 mg) by mouth every 6 hours. Do not fill before June 7, 2025.; Start   taking on: June 7, 2025; What changed: These instructions start on June 7, 2025. If you are unsure what to do until then, ask your doctor or other   care provider.     CONTINUE taking these medications     albuterol 2.5 mg /3 mL (0.083 %) nebulizer solution; Take 3 mL (2.5 mg)   by nebulization every 6 hours if needed for wheezing.   alendronate 70 mg tablet; Commonly known as: Fosamax; Take 1 tablet by   mouth every 7 days. Take in the morning with a full glass of water at   least 30 minutes before first food, drink, or medications of the day.   amLODIPine 2.5 mg tablet; Commonly known as: Norvasc; Take 1 tablet (2.5   mg) by mouth once daily. For blood pressure   aspirin 81 mg chewable tablet; Chew 1 tablet (81 mg) once daily.   atorvastatin 40 mg tablet; Commonly known as: Lipitor; Take 1 tablet (40   mg) by mouth once daily at bedtime.   busPIRone 5 mg tablet; Commonly known as: Buspar; Take 1 tablet by mouth   three times daily as needed for anxiety.   cyanocobalamin 1,000 mcg tablet; Commonly known as: Vitamin B-12; Take 1   tablet by mouth once daily as directed.   DULoxetine 60 mg DR capsule; Commonly known as: Cymbalta;  Take 1 capsule   (60 mg) by mouth 2 times a day.   empagliflozin 10 mg tablet; Commonly known as: Jardiance; Take 1 tablet   (10 mg) by mouth once daily.   ergocalciferol 1250 mcg (50,000 units) capsule; Commonly known as:   Vitamin D-2; Take 1 capsule by mouth once weekly.; Start taking on: June 8, 2025   escitalopram 20 mg tablet; Commonly known as: Lexapro; Take 1 tablet by   mouth once daily.   FeroSuL 325 mg (65 mg iron) tablet; Generic drug: ferrous sulfate; Take   1 tablet by mouth once daily with breakfast.   folic acid 1 mg tablet; Commonly known as: Folvite; Take 1 tablet by   mouth daily for folate deficiency.   ipratropium 0.02 % nebulizer solution; Commonly known as: Atrovent; Take   2.5 mL (0.5 mg) by nebulization 4 times a day as needed for shortness of   breath or wheezing.   levothyroxine 50 mcg tablet; Commonly known as: Synthroid, Levoxyl; Take   1 tablet by mouth once daily in the morning before meals.   mirtazapine 15 mg tablet; Commonly known as: Remeron; Take 1 tablet by   mouth once daily at bedtime.   montelukast 10 mg tablet; Commonly known as: Singulair; Take 1 tablet by   mouth once daily at bedtime.   mucus clearing device device; Acapella device.   pantoprazole 40 mg EC tablet; Commonly known as: ProtoNix; Take 1 tablet   by mouth twice daily.   propranolol 10 mg tablet; Commonly known as: Inderal; Take 1 tablet (10   mg) by mouth 2 times a day.   spironolactone 25 mg tablet; Commonly known as: Aldactone; Take 1 tablet   (25 mg) by mouth once daily. Do not fill before June 6, 2025.; Start   taking on: June 6, 2025   tamsulosin 0.4 mg 24 hr capsule; Commonly known as: Flomax; Take 1   capsule by mouth once daily at bedtime.   torsemide 20 mg tablet; Commonly known as: Demadex; Take 1 tablet (20   mg) by mouth once daily. Do not fill before June 6, 2025.; Start taking   on: June 6, 2025   Trelegy Ellipta 200-62.5-25 mcg blister with device; Generic drug:    fluticasone-umeclidin-vilanter     STOP taking these medications     sulfamethoxazole-trimethoprim 800-160 mg tablet; Commonly known as:   Bactrim DS       Test Results Pending At Discharge  Pending Labs       Order Current Status    Surgical Pathology Exam In process            Hospital Course  Angus Redman is a 69 y.o. male with past medical history of HFpEF (EF 60-65% 4/2024), PSVT (on propanolol), HTN, COPD with chronic respiratory failure on 4 L NC at baseline, CAD, CKD 3, OA, alcohol use disorder c/b severe alcoholic polyneuropathy, and recent admission for osteomyelitis of right third digit requiring amputation of right 1st, 4th and 5th toe at the beginning of May 2025 but left AMA and discharged home with oral antibiotics of doxycycline and Augmentin for 14 days by ID, who presented on 5/30 with worsening pain with erythema in the right lower extremity radiating up to his knee.  He was found to have osteomyelitis of the right first metatarsal by MRI and underwent soft tissue and osseus debridement on 6/2 by podiatry.  Intraoperative tissue and bone cultures grew MRSA.  Given this, ID started patient on vancomycin with planned duration of 6 weeks with end date on 7/12/2025.  Since he will require prolonged treatment, PICC line was placed successfully.  Additionally, PT/OT evaluated him during his hospitalization and determined that he will need moderate intensity level of care on discharge.  Therefore, since he is clinically optimized, he will be discharged to [facility] for further care and monitoring while on IV vancomycin.  He will be instructed to follow-up with ID in 5 weeks outpatient regarding antibiotics management for osteomyelitis.    Pertinent Physical Exam At Time of Discharge  Physical Exam  Vitals and nursing note reviewed.   Constitutional:       General: He is not in acute distress.  Eyes:      General: No scleral icterus.     Conjunctiva/sclera: Conjunctivae normal.    Cardiovascular:      Rate and Rhythm: Normal rate and regular rhythm.      Pulses: Normal pulses.      Heart sounds: Normal heart sounds. No murmur heard.  Pulmonary:      Effort: Pulmonary effort is normal. No respiratory distress.      Breath sounds: Wheezing present. No rhonchi or rales.      Comments: Satting well on baseline 4 L oxygen via nasal cannula  Chest:      Chest wall: Tenderness (right lower costal region) present.   Abdominal:      General: There is no distension.      Palpations: Abdomen is soft.      Tenderness: There is no abdominal tenderness.   Musculoskeletal:         General: No tenderness.      Cervical back: Neck supple.      Right lower leg: No edema.      Left lower leg: No edema.   Skin:     General: Skin is warm and dry.   Neurological:      Mental Status: He is alert and oriented to person, place, and time.   Psychiatric:         Mood and Affect: Mood normal.         Behavior: Behavior normal.         Outpatient Follow-Up  Future Appointments   Date Time Provider Department Center   6/11/2025 10:15 AM Boone Bull DPM [id number] [place]   7/30/2025  2:30 PM Artie Haskins MD [id number] [place]   10/13/2025  3:40 PM Angel Harry DO [id number] [place]   11/25/2025  1:30 PM Artie Haskins MD [id number] [place]   3/25/2026  1:00 PM Artie Haskins MD [id number] [place]         Letty Gomez DO

## 2025-06-06 RX ADMIN — Medication 4 L/MIN: at 01:56

## 2025-06-10 DIAGNOSIS — M86.071 ACUTE HEMATOGENOUS OSTEOMYELITIS OF RIGHT FOOT (MULTI): ICD-10-CM

## 2025-06-10 DIAGNOSIS — M86.9 OSTEOMYELITIS OF THIRD TOE OF RIGHT FOOT (MULTI): Primary | ICD-10-CM

## 2025-06-10 DIAGNOSIS — F33.41 RECURRENT MAJOR DEPRESSIVE DISORDER, IN PARTIAL REMISSION: ICD-10-CM

## 2025-06-11 ENCOUNTER — OFFICE VISIT (OUTPATIENT)
Dept: WOUND CARE | Facility: CLINIC | Age: 70
End: 2025-06-11
Payer: MEDICARE

## 2025-06-11 PROCEDURE — 11042 DBRDMT SUBQ TIS 1ST 20SQCM/<: CPT

## 2025-06-11 RX ORDER — ESCITALOPRAM OXALATE 20 MG/1
20 TABLET ORAL DAILY
Qty: 90 TABLET | Refills: 1 | Status: SHIPPED | OUTPATIENT
Start: 2025-06-11

## 2025-06-11 RX ORDER — CEPHALEXIN 500 MG/1
500 TABLET, FILM COATED ORAL 2 TIMES DAILY
Qty: 20 TABLET | Refills: 0 | Status: SHIPPED | OUTPATIENT
Start: 2025-06-11 | End: 2025-06-21

## 2025-06-25 ENCOUNTER — APPOINTMENT (OUTPATIENT)
Dept: WOUND CARE | Facility: CLINIC | Age: 70
End: 2025-06-25
Payer: MEDICARE

## 2025-06-26 ENCOUNTER — PATIENT OUTREACH (OUTPATIENT)
Dept: PRIMARY CARE | Facility: CLINIC | Age: 70
End: 2025-06-26
Payer: MEDICARE

## 2025-07-01 ENCOUNTER — APPOINTMENT (OUTPATIENT)
Dept: CARDIOLOGY | Facility: CLINIC | Age: 70
End: 2025-07-01
Payer: MEDICARE

## 2025-07-01 VITALS
WEIGHT: 252 LBS | HEIGHT: 72 IN | SYSTOLIC BLOOD PRESSURE: 108 MMHG | DIASTOLIC BLOOD PRESSURE: 66 MMHG | HEART RATE: 56 BPM | OXYGEN SATURATION: 95 % | BODY MASS INDEX: 34.13 KG/M2

## 2025-07-01 DIAGNOSIS — I25.10 ASCVD (ARTERIOSCLEROTIC CARDIOVASCULAR DISEASE): ICD-10-CM

## 2025-07-01 DIAGNOSIS — I50.30 STAGE C DIASTOLIC HEART FAILURE: Primary | ICD-10-CM

## 2025-07-01 DIAGNOSIS — I10 ESSENTIAL HYPERTENSION: ICD-10-CM

## 2025-07-01 PROCEDURE — 1160F RVW MEDS BY RX/DR IN RCRD: CPT | Performed by: INTERNAL MEDICINE

## 2025-07-01 PROCEDURE — 3078F DIAST BP <80 MM HG: CPT | Performed by: INTERNAL MEDICINE

## 2025-07-01 PROCEDURE — 1111F DSCHRG MED/CURRENT MED MERGE: CPT | Performed by: INTERNAL MEDICINE

## 2025-07-01 PROCEDURE — 1036F TOBACCO NON-USER: CPT | Performed by: INTERNAL MEDICINE

## 2025-07-01 PROCEDURE — 3008F BODY MASS INDEX DOCD: CPT | Performed by: INTERNAL MEDICINE

## 2025-07-01 PROCEDURE — 3074F SYST BP LT 130 MM HG: CPT | Performed by: INTERNAL MEDICINE

## 2025-07-01 PROCEDURE — 99214 OFFICE O/P EST MOD 30 MIN: CPT | Performed by: INTERNAL MEDICINE

## 2025-07-01 PROCEDURE — 1159F MED LIST DOCD IN RCRD: CPT | Performed by: INTERNAL MEDICINE

## 2025-07-01 RX ORDER — AMOXICILLIN 250 MG
1 CAPSULE ORAL 2 TIMES DAILY PRN
COMMUNITY

## 2025-07-01 RX ORDER — PREDNISONE 10 MG/1
10 TABLET ORAL DAILY
COMMUNITY

## 2025-07-01 RX ORDER — ACETAMINOPHEN 325 MG/1
650 TABLET ORAL EVERY 6 HOURS PRN
COMMUNITY

## 2025-07-01 RX ORDER — ONDANSETRON 8 MG/1
8 TABLET, FILM COATED ORAL EVERY 8 HOURS PRN
COMMUNITY

## 2025-07-01 RX ORDER — GUAIFENESIN 600 MG/1
600 TABLET, EXTENDED RELEASE ORAL 2 TIMES DAILY
COMMUNITY

## 2025-07-01 RX ORDER — BENZONATATE 100 MG/1
100 CAPSULE ORAL 3 TIMES DAILY PRN
COMMUNITY

## 2025-07-01 NOTE — PROGRESS NOTES
[facility] Failure [facility]  Primary Care Physician: Artie Haskins MD  Referring Provider/Cardiologist: n/a     Date of Visit: 07/01/2025  2:20 PM EDT  Location of visit: [address]     HPI:   Mr. Redman is a 69M with a PMHx sig for stage C diastolic HF/HFpEF, pSVT, HTN, and oxygen dependent chronic hypoxic respiratory failure (4L via n/c) who returns to the  Advanced Heart Failure clinic for ongoing evaluation and management.     Interval hx:   He was recently hospitalized at [facility]. He is currently being treated with vancomycin for cellulitis. He is currently wearing a monitor to evaluate for AF.      Currently denies chest pain, palpitations PND or orthopnea, or LE edema. He reports chronic dyspnea.      Hospitalizations: 5/30-6/5/25 Acute cellulitis; 6/23/25 ED for AMS       PMHx:  stage C diastolic HF/HFpEF, pSVT, nonobstructive CAD, HTN, and oxygen dependent chronic hypoxic respiratory failure    SocHx:  Lives alone in [place]  Former smoker (quit 1999). Denies ETOH, illicits    FamHx:  Father had heart disease       Current Outpatient Medications   Medication Sig Dispense Refill    albuterol 2.5 mg /3 mL (0.083 %) nebulizer solution Take 3 mL (2.5 mg) by nebulization every 6 hours if needed for wheezing. 120 mL 11    alendronate (Fosamax) 70 mg tablet Take 1 tablet by mouth every 7 days. Take in the morning with a full glass of water at least 30 minutes before first food, drink, or medications of the day. (Patient taking differently: Take 1 tablet (70 mg) by mouth every 7 days. On Mondays) 12 tablet 3    amLODIPine (Norvasc) 2.5 mg tablet Take 1 tablet (2.5 mg) by mouth once daily. For blood pressure 90 tablet 3    aspirin 81 mg chewable tablet Chew 1 tablet (81 mg) once daily. 90 tablet 3    atorvastatin (Lipitor) 40 mg tablet Take 1 tablet (40 mg) by mouth once daily at bedtime. 90 tablet 3    busPIRone (Buspar) 5 mg tablet Take 1 tablet by mouth three times daily as  needed for anxiety. 180 tablet 2    cyanocobalamin (Vitamin B-12) 1,000 mcg tablet Take 1 tablet by mouth once daily as directed. 90 tablet 3    DULoxetine (Cymbalta) 60 mg DR capsule Take 1 capsule (60 mg) by mouth 2 times a day. 60 capsule 2    empagliflozin (Jardiance) 10 mg tablet Take 1 tablet (10 mg) by mouth once daily. 30 tablet 11    ergocalciferol (Vitamin D-2) 1250 mcg (50,000 units) capsule Take 1 capsule by mouth once weekly. 12 capsule 3    escitalopram (Lexapro) 20 mg tablet Take 1 tablet by mouth once daily. 90 tablet 1    ferrous sulfate, 325 mg ferrous sulfate, (FeroSuL) tablet Take 1 tablet by mouth once daily with breakfast. 90 tablet 3    fluticasone-umeclidin-vilanter (Trelegy Ellipta) 200-62.5-25 mcg blister with device Inhale 1 puff once daily.      folic acid (Folvite) 1 mg tablet Take 1 tablet by mouth daily for folate deficiency. 90 tablet 2    ipratropium (Atrovent) 0.02 % nebulizer solution Take 2.5 mL (0.5 mg) by nebulization 4 times a day as needed for shortness of breath or wheezing.      levothyroxine (Synthroid, Levoxyl) 50 mcg tablet Take 1 tablet by mouth once daily in the morning before meals. 90 tablet 1    mirtazapine (Remeron) 15 mg tablet Take 1 tablet by mouth once daily at bedtime. 90 tablet 1    montelukast (Singulair) 10 mg tablet Take 1 tablet by mouth once daily at bedtime. 90 tablet 3    mucus clearing device device Acapella device. 1 each 0    pantoprazole (ProtoNix) 40 mg EC tablet Take 1 tablet by mouth twice daily. 180 tablet 3    pregabalin (Lyrica) 150 mg capsule Take 1 capsule (150 mg) by mouth every 6 hours. Do not fill before June 7, 2025. 120 capsule 0    propranolol (Inderal) 10 mg tablet Take 1 tablet (10 mg) by mouth 2 times a day. 270 tablet 3    spironolactone (Aldactone) 25 mg tablet Take 1 tablet (25 mg) by mouth once daily. Do not fill before June 6, 2025. 30 tablet 11    tamsulosin (Flomax) 0.4 mg 24 hr capsule Take 1 capsule by mouth once daily at  bedtime. 90 capsule 3    torsemide (Demadex) 20 mg tablet Take 1 tablet (20 mg) by mouth once daily. Do not fill before June 6, 2025.      vancomycin (Vancocin) 1,000 mg vial for injection Infuse 1.5 g into a venous catheter once daily. Obtain weekly labs: BMP, CBC, Sed rate, CRP, and Vancomycin trough. Fax to Dr. Mg at [phone]. 58.5 g 0     No current facility-administered medications for this visit.       Allergies   Allergen Reactions    Lisinopril Hives    Codeine GI Upset    House Dust Mite Runny nose    Hydrocodone-Acetaminophen Nausea/vomiting    Latex Hives and Rash     Latex tube/connecter kit/gloves      Mold Runny nose    Tree And Shrub Pollen Runny nose       Visit Vitals  /66 (BP Location: Left arm, Patient Position: Sitting)   Pulse 56   Ht 1.829 m (6')   Wt 114 kg (252 lb)   SpO2 95%   BMI 34.18 kg/m²   Smoking Status Former   BSA 2.41 m²         Physical Exam:  On exam Mr. Redman appears his stated age, is alert and oriented x3, and in no acute distress. His sclera are anicteric and his oropharynx has moist mucous membranes. His neck is supple and without thyromegaly. The JVP is ~8 cm of water above the right atrium. His cardiac exam has regular rhythm, normal S1, S2. No S3/4. There are no murmurs. His lungs are clear to auscultation bilaterally and there is no dullness to percussion. His abdomen is soft, nontender with normoactive bowel sounds. There is no HJR. The extremities are warm and with trace edema. The skin is dry. There is no rash present. The distal pulses are 2+ in all four extremities. His mood and affect are appropriate for todays encounter.       Cardiac Labs/Diagnostics:    Lab Results   Component Value Date    CREATININE 1.28 06/07/2025    BUN 21 06/07/2025     06/07/2025    K 4.5 06/07/2025    CL 98 06/07/2025    CO2 29 06/07/2025        Recent Labs     12/26/24  1948 11/18/24  1249 11/13/24  1300 10/30/24  1305 09/24/24  0535   BNP 27 41 52 69 395*        Echo (4/11/24):  1. Left ventricular systolic function is normal with a 60-65% estimated ejection fraction.  2. Poorly visualized anatomical structures due to suboptimal image quality.    Holter (2/21-3/6/24):  Sinus rhythm, no AF, occ SVT (longest lasting 20 seconds)    CCTA (2/27/24):  1. Right dominant system  2. 50% stenosis appreciated at the level the prox/mid LAD-CT FFR of 0.82 in the mid LAD; 25-50% stenosis appreciated in the proximal/mid RCA - CT FFR results are not available..  3. Nonobstructive CAD revolving the remaining vessels    ECG (2/6/24):  Sinus rhythm (HR 98)    Echo (9/12/23):  1. Left ventricular systolic function is normal with a 60% estimated ejection fraction.  2. Sinus rhythm (HR 70s).      Impression/Plan:  Mr. Redman is a 69M with a PMHx sig for stage C diastolic HF/HFpEF, pSVT, HTN, and oxygen dependent chronic hypoxic respiratory failure (4L via n/c) who returns to the  Advanced Heart Failure clinic for ongoing evaluation and management. At the current time he has functional class II symptoms and is relatively euvolemic on exam.     1) Stage C chronic diastolic HF/HFpEF  Currently on daily torsemide. Most recent proBNP improved to 189 (6/2025).   -change torsemide 20 mg M-W-F   -c/w jardiance 10 mg daily, spironolactone 25 mg daily    2) CAD  Lipids (2/13/24): tChol 203, HDL 40, , Trigs 132  CCTA (2/2024) with nonobstructive CAD (CT FFR negative LAD). Echo with preserved biventricular function and no hemodynamically significant valvular disease.   -c/w ASA 81 mg daily, atorvastatin 40 mg daily  -will repeat lipids after starting statin    3) ? AF  States he has a history, but not on anticoagulation or any diagnostic tests showing this. 2 week holter with no evidence of AF. He was given another monitor after his recent hospitalization at [facility].   -follow up the monitor when available    4) pSVT  Currently on propranolol. Transient episodes noted on prior holter.   -c/w  propranolol    5) HTN  BP controlled.   -c/w amlodipine 2.5 mg daily      F/U: 6 months at /[facility]      ____________________________________________________________  Angel Harry DO  Section of Advanced Heart Failure and Cardiac Transplantation  Division of Cardiovascular Medicine  [facility]  [facility]

## 2025-07-01 NOTE — PATIENT INSTRUCTIONS
It was a pleasure seeing you today. Please contact myself or my team with any questions.     To reach Dr. Harry' office please call [phone] ([place]).   Fax: [phone]   To schedule an appointment call [phone]     If you have any questions or need cardiac medication refills, please call the Heart Failure office at [phone], option 6. You may also contact the  Heart Failure Nursing team via email at [email or web address] (Please include your name and date of birth).       1) Change torsemide to 20 mg every M-W-F  2) Follow up in 6 months at /[facility]

## 2025-07-02 ENCOUNTER — APPOINTMENT (OUTPATIENT)
Dept: WOUND CARE | Facility: CLINIC | Age: 70
End: 2025-07-02
Payer: MEDICARE

## 2025-07-09 ENCOUNTER — OFFICE VISIT (OUTPATIENT)
Dept: WOUND CARE | Facility: CLINIC | Age: 70
End: 2025-07-09
Payer: MEDICARE

## 2025-07-09 PROCEDURE — 11042 DBRDMT SUBQ TIS 1ST 20SQCM/<: CPT

## 2025-07-14 ENCOUNTER — TELEPHONE (OUTPATIENT)
Dept: PRIMARY CARE | Facility: CLINIC | Age: 70
End: 2025-07-14
Payer: MEDICARE

## 2025-07-15 ENCOUNTER — DOCUMENTATION (OUTPATIENT)
Dept: PRIMARY CARE | Facility: CLINIC | Age: 70
End: 2025-07-15
Payer: MEDICARE

## 2025-07-15 ENCOUNTER — PATIENT OUTREACH (OUTPATIENT)
Dept: PRIMARY CARE | Facility: CLINIC | Age: 70
End: 2025-07-15
Payer: MEDICARE

## 2025-07-15 DIAGNOSIS — J44.89 ASTHMA WITH CHRONIC OBSTRUCTIVE PULMONARY DISEASE (COPD) (MULTI): ICD-10-CM

## 2025-07-15 DIAGNOSIS — G62.9 PERIPHERAL NEUROPATHY: ICD-10-CM

## 2025-07-15 DIAGNOSIS — Z89.429: ICD-10-CM

## 2025-07-15 NOTE — PROGRESS NOTES
Discharge facility: [facility]  Discharge diagnosis: Seosis  Admission date: 6/27/2025  Discharge date: 7/11/2025    PCP Appointment Date: 7/16/2025  Specialist Appointment Date:   Hospital Encounter and Summary: Linked   See Discharge assessment below for further details      Medications  Medications reviewed with patient/caregiver?: Yes (7/15/2025  4:25 PM)  Is the patient having any side effects they believe may be caused by any medication additions or changes?: No (7/15/2025  4:25 PM)  Does the patient have all medications ordered at discharge?: Yes (7/15/2025  4:25 PM)  Care Management Interventions: Provided patient education (7/15/2025  4:25 PM)  Prescription Comments: started on Torsemide (7/15/2025  4:25 PM)  Is the patient taking all medications as directed (includes completed medication regime)?: Yes (7/15/2025  4:25 PM)  Care Management Interventions: Provided patient education (7/15/2025  4:25 PM)  Medication Comments: started back on Torsemide (7/15/2025  4:22 PM)    Appointments  Does the patient have a primary care provider?: Yes (7/15/2025  4:22 PM)  Care Management Interventions: Verified appointment date/time/provider (7/15/2025  4:25 PM)  Has the patient kept scheduled appointments due by today?: Yes (7/15/2025  4:25 PM)  Care Management Interventions: Educated on importance of keeping appointment (7/15/2025  4:22 PM)    Self Management  What is the home health agency?: [facility] (7/15/2025  4:25 PM)  Has home health visited the patient within 72 hours of discharge?: Yes (7/15/2025  4:25 PM)  What Durable Medical Equipment (DME) was ordered?: yes, nebulizer, oscilliation vest and acapella. (7/15/2025  4:25 PM)  Has all Durable Medical Equipment (DME) been delivered?: Yes (7/15/2025  4:25 PM)    Patient Teaching  Does the patient have access to their discharge instructions?: Yes (7/15/2025  4:25 PM)  Care Management Interventions: Reviewed instructions with  patient (7/15/2025  4:25 PM)  What is the patient's perception of their health status since discharge?: Improving (7/15/2025  4:25 PM)  Is the patient/caregiver able to teach back the hierarchy of who to call/visit for symptoms/problems? PCP, Specialist, Home Health nurse, Urgent Care, ED, 911: Yes (7/15/2025  4:22 PM)  Patient/Caregiver Education Comments: Spoke with the patient who was discharged on 7/11/2025 from [facility].  Patient has falled 2x since he has been home.  Both times he used his life alert and the fire department got him up.  Patient received his new concentrator from [facility].  Patient also has an oscillation vest, nebulizer and an acapella which he is to start using.  Discused his torsemide and S&S of worsening condition.  Patient has a TCM follow up with Dr Haskins 7/16/2025. (7/15/2025  4:25 PM)     TCM outreach completed on : 7/15/2025  Discharge date: 7/11/2025  Pt has follow up on : 7/16/2025    Moderately complex & follow-up within 14 days- bill [id number] for hospital follow up.   Highly complex and follow-up visit within 7 days-can bill [id number] for hospital follow up    *virtual follow up needs modifier added (95 or GT)   *AWV AND TCM CAN BE BILLED TOGETHER WITH 25 MODIFIER

## 2025-07-16 ENCOUNTER — APPOINTMENT (OUTPATIENT)
Dept: PRIMARY CARE | Facility: CLINIC | Age: 70
End: 2025-07-16
Payer: MEDICARE

## 2025-07-16 DIAGNOSIS — E03.9 ACQUIRED HYPOTHYROIDISM: ICD-10-CM

## 2025-07-16 DIAGNOSIS — M54.50 CHRONIC LOW BACK PAIN, UNSPECIFIED BACK PAIN LATERALITY, UNSPECIFIED WHETHER SCIATICA PRESENT: ICD-10-CM

## 2025-07-16 DIAGNOSIS — M25.561 CHRONIC PAIN OF RIGHT KNEE: ICD-10-CM

## 2025-07-16 DIAGNOSIS — I48.0 PAROXYSMAL ATRIAL FIBRILLATION (MULTI): ICD-10-CM

## 2025-07-16 DIAGNOSIS — G89.29 CHRONIC LOW BACK PAIN, UNSPECIFIED BACK PAIN LATERALITY, UNSPECIFIED WHETHER SCIATICA PRESENT: ICD-10-CM

## 2025-07-16 DIAGNOSIS — Z60.2 LIVES ALONE WITH HELP AVAILABLE: ICD-10-CM

## 2025-07-16 DIAGNOSIS — K29.20 ALCOHOLIC GASTRITIS WITHOUT BLEEDING, UNSPECIFIED CHRONICITY: ICD-10-CM

## 2025-07-16 DIAGNOSIS — R29.6 FREQUENT FALLS: Primary | ICD-10-CM

## 2025-07-16 DIAGNOSIS — J45.41 MODERATE PERSISTENT REACTIVE AIRWAY DISEASE WITH ACUTE EXACERBATION (HHS-HCC): ICD-10-CM

## 2025-07-16 DIAGNOSIS — G89.29 CHRONIC PAIN OF RIGHT KNEE: ICD-10-CM

## 2025-07-16 DIAGNOSIS — Z99.89 USES ROLLER WALKER: ICD-10-CM

## 2025-07-16 DIAGNOSIS — N18.31 STAGE 3A CHRONIC KIDNEY DISEASE (MULTI): ICD-10-CM

## 2025-07-16 DIAGNOSIS — I10 BENIGN ESSENTIAL HYPERTENSION: ICD-10-CM

## 2025-07-16 DIAGNOSIS — E66.01 OBESITY, MORBID (MULTI): ICD-10-CM

## 2025-07-16 DIAGNOSIS — G57.93 NEUROPATHIC PAIN OF BOTH FEET: ICD-10-CM

## 2025-07-16 DIAGNOSIS — F33.1 MAJOR DEPRESSIVE DISORDER, RECURRENT, MODERATE: ICD-10-CM

## 2025-07-16 DIAGNOSIS — E11.43 TYPE 2 DIABETES MELLITUS WITH DIABETIC AUTONOMIC NEUROPATHY, WITHOUT LONG-TERM CURRENT USE OF INSULIN: ICD-10-CM

## 2025-07-16 SDOH — SOCIAL STABILITY - SOCIAL INSECURITY: PROBLEMS RELATED TO LIVING ALONE: Z60.2

## 2025-07-16 NOTE — PROGRESS NOTES
"Patient: Angus Redman  : 1955  PCP: Artie Haskins MD  MRN: [id number]     Angus Redman is a 69 y.o. male presenting today for follow-up after being discharged from the hospital 5 days ago. The main problem requiring admission was rehab following acute admission. The discharge summary and/or Transitional Care Management documentation was reviewed. Medication reconciliation was performed as indicated via the \"Juan as Reviewed\" timestamp.     Angus Redman was contacted by Transitional Care Management services two days after his discharge. This encounter and supporting documentation was reviewed.    The complexity of medical decision making for this patient's transitional care is moderate.    Here after discharge from hospital-with rehab from 2025 at [facility], discharged 2025.    He is back at home.  For the most part he is doing fairly well though he did have a fall 2025, he states he was getting the paper when he fell out of the house.  He called the squad.  The following day on  he fell in the kitchen and called the squad again.    He does not drive-he has food delivered by [employer].    He was admitted recently with pneumonia-his cough is reduced presently he finished the antibiotics and has reduced the Tessalon Perles      Somewhat disheveled gentleman, appearing comfortable at times smiling.  Quite interactive and appropriate without abnormal thoughts  This is a well-appearing male, in no distress. Overweight. Vital signs as noted above.  Eye exam revealed pupils equally round and reactive, with extraocular muscles intact. Normal sclera and eyelids.  Neck exam revealed no masses, adenopathy or thyromegaly. No neck pain on range of motion was noted.  Pulmonary exam revealed clear breath sounds bilaterally in all lung fields. No wheezes bronchitis or rales noted.  Cardiac exam revealed regular rate and rhythm without murmurs gallops or rubs.    Musculoskeletal " exam without new joint deformities, or active synovitis. Gait symmetric and otherwise unremarkable.  Bilateral pitting edema  On mental status exam, judgment and insight appear intact. Alert and oriented x3. No apparent mood or cognitive impairment. Affect was at baseline, without evidence of depression or anxiety.    Review of Systems    Family History[1]    No data recorded        Portions of this encounter note have been copied from my previous note dated 3/26/2025, which have been updated where appropriate and all reflect my current medical decision making from today.        Living situation-he lives alone in a house.  He no longer drives.  His   in .  His  sister - Antonina, lives in [facility], now retired.  The brother lives in a group home near [place] near their family home.             3/25-his sister visits often helping him with appointments and other household details      Home health/home rehab-           3/25-Home PT and OT continue Home health documentation-forms completed and faxed back as requested             -Home health continues including OT PT and skilled nurse      CODE STATUS-he has requested DNR comfort care and states he has worked with the  to get forms completed accordingly.  He has these at home.  He is will send those in and will update his electronic medical record to reflect this accordingly    Healthcare power of -reviewed at his  visit.  He has designated his sister, Antonina Redman as his legal agent of authority.  His formal written and notarized medical POA has been filed      Lab work reviewed from 2025, 2025, 2025, 2025,  CT angio chest reviewed 2025 head CT reviewed from 2025    S/p acute hospitalization--2027 with pneumonia at [facility]-improved with antibiotics discharged to rehab    Rehab stay-discharge from the hospital 2025-discharged from rehab  7/11/2025            Now 5 days later-he has fallen twice.  He requires home health with PT and OT.  His sister has been helpful although she does not live in town.    Recurrent falls-once again strongly encouraged stability and caution with position changes to prevent future falls      S/p acute rehab stay-discharged from rehab 1/12/2025, following admission December 27 with worsening respiratory concerns with cough and shortness of breath.  He is on portable oxygen.  Chest CT without evidence of PE.  Chronic rib fractures-contributing to his restrictive component.  Otherwise no acute findings    S/p acute hospital stay 12/26/2024-12/27/2024--discharged to rehab to help with rehab, complicated by insufficient services at home-he lives alone and his only relative his sister lives out of town    S/p right third  toe amputations-11/22/24-he will continue wound care with podiatry    Wound care plan-presently goes to the wound clinic Wednesdays where he sees his podiatrist Dr. Jang.  Monday and Friday, the home nurse comes out- [place] to do dressing changes               3/25-fortunately his foot ulcer has healed-he will see the wound clinic as scheduled in several weeks for follow-up    Home health-PT and OT visits continue each once a week to optimize stamina strength and stability    Hospital admission 9/23/2024-9/25/2024-acute on chronic hypoxic respiratory failure from restrictive lung disease likely exacerbated by new onset heart failure-    Left pleural effusion-thoracentesis revealed exudative fluid-furosemide started.  He will follow-up with pulmonary later this week to review    History of heart failure-cardiac evaluation-he was referred to the advanced heart failure clinic with Dr. Harry February 2024-he has completed a 2-week Holter monitor and will see Dr. Harry soon.  He was reported to have a NSTEMI.  LVEF normal on last echo 9/23.             4/24 echo-normal EF, suboptimal echo               10/24-scheduled to meet with Dr. Angel Harry in the heart failure clinic 10/14/2024.  He will continue his cardiac regimen including furosemide        Abnormal chest CT-he has a follow-up CT planned and will follow-up with his pulmonology provider soon    Restrictive lung disease-he will follow-up with his pulmonologist provider-10/21/2024-since discharge he has been on a nebulizer-albuterol and ipratropium provided             He will follow-up with pulmonary following his recent pneumonia admission late June 2025      Polypharmacy-he will continue to optimize his compliance.  He has a home nurse and will meet with our pharmacy team 10/28/2024    Hypoxemia-with oxygen dependent chronic hypoxic respiratory failure-he was reportedly hypoxic overnight-we will ordered overnight oximeter            He is now on portable oxygen presumably from his pulmonologist.  He will continue.  He states that he feels better and has more stamina           3/25 he remains on portable oxygen and will meet with his pulmonologist as scheduled.              7/25-he remains on oxygen continuously      Severe sleep apnea-he remains on BiPAP nightly      Nutritional concerns-unfortunately he does not do a lot of cooking.  He gets food from [employer] which is delivered      Gait unsteadiness-Home physical therapy ordered last time           He needs help getting out of shower          10/24-he now has a life alert             He uses his rollator consistently             Will continue physical therapy Tuesdays and Thursdays.            1/25-he will continue OT and PT.  Alcoholic peripheral neuropathy has complicated his balance and has increased his risk for falling              3/25-Home PT and OT continue.  He will use extreme caution with position changes.  Disability parking placard requested/provided      Home help         12/23  - Sharon from [employer], es for 4 hrs, and Valentina 3 hrs Friday - housekeeping              3/25-remarkably he is doing fairly well on his own though his sister visits and helps.  He states he cannot really afford much help as he once did    Home adaptive equipment-though he uses a wheelchair out of the house-he has stairs in his house and cannot use the wheelchair or scooter inside of his house.  He uses a walker instead.  Unfortunately he does not have a chairlift for the stairs as he does not think this can fit with the layout of his stairs.  Portable oxygen also continues    Recurrent falls-he will use a cane or walker as he is able to.  Unfortunately he recently fell when he went out to the [employer] to  delivered items from the store.  He had to call the squad.           Presently using a walker-encouraged him to continue to optimize his safety              Now has a wheelchair to use in his kitchen             12/23-he continues to use the walker as much as he can around the house and kitchen.  He now has a life alert              10/24-fortunately no recent falls.  He will continue efforts with therapy to regain strength stamina and ambulation conditioning especially with his stairs              1/25-no recent falls.  He just got out of rehab January 12.  He will continue home PT               3/25-no recent falls              7/25-as above more falls recently even since discharge from acute rehab.  He will continue to work with OT and PT      Hx MRSA feet infection - from past fall. Home wound RN done w/ regular visits. Plans to follow up to check feet prn             He was referred to the emergency room at [facility] 3/6/2024 for worsening foot infections-he was changed to oral antibiotics as below and discharged             He has ongoing foot infections mainly on the left under his first MTP from difficult calluses-currently on Augmentin and doxycycline.  He is working with the wound clinic as well as his podiatrist who he will see tomorrow, Dr. Jang 3/13/2024            10/24-bilateral  feet infections-ongoing issue-wound clinic visits every Wed continues with Dr. Jang along with home visits with wound care nurseOttoniel Monday and Fridays 7/25-fortunately no active issues with his feet    Elevated creatinine-2.2 in ER 3/6/2024.  He will recheck that this afternoon                 Creatinine normalized on recheck fortunately           Regarding tenuous home situation-he lives alone but now no longer drives.  He is able to hire drivers to get him where he needs to go.  He is able to order groceries and have them delivered.  At this point he declines any further changes in his home situation            10/24- his friend Vicky moved away. His sister comes to town when she can. Now retired. He's managing so far on his own.    Nutritional concerns - He gets groceries by [employer] w/ home deliveries 1-2 x wk    Alcoholism with a history of alcoholic gastritis-unfortunately he is still drinking alcohol-apparently the groceries will deliver wine which she has converted to from gin, unfortunately.                7/23-presently drinking 2 bottles of white wine daily.  He refuses to cut back              12/23-he states he quit using alcohol in 10/23 when he promised his sister he would not drink.  He has not really missed that he states strongly encouraged long-term abstinence                3/24-he confirms that he remains off alcohol.  Encouragement/support provided               10/24-unfortunately he is drinking a bottle of wine again daily.  Encouraged him to stop using alcohol              1/25-not using alcohol presently.    History hypertension-he will continue medications           Blood pressure much improved on recheck    Alcoholic gastritis-he will continue his PPI and limit alcohol use            12/23-he states he quit using alcohol in 10/23 when he promised his sister he would not drink.  He has not really missed that he states strongly encouraged long-term abstinence              10/24-fortunately no present dyspepsia issues    Asthma-improved with Breo.  He does not always use it    Allergic rhinitis-he is allergic to cats and he has several cats.  He will continue his Allegra and Flonase      Painful peripheral neuropathy-both feet are numb, but he feels the pain at times right lateral ankle area and hands.  He is on the gabapentin twice daily now that helps.  He will continue to work with the pain clinic           He continues to meet with the pain clinic-with that appointment 1/24 to continue his duloxetine and gabapentin.              7/24-right wrist arthritis and left arm pain main problems presently.  He is on twice daily gabapentin.  He plans to establish with the pain clinic near [place]             10/24-he will meet with the pain clinic as scheduled 10/9/2024             1/25-he will continue podiatry and wound care    Vitamin D deficiency-he will continue-refilled    Vision care-he had an eye exam with Dr. Lutz in Feb '24      Depression/anxiety-he will continue the BuSpar Cymbalta and Lexapro              10/24-confirm that he is using this    Flu shot-encouraged each September-updated 10/24             Covid booster -encouraged considering a COVID booster each fall.  Updated 10/24          RSV vacc - updated 9/23    Shingrix-completed in 2021    He will follow-up in 3-4  months, sooner as needed    Charting was completed using voice recognition technology and may include unintended errors.                 [1]   Family History  Problem Relation Name Age of Onset    Other (cardiac pacemaker) Mother      Coronary artery disease Mother      Other (history of heart artery stent) Mother      Cancer Mother      Other (kurtis cell cancer) Mother      Arthritis Mother      Mental illness Brother          living in handicapped assisted living facility permanently [Other]    Other (angina pectoris) Paternal Grandmother

## 2025-07-17 RX ORDER — PREGABALIN 150 MG/1
150 CAPSULE ORAL 4 TIMES DAILY
Qty: 120 CAPSULE | Refills: 0 | Status: SHIPPED | OUTPATIENT
Start: 2025-07-30 | End: 2025-07-25 | Stop reason: SDUPTHER

## 2025-07-18 ENCOUNTER — APPOINTMENT (OUTPATIENT)
Dept: RADIOLOGY | Facility: HOSPITAL | Age: 70
DRG: 603 | End: 2025-07-18
Payer: MEDICARE

## 2025-07-18 ENCOUNTER — APPOINTMENT (OUTPATIENT)
Dept: CARDIOLOGY | Facility: HOSPITAL | Age: 70
DRG: 603 | End: 2025-07-18
Payer: MEDICARE

## 2025-07-18 ENCOUNTER — HOSPITAL ENCOUNTER (INPATIENT)
Facility: HOSPITAL | Age: 70
Discharge: HOME | DRG: 603 | End: 2025-07-18
Attending: STUDENT IN AN ORGANIZED HEALTH CARE EDUCATION/TRAINING PROGRAM | Admitting: INTERNAL MEDICINE
Payer: MEDICARE

## 2025-07-18 DIAGNOSIS — M25.572 PAIN IN JOINTS OF BOTH FEET: ICD-10-CM

## 2025-07-18 DIAGNOSIS — M79.89 OTHER SPECIFIED SOFT TISSUE DISORDERS: ICD-10-CM

## 2025-07-18 DIAGNOSIS — M25.571 PAIN IN JOINTS OF BOTH FEET: ICD-10-CM

## 2025-07-18 DIAGNOSIS — L03.115 CELLULITIS OF RIGHT LOWER EXTREMITY: ICD-10-CM

## 2025-07-18 DIAGNOSIS — I82.811 SUPERFICIAL THROMBOSIS OF LEG, RIGHT: Primary | ICD-10-CM

## 2025-07-18 DIAGNOSIS — J44.89 ASTHMA WITH CHRONIC OBSTRUCTIVE PULMONARY DISEASE (COPD) (MULTI): ICD-10-CM

## 2025-07-18 DIAGNOSIS — M86.171 OTHER ACUTE OSTEOMYELITIS OF RIGHT FOOT: ICD-10-CM

## 2025-07-18 DIAGNOSIS — N17.9 AKI (ACUTE KIDNEY INJURY): ICD-10-CM

## 2025-07-18 LAB
ALBUMIN SERPL BCP-MCNC: 4.1 G/DL (ref 3.4–5)
ALP SERPL-CCNC: 116 U/L (ref 33–136)
ALT SERPL W P-5'-P-CCNC: 13 U/L (ref 10–52)
ANION GAP BLDV CALCULATED.4IONS-SCNC: 6 MMOL/L (ref 10–25)
ANION GAP SERPL CALC-SCNC: 13 MMOL/L (ref 10–20)
AST SERPL W P-5'-P-CCNC: 19 U/L (ref 9–39)
BASE EXCESS BLDV CALC-SCNC: 2.3 MMOL/L (ref -2–3)
BASOPHILS # BLD AUTO: 0.02 X10*3/UL (ref 0–0.1)
BASOPHILS NFR BLD AUTO: 0.2 %
BILIRUB SERPL-MCNC: 0.6 MG/DL (ref 0–1.2)
BODY TEMPERATURE: ABNORMAL
BUN SERPL-MCNC: 17 MG/DL (ref 6–23)
CA-I BLDV-SCNC: 1.15 MMOL/L (ref 1.1–1.33)
CALCIUM SERPL-MCNC: 8.8 MG/DL (ref 8.6–10.3)
CHLORIDE BLDV-SCNC: 105 MMOL/L (ref 98–107)
CHLORIDE SERPL-SCNC: 102 MMOL/L (ref 98–107)
CO2 SERPL-SCNC: 27 MMOL/L (ref 21–32)
CREAT SERPL-MCNC: 1.47 MG/DL (ref 0.5–1.3)
CRP SERPL-MCNC: 0.86 MG/DL
EGFRCR SERPLBLD CKD-EPI 2021: 51 ML/MIN/1.73M*2
EOSINOPHIL # BLD AUTO: 0.26 X10*3/UL (ref 0–0.4)
EOSINOPHIL NFR BLD AUTO: 3.1 %
ERYTHROCYTE [DISTWIDTH] IN BLOOD BY AUTOMATED COUNT: 17.7 % (ref 11.5–14.5)
ERYTHROCYTE [SEDIMENTATION RATE] IN BLOOD BY WESTERGREN METHOD: 48 MM/H (ref 0–20)
GLUCOSE BLDV-MCNC: 108 MG/DL (ref 74–99)
GLUCOSE SERPL-MCNC: 95 MG/DL (ref 74–99)
HCO3 BLDV-SCNC: 28.7 MMOL/L (ref 22–26)
HCT VFR BLD AUTO: 37.6 % (ref 41–52)
HCT VFR BLD EST: 32 % (ref 41–52)
HGB BLD-MCNC: 11.6 G/DL (ref 13.5–17.5)
HGB BLDV-MCNC: 10.8 G/DL (ref 13.5–17.5)
IMM GRANULOCYTES # BLD AUTO: 0.04 X10*3/UL (ref 0–0.5)
IMM GRANULOCYTES NFR BLD AUTO: 0.5 % (ref 0–0.9)
INHALED O2 CONCENTRATION: 41 %
LACTATE BLDV-SCNC: 0.7 MMOL/L (ref 0.4–2)
LIPASE SERPL-CCNC: 26 U/L (ref 9–82)
LYMPHOCYTES # BLD AUTO: 1.75 X10*3/UL (ref 0.8–3)
LYMPHOCYTES NFR BLD AUTO: 20.8 %
MCH RBC QN AUTO: 28.6 PG (ref 26–34)
MCHC RBC AUTO-ENTMCNC: 30.9 G/DL (ref 32–36)
MCV RBC AUTO: 93 FL (ref 80–100)
MONOCYTES # BLD AUTO: 0.68 X10*3/UL (ref 0.05–0.8)
MONOCYTES NFR BLD AUTO: 8.1 %
NEUTROPHILS # BLD AUTO: 5.66 X10*3/UL (ref 1.6–5.5)
NEUTROPHILS NFR BLD AUTO: 67.3 %
NRBC BLD-RTO: 0 /100 WBCS (ref 0–0)
OXYHGB MFR BLDV: 75.3 % (ref 45–75)
PCO2 BLDV: 52 MM HG (ref 41–51)
PH BLDV: 7.35 PH (ref 7.33–7.43)
PLATELET # BLD AUTO: 269 X10*3/UL (ref 150–450)
PO2 BLDV: 49 MM HG (ref 35–45)
POTASSIUM BLDV-SCNC: 4 MMOL/L (ref 3.5–5.3)
POTASSIUM SERPL-SCNC: 4.1 MMOL/L (ref 3.5–5.3)
PROT SERPL-MCNC: 7.1 G/DL (ref 6.4–8.2)
RBC # BLD AUTO: 4.06 X10*6/UL (ref 4.5–5.9)
SAO2 % BLDV: 77 % (ref 45–75)
SODIUM BLDV-SCNC: 136 MMOL/L (ref 136–145)
SODIUM SERPL-SCNC: 138 MMOL/L (ref 136–145)
URATE SERPL-MCNC: 5.5 MG/DL (ref 4–7.5)
WBC # BLD AUTO: 8.4 X10*3/UL (ref 4.4–11.3)

## 2025-07-18 PROCEDURE — 99285 EMERGENCY DEPT VISIT HI MDM: CPT | Performed by: STUDENT IN AN ORGANIZED HEALTH CARE EDUCATION/TRAINING PROGRAM

## 2025-07-18 PROCEDURE — 1100000001 HC PRIVATE ROOM DAILY

## 2025-07-18 PROCEDURE — 36415 COLL VENOUS BLD VENIPUNCTURE: CPT | Performed by: STUDENT IN AN ORGANIZED HEALTH CARE EDUCATION/TRAINING PROGRAM

## 2025-07-18 PROCEDURE — 84550 ASSAY OF BLOOD/URIC ACID: CPT | Performed by: INTERNAL MEDICINE

## 2025-07-18 PROCEDURE — 2500000005 HC RX 250 GENERAL PHARMACY W/O HCPCS: Performed by: INTERNAL MEDICINE

## 2025-07-18 PROCEDURE — 73630 X-RAY EXAM OF FOOT: CPT | Mod: LT

## 2025-07-18 PROCEDURE — 73590 X-RAY EXAM OF LOWER LEG: CPT | Mod: RT

## 2025-07-18 PROCEDURE — 73590 X-RAY EXAM OF LOWER LEG: CPT | Mod: RIGHT SIDE | Performed by: RADIOLOGY

## 2025-07-18 PROCEDURE — 85652 RBC SED RATE AUTOMATED: CPT | Performed by: STUDENT IN AN ORGANIZED HEALTH CARE EDUCATION/TRAINING PROGRAM

## 2025-07-18 PROCEDURE — 99285 EMERGENCY DEPT VISIT HI MDM: CPT | Mod: 25 | Performed by: STUDENT IN AN ORGANIZED HEALTH CARE EDUCATION/TRAINING PROGRAM

## 2025-07-18 PROCEDURE — 99223 1ST HOSP IP/OBS HIGH 75: CPT | Performed by: INTERNAL MEDICINE

## 2025-07-18 PROCEDURE — 87077 CULTURE AEROBIC IDENTIFY: CPT | Mod: STJLAB | Performed by: STUDENT IN AN ORGANIZED HEALTH CARE EDUCATION/TRAINING PROGRAM

## 2025-07-18 PROCEDURE — 83690 ASSAY OF LIPASE: CPT | Performed by: STUDENT IN AN ORGANIZED HEALTH CARE EDUCATION/TRAINING PROGRAM

## 2025-07-18 PROCEDURE — 85025 COMPLETE CBC W/AUTO DIFF WBC: CPT | Performed by: STUDENT IN AN ORGANIZED HEALTH CARE EDUCATION/TRAINING PROGRAM

## 2025-07-18 PROCEDURE — 84132 ASSAY OF SERUM POTASSIUM: CPT | Performed by: STUDENT IN AN ORGANIZED HEALTH CARE EDUCATION/TRAINING PROGRAM

## 2025-07-18 PROCEDURE — 96367 TX/PROPH/DG ADDL SEQ IV INF: CPT

## 2025-07-18 PROCEDURE — 96366 THER/PROPH/DIAG IV INF ADDON: CPT

## 2025-07-18 PROCEDURE — 2500000004 HC RX 250 GENERAL PHARMACY W/ HCPCS (ALT 636 FOR OP/ED): Performed by: INTERNAL MEDICINE

## 2025-07-18 PROCEDURE — 2500000001 HC RX 250 WO HCPCS SELF ADMINISTERED DRUGS (ALT 637 FOR MEDICARE OP): Performed by: INTERNAL MEDICINE

## 2025-07-18 PROCEDURE — 93971 EXTREMITY STUDY: CPT

## 2025-07-18 PROCEDURE — 73630 X-RAY EXAM OF FOOT: CPT | Mod: LEFT SIDE | Performed by: RADIOLOGY

## 2025-07-18 PROCEDURE — 96365 THER/PROPH/DIAG IV INF INIT: CPT

## 2025-07-18 PROCEDURE — 2500000004 HC RX 250 GENERAL PHARMACY W/ HCPCS (ALT 636 FOR OP/ED): Performed by: STUDENT IN AN ORGANIZED HEALTH CARE EDUCATION/TRAINING PROGRAM

## 2025-07-18 PROCEDURE — 2500000002 HC RX 250 W HCPCS SELF ADMINISTERED DRUGS (ALT 637 FOR MEDICARE OP, ALT 636 FOR OP/ED): Performed by: INTERNAL MEDICINE

## 2025-07-18 PROCEDURE — 2500000005 HC RX 250 GENERAL PHARMACY W/O HCPCS: Performed by: EMERGENCY MEDICINE

## 2025-07-18 PROCEDURE — 86140 C-REACTIVE PROTEIN: CPT | Performed by: STUDENT IN AN ORGANIZED HEALTH CARE EDUCATION/TRAINING PROGRAM

## 2025-07-18 PROCEDURE — 93971 EXTREMITY STUDY: CPT | Performed by: STUDENT IN AN ORGANIZED HEALTH CARE EDUCATION/TRAINING PROGRAM

## 2025-07-18 RX ORDER — ACETAMINOPHEN 650 MG/1
650 SUPPOSITORY RECTAL EVERY 4 HOURS PRN
Status: DISCONTINUED | OUTPATIENT
Start: 2025-07-18 | End: 2025-07-21 | Stop reason: HOSPADM

## 2025-07-18 RX ORDER — SPIRONOLACTONE 25 MG/1
25 TABLET ORAL DAILY
Status: DISCONTINUED | OUTPATIENT
Start: 2025-07-19 | End: 2025-07-21 | Stop reason: HOSPADM

## 2025-07-18 RX ORDER — TRAMADOL HYDROCHLORIDE 50 MG/1
50 TABLET, FILM COATED ORAL ONCE
Status: COMPLETED | OUTPATIENT
Start: 2025-07-18 | End: 2025-07-18

## 2025-07-18 RX ORDER — TORSEMIDE 20 MG/1
20 TABLET ORAL DAILY
Status: DISCONTINUED | OUTPATIENT
Start: 2025-07-19 | End: 2025-07-21 | Stop reason: HOSPADM

## 2025-07-18 RX ORDER — ACETAMINOPHEN 160 MG/5ML
650 SOLUTION ORAL EVERY 4 HOURS PRN
Status: DISCONTINUED | OUTPATIENT
Start: 2025-07-18 | End: 2025-07-21 | Stop reason: HOSPADM

## 2025-07-18 RX ORDER — DULOXETIN HYDROCHLORIDE 60 MG/1
60 CAPSULE, DELAYED RELEASE ORAL 2 TIMES DAILY
Qty: 60 CAPSULE | Refills: 0 | Status: SHIPPED | OUTPATIENT
Start: 2025-07-18

## 2025-07-18 RX ORDER — ACETAMINOPHEN 325 MG/1
650 TABLET ORAL EVERY 4 HOURS PRN
Status: DISCONTINUED | OUTPATIENT
Start: 2025-07-18 | End: 2025-07-21 | Stop reason: HOSPADM

## 2025-07-18 RX ORDER — PANTOPRAZOLE SODIUM 40 MG/10ML
40 INJECTION, POWDER, LYOPHILIZED, FOR SOLUTION INTRAVENOUS
Status: DISCONTINUED | OUTPATIENT
Start: 2025-07-19 | End: 2025-07-19

## 2025-07-18 RX ORDER — ONDANSETRON 4 MG/1
4 TABLET, ORALLY DISINTEGRATING ORAL EVERY 8 HOURS PRN
Status: DISCONTINUED | OUTPATIENT
Start: 2025-07-18 | End: 2025-07-21 | Stop reason: HOSPADM

## 2025-07-18 RX ORDER — ONDANSETRON HYDROCHLORIDE 2 MG/ML
4 INJECTION, SOLUTION INTRAVENOUS EVERY 8 HOURS PRN
Status: DISCONTINUED | OUTPATIENT
Start: 2025-07-18 | End: 2025-07-21 | Stop reason: HOSPADM

## 2025-07-18 RX ORDER — MONTELUKAST SODIUM 10 MG/1
10 TABLET ORAL NIGHTLY
Status: DISCONTINUED | OUTPATIENT
Start: 2025-07-18 | End: 2025-07-21 | Stop reason: HOSPADM

## 2025-07-18 RX ORDER — METOCLOPRAMIDE 10 MG/1
10 TABLET ORAL EVERY 6 HOURS PRN
Status: DISCONTINUED | OUTPATIENT
Start: 2025-07-18 | End: 2025-07-19

## 2025-07-18 RX ORDER — VANCOMYCIN HYDROCHLORIDE 1 G/20ML
INJECTION, POWDER, LYOPHILIZED, FOR SOLUTION INTRAVENOUS DAILY PRN
Status: DISCONTINUED | OUTPATIENT
Start: 2025-07-18 | End: 2025-07-21 | Stop reason: HOSPADM

## 2025-07-18 RX ORDER — ATORVASTATIN CALCIUM 40 MG/1
40 TABLET, FILM COATED ORAL NIGHTLY
Status: DISCONTINUED | OUTPATIENT
Start: 2025-07-18 | End: 2025-07-21 | Stop reason: HOSPADM

## 2025-07-18 RX ORDER — IPRATROPIUM BROMIDE AND ALBUTEROL SULFATE 2.5; .5 MG/3ML; MG/3ML
3 SOLUTION RESPIRATORY (INHALATION)
Status: DISCONTINUED | OUTPATIENT
Start: 2025-07-19 | End: 2025-07-21 | Stop reason: HOSPADM

## 2025-07-18 RX ORDER — PANTOPRAZOLE SODIUM 40 MG/1
40 TABLET, DELAYED RELEASE ORAL
Status: DISCONTINUED | OUTPATIENT
Start: 2025-07-19 | End: 2025-07-21 | Stop reason: HOSPADM

## 2025-07-18 RX ORDER — ENOXAPARIN SODIUM 100 MG/ML
40 INJECTION SUBCUTANEOUS EVERY 24 HOURS
Status: DISCONTINUED | OUTPATIENT
Start: 2025-07-18 | End: 2025-07-21 | Stop reason: HOSPADM

## 2025-07-18 RX ORDER — ESCITALOPRAM OXALATE 20 MG/1
20 TABLET ORAL DAILY
Status: DISCONTINUED | OUTPATIENT
Start: 2025-07-19 | End: 2025-07-21 | Stop reason: HOSPADM

## 2025-07-18 RX ORDER — NAPROXEN SODIUM 220 MG/1
81 TABLET, FILM COATED ORAL DAILY
Status: DISCONTINUED | OUTPATIENT
Start: 2025-07-19 | End: 2025-07-21 | Stop reason: HOSPADM

## 2025-07-18 RX ORDER — VANCOMYCIN/0.9 % SOD CHLORIDE 1.5G/250ML
1500 PLASTIC BAG, INJECTION (ML) INTRAVENOUS EVERY 24 HOURS
Status: DISCONTINUED | OUTPATIENT
Start: 2025-07-19 | End: 2025-07-20

## 2025-07-18 RX ORDER — PROPRANOLOL HYDROCHLORIDE 20 MG/1
10 TABLET ORAL 2 TIMES DAILY
Status: DISCONTINUED | OUTPATIENT
Start: 2025-07-18 | End: 2025-07-21 | Stop reason: HOSPADM

## 2025-07-18 RX ORDER — DULOXETIN HYDROCHLORIDE 60 MG/1
60 CAPSULE, DELAYED RELEASE ORAL 2 TIMES DAILY
Status: DISCONTINUED | OUTPATIENT
Start: 2025-07-18 | End: 2025-07-21 | Stop reason: HOSPADM

## 2025-07-18 RX ORDER — MIRTAZAPINE 15 MG/1
15 TABLET, FILM COATED ORAL NIGHTLY
Status: DISCONTINUED | OUTPATIENT
Start: 2025-07-18 | End: 2025-07-21 | Stop reason: HOSPADM

## 2025-07-18 RX ORDER — BUDESONIDE 0.5 MG/2ML
0.5 INHALANT ORAL
Status: DISCONTINUED | OUTPATIENT
Start: 2025-07-18 | End: 2025-07-21 | Stop reason: HOSPADM

## 2025-07-18 RX ORDER — TAMSULOSIN HYDROCHLORIDE 0.4 MG/1
0.4 CAPSULE ORAL DAILY
Status: DISCONTINUED | OUTPATIENT
Start: 2025-07-19 | End: 2025-07-21 | Stop reason: HOSPADM

## 2025-07-18 RX ORDER — BUSPIRONE HYDROCHLORIDE 5 MG/1
5 TABLET ORAL 3 TIMES DAILY PRN
Status: DISCONTINUED | OUTPATIENT
Start: 2025-07-18 | End: 2025-07-21 | Stop reason: HOSPADM

## 2025-07-18 RX ORDER — METOCLOPRAMIDE HYDROCHLORIDE 5 MG/ML
10 INJECTION INTRAMUSCULAR; INTRAVENOUS EVERY 6 HOURS PRN
Status: DISCONTINUED | OUTPATIENT
Start: 2025-07-18 | End: 2025-07-19

## 2025-07-18 RX ORDER — LEVOTHYROXINE SODIUM 50 UG/1
50 TABLET ORAL
Status: DISCONTINUED | OUTPATIENT
Start: 2025-07-19 | End: 2025-07-21 | Stop reason: HOSPADM

## 2025-07-18 RX ORDER — IPRATROPIUM BROMIDE 0.5 MG/2.5ML
0.5 SOLUTION RESPIRATORY (INHALATION)
Status: DISCONTINUED | OUTPATIENT
Start: 2025-07-19 | End: 2025-07-18 | Stop reason: SDUPTHER

## 2025-07-18 RX ORDER — TALC
3 POWDER (GRAM) TOPICAL NIGHTLY PRN
Status: DISCONTINUED | OUTPATIENT
Start: 2025-07-18 | End: 2025-07-21 | Stop reason: HOSPADM

## 2025-07-18 RX ORDER — ALBUTEROL SULFATE 0.83 MG/ML
2.5 SOLUTION RESPIRATORY (INHALATION) EVERY 6 HOURS PRN
Status: DISCONTINUED | OUTPATIENT
Start: 2025-07-18 | End: 2025-07-19

## 2025-07-18 RX ORDER — AMLODIPINE BESYLATE 2.5 MG/1
2.5 TABLET ORAL DAILY
Status: DISCONTINUED | OUTPATIENT
Start: 2025-07-19 | End: 2025-07-19

## 2025-07-18 RX ORDER — VANCOMYCIN/0.9 % SOD CHLORIDE 1.5G/250ML
1500 PLASTIC BAG, INJECTION (ML) INTRAVENOUS ONCE
Status: COMPLETED | OUTPATIENT
Start: 2025-07-18 | End: 2025-07-18

## 2025-07-18 RX ADMIN — PIPERACILLIN SODIUM AND TAZOBACTAM SODIUM 3.38 G: 3; .375 INJECTION, SOLUTION INTRAVENOUS at 15:45

## 2025-07-18 RX ADMIN — Medication 3 MG: at 22:02

## 2025-07-18 RX ADMIN — DULOXETINE 60 MG: 60 CAPSULE, DELAYED RELEASE ORAL at 22:02

## 2025-07-18 RX ADMIN — MIRTAZAPINE 15 MG: 15 TABLET, FILM COATED ORAL at 22:02

## 2025-07-18 RX ADMIN — PIPERACILLIN SODIUM AND TAZOBACTAM SODIUM 3.38 G: 3; .375 INJECTION, SOLUTION INTRAVENOUS at 23:11

## 2025-07-18 RX ADMIN — ENOXAPARIN SODIUM 40 MG: 40 INJECTION SUBCUTANEOUS at 22:04

## 2025-07-18 RX ADMIN — TRAMADOL HYDROCHLORIDE 50 MG: 50 TABLET, COATED ORAL at 23:37

## 2025-07-18 RX ADMIN — MONTELUKAST 10 MG: 10 TABLET, FILM COATED ORAL at 22:02

## 2025-07-18 RX ADMIN — Medication 4 L/MIN: at 13:00

## 2025-07-18 RX ADMIN — ATORVASTATIN CALCIUM 40 MG: 40 TABLET, FILM COATED ORAL at 22:02

## 2025-07-18 RX ADMIN — Medication 1500 MG: at 17:35

## 2025-07-18 RX ADMIN — ACETAMINOPHEN 650 MG: 325 TABLET ORAL at 22:03

## 2025-07-18 RX ADMIN — PROPRANOLOL HYDROCHLORIDE 10 MG: 20 TABLET ORAL at 22:03

## 2025-07-18 SDOH — SOCIAL STABILITY: SOCIAL NETWORK: HOW OFTEN DO YOU ATTEND MEETINGS OF THE CLUBS OR ORGANIZATIONS YOU BELONG TO?: NEVER

## 2025-07-18 SDOH — ECONOMIC STABILITY: HOUSING INSECURITY: IN THE LAST 12 MONTHS, WAS THERE A TIME WHEN YOU WERE NOT ABLE TO PAY THE MORTGAGE OR RENT ON TIME?: NO

## 2025-07-18 SDOH — SOCIAL STABILITY: SOCIAL INSECURITY: HAVE YOU HAD THOUGHTS OF HARMING ANYONE ELSE?: NO

## 2025-07-18 SDOH — SOCIAL STABILITY: SOCIAL INSECURITY: DOES ANYONE TRY TO KEEP YOU FROM HAVING/CONTACTING OTHER FRIENDS OR DOING THINGS OUTSIDE YOUR HOME?: NO

## 2025-07-18 SDOH — SOCIAL STABILITY: SOCIAL INSECURITY: ARE YOU MARRIED, WIDOWED, DIVORCED, SEPARATED, NEVER MARRIED, OR LIVING WITH A PARTNER?: WIDOWED

## 2025-07-18 SDOH — ECONOMIC STABILITY: HOUSING INSECURITY: AT ANY TIME IN THE PAST 12 MONTHS, WERE YOU HOMELESS OR LIVING IN A SHELTER (INCLUDING NOW)?: NO

## 2025-07-18 SDOH — SOCIAL STABILITY: SOCIAL INSECURITY: ABUSE: ADULT

## 2025-07-18 SDOH — ECONOMIC STABILITY: FOOD INSECURITY: HOW HARD IS IT FOR YOU TO PAY FOR THE VERY BASICS LIKE FOOD, HOUSING, MEDICAL CARE, AND HEATING?: NOT VERY HARD

## 2025-07-18 SDOH — ECONOMIC STABILITY: FOOD INSECURITY: WITHIN THE PAST 12 MONTHS, THE FOOD YOU BOUGHT JUST DIDN'T LAST AND YOU DIDN'T HAVE MONEY TO GET MORE.: NEVER TRUE

## 2025-07-18 SDOH — SOCIAL STABILITY: SOCIAL INSECURITY: WITHIN THE LAST YEAR, HAVE YOU BEEN HUMILIATED OR EMOTIONALLY ABUSED IN OTHER WAYS BY YOUR PARTNER OR EX-PARTNER?: NO

## 2025-07-18 SDOH — SOCIAL STABILITY: SOCIAL INSECURITY: DO YOU FEEL UNSAFE GOING BACK TO THE PLACE WHERE YOU ARE LIVING?: NO

## 2025-07-18 SDOH — SOCIAL STABILITY: SOCIAL INSECURITY: WITHIN THE LAST YEAR, HAVE YOU BEEN AFRAID OF YOUR PARTNER OR EX-PARTNER?: NO

## 2025-07-18 SDOH — ECONOMIC STABILITY: INCOME INSECURITY: IN THE PAST 12 MONTHS HAS THE ELECTRIC, GAS, OIL, OR WATER COMPANY THREATENED TO SHUT OFF SERVICES IN YOUR HOME?: NO

## 2025-07-18 SDOH — SOCIAL STABILITY: SOCIAL INSECURITY: ARE THERE ANY APPARENT SIGNS OF INJURIES/BEHAVIORS THAT COULD BE RELATED TO ABUSE/NEGLECT?: NO

## 2025-07-18 SDOH — HEALTH STABILITY: PHYSICAL HEALTH: ON AVERAGE, HOW MANY MINUTES DO YOU ENGAGE IN EXERCISE AT THIS LEVEL?: 0 MIN

## 2025-07-18 SDOH — HEALTH STABILITY: PHYSICAL HEALTH: ON AVERAGE, HOW MANY DAYS PER WEEK DO YOU ENGAGE IN MODERATE TO STRENUOUS EXERCISE (LIKE A BRISK WALK)?: 0 DAYS

## 2025-07-18 SDOH — SOCIAL STABILITY: SOCIAL NETWORK: HOW OFTEN DO YOU ATTEND CHURCH OR RELIGIOUS SERVICES?: NEVER

## 2025-07-18 SDOH — ECONOMIC STABILITY: FOOD INSECURITY: WITHIN THE PAST 12 MONTHS, YOU WORRIED THAT YOUR FOOD WOULD RUN OUT BEFORE YOU GOT THE MONEY TO BUY MORE.: NEVER TRUE

## 2025-07-18 SDOH — SOCIAL STABILITY: SOCIAL NETWORK: HOW OFTEN DO YOU GET TOGETHER WITH FRIENDS OR RELATIVES?: NEVER

## 2025-07-18 SDOH — SOCIAL STABILITY: SOCIAL INSECURITY: ARE YOU OR HAVE YOU BEEN THREATENED OR ABUSED PHYSICALLY, EMOTIONALLY, OR SEXUALLY BY ANYONE?: NO

## 2025-07-18 SDOH — ECONOMIC STABILITY: TRANSPORTATION INSECURITY: IN THE PAST 12 MONTHS, HAS LACK OF TRANSPORTATION KEPT YOU FROM MEDICAL APPOINTMENTS OR FROM GETTING MEDICATIONS?: NO

## 2025-07-18 SDOH — ECONOMIC STABILITY: HOUSING INSECURITY: IN THE PAST 12 MONTHS, HOW MANY TIMES HAVE YOU MOVED WHERE YOU WERE LIVING?: 0

## 2025-07-18 SDOH — SOCIAL STABILITY: SOCIAL INSECURITY: DO YOU FEEL ANYONE HAS EXPLOITED OR TAKEN ADVANTAGE OF YOU FINANCIALLY OR OF YOUR PERSONAL PROPERTY?: NO

## 2025-07-18 SDOH — SOCIAL STABILITY: SOCIAL INSECURITY: HAVE YOU HAD ANY THOUGHTS OF HARMING ANYONE ELSE?: NO

## 2025-07-18 SDOH — SOCIAL STABILITY: SOCIAL INSECURITY: HAS ANYONE EVER THREATENED TO HURT YOUR FAMILY OR YOUR PETS?: NO

## 2025-07-18 ASSESSMENT — ACTIVITIES OF DAILY LIVING (ADL)
PATIENT'S MEMORY ADEQUATE TO SAFELY COMPLETE DAILY ACTIVITIES?: YES
DRESSING YOURSELF: INDEPENDENT
WALKS IN HOME: NEEDS ASSISTANCE
FEEDING YOURSELF: INDEPENDENT
HEARING - LEFT EAR: FUNCTIONAL
PATIENT'S MEMORY ADEQUATE TO SAFELY COMPLETE DAILY ACTIVITIES?: YES
GROOMING: INDEPENDENT
TOILETING: INDEPENDENT
BATHING: NEEDS ASSISTANCE
BATHING: INDEPENDENT
ADEQUATE_TO_COMPLETE_ADL: YES
HEARING - RIGHT EAR: FUNCTIONAL
ASSISTIVE_DEVICE: WALKER;OXYGEN;EYEGLASSES
GROOMING: INDEPENDENT
JUDGMENT_ADEQUATE_SAFELY_COMPLETE_DAILY_ACTIVITIES: YES
TOILETING: INDEPENDENT
FEEDING YOURSELF: INDEPENDENT
ADEQUATE_TO_COMPLETE_ADL: YES
JUDGMENT_ADEQUATE_SAFELY_COMPLETE_DAILY_ACTIVITIES: YES
ASSISTIVE_DEVICE: WALKER;OXYGEN;EYEGLASSES
DRESSING YOURSELF: INDEPENDENT
WALKS IN HOME: NEEDS ASSISTANCE
LACK_OF_TRANSPORTATION: NO
HEARING - RIGHT EAR: FUNCTIONAL
HEARING - LEFT EAR: FUNCTIONAL

## 2025-07-18 ASSESSMENT — COGNITIVE AND FUNCTIONAL STATUS - GENERAL
MOBILITY SCORE: 18
TURNING FROM BACK TO SIDE WHILE IN FLAT BAD: A LITTLE
PATIENT BASELINE BEDBOUND: NO
MOVING TO AND FROM BED TO CHAIR: A LITTLE
CLIMB 3 TO 5 STEPS WITH RAILING: A LITTLE
STANDING UP FROM CHAIR USING ARMS: A LITTLE
DAILY ACTIVITIY SCORE: 19
DRESSING REGULAR LOWER BODY CLOTHING: A LITTLE
HELP NEEDED FOR BATHING: A LITTLE
WALKING IN HOSPITAL ROOM: A LITTLE
PERSONAL GROOMING: A LITTLE
DRESSING REGULAR UPPER BODY CLOTHING: A LITTLE
MOVING FROM LYING ON BACK TO SITTING ON SIDE OF FLAT BED WITH BEDRAILS: A LITTLE
TOILETING: A LITTLE

## 2025-07-18 ASSESSMENT — PAIN SCALES - GENERAL
PAINLEVEL_OUTOF10: 8
PAINLEVEL_OUTOF10: 2
PAINLEVEL_OUTOF10: 4
PAINLEVEL_OUTOF10: 2

## 2025-07-18 ASSESSMENT — LIFESTYLE VARIABLES
SUBSTANCE_ABUSE_PAST_12_MONTHS: NO
SKIP TO QUESTIONS 9-10: 1
HOW OFTEN DO YOU HAVE A DRINK CONTAINING ALCOHOL: NEVER
PRESCIPTION_ABUSE_PAST_12_MONTHS: NO
AUDIT-C TOTAL SCORE: 0
HOW MANY STANDARD DRINKS CONTAINING ALCOHOL DO YOU HAVE ON A TYPICAL DAY: PATIENT DOES NOT DRINK
HOW OFTEN DO YOU HAVE 6 OR MORE DRINKS ON ONE OCCASION: NEVER
AUDIT-C TOTAL SCORE: 0

## 2025-07-18 ASSESSMENT — PAIN - FUNCTIONAL ASSESSMENT
PAIN_FUNCTIONAL_ASSESSMENT: 0-10

## 2025-07-18 ASSESSMENT — PAIN DESCRIPTION - ORIENTATION
ORIENTATION: LEFT;RIGHT
ORIENTATION: LEFT;RIGHT
ORIENTATION: RIGHT;LEFT

## 2025-07-18 ASSESSMENT — PAIN DESCRIPTION - LOCATION
LOCATION: LEG
LOCATION: FOOT
LOCATION: FOOT

## 2025-07-18 NOTE — ED TRIAGE NOTES
Patient presents to triage by: private vehicle from home for wound check/infection. Patient has a history of foot infections after toe amputations. Patient states his right leg is swollen, and left foot is swollen. Patient states he noted a new wound on his left foot. Denies fevers or chills.

## 2025-07-18 NOTE — ED PROVIDER NOTES
EMERGENCY DEPARTMENT ENCOUNTER      Pt Name: Angus Redman  MRN: [id number]  Birthdate 1955  Date of evaluation: 7/18/2025  Provider: Ashlee Prado MD    CHIEF COMPLAINT       Chief Complaint   Patient presents with    Wound Infection         HISTORY OF PRESENT ILLNESS    69-year-old male with history of COPD  (O2 via NC), OM, foot infections after toe amputations, peripheral neuropathy, essential HTN, frequent falls and history of MRSA positive foot infection was referred to the ED by his health provider for evaluation of new onset left lower leg swelling, redness and warmth which was noted  today.  Patient reports localized swelling and wound on the right foot as well.  Denies history of diabetes mellitus, fever, chill, shortness of breath or any other systemic symptoms.      History provided by:  Patient   used: No        Nursing Notes were reviewed.    PAST MEDICAL HISTORY   Medical History[1]      SURGICAL HISTORY     Surgical History[2]      CURRENT MEDICATIONS       Previous Medications    ALBUTEROL 2.5 MG /3 ML (0.083 %) NEBULIZER SOLUTION    Take 3 mL (2.5 mg) by nebulization every 6 hours if needed for wheezing.    ALENDRONATE (FOSAMAX) 70 MG TABLET    Take 1 tablet by mouth every 7 days. Take in the morning with a full glass of water at least 30 minutes before first food, drink, or medications of the day.    AMLODIPINE (NORVASC) 2.5 MG TABLET    Take 1 tablet (2.5 mg) by mouth once daily. For blood pressure    ASPIRIN 81 MG CHEWABLE TABLET    Chew 1 tablet (81 mg) once daily.    ATORVASTATIN (LIPITOR) 40 MG TABLET    Take 1 tablet (40 mg) by mouth once daily at bedtime.    BUSPIRONE (BUSPAR) 5 MG TABLET    Take 1 tablet by mouth three times daily as needed for anxiety.    CYANOCOBALAMIN (VITAMIN B-12) 1,000 MCG TABLET    Take 1 tablet by mouth once daily as directed.    DULOXETINE (CYMBALTA) 60 MG DR CAPSULE    Take 1 capsule by mouth twice daily.    EMPAGLIFLOZIN  (JARDIANCE) 10 MG TABLET    Take 1 tablet (10 mg) by mouth once daily.    ERGOCALCIFEROL (VITAMIN D-2) 1250 MCG (50,000 UNITS) CAPSULE    Take 1 capsule by mouth once weekly.    ESCITALOPRAM (LEXAPRO) 20 MG TABLET    Take 1 tablet by mouth once daily.    FERROUS SULFATE, 325 MG FERROUS SULFATE, (FEROSUL) TABLET    Take 1 tablet by mouth once daily with breakfast.    FLUTICASONE-UMECLIDIN-VILANTER (TRELEGY ELLIPTA) 200-62.5-25 MCG BLISTER WITH DEVICE    Inhale 1 puff once daily.    FOLIC ACID (FOLVITE) 1 MG TABLET    Take 1 tablet by mouth daily for folate deficiency.    IPRATROPIUM (ATROVENT) 0.02 % NEBULIZER SOLUTION    Take 2.5 mL (0.5 mg) by nebulization 4 times a day as needed for shortness of breath or wheezing.    LEVOTHYROXINE (SYNTHROID, LEVOXYL) 50 MCG TABLET    Take 1 tablet by mouth once daily in the morning before meals.    MIRTAZAPINE (REMERON) 15 MG TABLET    Take 1 tablet by mouth once daily at bedtime.    MONTELUKAST (SINGULAIR) 10 MG TABLET    Take 1 tablet by mouth once daily at bedtime.    MUCUS CLEARING DEVICE DEVICE    Acapella device.    ONDANSETRON (ZOFRAN) 8 MG TABLET    Take 1 tablet (8 mg) by mouth every 8 hours if needed for nausea or vomiting.    PANTOPRAZOLE (PROTONIX) 40 MG EC TABLET    Take 1 tablet by mouth twice daily.    PREGABALIN (LYRICA) 150 MG CAPSULE    Take 1 capsule (150 mg) by mouth 4 times a day. Do not fill before July 30, 2025.    PROPRANOLOL (INDERAL) 10 MG TABLET    Take 1 tablet (10 mg) by mouth 2 times a day.    SENNOSIDES-DOCUSATE SODIUM (WILBERT-COLACE) 8.6-50 MG TABLET    Take 1 tablet by mouth 2 times a day as needed for constipation.    SPIRONOLACTONE (ALDACTONE) 25 MG TABLET    Take 1 tablet (25 mg) by mouth once daily. Do not fill before June 6, 2025.    TAMSULOSIN (FLOMAX) 0.4 MG 24 HR CAPSULE    Take 1 capsule by mouth once daily at bedtime.    TORSEMIDE (DEMADEX) 20 MG TABLET    Take 1 tablet (20 mg) by mouth once daily. Do not fill before June 6, 2025.        ALLERGIES     Lisinopril, Codeine, House dust mite, Hydrocodone-acetaminophen, Latex, Mold, and Tree and shrub pollen    FAMILY HISTORY     Family History[3]       SOCIAL HISTORY     Social History[4]    SCREENINGS                        PHYSICAL EXAM    (up to 7 for level 4, 8 or more for level 5)     ED Triage Vitals   Temperature Heart Rate Respirations BP   07/18/25 1228 07/18/25 1230 07/18/25 1230 07/18/25 1230   36 °C (96.8 °F) 80 20 133/60      Pulse Ox Temp Source Heart Rate Source Patient Position   07/18/25 1230 07/18/25 1228 07/18/25 1230 07/18/25 1230   (!) 93 % Temporal Monitor Sitting      BP Location FiO2 (%)     07/18/25 1230 --     Right arm        Physical Exam  Vitals and nursing note reviewed.   Constitutional:       General: He is not in acute distress.     Appearance: He is obese. He is not ill-appearing, toxic-appearing or diaphoretic.   HENT:      Head: Normocephalic.     Eyes:      Extraocular Movements: Extraocular movements intact.       Cardiovascular:      Rate and Rhythm: Regular rhythm.      Pulses: Normal pulses.      Heart sounds: Normal heart sounds.   Pulmonary:      Effort: Pulmonary effort is normal. No respiratory distress.      Breath sounds: Normal breath sounds. No stridor. No wheezing or rhonchi.   Abdominal:      General: Abdomen is protuberant. There is no distension.      Palpations: Abdomen is soft.      Tenderness: There is no abdominal tenderness. There is no guarding or rebound.     Musculoskeletal:         General: Swelling present. No deformity or signs of injury. Normal range of motion.      Right lower leg: Edema present.      Left lower leg: Edema present.      Comments: No crepitus or fluctuance in the lower extremities     Skin:     General: Skin is warm.      Capillary Refill: Capillary refill takes less than 2 seconds.      Findings: Erythema present.     Neurological:      General: No focal deficit present.      Mental Status: He is alert and oriented  to person, place, and time.     Psychiatric:         Mood and Affect: Mood normal.         Behavior: Behavior normal.          DIAGNOSTIC RESULTS     LABS:  Labs Reviewed   CBC WITH AUTO DIFFERENTIAL - Abnormal       Result Value    WBC 8.4      nRBC 0.0      RBC 4.06 (*)     Hemoglobin 11.6 (*)     Hematocrit 37.6 (*)     MCV 93      MCH 28.6      MCHC 30.9 (*)     RDW 17.7 (*)     Platelets 269      Neutrophils % 67.3      Immature Granulocytes %, Automated 0.5      Lymphocytes % 20.8      Monocytes % 8.1      Eosinophils % 3.1      Basophils % 0.2      Neutrophils Absolute 5.66 (*)     Immature Granulocytes Absolute, Automated 0.04      Lymphocytes Absolute 1.75      Monocytes Absolute 0.68      Eosinophils Absolute 0.26      Basophils Absolute 0.02     COMPREHENSIVE METABOLIC PANEL - Abnormal    Glucose 95      Sodium 138      Potassium 4.1      Chloride 102      Bicarbonate 27      Anion Gap 13      Urea Nitrogen 17      Creatinine 1.47 (*)     eGFR 51 (*)     Calcium 8.8      Albumin 4.1      Alkaline Phosphatase 116      Total Protein 7.1      AST 19      Bilirubin, Total 0.6      ALT 13     BLOOD GAS VENOUS FULL PANEL - Abnormal    POCT pH, Venous 7.35      POCT pCO2, Venous 52 (*)     POCT pO2, Venous 49 (*)     POCT SO2, Venous 77 (*)     POCT Oxy Hemoglobin, Venous 75.3 (*)     POCT Hematocrit Calculated, Venous 32.0 (*)     POCT Sodium, Venous 136      POCT Potassium, Venous 4.0      POCT Chloride, Venous 105      POCT Ionized Calicum, Venous 1.15      POCT Glucose, Venous 108 (*)     POCT Lactate, Venous 0.7      POCT Base Excess, Venous 2.3      POCT HCO3 Calculated, Venous 28.7 (*)     POCT Hemoglobin, Venous 10.8 (*)     POCT Anion Gap, Venous 6.0 (*)     Patient Temperature        FiO2 41     SEDIMENTATION RATE, AUTOMATED - Abnormal    Sedimentation Rate 48 (*)    LIPASE - Normal    Lipase 26      Narrative:     Venipuncture immediately after or during the administration of Metamizole may lead to  falsely low results. Testing should be performed immediately prior to Metamizole dosing.   C-REACTIVE PROTEIN - Normal    C-Reactive Protein 0.86     BLOOD CULTURE   BLOOD CULTURE       All other labs were within normal range or not returned as of this dictation.    Imaging  Lower extremity venous duplex right         XR foot left 3+ views   Final Result   Degenerative changes of the left with postoperative change.  There is   no evidence of acute fracture.   Soft tissue ulceration without evidence of underlying osteomyelitis.   Signed by Enrrique Jimenez MD      XR tibia fibula right 2 views   Final Result   No acute osseous abnormalities.   No evidence of osteomyelitis.   Signed by Enrrique Jimenez MD           Procedures  Procedures     EMERGENCY DEPARTMENT COURSE/MDM:     ED Course as of 07/18/25 1834   Fri Jul 18, 2025   1433 Discussed with pharmacy regarding antibiotic selection given that the patient recently completed vancomycin last week for cellulitis and has positive wound cultures for MRSA  Pharmacy team believes that IV antibiotics are a better choice than p.o. antibiotics. [FN]   1504 POCT Lactate, Venous: 0.7 [FN]   1505 qSOFA score 0, at this time do not suspect sepsis.  Normal lactate.  Giving IV antibiotics for severe cellulitis and possible osteomyelitis [FN]   1505 Will not give IV fluids other than 500 cc of NS with vancomycin.  Patient does have leg edema so there is concern for volume overload [FN]   1543 Lower extremity venous duplex right  Discussed with radiology  No DVT but patient does have age indeterminant thrombus in proximal right superficial small saphenous [FN]      ED Course User Index  [FN] Barbara Madsen MD         Diagnoses as of 07/18/25 1834   Superficial thrombosis of leg, right   Cellulitis of right lower extremity   MARIA L (acute kidney injury)        Medical Decision Making    69-year-old male with history of previously treated MRSA positive wound presents with new onset swelling,  "erythema and warmth of the right lower extremity, there is a concern for cellulitis.  Also, there is a wound without drainage in the medial aspect of the left foot.  The patient remained hemodynamically stable throughout the ED evaluation.  The ED attending discussed the case with pharmacy, who recommended initiating IV antibiotics due to patient history of MRSA positive wound cultures and completed prior course of vancomycin.  An x-ray of the foot or leg was ordered to evaluate for possible deep tissue infection or air, in the setting of history of osteomyelitis.  The patient was initiated on IV antibiotic and received conservative IV fluid resuscitation.  Workup remarkable for MARIA L and elevated inflammatory markers.  No leukocytosis.  Right lower extremity ultrasound revealed \"indeterminate\" superficial venous thrombus.  X-ray right tibia-fibula and left foot x-ray with no evidence of osteomyelitis.  Results and findings along with admission plan was discussed with patient at bedside  Patient and or family in agreement and understanding of treatment plan.  All questions answered.      I reviewed the case with the attending ED physician. The attending ED physician agrees with the plan. Patient and/or patient´s representative was counseled regarding labs, imaging, likely diagnosis, and plan. All questions were answered.    ED Medications administered this visit:    Medications   vancomycin 1,500 mg in NS  mL (1,500 mg intravenous New Bag 7/18/25 1735)   piperacillin-tazobactam (Zosyn) 3.375 g in dextrose (iso) IV 50 mL (0 g intravenous Stopped 7/18/25 1616)       New Prescriptions from this visit:    New Prescriptions    No medications on file       Follow-up:  No follow-up provider specified.      Final Impression:   1. Superficial thrombosis of leg, right    2. Other specified soft tissue disorders    3. Cellulitis of right lower extremity    4. MARIA L (acute kidney injury)          (Please note that portions of " this note were completed with a voice recognition program.  Efforts were made to edit the dictations but occasionally words are mis-transcribed.)         [1]   Past Medical History:  Diagnosis Date    Alcohol dependence, in remission 06/08/2022    Alcohol dependence in early, early partial, sustained full, or sustained partial remission    Alcohol dependence, in remission 06/08/2022    Alcohol dependence in early, early partial, sustained full, or sustained partial remission    Alcohol dependence, uncomplicated (Multi) 09/15/2022    Alcohol dependence, daily use    Dependence on other enabling machines and devices 09/24/2019    Walker as ambulation aid    Encounter for screening for other disorder 03/01/2021    Special screening for other conditions    Fracture of one rib, unspecified side, initial encounter for closed fracture 01/03/2014    Closed rib fracture    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    Laceration without foreign body of scalp, initial encounter 09/10/2015    Scalp laceration    Nausea 04/15/2014    Nausea    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 07/27/2017    Skin ulcer of right ankle, limited to breakdown of skin    Osteomyelitis, unspecified 02/26/2022    Osteomyelitis of toe    Other chest pain 09/21/2013    Atypical chest pain    Other conditions influencing health status     Skin Cancer    Other conditions influencing health status 10/08/2017    Closed displaced fracture of olecranon process of left ulna with intra-articular extension, initial encounter    Other specified health status 07/23/2014    Foreign travel    Patient's noncompliance with other medical treatment and regimen due to unspecified reason 10/28/2016    Noncompliance with therapeutic plan    Patient's other noncompliance with medication regimen 06/04/2016    History of medication noncompliance    Personal history of (healed)  stress fracture 12/30/2013    History of stress fracture    Personal history of diseases of the skin and subcutaneous tissue 02/26/2022    History of chronic skin ulcer    Personal history of other diseases of the nervous system and sense organs 03/25/2016    History of peripheral neuropathy    Personal history of other diseases of the nervous system and sense organs 02/02/2016    History of peripheral neuropathy    Personal history of other endocrine, nutritional and metabolic disease 07/13/2015    History of hypothyroidism    Personal history of other specified conditions 07/28/2019    History of syncope    Personal history of other specified conditions 05/07/2018    History of syncope    Personal history of other specified conditions 06/25/2015    History of nausea    Unspecified fracture of left foot, initial encounter for closed fracture 06/04/2016    Foot fracture, left    Unspecified injury of head, initial encounter 04/20/2016    Closed head injury, initial encounter    Unspecified injury of unspecified elbow, initial encounter 04/07/2017    Elbow injury    Unsteadiness on feet 04/15/2014    Unsteady gait   [2]   Past Surgical History:  Procedure Laterality Date    COLONOSCOPY  10/09/2013    Complete Colonoscopy    IR CVC PICC  6/4/2025    IR CVC PICC 6/4/2025 Agustin Escoto MD [facility]    OTHER SURGICAL HISTORY  07/28/2019    Insertion of cardiac monitor    OTHER SURGICAL HISTORY  04/15/2014    Reported Hx Of Hip Replacement - Left Side    OTHER SURGICAL HISTORY  01/28/2020    Hip replacement    OTHER SURGICAL HISTORY  11/24/2015    Interrogation Of Implantable Loop Recorder By Physician In Person    OTHER SURGICAL HISTORY  04/19/2017    Arthroscopy Elbow Left    OTHER SURGICAL HISTORY  10/09/2013    Shoulder Surgery Left    SKIN CANCER EXCISION  10/09/2013    Mohs Micrographic Surgery Face   [3]   Family History  Problem Relation Name Age of Onset    Other (cardiac pacemaker) Mother      Coronary artery  disease Mother      Other (history of heart artery stent) Mother      Cancer Mother      Other (kurtis cell cancer) Mother      Arthritis Mother      Mental illness Brother          living in handicapped assisted living facility permanently [Other]    Other (angina pectoris) Paternal Grandmother     [4]   Social History  Socioeconomic History    Marital status:    Tobacco Use    Smoking status: Former     Current packs/day: 0.00     Types: Cigarettes     Quit date:      Years since quittin.5    Smokeless tobacco: Never   Vaping Use    Vaping status: Never Used   Substance and Sexual Activity    Alcohol use: Not Currently    Drug use: Never     Social Drivers of Health     Financial Resource Strain: Low Risk  (2025)    Overall Financial Resource Strain (CARDIA)     Difficulty of Paying Living Expenses: Not very hard   Food Insecurity: No Food Insecurity (2025)    Received from [facility]    Hunger Vital Sign     Within the past 12 months, you worried that your food would run out before you got the money to buy more.: Never true     Within the past 12 months, the food you bought just didn't last and you didn't have money to get more.: Never true   Transportation Needs: No Transportation Needs (2025)    Received from [facility]    PRAPARE - Transportation     Lack of Transportation (Medical): No     Lack of Transportation (Non-Medical): No   Physical Activity: Inactive (2025)    Exercise Vital Sign     Days of Exercise per Week: 0 days     Minutes of Exercise per Session: 0 min   Stress: No Stress Concern Present (2025)    [place] [employer] - Occupational Stress Questionnaire     Feeling of Stress : Only a little   Social Connections: Socially Isolated (2025)    Social Connection and Isolation Panel     Frequency of Communication with Friends and Family: More than three times a week     Frequency of Social Gatherings with Friends and Family:  Never     Attends [employer] Services: Never     Active Member of Clubs or Organizations: No     Attends Club or Organization Meetings: Never     Marital Status:    Intimate Partner Violence: Not At Risk (5/31/2025)    Humiliation, Afraid, Rape, and Kick questionnaire     Fear of Current or Ex-Partner: No     Emotionally Abused: No     Physically Abused: No     Sexually Abused: No   Housing Stability: Unknown (6/24/2025)    Received from [facility]    Housing Stability Vital Sign     In the last 12 months, was there a time when you were not able to pay the mortgage or rent on time?: No     At any time in the past 12 months, were you homeless or living in a shelter (including now)?: No        Ashlee Prado MD  Resident  07/18/25 [id number]

## 2025-07-19 PROBLEM — F33.1 MAJOR DEPRESSIVE DISORDER, RECURRENT, MODERATE: Status: ACTIVE | Noted: 2025-07-19

## 2025-07-19 PROBLEM — E66.01 OBESITY, MORBID (MULTI): Status: ACTIVE | Noted: 2025-01-31

## 2025-07-19 PROBLEM — J96.12 CHRONIC RESPIRATORY FAILURE WITH HYPOXIA AND HYPERCAPNIA: Status: ACTIVE | Noted: 2025-01-29

## 2025-07-19 PROBLEM — L03.116 CELLULITIS OF LEFT LOWER EXTREMITY: Status: ACTIVE | Noted: 2025-07-19

## 2025-07-19 PROBLEM — J96.11 CHRONIC RESPIRATORY FAILURE WITH HYPOXIA AND HYPERCAPNIA: Status: ACTIVE | Noted: 2025-01-29

## 2025-07-19 LAB
ALBUMIN SERPL BCP-MCNC: 3.6 G/DL (ref 3.4–5)
ALP SERPL-CCNC: 101 U/L (ref 33–136)
ALT SERPL W P-5'-P-CCNC: 10 U/L (ref 10–52)
ANION GAP SERPL CALC-SCNC: 9 MMOL/L (ref 10–20)
AST SERPL W P-5'-P-CCNC: 13 U/L (ref 9–39)
BILIRUB SERPL-MCNC: 0.7 MG/DL (ref 0–1.2)
BUN SERPL-MCNC: 12 MG/DL (ref 6–23)
CALCIUM SERPL-MCNC: 8.3 MG/DL (ref 8.6–10.3)
CHLORIDE SERPL-SCNC: 106 MMOL/L (ref 98–107)
CO2 SERPL-SCNC: 30 MMOL/L (ref 21–32)
CREAT SERPL-MCNC: 1.38 MG/DL (ref 0.5–1.3)
CRP SERPL-MCNC: 0.63 MG/DL
EGFRCR SERPLBLD CKD-EPI 2021: 55 ML/MIN/1.73M*2
ERYTHROCYTE [DISTWIDTH] IN BLOOD BY AUTOMATED COUNT: 18.2 % (ref 11.5–14.5)
GLUCOSE SERPL-MCNC: 107 MG/DL (ref 74–99)
HCT VFR BLD AUTO: 35.9 % (ref 41–52)
HGB BLD-MCNC: 10.8 G/DL (ref 13.5–17.5)
MCH RBC QN AUTO: 27.9 PG (ref 26–34)
MCHC RBC AUTO-ENTMCNC: 30.1 G/DL (ref 32–36)
MCV RBC AUTO: 93 FL (ref 80–100)
NRBC BLD-RTO: 0 /100 WBCS (ref 0–0)
PLATELET # BLD AUTO: 211 X10*3/UL (ref 150–450)
POTASSIUM SERPL-SCNC: 3.9 MMOL/L (ref 3.5–5.3)
PROT SERPL-MCNC: 6.2 G/DL (ref 6.4–8.2)
RBC # BLD AUTO: 3.87 X10*6/UL (ref 4.5–5.9)
SODIUM SERPL-SCNC: 141 MMOL/L (ref 136–145)
WBC # BLD AUTO: 4.8 X10*3/UL (ref 4.4–11.3)

## 2025-07-19 PROCEDURE — 94640 AIRWAY INHALATION TREATMENT: CPT

## 2025-07-19 PROCEDURE — 36415 COLL VENOUS BLD VENIPUNCTURE: CPT | Performed by: INTERNAL MEDICINE

## 2025-07-19 PROCEDURE — 2500000005 HC RX 250 GENERAL PHARMACY W/O HCPCS: Performed by: EMERGENCY MEDICINE

## 2025-07-19 PROCEDURE — 2500000004 HC RX 250 GENERAL PHARMACY W/ HCPCS (ALT 636 FOR OP/ED): Performed by: INTERNAL MEDICINE

## 2025-07-19 PROCEDURE — 99233 SBSQ HOSP IP/OBS HIGH 50: CPT | Performed by: INTERNAL MEDICINE

## 2025-07-19 PROCEDURE — 85027 COMPLETE CBC AUTOMATED: CPT | Performed by: INTERNAL MEDICINE

## 2025-07-19 PROCEDURE — 86140 C-REACTIVE PROTEIN: CPT | Performed by: INTERNAL MEDICINE

## 2025-07-19 PROCEDURE — 1100000001 HC PRIVATE ROOM DAILY

## 2025-07-19 PROCEDURE — 97110 THERAPEUTIC EXERCISES: CPT | Mod: GP

## 2025-07-19 PROCEDURE — 2500000002 HC RX 250 W HCPCS SELF ADMINISTERED DRUGS (ALT 637 FOR MEDICARE OP, ALT 636 FOR OP/ED): Performed by: INTERNAL MEDICINE

## 2025-07-19 PROCEDURE — 97161 PT EVAL LOW COMPLEX 20 MIN: CPT | Mod: GP

## 2025-07-19 PROCEDURE — 2500000005 HC RX 250 GENERAL PHARMACY W/O HCPCS: Performed by: INTERNAL MEDICINE

## 2025-07-19 PROCEDURE — 2500000001 HC RX 250 WO HCPCS SELF ADMINISTERED DRUGS (ALT 637 FOR MEDICARE OP): Performed by: INTERNAL MEDICINE

## 2025-07-19 PROCEDURE — 80053 COMPREHEN METABOLIC PANEL: CPT | Performed by: INTERNAL MEDICINE

## 2025-07-19 RX ORDER — DIPHENHYDRAMINE HYDROCHLORIDE 50 MG/ML
25 INJECTION, SOLUTION INTRAMUSCULAR; INTRAVENOUS ONCE
Status: COMPLETED | OUTPATIENT
Start: 2025-07-19 | End: 2025-07-19

## 2025-07-19 RX ORDER — HYDROXYZINE HYDROCHLORIDE 25 MG/1
25 TABLET, FILM COATED ORAL EVERY 8 HOURS PRN
Status: DISCONTINUED | OUTPATIENT
Start: 2025-07-19 | End: 2025-07-21 | Stop reason: HOSPADM

## 2025-07-19 RX ORDER — ALBUTEROL SULFATE 0.83 MG/ML
2.5 SOLUTION RESPIRATORY (INHALATION) EVERY 2 HOUR PRN
Status: DISCONTINUED | OUTPATIENT
Start: 2025-07-19 | End: 2025-07-21 | Stop reason: HOSPADM

## 2025-07-19 RX ORDER — OXYCODONE HYDROCHLORIDE 5 MG/1
5 TABLET ORAL EVERY 4 HOURS PRN
Refills: 0 | Status: DISCONTINUED | OUTPATIENT
Start: 2025-07-19 | End: 2025-07-21 | Stop reason: HOSPADM

## 2025-07-19 RX ORDER — TRAMADOL HYDROCHLORIDE 50 MG/1
50 TABLET, FILM COATED ORAL EVERY 8 HOURS PRN
Status: DISCONTINUED | OUTPATIENT
Start: 2025-07-19 | End: 2025-07-21 | Stop reason: HOSPADM

## 2025-07-19 RX ADMIN — SPIRONOLACTONE 25 MG: 25 TABLET ORAL at 08:22

## 2025-07-19 RX ADMIN — ASPIRIN 81 MG CHEWABLE TABLET 81 MG: 81 TABLET CHEWABLE at 08:22

## 2025-07-19 RX ADMIN — ATORVASTATIN CALCIUM 40 MG: 40 TABLET, FILM COATED ORAL at 21:21

## 2025-07-19 RX ADMIN — ESCITALOPRAM OXALATE 20 MG: 20 TABLET ORAL at 08:22

## 2025-07-19 RX ADMIN — TRAMADOL HYDROCHLORIDE 50 MG: 50 TABLET, COATED ORAL at 10:23

## 2025-07-19 RX ADMIN — IPRATROPIUM BROMIDE AND ALBUTEROL SULFATE 3 ML: 2.5; .5 SOLUTION RESPIRATORY (INHALATION) at 13:18

## 2025-07-19 RX ADMIN — Medication 3 MG: at 22:00

## 2025-07-19 RX ADMIN — ENOXAPARIN SODIUM 40 MG: 40 INJECTION SUBCUTANEOUS at 21:21

## 2025-07-19 RX ADMIN — BUDESONIDE 0.5 MG: 0.5 INHALANT RESPIRATORY (INHALATION) at 10:07

## 2025-07-19 RX ADMIN — Medication 1500 MG: at 16:51

## 2025-07-19 RX ADMIN — IPRATROPIUM BROMIDE AND ALBUTEROL SULFATE 3 ML: 2.5; .5 SOLUTION RESPIRATORY (INHALATION) at 10:07

## 2025-07-19 RX ADMIN — EMPAGLIFLOZIN 10 MG: 10 TABLET, FILM COATED ORAL at 08:22

## 2025-07-19 RX ADMIN — PANTOPRAZOLE SODIUM 40 MG: 40 TABLET, DELAYED RELEASE ORAL at 06:19

## 2025-07-19 RX ADMIN — MONTELUKAST 10 MG: 10 TABLET, FILM COATED ORAL at 21:21

## 2025-07-19 RX ADMIN — DIPHENHYDRAMINE HYDROCHLORIDE 25 MG: 50 INJECTION, SOLUTION INTRAMUSCULAR; INTRAVENOUS at 07:38

## 2025-07-19 RX ADMIN — AMLODIPINE BESYLATE 2.5 MG: 2.5 TABLET ORAL at 08:22

## 2025-07-19 RX ADMIN — DULOXETINE 60 MG: 60 CAPSULE, DELAYED RELEASE ORAL at 21:21

## 2025-07-19 RX ADMIN — TAMSULOSIN HYDROCHLORIDE 0.4 MG: 0.4 CAPSULE ORAL at 08:22

## 2025-07-19 RX ADMIN — IPRATROPIUM BROMIDE AND ALBUTEROL SULFATE 3 ML: 2.5; .5 SOLUTION RESPIRATORY (INHALATION) at 17:36

## 2025-07-19 RX ADMIN — ALBUTEROL SULFATE 2.5 MG: 2.5 SOLUTION RESPIRATORY (INHALATION) at 00:14

## 2025-07-19 RX ADMIN — Medication 4 L/MIN: at 20:06

## 2025-07-19 RX ADMIN — DULOXETINE 60 MG: 60 CAPSULE, DELAYED RELEASE ORAL at 08:22

## 2025-07-19 RX ADMIN — OXYCODONE HYDROCHLORIDE 5 MG: 5 TABLET ORAL at 08:22

## 2025-07-19 RX ADMIN — MIRTAZAPINE 15 MG: 15 TABLET, FILM COATED ORAL at 21:21

## 2025-07-19 RX ADMIN — BUDESONIDE 0.5 MG: 0.5 INHALANT RESPIRATORY (INHALATION) at 00:14

## 2025-07-19 RX ADMIN — IPRATROPIUM BROMIDE AND ALBUTEROL SULFATE 3 ML: 2.5; .5 SOLUTION RESPIRATORY (INHALATION) at 20:06

## 2025-07-19 RX ADMIN — BUDESONIDE 0.5 MG: 0.5 INHALANT RESPIRATORY (INHALATION) at 20:06

## 2025-07-19 RX ADMIN — Medication 4 L/MIN: at 08:00

## 2025-07-19 RX ADMIN — PIPERACILLIN SODIUM AND TAZOBACTAM SODIUM 3.38 G: 3; .375 INJECTION, SOLUTION INTRAVENOUS at 08:29

## 2025-07-19 RX ADMIN — LEVOTHYROXINE SODIUM 50 MCG: 0.05 TABLET ORAL at 06:19

## 2025-07-19 RX ADMIN — HYDROXYZINE HYDROCHLORIDE 25 MG: 25 TABLET, FILM COATED ORAL at 19:18

## 2025-07-19 RX ADMIN — TORSEMIDE 20 MG: 20 TABLET ORAL at 08:22

## 2025-07-19 RX ADMIN — TRAMADOL HYDROCHLORIDE 50 MG: 50 TABLET, COATED ORAL at 21:59

## 2025-07-19 ASSESSMENT — COGNITIVE AND FUNCTIONAL STATUS - GENERAL
STANDING UP FROM CHAIR USING ARMS: A LITTLE
TURNING FROM BACK TO SIDE WHILE IN FLAT BAD: A LITTLE
WALKING IN HOSPITAL ROOM: A LITTLE
DAILY ACTIVITIY SCORE: 19
MOBILITY SCORE: 17
TURNING FROM BACK TO SIDE WHILE IN FLAT BAD: A LOT
HELP NEEDED FOR BATHING: A LITTLE
TURNING FROM BACK TO SIDE WHILE IN FLAT BAD: A LITTLE
CLIMB 3 TO 5 STEPS WITH RAILING: A LOT
STANDING UP FROM CHAIR USING ARMS: A LITTLE
MOVING FROM LYING ON BACK TO SITTING ON SIDE OF FLAT BED WITH BEDRAILS: A LOT
HELP NEEDED FOR BATHING: A LITTLE
WALKING IN HOSPITAL ROOM: A LITTLE
MOVING TO AND FROM BED TO CHAIR: A LITTLE
DRESSING REGULAR LOWER BODY CLOTHING: A LITTLE
MOVING TO AND FROM BED TO CHAIR: A LITTLE
PERSONAL GROOMING: A LITTLE
MOVING FROM LYING ON BACK TO SITTING ON SIDE OF FLAT BED WITH BEDRAILS: A LITTLE
DRESSING REGULAR UPPER BODY CLOTHING: A LITTLE
STANDING UP FROM CHAIR USING ARMS: A LITTLE
CLIMB 3 TO 5 STEPS WITH RAILING: A LOT
TOILETING: A LITTLE
PERSONAL GROOMING: A LITTLE
DAILY ACTIVITIY SCORE: 19
MOVING FROM LYING ON BACK TO SITTING ON SIDE OF FLAT BED WITH BEDRAILS: A LITTLE
DRESSING REGULAR UPPER BODY CLOTHING: A LITTLE
MOBILITY SCORE: 15
DRESSING REGULAR LOWER BODY CLOTHING: A LITTLE
MOVING TO AND FROM BED TO CHAIR: A LITTLE
WALKING IN HOSPITAL ROOM: A LITTLE
CLIMB 3 TO 5 STEPS WITH RAILING: A LOT
TOILETING: A LITTLE
MOBILITY SCORE: 17

## 2025-07-19 ASSESSMENT — PAIN - FUNCTIONAL ASSESSMENT
PAIN_FUNCTIONAL_ASSESSMENT: 0-10

## 2025-07-19 ASSESSMENT — ENCOUNTER SYMPTOMS: JOINT SWELLING: 1

## 2025-07-19 ASSESSMENT — PAIN DESCRIPTION - LOCATION
LOCATION: FOOT

## 2025-07-19 ASSESSMENT — PAIN SCALES - GENERAL
PAINLEVEL_OUTOF10: 7
PAINLEVEL_OUTOF10: 9
PAINLEVEL_OUTOF10: 8
PAINLEVEL_OUTOF10: 0 - NO PAIN
PAINLEVEL_OUTOF10: 9

## 2025-07-19 ASSESSMENT — PAIN DESCRIPTION - ORIENTATION
ORIENTATION: RIGHT;LEFT
ORIENTATION: RIGHT;LEFT

## 2025-07-19 NOTE — CARE PLAN
The patient's goals for the shift include Pt to remain free from pain/ discomfort throughout shift    The clinical goals for the shift include Pt to remain hemodynamically stable throughout shift

## 2025-07-19 NOTE — PROGRESS NOTES
Physical Therapy    Physical Therapy Evaluation & Treatment    Patient Name: Angus Redman  MRN: [id number]  Department: [address]  Room: [facility]123-A  Today's Date: 7/19/2025   Time Calculation  Start Time: 1240  Stop Time: 1300  Time Calculation (min): 20 min    Assessment/Plan   PT Assessment  PT Assessment Results: Decreased strength, Decreased range of motion, Decreased endurance, Impaired balance, Decreased mobility, Decreased coordination  Rehab Prognosis: Fair  Evaluation/Treatment Tolerance: Patient tolerated treatment well, Patient limited by pain  Medical Staff Made Aware: Yes  End of Session Communication: Bedside nurse  End of Session Patient Position: Alarm off, caregiver present (Pt was seated EOB working with nursing aid to clean off the blood on his arm from where his IV was.)   IP OR SWING BED PT PLAN  Inpatient or Swing Bed: Inpatient  PT Plan  Treatment/Interventions: Bed mobility, Transfer training, Gait training, Stair training, Balance training, Neuromuscular re-education, Neurodevelopmental intervention, Strengthening, Endurance training, Range of motion, Therapeutic exercise, Therapeutic activity  PT Plan: Ongoing PT  PT Frequency: 3 times per week during this acute inpatient hospitalization  PT Discharge Recommendations: Moderate intensity level of continued care  PT - OK to Discharge: Yes (once medically cleared)      Subjective     PT Visit Info:  PT Received On: 07/19/25  General Visit Information:  General  Reason for Referral: cellulitis of LLE  Referred By: Dr. Feliz  Past Medical History Relevant to Rehab: COPD  (O2 via NC), OM, foot infections after toe amputations, peripheral neuropathy, essential HTN, frequent falls and history of MRSA positive foot infection  Prior to Session Communication: Bedside nurse  Patient Position Received: Alarm off, caregiver present, Bed, 2 rail up  General Comment: Pt agitated but agreeable to therapy as he wanted to use the bathroom. Began bleeding at  conclusion of session from IV insertion on L anterior forearm. Bedside nurse and nursing aid (who was present at this point) made awawre.  Home Living:  Home Living  Type of Home: House  Home Layout: One level  Prior Level of Function:  Prior Function Per Pt/Caregiver Report  Level of [place]: Independent with homemaking with ambulation    Objective   Pain:  Pain Assessment  Pain Assessment: 0-10  0-10 (Numeric) Pain Score: 9 (pain in right foot where several toes have been amputated)  Pain Location: Foot (Right side)    Static Sitting Balance  Static Sitting-Balance Support: Bilateral upper extremity supported, Feet supported  Static Sitting-Level of Assistance: Contact guard  Static Sitting-Comment/Number of Minutes: 10    Static Standing Balance  Static Standing-Balance Support: Bilateral upper extremity supported  Static Standing-Level of Assistance: Contact guard  Static Standing-Comment/Number of Minutes: 3  Functional Assessments:       Bed Mobility  Bed Mobility: Yes  Bed Mobility 1  Bed Mobility 1: Sitting to supine  Level of Assistance 1: Moderate assistance  Bed Mobility Comments 1: nursing assistant stepped in to help pt as soon as he needed help instead of seeing what all the pt could do on his own first    Transfers  Transfer: Yes  Transfer 1  Transfer From 1: Bed to  Transfer to 1: Stand  Transfer Device 1: Walker  Transfer Level of Assistance 1: Minimum assistance, Contact guard, Moderate verbal cues (despite multiple VC to push through BL UE on bed to assist with standing, pt held onto FWW to stand)  Transfers 2  Transfer From 2: Stand to  Transfer to 2: Commode-standard (commode over toilet)  Transfer Device 2: Walker  Transfer Level of Assistance 2: Moderate verbal cues, Contact guard (Pt held onto arms of FWW to lower self on to toilet, despite multiple VC and TC to reach back to raised arms of commode)  Transfers 3  Transfer From 3: Commode-standard to  Transfer to 3: Stand  Transfer Device  3: Walker  Transfer Level of Assistance 3: Contact guard, Moderate verbal cues  Trials/Comments 3:  (On this transfer, pt adherent to VC from clinician and able to make corrections in order to stand with proper form.)  Transfers 4  Transfer From 4: Stand to  Transfer to 4: Bed  Transfer Device 4: Walker  Transfer Level of Assistance 4: Contact guard    Ambulation/Gait Training  Ambulation/Gait Training Performed: Yes  Ambulation/Gait Training 1  Surface 1: Level tile  Device 1: Rolling walker  Assistance 1: Contact guard  Quality of Gait 1: Decreased step length  Comments/Distance (ft) 1: 8  Ambulation/Gait Training 2  Surface 2: Level tile  Device 2: Rolling walker  Assistance 2: Contact guard  Quality of Gait 2: Decreased step length  Comments/Distance (ft) 2: 8  Extremity/Trunk Assessments:  RLE   RLE : Within Functional Limits  LLE   LLE : Within Functional Limits    Ambulation/Gait Training  Ambulation/Gait Training Performed: Yes  Ambulation/Gait Training 1  Surface 1: Level tile  Device 1: Rolling walker  Assistance 1: Contact guard  Quality of Gait 1: Decreased step length  Comments/Distance (ft) 1: 8  Ambulation/Gait Training 2  Surface 2: Level tile  Device 2: Rolling walker  Assistance 2: Contact guard  Quality of Gait 2: Decreased step length  Comments/Distance (ft) 2: 8  Transfers  Transfer: Yes  Transfer 1  Transfer From 1: Bed to  Transfer to 1: Stand  Transfer Device 1: Walker  Transfer Level of Assistance 1: Minimum assistance, Contact guard, Moderate verbal cues (despite multiple VC to push through BL UE on bed to assist with standing, pt held onto FWW to stand)  Transfers 2  Transfer From 2: Stand to  Transfer to 2: Commode-standard (commode over toilet)  Transfer Device 2: Walker  Transfer Level of Assistance 2: Moderate verbal cues, Contact guard (Pt held onto arms of FWW to lower self on to toilet, despite multiple VC and TC to reach back to raised arms of commode)  Transfers 3  Transfer From  3: Commode-standard to  Transfer to 3: Stand  Transfer Device 3: Walker  Transfer Level of Assistance 3: Contact guard, Moderate verbal cues  Trials/Comments 3:  (On this transfer, pt adherent to VC from clinician and able to make corrections in order to stand with proper form.)  Transfers 4  Transfer From 4: Stand to  Transfer to 4: Bed  Transfer Device 4: Walker  Transfer Level of Assistance 4: Contact guard  Outcome Measures:  [facility] Basic Mobility  Turning from your back to your side while in a flat bed without using bedrails: A lot  Moving from lying on your back to sitting on the side of a flat bed without using bedrails: A lot  Moving to and from bed to chair (including a wheelchair): A little  Standing up from a chair using your arms (e.g. wheelchair or bedside chair): A little  To walk in hospital room: A little  Climbing 3-5 steps with railing: A lot  Basic Mobility - Total Score: 15    Encounter Problems       Encounter Problems (Active)       Balance       STG - Maintains static standing balance with upper extremity support (Progressing)       Start:  07/19/25    Expected End:  08/02/25                   Mobility       LTG - Patient will ambulate community distance (Progressing)       Start:  07/19/25    Expected End:  08/02/25               PT Transfers       STG - Patient will perform bed mobility (Progressing)       Start:  07/19/25    Expected End:  08/02/25               Pain              Education Documentation  Body Mechanics, taught by Mini Cintron PT at 7/19/2025  1:40 PM.  Learner: Patient  Readiness: Acceptance  Method: Explanation  Response: Verbalizes Understanding    Home Exercise Program, taught by Mini Cintron PT at 7/19/2025  1:40 PM.  Learner: Patient  Readiness: Acceptance  Method: Explanation  Response: Verbalizes Understanding    Mobility Training, taught by Mini Cintron PT at 7/19/2025  1:40 PM.  Learner: Patient  Readiness: Acceptance  Method: Explanation  Response: Verbalizes  Understanding

## 2025-07-19 NOTE — ASSESSMENT & PLAN NOTE
Patient just completed an extended outpatient course of vancomycin for MRSA infection involving the right lower extremity.  Presenting with left lower extremity redness/erythema, swelling, but no open lesions, purulent drainage, or concerning discharge.  Sed rate noted to be elevated however CRP within normal limits.  Radiographic findings were minimal and considered chronic.  No findings to suggest acute infectious process such as osteomyelitis.  Patient has been started on vancomycin and Zosyn empirically.  Would consider ID consultation pending clinical course as he was followed closely in the outpatient setting.  However in the absence of IV antibiotics at discharge, may be appropriate to provide outpatient follow-up/referral at discharge as opposed to inpatient consultation.  Blood cultures collected in the emergency department and pending.  Pain control at this time with oxycodone.  Allergy noted in patient's chart he describes is inaccurate and is requesting that be removed.  States that he tolerates oxycodone and hydrocodone without difficulty.

## 2025-07-19 NOTE — ASSESSMENT & PLAN NOTE
Pain is improved but still there  Use tramadol as needed  Added as needed order for tramadol as he had some relief with it  Continue with vancomycin and Zosyn  Follow-up culture results  Blood pressure on lower side and with bilateral lower extremity swelling right greater than left, component of infection and possibly some from amlodipine  Discontinuing amlodipine for the time being  Blood pressure is already on lower side  Side effects of vancomycin and Zosyn have been discussed with patient will monitor labs and for any rash

## 2025-07-19 NOTE — CONSULTS
Vancomycin Dosing by Pharmacy- INITIAL    Angus Redman is a 69 y.o. year old male who Pharmacy has been consulted for vancomycin dosing for cellulitis, skin and soft tissue. Based on the patient's indication and renal status this patient will be dosed based on a goal AUC of 400-600.     Renal function is currently stable.    Visit Vitals  /60 (BP Location: Right arm, Patient Position: Lying)   Pulse 55   Temp 36.1 °C (97 °F) (Temporal)   Resp 24        Lab Results   Component Value Date    CREATININE 1.47 (H) 2025    CREATININE 1.28 2025    CREATININE 1.21 2025    CREATININE 1.15 2025        Patient weight is as follows:   Vitals:    25   Weight: 118 kg (260 lb)       Cultures:  No results found for the encounter in last 14 days.        No intake/output data recorded.  I/O during current shift:  No intake/output data recorded.    Temp (24hrs), Av.1 °C (96.9 °F), Min:36 °C (96.8 °F), Max:36.1 °C (97 °F)         Assessment/Plan     Patient has already been given a loading dose of 1500 mg.  Will initiate vancomycin maintenance, 1500 mg every 24 hours.    This dosing regimen is predicted by [employer] to result in the following pharmacokinetic parameters:  Loading dose: 1500mg  Regimen: 1500 mg IV every 24 hours.  Start time: 17:00 on 2025  Exposure target: AUC24 (range) 400-600 mg/L.hr   VAK43-26: 391 mg/L.hr  AUC24,ss: 452 mg/L.hr  Probability of AUC24 > 400: 63 %  Ctrough,ss: 13.7 mg/L  Probability of Ctrough,ss > 20: 21 %    Follow-up level will be ordered on  at 0500 unless clinically indicated sooner.  Will continue to monitor renal function daily while on vancomycin and order serum creatinine at least every 48 hours if not already ordered.  Follow for continued vancomycin needs, clinical response, and signs/symptoms of toxicity.       Donis Pendleton, PharmD

## 2025-07-19 NOTE — ASSESSMENT & PLAN NOTE
Current kidney function approximates his reported baseline level of function.  Stage IIIa CKD with GFR ranging between 40 and 60.  Creatinine noted to be 1.47 at the time of admission.  This is slightly elevated from patient's previous value of 1.28.  Continue gentle IV fluid support overnight.  Avoid nephrotoxic agents including IV contrast dye.  Obtain follow-up renal function studies with morning blood work.

## 2025-07-19 NOTE — ASSESSMENT & PLAN NOTE
Stable  Will continue to monitor  Okay to continue spironolactone and torsemide  Repeat labs for tomorrow are ordered  Monitor renal function

## 2025-07-19 NOTE — ASSESSMENT & PLAN NOTE
No active lung issues appreciated  On 4 L nasal cannula at baseline  Breathing treatment  Incentive spirometry

## 2025-07-19 NOTE — PROGRESS NOTES
Angus Redman is a 69 y.o. male on day 1 of admission presenting with Cellulitis of left lower extremity.      Subjective   Right foot and ankle swelling has improved significantly but not yet normal.  Lower extremity duplex results are negative.  Continuing vancomycin and Zosyn for time being       Objective     Last Recorded Vitals  /69 (BP Location: Right arm, Patient Position: Lying)   Pulse 56   Temp 35.5 °C (95.9 °F) (Temporal) Comment: Dr. Munoz aware  Resp 20   Wt 118 kg (260 lb)   SpO2 97%   Intake/Output last 3 Shifts:    Intake/Output Summary (Last 24 hours) at 7/19/2025 1114  Last data filed at 7/19/2025 1000  Gross per 24 hour   Intake 790 ml   Output 1060 ml   Net -270 ml       Admission Weight  Weight: 118 kg (260 lb) (07/18/25 1228)    Daily Weight  07/18/25 : 118 kg (260 lb)      Physical Exam:  General: Not in acute distress, alert on nasal cannula 4 L  HEENT: PERRLA, head intact and normocephalic  Neck: Normal to inspection  Lungs: Clear to auscultation, work of breathing within normal limit  Cardiac: Regular rate and rhythm  Abdomen: Soft nontender, positive bowel sounds  : Exam deferred  Skin: Wound on left leg is noted  Hematology: No petechia or excessive ecchymosis  Musculoskeletal: Right lower extremity toe amputations are noted.  Neurological: Alert awake oriented, no focal deficit, cranial nerves grossly intact  Psych: No suicidal ideation or homicidal ideation    Relevant Results  Scheduled medications  Scheduled Medications[1]  Continuous medications  Continuous Medications[2]  PRN medications  PRN Medications[3]   Labs  Results from last 7 days   Lab Units 07/19/25  0632 07/18/25  1438   WBC AUTO x10*3/uL 4.8 8.4   HEMOGLOBIN g/dL 10.8* 11.6*   HEMATOCRIT % 35.9* 37.6*   PLATELETS AUTO x10*3/uL 211 269     Results from last 7 days   Lab Units 07/19/25  0632 07/18/25  1438   SODIUM mmol/L 141 138   POTASSIUM mmol/L 3.9 4.1   CHLORIDE mmol/L 106 102   CO2 mmol/L 30 27   BUN  mg/dL 12 17   CREATININE mg/dL 1.38* 1.47*   CALCIUM mg/dL 8.3* 8.8   PROTEIN TOTAL g/dL 6.2* 7.1   BILIRUBIN TOTAL mg/dL 0.7 0.6   ALK PHOS U/L 101 116   ALT U/L 10 13   AST U/L 13 19   GLUCOSE mg/dL 107* 95       Imaging  XR tibia fibula right 2 views  Result Date: 7/18/2025  No acute osseous abnormalities. No evidence of osteomyelitis. Signed by Enrrique Jimenez MD    XR foot left 3+ views  Result Date: 7/18/2025  Degenerative changes of the left with postoperative change.  There is no evidence of acute fracture. Soft tissue ulceration without evidence of underlying osteomyelitis. Signed by Enrrique Jimenez MD      Cardiology, Vascular, and Other Imaging  Lower extremity venous duplex right  Result Date: 7/18/2025  Preliminary Cardiology Report            [facility] [address] [place] [address]     Tel [phone] Fax [phone]        Preliminary Vascular Lab Report  VASC US LOWER EXTREMITY VENOUS DUPLEX RIGHT  Patient Name:     PRUDENCIO Alcantar Physician:  [id number] Milligan MD Study Date:       7/18/2025     Ordering Provider:  [id number] ERROL DOVER MRN/PID:          [id number]      Fellow: Accession#:       [id number]  Technologist:       Nancy Merino RVROSALIND YOB: 1955     Technologist 2: Gender:           M             Encounter#:         [id number] Admission Status: Emergency     Location Performed: [facility]  Diagnosis/ICD: Right leg swelling-M79.89 Indication:    Limb swelling CPT Codes:     15470 Peripheral venous duplex scan for DVT Limited  Pertinent History: Recent Surgery and Cellulitus.  PRELIMINARY CONCLUSIONS:  Right Lower Venous: No evidence of acute deep vein thrombus visualized in the right lower extremity. There is age indeterminate thrombosis visualized in the small saphenous vein. Additional Findings; Lymph nodes in groin the largest measuring 1.87 cm x 1.06 cm. Additional Findings: Errol Dover MD notified of SVT findings.  Imaging & Doppler  Findings:  Right    Compress     Thrombus SSV Prox    No    Age Indeterminate  Right                 Compressible Thrombus        Flow Distal External Iliac     Yes        None   Spontaneous/Phasic CFV                       Yes        None   Spontaneous/Phasic PFV                       Yes        None FV Proximal               Yes        None   Spontaneous/Phasic FV Mid                    Yes        None FV Distal                 Yes        None Popliteal                 Yes        None   Spontaneous/Phasic Peroneal                  Yes        None PTV                       Yes        None  Left        Flow CFV  Spontaneous/Phasic  VASCULAR PRELIMINARY REPORT completed by Nancy HOWARDT on 7/18/2025 at 3:43:10 PM  ** Final **                       Assessment/Plan   Angus Redman is a 69 y.o. male on day 1 of admission presenting with Cellulitis of left lower extremity.  Assessment & Plan  Cellulitis of left lower extremity  Pain is improved but still there  Use tramadol as needed  Added as needed order for tramadol as he had some relief with it  Continue with vancomycin and Zosyn  Follow-up culture results  Blood pressure on lower side and with bilateral lower extremity swelling right greater than left, component of infection and possibly some from amlodipine  Discontinuing amlodipine for the time being  Blood pressure is already on lower side  Side effects of vancomycin and Zosyn have been discussed with patient will monitor labs and for any rash  Chronic respiratory failure with hypoxia and hypercapnia  Asthma with chronic obstructive pulmonary disease (COPD) (Multi)  No active lung issues appreciated  On 4 L nasal cannula at baseline  Breathing treatment  Incentive spirometry  Stage 3a chronic kidney disease (Multi)  Stable  Will continue to monitor  Okay to continue spironolactone and torsemide  Repeat labs for tomorrow are ordered  Monitor renal function       Plan discussed with patient at bedside    High level  of [facility] based on above issue and discussing plan    This note is created using voice recognition software. All efforts are made to minimize errors, if there are errors there due to transcription.    Steven Feliz  Hospitalist           [1] amLODIPine, 2.5 mg, oral, Daily  aspirin, 81 mg, oral, Daily  atorvastatin, 40 mg, oral, Nightly  budesonide, 0.5 mg, nebulization, BID   And  ipratropium-albuteroL, 3 mL, nebulization, 4x daily  DULoxetine, 60 mg, oral, BID  empagliflozin, 10 mg, oral, Daily  enoxaparin, 40 mg, subcutaneous, q24h  escitalopram, 20 mg, oral, Daily  levothyroxine, 50 mcg, oral, Daily before breakfast  mirtazapine, 15 mg, oral, Nightly  montelukast, 10 mg, oral, Nightly  pantoprazole, 40 mg, oral, Daily before breakfast  piperacillin-tazobactam, 3.375 g, intravenous, q8h  propranolol, 10 mg, oral, BID  spironolactone, 25 mg, oral, Daily  tamsulosin, 0.4 mg, oral, Daily  torsemide, 20 mg, oral, Daily  vancomycin, 1,500 mg, intravenous, q24h    [2]    [3] PRN medications: acetaminophen **OR** acetaminophen **OR** acetaminophen, acetaminophen **OR** acetaminophen **OR** acetaminophen, albuterol, busPIRone, melatonin, ondansetron ODT **OR** ondansetron, oxyCODONE, oxygen, traMADol, vancomycin

## 2025-07-19 NOTE — H&P
History Of Present Illness  Angus Redman is a 69 y.o. male presenting with concern for worsening left lower extremity swelling and erythema.  Patient recently completed a course of IV vancomycin in the outpatient setting for MRSA infection involving his right lower extremity.  Follows with infectious disease service with PICC line recently removed.  However shortly after completion of therapy, staff at his nursing facility noted worsening redness and swelling involving the contralateral lower extremity.  There was associated right great toe swelling however patient denies any focal pain.  On evaluation in the emergency department, clinically there was erythema and swelling however noted to be chronic lower extremity edema.  Inflammatory markers including CRP was noted to be within normal limits however ESR was slightly elevated.  Imaging studies obtained with no degenerative changes of the left with postoperative changes but no evidence of acute fracture.  No evidence of osteomyelitis.  Patient admitted for further management.     Past Medical History  Medical History[1]    Surgical History  Surgical History[2]     Social History  He reports that he quit smoking about 26 years ago. His smoking use included cigarettes. He has never used smokeless tobacco. He reports that he does not currently use alcohol. He reports that he does not use drugs.    Family History  Family History[3]     Allergies  Lisinopril, Codeine, House dust mite, Hydrocodone-acetaminophen, Latex, Mold, and Tree and shrub pollen    Review of Systems   Musculoskeletal:  Positive for joint swelling.        Physical Exam  Vitals reviewed.   Constitutional:       Appearance: Normal appearance.   HENT:      Head: Normocephalic and atraumatic.      Nose: Nose normal.      Mouth/Throat:      Mouth: Mucous membranes are moist.     Eyes:      Extraocular Movements: Extraocular movements intact.      Conjunctiva/sclera: Conjunctivae normal.      Pupils:  Pupils are equal, round, and reactive to light.       Cardiovascular:      Rate and Rhythm: Normal rate and regular rhythm.      Pulses: Normal pulses.      Heart sounds: Normal heart sounds.   Pulmonary:      Effort: Pulmonary effort is normal.      Breath sounds: Normal breath sounds.   Abdominal:      General: Bowel sounds are normal.      Palpations: Abdomen is soft.     Musculoskeletal:         General: Swelling present. Normal range of motion.      Cervical back: Normal range of motion and neck supple.      Right lower leg: Edema present.      Left lower leg: Edema present.     Skin:     General: Skin is warm and dry.      Findings: Erythema present.     Neurological:      General: No focal deficit present.      Mental Status: He is alert. Mental status is at baseline.     Psychiatric:         Mood and Affect: Mood normal.         Behavior: Behavior normal.          Last Recorded Vitals  Blood pressure 154/74, pulse 66, temperature 36 °C (96.8 °F), temperature source Temporal, resp. rate 18, height 1.829 m (6'), weight 118 kg (260 lb), SpO2 96%.    Relevant Results  Scheduled medications  Scheduled Medications[4]  Continuous medications  Continuous Medications[5]  PRN medications  PRN Medications[6]  XR tibia fibula right 2 views  Result Date: 7/18/2025  STUDY: Tibia and Fibula Radiographs; 07/18/2025 03:02 PM INDICATION: Cellulitis. Evaluate for free air and osteomyelitis. COMPARISON: XR Tibia fibula bilateral 11/18/2024. ACCESSION NUMBER(S): [id number] ORDERING CLINICIAN: ERROL DOVER TECHNIQUE:  Two view(s) of the right tibia and fibula. FINDINGS:  There is cortical irregularity to the distal tibia consistent with an old fracture.  There is no displaced fracture.  The alignment is anatomic.  No soft tissue abnormality is seen.    No acute osseous abnormalities. No evidence of osteomyelitis. Signed by Enrrique Jimenez MD    XR foot left 3+ views  Result Date: 7/18/2025  STUDY: Foot Radiographs; 07/18/2025 03:02  PM INDICATION: Ulcer at base of first tie. Evaluate for osteomyelitis. COMPARISON: XR Foot bilateral 11/18/2024, 12/17/2021. ACCESSION NUMBER(S): [id number] ORDERING CLINICIAN: ERROL DOVER TECHNIQUE:  Three view(s) of the left foot. FINDINGS:  There are postsurgical changes to the distal first metatarsal as well as the first interphalangeal joint.  There is a hallux valgus deformity present.  There are degenerative changes of the second metatarsal phalangeal joint.  There are degenerative changes of the tarsal joints. There is a small ulceration in the soft tissues overlying the first metatarsal phalangeal joint.  There is no underlying bony erosion.    Degenerative changes of the left with postoperative change.  There is no evidence of acute fracture. Soft tissue ulceration without evidence of underlying osteomyelitis. Signed by Enrrique Jimenez MD    Lower extremity venous duplex right  Result Date: 7/18/2025  Preliminary Cardiology Report            [facility] [address] [address] [place] [address]     Tel [phone] Fax [phone]        Preliminary Vascular Lab Report  [facility] LOWER EXTREMITY VENOUS DUPLEX RIGHT  Patient Name:     PRUDENCIO Alcantar Physician:  [id number] Augie Milligan MD Study Date:       7/18/2025     Ordering Provider:  [id number] ERROL DOVER MRN/PID:          [id number]      Fellow: Accession#:       [id number]  Technologist:       Nancy Merino RVT YOB: 1955     Technologist 2: Gender:           M             Encounter#:         [id number] Admission Status: Emergency     Location Performed: [facility]  Diagnosis/ICD: Right leg swelling-M79.89 Indication:    Limb swelling CPT Codes:     24880 Peripheral venous duplex scan for DVT Limited  Pertinent History: Recent Surgery and Cellulitus.  PRELIMINARY CONCLUSIONS:  Right Lower Venous: No evidence of acute deep vein thrombus visualized in the right lower extremity. There is age indeterminate thrombosis  visualized in the small saphenous vein. Additional Findings; Lymph nodes in groin the largest measuring 1.87 cm x 1.06 cm. Additional Findings: Barbara Madsen MD notified of SVT findings.  Imaging & Doppler Findings:  Right    Compress     Thrombus SSV Prox    No    Age Indeterminate  Right                 Compressible Thrombus        Flow Distal External Iliac     Yes        None   Spontaneous/Phasic CFV                       Yes        None   Spontaneous/Phasic PFV                       Yes        None FV Proximal               Yes        None   Spontaneous/Phasic FV Mid                    Yes        None FV Distal                 Yes        None Popliteal                 Yes        None   Spontaneous/Phasic Peroneal                  Yes        None PTV                       Yes        None  Left        Flow CFV  Spontaneous/Phasic  VASCULAR PRELIMINARY REPORT completed by Nancy Merino RVT on 7/18/2025 at 3:43:10 PM  ** Final **     CT angio chest w and wo IV contrast  Result Date: 6/25/2025  * * *Final Report* * * DATE OF EXAM: Jun 25 2025 10:26AM   [facility]   [id number]  -  CTA CHEST (NON GATED) W IVCON PE  / ACCESSION #  [id number] PROCEDURE REASON: concer for possible PNA, recently had a surgery feet at rehab      * * * * Physician Interpretation * * * *  EXAMINATION:  CHEST CTA (NON GATED) WITH CONTRAST (PULMONARY EMBOLISM PROTOCOL) Clinical History: Concern for possible pneumonia.  Recently had surgery.   Shortness of breath.  Evaluation for PE. Technique:  Spiral CT acquisition of the chest from the thoracic inlet to the upper abdomen following IV contrast.  Axial 1 and 3 mm thick slices plus coronal and sagittal reformatted images. MQ:  CTCP_5 Contrast:  80 mL Omnipaque 350 IV CT Radiation dose: Integrated Dose-length product (DLP) for this visit =   698 mGy*cm CT Dose Reduction Employed: Automated exposure control(AEC) and iterative recon Comparison:  CT chest from 10/13/2023. RESULT: Limitations:  Suboptimal study due to  respiratory motion with or without non-ideal pulmonary arterial enhancement. Evaluation for thromboembolic disease:      - Right heart chambers:  No thromboembolic disease.      - Main pulmonary arteries:  No thromboembolic disease.      - Lobar , segmental and subsegmental pulmonary arteries:  Evaluation is limited due to respiratory motion and nonideal pulmonary arterial enhancement.           - Additional pulmonary artery findings:  The main pulmonary artery is normal in caliber. Lines, tubes, and devices:  The right central venous catheter terminates at the SVC. Lung parenchyma and airways: Patchy groundglass density is noted in bilateral lungs.  There are patchy airspace opacities in the lower lobes bilaterally and lingula.  Subsegmental atelectasis noted in the lung bases bilaterally and right upper lobe. Pleural space:  Again noted is pleural thickening with calcifications in the right costophrenic angle and posterior chest, stable.  Small layering left pleural effusion noted.  No pneumothorax. Lower neck, lymph nodes, and mediastinum:  The imaged thyroid gland is normal.  No lymphadenopathy in the supraclavicular, axillary, mediastinal, or hilar regions. Heart, pericardium, and thoracic vessels:  The thoracic aorta is normal in caliber. The heart is mildly enlarged.  Moderate coronary artery atherosclerotic calcifications are noted, although the study is not optimized for coronary assessment. No pericardial effusion or thickening. Bones and soft tissues:  No focal aggressive bony lesion noted.  Old healed fracture deformities of bilateral multiple ribs are stable. Upper abdomen:  No abnormality in the imaged upper abdomen. Localizer images: No additional findings.    IMPRESSION: 1.  Evaluation of lobar, segmental and subsegmental pulmonary arteries is limited due to respiratory motion and nonideal pulmonary arterial enhancement.  No large central pulmonary embolus seen. 2.  Patchy airspace opacities in  bilateral lower lobes and lingula, may be part of pulmonary edema or infiltrates. 3.  Mild cardiomegaly with mild pulmonary edema and small layering left pleural effusion. : LYLY   Transcribe Date/Time: Jun 25 2025 11:34A Dictated by : ELVIS MUNOZ MD This examination was interpreted and the report reviewed and electronically signed by: EVLIS MUNOZ MD on Jun 25 2025 11:43AM  EST    XR chest 1 view  Result Date: 6/24/2025  * * *Final Report* * * DATE OF EXAM: Jun 23 2025 10:38PM   X   [id number]  -  XR CHEST 1V FRONTAL PORT  / ACCESSION #  [id number] PROCEDURE REASON: Cough      * * * * Physician Interpretation * * * *  EXAMINATION:  CHEST RADIOGRAPH (PORTABLE SINGLE VIEW AP) Exam Date/Time:  6/23/2025 10:38 PM CLINICAL HISTORY: Cough MQ:  XCPR_5 Comparison:  10/17/2023 RESULT: See impression    IMPRESSION: Right PICC with tip overlying distal SVC. Normal cardiac silhouette size.  No pneumothorax.  Patchy right mid and lower lung densities may represent pneumonia.  Question of trace right pleural effusion.  Multiple old left rib fractures. : [facility]   Transcribe Date/Time: Jun 24 2025  1:03A Dictated by : JAZLYN PABON MD This examination was interpreted and the report reviewed and electronically signed by: JAZLYN PABON MD on Jun 24 2025  1:05AM  EST    CT head wo IV contrast  Result Date: 6/23/2025  * * *Final Report* * * DATE OF EXAM: Jun 23 2025 10:28PM   [facility]   0504  -  CT BRAIN WO IVCON   / ACCESSION #  [id number] PROCEDURE REASON: Mental status change, unknown cause      * * * * Physician Interpretation * * * *  EXAMINATION:  CT BRAIN WO IVCON CLINICAL HISTORY:  Mental status change, unknown cause. TECHNIQUE: Routine CT of the brain without IV contrast. CT Dose-Length Product (DLP): 875 mGy*cm CT Dose Reduction Employed: Automated exposure control(AEC) and iterative recon; COMPARISON: 10/13/2023 brain CT RESULT:  Mild motion artifact limitation. Post-operative  change: None. Acute change:  No evidence of a sizable/large acute territorial brain infarct/parenchymal edema.  MRI may be considered as a more sensitive modality if continued clinical concern/warranted. Hemorrhage: No evidence of acute intracranial hemorrhage. Mass Lesion / Mass Effect: There is no evidence of a sizable brain mass.   No significant mass effect or extra-axial fluid collection. Chronic (or likely chronic) changes, including brain parenchymal: Intracranial arterial wall calcifications.  Dural calcifications noted.  Punctate hypodense supratentorial white matter foci are nonspecific, but most commonly on the basis of minimal microvascular ischemia. There is mild generalized cerebral volume loss. Ventricles: Commensurate with sulcal sizes.  No hydrocephalus. Visualized paranasal sinuses:  Few areas of mild mucosal thickening.   Small opacity/opacities of probable mucosal retention cyst(s) and/or polyp(s). Other:   No depressed skull fracture is seen.   Bilateral ocular lens replacements.  (topogram) images: Non-diagnostic.    IMPRESSION: Brain CT shows no evidence of an acute intracranial abnormality. Mild chronic brain parenchymal changes and other details as above. : [facility]   Transcribe Date/Time: Jun 23 2025 11:39P Dictated by : MARIA FERNANDA WILLARD MD This examination was interpreted and the report reviewed and electronically signed by: MARIA FERNANDA WILLARD MD on Jun 23 2025 11:42PM  EST    Results for orders placed or performed during the hospital encounter of 07/18/25 (from the past 24 hours)   CBC and Auto Differential   Result Value Ref Range    WBC 8.4 4.4 - 11.3 x10*3/uL    nRBC 0.0 0.0 - 0.0 /100 WBCs    RBC 4.06 (L) 4.50 - 5.90 x10*6/uL    Hemoglobin 11.6 (L) 13.5 - 17.5 g/dL    Hematocrit 37.6 (L) 41.0 - 52.0 %    MCV 93 80 - 100 fL    MCH 28.6 26.0 - 34.0 pg    MCHC 30.9 (L) 32.0 - 36.0 g/dL    RDW 17.7 (H) 11.5 - 14.5 %    Platelets 269 150 - 450 x10*3/uL    Neutrophils % 67.3  40.0 - 80.0 %    Immature Granulocytes %, Automated 0.5 0.0 - 0.9 %    Lymphocytes % 20.8 13.0 - 44.0 %    Monocytes % 8.1 2.0 - 10.0 %    Eosinophils % 3.1 0.0 - 6.0 %    Basophils % 0.2 0.0 - 2.0 %    Neutrophils Absolute 5.66 (H) 1.60 - 5.50 x10*3/uL    Immature Granulocytes Absolute, Automated 0.04 0.00 - 0.50 x10*3/uL    Lymphocytes Absolute 1.75 0.80 - 3.00 x10*3/uL    Monocytes Absolute 0.68 0.05 - 0.80 x10*3/uL    Eosinophils Absolute 0.26 0.00 - 0.40 x10*3/uL    Basophils Absolute 0.02 0.00 - 0.10 x10*3/uL   Comprehensive metabolic panel   Result Value Ref Range    Glucose 95 74 - 99 mg/dL    Sodium 138 136 - 145 mmol/L    Potassium 4.1 3.5 - 5.3 mmol/L    Chloride 102 98 - 107 mmol/L    Bicarbonate 27 21 - 32 mmol/L    Anion Gap 13 10 - 20 mmol/L    Urea Nitrogen 17 6 - 23 mg/dL    Creatinine 1.47 (H) 0.50 - 1.30 mg/dL    eGFR 51 (L) >60 mL/min/1.73m*2    Calcium 8.8 8.6 - 10.3 mg/dL    Albumin 4.1 3.4 - 5.0 g/dL    Alkaline Phosphatase 116 33 - 136 U/L    Total Protein 7.1 6.4 - 8.2 g/dL    AST 19 9 - 39 U/L    Bilirubin, Total 0.6 0.0 - 1.2 mg/dL    ALT 13 10 - 52 U/L   Lipase   Result Value Ref Range    Lipase 26 9 - 82 U/L   Sedimentation rate, automated   Result Value Ref Range    Sedimentation Rate 48 (H) 0 - 20 mm/h   C-reactive protein   Result Value Ref Range    C-Reactive Protein 0.86 <1.00 mg/dL   Uric Acid   Result Value Ref Range    Uric Acid 5.5 4.0 - 7.5 mg/dL   Blood Culture    Specimen: Peripheral Venipuncture; Blood culture   Result Value Ref Range    Blood Culture Loaded on Instrument - Culture in progress    Blood Gas Venous Full Panel   Result Value Ref Range    POCT pH, Venous 7.35 7.33 - 7.43 pH    POCT pCO2, Venous 52 (H) 41 - 51 mm Hg    POCT pO2, Venous 49 (H) 35 - 45 mm Hg    POCT SO2, Venous 77 (H) 45 - 75 %    POCT Oxy Hemoglobin, Venous 75.3 (H) 45.0 - 75.0 %    POCT Hematocrit Calculated, Venous 32.0 (L) 41.0 - 52.0 %    POCT Sodium, Venous 136 136 - 145 mmol/L    POCT  Potassium, Venous 4.0 3.5 - 5.3 mmol/L    POCT Chloride, Venous 105 98 - 107 mmol/L    POCT Ionized Calicum, Venous 1.15 1.10 - 1.33 mmol/L    POCT Glucose, Venous 108 (H) 74 - 99 mg/dL    POCT Lactate, Venous 0.7 0.4 - 2.0 mmol/L    POCT Base Excess, Venous 2.3 -2.0 - 3.0 mmol/L    POCT HCO3 Calculated, Venous 28.7 (H) 22.0 - 26.0 mmol/L    POCT Hemoglobin, Venous 10.8 (L) 13.5 - 17.5 g/dL    POCT Anion Gap, Venous 6.0 (L) 10.0 - 25.0 mmol/L    Patient Temperature      FiO2 41 %   Blood Culture    Specimen: Peripheral Venipuncture; Blood culture   Result Value Ref Range    Blood Culture Loaded on Instrument - Culture in progress    Lower extremity venous duplex right   Result Value Ref Range    BSA 2.45 m2     *Note: Due to a large number of results and/or encounters for the requested time period, some results have not been displayed. A complete set of results can be found in Results Review.       Assessment & Plan  Cellulitis of left lower extremity  Patient just completed an extended outpatient course of vancomycin for MRSA infection involving the right lower extremity.  Presenting with left lower extremity redness/erythema, swelling, but no open lesions, purulent drainage, or concerning discharge.  Sed rate noted to be elevated however CRP within normal limits.  Radiographic findings were minimal and considered chronic.  No findings to suggest acute infectious process such as osteomyelitis.  Patient has been started on vancomycin and Zosyn empirically.  Would consider ID consultation pending clinical course as he was followed closely in the outpatient setting.  However in the absence of IV antibiotics at discharge, may be appropriate to provide outpatient follow-up/referral at discharge as opposed to inpatient consultation.  Blood cultures collected in the emergency department and pending.  Pain control at this time with oxycodone.  Allergy noted in patient's chart he describes is inaccurate and is requesting  that be removed.  States that he tolerates oxycodone and hydrocodone without difficulty.  Chronic respiratory failure with hypoxia and hypercapnia  Asthma with chronic obstructive pulmonary disease (COPD) (Multi)  Chronic respiratory failure on 4 L of oxygen by nasal cannula.  Home medications have been resumed as indicated.  Stage 3a chronic kidney disease (Multi)  Current kidney function approximates his reported baseline level of function.  Stage IIIa CKD with GFR ranging between 40 and 60.  Creatinine noted to be 1.47 at the time of admission.  This is slightly elevated from patient's previous value of 1.28.  Continue gentle IV fluid support overnight.  Avoid nephrotoxic agents including IV contrast dye.  Obtain follow-up renal function studies with morning blood work.    I spent >75 minutes in the professional and overall care of this patient.      Khurram Munoz DO         [1]   Past Medical History:  Diagnosis Date    Alcohol dependence, in remission 06/08/2022    Alcohol dependence in early, early partial, sustained full, or sustained partial remission    Alcohol dependence, in remission 06/08/2022    Alcohol dependence in early, early partial, sustained full, or sustained partial remission    Alcohol dependence, uncomplicated (Multi) 09/15/2022    Alcohol dependence, daily use    Dependence on other enabling machines and devices 09/24/2019    Walker as ambulation aid    Encounter for screening for other disorder 03/01/2021    Special screening for other conditions    Fracture of one rib, unspecified side, initial encounter for closed fracture 01/03/2014    Closed rib fracture    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    Laceration without foreign body of scalp, initial encounter 09/10/2015    Scalp laceration    Nausea 04/15/2014    Nausea    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 07/27/2017    Skin ulcer of  right ankle, limited to breakdown of skin    Osteomyelitis, unspecified 02/26/2022    Osteomyelitis of toe    Other chest pain 09/21/2013    Atypical chest pain    Other conditions influencing health status     Skin Cancer    Other conditions influencing health status 10/08/2017    Closed displaced fracture of olecranon process of left ulna with intra-articular extension, initial encounter    Other specified health status 07/23/2014    Foreign travel    Patient's noncompliance with other medical treatment and regimen due to unspecified reason 10/28/2016    Noncompliance with therapeutic plan    Patient's other noncompliance with medication regimen 06/04/2016    History of medication noncompliance    Personal history of (healed) stress fracture 12/30/2013    History of stress fracture    Personal history of diseases of the skin and subcutaneous tissue 02/26/2022    History of chronic skin ulcer    Personal history of other diseases of the nervous system and sense organs 03/25/2016    History of peripheral neuropathy    Personal history of other diseases of the nervous system and sense organs 02/02/2016    History of peripheral neuropathy    Personal history of other endocrine, nutritional and metabolic disease 07/13/2015    History of hypothyroidism    Personal history of other specified conditions 07/28/2019    History of syncope    Personal history of other specified conditions 05/07/2018    History of syncope    Personal history of other specified conditions 06/25/2015    History of nausea    Unspecified fracture of left foot, initial encounter for closed fracture 06/04/2016    Foot fracture, left    Unspecified injury of head, initial encounter 04/20/2016    Closed head injury, initial encounter    Unspecified injury of unspecified elbow, initial encounter 04/07/2017    Elbow injury    Unsteadiness on feet 04/15/2014    Unsteady gait   [2]   Past Surgical History:  Procedure Laterality Date    COLONOSCOPY   10/09/2013    Complete Colonoscopy    IR CVC PICC  6/4/2025    IR CVC PICC 6/4/2025 Agustin Escoto MD [facility]    OTHER SURGICAL HISTORY  07/28/2019    Insertion of cardiac monitor    OTHER SURGICAL HISTORY  04/15/2014    Reported Hx Of Hip Replacement - Left Side    OTHER SURGICAL HISTORY  01/28/2020    Hip replacement    OTHER SURGICAL HISTORY  11/24/2015    Interrogation Of Implantable Loop Recorder By Physician In Person    OTHER SURGICAL HISTORY  04/19/2017    Arthroscopy Elbow Left    OTHER SURGICAL HISTORY  10/09/2013    Shoulder Surgery Left    SKIN CANCER EXCISION  10/09/2013    Mohs Micrographic Surgery Face   [3]   Family History  Problem Relation Name Age of Onset    Other (cardiac pacemaker) Mother      Coronary artery disease Mother      Other (history of heart artery stent) Mother      Cancer Mother      Other (kurtis cell cancer) Mother      Arthritis Mother      Mental illness Brother          living in handicapped assisted living facility permanently [Other]    Other (angina pectoris) Paternal Grandmother     [4] amLODIPine, 2.5 mg, oral, Daily  aspirin, 81 mg, oral, Daily  atorvastatin, 40 mg, oral, Nightly  budesonide, 0.5 mg, nebulization, BID   And  ipratropium-albuteroL, 3 mL, nebulization, 4x daily  DULoxetine, 60 mg, oral, BID  empagliflozin, 10 mg, oral, Daily  enoxaparin, 40 mg, subcutaneous, q24h  escitalopram, 20 mg, oral, Daily  levothyroxine, 50 mcg, oral, Daily before breakfast  mirtazapine, 15 mg, oral, Nightly  montelukast, 10 mg, oral, Nightly  pantoprazole, 40 mg, oral, Daily before breakfast   Or  pantoprazole, 40 mg, intravenous, Daily before breakfast  piperacillin-tazobactam, 3.375 g, intravenous, q8h  propranolol, 10 mg, oral, BID  spironolactone, 25 mg, oral, Daily  tamsulosin, 0.4 mg, oral, Daily  torsemide, 20 mg, oral, Daily  vancomycin, 1,500 mg, intravenous, q24h  [5]    [6] PRN medications: acetaminophen **OR** acetaminophen **OR** acetaminophen, acetaminophen **OR**  acetaminophen **OR** acetaminophen, albuterol, busPIRone, melatonin, metoclopramide **OR** metoclopramide, ondansetron ODT **OR** ondansetron, oxyCODONE, oxygen, vancomycin

## 2025-07-19 NOTE — CARE PLAN
The clinical goals for the shift include Pt will report decreased pain with medication administration      Problem: Pain - Adult  Goal: Verbalizes/displays adequate comfort level or baseline comfort level  Outcome: Progressing     Problem: Safety - Adult  Goal: Free from fall injury  Outcome: Progressing     Problem: Chronic Conditions and Co-morbidities  Goal: Patient's chronic conditions and co-morbidity symptoms are monitored and maintained or improved  Outcome: Progressing     Problem: Nutrition  Goal: Nutrient intake appropriate for maintaining nutritional needs  Outcome: Progressing

## 2025-07-19 NOTE — ASSESSMENT & PLAN NOTE
Chronic respiratory failure on 4 L of oxygen by nasal cannula.  Home medications have been resumed as indicated.

## 2025-07-19 NOTE — CONSULTS
Consults  Reason for Consult:  Evaluation and management of left foot cellulitis and bacteremia    Patient is seen at the request of Dr. Steven Feliz    Subjective   History of Present Illness:  Angus Redman is a 69 y.o. male who presented on 7/18/2025 with new redness and swelling in both of his lower extremities with concern for his left great toe in particular.  He had recently been [place] [facility] and discharged on IV vancomycin up through 7/12/2025 to treat possibility of a right lower extremity MRSA osteomyelitis.  He finished his antibiotics and then returned home.  He went to see his gastroenterologist who was concerned with the redness and swelling of his legs so he was advised to come to the emergency department.  On arrival he had a temperature of 36 with a white blood count of 8.4 and a creatinine 1.5.  He went for an x-ray of his left foot and ankle which showed no acute issues.  He was placed on vancomycin and Zosyn.  His blood cultures now are showing gram-positive cocci.  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), Laceration without foreign body of scalp, initial encounter (09/10/2015), Nausea (04/15/2014), 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 (07/27/2017), Osteomyelitis, unspecified (02/26/2022), Other chest pain (09/21/2013), Other conditions influencing health status, Other conditions influencing health status (10/08/2017), Other specified health status  (07/23/2014), Patient's noncompliance with other medical treatment and regimen due to unspecified reason (10/28/2016), Patient's other noncompliance with medication regimen (06/04/2016), Personal history of (healed) stress fracture (12/30/2013), Personal history of diseases of the skin and subcutaneous tissue (02/26/2022), Personal history of other diseases of the nervous system and sense organs (03/25/2016), Personal history of other diseases of the nervous system and sense organs (02/02/2016), Personal history of other endocrine, nutritional and metabolic disease (07/13/2015), Personal history of other specified conditions (07/28/2019), Personal history of other specified conditions (05/07/2018), Personal history of other specified conditions (06/25/2015), Unspecified fracture of left foot, initial encounter for closed fracture (06/04/2016), Unspecified injury of head, initial encounter (04/20/2016), Unspecified injury of unspecified elbow, initial encounter (04/07/2017), and Unsteadiness on feet (04/15/2014).    has a past surgical history that includes Colonoscopy (10/09/2013); Other surgical history (07/28/2019); Other surgical history (04/15/2014); Other surgical history (01/28/2020); Other surgical history (11/24/2015); Other surgical history (04/19/2017); Other surgical history (10/09/2013); Skin cancer excision (10/09/2013); and IR CVC PICC (6/4/2025).     Social History:   reports that he quit smoking about 26 years ago. His smoking use included cigarettes. He has never used smokeless tobacco. He reports that he does not currently use alcohol. He reports that he does not use drugs.     Family History:  No sick contacts    Review of Systems:  Left great toe redness    Allergies:  Lisinopril, Codeine, House dust mite, Hydrocodone-acetaminophen, Latex, Mold, and Tree and shrub pollen      Objective   Current Medications[1]    Physical Exam:  BP 99/58 (BP Location: Right arm, Patient Position: Lying)   Pulse  54   Temp 36.3 °C (97.4 °F) (Rectal)   Resp 16   Wt 118 kg (260 lb)   SpO2 97%    General: no acute distress, lying in bed, interactive  HEENT: pink pharynx  CVS: RRR  Resp: decreased breath sounds in bases  ABD: soft, NT, ND  EXT: Left medial plantar foot with dry ulcer; there is patchy erythema and edema of his left great toe; he has a right transmetatarsal amputation that is clean dry and intact; there is mild erythema and edema of his right lower extremity up to his knee  Skin: no rash     Relevant Results:    Labs:  Results from last 72 hours   Lab Units 07/19/25  0632 07/18/25  1438   SODIUM mmol/L 141 138   POTASSIUM mmol/L 3.9 4.1   CHLORIDE mmol/L 106 102   BUN mg/dL 12 17   CREATININE mg/dL 1.38* 1.47*     Results from last 72 hours   Lab Units 07/19/25  0632 07/18/25  1438   WBC AUTO x10*3/uL 4.8 8.4   HEMOGLOBIN g/dL 10.8* 11.6*   HEMATOCRIT % 35.9* 37.6*   PLATELETS AUTO x10*3/uL 211 269       Microbiology data: I have personally and independently reviewed and intrepreted the lab results  1/29/2025 wound culture shows MRSA resistant to clindamycin and tetracycline; sensitive to Bactrim and vancomycin  5/30/2025 blood cultures show no growth  5/31/2025 wound culture shows MRSA  7/18/2025 blood cultures show gram-positive cocci    7/18/2025 sedimentation rate 48; C-reactive protein 0.8    Imaging data: I have personally and independently reviewed and interpreted the imaging studies  7/18/2025 x-ray of the left foot/tibia/fibula showed DJD and an ulcer  7/18/2025 lower extremity Doppler ultrasound shows no DVT     Assessment/Plan     MY IMPRESSION & RECOMMENDATIONS:  Left great toe erythema and right lower extremity erythema, which could be due to cellulitis.  He has been found to have a gram-positive bacteremia.  He does have a history of MRSA and just finished a 6-week course of IV treatment for possible right lower extremity osteomyelitis.  He has a left plantar foot neuropathic ulcer which could  serve as a portal of entry for a pathogen. I would recommend that we continue him on vancomycin for now.    - Continue vancomycin and monitor levels  - Can stop Zosyn  - Await further blood culture results    Other issues:  #Hypertension   #Hyperlipidemia  #CAD  #COPD  #Chronic kidney disease stage IIIa, which can affect the dosing of antimicrobials  #Osteoarthritis  #HFpEF  #Paroxysmal atrial fibrillation  #EtOH polyneuropathy     ~I have personally and independently reviewed and interpreted the laboratory tests, imaging studies, and the documentation from other healthcare providers.  I am monitoring for side effects and toxicity from vancomycin which can include rash, diarrhea, and nephrotoxicity.  His prognosis is uncertain due to his multiple comorbidities.         Jerry Lozano MD          [1]   Current Facility-Administered Medications   Medication Dose Route Frequency Provider Last Rate Last Admin    acetaminophen (Tylenol) tablet 650 mg  650 mg oral q4h PRN Khurram Munoz, DO        Or    acetaminophen (Tylenol) oral liquid 650 mg  650 mg nasogastric tube q4h PRN Khurram Munoz, DO        Or    acetaminophen (Tylenol) suppository 650 mg  650 mg rectal q4h PRN Khurram Munoz, DO        acetaminophen (Tylenol) tablet 650 mg  650 mg oral q4h PRN Khurram Munoz, DO   650 mg at 07/18/25 2203    Or    acetaminophen (Tylenol) oral liquid 650 mg  650 mg oral q4h PRN Khurram Munoz, DO        Or    acetaminophen (Tylenol) suppository 650 mg  650 mg rectal q4h PRN Khurram Munoz, DO        albuterol 2.5 mg /3 mL (0.083 %) nebulizer solution 2.5 mg  2.5 mg nebulization q2h PRN Khurram Munoz, DO        aspirin chewable tablet 81 mg  81 mg oral Daily Khurram Munoz, DO   81 mg at 07/19/25 0822    atorvastatin (Lipitor) tablet 40 mg  40 mg oral Nightly Khurram Munoz, DO   40 mg at 07/18/25 2202    budesonide (Pulmicort) 0.5 mg/2 mL nebulizer solution 0.5 mg  0.5 mg nebulization BID Khurram Munoz  DO   0.5 mg at 07/19/25 1007    And    ipratropium-albuteroL (Duo-Neb) 0.5-2.5 mg/3 mL nebulizer solution 3 mL  3 mL nebulization 4x daily Khurram Munoz, DO   3 mL at 07/19/25 1318    busPIRone (Buspar) tablet 5 mg  5 mg oral TID PRN Khurram Munoz, DO        DULoxetine (Cymbalta) DR capsule 60 mg  60 mg oral BID Khurram Munoz, DO   60 mg at 07/19/25 0822    empagliflozin (Jardiance) tablet 10 mg  10 mg oral Daily Khurram Munoz, DO   10 mg at 07/19/25 0822    enoxaparin (Lovenox) syringe 40 mg  40 mg subcutaneous q24h Khurram Munoz, DO   40 mg at 07/18/25 2204    escitalopram (Lexapro) tablet 20 mg  20 mg oral Daily Khurram Munoz, DO   20 mg at 07/19/25 0822    levothyroxine (Synthroid, Levoxyl) tablet 50 mcg  50 mcg oral Daily before breakfast Khurram Munoz, DO   50 mcg at 07/19/25 0619    melatonin tablet 3 mg  3 mg oral Nightly PRN Khurram Munoz, DO   3 mg at 07/18/25 2202    mirtazapine (Remeron) tablet 15 mg  15 mg oral Nightly Khurram Munoz, DO   15 mg at 07/18/25 2202    montelukast (Singulair) tablet 10 mg  10 mg oral Nightly Khurram Munoz, DO   10 mg at 07/18/25 2202    ondansetron ODT (Zofran-ODT) disintegrating tablet 4 mg  4 mg oral q8h PRN Khurram Munoz, DO        Or    ondansetron (Zofran) injection 4 mg  4 mg intravenous q8h PRN Khurram Munoz, DO        oxyCODONE (Roxicodone) immediate release tablet 5 mg  5 mg oral q4h PRN Khurram Munoz, DO   5 mg at 07/19/25 0822    oxygen (O2) therapy   inhalation Continuous PRN - O2/gases Jt Araujo, DO   4 L/min at 07/18/25 1300    pantoprazole (ProtoNix) EC tablet 40 mg  40 mg oral Daily before breakfast Khurram Munoz DO   40 mg at 07/19/25 0619    piperacillin-tazobactam (Zosyn) 3.375 g in dextrose (iso) IV 50 mL  3.375 g intravenous q8h Khurram Munoz DO   Stopped at 07/19/25 1333    propranolol (Inderal) tablet 10 mg  10 mg oral BID Khurram Munoz DO   10 mg at 07/18/25 2203    spironolactone  (Aldactone) tablet 25 mg  25 mg oral Daily Khurram Munzo DO   25 mg at 07/19/25 0822    tamsulosin (Flomax) 24 hr capsule 0.4 mg  0.4 mg oral Daily Khurram Munoz DO   0.4 mg at 07/19/25 0822    torsemide (Demadex) tablet 20 mg  20 mg oral Daily Khurram Munoz DO   20 mg at 07/19/25 0822    traMADol (Ultram) tablet 50 mg  50 mg oral q8h PRN Steven Feliz MD   50 mg at 07/19/25 1023    vancomycin (Vancocin) pharmacy to dose - pharmacy monitoring   miscellaneous Daily PRN Khurram Munoz DO        vancomycin 1,500 mg in NS  mL  1,500 mg intravenous q24h Khurram Munoz DO

## 2025-07-20 VITALS
BODY MASS INDEX: 35.21 KG/M2 | SYSTOLIC BLOOD PRESSURE: 126 MMHG | OXYGEN SATURATION: 94 % | HEIGHT: 72 IN | DIASTOLIC BLOOD PRESSURE: 58 MMHG | TEMPERATURE: 97.7 F | WEIGHT: 260 LBS | RESPIRATION RATE: 18 BRPM | HEART RATE: 70 BPM

## 2025-07-20 LAB
ANION GAP SERPL CALC-SCNC: 10 MMOL/L (ref 10–20)
BACTERIA BLD AEROBE CULT: ABNORMAL
BACTERIA BLD CULT: ABNORMAL
BACTERIA BLD CULT: NORMAL
BASOPHILS # BLD AUTO: 0.01 X10*3/UL (ref 0–0.1)
BASOPHILS NFR BLD AUTO: 0.2 %
BUN SERPL-MCNC: 12 MG/DL (ref 6–23)
CALCIUM SERPL-MCNC: 8.1 MG/DL (ref 8.6–10.3)
CHLORIDE SERPL-SCNC: 104 MMOL/L (ref 98–107)
CO2 SERPL-SCNC: 30 MMOL/L (ref 21–32)
CREAT SERPL-MCNC: 1.39 MG/DL (ref 0.5–1.3)
EGFRCR SERPLBLD CKD-EPI 2021: 54 ML/MIN/1.73M*2
EOSINOPHIL # BLD AUTO: 0.22 X10*3/UL (ref 0–0.4)
EOSINOPHIL NFR BLD AUTO: 4.1 %
ERYTHROCYTE [DISTWIDTH] IN BLOOD BY AUTOMATED COUNT: 18 % (ref 11.5–14.5)
GLUCOSE SERPL-MCNC: 116 MG/DL (ref 74–99)
GRAM STN SPEC: ABNORMAL
GRAM STN SPEC: ABNORMAL
HCT VFR BLD AUTO: 34.3 % (ref 41–52)
HGB BLD-MCNC: 10.6 G/DL (ref 13.5–17.5)
IMM GRANULOCYTES # BLD AUTO: 0.03 X10*3/UL (ref 0–0.5)
IMM GRANULOCYTES NFR BLD AUTO: 0.6 % (ref 0–0.9)
LYMPHOCYTES # BLD AUTO: 1.35 X10*3/UL (ref 0.8–3)
LYMPHOCYTES NFR BLD AUTO: 25.1 %
MAGNESIUM SERPL-MCNC: 2.37 MG/DL (ref 1.6–2.4)
MCH RBC QN AUTO: 28.6 PG (ref 26–34)
MCHC RBC AUTO-ENTMCNC: 30.9 G/DL (ref 32–36)
MCV RBC AUTO: 93 FL (ref 80–100)
MONOCYTES # BLD AUTO: 0.56 X10*3/UL (ref 0.05–0.8)
MONOCYTES NFR BLD AUTO: 10.4 %
NEUTROPHILS # BLD AUTO: 3.21 X10*3/UL (ref 1.6–5.5)
NEUTROPHILS NFR BLD AUTO: 59.6 %
NRBC BLD-RTO: 0 /100 WBCS (ref 0–0)
PLATELET # BLD AUTO: 204 X10*3/UL (ref 150–450)
POTASSIUM SERPL-SCNC: 3.7 MMOL/L (ref 3.5–5.3)
RBC # BLD AUTO: 3.71 X10*6/UL (ref 4.5–5.9)
SODIUM SERPL-SCNC: 140 MMOL/L (ref 136–145)
VANCOMYCIN SERPL-MCNC: 19.4 UG/ML (ref 5–20)
WBC # BLD AUTO: 5.4 X10*3/UL (ref 4.4–11.3)

## 2025-07-20 PROCEDURE — 80048 BASIC METABOLIC PNL TOTAL CA: CPT | Performed by: INTERNAL MEDICINE

## 2025-07-20 PROCEDURE — 85025 COMPLETE CBC W/AUTO DIFF WBC: CPT | Performed by: INTERNAL MEDICINE

## 2025-07-20 PROCEDURE — 36415 COLL VENOUS BLD VENIPUNCTURE: CPT | Performed by: INTERNAL MEDICINE

## 2025-07-20 PROCEDURE — 99233 SBSQ HOSP IP/OBS HIGH 50: CPT | Performed by: INTERNAL MEDICINE

## 2025-07-20 PROCEDURE — 80202 ASSAY OF VANCOMYCIN: CPT

## 2025-07-20 PROCEDURE — 2500000004 HC RX 250 GENERAL PHARMACY W/ HCPCS (ALT 636 FOR OP/ED): Performed by: INTERNAL MEDICINE

## 2025-07-20 PROCEDURE — 2500000005 HC RX 250 GENERAL PHARMACY W/O HCPCS: Performed by: EMERGENCY MEDICINE

## 2025-07-20 PROCEDURE — 2500000004 HC RX 250 GENERAL PHARMACY W/ HCPCS (ALT 636 FOR OP/ED)

## 2025-07-20 PROCEDURE — 87040 BLOOD CULTURE FOR BACTERIA: CPT | Mod: STJLAB | Performed by: INTERNAL MEDICINE

## 2025-07-20 PROCEDURE — 2500000001 HC RX 250 WO HCPCS SELF ADMINISTERED DRUGS (ALT 637 FOR MEDICARE OP): Performed by: INTERNAL MEDICINE

## 2025-07-20 PROCEDURE — 94640 AIRWAY INHALATION TREATMENT: CPT

## 2025-07-20 PROCEDURE — 2500000002 HC RX 250 W HCPCS SELF ADMINISTERED DRUGS (ALT 637 FOR MEDICARE OP, ALT 636 FOR OP/ED): Performed by: INTERNAL MEDICINE

## 2025-07-20 PROCEDURE — 83735 ASSAY OF MAGNESIUM: CPT | Performed by: INTERNAL MEDICINE

## 2025-07-20 PROCEDURE — 1100000001 HC PRIVATE ROOM DAILY

## 2025-07-20 RX ORDER — VANCOMYCIN HYDROCHLORIDE 1.25 G/250ML
1250 INJECTION, SOLUTION INTRAVITREAL EVERY 24 HOURS
Status: DISCONTINUED | OUTPATIENT
Start: 2025-07-20 | End: 2025-07-21 | Stop reason: HOSPADM

## 2025-07-20 RX ORDER — DOXYCYCLINE 100 MG/1
100 CAPSULE ORAL 2 TIMES DAILY
Qty: 14 CAPSULE | Refills: 0 | Status: SHIPPED | OUTPATIENT
Start: 2025-07-20 | End: 2025-07-20

## 2025-07-20 RX ORDER — DOXYCYCLINE 100 MG/1
100 CAPSULE ORAL 2 TIMES DAILY
Qty: 14 CAPSULE | Refills: 0 | Status: SHIPPED | OUTPATIENT
Start: 2025-07-20

## 2025-07-20 RX ADMIN — Medication 4 L/MIN: at 08:00

## 2025-07-20 RX ADMIN — MONTELUKAST 10 MG: 10 TABLET, FILM COATED ORAL at 21:36

## 2025-07-20 RX ADMIN — VANCOMYCIN HYDROCHLORIDE 1250 MG: 1.25 INJECTION, SOLUTION INTRAVITREAL at 17:07

## 2025-07-20 RX ADMIN — PANTOPRAZOLE SODIUM 40 MG: 40 TABLET, DELAYED RELEASE ORAL at 06:06

## 2025-07-20 RX ADMIN — BUDESONIDE 0.5 MG: 0.5 INHALANT RESPIRATORY (INHALATION) at 20:07

## 2025-07-20 RX ADMIN — PROPRANOLOL HYDROCHLORIDE 10 MG: 20 TABLET ORAL at 09:51

## 2025-07-20 RX ADMIN — IPRATROPIUM BROMIDE AND ALBUTEROL SULFATE 3 ML: 2.5; .5 SOLUTION RESPIRATORY (INHALATION) at 20:07

## 2025-07-20 RX ADMIN — PROPRANOLOL HYDROCHLORIDE 10 MG: 20 TABLET ORAL at 21:36

## 2025-07-20 RX ADMIN — ENOXAPARIN SODIUM 40 MG: 40 INJECTION SUBCUTANEOUS at 21:37

## 2025-07-20 RX ADMIN — ATORVASTATIN CALCIUM 40 MG: 40 TABLET, FILM COATED ORAL at 21:36

## 2025-07-20 RX ADMIN — TAMSULOSIN HYDROCHLORIDE 0.4 MG: 0.4 CAPSULE ORAL at 09:51

## 2025-07-20 RX ADMIN — MIRTAZAPINE 15 MG: 15 TABLET, FILM COATED ORAL at 21:36

## 2025-07-20 RX ADMIN — ESCITALOPRAM OXALATE 20 MG: 20 TABLET ORAL at 09:51

## 2025-07-20 RX ADMIN — IPRATROPIUM BROMIDE AND ALBUTEROL SULFATE 3 ML: 2.5; .5 SOLUTION RESPIRATORY (INHALATION) at 15:38

## 2025-07-20 RX ADMIN — Medication 4 L/MIN: at 15:39

## 2025-07-20 RX ADMIN — HYDROXYZINE HYDROCHLORIDE 25 MG: 25 TABLET, FILM COATED ORAL at 07:25

## 2025-07-20 RX ADMIN — EMPAGLIFLOZIN 10 MG: 10 TABLET, FILM COATED ORAL at 09:51

## 2025-07-20 RX ADMIN — Medication 4 L/MIN: at 20:08

## 2025-07-20 RX ADMIN — IPRATROPIUM BROMIDE AND ALBUTEROL SULFATE 3 ML: 2.5; .5 SOLUTION RESPIRATORY (INHALATION) at 09:12

## 2025-07-20 RX ADMIN — TRAMADOL HYDROCHLORIDE 50 MG: 50 TABLET, COATED ORAL at 09:57

## 2025-07-20 RX ADMIN — LEVOTHYROXINE SODIUM 50 MCG: 0.05 TABLET ORAL at 06:07

## 2025-07-20 RX ADMIN — DULOXETINE 60 MG: 60 CAPSULE, DELAYED RELEASE ORAL at 09:51

## 2025-07-20 RX ADMIN — ASPIRIN 81 MG CHEWABLE TABLET 81 MG: 81 TABLET CHEWABLE at 09:51

## 2025-07-20 RX ADMIN — DULOXETINE 60 MG: 60 CAPSULE, DELAYED RELEASE ORAL at 21:36

## 2025-07-20 RX ADMIN — BUDESONIDE 0.5 MG: 0.5 INHALANT RESPIRATORY (INHALATION) at 09:12

## 2025-07-20 ASSESSMENT — COGNITIVE AND FUNCTIONAL STATUS - GENERAL
DRESSING REGULAR UPPER BODY CLOTHING: A LITTLE
PERSONAL GROOMING: A LITTLE
MOVING TO AND FROM BED TO CHAIR: A LITTLE
MOBILITY SCORE: 17
DAILY ACTIVITIY SCORE: 19
WALKING IN HOSPITAL ROOM: A LITTLE
TURNING FROM BACK TO SIDE WHILE IN FLAT BAD: A LITTLE
DRESSING REGULAR LOWER BODY CLOTHING: A LITTLE
HELP NEEDED FOR BATHING: A LITTLE
TOILETING: A LITTLE
MOVING FROM LYING ON BACK TO SITTING ON SIDE OF FLAT BED WITH BEDRAILS: A LITTLE
CLIMB 3 TO 5 STEPS WITH RAILING: A LOT
STANDING UP FROM CHAIR USING ARMS: A LITTLE

## 2025-07-20 ASSESSMENT — PAIN SCALES - GENERAL
PAINLEVEL_OUTOF10: 8
PAINLEVEL_OUTOF10: 7

## 2025-07-20 ASSESSMENT — PAIN DESCRIPTION - ORIENTATION: ORIENTATION: RIGHT

## 2025-07-20 ASSESSMENT — PAIN DESCRIPTION - LOCATION: LOCATION: FOOT

## 2025-07-20 ASSESSMENT — PAIN - FUNCTIONAL ASSESSMENT: PAIN_FUNCTIONAL_ASSESSMENT: 0-10

## 2025-07-20 NOTE — ASSESSMENT & PLAN NOTE
Stable  Will continue to monitor  Will hold off on spironolactone and torsemide today  Repeat labs tomorrow  Monitor renal function

## 2025-07-20 NOTE — CARE PLAN
Problem: Skin  Goal: Decreased wound size/increased tissue granulation at next dressing change  7/19/2025 2345 by Elsie Bergeron RN  Outcome: Progressing  Flowsheets (Taken 7/19/2025 2345)  Decreased wound size/increased tissue granulation at next dressing change: Promote sleep for wound healing  7/19/2025 2344 by Elsie Bergeron RN  Outcome: Progressing     Problem: Skin  Goal: Prevent/minimize sheer/friction injuries  7/19/2025 2345 by Elsie Bergeron RN  Outcome: Progressing  7/19/2025 2344 by Elsie Bergeron RN  Outcome: Progressing     Problem: Skin  Goal: Promote/optimize nutrition  7/19/2025 2345 by Elsie Bergeron RN  Outcome: Progressing  Flowsheets (Taken 7/19/2025 2345)  Promote/optimize nutrition: Offer water/supplements/favorite foods  7/19/2025 2344 by Elsie Bergeron RN  Outcome: Progressing     Problem: Skin  Goal: Promote skin healing  7/19/2025 2345 by Elsie Bergeron RN  Outcome: Progressing  7/19/2025 2344 by Elsie Bergeron RN  Outcome: Progressing   The patient's goals for the shift include Pt to remain free from pain/ discomfort throughout shift    The clinical goals for the shift include Patient will remain safe and stable

## 2025-07-20 NOTE — ASSESSMENT & PLAN NOTE
Pain is improved  ID recommended continuing vancomycin and stopping Zosyn  Repeat cultures this morning  Follow-up culture and susceptibility result  History of MRSA infection  Patient asking if he can go home today but informed him that we will to follow the final culture and only if it is contamination we can consider discharge with IDs recommendation  Appreciate infectious disease input  Discontinuing amlodipine on discharge  Side effects of vancomycin have been discussed with patient will monitor labs and for any rash

## 2025-07-20 NOTE — CARE PLAN
The patient's goals for the shift include Pt to remain free from pain/ discomfort throughout shift    The clinical goals for the shift include Patient will remain safe and stable    Problem: Pain - Adult  Goal: Verbalizes/displays adequate comfort level or baseline comfort level  Outcome: Progressing     Problem: Safety - Adult  Goal: Free from fall injury  Outcome: Progressing     Problem: Chronic Conditions and Co-morbidities  Goal: Patient's chronic conditions and co-morbidity symptoms are monitored and maintained or improved  Outcome: Progressing     Problem: Skin  Goal: Prevent/minimize sheer/friction injuries  Outcome: Progressing     Problem: Skin  Goal: Promote/optimize nutrition  Outcome: Progressing     Problem: Skin  Goal: Promote skin healing  Outcome: Progressing

## 2025-07-20 NOTE — PROGRESS NOTES
Vancomycin Dosing by Pharmacy- FOLLOW UP    Angus Redman is a 69 y.o. year old male who Pharmacy has been consulted for vancomycin dosing for cellulitis, skin and soft tissue. Based on the patient's indication and renal status this patient is being dosed based on a goal AUC of 400-600.     Renal function is currently stable.    Current vancomycin dose: 1500 mg given every 24 hours    Most recent random level: 19.4 mcg/mL.  Based on this info, the predicted AUC is 601.    Visit Vitals  /71 (BP Location: Right arm, Patient Position: Lying)   Pulse 80   Temp 36.1 °C (97 °F) (Temporal)   Resp 18        Lab Results   Component Value Date    CREATININE 1.39 (H) 2025    CREATININE 1.38 (H) 2025    CREATININE 1.47 (H) 2025    CREATININE 1.28 2025        Patient weight is as follows:   Vitals:    25   Weight: 118 kg (260 lb)       Cultures:  No results found for the encounter in last 14 days.       I/O last 3 completed shifts:  In: 2080 (17.6 mL/kg) [P.O.:980; IV Piggyback:1100]  Out: 3460 (29.3 mL/kg) [Urine:3460 (0.8 mL/kg/hr)]  Weight: 117.9 kg   I/O during current shift:  No intake/output data recorded.    Temp (24hrs), Av.1 °C (97 °F), Min:35.9 °C (96.6 °F), Max:36.3 °C (97.4 °F)      Assessment/Plan    Above goal AUC. Orders placed for new vancomcyin regimen of 1250 every 24 hours to begin at 1700.    This dosing regimen is predicted by [employer] to result in the following pharmacokinetic parameters:    Loading dose: N/A  Regimen: 1250 mg IV every 24 hours.  Start time: 16:51 on 2025  Exposure target: AUC24 (range) 400-600 mg/L.hr   [id number]: 483 mg/L.hr  AUC24,ss: 503 mg/L.hr  Probability of AUC24 > 400: 91 %  Ctrough,ss: 14.8 mg/L  Probability of Ctrough,ss > 20: 12 %    The next level will be obtained on  with AM labs. May be obtained sooner if clinically indicated.   Will continue to monitor renal function daily while on vancomycin and order serum creatinine  at least every 48 hours if not already ordered.  Follow for continued vancomycin needs, clinical response, and signs/symptoms of toxicity.       Mari Skaggs, PharmD

## 2025-07-20 NOTE — CARE PLAN
The clinical goals for the shift include Pt will report decreased pain with medication administration      Problem: Pain - Adult  Goal: Verbalizes/displays adequate comfort level or baseline comfort level  Outcome: Progressing     Problem: Safety - Adult  Goal: Free from fall injury  Outcome: Progressing     Problem: Chronic Conditions and Co-morbidities  Goal: Patient's chronic conditions and co-morbidity symptoms are monitored and maintained or improved  Outcome: Progressing     Problem: Nutrition  Goal: Nutrient intake appropriate for maintaining nutritional needs  Outcome: Progressing     Problem: Pain  Goal: STG - Patient verbalizes a reduction in pain level  Outcome: Progressing

## 2025-07-20 NOTE — PROGRESS NOTES
Angus Redman is a 69 y.o. male on day 2 of admission presenting with Cellulitis of left lower extremity.      Subjective   Requesting regular diet and no salt restriction.  Blood culture is still pending results.  Repeat cultures were done this morning         Objective     Last Recorded Vitals  /71 (BP Location: Right arm, Patient Position: Lying)   Pulse 66   Temp 36.1 °C (97 °F) (Temporal)   Resp 18   Wt 118 kg (260 lb)   SpO2 95%   Intake/Output last 3 Shifts:    Intake/Output Summary (Last 24 hours) at 7/20/2025 0944  Last data filed at 7/20/2025 0608  Gross per 24 hour   Intake 1030 ml   Output 2960 ml   Net -1930 ml       Admission Weight  Weight: 118 kg (260 lb) (07/18/25 1228)    Daily Weight  07/18/25 : 118 kg (260 lb)      Physical Exam:  General: Not in acute distress, alert on nasal cannula 4 L  HEENT: PERRLA, head intact and normocephalic  Neck: Normal to inspection  Lungs: Clear to auscultation, work of breathing within normal limit  Cardiac: Regular rate and rhythm  Abdomen: Soft nontender, positive bowel sounds  : Exam deferred  Skin: Wound on left leg is noted  Hematology: No petechia or excessive ecchymosis  Musculoskeletal: Right lower extremity toe amputations are noted.  Neurological: Alert awake oriented, no focal deficit, cranial nerves grossly intact  Psych: No suicidal ideation or homicidal ideation    Relevant Results  Scheduled medications  Scheduled Medications[1]  Continuous medications  Continuous Medications[2]  PRN medications  PRN Medications[3]   Labs  Results from last 7 days   Lab Units 07/20/25  0548 07/19/25  0632 07/18/25  1438   WBC AUTO x10*3/uL 5.4 4.8 8.4   HEMOGLOBIN g/dL 10.6* 10.8* 11.6*   HEMATOCRIT % 34.3* 35.9* 37.6*   PLATELETS AUTO x10*3/uL 204 211 269     Results from last 7 days   Lab Units 07/20/25  0548 07/19/25  0632 07/18/25  1438   SODIUM mmol/L 140 141 138   POTASSIUM mmol/L 3.7 3.9 4.1   CHLORIDE mmol/L 104 106 102   CO2 mmol/L 30 30 27   BUN  mg/dL 12 12 17   CREATININE mg/dL 1.39* 1.38* 1.47*   CALCIUM mg/dL 8.1* 8.3* 8.8   PROTEIN TOTAL g/dL  --  6.2* 7.1   BILIRUBIN TOTAL mg/dL  --  0.7 0.6   ALK PHOS U/L  --  101 116   ALT U/L  --  10 13   AST U/L  --  13 19   GLUCOSE mg/dL 116* 107* 95       Imaging  XR tibia fibula right 2 views  Result Date: 7/18/2025  No acute osseous abnormalities. No evidence of osteomyelitis. Signed by Enrrique Jimenez MD    XR foot left 3+ views  Result Date: 7/18/2025  Degenerative changes of the left with postoperative change.  There is no evidence of acute fracture. Soft tissue ulceration without evidence of underlying osteomyelitis. Signed by Enrrique Jimenez MD      Cardiology, Vascular, and Other Imaging  Lower extremity venous duplex right  Result Date: 7/18/2025  Preliminary Cardiology Report            [facility] [address] [id number]     Tel [phone] Fax [phone]        Preliminary Vascular Lab Report  [facility] US LOWER EXTREMITY VENOUS DUPLEX RIGHT  Patient Name:     PRUDENCIO Alcantar Physician:  [id number] Milligan MD Study Date:       7/18/2025     Ordering Provider:  [id number] ERROL DOVER MRN/PID:          [id number]      Fellow: Accession#:       [id number]  Technologist:       Nancy Merino ROSALIND YOB: 1955     Technologist 2: Gender:           M             Encounter#:         [id number] Admission Status: Emergency     Location Performed: [facility]  Diagnosis/ICD: Right leg swelling-M79.89 Indication:    Limb swelling CPT Codes:     12926 Peripheral venous duplex scan for DVT Limited  Pertinent History: Recent Surgery and Cellulitus.  PRELIMINARY CONCLUSIONS:  Right Lower Venous: No evidence of acute deep vein thrombus visualized in the right lower extremity. There is age indeterminate thrombosis visualized in the small saphenous vein. Additional Findings; Lymph nodes in groin the largest measuring 1.87 cm x 1.06 cm. Additional Findings: Errol Dover MD  notified of SVT findings.  Imaging & Doppler Findings:  Right    Compress     Thrombus SSV Prox    No    Age Indeterminate  Right                 Compressible Thrombus        Flow Distal External Iliac     Yes        None   Spontaneous/Phasic CFV                       Yes        None   Spontaneous/Phasic PFV                       Yes        None FV Proximal               Yes        None   Spontaneous/Phasic FV Mid                    Yes        None FV Distal                 Yes        None Popliteal                 Yes        None   Spontaneous/Phasic Peroneal                  Yes        None PTV                       Yes        None  Left        Flow CFV  Spontaneous/Phasic  VASCULAR PRELIMINARY REPORT completed by Nancy HOWARDT on 7/18/2025 at 3:43:10 PM  ** Final **                       Assessment/Plan   Angus Redman is a 69 y.o. male on day 2 of admission presenting with Cellulitis of left lower extremity.  Assessment & Plan  Cellulitis of left lower extremity  Pain is improved  ID recommended continuing vancomycin and stopping Zosyn  Repeat cultures this morning  Follow-up culture and susceptibility result  History of MRSA infection  Patient asking if he can go home today but informed him that we will to follow the final culture and only if it is contamination we can consider discharge with IDs recommendation  Appreciate infectious disease input  Discontinuing amlodipine on discharge  Side effects of vancomycin have been discussed with patient will monitor labs and for any rash  Chronic respiratory failure with hypoxia and hypercapnia  Asthma with chronic obstructive pulmonary disease (COPD) (Multi)  No active lung issues appreciated  On 4 L nasal cannula at baseline  Breathing treatment  Incentive spirometry  Stage 3a chronic kidney disease (Multi)  Stable  Will continue to monitor  Will hold off on spironolactone and torsemide today  Repeat labs tomorrow  Monitor renal function       Plan discussed with  patient at bedside  Disposition: Awaiting final culture results and ID's recommendation and repeat culture results before considering discharge  High level of MDM based on above issue and discussing plan    This note is created using voice recognition software. All efforts are made to minimize errors, if there are errors there due to transcription.    Steven Feliz  Hospitalist             [1] aspirin, 81 mg, oral, Daily  atorvastatin, 40 mg, oral, Nightly  budesonide, 0.5 mg, nebulization, BID   And  ipratropium-albuteroL, 3 mL, nebulization, 4x daily  DULoxetine, 60 mg, oral, BID  empagliflozin, 10 mg, oral, Daily  enoxaparin, 40 mg, subcutaneous, q24h  escitalopram, 20 mg, oral, Daily  levothyroxine, 50 mcg, oral, Daily before breakfast  mirtazapine, 15 mg, oral, Nightly  montelukast, 10 mg, oral, Nightly  pantoprazole, 40 mg, oral, Daily before breakfast  propranolol, 10 mg, oral, BID  [Held by provider] spironolactone, 25 mg, oral, Daily  tamsulosin, 0.4 mg, oral, Daily  [Held by provider] torsemide, 20 mg, oral, Daily  vancomycin, 1,250 mg, intravenous, q24h     [2]    [3] PRN medications: acetaminophen **OR** acetaminophen **OR** acetaminophen, acetaminophen **OR** acetaminophen **OR** acetaminophen, albuterol, busPIRone, hydrOXYzine HCL, melatonin, ondansetron ODT **OR** ondansetron, oxyCODONE, oxygen, traMADol, vancomycin

## 2025-07-21 VITALS
TEMPERATURE: 97 F | RESPIRATION RATE: 18 BRPM | HEIGHT: 72 IN | BODY MASS INDEX: 35.21 KG/M2 | WEIGHT: 260 LBS | SYSTOLIC BLOOD PRESSURE: 165 MMHG | HEART RATE: 64 BPM | DIASTOLIC BLOOD PRESSURE: 70 MMHG | OXYGEN SATURATION: 99 %

## 2025-07-21 LAB
ANION GAP SERPL CALC-SCNC: 13 MMOL/L (ref 10–20)
BACTERIA BLD AEROBE CULT: ABNORMAL
BACTERIA BLD CULT: ABNORMAL
BASOPHILS # BLD AUTO: 0.01 X10*3/UL (ref 0–0.1)
BASOPHILS NFR BLD AUTO: 0.2 %
BUN SERPL-MCNC: 11 MG/DL (ref 6–23)
CALCIUM SERPL-MCNC: 8.5 MG/DL (ref 8.6–10.3)
CHLORIDE SERPL-SCNC: 103 MMOL/L (ref 98–107)
CO2 SERPL-SCNC: 28 MMOL/L (ref 21–32)
CREAT SERPL-MCNC: 1.22 MG/DL (ref 0.5–1.3)
EGFRCR SERPLBLD CKD-EPI 2021: 63 ML/MIN/1.73M*2
EOSINOPHIL # BLD AUTO: 0.23 X10*3/UL (ref 0–0.4)
EOSINOPHIL NFR BLD AUTO: 3.9 %
ERYTHROCYTE [DISTWIDTH] IN BLOOD BY AUTOMATED COUNT: 17.8 % (ref 11.5–14.5)
GLUCOSE SERPL-MCNC: 96 MG/DL (ref 74–99)
GRAM STN SPEC: ABNORMAL
GRAM STN SPEC: ABNORMAL
HCT VFR BLD AUTO: 35.3 % (ref 41–52)
HGB BLD-MCNC: 10.5 G/DL (ref 13.5–17.5)
IMM GRANULOCYTES # BLD AUTO: 0.03 X10*3/UL (ref 0–0.5)
IMM GRANULOCYTES NFR BLD AUTO: 0.5 % (ref 0–0.9)
LYMPHOCYTES # BLD AUTO: 1.74 X10*3/UL (ref 0.8–3)
LYMPHOCYTES NFR BLD AUTO: 29.8 %
MAGNESIUM SERPL-MCNC: 2.46 MG/DL (ref 1.6–2.4)
MCH RBC QN AUTO: 28 PG (ref 26–34)
MCHC RBC AUTO-ENTMCNC: 29.7 G/DL (ref 32–36)
MCV RBC AUTO: 94 FL (ref 80–100)
MONOCYTES # BLD AUTO: 0.61 X10*3/UL (ref 0.05–0.8)
MONOCYTES NFR BLD AUTO: 10.4 %
NEUTROPHILS # BLD AUTO: 3.22 X10*3/UL (ref 1.6–5.5)
NEUTROPHILS NFR BLD AUTO: 55.2 %
NRBC BLD-RTO: 0 /100 WBCS (ref 0–0)
PLATELET # BLD AUTO: 220 X10*3/UL (ref 150–450)
POTASSIUM SERPL-SCNC: 3.8 MMOL/L (ref 3.5–5.3)
RBC # BLD AUTO: 3.75 X10*6/UL (ref 4.5–5.9)
SODIUM SERPL-SCNC: 140 MMOL/L (ref 136–145)
WBC # BLD AUTO: 5.8 X10*3/UL (ref 4.4–11.3)

## 2025-07-21 PROCEDURE — 82374 ASSAY BLOOD CARBON DIOXIDE: CPT | Performed by: INTERNAL MEDICINE

## 2025-07-21 PROCEDURE — 94640 AIRWAY INHALATION TREATMENT: CPT

## 2025-07-21 PROCEDURE — 83735 ASSAY OF MAGNESIUM: CPT | Performed by: INTERNAL MEDICINE

## 2025-07-21 PROCEDURE — 36415 COLL VENOUS BLD VENIPUNCTURE: CPT | Performed by: INTERNAL MEDICINE

## 2025-07-21 PROCEDURE — 2500000005 HC RX 250 GENERAL PHARMACY W/O HCPCS: Performed by: INTERNAL MEDICINE

## 2025-07-21 PROCEDURE — 2500000001 HC RX 250 WO HCPCS SELF ADMINISTERED DRUGS (ALT 637 FOR MEDICARE OP): Performed by: INTERNAL MEDICINE

## 2025-07-21 PROCEDURE — 2500000002 HC RX 250 W HCPCS SELF ADMINISTERED DRUGS (ALT 637 FOR MEDICARE OP, ALT 636 FOR OP/ED): Performed by: INTERNAL MEDICINE

## 2025-07-21 PROCEDURE — 2500000004 HC RX 250 GENERAL PHARMACY W/ HCPCS (ALT 636 FOR OP/ED): Performed by: INTERNAL MEDICINE

## 2025-07-21 PROCEDURE — 85025 COMPLETE CBC W/AUTO DIFF WBC: CPT | Performed by: INTERNAL MEDICINE

## 2025-07-21 PROCEDURE — 99239 HOSP IP/OBS DSCHRG MGMT >30: CPT | Performed by: INTERNAL MEDICINE

## 2025-07-21 RX ADMIN — BUDESONIDE 0.5 MG: 0.5 INHALANT RESPIRATORY (INHALATION) at 08:53

## 2025-07-21 RX ADMIN — PROPRANOLOL HYDROCHLORIDE 10 MG: 20 TABLET ORAL at 09:22

## 2025-07-21 RX ADMIN — ASPIRIN 81 MG CHEWABLE TABLET 81 MG: 81 TABLET CHEWABLE at 09:22

## 2025-07-21 RX ADMIN — TRAMADOL HYDROCHLORIDE 50 MG: 50 TABLET, COATED ORAL at 01:23

## 2025-07-21 RX ADMIN — ESCITALOPRAM OXALATE 20 MG: 20 TABLET ORAL at 09:22

## 2025-07-21 RX ADMIN — ONDANSETRON 4 MG: 4 TABLET, ORALLY DISINTEGRATING ORAL at 10:26

## 2025-07-21 RX ADMIN — EMPAGLIFLOZIN 10 MG: 10 TABLET, FILM COATED ORAL at 09:22

## 2025-07-21 RX ADMIN — PANTOPRAZOLE SODIUM 40 MG: 40 TABLET, DELAYED RELEASE ORAL at 06:01

## 2025-07-21 RX ADMIN — TAMSULOSIN HYDROCHLORIDE 0.4 MG: 0.4 CAPSULE ORAL at 09:22

## 2025-07-21 RX ADMIN — IPRATROPIUM BROMIDE AND ALBUTEROL SULFATE 3 ML: 2.5; .5 SOLUTION RESPIRATORY (INHALATION) at 08:53

## 2025-07-21 RX ADMIN — DULOXETINE 60 MG: 60 CAPSULE, DELAYED RELEASE ORAL at 09:22

## 2025-07-21 RX ADMIN — LEVOTHYROXINE SODIUM 50 MCG: 0.05 TABLET ORAL at 06:01

## 2025-07-21 RX ADMIN — Medication 3 MG: at 01:23

## 2025-07-21 ASSESSMENT — PAIN DESCRIPTION - LOCATION: LOCATION: FOOT

## 2025-07-21 ASSESSMENT — PAIN - FUNCTIONAL ASSESSMENT
PAIN_FUNCTIONAL_ASSESSMENT: 0-10

## 2025-07-21 ASSESSMENT — PAIN SCALES - GENERAL
PAINLEVEL_OUTOF10: 8
PAINLEVEL_OUTOF10: 0 - NO PAIN
PAINLEVEL_OUTOF10: 2

## 2025-07-21 ASSESSMENT — ACTIVITIES OF DAILY LIVING (ADL): LACK_OF_TRANSPORTATION: NO

## 2025-07-21 ASSESSMENT — COGNITIVE AND FUNCTIONAL STATUS - GENERAL
WALKING IN HOSPITAL ROOM: A LITTLE
STANDING UP FROM CHAIR USING ARMS: A LITTLE
CLIMB 3 TO 5 STEPS WITH RAILING: A LOT
MOVING TO AND FROM BED TO CHAIR: A LITTLE
MOBILITY SCORE: 19

## 2025-07-21 ASSESSMENT — PAIN DESCRIPTION - ORIENTATION: ORIENTATION: RIGHT

## 2025-07-21 NOTE — DISCHARGE SUMMARY
Discharge Diagnosis  Cellulitis of left lower extremity  Positive blood culture with staph epi likely contamination  Chronic hypoxemic respiratory failure           Issues Requiring Follow-Up  For follow-up with primary care provider and wound care    Discharge Meds     Medication List      START taking these medications     doxycycline 100 mg capsule; Commonly known as: Vibramycin; Take 1   capsule (100 mg) by mouth 2 times a day. Take with at least 8 ounces   (large glass) of water, do not lie down for 30 minutes after     CONTINUE taking these medications     albuterol 2.5 mg /3 mL (0.083 %) nebulizer solution; Take 3 mL (2.5 mg)   by nebulization every 6 hours if needed for wheezing.   alendronate 70 mg tablet; Commonly known as: Fosamax; Take 1 tablet by   mouth every 7 days. Take in the morning with a full glass of water at   least 30 minutes before first food, drink, or medications of the day.   aspirin 81 mg chewable tablet; Chew 1 tablet (81 mg) once daily.   atorvastatin 40 mg tablet; Commonly known as: Lipitor; Take 1 tablet (40   mg) by mouth once daily at bedtime.   busPIRone 5 mg tablet; Commonly known as: Buspar; Take 1 tablet by mouth   three times daily as needed for anxiety.   cyanocobalamin 1,000 mcg tablet; Commonly known as: Vitamin B-12; Take 1   tablet by mouth once daily as directed.   DULoxetine 60 mg DR capsule; Commonly known as: Cymbalta; Take 1 capsule   by mouth twice daily.   empagliflozin 10 mg tablet; Commonly known as: Jardiance; Take 1 tablet   (10 mg) by mouth once daily.   ergocalciferol 1250 mcg (50,000 units) capsule; Commonly known as:   Vitamin D-2; Take 1 capsule by mouth once weekly.   escitalopram 20 mg tablet; Commonly known as: Lexapro; Take 1 tablet by   mouth once daily.   FeroSuL 325 mg (65 mg iron) tablet; Generic drug: ferrous sulfate; Take   1 tablet by mouth once daily with breakfast.   folic acid 1 mg tablet; Commonly known as: Folvite; Take 1 tablet by   mouth  daily for folate deficiency.   ipratropium 0.02 % nebulizer solution; Commonly known as: Atrovent; Take   2.5 mL (0.5 mg) by nebulization 4 times a day as needed for shortness of   breath or wheezing.   levothyroxine 50 mcg tablet; Commonly known as: Synthroid, Levoxyl; Take   1 tablet by mouth once daily in the morning before meals.   mirtazapine 15 mg tablet; Commonly known as: Remeron; Take 1 tablet by   mouth once daily at bedtime.   montelukast 10 mg tablet; Commonly known as: Singulair; Take 1 tablet by   mouth once daily at bedtime.   mucus clearing device device; Acapella device.   ondansetron 8 mg tablet; Commonly known as: Zofran   pantoprazole 40 mg EC tablet; Commonly known as: ProtoNix; Take 1 tablet   by mouth twice daily.   pregabalin 150 mg capsule; Commonly known as: Lyrica; Take 1 capsule   (150 mg) by mouth 4 times a day. Do not fill before July 30, 2025.; Start   taking on: July 30, 2025   propranolol 10 mg tablet; Commonly known as: Inderal; Take 1 tablet (10   mg) by mouth 2 times a day.   sennosides-docusate sodium 8.6-50 mg tablet; Commonly known as:   Kelsey-Colace   spironolactone 25 mg tablet; Commonly known as: Aldactone; Take 1 tablet   (25 mg) by mouth once daily. Do not fill before June 6, 2025.   tamsulosin 0.4 mg 24 hr capsule; Commonly known as: Flomax; Take 1   capsule by mouth once daily at bedtime.   torsemide 20 mg tablet; Commonly known as: Demadex; Take 1 tablet (20   mg) by mouth once daily. Do not fill before June 6, 2025.   Trelegy Ellipta 200-62.5-25 mcg blister with device; Generic drug:   fluticasone-umeclidin-vilanter     STOP taking these medications     amLODIPine 2.5 mg tablet; Commonly known as: Norvasc   vancomycin 1,000 mg vial for injection; Commonly known as: Vancocin       Test Results Pending At Discharge  Pending Labs       Order Current Status    Blood Culture Preliminary result    Blood Culture Preliminary result    Blood Culture Preliminary result    Blood  Culture Preliminary result            Hospital Course   69-year-old male with past medical history of chronic respiratory failure with hypoxemia and hypercapnia on 4 L nasal cannula, CKD stage III, morbid obesity, essential hypertension, peripheral neuropathy, 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  Patient was referred by the provider and when in the emergency department he did have significant leg swelling and some erythema.  There was concern for infection and he was started on antibiotics.  Though he had a wound on his left foot not on his right foot.  After receiving his torsemide and being admitted overnight his swelling nearly resolved.  He was awaiting blood cultures and ID was consulted due to positive blood cultures.  Later that blood culture came back for staph epi suggesting contamination at collection.  Patient has no fevers or chills and his CRP was minimally elevated.  Patient will be discharged on short course of antibiotics with doxycycline and follow-up with primary wound care and resume his home health care.  He will need ongoing care for his left lower extremity wound.    Pertinent Physical Exam At Time of Discharge  General: Not in acute distress, alert on nasal cannula 4 L  HEENT: PERRLA, head intact and normocephalic  Neck: Normal to inspection  Lungs: Clear to auscultation, work of breathing within normal limit  Cardiac: Regular rate and rhythm  Abdomen: Soft nontender, positive bowel sounds  : Exam deferred  Skin: Wound on left leg is noted  Hematology: No petechia or excessive ecchymosis  Musculoskeletal: Right lower extremity toe amputations are noted.  Neurological: Alert awake oriented, no focal deficit, cranial nerves grossly intact  Psych: No suicidal ideation or homicidal ideation    Outpatient Follow-Up  Future Appointments    Date Time Provider Department [place]   7/22/2025  9:20 AM James Garrido, DO [id number] [place]   7/23/2025 10:15 AM Boone Bull DPM [id number] [place]   11/25/2025  1:30 PM MD CHYNA [id number] [place]   1/12/2026  2:40 PM Angel Harry DO [id number] [place]   3/25/2026  1:00 PM MD CHYNA [id number] [place]         Steven Feliz MD

## 2025-07-21 NOTE — CARE PLAN
Problem: Chronic Conditions and Co-morbidities  Goal: Patient's chronic conditions and co-morbidity symptoms are monitored and maintained or improved  Outcome: Progressing     Problem: Skin  Goal: Decreased wound size/increased tissue granulation at next dressing change  Outcome: Progressing  Goal: Participates in plan/prevention/treatment measures  Outcome: Progressing   The patient's goals for the shift include Pt to remain free from pain/ discomfort throughout shift    The clinical goals for the shift include Pt will report decreased pain with medication administration

## 2025-07-21 NOTE — PROGRESS NOTES
07/21/25 0854   Discharge Planning   Living Arrangements Alone   Support Systems Family members   Assistance Needed yes   Type of Residence Private residence   Number of Stairs Within Residence 20   Do you have animals or pets at home? Yes   Type of Animals or Pets cats   Home or Post Acute Services In home services   Type of Home Care Services Home nursing visits;Home OT;Home PT;Home health aide   Expected Discharge Disposition Home Health   Does the patient need discharge transport arranged? Yes   [facility] coordination needed? Yes   Has discharge transport been arranged? No   Financial Resource Strain   How hard is it for you to pay for the very basics like food, housing, medical care, and heating? Not hard   Housing Stability   In the last 12 months, was there a time when you were not able to pay the mortgage or rent on time? N   At any time in the past 12 months, were you homeless or living in a shelter (including now)? N   Transportation Needs   In the past 12 months, has lack of transportation kept you from medical appointments or from getting medications? no   In the past 12 months, has lack of transportation kept you from meetings, work, or from getting things needed for daily living? No   Patient Choice   Provider Choice list and CMS website (https://medicare.gov/care-compare#search) for post-acute Quality and Resource Measure Data were provided and reviewed with: Patient   Patient / Family choosing to utilize agency / facility established prior to hospitalization Yes   Stroke Family Assessment   Stroke Family Assessment Needed No   Intensity of Service   Intensity of Service 0-30 min     Met with patient at the bedside, confirmed demographics, insurance, and pcp is Dr. KO Haskins. He lives by himself, does not drive and uses [employer] for transportation. He purchases his groceries from [employer] and pharmacy with [employer]. He has no family or friends who are available to assist with his care. His sister lives  "in [place]. He was recently at [facility] for IV antibiotic therapy and wound management.   He denies financial concerns, able to purchase his medications, and daily expenses.  Received medication education and takes as ordered.   He is on chronic 4 LNC : [facility] is the supplier.   Therapy is recommending a moderate intensity therapy at discharge with an ampac of 15. He is active with [facility] and wants to return home with the same level of care.   Referral sent to [facility]. Message sent to attending to enter \"outgoing\" home car orders.     "

## 2025-07-22 ENCOUNTER — APPOINTMENT (OUTPATIENT)
Facility: CLINIC | Age: 70
End: 2025-07-22
Payer: MEDICARE

## 2025-07-22 ENCOUNTER — PATIENT OUTREACH (OUTPATIENT)
Dept: PRIMARY CARE | Facility: CLINIC | Age: 70
End: 2025-07-22

## 2025-07-22 DIAGNOSIS — E11.43 TYPE 2 DIABETES MELLITUS WITH DIABETIC AUTONOMIC NEUROPATHY, WITHOUT LONG-TERM CURRENT USE OF INSULIN: Primary | ICD-10-CM

## 2025-07-22 DIAGNOSIS — I50.9 HEART FAILURE, UNSPECIFIED HF CHRONICITY, UNSPECIFIED HEART FAILURE TYPE: ICD-10-CM

## 2025-07-22 DIAGNOSIS — M86.8X7 OTHER OSTEOMYELITIS OF RIGHT FOOT: ICD-10-CM

## 2025-07-22 DIAGNOSIS — J44.89 ASTHMA WITH CHRONIC OBSTRUCTIVE PULMONARY DISEASE (COPD) (MULTI): ICD-10-CM

## 2025-07-22 NOTE — PROGRESS NOTES
Discharge facility: [facility]  Discharge diagnosis: Wound infection  Admission date: 7/18/2025  Discharge date:  7/21/2025    PCP Appointment Date: Harriet to schedule  Specialist Appointment Date: 7/23/2025 wound care  Hospital Encounter and Summary: Linked   ED to Hosp-Admission (Discharged) with Steven Feliz MD; Khurram Munoz DO (07/18/2025)   See Discharge assessment below for further details      Wrap Up  Is the patient/caregiver familiar with Advance Care Planning?: Yes (7/22/2025 11:20 AM)  Would the patient like more information on Advance Care Planning?: No (7/22/2025 11:20 AM)    Medications  Medications reviewed with patient/caregiver?: Yes (7/22/2025 11:20 AM)  Is the patient having any side effects they believe may be caused by any medication additions or changes?: No (nausea) (7/22/2025 11:20 AM)  Does the patient have all medications ordered at discharge?: Yes (7/22/2025 11:20 AM)  Care Management Interventions: Provided patient education (7/22/2025 11:20 AM)  Prescription Comments: [facility] is going to visit the patient today and evaluate the nausea. (7/22/2025 11:20 AM)  Care Management Interventions: Notified home health (7/22/2025 11:20 AM)    Appointments  Does the patient have a primary care provider?: Yes (7/22/2025 11:20 AM)  Care Management Interventions: -- (message sent to Harriet to schedule a TCM she will contact Dr Haskins.) (7/22/2025 11:20 AM)  Has the patient kept scheduled appointments due by today?: Yes (7/22/2025 11:20 AM)  Care Management Interventions: Advised patient to keep appointment (7/22/2025 11:20 AM)    Self Management  What is the home health agency?: [facility] (7/22/2025 11:20 AM)  Has home health visited the patient within 72 hours of discharge?: No (7/22/2025 11:20 AM)  Home Health Interventions: Called home health agency (7/22/2025 11:20 AM)    Patient Teaching  Does the patient have access to their discharge instructions?: Yes (7/22/2025 11:20 AM)  Care  Management Interventions: Reviewed instructions with patient (7/22/2025 11:20 AM)  What is the patient's perception of their health status since discharge?: Same (7/22/2025 11:20 AM)  Is the patient/caregiver able to teach back the hierarchy of who to call/visit for symptoms/problems? PCP, Specialist, Home Health nurse, Urgent Care, ED, 911: Yes (7/22/2025 11:20 AM)  Patient/Caregiver Education Comments: Patient c/o nausea.  He is not sure if it from the Doxcycline.  Home care is comine out today (7/22)  to evaluate the patient and his O2 concentrator. (7/22/2025 11:20 AM)     TCM outreach completed. 7/22/2025  An in-basket message was sent to PRACTICE STAFF to assist with scheduling patient for 14 day follow-up.    Hospital Discharge Date: 7/21/2025  In order to bill as a TCM, the patient needs to be seen by: 8/3/2025

## 2025-07-23 ENCOUNTER — APPOINTMENT (OUTPATIENT)
Dept: WOUND CARE | Facility: CLINIC | Age: 70
End: 2025-07-23
Payer: MEDICARE

## 2025-07-23 LAB — BACTERIA BLD CULT: NORMAL

## 2025-07-24 ENCOUNTER — TELEPHONE (OUTPATIENT)
Dept: PRIMARY CARE | Facility: CLINIC | Age: 70
End: 2025-07-24
Payer: MEDICARE

## 2025-07-24 DIAGNOSIS — D64.9 ANEMIA, UNSPECIFIED TYPE: ICD-10-CM

## 2025-07-24 DIAGNOSIS — M85.80 OSTEOPENIA, UNSPECIFIED LOCATION: ICD-10-CM

## 2025-07-24 DIAGNOSIS — J45.20 MILD INTERMITTENT REACTIVE AIRWAY DISEASE WITHOUT COMPLICATION (HHS-HCC): ICD-10-CM

## 2025-07-24 DIAGNOSIS — I50.30 STAGE C DIASTOLIC HEART FAILURE: ICD-10-CM

## 2025-07-24 LAB
BACTERIA BLD CULT: NORMAL
BACTERIA BLD CULT: NORMAL

## 2025-07-24 NOTE — TELEPHONE ENCOUNTER
Tried calling patient to set up follow up appt. Patient has a voicemail box that is not set up to leave messages.

## 2025-07-25 DIAGNOSIS — G62.9 PERIPHERAL NEUROPATHY: ICD-10-CM

## 2025-07-25 RX ORDER — MONTELUKAST SODIUM 10 MG/1
10 TABLET ORAL NIGHTLY
Qty: 90 TABLET | Refills: 2 | Status: SHIPPED | OUTPATIENT
Start: 2025-07-25

## 2025-07-25 RX ORDER — TORSEMIDE 20 MG/1
20 TABLET ORAL 3 TIMES WEEKLY
Qty: 36 TABLET | Refills: 2 | Status: SHIPPED | OUTPATIENT
Start: 2025-07-25

## 2025-07-25 RX ORDER — ALENDRONATE SODIUM 70 MG/1
TABLET ORAL
Qty: 12 TABLET | Refills: 2 | Status: SHIPPED | OUTPATIENT
Start: 2025-07-25

## 2025-07-25 RX ORDER — PREGABALIN 150 MG/1
150 CAPSULE ORAL 4 TIMES DAILY
Qty: 120 CAPSULE | Refills: 3 | Status: SHIPPED | OUTPATIENT
Start: 2025-07-30 | End: 2025-08-29

## 2025-07-25 RX ORDER — FERROUS SULFATE 325(65) MG
1 TABLET ORAL
Qty: 90 TABLET | Refills: 2 | Status: SHIPPED | OUTPATIENT
Start: 2025-07-25

## 2025-07-28 ENCOUNTER — APPOINTMENT (OUTPATIENT)
Facility: CLINIC | Age: 70
End: 2025-07-28
Payer: MEDICARE

## 2025-07-28 VITALS
OXYGEN SATURATION: 92 % | RESPIRATION RATE: 22 BRPM | WEIGHT: 243.2 LBS | BODY MASS INDEX: 32.94 KG/M2 | HEIGHT: 72 IN | SYSTOLIC BLOOD PRESSURE: 112 MMHG | HEART RATE: 86 BPM | DIASTOLIC BLOOD PRESSURE: 68 MMHG | TEMPERATURE: 97.2 F

## 2025-07-28 DIAGNOSIS — J44.1 COPD EXACERBATION (MULTI): ICD-10-CM

## 2025-07-28 DIAGNOSIS — Z99.81 CHRONIC HYPOXIC RESPIRATORY FAILURE, ON HOME OXYGEN THERAPY: ICD-10-CM

## 2025-07-28 DIAGNOSIS — J96.11 CHRONIC HYPOXIC RESPIRATORY FAILURE, ON HOME OXYGEN THERAPY: ICD-10-CM

## 2025-07-28 DIAGNOSIS — G47.33 OSA (OBSTRUCTIVE SLEEP APNEA): Primary | ICD-10-CM

## 2025-07-28 PROCEDURE — 1159F MED LIST DOCD IN RCRD: CPT | Performed by: INTERNAL MEDICINE

## 2025-07-28 PROCEDURE — 99215 OFFICE O/P EST HI 40 MIN: CPT | Performed by: INTERNAL MEDICINE

## 2025-07-28 PROCEDURE — 3078F DIAST BP <80 MM HG: CPT | Performed by: INTERNAL MEDICINE

## 2025-07-28 PROCEDURE — 1111F DSCHRG MED/CURRENT MED MERGE: CPT | Performed by: INTERNAL MEDICINE

## 2025-07-28 PROCEDURE — 3074F SYST BP LT 130 MM HG: CPT | Performed by: INTERNAL MEDICINE

## 2025-07-28 PROCEDURE — 3008F BODY MASS INDEX DOCD: CPT | Performed by: INTERNAL MEDICINE

## 2025-07-28 ASSESSMENT — ENCOUNTER SYMPTOMS
OCCASIONAL FEELINGS OF UNSTEADINESS: 1
DEPRESSION: 0
WOUND: 1
COUGH: 1
SHORTNESS OF BREATH: 1
LOSS OF SENSATION IN FEET: 1

## 2025-07-28 NOTE — PROGRESS NOTES
Patient: Angus Redman    [id number]  : 1955 -- AGE 69 y.o.    Provider: James Garrido DO     Location [facility]   Service Date: 2025         Department of Medicine  Division of Pulmonary, Critical Care, and Sleep Medicine         [facility]  Follow Up Visit Note        HISTORY OF PRESENT ILLNESS     HISTORY OF PRESENT ILLNESS   Angus Redman is a 69 y.o. male who presents to a [facility] for a follow up visit with concerns of No chief complaint on file.. I have independently interviewed and examined the patient in the office and reviewed available records.      2025:  The patient presents today in respiratory follow-up.  He is doing better from a respiratory perspective.  He has maintained on albuterol nebulizer along with Trelegy and Atrovent nebulizer.  He appears comfortable.  He last had chest imaging in 2025 which demonstrated some degree of pleural thickening with calcified pleural plaques.  He has unchanged areas of scattered fibrosis. The patient recently had two toes amputated and was hospitalized subsequently at [facility].   Patient reports excessive daytime sleepiness.           Current History 2024    Acute on chronic hypoxic respiratory failure  Moderate to large pleural effusion - transudative   Bilateral pleural thickening  Possible underlying COPD with signs of acute exacerbation  Restrictive lung disease Pleural calcification  No history to suggest asbestos exposure    He would get dyspneic with 1 flight of stairs. He uses rollator to ambulate. Walking around his home he would have dyspnea. His wound care RN recommend he be seen in the hospital. He initially was placed on 6L. He is on 4L O2.  Currently  at rest is not short of breath. He has to stop for breath after walking about 100 meters or a few minutes (mMRC 3). He is unaware when things worsened.  He also denies  orthopnea, but has tiffani and leg cellulitis . He has gained X 10 pounds in the last X 12 months. C/o cough in the am, with cloudy to brown secretions. He has a nebulizer helped with clearing secretions.  He also  wheezing, and denies  green, blood streaks, but no sputum. No night cough. No hemoptysis. No fever or shivering chills. He has a runny nose, and a tingling sensation in the back of his throat. Has seasonal allergies - dust mites, mold,pollen, and latex . He denies chest pain or heartburn.     Previous pulmonary history:   Multiple episodes of PNA non currently   He has no history of recurrent infections, or lung disease as a child.  He had no previous lung hx, never on oxygen or inhaler therapy.     Inhalers/nebulized medications: albuterol     Hospitalization History:  Has  been hospitalized over the last year for breathing related problem.      Sleep history:  Denies snoring, apnea, feeling tired during the day or taking naps during the day.       7/28/2025    On today's visit, the patient reports having more congestion, productive cough with clear frothy. C/o wheezing. He is short of breath at rest. He has pulsed O2 needs continuous flow.   He was in the skilled facility as he had toe ambutation and skin graft. And physically therapy. From Nov- middle of January.  He was getting worsening cough and getting worse. He was given cough medicine.   Denies chest pain   Denies runny nose or throat clearing  Denies GERD    He uses nebulizer 2-3 times a day which helps break up congestion.   On montelkast   Last eosinophils  626      REVIEW OF SYSTEMS     REVIEW OF SYSTEMS  Review of Systems   Respiratory:  Positive for cough and shortness of breath.    Skin:  Positive for wound.        Lower leg cellulitlits    All other systems reviewed and are negative.        ALLERGIES AND MEDICATIONS     ALLERGIES  Allergies   Allergen Reactions    Lisinopril Hives    Codeine GI Upset    House Dust Mite Runny nose     Hydrocodone-Acetaminophen Nausea/vomiting    Latex Hives and Rash     Latex tube/connecter kit/gloves      Mold Runny nose    Tree And Shrub Pollen Runny nose       MEDICATIONS  Current Outpatient Medications   Medication Sig Dispense Refill    albuterol 2.5 mg /3 mL (0.083 %) nebulizer solution Take 3 mL (2.5 mg) by nebulization every 6 hours if needed for wheezing. (Patient taking differently: Take 3 mL (2.5 mg) by nebulization 4 times a day.) 120 mL 11    alendronate (Fosamax) 70 mg tablet Take 1 tablet by mouth every 7 days. Take in the morning with a full glass of water at least 30 minutes before first food, drink, or medications of the day. 12 tablet 2    aspirin 81 mg chewable tablet Chew 1 tablet (81 mg) once daily. 90 tablet 3    atorvastatin (Lipitor) 40 mg tablet Take 1 tablet (40 mg) by mouth once daily at bedtime. 90 tablet 3    busPIRone (Buspar) 5 mg tablet Take 1 tablet by mouth three times daily as needed for anxiety. 180 tablet 2    cyanocobalamin (Vitamin B-12) 1,000 mcg tablet Take 1 tablet by mouth once daily as directed. 90 tablet 3    doxycycline (Vibramycin) 100 mg capsule Take 1 capsule (100 mg) by mouth 2 times a day. Take with at least 8 ounces (large glass) of water, do not lie down for 30 minutes after 14 capsule 0    DULoxetine (Cymbalta) 60 mg DR capsule Take 1 capsule by mouth twice daily. 60 capsule 0    empagliflozin (Jardiance) 10 mg tablet Take 1 tablet (10 mg) by mouth once daily. 30 tablet 11    ergocalciferol (Vitamin D-2) 1250 mcg (50,000 units) capsule Take 1 capsule by mouth once weekly. 12 capsule 3    escitalopram (Lexapro) 20 mg tablet Take 1 tablet by mouth once daily. 90 tablet 1    FeroSuL 325 mg (65 mg iron) tablet Take 1 tablet by mouth once daily with breakfast. 90 tablet 2    fluticasone-umeclidin-vilanter (Trelegy Ellipta) 200-62.5-25 mcg blister with device Inhale 1 puff once daily.      folic acid (Folvite) 1 mg tablet Take 1 tablet by mouth daily for folate  deficiency. 90 tablet 2    ipratropium (Atrovent) 0.02 % nebulizer solution Take 2.5 mL (0.5 mg) by nebulization 4 times a day as needed for shortness of breath or wheezing. (Patient taking differently: Take 2.5 mL (0.5 mg) by nebulization 4 times a day.)      levothyroxine (Synthroid, Levoxyl) 50 mcg tablet Take 1 tablet by mouth once daily in the morning before meals. 90 tablet 1    mirtazapine (Remeron) 15 mg tablet Take 1 tablet by mouth once daily at bedtime. 90 tablet 1    montelukast (Singulair) 10 mg tablet Take 1 tablet by mouth once daily at bedtime. 90 tablet 2    mucus clearing device device Acapella device. 1 each 0    ondansetron (Zofran) 8 mg tablet Take 1 tablet (8 mg) by mouth every 8 hours if needed for nausea or vomiting.      pantoprazole (ProtoNix) 40 mg EC tablet Take 1 tablet by mouth twice daily. 180 tablet 3    [START ON 7/30/2025] pregabalin (Lyrica) 150 mg capsule Take 1 capsule (150 mg) by mouth 4 times a day. Do not fill before July 30, 2025. 120 capsule 3    propranolol (Inderal) 10 mg tablet Take 1 tablet (10 mg) by mouth 2 times a day. 270 tablet 3    sennosides-docusate sodium (Kelsey-Colace) 8.6-50 mg tablet Take 1 tablet by mouth 2 times a day as needed for constipation.      spironolactone (Aldactone) 25 mg tablet Take 1 tablet (25 mg) by mouth once daily. Do not fill before June 6, 2025. 30 tablet 11    tamsulosin (Flomax) 0.4 mg 24 hr capsule Take 1 capsule by mouth once daily at bedtime. 90 capsule 3    torsemide (Demadex) 20 mg tablet Take 1 tablet by mouth three times weekly. 36 tablet 2     No current facility-administered medications for this visit.         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), Laceration without foreign body of scalp, initial encounter (09/10/2015), Nausea (04/15/2014), 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 (07/27/2017), Osteomyelitis, unspecified (02/26/2022), Other chest pain (09/21/2013), Other conditions influencing health status, Other conditions influencing health status (10/08/2017), Other specified health status (07/23/2014), Patient's noncompliance with other medical treatment and regimen due to unspecified reason (10/28/2016), Patient's other noncompliance with medication regimen (06/04/2016), Personal history of (healed) stress fracture (12/30/2013), Personal history of diseases of the skin and subcutaneous tissue (02/26/2022), Personal history of other diseases of the nervous system and sense organs (03/25/2016), Personal history of other diseases of the nervous system and sense organs (02/02/2016), Personal history of other endocrine, nutritional and metabolic disease (07/13/2015), Personal history of other specified conditions (07/28/2019), Personal history of other specified conditions (05/07/2018), Personal history of other specified conditions (06/25/2015), Unspecified fracture of left foot, initial encounter for closed fracture (06/04/2016), Unspecified injury of head, initial encounter (04/20/2016), Unspecified injury of unspecified elbow, initial encounter (04/07/2017), and Unsteadiness on feet (04/15/2014).       PAST SURGICAL HISTORY  Past Surgical History:   Procedure Laterality Date    COLONOSCOPY  10/09/2013    Complete Colonoscopy    IR CVC PICC  6/4/2025    IR CVC PICC 6/4/2025 Agustin Escoto MD [facility] ANGIO    OTHER SURGICAL HISTORY  07/28/2019    Insertion of cardiac monitor    OTHER SURGICAL HISTORY  04/15/2014    Reported Hx Of Hip Replacement -  Left Side    OTHER SURGICAL HISTORY  01/28/2020    Hip replacement    OTHER SURGICAL HISTORY  11/24/2015    Interrogation Of Implantable Loop Recorder By Physician In Person    OTHER SURGICAL HISTORY  04/19/2017    Arthroscopy Elbow Left    OTHER SURGICAL HISTORY  10/09/2013    Shoulder Surgery Left    SKIN CANCER EXCISION  10/09/2013    Mohs Micrographic Surgery Face       IMMUNIZATION HISTORY  Immunization History   Administered Date(s) Administered    Flu vaccine (IIV4), preservative free *Check age/dose* 09/09/2015, 09/01/2016, 10/06/2017    Flu vaccine, quadrivalent, high-dose, preservative free, age 65y+ (FLUZONE) 09/13/2022    Flu vaccine, trivalent, preservative free, HIGH-DOSE, age 65y+ (Fluzone) 09/19/2019    Flu vaccine, trivalent, preservative free, age 6 months and greater (Fluarix/Fluzone/Flulaval) 09/21/2015    Hep A, Unspecified 01/28/2015    Hepatitis A vaccine, age 19 years and greater (HAVRIX) 07/07/2014, 01/28/2015    Influenza Nasal, Unspecified 09/30/2012    Influenza, Seasonal, Quadrivalent, Adjuvanted 10/21/2020, 08/30/2021, 09/29/2023    Influenza, seasonal, injectable 09/28/2012, 09/21/2015, 08/30/2021, 10/18/2023    Influenza, trivalent, adjuvanted 09/18/2018, 10/11/2024    Moderna COVID-19 vaccine, 12 years and older (50mcg/0.5mL)(Spikevax) 09/29/2023, 10/11/2024    Moderna COVID-19 vaccine, bivalent, blue cap/gray label *Check age/dose* 09/13/2022    Moderna SARS-CoV-2 Vaccination 02/08/2021, 02/13/2021, 03/13/2021, 11/08/2021, 04/01/2022, 09/13/2022    PPD Test 02/08/2014    Pneumococcal conjugate vaccine, 13-valent (PREVNAR 13) 06/02/2016    Pneumococcal polysaccharide vaccine, 23-valent, age 2 years and older (PNEUMOVAX 23) 12/15/2013, 01/28/2015, 09/24/2020, 05/28/2021    RESPIRATORY SYNCYTIAL VIRUS (RSV), ELIGIBLE PREGNANT PTS, 0.5 ML (ABRYSVO) 09/29/2023    SARS-CoV-2, Unspecified 10/12/2023    Td vaccine, age 7 years and older (TDVAX) 01/01/2005    Tdap vaccine, age 7 year and  older (BOOSTRIX, ADACEL) 07/21/2014, 11/18/2024    Zoster vaccine, recombinant, adult (SHINGRIX) 10/21/2020, 05/11/2021    Zoster, live 10/09/2013       SOCIAL HISTORY  He  reports that he quit smoking about 26 years ago. His smoking use included cigarettes. He has never used smokeless tobacco. He reports that he does not currently use alcohol. He reports that he does not use drugs. 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  DOES/DOES NOT: does have exposure to birds or exotic animals. Had pet birds for 10 years about in [id number] has 3 cats     FAMILY HISTORY  Family History   Problem Relation Name Age of Onset    Other (cardiac pacemaker) Mother      Coronary artery disease Mother      Other (history of heart artery stent) Mother      Cancer Mother      Other (kurtis cell cancer) Mother      Arthritis Mother      Mental illness Brother          living in handicapped assisted living facility permanently [Other]    Other (angina pectoris) Paternal Grandmother       DOES/DOES NOT: does have a family history of pulmonary disease. Mother was heavy smoker   DOES/DOES NOT: does have a family history of cancer.  DOES/DOES NOT: does not have a family history of autoimmune disorders.    PHYSICAL EXAM     VITAL SIGNS: There were no vitals taken for this visit. needs 4 L but runs out    PREVIOUS WEIGHTS:  Wt Readings from Last 3 Encounters:   07/18/25 118 kg (260 lb)   07/01/25 114 kg (252 lb)   05/30/25 118 kg (260 lb 8 oz)       Physical Exam  Constitutional:       Appearance: Normal appearance. He is ill-appearing.   HENT:      Head: Normocephalic and atraumatic.      Right Ear: External ear normal.      Left Ear: External ear normal.      Nose: Nose normal.      Mouth/Throat:      Mouth: Mucous membranes are  moist.      Pharynx: Oropharynx is clear.     Eyes:      Extraocular Movements: Extraocular movements intact.      Conjunctiva/sclera: Conjunctivae normal.      Pupils: Pupils are equal, round, and reactive to light.       Cardiovascular:      Rate and Rhythm: Normal rate and regular rhythm.      Pulses: Normal pulses.      Heart sounds: Normal heart sounds.   Pulmonary:      Effort: Pulmonary effort is normal.      Breath sounds: Wheezing present.   Abdominal:      General: Bowel sounds are normal.      Palpations: Abdomen is soft.     Musculoskeletal:         General: Normal range of motion.      Cervical back: Normal range of motion and neck supple.      Right lower leg: No edema.      Left lower leg: No edema.     Skin:     General: Skin is warm and dry.     Neurological:      General: No focal deficit present.      Mental Status: He is alert and oriented to person, place, and time. Mental status is at baseline.     Psychiatric:         Mood and Affect: Mood normal.         Behavior: Behavior normal.         Thought Content: Thought content normal.         Judgment: Judgment normal.           RESULTS/DATA     Pulmonary Function Test Results        Complete Pulmonary Function Test Pre/Post Bronchodialator (Spirometry Pre/Post/DLCO/Lung Volumes) 4/22/2024  Status: Final result     Study Result    Narrative & Impression   Spirometry shows no obstruction. There is no significant bronchodilator response. Lung volumes indicate a moderate restrictive defect. Spirometry (normal FEV1/FVC) and lung volumes (increased RV/TLC) suggest a complex restrictive ventilatory impairment. The DLCO corrected for hemoglobin is moderately reduced. Low DLCO with low/normal KCO is consistent with a loss of alveoli capillary structure with loss of lung volume. 6MWT: distance was 79 meters. Test was done on 3 LPM of supplemental O2. End of exercise SPO2 95%, HR 90.     Scans on Order [id number]      Pulmonary Function Test - Scan on  4/22/2024  2:22 PM                                          Chest Radiograph     XR CHEST 1 VIEW;  12/26/2024       INDICATION:  Signs/Symptoms:Short of breath.      COMPARISON:  CXR 11/18/2024      ACCESSION NUMBER(S):  [id number]      ORDERING CLINICIAN:  NICOLLE NICOLE      FINDINGS:  Chronic bilateral rib deformities, unchanged compared to prior. No  acute osseous abnormalities. Upper abdomen and soft tissues appear  unremarkable.      Cardiomediastinal contour is unchanged.      Right-sided pleural calcifications are again seen, better  characterized on prior CT. Small right pleural effusion persists.  Left costophrenic angle blunting may represent trace effusion or  thickening. No pneumothorax.      Slight obscuration of the left hemidiaphragm secondary to a patchy  retrocardiac opacity. Right basilar consolidation is unchanged.      IMPRESSION:  Compared to 11/18/2024:  1. Patchy retrocardiac opacity may represent aspiration, atelectasis,  or pneumonia.  2. Right basilar consolidation is unchanged.  3. Small right pleural effusion is unchanged.  4. Increased left costophrenic angle blunting may represent a trace  effusion or thickening.  5. Chronic right pleural calcification.      MACRO:  None      Signed by: Easton Romano 12/26/2024 9:04 PM  Dictation workstation:   [id number]    Chest CT Scan     CT ANGIO CHEST FOR PULMONARY EMBOLISM;  12/26/2024           IMPRESSION:  No evidence of acute pulmonary embolism.      Multiple chronic left rib fractures and small left pleural effusion  and stable small right pleural effusion with pleural thickening and  pleural calcifications and basilar atelectasis.        Echocardiogram     TRANSTHORACIC ECHOCARDIOGRAM REPORT 4/11/2024        Patient Name:      PRUDENCIO Alcantar Physician:    [id number] Corey Hsu MD  Study Date:        4/11/2024            Ordering Provider:    [id number] FLORES        PHYSICIAN INTERPRETATION:  Left Ventricle: The left ventricular systolic function is normal, with an estimated ejection fraction of 60-65%. There are no regional wall motion abnormalities. The left ventricular cavity size is normal. Left ventricular diastolic filling was not assessed.  Left Atrium: The left atrium is upper limits of normal in size. A bubble study using agitated saline was performed. Bubble study is negative.  Right Ventricle: The right ventricle is normal in size. There is normal right ventricular global systolic function.  Right Atrium: The right atrium is normal in size.  Aortic Valve: The aortic valve was not well visualized. There is no evidence of aortic valve regurgitation.  Mitral Valve: The mitral valve is normal in structure. There is no evidence of mitral valve regurgitation.  Tricuspid Valve: The tricuspid valve was not well visualized. There is trace tricuspid regurgitation. The right ventricular systolic pressure is unable to be estimated.  Pulmonic Valve: The pulmonic valve was not assessed. Pulmonic valve regurgitation was not assessed.  Pericardium: There is a trivial pericardial effusion.  Aorta: The aortic root is normal.  Systemic Veins: The inferior vena cava appears to be of normal size. There is IVC inspiratory collapse greater than 50%.        CONCLUSIONS:   1. Left ventricular systolic function is normal with a 60-65% estimated ejection fraction.   2. Poorly visualized anatomical structures due to suboptimal image quality.     QUANTITATIVE DATA SUMMARY:  TRICUSPID VALVE/RVSP:                    Normal Ranges:  IVC Diam: 2.10 cm          Labwork   Complete Blood Count  Lab Results   Component Value Date    WBC 5.8 07/21/2025    HGB 10.5 (L) 07/21/2025    HCT 35.3 (L) 07/21/2025    MCV 94 07/21/2025     07/21/2025       Peripheral Eosinophil Count/Percentage:   Eosinophils Absolute (x10*3/uL)   Date Value   07/21/2025 0.23     Eosinophils % (%)   Date Value  "  07/21/2025 3.9       Serum Immunoglobulin E:    No results found for: \"IGE\"       ASSESSMENT/PLAN     Mr. Redman is a 69 y.o. 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 (07/27/2017), Osteomyelitis, unspecified (02/26/2022), Other chest pain (09/21/2013), Other conditions influencing health status, Other conditions influencing health status (10/08/2017), Other specified health status (07/23/2014), Patient's noncompliance with other medical treatment and regimen due to unspecified reason (10/28/2016), Patient's other noncompliance with medication regimen (06/04/2016), Personal history of (healed) stress fracture (12/30/2013), Personal history of diseases of the skin and subcutaneous tissue (02/26/2022), Personal history of other diseases of the nervous system and sense organs (03/25/2016), Personal history of other diseases of the nervous system and sense organs (02/02/2016), Personal history of other endocrine, nutritional and metabolic disease (07/13/2015), Personal history of other specified conditions (07/28/2019), Personal history of other specified conditions (05/07/2018), Personal history of other specified conditions (06/25/2015), Unspecified fracture of left foot, initial encounter for closed fracture (06/04/2016), Unspecified injury of head, initial encounter (04/20/2016), Unspecified injury of unspecified elbow, initial " encounter (04/07/2017), and Unsteadiness on feet (04/15/2014). He presents to the [facility] for follow up of chronic respiratory failure     Problem List and Orders      Assessment and Plan / Recommendations:    60 pack year history, chronic respiratory failure history, history of asthma   - albuterol hfa 2 puffs or albuterol nebs every 4-6 hours as needed  PFT reveals no obstruction but lung volumes show moderate restrictive defect moderately diffusion pattern  6 minute walk test without desaturation on 3 L   - will repeat PFT in April   Elevated eosinophils - Last eosinophils  626 --->  cont montelukast   - stopped ICS/LABA as too hard to push button to expel - trial to switch ICS/LABA/LAMA 1 puff once day, rince and spit afterwards, sample given     Abnormal CT chest - Pleural effusions  Had thoracentesis on the right in Sept 2024  Patient CT chest in late Dec with increased on the left. 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

## 2025-07-30 ENCOUNTER — APPOINTMENT (OUTPATIENT)
Dept: PRIMARY CARE | Facility: CLINIC | Age: 70
End: 2025-07-30
Payer: MEDICARE

## 2025-07-30 ENCOUNTER — APPOINTMENT (OUTPATIENT)
Dept: WOUND CARE | Facility: CLINIC | Age: 70
End: 2025-07-30
Payer: MEDICARE

## 2025-07-31 ENCOUNTER — TELEPHONE (OUTPATIENT)
Dept: PRIMARY CARE | Facility: CLINIC | Age: 70
End: 2025-07-31
Payer: MEDICARE

## 2025-07-31 NOTE — TELEPHONE ENCOUNTER
[facility] [phone]    Patient was seen today for therapy, patient had a fall yesterday, was able to pull himself up, the fall happened in kitchen, no injuries.   Also, patient has very poor adherence to wearing oxygen, it goes down to low 80's. It comes back up, after patient sits down.  Patient was advised of the importance of wearing his oxygen. REJI

## 2025-08-06 ENCOUNTER — PATIENT OUTREACH (OUTPATIENT)
Dept: PRIMARY CARE | Facility: CLINIC | Age: 70
End: 2025-08-06
Payer: MEDICARE

## 2025-08-06 DIAGNOSIS — J45.20 MILD INTERMITTENT REACTIVE AIRWAY DISEASE WITHOUT COMPLICATION (HHS-HCC): ICD-10-CM

## 2025-08-06 DIAGNOSIS — I50.30 STAGE C DIASTOLIC HEART FAILURE: ICD-10-CM

## 2025-08-06 NOTE — PROGRESS NOTES
Care Management Monthly Outreach  Chart review completed  Confirmation of at least 2 patient identifiers  Change in insurance? No    Has patient been to ER/Urgent Care since last outreach? No    Last Office Visit with PCP: 5/22/2025   Next Office Visit with PCP: 11/25/2025   APC Collaboration: n/a    Chronic Conditions and Outreach Summary:   Mild intermittent reactive airway disease without complication ([facility])    Stage C diastolic heart failure    Medications:   Are there medication changes since last visit? No  Refills needed? No    Social Drivers of Health: Addressed today  Care Gaps Addressed? Addressed today  Care Plan addressed: Yes    Upcoming Appointments:   Future Appointments       Date / Time Provider Department Dept Phone    10/27/2025 1:00 PM James Garrido, DO [facility] [phone]    11/25/2025 1:30 PM (Arrive by 1:15 PM) Artie Haskins MD [facility] Internal [facility] [phone]    1/12/2026 2:40 PM Angel Harry, DO [facility]     3/25/2026 1:00 PM (Arrive by 12:45 PM) Artie Haskins MD [facility] Internal Medicine [phone]          Blood Pressures Reviewed  BP Readings from Last 3 Encounters:   07/28/25 112/68   07/21/25 165/70   07/01/25 108/66     Labs Reviewed:  Lab Results   Component Value Date    CREATININE 1.22 07/21/2025    GLUCOSE 96 07/21/2025    ALKPHOS 101 07/19/2025    K 3.8 07/21/2025    PROT 6.2 (L) 07/19/2025     07/21/2025    CALCIUM 8.5 (L) 07/21/2025    AST 13 07/19/2025    ALT 10 07/19/2025    BUN 11 07/21/2025    MG 2.46 (H) 07/21/2025    PHOS 4.4 06/05/2025     09/23/2024    GFRMALE 70 10/10/2022     Lab Results   Component Value Date    TRIG 104 09/23/2024    CHOL 203 (H) 02/13/2024    LDLCALC 136 (H) 02/13/2024    HDL 40.6 02/13/2024     Lab Results   Component Value Date    HGBA1C 4.9 12/19/2023    HGBA1C 4.7 02/23/2018     Lab Results   Component Value Date    WBC 5.8 07/21/2025    RBC 3.75 (L) 07/21/2025    HGB 10.5  (L) 07/21/2025     07/21/2025   Patient has had 1 fall since we last spoke.  He fell in his kitchen and was able to pull himself up.  Patient has completed treatment with PT/OT.  He is waiting to hear back from them regarding more approved visits.  Patient will continue with home exercise program.  Patient did walk outside for the first time yesterday with PT.  This is a great accomplishment for the patient, and I congratulated him.  Per the wound nurse his left foot is healed.  His right foot is healing well.  No refills needed at this time.    Agreeable to continue monthly outreaches.  Encouraged to call if questions or concerns arise.    Evelyn Valentin RN

## 2025-08-10 DIAGNOSIS — R26.9 ABNORMALITY OF GAIT AND MOBILITY: ICD-10-CM

## 2025-08-12 RX ORDER — PROPRANOLOL HYDROCHLORIDE 10 MG/1
10 TABLET ORAL 2 TIMES DAILY
Qty: 180 TABLET | Refills: 3 | Status: SHIPPED | OUTPATIENT
Start: 2025-08-12

## 2025-08-23 DIAGNOSIS — I10 ESSENTIAL HYPERTENSION: ICD-10-CM

## 2025-08-24 RX ORDER — AMLODIPINE BESYLATE 2.5 MG/1
2.5 TABLET ORAL DAILY
Qty: 90 TABLET | Refills: 2 | Status: SHIPPED | OUTPATIENT
Start: 2025-08-24

## 2025-08-27 DIAGNOSIS — G57.93 NEUROPATHIC PAIN OF BOTH FEET: ICD-10-CM

## 2025-08-27 DIAGNOSIS — G89.29 CHRONIC LOW BACK PAIN, UNSPECIFIED BACK PAIN LATERALITY, UNSPECIFIED WHETHER SCIATICA PRESENT: ICD-10-CM

## 2025-08-27 DIAGNOSIS — M54.50 CHRONIC LOW BACK PAIN, UNSPECIFIED BACK PAIN LATERALITY, UNSPECIFIED WHETHER SCIATICA PRESENT: ICD-10-CM

## 2025-08-27 RX ORDER — DULOXETIN HYDROCHLORIDE 60 MG/1
60 CAPSULE, DELAYED RELEASE ORAL 2 TIMES DAILY
Qty: 60 CAPSULE | Refills: 2 | Status: SHIPPED | OUTPATIENT
Start: 2025-08-27

## 2025-10-13 ENCOUNTER — APPOINTMENT (OUTPATIENT)
Dept: CARDIOLOGY | Facility: CLINIC | Age: 70
End: 2025-10-13
Payer: MEDICARE

## 2025-10-20 ENCOUNTER — APPOINTMENT (OUTPATIENT)
Facility: CLINIC | Age: 70
End: 2025-10-20
Payer: MEDICARE

## 2025-10-27 ENCOUNTER — APPOINTMENT (OUTPATIENT)
Facility: CLINIC | Age: 70
End: 2025-10-27
Payer: MEDICARE

## 2025-11-25 ENCOUNTER — APPOINTMENT (OUTPATIENT)
Dept: PRIMARY CARE | Facility: CLINIC | Age: 70
End: 2025-11-25
Payer: MEDICARE

## 2026-01-12 ENCOUNTER — APPOINTMENT (OUTPATIENT)
Dept: CARDIOLOGY | Facility: CLINIC | Age: 71
End: 2026-01-12
Payer: MEDICARE

## 2026-03-25 ENCOUNTER — APPOINTMENT (OUTPATIENT)
Dept: PRIMARY CARE | Facility: CLINIC | Age: 71
End: 2026-03-25
Payer: MEDICARE

## (undated) DEVICE — BLADE, OSCILLATING/SAGITTAL, 25MM X 9MM

## (undated) DEVICE — SYRINGE, 60 CC, IRRIGATION, BULB, CONTRO-BULB, PAPER POUCH

## (undated) DEVICE — SOLUTION, IRRIGATION, SODIUM CHLORIDE 0.9%, 1000 ML, POUR BOTTLE

## (undated) DEVICE — SUTURE, VICRYL, 3-0, 27 IN, SH

## (undated) DEVICE — BANDAGE, GAUZE, 6 PLY, KERLIX, 4.5 IN X 4.1 YD, AMD, STERILE

## (undated) DEVICE — NEEDLE, HYPODERMIC, REGULAR WALL, REGULAR BEVEL, 25 G X 1.5 IN

## (undated) DEVICE — SOLUTION, IRRIGATION, STERILE WATER, 1000 ML, POUR BOTTLE

## (undated) DEVICE — Device

## (undated) DEVICE — SUTURE, PROLENE, 2-0, CT-1, BL MONO, LF

## (undated) DEVICE — SUTURE, ETHILON, 4-0, BLK, MONO, PS-2 18

## (undated) DEVICE — HIGH FLOW TIP FOR INTERPULSE HANDPIECE SET

## (undated) DEVICE — SUTURE, ETHILON, 4-0, 18 IN, P-3, BLACK

## (undated) DEVICE — BANDAGE, ESMARK, 4 IN X 12 FT, LF

## (undated) DEVICE — DRESSING, ABDOMINAL, WET PRUF, TENDERSORB, 5 X 9 IN, STERILE

## (undated) DEVICE — GLOVE, SURGICAL, PROTEXIS PI W/NEU-THERA, 7.5, PF, LF

## (undated) DEVICE — BANDAGE, ELASTIC, SELF-CLOSE, 4 IN, HONEYCOMB, STERILE

## (undated) DEVICE — GLOVE, SURGICAL, PROTEXIS PI BLUE W/NEUTHERA, 8.0, PF, LF

## (undated) DEVICE — GLOVE, SURGICAL, PROTEXIS PI , 8.0, PF, LF

## (undated) DEVICE — PREP TRAY, VAGINAL

## (undated) DEVICE — TOWEL PACK, STERILE, 4/PACK, BLUE

## (undated) DEVICE — DRESSING, GAUZE, SUPER KERLIX, 6X6

## (undated) DEVICE — DRESSING, ADHESIVE, ISLAND, TELFA, 2 X 3.75 IN, LF

## (undated) DEVICE — DRESSING, GAUZE, PETROLATUM, XEROFORM, 5 X 9 IN, STERILE

## (undated) DEVICE — BANDAGE, COFLEX, 4 X 5 YDS, FOAM TAN, STERILE, LF

## (undated) DEVICE — SPONGE, LAP, XRAY DECT, 4IN X 18IN, W/MASTER DMT, STERILE

## (undated) DEVICE — DRESSING, GAUZE, PETROLATUM, PATCH, XEROFORM, 1 X 8 IN, STERILE

## (undated) DEVICE — INTERPULSE HANDPIECE SET W/ 10FT SUCTION TUBING

## (undated) DEVICE — SUTURE, CTD, VICRYL 3-0, UND, BR, SH-1

## (undated) DEVICE — GLOVE, SURGICAL, PROTEXIS PI , 6.5, PF, LF

## (undated) DEVICE — BANDAGE, GAUZE, COTTON, STERILE, BULKEE II, 4.5IN X 4.1YD

## (undated) DEVICE — APPLICATOR, CHLORAPREP, W/ORANGE TINT, 26ML

## (undated) DEVICE — GLOVE, SURGICAL, PROTEXIS PI MICRO, 7.5, PF, LF

## (undated) DEVICE — SUTURE, VICRYL, 4-0, 18 IN, UNDYED BR PS-2

## (undated) DEVICE — SUTURE, PROLENE, 3-0, 18 IN, FS-1, BLUE